# Patient Record
Sex: FEMALE | Race: WHITE | NOT HISPANIC OR LATINO | Employment: OTHER | URBAN - NONMETROPOLITAN AREA
[De-identification: names, ages, dates, MRNs, and addresses within clinical notes are randomized per-mention and may not be internally consistent; named-entity substitution may affect disease eponyms.]

---

## 2017-03-09 ENCOUNTER — OFFICE VISIT (OUTPATIENT)
Dept: PULMONOLOGY | Facility: CLINIC | Age: 57
End: 2017-03-09

## 2017-03-09 VITALS
RESPIRATION RATE: 18 BRPM | SYSTOLIC BLOOD PRESSURE: 118 MMHG | HEIGHT: 66 IN | HEART RATE: 72 BPM | WEIGHT: 265 LBS | BODY MASS INDEX: 42.59 KG/M2 | OXYGEN SATURATION: 99 % | DIASTOLIC BLOOD PRESSURE: 76 MMHG

## 2017-03-09 DIAGNOSIS — J45.40 MODERATE PERSISTENT ASTHMA WITHOUT COMPLICATION: ICD-10-CM

## 2017-03-09 DIAGNOSIS — R06.02 SHORTNESS OF BREATH: Primary | ICD-10-CM

## 2017-03-09 DIAGNOSIS — E66.01 MORBID OBESITY, UNSPECIFIED OBESITY TYPE (HCC): ICD-10-CM

## 2017-03-09 DIAGNOSIS — G47.33 OBSTRUCTIVE SLEEP APNEA: ICD-10-CM

## 2017-03-09 DIAGNOSIS — G47.33 OSA (OBSTRUCTIVE SLEEP APNEA): ICD-10-CM

## 2017-03-09 DIAGNOSIS — J30.89 OTHER ALLERGIC RHINITIS: ICD-10-CM

## 2017-03-09 PROCEDURE — 95012 NITRIC OXIDE EXP GAS DETER: CPT | Performed by: INTERNAL MEDICINE

## 2017-03-09 PROCEDURE — 99214 OFFICE O/P EST MOD 30 MIN: CPT | Performed by: INTERNAL MEDICINE

## 2017-03-09 NOTE — PROGRESS NOTES
"Chief Complaint   Patient presents with   • Follow-up         Subjective   Janet Obrien is a 56 y.o. female.     History of Present Illness   The patient is here for follow-up of obstructive sleep apnea, allergic rhinitis, and asthma.    She continues to have symptoms of rhinitis even with using Flonase twice a day and Astelin as needed.    She has a dogs and cats indoors.  She states mold may also be present in her house.    She used the sample of Spiriva and was doing well with this medication but her insurance would not cover the medication. She is now using Dulera, but she is using Ventolin 3-4 times a day also. She states she was not needing to use the Ventolin as often when she was using Spiriva.    In December, she had an episode of shortness of breath that required her to go to the emergency room.  A couple of weeks after that she still hadn't improved much since she went to the urgent treatment Center and was given antibiotics and steroids for an upper respiratory infection.    She is using the CPAP machine at night but complains of it causing extreme dryness.  She is not sure if there is a humidifier or how to adjust or turn on the humidifier if one is present.    The following portions of the patient's history were reviewed and updated as appropriate: allergies, current medications, past family history, past medical history, past social history and past surgical history.    Review of Systems   HENT: Negative for sinus pressure, sneezing and sore throat.    Respiratory: Positive for shortness of breath. Negative for cough and wheezing.        Objective   Visit Vitals   • /76   • Pulse 72   • Resp 18   • Ht 66\" (167.6 cm)   • Wt 265 lb (120 kg)   • SpO2 99%   • BMI 42.77 kg/m2     Physical Exam   Constitutional: She is oriented to person, place, and time. She appears well-developed.   HENT:   Head: Normocephalic and atraumatic.   Fillings noted.  Erythematous nares.  Mild " drainage.  Mallempati III/IV.   Eyes: EOM are normal.   Cardiovascular: Normal rate and regular rhythm.    Pulmonary/Chest: Effort normal and breath sounds normal. She has no wheezes.   Abdominal:   Obese.    Musculoskeletal:   Gait normal.   Neurological: She is alert and oriented to person, place, and time.   Skin: Skin is dry.   Psychiatric: She has a normal mood and affect.   Vitals reviewed.          Assessment/Plan   Janet was seen today for follow-up.    Diagnoses and all orders for this visit:    Shortness of breath    Obstructive sleep apnea  -     BIPAP / CPAP Adjustment  -     Ambulatory Referral to Bariatric Surgery    Moderate persistent asthma without complication  -     Ambulatory Referral to Bariatric Surgery  -     Nitric Oxide    LINDSAY (obstructive sleep apnea)    Morbid obesity, unspecified obesity type    Other allergic rhinitis    Other orders  -     Tiotropium Bromide Monohydrate (SPIRIVA RESPIMAT) 1.25 MCG/ACT aerosol solution; Inhale 2 puffs Daily.  -     mometasone-formoterol (DULERA 200) 200-5 MCG/ACT inhaler; Inhale 2 puffs 2 (Two) Times a Day. Rinse mouth with water after use.           Return in about 3 months (around 6/9/2017) for Recheck, For Elvia.    DISCUSSION (if any):  I will request that Tonia demonstrate the humidifier to the patient and show her how to increase the humidification. This should help with the dryness.     She has some issues using the CPAP initially but she is doing better. We have discussed the dryness issue so this should improve also.     I will start Spiriva again. She was having fewer issues and SOB when on Spiriva. Continue Dulera. Ventolin prn.     The patient is open to the idea of bariatric surgery. She has sleep apnea and asthma so she would benefit from the surgery. I reviewed her sleep study and she has mild sleep apnea with an AHI of 13. She needs to be compliant with AutoPAP. The setting is 8/20.    FeNO today 11.       Errors in dictation may  reflect use of voice recognition software and not all errors in transcription may have been detected prior to signing    This document was electronically signed by Jose Rao MD March 9, 2017  11:51 AM

## 2017-06-28 ENCOUNTER — HOSPITAL ENCOUNTER (EMERGENCY)
Facility: HOSPITAL | Age: 57
Discharge: HOME | End: 2017-06-28
Attending: EMERGENCY MEDICINE | Admitting: EMERGENCY MEDICINE

## 2017-06-28 ENCOUNTER — APPOINTMENT (OUTPATIENT)
Dept: CT IMAGING | Facility: HOSPITAL | Age: 57
End: 2017-06-28

## 2017-06-28 VITALS
HEART RATE: 70 BPM | OXYGEN SATURATION: 98 % | WEIGHT: 265 LBS | DIASTOLIC BLOOD PRESSURE: 72 MMHG | BODY MASS INDEX: 44.15 KG/M2 | RESPIRATION RATE: 18 BRPM | TEMPERATURE: 98.1 F | HEIGHT: 65 IN | SYSTOLIC BLOOD PRESSURE: 126 MMHG

## 2017-06-28 DIAGNOSIS — M25.519 NECK AND SHOULDER PAIN: ICD-10-CM

## 2017-06-28 DIAGNOSIS — J30.9 ALLERGIC RHINITIS, UNSPECIFIED ALLERGIC RHINITIS TRIGGER, UNSPECIFIED RHINITIS SEASONALITY: ICD-10-CM

## 2017-06-28 DIAGNOSIS — G44.89 OTHER HEADACHE SYNDROME: Primary | ICD-10-CM

## 2017-06-28 DIAGNOSIS — M54.2 NECK AND SHOULDER PAIN: ICD-10-CM

## 2017-06-28 DIAGNOSIS — N76.0 ACUTE VAGINITIS: ICD-10-CM

## 2017-06-28 PROCEDURE — 99282 EMERGENCY DEPT VISIT SF MDM: CPT

## 2017-06-28 PROCEDURE — 70450 CT HEAD/BRAIN W/O DYE: CPT

## 2017-06-28 RX ORDER — FLUCONAZOLE 150 MG/1
150 TABLET ORAL DAILY
Qty: 2 TABLET | Refills: 0 | Status: SHIPPED | OUTPATIENT
Start: 2017-06-28 | End: 2017-08-24 | Stop reason: SDUPTHER

## 2017-07-10 ENCOUNTER — OFFICE VISIT (OUTPATIENT)
Dept: PULMONOLOGY | Facility: CLINIC | Age: 57
End: 2017-07-10

## 2017-07-10 VITALS
OXYGEN SATURATION: 98 % | DIASTOLIC BLOOD PRESSURE: 72 MMHG | WEIGHT: 267.2 LBS | SYSTOLIC BLOOD PRESSURE: 108 MMHG | RESPIRATION RATE: 16 BRPM | BODY MASS INDEX: 44.52 KG/M2 | HEART RATE: 72 BPM | HEIGHT: 65 IN

## 2017-07-10 DIAGNOSIS — J45.901 ACUTE EXACERBATION OF ASTHMA WITH ALLERGIC RHINITIS: ICD-10-CM

## 2017-07-10 DIAGNOSIS — G47.33 OSA (OBSTRUCTIVE SLEEP APNEA): Primary | ICD-10-CM

## 2017-07-10 DIAGNOSIS — J30.89 OTHER ALLERGIC RHINITIS: ICD-10-CM

## 2017-07-10 PROCEDURE — 99214 OFFICE O/P EST MOD 30 MIN: CPT | Performed by: NURSE PRACTITIONER

## 2017-07-10 PROCEDURE — 96372 THER/PROPH/DIAG INJ SC/IM: CPT | Performed by: NURSE PRACTITIONER

## 2017-07-10 RX ORDER — METHYLPREDNISOLONE ACETATE 80 MG/ML
80 INJECTION, SUSPENSION INTRA-ARTICULAR; INTRALESIONAL; INTRAMUSCULAR; SOFT TISSUE ONCE
Status: COMPLETED | OUTPATIENT
Start: 2017-07-10 | End: 2017-07-13

## 2017-07-10 NOTE — PROGRESS NOTES
"Chief Complaint   Patient presents with   • Follow-up     ASTHMA COMPLICATION         Subjective   Janet Obrien is a 56 y.o. female.     History of Present Illness   The patient is here for shortness of breath and asthma.    She began having increased shortness of breath about 4 days ago and reports the shortness of breath has continued to worsen. She feels like she cannot get enough air in when she breathes. She has continued to use Dulera and Spiriva. She denies any fever or worsening cough. Her sclera are very erythematous and upon further questioning she mentions fresh cut grass being a trigger. She is using Ventolin about 2 times a day everyday for the last 4 days.     Upon further questioning, she admits to increased stress. Her Aunt passed away and she has several issues at home. We discussed how anxiety can cause or exacerbate the shortness of breath.     She is not using AutoPAP. She states the machine was not counting the time she used it and the company said she was not compliant and insurance would not pay for the machine. She returned it to the company.     The following portions of the patient's history were reviewed and updated as appropriate: allergies, current medications, past family history, past medical history, past social history and past surgical history.    Review of Systems   HENT: Negative for sinus pressure, sneezing and sore throat.    Respiratory: Positive for shortness of breath and wheezing. Negative for cough.        Objective   Visit Vitals   • /72   • Pulse 72   • Resp 16   • Ht 65\" (165.1 cm)   • Wt 267 lb 3.2 oz (121 kg)   • SpO2 98%   • BMI 44.46 kg/m2     Physical Exam   Constitutional: She is oriented to person, place, and time. She appears well-developed.   HENT:   Head: Normocephalic and atraumatic.   Crowded oropharynx.  Mild drainage.   Eyes: EOM are normal.   Sclera erythematous. Right >Left.   Cardiovascular: Normal rate and regular rhythm.  "   Pulmonary/Chest: Effort normal.   Very minimal end expiratory wheezing.   Abdominal:   Obese.   Neurological: She is alert and oriented to person, place, and time.   Skin: Skin is warm.   Psychiatric:   Tearful   Vitals reviewed.          Assessment/Plan   Janet was seen today for follow-up.    Diagnoses and all orders for this visit:    LINDSAY (obstructive sleep apnea)    Other allergic rhinitis    Acute exacerbation of asthma with allergic rhinitis  -     methylPREDNISolone acetate (DEPO-medrol) injection 80 mg; Inject 1 mL into the shoulder, thigh, or buttocks 1 (One) Time.    Other orders  -     mometasone-formoterol (DULERA 200) 200-5 MCG/ACT inhaler; Inhale 2 puffs 2 (Two) Times a Day. Rinse mouth with water after use.           Return in about 6 weeks (around 8/21/2017) for For Me.    DISCUSSION (if any):  For the symptoms of acute exacerbation of asthma, I have prescribed intramuscular steroids.    Patient will be prescribed antibiotics, if she develops any fever.    Side effects have been discussed in detail    Patient was asked to report to the emergency room if symptoms do not improve.    Continue Dulera and Spiriva. Spiriva samples given today.    Continue Nasalide and use it everyday.    Continue Singulair.    Errors in dictation may reflect use of voice recognition software and not all errors in transcription may have been detected prior to signing    This document was electronically signed by NARA Roque July 10, 2017  10:20 AM

## 2017-07-13 RX ADMIN — METHYLPREDNISOLONE ACETATE 80 MG: 80 INJECTION, SUSPENSION INTRA-ARTICULAR; INTRALESIONAL; INTRAMUSCULAR; SOFT TISSUE at 14:21

## 2017-08-24 ENCOUNTER — OFFICE VISIT (OUTPATIENT)
Dept: PULMONOLOGY | Facility: CLINIC | Age: 57
End: 2017-08-24

## 2017-08-24 VITALS
HEIGHT: 65 IN | OXYGEN SATURATION: 97 % | SYSTOLIC BLOOD PRESSURE: 116 MMHG | WEIGHT: 268.3 LBS | DIASTOLIC BLOOD PRESSURE: 72 MMHG | RESPIRATION RATE: 16 BRPM | HEART RATE: 65 BPM | BODY MASS INDEX: 44.7 KG/M2

## 2017-08-24 DIAGNOSIS — G47.33 OBSTRUCTIVE SLEEP APNEA: ICD-10-CM

## 2017-08-24 DIAGNOSIS — J45.40 MODERATE PERSISTENT ASTHMA WITHOUT COMPLICATION: ICD-10-CM

## 2017-08-24 DIAGNOSIS — R06.02 SHORTNESS OF BREATH: ICD-10-CM

## 2017-08-24 DIAGNOSIS — J30.89 OTHER ALLERGIC RHINITIS: Primary | ICD-10-CM

## 2017-08-24 DIAGNOSIS — E66.01 MORBID OBESITY, UNSPECIFIED OBESITY TYPE (HCC): ICD-10-CM

## 2017-08-24 PROCEDURE — 99214 OFFICE O/P EST MOD 30 MIN: CPT | Performed by: NURSE PRACTITIONER

## 2017-08-24 RX ORDER — FLUCONAZOLE 150 MG/1
150 TABLET ORAL DAILY
Qty: 2 TABLET | Refills: 0 | Status: SHIPPED | OUTPATIENT
Start: 2017-08-24 | End: 2017-08-29 | Stop reason: SDUPTHER

## 2017-08-24 RX ORDER — FLUNISOLIDE 0.25 MG/ML
2 SOLUTION NASAL EVERY 12 HOURS
Qty: 1 BOTTLE | Refills: 5 | Status: SHIPPED | OUTPATIENT
Start: 2017-08-24 | End: 2018-07-27

## 2017-08-24 RX ORDER — ALBUTEROL SULFATE 90 UG/1
2 AEROSOL, METERED RESPIRATORY (INHALATION) EVERY 6 HOURS PRN
Qty: 1 INHALER | Refills: 3 | Status: SHIPPED | OUTPATIENT
Start: 2017-08-24 | End: 2018-10-24 | Stop reason: SDUPTHER

## 2017-08-24 RX ORDER — AZELASTINE 1 MG/ML
2 SPRAY, METERED NASAL 2 TIMES DAILY PRN
Qty: 1 EACH | Refills: 5 | Status: SHIPPED | OUTPATIENT
Start: 2017-08-24 | End: 2018-07-27

## 2017-08-24 NOTE — PROGRESS NOTES
"Chief Complaint   Patient presents with   • Follow-up   • Sleep Apnea         Subjective   Janet Yoana Obrien is a 57 y.o. female.     History of Present Illness   The patient is here for follow-up of shortness of breath, asthma, and allergic rhinitis.    She is using Spiriva and Dulera as directed.  She has some shortness of breath at times but she feels this is due to anxiety.  She has had some family issues recently which has increased her stress and anxiety.  She describes her breathing as being heavy at times.    She has only used the Ventolin 3 times since her last appointment.    She continues to use Nasalide every day and Astelin on occasion. She states the Nasalide burns she has continued to use it.    She is taking Singulair at night    Upon questioning about using a CPAP machine she states that the machine was picked up by Jayda.  She reports she was using the machine but the machine was not capturing the full time she used it so she was sent to be noncompliant with therapy.  She would like to have another CPAP machine.  She states she felt better when she was using the machine.    The following portions of the patient's history were reviewed and updated as appropriate: allergies, current medications, past family history, past medical history, past social history and past surgical history.    Review of Systems   HENT: Negative for sinus pressure, sneezing and sore throat.    Respiratory: Positive for cough, shortness of breath and wheezing.        Objective   Visit Vitals   • /72   • Pulse 65   • Resp 16   • Ht 65\" (165.1 cm)   • Wt 268 lb 4.8 oz (122 kg)   • SpO2 97%   • BMI 44.65 kg/m2     Physical Exam   Constitutional: She is oriented to person, place, and time. She appears well-developed.   HENT:   Head: Normocephalic and atraumatic.   Eyes: EOM are normal.   Cardiovascular: Normal rate and regular rhythm.    Pulmonary/Chest: Effort normal and breath sounds normal. No respiratory distress. " She has no wheezes.   Abdominal:   Obese   Musculoskeletal:   Gait normal.   Neurological: She is alert and oriented to person, place, and time.   Skin: Skin is dry.   Psychiatric: She has a normal mood and affect.   Vitals reviewed.          Assessment/Plan   Janet was seen today for follow-up and sleep apnea.    Diagnoses and all orders for this visit:    Other allergic rhinitis    Moderate persistent asthma without complication    Morbid obesity, unspecified obesity type    Shortness of breath    Obstructive sleep apnea  -     BIPAP / CPAP Adjustment    Other orders  -     Tiotropium Bromide Monohydrate (SPIRIVA RESPIMAT) 1.25 MCG/ACT aerosol solution; Inhale 2 puffs Daily.  -     mometasone-formoterol (DULERA 200) 200-5 MCG/ACT inhaler; Inhale 2 puffs 2 (Two) Times a Day. Rinse mouth with water after use.  -     flunisolide (NASALIDE) 25 MCG/ACT (0.025%) solution nasal spray; Inhale 2 sprays Every 12 (Twelve) Hours.  -     azelastine (ASTELIN) 0.1 % nasal spray; 2 sprays into each nostril 2 (Two) Times a Day As Needed for Rhinitis or Allergies. Use in each nostril as directed  -     albuterol (VENTOLIN HFA) 108 (90 Base) MCG/ACT inhaler; Inhale 2 puffs Every 6 (Six) Hours As Needed for Wheezing.  -     fluconazole (DIFLUCAN) 150 MG tablet; Take 1 tablet by mouth Daily.           Return in about 3 months (around 11/24/2017) for Recheck, For Dr. Rao.    DISCUSSION (if any):  Her asthma seems to be under control at this time.  She is using her rescue inhaler minimally.  There could definitely be a component of anxiety that adds to her shortness of breath.  Continue Spiriva and Dulera as directed and Ventolin as needed.    She has benefited from the nasal spray so I encouraged her to continue to use Nasalide daily.  If her nose becomes too dry or she begins to have some bleeding she may decrease to using Nasalide every other day for a short period of time.    She definitely has sleep apnea and she is interested  in using CPAP again.  I reviewed her sleep study from November 2016 which was a home sleep study that showed mild sleep apnea with an AHI of 13 per hour.  She would definitely benefit from using CPAP.  I will order a new set up through a different company since she had a bad experience with [facility].  I have discussed compliance in using the machine and how she may benefit.  She is willing to use CPAP nightly.      Dictated utilizing Dragon dictation.    This document was electronically signed by NARA Roque August 24, 2017  2:06 PM

## 2017-08-29 RX ORDER — FLUCONAZOLE 150 MG/1
150 TABLET ORAL DAILY
Qty: 2 TABLET | Refills: 0 | Status: SHIPPED | OUTPATIENT
Start: 2017-08-29 | End: 2018-07-27

## 2017-10-30 ENCOUNTER — TRANSCRIBE ORDERS (OUTPATIENT)
Dept: MAMMOGRAPHY | Facility: HOSPITAL | Age: 57
End: 2017-10-30

## 2017-10-30 DIAGNOSIS — Z12.39 BREAST CANCER SCREENING: Primary | ICD-10-CM

## 2017-12-01 ENCOUNTER — TELEPHONE (OUTPATIENT)
Dept: PULMONOLOGY | Facility: CLINIC | Age: 57
End: 2017-12-01

## 2017-12-01 NOTE — TELEPHONE ENCOUNTER
[facility] HAS SCHEDULED SETUP TWICE AND PATIENT DOES NOT SHOW UP. NOW WILL NOT RETURN CALLS TO DME.

## 2017-12-13 ENCOUNTER — HOSPITAL ENCOUNTER (OUTPATIENT)
Dept: MAMMOGRAPHY | Facility: HOSPITAL | Age: 57
Discharge: HOME | End: 2017-12-13
Attending: INTERNAL MEDICINE | Admitting: INTERNAL MEDICINE

## 2017-12-13 DIAGNOSIS — Z12.39 BREAST CANCER SCREENING: ICD-10-CM

## 2017-12-13 PROCEDURE — 77063 BREAST TOMOSYNTHESIS BI: CPT

## 2017-12-13 PROCEDURE — G0202 SCR MAMMO BI INCL CAD: HCPCS

## 2018-04-24 ENCOUNTER — HOSPITAL ENCOUNTER (EMERGENCY)
Facility: HOSPITAL | Age: 58
Discharge: HOME | End: 2018-04-24
Attending: STUDENT IN AN ORGANIZED HEALTH CARE EDUCATION/TRAINING PROGRAM | Admitting: STUDENT IN AN ORGANIZED HEALTH CARE EDUCATION/TRAINING PROGRAM

## 2018-04-24 VITALS
RESPIRATION RATE: 18 BRPM | HEIGHT: 66 IN | DIASTOLIC BLOOD PRESSURE: 82 MMHG | SYSTOLIC BLOOD PRESSURE: 148 MMHG | BODY MASS INDEX: 42.59 KG/M2 | WEIGHT: 265 LBS | OXYGEN SATURATION: 96 % | HEART RATE: 76 BPM | TEMPERATURE: 98 F

## 2018-04-24 DIAGNOSIS — H53.2 DIPLOPIA: ICD-10-CM

## 2018-04-24 DIAGNOSIS — R42 VERTIGO: Primary | ICD-10-CM

## 2018-04-24 LAB
ALBUMIN SERPL-MCNC: 4.4 G/DL (ref 3.5–5)
ALBUMIN/GLOB SERPL: 1.5 G/DL (ref 1–2)
ALP SERPL-CCNC: 96 U/L (ref 38–126)
ALT SERPL W P-5'-P-CCNC: 34 U/L (ref 13–69)
ANION GAP SERPL CALCULATED.3IONS-SCNC: 18.1 MMOL/L (ref 10–20)
AST SERPL-CCNC: 29 U/L (ref 15–46)
BASOPHILS # BLD AUTO: 0.04 10*3/MM3 (ref 0–0.2)
BASOPHILS NFR BLD AUTO: 0.8 % (ref 0–2.5)
BILIRUB SERPL-MCNC: 0.6 MG/DL (ref 0.2–1.3)
BUN BLD-MCNC: 19 MG/DL (ref 7–20)
BUN/CREAT SERPL: 19 (ref 7.1–23.5)
CALCIUM SPEC-SCNC: 10.1 MG/DL (ref 8.4–10.2)
CHLORIDE SERPL-SCNC: 97 MMOL/L (ref 98–107)
CO2 SERPL-SCNC: 27 MMOL/L (ref 26–30)
CREAT BLD-MCNC: 1 MG/DL (ref 0.6–1.3)
DEPRECATED RDW RBC AUTO: 40.4 FL (ref 37–54)
EOSINOPHIL # BLD AUTO: 0.17 10*3/MM3 (ref 0–0.7)
EOSINOPHIL NFR BLD AUTO: 3.3 % (ref 0–7)
ERYTHROCYTE [DISTWIDTH] IN BLOOD BY AUTOMATED COUNT: 13.4 % (ref 11.5–14.5)
GFR SERPL CREATININE-BSD FRML MDRD: 57 ML/MIN/1.73
GLOBULIN UR ELPH-MCNC: 2.9 GM/DL
GLUCOSE BLD-MCNC: 142 MG/DL (ref 74–98)
HCT VFR BLD AUTO: 38.9 % (ref 37–47)
HGB BLD-MCNC: 13.1 G/DL (ref 12–16)
HOLD SPECIMEN: NORMAL
HOLD SPECIMEN: NORMAL
IMM GRANULOCYTES # BLD: 0.01 10*3/MM3 (ref 0–0.06)
IMM GRANULOCYTES NFR BLD: 0.2 % (ref 0–0.6)
LYMPHOCYTES # BLD AUTO: 1.24 10*3/MM3 (ref 0.6–3.4)
LYMPHOCYTES NFR BLD AUTO: 24.1 % (ref 10–50)
MCH RBC QN AUTO: 27.9 PG (ref 27–31)
MCHC RBC AUTO-ENTMCNC: 33.7 G/DL (ref 30–37)
MCV RBC AUTO: 82.8 FL (ref 81–99)
MONOCYTES # BLD AUTO: 0.5 10*3/MM3 (ref 0–0.9)
MONOCYTES NFR BLD AUTO: 9.7 % (ref 0–12)
NEUTROPHILS # BLD AUTO: 3.18 10*3/MM3 (ref 2–6.9)
NEUTROPHILS NFR BLD AUTO: 61.9 % (ref 37–80)
NRBC BLD MANUAL-RTO: 0 /100 WBC (ref 0–0)
PLATELET # BLD AUTO: 293 10*3/MM3 (ref 130–400)
PMV BLD AUTO: 10.9 FL (ref 6–12)
POTASSIUM BLD-SCNC: 3.1 MMOL/L (ref 3.5–5.1)
PROT SERPL-MCNC: 7.3 G/DL (ref 6.3–8.2)
RBC # BLD AUTO: 4.7 10*6/MM3 (ref 4.2–5.4)
SODIUM BLD-SCNC: 139 MMOL/L (ref 137–145)
TROPONIN I SERPL-MCNC: <0.012 NG/ML (ref 0–0.03)
WBC NRBC COR # BLD: 5.14 10*3/MM3 (ref 4.8–10.8)
WHOLE BLOOD HOLD SPECIMEN: NORMAL
WHOLE BLOOD HOLD SPECIMEN: NORMAL

## 2018-04-24 PROCEDURE — 85025 COMPLETE CBC W/AUTO DIFF WBC: CPT | Performed by: STUDENT IN AN ORGANIZED HEALTH CARE EDUCATION/TRAINING PROGRAM

## 2018-04-24 PROCEDURE — 99284 EMERGENCY DEPT VISIT MOD MDM: CPT

## 2018-04-24 PROCEDURE — 80053 COMPREHEN METABOLIC PANEL: CPT | Performed by: STUDENT IN AN ORGANIZED HEALTH CARE EDUCATION/TRAINING PROGRAM

## 2018-04-24 PROCEDURE — 96374 THER/PROPH/DIAG INJ IV PUSH: CPT

## 2018-04-24 PROCEDURE — 96375 TX/PRO/DX INJ NEW DRUG ADDON: CPT

## 2018-04-24 PROCEDURE — 25010000002 ONDANSETRON PER 1 MG: Performed by: STUDENT IN AN ORGANIZED HEALTH CARE EDUCATION/TRAINING PROGRAM

## 2018-04-24 PROCEDURE — 84484 ASSAY OF TROPONIN QUANT: CPT | Performed by: STUDENT IN AN ORGANIZED HEALTH CARE EDUCATION/TRAINING PROGRAM

## 2018-04-24 PROCEDURE — 25010000002 LORAZEPAM PER 2 MG: Performed by: STUDENT IN AN ORGANIZED HEALTH CARE EDUCATION/TRAINING PROGRAM

## 2018-04-24 PROCEDURE — 82962 GLUCOSE BLOOD TEST: CPT

## 2018-04-24 PROCEDURE — 93005 ELECTROCARDIOGRAM TRACING: CPT | Performed by: STUDENT IN AN ORGANIZED HEALTH CARE EDUCATION/TRAINING PROGRAM

## 2018-04-24 RX ORDER — MECLIZINE HYDROCHLORIDE 25 MG/1
50 TABLET ORAL ONCE
Status: COMPLETED | OUTPATIENT
Start: 2018-04-24 | End: 2018-04-24

## 2018-04-24 RX ORDER — ONDANSETRON 4 MG/1
4 TABLET, ORALLY DISINTEGRATING ORAL EVERY 6 HOURS PRN
Qty: 20 TABLET | Refills: 0 | Status: SHIPPED | OUTPATIENT
Start: 2018-04-24 | End: 2018-07-27

## 2018-04-24 RX ORDER — ONDANSETRON 2 MG/ML
4 INJECTION INTRAMUSCULAR; INTRAVENOUS ONCE
Status: COMPLETED | OUTPATIENT
Start: 2018-04-24 | End: 2018-04-24

## 2018-04-24 RX ORDER — MECLIZINE HYDROCHLORIDE 25 MG/1
25 TABLET ORAL 4 TIMES DAILY PRN
Qty: 20 TABLET | Refills: 0 | Status: SHIPPED | OUTPATIENT
Start: 2018-04-24 | End: 2018-07-27

## 2018-04-24 RX ORDER — LORAZEPAM 2 MG/ML
1 INJECTION INTRAMUSCULAR ONCE
Status: COMPLETED | OUTPATIENT
Start: 2018-04-24 | End: 2018-04-24

## 2018-04-24 RX ORDER — SODIUM CHLORIDE 0.9 % (FLUSH) 0.9 %
10 SYRINGE (ML) INJECTION AS NEEDED
Status: DISCONTINUED | OUTPATIENT
Start: 2018-04-24 | End: 2018-04-24 | Stop reason: HOSPADM

## 2018-04-24 RX ADMIN — MECLIZINE HYDROCHLORIDE 50 MG: 25 TABLET ORAL at 19:55

## 2018-04-24 RX ADMIN — ONDANSETRON 4 MG: 2 INJECTION INTRAMUSCULAR; INTRAVENOUS at 19:56

## 2018-04-24 RX ADMIN — LORAZEPAM 1 MG: 2 INJECTION INTRAMUSCULAR; INTRAVENOUS at 19:59

## 2018-04-25 LAB — GLUCOSE BLDC GLUCOMTR-MCNC: 141 MG/DL (ref 70–130)

## 2018-04-25 NOTE — ED PROVIDER NOTES
Subjective   Patient is a 57-year-old female that presents with vertigo symptoms that started today.  States she has nausea and vomiting as well as a headache.  Patient states when she tries to focus concern directions she has double vision.  She also reports that when she closes her eyes the vertigo persists.  She has a history of myositic orbital pseudotumor that she follows with Dr. Keenan aj.  She states when this flares up and he will schedule her for outpatient infusions.  She is not sure what the medicines are that she gets.  Currently closing one eye makes her symptoms better but they're still present.  She states it is worse when she tries to focus her eyes on anything.            Review of Systems   All other systems reviewed and are negative.      Past Medical History:   Diagnosis Date   • Asthma    • Chronic bronchitis    • Diabetes mellitus    • Hyperlipidemia    • Hypertension    • Hypothyroidism    • Pleurisy    • Pneumonia    • Skin cancer        Allergies   Allergen Reactions   • Penicillins    • Sulfa Antibiotics        Past Surgical History:   Procedure Laterality Date   • APPENDECTOMY     •  SECTION     • DILATATION AND CURETTAGE     • HYSTERECTOMY     • TONSILLECTOMY AND ADENOIDECTOMY         Family History   Problem Relation Age of Onset   • Hypertension Mother    • Heart attack Mother    • Fibromyalgia Mother    • Heart attack Father        Social History     Social History   • Marital status:      Social History Main Topics   • Smoking status: Former Smoker     Packs/day: 0.25     Years: 15.00     Types: Cigarettes     Quit date: 2004   • Smokeless tobacco: Never Used   • Alcohol use No   • Drug use: No     Other Topics Concern   • Not on file           Objective   Physical Exam   Nursing note and vitals reviewed.    GEN: No acute distress  Head: Normocephalic, atraumatic  Eyes: Pupils equal round reactive to light, mild nystagmus at baseline when looking straight ahead.   No true inducible nystagmus.  When she covers either eye it does seem to level out she seems to be able to focus.  ENT: Posterior pharynx normal in appearance, oral mucosa is moist  Chest: Nontender to palpation  Cardiovascular: Regular rate  Lungs: Clear to auscultation bilaterally  Abdomen: Soft, nontender, nondistended, no peritoneal signs  Extremities: No edema, normal appearance  Neuro: GCS 15, strength 5 out of 5 in all 4 extremities, patient does display normal coordination and has to reach for objects  Psych: Mood and affect are appropriate    Procedures         ED Course  ED Course                  MDM  Number of Diagnoses or Management Options  Diplopia:   Vertigo:   Diagnosis management comments: EKG shows no evidence of dysrhythmia or ST elevations.    Laboratories show no significant abnormalities    Patient was treated conservatively with IV Ativan, IV Zofran, and meclizine orally with good symptom control.  Patient does have a known disorder that she follows with Dr. Hussein for.  She states when these events happen he will have her show up for IV infusions.  She is comfortable with follow up with Dr. Hussein tomorrow.       Amount and/or Complexity of Data Reviewed  Clinical lab tests: reviewed  Decide to obtain previous medical records or to obtain history from someone other than the patient: yes  Obtain history from someone other than the patient: yes  Review and summarize past medical records: yes        Final diagnoses:   Vertigo   Diplopia            Davi Corona MD  04/24/18 [id number]

## 2018-07-27 ENCOUNTER — LAB (OUTPATIENT)
Dept: LAB | Facility: HOSPITAL | Age: 58
End: 2018-07-27
Attending: PSYCHIATRY & NEUROLOGY

## 2018-07-27 ENCOUNTER — OFFICE VISIT (OUTPATIENT)
Dept: NEUROLOGY | Facility: CLINIC | Age: 58
End: 2018-07-27

## 2018-07-27 VITALS
DIASTOLIC BLOOD PRESSURE: 68 MMHG | SYSTOLIC BLOOD PRESSURE: 118 MMHG | OXYGEN SATURATION: 99 % | HEART RATE: 82 BPM | HEIGHT: 65 IN

## 2018-07-27 DIAGNOSIS — H53.2 DIPLOPIA: Primary | ICD-10-CM

## 2018-07-27 DIAGNOSIS — H53.2 DIPLOPIA: ICD-10-CM

## 2018-07-27 LAB — TSH SERPL DL<=0.05 MIU/L-ACNC: 2.06 MIU/ML (ref 0.47–4.68)

## 2018-07-27 PROCEDURE — 36415 COLL VENOUS BLD VENIPUNCTURE: CPT

## 2018-07-27 PROCEDURE — 83519 RIA NONANTIBODY: CPT

## 2018-07-27 PROCEDURE — 99214 OFFICE O/P EST MOD 30 MIN: CPT | Performed by: PSYCHIATRY & NEUROLOGY

## 2018-07-27 PROCEDURE — 84443 ASSAY THYROID STIM HORMONE: CPT

## 2018-07-27 RX ORDER — CELECOXIB 100 MG/1
100 CAPSULE ORAL EVERY EVENING
COMMUNITY
End: 2019-01-24

## 2018-07-27 NOTE — PROGRESS NOTES
[facility] NEUROLOGY [place] PROGRESS NOTE  History of Present Illness     Date: 2018    Patient Identification  Janet Obrien is a 57 y.o. female.    Patient information was obtained from patient.  History/Exam limitations: none.    Original consultation requested by: Dr. Davenport      Chief Complaint   Diplopia (Pt in office today with c/o double vision and vertigo )      History of Present Illness   Patient is a pleasant 57-year-old referred to [facility] neurology [place] evaluation of diplopia.  Patient initially presented to the emergency room on 2018.  She was complaining of nausea and vomiting associated with headache.  She tried to focus she described a double vision.  Patients with clinically sent here for further evaluation and treatment.  We have also discussed the importance of checking her thyroid function and myasthenia gravis.  PMH:   Past Medical History:   Diagnosis Date   • Anxiety    • Asthma    • Chronic bronchitis (CMS/HCC)    • Depression    • Diabetes mellitus (CMS/HCC)    • Fibromyalgia    • Hyperlipidemia    • Hypertension    • Hypothyroidism    • Pleurisy    • Pneumonia    • Skin cancer        Past Surgical History:   Past Surgical History:   Procedure Laterality Date   • APPENDECTOMY     •  SECTION     • DILATATION AND CURETTAGE     • HYSTERECTOMY     • TONSILLECTOMY AND ADENOIDECTOMY         Family Hisotry:   Family History   Problem Relation Age of Onset   • Hypertension Mother    • Heart attack Mother    • Fibromyalgia Mother    • Heart attack Father        Social History:   Social History     Social History   • Marital status:      Spouse name: N/A   • Number of children: N/A   • Years of education: N/A     Occupational History   • Not on file.     Social History Main Topics   • Smoking status: Former Smoker     Packs/day: 0.25     Years: 15.00     Types: Cigarettes     Quit date: 2004   • Smokeless tobacco: Never Used   • Alcohol use  No   • Drug use: No   • Sexual activity: Not on file     Other Topics Concern   • Not on file     Social History Narrative   • No narrative on file       Medications:   Current Outpatient Prescriptions   Medication Sig Dispense Refill   • ACCU-CHEK MARYSOL PLUS test strip      • ACCU-CHEK SOFTCLIX LANCETS lancets      • albuterol (VENTOLIN HFA) 108 (90 Base) MCG/ACT inhaler Inhale 2 puffs Every 6 (Six) Hours As Needed for Wheezing. 1 inhaler 3   • Calcium Carbonate-Vit D-Min (CALCIUM 1200 PO) Take  by mouth.     • celecoxib (CeleBREX) 100 MG capsule Take 100 mg by mouth Every Evening.     • hydrochlorothiazide (HYDRODIURIL) 25 MG tablet Take 1 tablet by mouth daily.     • levothyroxine (SYNTHROID) 150 MCG tablet Take 1 tablet by mouth daily.     • oxyCODONE-acetaminophen (PERCOCET)  MG per tablet      • sitaGLIPtin-metFORMIN (JANUMET)  MG per tablet Take  by mouth.       No current facility-administered medications for this visit.        Allergy:   Allergies   Allergen Reactions   • Penicillins    • Sulfa Antibiotics        Review of Systems:  Review of Systems   Constitutional: Negative for chills and fever.   HENT: Negative for congestion, ear pain, hearing loss, rhinorrhea and sore throat.    Eyes: Positive for visual disturbance. Negative for pain, discharge and redness.   Respiratory: Negative for cough, shortness of breath, wheezing and stridor.    Cardiovascular: Negative for chest pain, palpitations and leg swelling.   Gastrointestinal: Negative for abdominal pain, constipation, nausea and vomiting.   Endocrine: Negative for cold intolerance, heat intolerance and polyphagia.   Genitourinary: Negative for dysuria, flank pain, frequency and urgency.   Musculoskeletal: Negative for joint swelling, myalgias, neck pain and neck stiffness.   Skin: Negative for pallor, rash and wound.   Allergic/Immunologic: Negative for environmental allergies.   Neurological: Positive for dizziness. Negative for  "tremors, seizures, syncope, facial asymmetry, speech difficulty, weakness, light-headedness, numbness and headaches.   Hematological: Negative for adenopathy.   Psychiatric/Behavioral: Negative for confusion and hallucinations. The patient is not nervous/anxious.        Physical Exam     Vitals:    07/27/18 1411   BP: 118/68   Pulse: 82   SpO2: 99%   Height: 165.1 cm (65\")     GENERAL: Patient is pleasant, cooperative, appears to be stated age.  Body habitus is endomorphic.  SKIN AND EXTREMITIES:  No skin rashes or lesions are noted.  No cyanosis, clubbing or edema of the extremities.    HEAD:  Head is normocephalic and atraumatic.    NECK: Neck are non-tender without thyromegaly or adenopathy.  Carotic upstrokes are 1+/4.  No cranial or cervical bruits.  The neck is supple with a full range of motion.   ENT: palate elevate symmetrically, no evidence of high arch palate, tongue midline erythema in posterior pharynx, Mallampati Classification Class III   CARDIOVASCULAR:  Regular rate and rhythm with normal S1 and S2 without rub or gallop.  RESPIRATORY:  Clear to auscultation without wheezes or crackle   ABDOMEN:  Soft and non-tender, positive bowel sound without hepatosplenomegaly  BACK:  Back is straight without midline defect.    PSYCH:  Higher cortical function/mental status:  The patient is alert.  She is oriented x3 to time, place and person.  Recent and the remote memory appear normal.  The patient has a good fund of knowledge.  There is no visual or auditory hallucination or suicidal or homicidal ideation.  SPEECH:There is no gross evidence of aphasia, dysarthria or agnosia.      CRANIAL NERVES:  Pupils are 4mm, equal round reactive to light, reacting briskly to 2mm without afferent pupillary defect.  Visual fields are intact to confrontation testing.  Fundoscopic examination reveals sharp disk margins with normal vasculature.  No papilledema, hemorrhages or exudates.  Extraocular movements are full and smooth " with normal pursuits and saccades.  No nystagmus noted.  The face is symmetric. palate elevate symmetrically, Tongue midline, positive gag reflex. The remainder of the cranial nerves are intact and symmetrical.    MOTOR: Strength is 5/5 throughout with normal tone and bulk with the following exceptions, 4/5 intrinsic muscles of the hands and feet.  No involuntary movements noted.    Deep Tendon Reflexes: are 2/4 and symmetrical in the upper extremities, 2/4 and symmetrical at the knees and 1/4 and symmetrical at the Achilles tendon.  Plantar responses were down-going bilaterally.    SENSATION:  Intact to pinprick, light touch, vibration and proprioception.  Coordination:  The patient normally performs finger-nose-finger, heel-to-knee-to-shin and rapid alternating movements in symmetrical fashion.    COORDINATION AND GAIT:  The patient walks with a narrow-based gait.  Patient is able to heel-toe and tandem walk forward and backwards without difficulty.  Romberg and monopedal  Romberg are negative.    MUSCULOSKELETAL: Range of motion normal, no clubbing, cyanosis, or edema.  No joint swelling.            Records Reviewed: I have personally reviewed her previous medical record.    Janet was seen today for diplopia.    Diagnoses and all orders for this visit:    Diplopia  -     Acetylcholine Receptor Antibody Panel; Future  -     TSH; Future        Treatments:  We have also discussed the importance of checking her thyroid function and myasthenia gravis.  Will obtain TSH and Myasthenia Gravis Panel    This Document is signed by Toni Hussein MD, FAAN, FAASM   July 28, 20189:08 PM

## 2018-08-09 LAB
ACHR AB SER-SCNC: <0.03 NMOL/L (ref 0–0.24)
ACHR BLOCK AB/ACHR TOTAL SFR SER: 15 % (ref 0–25)
ACHR MOD AB/ACHR TOTAL SFR SER: <12 % (ref 0–20)

## 2018-08-29 ENCOUNTER — OFFICE VISIT (OUTPATIENT)
Dept: NEUROLOGY | Facility: CLINIC | Age: 58
End: 2018-08-29

## 2018-08-29 VITALS
OXYGEN SATURATION: 99 % | SYSTOLIC BLOOD PRESSURE: 148 MMHG | HEIGHT: 65 IN | HEART RATE: 73 BPM | DIASTOLIC BLOOD PRESSURE: 80 MMHG

## 2018-08-29 DIAGNOSIS — E03.8 OTHER SPECIFIED HYPOTHYROIDISM: ICD-10-CM

## 2018-08-29 DIAGNOSIS — H53.2 BINOCULAR VISION DISORDER WITH DIPLOPIA: Primary | ICD-10-CM

## 2018-08-29 PROCEDURE — 99214 OFFICE O/P EST MOD 30 MIN: CPT | Performed by: PSYCHIATRY & NEUROLOGY

## 2018-08-29 NOTE — PROGRESS NOTES
[facility]UROLOG[facility] [place] PROGRESS NOTE  History of Present Illness     Date: 2018    Patient Identification  Janet Obrien is a 58 y.o. female.    Patient information was obtained from patient.  History/Exam limitations: none.    Original consultation requested by: Dr. Dailey      Chief Complaint   Results (Pt in office today to review results of lab work ) and Diplopia      History of Present Illness   Patient is a pleasant 58-year-old referred to [facility] neurology [place] for evaluation of diplopia.  Interval history since last visit patient underwent TSH, acetylcholine receptor AB,  CBC and they were all reportedly normal.  We have also discussed anti-MUSK antibody testing in diplopia patient when acetylcholine receptor antibody are negative.    PMH:   Past Medical History:   Diagnosis Date   • Anxiety    • Asthma    • Chronic bronchitis (CMS/HCC)    • Depression    • Diabetes mellitus (CMS/HCC)    • Fibromyalgia    • Hyperlipidemia    • Hypertension    • Hypothyroidism    • Pleurisy    • Pneumonia    • Skin cancer        Past Surgical History:   Past Surgical History:   Procedure Laterality Date   • APPENDECTOMY     •  SECTION     • DILATATION AND CURETTAGE     • HYSTERECTOMY     • TONSILLECTOMY AND ADENOIDECTOMY         Family Hisotry:   Family History   Problem Relation Age of Onset   • Hypertension Mother    • Heart attack Mother    • Fibromyalgia Mother    • Heart attack Father        Social History:   Social History     Social History   • Marital status:      Spouse name: N/A   • Number of children: N/A   • Years of education: N/A     Occupational History   • Not on file.     Social History Main Topics   • Smoking status: Former Smoker     Packs/day: 0.25     Years: 15.00     Types: Cigarettes     Quit date: 2004   • Smokeless tobacco: Never Used   • Alcohol use No   • Drug use: No   • Sexual activity: Not on file     Other Topics Concern   • Not on file      Social History Narrative   • No narrative on file       Medications:   Current Outpatient Prescriptions   Medication Sig Dispense Refill   • ACCU-CHEK MARYSOL PLUS test strip      • ACCU-CHEK SOFTCLIX LANCETS lancets      • albuterol (VENTOLIN HFA) 108 (90 Base) MCG/ACT inhaler Inhale 2 puffs Every 6 (Six) Hours As Needed for Wheezing. 1 inhaler 3   • Calcium Carbonate-Vit D-Min (CALCIUM 1200 PO) Take  by mouth.     • celecoxib (CeleBREX) 100 MG capsule Take 100 mg by mouth Every Evening.     • hydrochlorothiazide (HYDRODIURIL) 25 MG tablet Take 1 tablet by mouth daily.     • levothyroxine (SYNTHROID) 150 MCG tablet Take 1 tablet by mouth daily.     • oxyCODONE-acetaminophen (PERCOCET)  MG per tablet      • sitaGLIPtin-metFORMIN (JANUMET)  MG per tablet Take  by mouth.       No current facility-administered medications for this visit.        Allergy:   Allergies   Allergen Reactions   • Penicillins    • Sulfa Antibiotics        Review of Systems:  Review of Systems   Constitutional: Negative for chills and fever.   HENT: Negative for congestion, ear pain, hearing loss, rhinorrhea and sore throat.    Eyes: Positive for visual disturbance. Negative for pain, discharge and redness.   Respiratory: Negative for cough, shortness of breath, wheezing and stridor.    Cardiovascular: Negative for chest pain, palpitations and leg swelling.   Gastrointestinal: Negative for abdominal pain, constipation, nausea and vomiting.   Endocrine: Negative for cold intolerance, heat intolerance and polyphagia.   Genitourinary: Negative for dysuria, flank pain, frequency and urgency.   Musculoskeletal: Negative for joint swelling, myalgias, neck pain and neck stiffness.   Skin: Negative for pallor, rash and wound.   Allergic/Immunologic: Negative for environmental allergies.   Neurological: Positive for weakness. Negative for dizziness, tremors, seizures, syncope, facial asymmetry, speech difficulty, light-headedness,  "numbness and headaches.   Hematological: Negative for adenopathy.   Psychiatric/Behavioral: Negative for confusion and hallucinations. The patient is not nervous/anxious.        Physical Exam     Vitals:    08/29/18 1012   BP: 148/80   Pulse: 73   SpO2: 99%   Height: 165.1 cm (65\")     GENERAL: Patient is pleasant, cooperative, appears to be stated age.  Body habitus is endomorphic.  SKIN AND EXTREMITIES:  No skin rashes or lesions are noted.  No cyanosis, clubbing or edema of the extremities.    HEAD:  Head is normocephalic and atraumatic.    NECK: Neck are non-tender without thyromegaly or adenopathy.  Carotic upstrokes are 1+/4.  No cranial or cervical bruits.  The neck is supple with a full range of motion.   ENT: palate elevate symmetrically, no evidence of high arch palate, tongue midline erythema in posterior pharynx, Mallampati Classification Class III   CARDIOVASCULAR:  Regular rate and rhythm with normal S1 and S2 without rub or gallop.  RESPIRATORY:  Clear to auscultation without wheezes or crackle   ABDOMEN:  Soft and non-tender, positive bowel sound without hepatosplenomegaly  BACK:  Back is straight without midline defect.    PSYCH:  Higher cortical function/mental status:  The patient is alert.  She is oriented x3 to time, place and person.  Recent and the remote memory appear normal.  The patient has a good fund of knowledge.  There is no visual or auditory hallucination or suicidal or homicidal ideation.  SPEECH:There is no gross evidence of aphasia, dysarthria or agnosia.      CRANIAL NERVES:  Pupils are 4mm, equal round reactive to light, reacting briskly to 2mm without afferent pupillary defect.  Visual fields are intact to confrontation testing.  Fundoscopic examination reveals sharp disk margins with normal vasculature.  No papilledema, hemorrhages or exudates.  Extraocular movements left gaze nystagmus with normal pursuits and saccades.  No nystagmus noted.  The face is symmetric. palate " elevate symmetrically, Tongue midline, positive gag reflex. The remainder of the cranial nerves are intact and symmetrical.    MOTOR: Strength is 5/5 throughout with normal tone and bulk with the following exceptions, 4/5 intrinsic muscles of the hands and feet.  No involuntary movements noted.    Deep Tendon Reflexes: are 2/4 and symmetrical in the upper extremities, 2/4 and symmetrical at the knees and 1/4 and symmetrical at the Achilles tendon.  Plantar responses were down-going bilaterally.    SENSATION:  Intact to pinprick, light touch, vibration and proprioception.  Coordination:  The patient normally performs finger-nose-finger, heel-to-knee-to-shin and rapid alternating movements in symmetrical fashion.    COORDINATION AND GAIT:  The patient walks with a narrow-based gait.  Patient is able to heel-toe and tandem walk forward and backwards without difficulty.  Romberg and monopedal  Romberg are negative.    MUSCULOSKELETAL: Range of motion normal, no clubbing, cyanosis, or edema.  No joint swelling.            Studies: I have personally reviewed the following and discussed with the patient.  Results for orders placed or performed in visit on 07/27/18   Acetylcholine Receptor Antibody Panel   Result Value Ref Range    AChR Binding Ab <0.03 0.00 - 0.24 nmol/L    AChR Blocking Abs 15 0 - 25 %    Acetylcholine Modulating Ab <12 0 - 20 %   TSH   Result Value Ref Range    TSH 2.060 0.470 - 4.680 mIU/mL         Records Reviewed: I have personally reviewed her previous medical record.    Janet was seen today for results and diplopia.    Diagnoses and all orders for this visit:    Binocular vision disorder with diplopia  -     Musk Antibody; Future  -     Ambulatory Referral to Endocrinology    Other specified hypothyroidism  -     Ambulatory Referral to Endocrinology        Treatments:  We have also discussed anti-MUSK antibody testing in diplopia patient when acetylcholine receptor antibody are negative.      This  Document is signed by Toni Hussein MD, FAAN, FAASM   August 29, 201810:25 PM

## 2018-10-15 ENCOUNTER — LAB (OUTPATIENT)
Dept: LAB | Facility: HOSPITAL | Age: 58
End: 2018-10-15
Attending: PSYCHIATRY & NEUROLOGY

## 2018-10-15 DIAGNOSIS — H53.2 BINOCULAR VISION DISORDER WITH DIPLOPIA: ICD-10-CM

## 2018-10-15 PROCEDURE — 83519 RIA NONANTIBODY: CPT

## 2018-10-15 PROCEDURE — 36415 COLL VENOUS BLD VENIPUNCTURE: CPT

## 2018-10-15 RX ORDER — ALBUTEROL SULFATE 90 UG/1
AEROSOL, METERED RESPIRATORY (INHALATION)
Refills: 3 | OUTPATIENT
Start: 2018-10-15

## 2018-10-24 LAB — MUSK ANTIBODY: <1 U/ML

## 2018-10-24 RX ORDER — ALBUTEROL SULFATE 90 UG/1
2 AEROSOL, METERED RESPIRATORY (INHALATION) EVERY 6 HOURS PRN
Qty: 1 INHALER | Refills: 0 | Status: SHIPPED | OUTPATIENT
Start: 2018-10-24 | End: 2018-10-29 | Stop reason: SDUPTHER

## 2018-10-24 RX ORDER — ALBUTEROL SULFATE 90 UG/1
AEROSOL, METERED RESPIRATORY (INHALATION)
Refills: 3 | Status: CANCELLED | OUTPATIENT
Start: 2018-10-24

## 2018-10-29 ENCOUNTER — OFFICE VISIT (OUTPATIENT)
Dept: PULMONOLOGY | Facility: CLINIC | Age: 58
End: 2018-10-29

## 2018-10-29 VITALS
HEIGHT: 65 IN | OXYGEN SATURATION: 97 % | DIASTOLIC BLOOD PRESSURE: 78 MMHG | RESPIRATION RATE: 16 BRPM | HEART RATE: 68 BPM | SYSTOLIC BLOOD PRESSURE: 122 MMHG | WEIGHT: 267 LBS | BODY MASS INDEX: 44.48 KG/M2

## 2018-10-29 DIAGNOSIS — J45.40 MODERATE PERSISTENT ASTHMA WITHOUT COMPLICATION: Primary | ICD-10-CM

## 2018-10-29 DIAGNOSIS — E66.01 MORBID OBESITY, UNSPECIFIED OBESITY TYPE (HCC): ICD-10-CM

## 2018-10-29 DIAGNOSIS — R06.02 SHORTNESS OF BREATH: ICD-10-CM

## 2018-10-29 DIAGNOSIS — G47.33 OBSTRUCTIVE SLEEP APNEA: ICD-10-CM

## 2018-10-29 DIAGNOSIS — G47.33 OSA (OBSTRUCTIVE SLEEP APNEA): Primary | ICD-10-CM

## 2018-10-29 PROCEDURE — 99214 OFFICE O/P EST MOD 30 MIN: CPT | Performed by: NURSE PRACTITIONER

## 2018-10-29 RX ORDER — DULOXETIN HYDROCHLORIDE 60 MG/1
CAPSULE, DELAYED RELEASE ORAL
COMMUNITY
Start: 2018-10-24 | End: 2020-06-05

## 2018-10-29 RX ORDER — BUPROPION HYDROCHLORIDE 150 MG/1
150 TABLET, EXTENDED RELEASE ORAL
COMMUNITY
Start: 2018-10-24 | End: 2021-07-06

## 2018-10-29 RX ORDER — ALBUTEROL SULFATE 90 UG/1
2 AEROSOL, METERED RESPIRATORY (INHALATION) EVERY 6 HOURS PRN
Qty: 1 INHALER | Refills: 0 | Status: SHIPPED | OUTPATIENT
Start: 2018-10-29 | End: 2019-07-29 | Stop reason: SDUPTHER

## 2018-10-29 RX ORDER — MONTELUKAST SODIUM 10 MG/1
10 TABLET ORAL NIGHTLY
Qty: 30 TABLET | Refills: 5 | Status: SHIPPED | OUTPATIENT
Start: 2018-10-29 | End: 2019-07-29 | Stop reason: SDUPTHER

## 2018-11-01 ENCOUNTER — TRANSCRIBE ORDERS (OUTPATIENT)
Dept: MAMMOGRAPHY | Facility: HOSPITAL | Age: 58
End: 2018-11-01

## 2018-11-01 DIAGNOSIS — Z12.39 BREAST CANCER SCREENING: Primary | ICD-10-CM

## 2018-11-29 ENCOUNTER — OFFICE VISIT (OUTPATIENT)
Dept: NEUROLOGY | Facility: CLINIC | Age: 58
End: 2018-11-29

## 2018-11-29 VITALS — BODY MASS INDEX: 46.85 KG/M2 | HEART RATE: 66 BPM | OXYGEN SATURATION: 98 % | HEIGHT: 65 IN | WEIGHT: 281.2 LBS

## 2018-11-29 DIAGNOSIS — G70.00 OCULAR MYASTHENIA (HCC): ICD-10-CM

## 2018-11-29 DIAGNOSIS — G47.34 NOCTURNAL OXYGEN DESATURATION: ICD-10-CM

## 2018-11-29 DIAGNOSIS — G47.19 EXCESSIVE DAYTIME SLEEPINESS: ICD-10-CM

## 2018-11-29 DIAGNOSIS — G47.33 OBSTRUCTIVE SLEEP APNEA: Primary | ICD-10-CM

## 2018-11-29 PROCEDURE — 99214 OFFICE O/P EST MOD 30 MIN: CPT | Performed by: PSYCHIATRY & NEUROLOGY

## 2018-11-29 RX ORDER — PYRIDOSTIGMINE BROMIDE 60 MG/1
60 TABLET ORAL 3 TIMES DAILY
Qty: 90 TABLET | Refills: 2 | Status: SHIPPED | OUTPATIENT
Start: 2018-11-29 | End: 2019-01-24

## 2018-11-29 RX ORDER — LEVOTHYROXINE SODIUM 175 UG/1
TABLET ORAL
COMMUNITY
Start: 2018-11-06 | End: 2019-01-24

## 2018-11-29 NOTE — PROGRESS NOTES
[facility] NEUROLOGY [place] PROGRESS NOTE  History of Present Illness     Date: 2018    Patient Identification  Janet Obrien is a 58 y.o. female.    Patient information was obtained from patient.  History/Exam limitations: none.    Original consultation requested by: Dr. Davenport      Chief Complaint   Follow-up (Pt is here for a FU for vertigo and vision changes. Pt states that she is about the same not much better. Pt also requests her lab results. )      History of Present Illness   Patient is a pleasant 58-year-old referred to [facility] neurology [place] for evaluation of obstructive sleep apnea and intermittent diplopia.  Patient still have not started using her nasal CPAP yet.  I have counseled patient extensively the importance of being compliant with using nasal CPAP.    In today visit patient reported that she is still experiencing intermittent diplopia.  Despite acetylcholine receptor antibody negative and Musk antibody negative, we have discussed a trial of Mestinon 60 mg 1 pill 3 times a day.  We have discussed extensively the side effect profile the Mestinon including GI side effect.  Patient expressed understanding.    Patient reported that her dizziness has completely resolved in today visit.      PMH:   Past Medical History:   Diagnosis Date   • Anxiety    • Asthma    • Chronic bronchitis (CMS/HCC)    • Depression    • Diabetes mellitus (CMS/HCC)    • Fibromyalgia    • Hyperlipidemia    • Hypertension    • Hypothyroidism    • Pleurisy    • Pneumonia    • Skin cancer        Past Surgical History:   Past Surgical History:   Procedure Laterality Date   • APPENDECTOMY     •  SECTION     • DILATATION AND CURETTAGE     • HYSTERECTOMY     • TONSILLECTOMY AND ADENOIDECTOMY         Family Hisotry:   Family History   Problem Relation Age of Onset   • Hypertension Mother    • Heart attack Mother    • Fibromyalgia Mother    • Heart attack Father        Social History:   Social  History     Socioeconomic History   • Marital status:      Spouse name: Not on file   • Number of children: Not on file   • Years of education: Not on file   • Highest education level: Not on file   Social Needs   • Financial resource strain: Not on file   • Food insecurity - worry: Not on file   • Food insecurity - inability: Not on file   • Transportation needs - medical: Not on file   • Transportation needs - non-medical: Not on file   Occupational History   • Not on file   Tobacco Use   • Smoking status: Former Smoker     Packs/day: 0.25     Years: 15.00     Pack years: 3.75     Types: Cigarettes     Last attempt to quit: 2004     Years since quittin.3   • Smokeless tobacco: Never Used   Substance and Sexual Activity   • Alcohol use: No   • Drug use: No   • Sexual activity: Defer   Other Topics Concern   • Not on file   Social History Narrative   • Not on file       Medications:   Current Outpatient Medications   Medication Sig Dispense Refill   • ACCU-CHEK MARYSOL PLUS test strip      • ACCU-CHEK SOFTCLIX LANCETS lancets      • albuterol (VENTOLIN HFA) 108 (90 Base) MCG/ACT inhaler Inhale 2 puffs Every 6 (Six) Hours As Needed for Wheezing. 1 inhaler 0   • buPROPion SR (WELLBUTRIN SR) 150 MG 12 hr tablet      • Calcium Carbonate-Vit D-Min (CALCIUM 1200 PO) Take  by mouth.     • celecoxib (CeleBREX) 100 MG capsule Take 100 mg by mouth Every Evening.     • DULoxetine (CYMBALTA) 60 MG capsule      • hydrochlorothiazide (HYDRODIURIL) 25 MG tablet Take 1 tablet by mouth daily.     • levothyroxine (SYNTHROID, LEVOTHROID) 175 MCG tablet      • mometasone-formoterol (DULERA 200) 200-5 MCG/ACT inhaler Inhale 2 puffs 2 (Two) Times a Day. Rinse mouth with water after use. 1 g 5   • montelukast (SINGULAIR) 10 MG tablet Take 1 tablet by mouth Every Night. 30 tablet 5   • oxyCODONE-acetaminophen (PERCOCET)  MG per tablet      • sitaGLIPtin-metFORMIN (JANUMET)  MG per tablet Take  by mouth.     •  "pyridostigmine (MESTINON) 60 MG tablet Take 1 tablet by mouth 3 (Three) Times a Day. 90 tablet 2     No current facility-administered medications for this visit.        Allergy:   Allergies   Allergen Reactions   • Penicillins    • Sulfa Antibiotics        Review of Systems:  Review of Systems   Constitutional: Positive for fatigue. Negative for chills and fever.   HENT: Negative for congestion, ear pain, hearing loss, rhinorrhea and sore throat.    Eyes: Positive for visual disturbance. Negative for pain, discharge and redness.   Respiratory: Positive for apnea. Negative for cough, shortness of breath, wheezing and stridor.    Cardiovascular: Negative for chest pain, palpitations and leg swelling.   Gastrointestinal: Negative for abdominal pain, constipation, nausea and vomiting.   Endocrine: Negative for cold intolerance, heat intolerance and polyphagia.   Genitourinary: Negative for dysuria, flank pain, frequency and urgency.   Musculoskeletal: Negative for joint swelling, myalgias, neck pain and neck stiffness.   Skin: Negative for pallor, rash and wound.   Allergic/Immunologic: Negative for environmental allergies.   Neurological: Positive for dizziness. Negative for tremors, seizures, syncope, facial asymmetry, speech difficulty, weakness, light-headedness, numbness and headaches.   Hematological: Negative for adenopathy.   Psychiatric/Behavioral: Positive for sleep disturbance. Negative for confusion and hallucinations. The patient is not nervous/anxious.        Physical Exam     Vitals:    11/29/18 0859   Pulse: 66   SpO2: 98%   Weight: 128 kg (281 lb 3.2 oz)   Height: 165.1 cm (65\")     GENERAL: Patient is pleasant, cooperative, appears to be stated age.  Body habitus is endomorphic.  SKIN AND EXTREMITIES:  No skin rashes or lesions are noted.  No cyanosis, clubbing or edema of the extremities.    HEAD:  Head is normocephalic and atraumatic.    NECK: Neck are non-tender without thyromegaly or adenopathy.  " Carotic upstrokes are 1+/4.  No cranial or cervical bruits.  The neck is supple with a full range of motion.   ENT: palate elevate symmetrically, no evidence of high arch palate, tongue midline erythema in posterior pharynx, Mallampati Classification Class III   CARDIOVASCULAR:  Regular rate and rhythm with normal S1 and S2 without rub or gallop.  RESPIRATORY:  Clear to auscultation without wheezes or crackle   ABDOMEN:  Soft and non-tender, positive bowel sound without hepatosplenomegaly  BACK:  Back is straight without midline defect.    PSYCH:  Higher cortical function/mental status:  The patient is alert.  She is oriented x3 to time, place and person.  Recent and the remote memory appear normal.  The patient has a good fund of knowledge.  There is no visual or auditory hallucination or suicidal or homicidal ideation.  SPEECH:There is no gross evidence of aphasia, dysarthria or agnosia.      CRANIAL NERVES:  Pupils are 4mm, equal round reactive to light, reacting briskly to 2mm without afferent pupillary defect.  Visual fields are intact to confrontation testing.  Fundoscopic examination reveals sharp disk margins with normal vasculature.  No papilledema, hemorrhages or exudates.  Extraocular movements are full and smooth with normal pursuits and saccades.  No nystagmus noted.  The face is symmetric. palate elevate symmetrically, Tongue midline, positive gag reflex. The remainder of the cranial nerves are intact and symmetrical.    MOTOR: Strength is 5/5 throughout with normal tone and bulk with the following exceptions, 4/5 intrinsic muscles of the hands and feet.  No involuntary movements noted.    Deep Tendon Reflexes: are 2/4 and symmetrical in the upper extremities, 2/4 and symmetrical at the knees and 1/4 and symmetrical at the Achilles tendon.  Plantar responses were down-going bilaterally.    SENSATION:  Intact to pinprick, light touch, vibration and proprioception.  Coordination:  The patient normally  performs finger-nose-finger, heel-to-knee-to-shin and rapid alternating movements in symmetrical fashion.    COORDINATION AND GAIT:  The patient walks with a narrow-based gait.  Patient is able to heel-toe and tandem walk forward and backwards without difficulty.  Romberg and monopedal  Romberg are negative.    MUSCULOSKELETAL: Range of motion normal, no clubbing, cyanosis, or edema.  No joint swelling.            Records Reviewed: I have personally reviewed her previous medical record.    Janet was seen today for follow-up.    Diagnoses and all orders for this visit:    Obstructive sleep apnea    Excessive daytime sleepiness    Nocturnal oxygen desaturation    Ocular myasthenia (CMS/HCC)    Other orders  -     pyridostigmine (MESTINON) 60 MG tablet; Take 1 tablet by mouth 3 (Three) Times a Day.        Treatments:  Patient still have not started using her nasal CPAP yet.  I have counseled patient extensively the importance of being compliant with using nasal CPAP.    In today visit patient reported that she is still experiencing intermittent diplopia.  Despite acetylcholine receptor antibody negative and Musk antibody negative, we have discussed a trial of Mestinon 60 mg 1 pill 3 times a day.  We have discussed extensively the side effect profile the Mestinon including GI side effect.  Patient expressed understanding.    Discussion:  I have counseled patient extensively on Sleep Hygiene including regular sleep wake schedule and stimulus control therapy.  I have also discussed the importance of weight reduction because 10% reduction in body weight can reduce sleep apnea by 50 %. We have also discussed abstaining from smoking and drinking.  I have explained to patient that obstructive apnea episode is defined as the absence of airflow for at least 10 seconds.  Sleep apnea is usually accompanied by snoring, disturbed sleep, and daytime sleepiness. Patients with micrognathia, retrognathia, enlarged tonsils, tongue  enlargement, and acromegaly are especially predisposed to obstructive sleep apnea. Abnormalities or weakness in the muscles can also contribute to obstructive sleep apnea. Obesity can also contribute to sleep apnea.     Sleep apnea can lead to a number of complications, ranging from daytime sleepiness to possible increased risk of cardiovascular risks.   Daytime sleepiness is the most serious.  Daytime sleepiness can also increase the risk for accident-related injuries. Several studies have suggested that people with sleep apnea have two to three times as many car accidents, and five to seven times the risk for multiple accidents.   A number of cardiovascular diseases -- including high blood pressure, heart failure, stroke, and heart arrhythmias -- have an association with obstructive sleep apnea.   Up to a third of patients with heart failure also have sleep apnea. Both central and obstructive sleep apnea are linked with heart failure. Obstructive sleep apnea is also noted to be associated with type 2 diabetes according to Dr. Phillips at [facility].  The best treatment for symptomatic obstructive sleep apnea is continuous positive airflow pressure (CPAP). Bilevel positive airway pressure (BPAP) systems may be particularly helpful for patients with coexisting lung disease and those with excessive levels of carbon dioxide.  Other treatment options including UPPP surgery, LAUP surgery, radiofrequency somnoplasty and dental appliances such [place] or Clear[place].  This Document is signed by Toni Hussein MD, FAAN, FAASM   November 29, 20188:52 PM

## 2018-12-20 ENCOUNTER — APPOINTMENT (OUTPATIENT)
Dept: MAMMOGRAPHY | Facility: HOSPITAL | Age: 58
End: 2018-12-20
Attending: INTERNAL MEDICINE

## 2019-01-24 ENCOUNTER — OFFICE VISIT (OUTPATIENT)
Dept: ENDOCRINOLOGY | Facility: CLINIC | Age: 59
End: 2019-01-24

## 2019-01-24 VITALS
HEART RATE: 68 BPM | WEIGHT: 271.3 LBS | SYSTOLIC BLOOD PRESSURE: 112 MMHG | DIASTOLIC BLOOD PRESSURE: 68 MMHG | OXYGEN SATURATION: 99 % | BODY MASS INDEX: 45.15 KG/M2

## 2019-01-24 DIAGNOSIS — E11.9 TYPE 2 DIABETES MELLITUS WITHOUT COMPLICATION, WITHOUT LONG-TERM CURRENT USE OF INSULIN (HCC): ICD-10-CM

## 2019-01-24 DIAGNOSIS — H53.2 DIPLOPIA: Primary | ICD-10-CM

## 2019-01-24 DIAGNOSIS — E05.00 GRAVES' EYE DISEASE: ICD-10-CM

## 2019-01-24 DIAGNOSIS — E89.0 POSTOPERATIVE HYPOTHYROIDISM: ICD-10-CM

## 2019-01-24 PROBLEM — E03.9 HYPOTHYROIDISM: Status: ACTIVE | Noted: 2019-01-24

## 2019-01-24 LAB
ALBUMIN SERPL-MCNC: 4.42 G/DL (ref 3.2–4.8)
ALBUMIN/GLOB SERPL: 2.4 G/DL (ref 1.5–2.5)
ALP SERPL-CCNC: 114 U/L (ref 25–100)
ALT SERPL W P-5'-P-CCNC: 28 U/L (ref 7–40)
ANION GAP SERPL CALCULATED.3IONS-SCNC: 5 MMOL/L (ref 3–11)
AST SERPL-CCNC: 28 U/L (ref 0–33)
BILIRUB SERPL-MCNC: 0.4 MG/DL (ref 0.3–1.2)
BUN BLD-MCNC: 17 MG/DL (ref 9–23)
BUN/CREAT SERPL: 19.3 (ref 7–25)
CALCIUM SPEC-SCNC: 10 MG/DL (ref 8.7–10.4)
CHLORIDE SERPL-SCNC: 101 MMOL/L (ref 99–109)
CO2 SERPL-SCNC: 32 MMOL/L (ref 20–31)
CREAT BLD-MCNC: 0.88 MG/DL (ref 0.6–1.3)
GFR SERPL CREATININE-BSD FRML MDRD: 66 ML/MIN/1.73
GLOBULIN UR ELPH-MCNC: 1.9 GM/DL
GLUCOSE BLD-MCNC: 100 MG/DL (ref 70–100)
HBA1C MFR BLD: 6.2 % (ref 4.8–5.6)
POTASSIUM BLD-SCNC: 3.9 MMOL/L (ref 3.5–5.5)
PROT SERPL-MCNC: 6.3 G/DL (ref 5.7–8.2)
SODIUM BLD-SCNC: 138 MMOL/L (ref 132–146)
T4 FREE SERPL-MCNC: 1.41 NG/DL (ref 0.89–1.76)
TSH SERPL DL<=0.05 MIU/L-ACNC: 2.6 MIU/ML (ref 0.35–5.35)

## 2019-01-24 PROCEDURE — 84439 ASSAY OF FREE THYROXINE: CPT | Performed by: INTERNAL MEDICINE

## 2019-01-24 PROCEDURE — 83036 HEMOGLOBIN GLYCOSYLATED A1C: CPT | Performed by: INTERNAL MEDICINE

## 2019-01-24 PROCEDURE — 80053 COMPREHEN METABOLIC PANEL: CPT | Performed by: INTERNAL MEDICINE

## 2019-01-24 PROCEDURE — 99245 OFF/OP CONSLTJ NEW/EST HI 55: CPT | Performed by: INTERNAL MEDICINE

## 2019-01-24 PROCEDURE — 84443 ASSAY THYROID STIM HORMONE: CPT | Performed by: INTERNAL MEDICINE

## 2019-01-24 PROCEDURE — 84445 ASSAY OF TSI GLOBULIN: CPT | Performed by: INTERNAL MEDICINE

## 2019-01-24 RX ORDER — LEVOTHYROXINE SODIUM 175 MCG
175 TABLET ORAL DAILY
Qty: 30 TABLET | Refills: 6 | Status: SHIPPED | OUTPATIENT
Start: 2019-01-24 | End: 2019-09-15 | Stop reason: SDUPTHER

## 2019-01-24 NOTE — PROGRESS NOTES
"Subjective:   Establish Care (Hypothyroidism)        Janet Obrien is a 58 y.o. female who is being seen for consultation today at the request of Toni Hussein MD, FAAN   Postsurgical hypothyroidism dx in 1987. She had Graves disease with opthalmopathy and proptosis, had thyroidectomy in 1987. She also underwent IV steroid treatments at some point  She is currently taking levothyroxine 175 mcg, has a lots of the symptoms: fatigue, dizziness, depression, eye double vision and swelling, arthralgias, weight gain.  She has large fluctuations of the hormones.   She remembers feeling better on the brand name Synthroid      Diabetes Mellitus type 2:   Patient here for follow-up of diabetes mellitus type 2 dx in 1997.    It is well controlled  Diabetic complications: peripheral neuropathy, frequent yeast infections.   Eye exam current (within one year): uptodate.    Current diabetic medications include janumet     Past medications: metformin - not effective.     Nutrition:   discussed  carb consistent diet 45-60 gm carb per meal.   Current diet: in general, a \"healthy\" diet  . She is very frustrated with the weight gain despite her best efforts.   Foot care and dental care: discussed    Patient complains of fatigue, weight gain, swelling, anxiousness, palpitations, depression, ocular symptoms, arthralgias, pain in the arms and legs, double vision and pressure behind the eyes.        Review of Systems  Review of Systems   Constitutional: Positive for fatigue and unexpected weight gain.   HENT: Positive for ear pain and nosebleeds.    Eyes: Positive for double vision, pain, redness, itching and visual disturbance.   Respiratory: Positive for shortness of breath.    Cardiovascular: Positive for leg swelling.   Gastrointestinal: Positive for abdominal pain, diarrhea and nausea.   Genitourinary: Positive for pelvic pain.   Musculoskeletal: Positive for arthralgias, back pain, joint swelling and myalgias.   Skin: Positive " for dry skin.   Neurological: Positive for weakness, headache and memory problem.   Psychiatric/Behavioral: Positive for sleep disturbance and depressed mood. The patient is nervous/anxious.    All other systems reviewed and are negative.    Current medications:  Current Outpatient Medications   Medication Sig Dispense Refill   • ACCU-CHEK MARYSOL PLUS test strip      • ACCU-CHEK SOFTCLIX LANCETS lancets      • albuterol (VENTOLIN HFA) 108 (90 Base) MCG/ACT inhaler Inhale 2 puffs Every 6 (Six) Hours As Needed for Wheezing. 1 inhaler 0   • buPROPion SR (WELLBUTRIN SR) 150 MG 12 hr tablet      • Calcium Carbonate-Vit D-Min (CALCIUM 1200 PO) Take  by mouth.     • DULoxetine (CYMBALTA) 60 MG capsule      • hydrochlorothiazide (HYDRODIURIL) 25 MG tablet Take 1 tablet by mouth daily.     • mometasone-formoterol (DULERA 200) 200-5 MCG/ACT inhaler Inhale 2 puffs 2 (Two) Times a Day. Rinse mouth with water after use. 1 g 5   • montelukast (SINGULAIR) 10 MG tablet Take 1 tablet by mouth Every Night. 30 tablet 5   • oxyCODONE-acetaminophen (PERCOCET)  MG per tablet      • sitaGLIPtin-metFORMIN (JANUMET)  MG per tablet Take  by mouth.     • Semaglutide (OZEMPIC) 0.25 or 0.5 MG/DOSE solution pen-injector Inject 0.5 mg under the skin into the appropriate area as directed 1 (One) Time Per Week. 2 pen 6   • SYNTHROID 175 MCG tablet Take 1 tablet by mouth Daily. 30 tablet 6     No current facility-administered medications for this visit.        The following portions of the patient's history were reviewed and updated as appropriate: She  has a past medical history of Anxiety, Arthritis, Asthma, Asthma, Cancer (CMS/HCC), Chronic bronchitis (CMS/HCC), Depression, Depression, Diabetes mellitus (CMS/HCC), Fibromyalgia, Fibromyalgia, Hyperlipidemia, Hypertension, Hypothyroidism, Pleurisy, Pneumonia, Skin cancer, and Thyroid disease.  She  has a past surgical history that includes Appendectomy; Tonsillectomy and adenoidectomy;   section; Hysterectomy; and Dilation and curettage of uterus.  Her family history includes Cancer in her brother, maternal grandmother, and paternal grandmother; Diabetes in her maternal aunt; Fibromyalgia in her mother; Heart attack in her father and mother; Hyperlipidemia in her mother; Hypertension in her mother; Thyroid disease in her maternal grandmother and mother.  She  reports that she quit smoking about 14 years ago. Her smoking use included cigarettes. She has a 3.75 pack-year smoking history. she has never used smokeless tobacco. She reports that she does not drink alcohol or use drugs.  She is allergic to penicillins and sulfa antibiotics..      Objective:     Vitals:    19 0854   BP: 112/68   Pulse: 68   SpO2: 99%   Body mass index is 45.15 kg/m².  Physical Exam   Constitutional: She is oriented to person, place, and time. She appears well-developed and well-nourished.   Morbidly obese   HENT:   Head: Normocephalic and atraumatic.   Eyes: Lids are normal. Pupils are equal, round, and reactive to light. Right conjunctiva is injected. Left conjunctiva is injected. Right eye exhibits abnormal extraocular motion. Left eye exhibits abnormal extraocular motion.   Diplopia on lateral gaze is seen.    Neck: No thyromegaly present.   Cardiovascular: Normal rate, regular rhythm, normal heart sounds and intact distal pulses.   Pulmonary/Chest: Effort normal and breath sounds normal.   Musculoskeletal: She exhibits edema (non pitting edema).   Lymphadenopathy:     She has no cervical adenopathy.   Neurological: She is alert and oriented to person, place, and time.   Skin: Skin is dry. Rash noted.   Psychiatric: She has a normal mood and affect. Thought content normal.       LABS AND IMAGING  Lab Results   Component Value Date    TSH 2.060 2018    THYROIDAB 7 03/10/2015               Assessment:        Problem List Items Addressed This Visit        Endocrine    Hypothyroidism    Relevant  Medications    SYNTHROID 175 MCG tablet    Other Relevant Orders    T4, Free    TSH    Comprehensive Metabolic Panel    Diabetes mellitus (CMS/[facility])    Relevant Medications    Semaglutide (OZEMPIC) 0.25 or 0.5 MG/DOSE solution pen-injector    Other Relevant Orders    Hemoglobin A1c       Other    Diplopia - Primary    Relevant Orders    Thyroid Stimulating Immunoglobulin    Ambulatory Referral to Ophthalmology      Other Visit Diagnoses     Graves' eye disease        Relevant Medications    SYNTHROID 175 MCG tablet    Other Relevant Orders    Ambulatory Referral to Ophthalmology               Plan:        1. Diabetes mellitus type 2 with insulin resistance and weight gain   Most likely her weight gain is related to frequent steroids administration and multifactorial reasons. I do not see the need at this time to evaluate cortisol levels.  -GLP-1 agonist would be a great option in her case and I have provided her with the sample of Ozempic, instructions on administration and teaching.   -continue janumet, In the future if she tolerates injections weekly well, we may be able to d/c janumet.   -We have discussed the goals of HgbA1C, glucose testing frequency, foot care, diabetes complications prevention and screening. The healthy eating instructions and tips on weight loss were provided to the patient and all questions were answered. Patient was recommended to start exercise activity at least 15 min a day and slowly increase the intensity and duration of exercise as tolerated.    2. Postoperative hypothyroidism. - change to Synthroid brand name as she was feeling much better on this medication in the past.   -repeat labs now    3. Proptosis and diplopia - MRi of the orbits results reviewed and I have advised to see Graves disease eye specialist DR Stone. It appears she does not have an active disease at this time. Her sx are typical for Graves opthalmopathy.     35     min  of  75 min face-to-face visit time spent for  coordination of care and counselling regarding identified problems as outlined in the objective, assessment and discussion portions of the documentation.

## 2019-01-26 LAB — TSI SER-MCNC: 0.17 IU/L (ref 0–0.55)

## 2019-01-29 ENCOUNTER — PRIOR AUTHORIZATION (OUTPATIENT)
Dept: ENDOCRINOLOGY | Facility: CLINIC | Age: 59
End: 2019-01-29

## 2019-01-29 NOTE — TELEPHONE ENCOUNTER
Received PA request via fax from pharmacy for Ozempic, submitted PA via [email or web address], awaiting response.

## 2019-01-31 ENCOUNTER — TELEPHONE (OUTPATIENT)
Dept: INTERNAL MEDICINE | Facility: CLINIC | Age: 59
End: 2019-01-31

## 2019-01-31 NOTE — TELEPHONE ENCOUNTER
Returned patient call, copied instructions from office note and advised.  Will mail out summary also.     Instructions     Start Ozempic 0.25 mg once a week for 1 month, then increase to 0.5 mg once a week.

## 2019-02-02 ENCOUNTER — HOSPITAL ENCOUNTER (EMERGENCY)
Facility: HOSPITAL | Age: 59
Discharge: LEFT BEFORE DISCHARGE | End: 2019-02-02

## 2019-02-02 VITALS
WEIGHT: 284.8 LBS | TEMPERATURE: 98 F | DIASTOLIC BLOOD PRESSURE: 86 MMHG | HEART RATE: 75 BPM | BODY MASS INDEX: 45.77 KG/M2 | HEIGHT: 66 IN | RESPIRATION RATE: 18 BRPM | SYSTOLIC BLOOD PRESSURE: 138 MMHG | OXYGEN SATURATION: 97 %

## 2019-02-27 DIAGNOSIS — G47.33 OSA (OBSTRUCTIVE SLEEP APNEA): Primary | ICD-10-CM

## 2019-03-01 ENCOUNTER — OFFICE VISIT (OUTPATIENT)
Dept: NEUROLOGY | Facility: CLINIC | Age: 59
End: 2019-03-01

## 2019-03-01 VITALS
HEART RATE: 74 BPM | HEIGHT: 65 IN | BODY MASS INDEX: 47.39 KG/M2 | OXYGEN SATURATION: 98 % | DIASTOLIC BLOOD PRESSURE: 84 MMHG | SYSTOLIC BLOOD PRESSURE: 126 MMHG

## 2019-03-01 DIAGNOSIS — G47.34 NOCTURNAL OXYGEN DESATURATION: ICD-10-CM

## 2019-03-01 DIAGNOSIS — H53.2 DIPLOPIA: ICD-10-CM

## 2019-03-01 DIAGNOSIS — G47.19 EXCESSIVE DAYTIME SLEEPINESS: ICD-10-CM

## 2019-03-01 DIAGNOSIS — G70.00 OCULAR MYASTHENIA (HCC): ICD-10-CM

## 2019-03-01 DIAGNOSIS — G47.33 OBSTRUCTIVE SLEEP APNEA: Primary | ICD-10-CM

## 2019-03-01 PROCEDURE — 99214 OFFICE O/P EST MOD 30 MIN: CPT | Performed by: PSYCHIATRY & NEUROLOGY

## 2019-03-01 RX ORDER — PYRIDOSTIGMINE BROMIDE 60 MG/1
60 TABLET ORAL 3 TIMES DAILY
Qty: 90 TABLET | Refills: 2 | Status: SHIPPED | OUTPATIENT
Start: 2019-03-01 | End: 2020-02-29

## 2019-03-01 NOTE — PROGRESS NOTES
[facility] NEUROLOGY [place] CONSULTATION   History of Present Illness     Date: 3/2/2019    Patient Identification  Janet Obrien is a 58 y.o. female.    Patient information was obtained from patient.  History/Exam limitations: none.    CONSULTATION requested by: Dr. Davenport    Chief Complaint   Dizziness (Pt in office today for follow up appt, pt states sx have improved, are not as frequent) and Eye Problem (Pt states vision appt is scheduled for this month)      History of Present Illness   Patient is a pleasant 58-year-old referred to [facility] neurology [place] for evaluation of diplopia, and dizziness.  Patient reported dizziness particularly prominent when she is having lateral gaze bilaterally.  Patient was started on Mestinon 60 mg 3 times a day.  Patient reported that her symptoms has improved despite negative acetylcholine receptor antibody and negative Anti-Musk antibody.  Patient denied any GI side effect.   we have also counseled patient extensively the importance of controlling blood sugar.  Patient expressed understanding.      Patient is also been diagnosis of obstructive sleep apnea however patient still not interested in using nasal CPAP at this point.    PMH:   Past Medical History:   Diagnosis Date   • Anxiety    • Arthritis    • Asthma    • Asthma    • Cancer (CMS/HCC)    • Chronic bronchitis (CMS/HCC)    • Depression    • Depression    • Diabetes mellitus (CMS/HCC)    • Fibromyalgia    • Fibromyalgia    • Hyperlipidemia    • Hypertension    • Hypothyroidism    • Pleurisy    • Pneumonia    • Skin cancer    • Thyroid disease        Past Surgical History:   Past Surgical History:   Procedure Laterality Date   • APPENDECTOMY     •  SECTION     • DILATATION AND CURETTAGE     • HYSTERECTOMY     • TONSILLECTOMY AND ADENOIDECTOMY         Family Hisotry:   Family History   Problem Relation Age of Onset   • Hypertension Mother    • Heart attack Mother    • Fibromyalgia Mother     • Hyperlipidemia Mother    • Thyroid disease Mother    • Heart attack Father    • Cancer Brother    • Diabetes Maternal Aunt    • Cancer Maternal Grandmother    • Thyroid disease Maternal Grandmother    • Cancer Paternal Grandmother        Social History:   Social History     Socioeconomic History   • Marital status:      Spouse name: Not on file   • Number of children: Not on file   • Years of education: Not on file   • Highest education level: Not on file   Social Needs   • Financial resource strain: Not on file   • Food insecurity - worry: Not on file   • Food insecurity - inability: Not on file   • Transportation needs - medical: Not on file   • Transportation needs - non-medical: Not on file   Occupational History   • Not on file   Tobacco Use   • Smoking status: Former Smoker     Packs/day: 0.25     Years: 15.00     Pack years: 3.75     Types: Cigarettes     Last attempt to quit: 2004     Years since quittin.6   • Smokeless tobacco: Never Used   Substance and Sexual Activity   • Alcohol use: No   • Drug use: No   • Sexual activity: Defer   Other Topics Concern   • Not on file   Social History Narrative   • Not on file       Medications:   Current Outpatient Medications   Medication Sig Dispense Refill   • ACCU-CHEK MARYSOL PLUS test strip      • ACCU-CHEK SOFTCLIX LANCETS lancets      • albuterol (VENTOLIN HFA) 108 (90 Base) MCG/ACT inhaler Inhale 2 puffs Every 6 (Six) Hours As Needed for Wheezing. 1 inhaler 0   • buPROPion SR (WELLBUTRIN SR) 150 MG 12 hr tablet      • Calcium Carbonate-Vit D-Min (CALCIUM 1200 PO) Take  by mouth.     • DULoxetine (CYMBALTA) 60 MG capsule      • hydrochlorothiazide (HYDRODIURIL) 25 MG tablet Take 1 tablet by mouth daily.     • mometasone-formoterol (DULERA 200) 200-5 MCG/ACT inhaler Inhale 2 puffs 2 (Two) Times a Day. Rinse mouth with water after use. 1 g 5   • montelukast (SINGULAIR) 10 MG tablet Take 1 tablet by mouth Every Night. 30 tablet 5   •  "oxyCODONE-acetaminophen (PERCOCET)  MG per tablet      • Semaglutide (OZEMPIC) 0.25 or 0.5 MG/DOSE solution pen-injector Inject 0.5 mg under the skin into the appropriate area as directed 1 (One) Time Per Week. 2 pen 6   • sitaGLIPtin-metFORMIN (JANUMET)  MG per tablet Take  by mouth.     • SYNTHROID 175 MCG tablet Take 1 tablet by mouth Daily. 30 tablet 6   • pyridostigmine (MESTINON) 60 MG tablet Take 1 tablet by mouth 3 (Three) Times a Day. 90 tablet 2     No current facility-administered medications for this visit.        Allergy:   Allergies   Allergen Reactions   • Penicillins    • Sulfa Antibiotics        Review of Systems:  Review of Systems   Constitutional: Negative for chills and fever.   HENT: Negative for congestion, ear pain, hearing loss, rhinorrhea and sore throat.    Eyes: Positive for visual disturbance. Negative for pain, discharge and redness.   Respiratory: Negative for cough, shortness of breath, wheezing and stridor.    Cardiovascular: Negative for chest pain, palpitations and leg swelling.   Gastrointestinal: Negative for abdominal pain, constipation, nausea and vomiting.   Endocrine: Negative for cold intolerance, heat intolerance and polyphagia.   Genitourinary: Negative for dysuria, flank pain, frequency and urgency.   Musculoskeletal: Negative for joint swelling, myalgias, neck pain and neck stiffness.   Skin: Negative for pallor, rash and wound.   Allergic/Immunologic: Negative for environmental allergies.   Neurological: Positive for dizziness. Negative for tremors, seizures, syncope, facial asymmetry, speech difficulty, weakness, light-headedness, numbness and headaches.   Hematological: Negative for adenopathy.   Psychiatric/Behavioral: Negative for confusion and hallucinations. The patient is not nervous/anxious.        Physical Exam     Vitals:    03/01/19 0943   BP: 126/84   Pulse: 74   SpO2: 98%   Height: 165.1 cm (65\")     GENERAL: Patient is pleasant, cooperative, " appears to be stated age.  Body habitus is endomorphic.  SKIN AND EXTREMITIES:  No skin rashes or lesions are noted.  No cyanosis, clubbing or edema of the extremities.    HEAD:  Head is normocephalic and atraumatic.    NECK: Nontender without thyromegaly or adenopathy.  Carotid upstrokes are 1+/4.  No cranial or cervical bruits.  The neck is supple with a full range of motion.   ENT: Palate elevates symmetrically.  No evidence of high arch palate.  Tongue midline.  No erythema in posterior pharynx.  Mallampati Classification Class III   CARDIOVASCULAR:  Regular rate and rhythm with normal S1 and S2 without rub or gallop.  RESPIRATORY:  Clear to auscultation without wheezes or crackle   ABDOMEN:  Soft and nontender, positive bowel sound without hepatosplenomegaly  BACK:  Back is straight without midline defect.    PSYCH:  Higher cortical function/mental status:  The patient is alert.  The patient is oriented x3 to time, place and person.  Recent and the remote memory appear normal.  The patient has a good fund of knowledge.  There is no visual or auditory hallucination or suicidal or homicidal ideation.  SPEECH:There is no gross evidence of aphasia, dysarthria or agnosia.      CRANIAL NERVES:  Pupils are 4mm, equal round reactive to light, reacting briskly to 2mm without afferent pupillary defect.  Visual fields are intact to confrontation testing.  Funduscopic examination reveals sharp disc margins with normal vasculature.  No papilledema, hemorrhages or exudates.  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   the face is symmetric. Palate elevates symmetrically, Tongue midline, positive gag reflex. The remainder of the cranial nerves are intact and symmetrical.    MOTOR: Strength is 5/5 throughout with normal tone and bulk with the following exceptions, 4/5 intrinsic muscles of the hands and feet.  No involuntary movements noted.    Deep Tendon Reflexes: are 2/4 and symmetrical in the upper extremities,  2/4 and symmetrical at the knees and 1/4 and symmetrical at the Achilles tendon.  Plantar responses were down-going bilaterally.    SENSATION:  Intact to pinprick, light touch, vibration and proprioception.  Coordination:  The patient normally performs finger-nose-finger, heel-to-knee-to-shin and rapid alternating movements in symmetrical fashion.    COORDINATION AND GAIT:  The patient walks with a narrow-based gait.  Patient is able to heel-toe and tandem walk forward and backwards without difficulty.  Romberg and monopedal  Romberg are negative.    MUSCULOSKELETAL: Range of motion normal, no clubbing, cyanosis, or edema.  No joint swelling.            Records Reviewed: I have personally reviewed her previous medical record.    Janet was seen today for dizziness and eye problem.    Diagnoses and all orders for this visit:    Obstructive sleep apnea    Excessive daytime sleepiness    Nocturnal oxygen desaturation    Diplopia    Ocular myasthenia (CMS/HCC)    Other orders  -     pyridostigmine (MESTINON) 60 MG tablet; Take 1 tablet by mouth 3 (Three) Times a Day.        Treatments:    Discussion:  In summary, 58-year-old referred to [facility] neurology [place] for evaluation of diplopia, and dizziness.  Patient reported dizziness particularly prominent when she is having lateral gaze bilaterally.  Patient was started on Mestinon 60 mg 3 times a day.  Patient reported that her symptoms has improved despite negative acetylcholine receptor antibody and negative Anti-Musk antibody.  Patient denied any GI side effect.   we have also counseled patient extensively the importance of controlling blood sugar.  Patient expressed understanding.      Patient is also been diagnosis of obstructive sleep apnea however patient still not interested in using nasal CPAP at this point.    This Document is signed by Toni Hussein MD, FAAN, FAASM March 2, 20195:52 PM

## 2019-04-20 ENCOUNTER — HOSPITAL ENCOUNTER (EMERGENCY)
Facility: HOSPITAL | Age: 59
Discharge: HOME | End: 2019-04-21
Attending: STUDENT IN AN ORGANIZED HEALTH CARE EDUCATION/TRAINING PROGRAM | Admitting: STUDENT IN AN ORGANIZED HEALTH CARE EDUCATION/TRAINING PROGRAM
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
## 2019-04-20 DIAGNOSIS — S01.531A PUNCTURE WOUND OF LIP WITHOUT FOREIGN BODY, INITIAL ENCOUNTER: ICD-10-CM

## 2019-04-20 DIAGNOSIS — S00.81XA CAT SCRATCH OF FACE, INITIAL ENCOUNTER: Primary | ICD-10-CM

## 2019-04-20 PROCEDURE — 99283 EMERGENCY DEPT VISIT LOW MDM: CPT

## 2019-04-21 VITALS
HEART RATE: 70 BPM | DIASTOLIC BLOOD PRESSURE: 74 MMHG | OXYGEN SATURATION: 95 % | RESPIRATION RATE: 18 BRPM | WEIGHT: 278 LBS | HEIGHT: 66 IN | TEMPERATURE: 98.1 F | BODY MASS INDEX: 44.68 KG/M2 | SYSTOLIC BLOOD PRESSURE: 130 MMHG

## 2019-04-21 RX ORDER — AZITHROMYCIN 250 MG/1
250 TABLET, FILM COATED ORAL DAILY
Qty: 4 TABLET | Refills: 0 | Status: SHIPPED | OUTPATIENT
Start: 2019-04-22 | End: 2019-04-26

## 2019-04-21 RX ORDER — AZITHROMYCIN 250 MG/1
500 TABLET, FILM COATED ORAL ONCE
Status: COMPLETED | OUTPATIENT
Start: 2019-04-21 | End: 2019-04-21

## 2019-04-21 RX ADMIN — AZITHROMYCIN 500 MG: 250 TABLET, FILM COATED ORAL at 00:38

## 2019-04-26 ENCOUNTER — OFFICE VISIT (OUTPATIENT)
Dept: ENDOCRINOLOGY | Facility: CLINIC | Age: 59
End: 2019-04-26

## 2019-04-26 VITALS
WEIGHT: 279.6 LBS | SYSTOLIC BLOOD PRESSURE: 126 MMHG | DIASTOLIC BLOOD PRESSURE: 76 MMHG | OXYGEN SATURATION: 98 % | HEART RATE: 72 BPM | HEIGHT: 66 IN | BODY MASS INDEX: 44.93 KG/M2

## 2019-04-26 DIAGNOSIS — E11.9 TYPE 2 DIABETES MELLITUS WITHOUT COMPLICATION, WITH LONG-TERM CURRENT USE OF INSULIN (HCC): Primary | ICD-10-CM

## 2019-04-26 DIAGNOSIS — Z79.4 TYPE 2 DIABETES MELLITUS WITHOUT COMPLICATION, WITH LONG-TERM CURRENT USE OF INSULIN (HCC): Primary | ICD-10-CM

## 2019-04-26 DIAGNOSIS — H53.2 DIPLOPIA: ICD-10-CM

## 2019-04-26 DIAGNOSIS — E89.0 POSTOPERATIVE HYPOTHYROIDISM: ICD-10-CM

## 2019-04-26 LAB
GLUCOSE BLDC GLUCOMTR-MCNC: 123 MG/DL (ref 70–130)
HBA1C MFR BLD: 5.3 %
T4 FREE SERPL-MCNC: 1.07 NG/DL (ref 0.93–1.7)
TSH SERPL DL<=0.05 MIU/L-ACNC: 2.4 MIU/ML (ref 0.27–4.2)

## 2019-04-26 PROCEDURE — 99213 OFFICE O/P EST LOW 20 MIN: CPT | Performed by: INTERNAL MEDICINE

## 2019-04-26 PROCEDURE — 82962 GLUCOSE BLOOD TEST: CPT | Performed by: INTERNAL MEDICINE

## 2019-04-26 PROCEDURE — 84443 ASSAY THYROID STIM HORMONE: CPT | Performed by: INTERNAL MEDICINE

## 2019-04-26 PROCEDURE — 84439 ASSAY OF FREE THYROXINE: CPT | Performed by: INTERNAL MEDICINE

## 2019-04-26 PROCEDURE — 83036 HEMOGLOBIN GLYCOSYLATED A1C: CPT | Performed by: INTERNAL MEDICINE

## 2019-04-26 NOTE — PATIENT INSTRUCTIONS
Results for orders placed or performed in visit on 04/26/19   POC Glucose Fingerstick   Result Value Ref Range    Glucose 123 70 - 130 mg/dL   POC Glycosylated Hemoglobin (Hb A1C)   Result Value Ref Range    Hemoglobin A1C 5.3 %

## 2019-04-26 NOTE — PROGRESS NOTES
"Subjective:   Diplopia (Follow up:  )        Janet Obrien is a 58 y.o. female who is being seen for consultation today at the request of No ref. provider found   Postsurgical hypothyroidism dx in 1987. She had Graves disease with opthalmopathy and proptosis, had thyroidectomy in 1987. She also underwent IV steroid treatments at some point  She is currently taking levothyroxine 175 mcg, has a lots of the symptoms: fatigue, dizziness, depression, eye double vision and swelling, arthralgias, weight gain.  She has large fluctuations of the hormones.   After change to the brand name synthroid her sx improved.     Diabetes Mellitus type 2:   Diabetic complications: peripheral neuropathy, frequent yeast infections.   Eye exam current (within one year): uptodate.    Current diabetic medications include janumet and Ozempic started 1/2019 Tolerated it well, no side effects/     Past medications: metformin - not effective.     Nutrition:   discussed  carb consistent diet 45-60 gm carb per meal.   Current diet: in general, a \"healthy\" diet  . She is very frustrated with the weight gain despite her best efforts.   Foot care and dental care: discussed    Patient complains of fatigue, double vision on lateral gaze. Glucose is normal.   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         Review of Systems  Review of Systems   Constitutional: Positive for fatigue.   Eyes: Positive for double vision, pain, redness, itching and visual disturbance.   Respiratory: Positive for shortness of breath.    Cardiovascular: Positive for leg swelling.   Genitourinary: Positive for pelvic pain.   Musculoskeletal: Positive for arthralgias, back pain, joint swelling and myalgias.   Skin: Positive for dry skin.   Neurological: Positive for weakness, headache and memory problem.   Psychiatric/Behavioral: Positive for sleep disturbance and depressed mood. The patient is nervous/anxious.    All other systems reviewed and are " negative.    Current medications:  Current Outpatient Medications   Medication Sig Dispense Refill   • ACCU-CHEK MARYSOL PLUS test strip      • ACCU-CHEK SOFTCLIX LANCETS lancets      • albuterol (VENTOLIN HFA) 108 (90 Base) MCG/ACT inhaler Inhale 2 puffs Every 6 (Six) Hours As Needed for Wheezing. 1 inhaler 0   • buPROPion SR (WELLBUTRIN SR) 150 MG 12 hr tablet      • Calcium Carbonate-Vit D-Min (CALCIUM 1200 PO) Take  by mouth.     • DULoxetine (CYMBALTA) 60 MG capsule      • hydrochlorothiazide (HYDRODIURIL) 25 MG tablet Take 1 tablet by mouth daily.     • mometasone-formoterol (DULERA 200) 200-5 MCG/ACT inhaler Inhale 2 puffs 2 (Two) Times a Day. Rinse mouth with water after use. 1 g 5   • montelukast (SINGULAIR) 10 MG tablet Take 1 tablet by mouth Every Night. 30 tablet 5   • oxyCODONE-acetaminophen (PERCOCET)  MG per tablet      • pyridostigmine (MESTINON) 60 MG tablet Take 1 tablet by mouth 3 (Three) Times a Day. 90 tablet 2   • Semaglutide (OZEMPIC) 0.25 or 0.5 MG/DOSE solution pen-injector Inject 0.5 mg under the skin into the appropriate area as directed 1 (One) Time Per Week. 2 pen 6   • sitaGLIPtin-metFORMIN (JANUMET)  MG per tablet Take  by mouth.     • SYNTHROID 175 MCG tablet Take 1 tablet by mouth Daily. 30 tablet 6     No current facility-administered medications for this visit.        The following portions of the patient's history were reviewed and updated as appropriate: She  has a past medical history of Anxiety, Arthritis, Asthma, Asthma, Cancer (CMS/HCC), Chronic bronchitis (CMS/HCC), Depression, Depression, Diabetes mellitus (CMS/HCC), Fibromyalgia, Fibromyalgia, Hyperlipidemia, Hypertension, Hypothyroidism, Pleurisy, Pneumonia, Skin cancer, and Thyroid disease.  She  has a past surgical history that includes Appendectomy; Tonsillectomy and adenoidectomy;  section; Hysterectomy; and Dilation and curettage of uterus.  Her family history includes Cancer in her brother,  maternal grandmother, and paternal grandmother; Diabetes in her maternal aunt; Fibromyalgia in her mother; Heart attack in her father and mother; Hyperlipidemia in her mother; Hypertension in her mother; Thyroid disease in her maternal grandmother and mother.  She  reports that she quit smoking about 14 years ago. Her smoking use included cigarettes. She has a 3.75 pack-year smoking history. She has never used smokeless tobacco. She reports that she does not drink alcohol or use drugs.  She is allergic to penicillins and sulfa antibiotics..      Objective:     Vitals:    04/26/19 1335   BP: 126/76   Pulse: 72   SpO2: 98%   Body mass index is 45.13 kg/m².  Physical Exam   Constitutional: She is oriented to person, place, and time. She appears well-developed and well-nourished.   Morbidly obese   HENT:   Head: Normocephalic and atraumatic.   Eyes: Lids are normal.   Musculoskeletal: She exhibits edema (non pitting edema).   Neurological: She is alert and oriented to person, place, and time.   Psychiatric: She has a normal mood and affect. Thought content normal.       LABS AND IMAGING  Results for orders placed or performed in visit on 04/26/19   POC Glucose Fingerstick   Result Value Ref Range    Glucose 123 70 - 130 mg/dL   POC Glycosylated Hemoglobin (Hb A1C)   Result Value Ref Range    Hemoglobin A1C 5.3 %               Assessment:        Problem List Items Addressed This Visit        Endocrine    Hypothyroidism    Overview     Synthroid 175 mcg daily          Diabetes mellitus (CMS/[facility]) - Primary    Overview     ozempic 0.5 mg weekly, d/c janumet after current supply is over . A1C is at goal of therapy.          Relevant Orders    POC Glucose Fingerstick (Completed)    POC Glycosylated Hemoglobin (Hb A1C) (Completed)    TSH    T4, Free       Other    Diplopia               Plan:        1. Diabetes mellitus type 2 with insulin resistance and weight gain   -GLP-1 agonist Ozempic continued, A1C is at goal of  therapy.   -continue janumet until current supply is over, then d/c.     2. Postoperative hypothyroidism. - change to Synthroid brand name is effective.   -repeat labs    3. Referral to Dr Ashley VALENTINE were normal in 1/2019. She has sx of opthalmopathy. She missed her appt ion 2/2019 and I have given her a number to call and schedule appt.    Follow-up in 4-5 months

## 2019-07-25 ENCOUNTER — TRANSCRIBE ORDERS (OUTPATIENT)
Dept: MAMMOGRAPHY | Facility: HOSPITAL | Age: 59
End: 2019-07-25

## 2019-07-25 DIAGNOSIS — Z12.39 BREAST CANCER SCREENING: Primary | ICD-10-CM

## 2019-07-29 RX ORDER — ALBUTEROL SULFATE 90 UG/1
2 AEROSOL, METERED RESPIRATORY (INHALATION) EVERY 6 HOURS PRN
Qty: 1 INHALER | Refills: 0 | Status: SHIPPED | OUTPATIENT
Start: 2019-07-29 | End: 2019-10-15 | Stop reason: SDUPTHER

## 2019-07-29 RX ORDER — MONTELUKAST SODIUM 10 MG/1
10 TABLET ORAL NIGHTLY
Qty: 30 TABLET | Refills: 3 | Status: SHIPPED | OUTPATIENT
Start: 2019-07-29 | End: 2019-09-16 | Stop reason: HOSPADM

## 2019-08-01 ENCOUNTER — APPOINTMENT (OUTPATIENT)
Dept: CT IMAGING | Facility: HOSPITAL | Age: 59
End: 2019-08-01

## 2019-08-01 ENCOUNTER — HOSPITAL ENCOUNTER (EMERGENCY)
Facility: HOSPITAL | Age: 59
Discharge: HOME | End: 2019-08-01
Attending: EMERGENCY MEDICINE | Admitting: EMERGENCY MEDICINE

## 2019-08-01 VITALS
OXYGEN SATURATION: 99 % | SYSTOLIC BLOOD PRESSURE: 132 MMHG | DIASTOLIC BLOOD PRESSURE: 79 MMHG | BODY MASS INDEX: 45 KG/M2 | TEMPERATURE: 98.2 F | HEIGHT: 66 IN | HEART RATE: 72 BPM | WEIGHT: 280 LBS | RESPIRATION RATE: 20 BRPM

## 2019-08-01 DIAGNOSIS — R03.0 ELEVATED BLOOD PRESSURE READING: ICD-10-CM

## 2019-08-01 DIAGNOSIS — H10.13 ACUTE ATOPIC CONJUNCTIVITIS OF BOTH EYES: Primary | ICD-10-CM

## 2019-08-01 PROCEDURE — 70480 CT ORBIT/EAR/FOSSA W/O DYE: CPT

## 2019-08-01 PROCEDURE — 99283 EMERGENCY DEPT VISIT LOW MDM: CPT

## 2019-08-01 RX ORDER — TETRACAINE HYDROCHLORIDE 5 MG/ML
2 SOLUTION OPHTHALMIC ONCE
Status: COMPLETED | OUTPATIENT
Start: 2019-08-01 | End: 2019-08-01

## 2019-08-01 RX ORDER — ERYTHROMYCIN 5 MG/G
OINTMENT OPHTHALMIC EVERY 6 HOURS
Qty: 3.5 G | Refills: 0 | Status: SHIPPED | OUTPATIENT
Start: 2019-08-01 | End: 2019-09-16 | Stop reason: HOSPADM

## 2019-08-01 RX ORDER — ERYTHROMYCIN 5 MG/G
1 OINTMENT OPHTHALMIC ONCE
Status: COMPLETED | OUTPATIENT
Start: 2019-08-01 | End: 2019-08-01

## 2019-08-01 RX ADMIN — FLUORESCEIN SODIUM 2 STRIP: 1 STRIP OPHTHALMIC at 01:30

## 2019-08-01 RX ADMIN — TETRACAINE HYDROCHLORIDE 2 DROP: 5 SOLUTION OPHTHALMIC at 01:30

## 2019-08-01 RX ADMIN — ERYTHROMYCIN 1 APPLICATION: 5 OINTMENT OPHTHALMIC at 04:33

## 2019-08-01 NOTE — ED NOTES
"Pt refused vision acuity exam stating \"I can't do it, I can open my eyes in the light, it is too painful\" MD notified     Shanda Rudd, RN  08/01/19 9277    "

## 2019-08-03 NOTE — ED PROVIDER NOTES
Subjective   History of Present Illness    Chief Complaint: Eye pain and redness and drainage  History of Present Illness: Patient presents with bilateral eye redness tearing and reported pain and discomfort.  Was seen by optometrist today started on Patanol and ofloxacin.  States she has persistent symptoms.  Wears contacts but remove them today.  Onset: 1 to 2 days  Duration: Persistent exacerbating / Alleviating factors: Worse with opening her eyes and blinking  Associated symptoms: States her contact lens did tear when taking it out of her right eye but does not suspect that it broke and remains in her eye.    Nurses Notes reviewed and agree, including vitals, allergies, social history and prior medical history.     REVIEW OF SYSTEMS: All systems reviewed and not pertinent unless noted.    Positive for: Bilateral drainage, redness, tearing, pain and irritation    Negative for: Loss of vision, double vision  Review of Systems    Past Medical History:   Diagnosis Date   • Anxiety    • Arthritis    • Asthma    • Asthma    • Cancer (CMS/HCC)    • Chronic bronchitis (CMS/HCC)    • Depression    • Depression    • Diabetes mellitus (CMS/HCC)    • Fibromyalgia    • Fibromyalgia    • Hyperlipidemia    • Hypertension    • Hypothyroidism    • Pleurisy    • Pneumonia    • Skin cancer    • Thyroid disease        Allergies   Allergen Reactions   • Penicillins Angioedema   • Sulfa Antibiotics Rash       Past Surgical History:   Procedure Laterality Date   • APPENDECTOMY     •  SECTION     • DILATATION AND CURETTAGE     • HYSTERECTOMY     • TONSILLECTOMY AND ADENOIDECTOMY         Family History   Problem Relation Age of Onset   • Hypertension Mother    • Heart attack Mother    • Fibromyalgia Mother    • Hyperlipidemia Mother    • Thyroid disease Mother    • Heart attack Father    • Cancer Brother    • Diabetes Maternal Aunt    • Cancer Maternal Grandmother    • Thyroid disease Maternal Grandmother    • Cancer Paternal  Grandmother        Social History     Socioeconomic History   • Marital status:      Spouse name: Not on file   • Number of children: Not on file   • Years of education: Not on file   • Highest education level: Not on file   Tobacco Use   • Smoking status: Former Smoker     Packs/day: 0.25     Years: 15.00     Pack years: 3.75     Types: Cigarettes     Last attempt to quit: 7/21/2004     Years since quitting: 15.0   • Smokeless tobacco: Never Used   Substance and Sexual Activity   • Alcohol use: No   • Drug use: No   • Sexual activity: Defer           Objective   Physical Exam      GENERAL APPEARANCE: Well developed, well nourished, in no acute distress.  VITAL SIGNS: per nursing, reviewed and noted  SKIN: no rashes, ulcerations or petechiae.  Head: Normocephalic, atraumatic.   EYES: perrla. EOMI. bilateral chemosis and conjunctival hyperemia.  No visual field loss.  No identified foreign body with lid eversion.  Patient had significant immediate pain relief with tetracaine.  No evidence of foreign body or abrasion with forcing staining.  Intraocular pressures measured at 12 on the right and 17 on the left.  LUNGS:  normal breath sounds. No retractions.   CARDIOVASCULAR:  regular rate and rhythm, no murmurs.  Good Peripheral pulses.  ABDOMEN: Soft, nontender, normal bowel sounds. No hernia. No ascites.  MUSCULOSKELETAL:  No tenderness. Full ROM. Strength and tone normal.  NEUROLOGIC: Alert, oriented x 3. No gross deficits.   NECK: Supple, symmetric. No tenderness, no masses. Full ROM    Procedures     No attending provider procedures were performed      ED Course                  MDM  Patient had normal CT findings and of the orbits.  No evidence of foreign body.  Response with tetracaine suggestive of superficial irritation only.  Advised to continue her Patanol and ofloxacin drops.  Will add erythromycin ointment for lubrication at bedtime.  Advised outpatient follow-up and return if progressive worsening  symptoms.  Blood pressure is elevated however improved after monitoring and treatment of her symptoms which is likely secondary to her eye discomfort.  Will defer any changes in blood pressure treatment at this time.    Final diagnoses:   Acute atopic conjunctivitis of both eyes   Elevated blood pressure reading            Waqas Moore,   08/03/19 1944

## 2019-09-13 RX ORDER — SENNA AND DOCUSATE SODIUM 50; 8.6 MG/1; MG/1
1 TABLET, FILM COATED ORAL DAILY
Status: ON HOLD | COMMUNITY
End: 2023-03-15

## 2019-09-16 ENCOUNTER — ANESTHESIA EVENT (OUTPATIENT)
Dept: PERIOP | Facility: HOSPITAL | Age: 59
End: 2019-09-16

## 2019-09-16 ENCOUNTER — ANESTHESIA (OUTPATIENT)
Dept: PERIOP | Facility: HOSPITAL | Age: 59
End: 2019-09-16

## 2019-09-16 ENCOUNTER — HOSPITAL ENCOUNTER (OUTPATIENT)
Facility: HOSPITAL | Age: 59
Setting detail: HOSPITAL OUTPATIENT SURGERY
Discharge: HOME | End: 2019-09-16
Attending: OPHTHALMOLOGY | Admitting: OPHTHALMOLOGY

## 2019-09-16 VITALS
OXYGEN SATURATION: 97 % | HEIGHT: 66 IN | RESPIRATION RATE: 18 BRPM | TEMPERATURE: 98 F | BODY MASS INDEX: 44.03 KG/M2 | SYSTOLIC BLOOD PRESSURE: 128 MMHG | DIASTOLIC BLOOD PRESSURE: 76 MMHG | WEIGHT: 274 LBS | HEART RATE: 61 BPM

## 2019-09-16 PROBLEM — H25.11 AGE-RELATED NUCLEAR CATARACT OF RIGHT EYE: Status: ACTIVE | Noted: 2019-09-16

## 2019-09-16 LAB — GLUCOSE BLDC GLUCOMTR-MCNC: 103 MG/DL (ref 70–130)

## 2019-09-16 PROCEDURE — V2632 POST CHMBR INTRAOCULAR LENS: HCPCS | Performed by: OPHTHALMOLOGY

## 2019-09-16 PROCEDURE — 82962 GLUCOSE BLOOD TEST: CPT

## 2019-09-16 PROCEDURE — 25010000002 PROPOFOL 10 MG/ML EMULSION: Performed by: NURSE ANESTHETIST, CERTIFIED REGISTERED

## 2019-09-16 DEVICE — IMPLANTABLE DEVICE: Type: IMPLANTABLE DEVICE | Status: FUNCTIONAL

## 2019-09-16 RX ORDER — PREDNISOLONE ACETATE 10 MG/ML
SUSPENSION/ DROPS OPHTHALMIC AS NEEDED
Status: DISCONTINUED | OUTPATIENT
Start: 2019-09-16 | End: 2019-09-16 | Stop reason: HOSPADM

## 2019-09-16 RX ORDER — BALANCED SALT SOLUTION 6.4; .75; .48; .3; 3.9; 1.7 MG/ML; MG/ML; MG/ML; MG/ML; MG/ML; MG/ML
SOLUTION OPHTHALMIC AS NEEDED
Status: DISCONTINUED | OUTPATIENT
Start: 2019-09-16 | End: 2019-09-16 | Stop reason: HOSPADM

## 2019-09-16 RX ORDER — LIDOCAINE HYDROCHLORIDE 40 MG/ML
INJECTION, SOLUTION RETROBULBAR; TOPICAL AS NEEDED
Status: DISCONTINUED | OUTPATIENT
Start: 2019-09-16 | End: 2019-09-16 | Stop reason: HOSPADM

## 2019-09-16 RX ORDER — PREDNISOLONE ACETATE 10 MG/ML
SUSPENSION/ DROPS OPHTHALMIC
Qty: 2 ML | Refills: 0
Start: 2019-09-16 | End: 2020-06-05

## 2019-09-16 RX ORDER — SODIUM CHLORIDE 0.9 % (FLUSH) 0.9 %
1-10 SYRINGE (ML) INJECTION AS NEEDED
Status: DISCONTINUED | OUTPATIENT
Start: 2019-09-16 | End: 2019-09-16 | Stop reason: HOSPADM

## 2019-09-16 RX ORDER — SODIUM CHLORIDE 0.9 % (FLUSH) 0.9 %
3 SYRINGE (ML) INJECTION EVERY 12 HOURS SCHEDULED
Status: DISCONTINUED | OUTPATIENT
Start: 2019-09-16 | End: 2019-09-16 | Stop reason: HOSPADM

## 2019-09-16 RX ORDER — PREDNISOLONE ACETATE 10 MG/ML
1 SUSPENSION/ DROPS OPHTHALMIC SEE ADMIN INSTRUCTIONS
Status: DISCONTINUED | OUTPATIENT
Start: 2019-09-16 | End: 2019-09-16 | Stop reason: HOSPADM

## 2019-09-16 RX ORDER — CYCLOPENTOLATE HYDROCHLORIDE 20 MG/ML
1 SOLUTION/ DROPS OPHTHALMIC
Status: COMPLETED | OUTPATIENT
Start: 2019-09-16 | End: 2019-09-16

## 2019-09-16 RX ORDER — SODIUM CHLORIDE, SODIUM LACTATE, POTASSIUM CHLORIDE, CALCIUM CHLORIDE 600; 310; 30; 20 MG/100ML; MG/100ML; MG/100ML; MG/100ML
1000 INJECTION, SOLUTION INTRAVENOUS CONTINUOUS
Status: DISCONTINUED | OUTPATIENT
Start: 2019-09-16 | End: 2019-09-16 | Stop reason: HOSPADM

## 2019-09-16 RX ORDER — PROPOFOL 10 MG/ML
VIAL (ML) INTRAVENOUS AS NEEDED
Status: DISCONTINUED | OUTPATIENT
Start: 2019-09-16 | End: 2019-09-16 | Stop reason: SURG

## 2019-09-16 RX ORDER — PHENYLEPHRINE HYDROCHLORIDE 100 MG/ML
1 SOLUTION/ DROPS OPHTHALMIC
Status: COMPLETED | OUTPATIENT
Start: 2019-09-16 | End: 2019-09-16

## 2019-09-16 RX ORDER — TETRACAINE HYDROCHLORIDE 5 MG/ML
1 SOLUTION OPHTHALMIC SEE ADMIN INSTRUCTIONS
Status: COMPLETED | OUTPATIENT
Start: 2019-09-16 | End: 2019-09-16

## 2019-09-16 RX ORDER — TETRACAINE HYDROCHLORIDE 5 MG/ML
SOLUTION OPHTHALMIC AS NEEDED
Status: DISCONTINUED | OUTPATIENT
Start: 2019-09-16 | End: 2019-09-16 | Stop reason: HOSPADM

## 2019-09-16 RX ORDER — KETAMINE HYDROCHLORIDE 50 MG/ML
INJECTION, SOLUTION, CONCENTRATE INTRAMUSCULAR; INTRAVENOUS AS NEEDED
Status: DISCONTINUED | OUTPATIENT
Start: 2019-09-16 | End: 2019-09-16 | Stop reason: SURG

## 2019-09-16 RX ADMIN — PHENYLEPHRINE HYDROCHLORIDE 1 DROP: 100 SOLUTION/ DROPS OPHTHALMIC at 07:42

## 2019-09-16 RX ADMIN — CYCLOPENTOLATE HYDROCHLORIDE 1 DROP: 20 SOLUTION/ DROPS OPHTHALMIC at 07:32

## 2019-09-16 RX ADMIN — KETAMINE HYDROCHLORIDE 20 MG: 50 INJECTION, SOLUTION INTRAMUSCULAR; INTRAVENOUS at 08:35

## 2019-09-16 RX ADMIN — CYCLOPENTOLATE HYDROCHLORIDE 1 DROP: 20 SOLUTION/ DROPS OPHTHALMIC at 07:42

## 2019-09-16 RX ADMIN — SODIUM CHLORIDE, POTASSIUM CHLORIDE, SODIUM LACTATE AND CALCIUM CHLORIDE 1000 ML: 600; 310; 30; 20 INJECTION, SOLUTION INTRAVENOUS at 07:39

## 2019-09-16 RX ADMIN — CYCLOPENTOLATE HYDROCHLORIDE 1 DROP: 20 SOLUTION/ DROPS OPHTHALMIC at 07:37

## 2019-09-16 RX ADMIN — TETRACAINE HYDROCHLORIDE 1 DROP: 5 SOLUTION OPHTHALMIC at 07:30

## 2019-09-16 RX ADMIN — PHENYLEPHRINE HYDROCHLORIDE 1 DROP: 100 SOLUTION/ DROPS OPHTHALMIC at 07:32

## 2019-09-16 RX ADMIN — PROPOFOL 50 MG: 10 INJECTION, EMULSION INTRAVENOUS at 08:41

## 2019-09-16 RX ADMIN — PHENYLEPHRINE HYDROCHLORIDE 1 DROP: 100 SOLUTION/ DROPS OPHTHALMIC at 07:37

## 2019-09-16 RX ADMIN — TETRACAINE HYDROCHLORIDE 1 DROP: 5 SOLUTION OPHTHALMIC at 07:31

## 2019-09-16 RX ADMIN — PROPOFOL 50 MG: 10 INJECTION, EMULSION INTRAVENOUS at 08:35

## 2019-09-16 NOTE — DISCHARGE INSTRUCTIONS
Please follow all post op instructions and follow up appointment time from your physician's office included in your discharge packet.  .   To assist you in voiding:  Drink plenty of fluids  Listen to running water while attempting to void.    If you are unable to urinate and you have an uncomfortable urge to void or it has been   6 hours since you were discharged, return to the Emergency Room    Post Operative Cataract Instructions     Right Eye    1.  Wear eye shield at bedtime for 3 nights.  You may wear glasses or sunglasses during the day.  You must keep eye protected at all times.  2.  Try not to sleep on the side in which the eye was operated (1-2 weeks).  3.  No heavy lifting (at least 3 days).  No bending below the waist.  Keep your head above your heart.  4.  Try not to cough or sneeze excessively.  5.  Bring eye drops and instructions with you to post-op appointment.  6.  If eyes become stuck together after surgery you may soak with warm cloth.  7. Start Prednisilone (Pred-Forte) today as soon as you get home.   *Apply 1 drop to operative eye four times a day for 7 days and then twice daily until all medication is gone.    Complications from cataract surgery usually occur within the first couple of weeks.  Complications could include excessive redness, pain, decreased vision, or changes in vision.  If problems are treated early, there is a better chance of resolution.  Please contact Dr. Babb at one of the numbers listed below if you are experiencing any problems.  If a holiday, evenings, or weekends, do not hesitate to call if you are having a problem.      Dr. Romeo Babb    [phone] CELL  [phone] CELL    [phone] CELL             [phone] CELL        If you are unable to reach any of the above doctors, please call [facility] at [phone]    IMPORTANT INFORMATION  If you have any questions or need any refills on your  eye drops, please call the office at [phone].

## 2019-09-16 NOTE — H&P
[facility] [place]         History and Physical    Patient Name: Janet Obrien  MRN: [id number]  : 1960  Gender: female     HPI: Patient complaint of PPLOV Right eye diagnosed with cataract. Patient requests PHACO PCIOL for Increase of VA/ADL.    History:    Past Medical History:   Diagnosis Date   • Anxiety    • Arthritis    • Asthma    • Asthma    • Cancer (CMS/HCC)    • Chronic bronchitis (CMS/HCC)    • Depression    • Depression    • Diabetes mellitus (CMS/HCC)     type 2   • Fibromyalgia    • Fibromyalgia    • Hyperlipidemia    • Hypertension    • Hypothyroidism    • Pleurisy    • Pneumonia    • Skin cancer     upper lip   • Thyroid disease        Past Surgical History:   Procedure Laterality Date   • APPENDECTOMY     •  SECTION      x 2    • DILATATION AND CURETTAGE     • HYSTERECTOMY      ''still have one ovary''   • TONSILLECTOMY AND ADENOIDECTOMY         Social History     Socioeconomic History   • Marital status:      Spouse name: Not on file   • Number of children: Not on file   • Years of education: Not on file   • Highest education level: Not on file   Tobacco Use   • Smoking status: Former Smoker     Packs/day: 0.25     Years: 15.00     Pack years: 3.75     Types: Cigarettes     Last attempt to quit: 2004     Years since quitting: 15.1   • Smokeless tobacco: Never Used   Substance and Sexual Activity   • Alcohol use: No   • Drug use: No   • Sexual activity: Defer       Family History   Problem Relation Age of Onset   • Hypertension Mother    • Heart attack Mother    • Fibromyalgia Mother    • Hyperlipidemia Mother    • Thyroid disease Mother    • Heart attack Father    • Cancer Brother    • Diabetes Maternal Aunt    • Cancer Maternal Grandmother    • Thyroid disease Maternal Grandmother    • Cancer Paternal Grandmother        Prior to Admission Medications:  Medications Prior to Admission   Medication Sig Dispense Refill Last Dose   • ACCU-CHEK  MARYSOL PLUS test strip    Past Week at Unknown time   • ACCU-CHEK SOFTCLIX LANCETS lancets    Past Week at Unknown time   • albuterol sulfate HFA (VENTOLIN HFA) 108 (90 Base) MCG/ACT inhaler Inhale 2 puffs Every 6 (Six) Hours As Needed for Wheezing. 1 inhaler 0 Past Month at Unknown time   • buPROPion SR (WELLBUTRIN SR) 150 MG 12 hr tablet    Past Week at Unknown time   • Calcium Carbonate-Vit D-Min (CALCIUM 1200 PO) Take  by mouth.   Past Week at Unknown time   • DULoxetine (CYMBALTA) 60 MG capsule    Past Week at Unknown time   • estrogens, conjugated, (PREMARIN) 0.45 MG tablet Take 0.5 mg by mouth Daily. Take daily for 21 days then do not take for 7 days.   Past Week at Unknown time   • hydrochlorothiazide (HYDRODIURIL) 25 MG tablet Take 1 tablet by mouth daily.   9/15/2019 at 0800   • oxyCODONE-acetaminophen (PERCOCET)  MG per tablet    9/15/2019 at 1500   • pyridostigmine (MESTINON) 60 MG tablet Take 1 tablet by mouth 3 (Three) Times a Day. 90 tablet 2 9/15/2019 at 210   • Semaglutide (OZEMPIC) 0.25 or 0.5 MG/DOSE solution pen-injector Inject 0.5 mg under the skin into the appropriate area as directed 1 (One) Time Per Week. 2 pen 6 Past Week at Unknown time   • sennosides-docusate sodium (SENOKOT-S) 8.6-50 MG tablet Take 1 tablet by mouth Daily.   Past Week at Unknown time   • SYNTHROID 175 MCG tablet Take 1 tablet by mouth Daily. 30 tablet 6 9/15/2019 at 0800   • erythromycin (ROMYCIN) 5 MG/GM ophthalmic ointment Administer  to the right eye Every 6 (Six) Hours. 3.5 g 0    • fluticasone-salmeterol (ADVAIR) 250-50 MCG/DOSE DISKUS Inhale 1 puff 2 (Two) Times a Day. 60 each 5    • montelukast (SINGULAIR) 10 MG tablet Take 1 tablet by mouth Every Night. 30 tablet 3    • sitaGLIPtin-metFORMIN (JANUMET)  MG per tablet Take  by mouth.   Taking       Allergies:  Allergies   Allergen Reactions   • Penicillins Angioedema   • Darvon [Propoxyphene] Unknown (See Comments)     Doesn't remember   • Chlorhexidine  Rash   • Sulfa Antibiotics Rash        Vitals: Temp:  [98.2 °F (36.8 °C)] 98.2 °F (36.8 °C)  Heart Rate:  [72] 72  Resp:  [18] 18  BP: (114)/(70) 114/70    Review of Systems:   Within Normal Limits Abnormal   HEENT [x]    []     Cardiovascular [x]   []     Gastrointestinal [x]   []     Gentiourinary [x]   []     Neurologic [x]   []     Pulmonary [x]   []       Physical Exam:   Within Normal Limits Abnormal   HEENT [x]    []     Heart [x]   []     Lungs [x]   []     Abdomen [x]   []     Extremities [x]   []       Impression: Right nuclear sclerotic cataract.     Plan: CATARACT PHACO EXTRACTION WITH INTRAOCULAR LENS IMPLANT RIGHT (Right)     Carl Babb MD  9/16/2019

## 2019-09-16 NOTE — ANESTHESIA POSTPROCEDURE EVALUATION
Patient: Janet Obrien    Procedure Summary     Date:  09/16/19 Room / Location:  [facility] OR 2 / [facility] OR    Anesthesia Start:  0833 Anesthesia Stop:      Procedure:  CATARACT PHACO EXTRACTION WITH INTRAOCULAR LENS IMPLANT RIGHT (Right Eye) Diagnosis:       Age-related nuclear cataract of right eye      (Age-related nuclear cataract of right eye [H25.11])    Surgeon:  Carl Babb MD Provider:  Leroy Garcia CRNA    Anesthesia Type:  MAC ASA Status:  3          Anesthesia Type: MAC  Last vitals  BP   119/66   Temp   97.9   Pulse   64   Resp   16   SpO2   96     Post Anesthesia Care and Evaluation    Patient location during evaluation: bedside  Patient participation: complete - patient participated  Level of consciousness: awake and alert  Pain score: 0  Pain management: adequate  Airway patency: patent  Anesthetic complications: No anesthetic complications  PONV Status: none  Cardiovascular status: acceptable  Respiratory status: acceptable and nasal cannula  Hydration status: acceptable

## 2019-09-16 NOTE — OP NOTE
OPERATIVE NOTE    Date of Procedure: 9/16/2019  Patient Name: Janet Obrien  Patient MRN: [id number]  YOB: 1960     Preoperative Diagnosis: Right nuclear sclerotic cataract.     Postoperative Diagnosis: Right nuclear sclerotic cataract.     Procedure Performed: Phacoemulsification with implantation of a  foldable posterior chamber intraocular lens, Right eye.     Surgeon: Carl Babb MD     Anesthesia:  Monitored Anesthesia Care (MAC)      Brief History and Indication: The patient presents with a history of past progressive loss of vision.  Patient was diagnosed with cataract and requests removal for increased ability to read and see.     Operation Description: The patient was taken to the OR and prepped and draped in the usual sterile ophthalmic fashion. A lid speculum was placed in the eye.  A #75 blade was then used to make a stab incision two o’clock hours from the intended temporal clear cornea groove. The anterior chamber was then inflated with a Viscoelastic. A metal microkeratome blade was then used to enter the anterior chamber at the temporal clear cornea site. A three level tunnel incision was made. A curvilinear capsulorrhexis was then performed with a bent cystotome needle and capsulorrhexis forceps.  BSS on a 30 gauge bent cannula was used to hydro-dissect, and hydro-delineate the lens. Good fluid waves and hydro-delineation were noted. Phacoemulsification was then used to remove nuclear material without complications. The residual cortical and lenticular material was then removed with irrigation and aspiration. Viscoelastics were then used to inflate the bag in a soft shell technique. A PCIOL was injected into the bag. Post-implantation, there were no rents or tears in the bag and the lens was noted to be stable and centered. The residual Viscoelastic was then removed with irrigation and aspiration.  The wound was checked and found to be without leaks. Therefore a Polydex  ointment and one drop of a Prednisilone eye drop was placed in the eye.     Implant Information:   Implant Name Type Inv. Item Serial No.  Lot No. LRB No. Used   LENS ACRYSOF IQ [id number] W/ULTRASERT [id number] 25.5 - [id number] - [id number] Implant LENS ACRYSOF IQ [id number] W/ULTRASERT [id number] 25.5 [id number]  Right 1       Complications: None    Discharge and Condition  The patient was transported to same day surgery in excellent condition and scheduled for follow-up tomorrow morning. The patient was given instructions on use of eye drops for the operative eye and was specifically instructed to call Dr. Babb at his office or home for any nausea, vomiting, headache, decreased visual acuity, or pain, or if the patient had any general concerns.    Carl Babb MD  9/16/2019

## 2019-09-16 NOTE — ANESTHESIA PREPROCEDURE EVALUATION
Anesthesia Evaluation     Patient summary reviewed and Nursing notes reviewed   no history of anesthetic complications:  NPO Solid Status: > 8 hours  NPO Liquid Status: > 8 hours           Airway   Mallampati: II  TM distance: <3 FB  Neck ROM: full  Difficult intubation highly probable  Dental - normal exam     Pulmonary - normal exam   (+) a smoker Former, asthma,   Cardiovascular - normal exam    Rhythm: regular  Rate: normal    (+) hypertension, hyperlipidemia,       Neuro/Psych  (+) psychiatric history Anxiety and Depression,     GI/Hepatic/Renal/Endo    (+) morbid obesity,  diabetes mellitus type 2 well controlled, hypothyroidism,     Musculoskeletal     (+) arthralgias, chronic pain,       ROS comment: Fibromyalgia  Abdominal   (+) obese,     Abdomen: soft.  Bowel sounds: normal.   Substance History      OB/GYN          Other   (+) arthritis   history of cancer (Skin) remission                    Anesthesia Plan    ASA 3     MAC   (Risks and benefits discussed including risk of aspiration, recall and dental damage. All patient questions answered. Will continue with POC.)  intravenous induction   Anesthetic plan, all risks, benefits, and alternatives have been provided, discussed and informed consent has been obtained with: patient.

## 2019-09-17 RX ORDER — LEVOTHYROXINE SODIUM 175 MCG
TABLET ORAL
Qty: 90 TABLET | Refills: 0 | Status: SHIPPED | OUTPATIENT
Start: 2019-09-17 | End: 2020-01-16 | Stop reason: SDUPTHER

## 2019-09-19 ENCOUNTER — HOSPITAL ENCOUNTER (OUTPATIENT)
Dept: MAMMOGRAPHY | Facility: HOSPITAL | Age: 59
Discharge: HOME | End: 2019-09-19
Admitting: INTERNAL MEDICINE

## 2019-09-19 DIAGNOSIS — Z12.39 BREAST CANCER SCREENING: ICD-10-CM

## 2019-09-19 PROCEDURE — 77063 BREAST TOMOSYNTHESIS BI: CPT

## 2019-09-19 PROCEDURE — 77067 SCR MAMMO BI INCL CAD: CPT

## 2019-10-07 ENCOUNTER — ANESTHESIA EVENT (OUTPATIENT)
Dept: PERIOP | Facility: HOSPITAL | Age: 59
End: 2019-10-07

## 2019-10-07 ENCOUNTER — ANESTHESIA (OUTPATIENT)
Dept: PERIOP | Facility: HOSPITAL | Age: 59
End: 2019-10-07

## 2019-10-07 ENCOUNTER — HOSPITAL ENCOUNTER (OUTPATIENT)
Facility: HOSPITAL | Age: 59
Setting detail: HOSPITAL OUTPATIENT SURGERY
Discharge: HOME | End: 2019-10-07
Attending: OPHTHALMOLOGY | Admitting: OPHTHALMOLOGY

## 2019-10-07 VITALS
BODY MASS INDEX: 44.03 KG/M2 | WEIGHT: 274 LBS | HEIGHT: 66 IN | HEART RATE: 62 BPM | DIASTOLIC BLOOD PRESSURE: 95 MMHG | SYSTOLIC BLOOD PRESSURE: 154 MMHG | RESPIRATION RATE: 18 BRPM | TEMPERATURE: 98.7 F | OXYGEN SATURATION: 98 %

## 2019-10-07 LAB — GLUCOSE BLDC GLUCOMTR-MCNC: 111 MG/DL (ref 70–130)

## 2019-10-07 PROCEDURE — V2632 POST CHMBR INTRAOCULAR LENS: HCPCS | Performed by: OPHTHALMOLOGY

## 2019-10-07 PROCEDURE — 25010000002 PROPOFOL 10 MG/ML EMULSION: Performed by: NURSE ANESTHETIST, CERTIFIED REGISTERED

## 2019-10-07 PROCEDURE — 82962 GLUCOSE BLOOD TEST: CPT

## 2019-10-07 DEVICE — IMPLANTABLE DEVICE: Type: IMPLANTABLE DEVICE | Site: POSTERIOR CHAMBER | Status: FUNCTIONAL

## 2019-10-07 RX ORDER — LIDOCAINE HYDROCHLORIDE 40 MG/ML
INJECTION, SOLUTION RETROBULBAR; TOPICAL AS NEEDED
Status: DISCONTINUED | OUTPATIENT
Start: 2019-10-07 | End: 2019-10-07 | Stop reason: HOSPADM

## 2019-10-07 RX ORDER — TETRACAINE HYDROCHLORIDE 5 MG/ML
SOLUTION OPHTHALMIC AS NEEDED
Status: DISCONTINUED | OUTPATIENT
Start: 2019-10-07 | End: 2019-10-07 | Stop reason: HOSPADM

## 2019-10-07 RX ORDER — PREDNISOLONE ACETATE 10 MG/ML
1 SUSPENSION/ DROPS OPHTHALMIC SEE ADMIN INSTRUCTIONS
Status: DISCONTINUED | OUTPATIENT
Start: 2019-10-07 | End: 2019-10-07 | Stop reason: HOSPADM

## 2019-10-07 RX ORDER — SODIUM CHLORIDE, SODIUM LACTATE, POTASSIUM CHLORIDE, CALCIUM CHLORIDE 600; 310; 30; 20 MG/100ML; MG/100ML; MG/100ML; MG/100ML
1000 INJECTION, SOLUTION INTRAVENOUS CONTINUOUS
Status: DISCONTINUED | OUTPATIENT
Start: 2019-10-07 | End: 2019-10-07 | Stop reason: HOSPADM

## 2019-10-07 RX ORDER — BALANCED SALT SOLUTION 6.4; .75; .48; .3; 3.9; 1.7 MG/ML; MG/ML; MG/ML; MG/ML; MG/ML; MG/ML
SOLUTION OPHTHALMIC AS NEEDED
Status: DISCONTINUED | OUTPATIENT
Start: 2019-10-07 | End: 2019-10-07 | Stop reason: HOSPADM

## 2019-10-07 RX ORDER — TETRACAINE HYDROCHLORIDE 5 MG/ML
1 SOLUTION OPHTHALMIC SEE ADMIN INSTRUCTIONS
Status: COMPLETED | OUTPATIENT
Start: 2019-10-07 | End: 2019-10-07

## 2019-10-07 RX ORDER — LIDOCAINE HYDROCHLORIDE 20 MG/ML
INJECTION, SOLUTION INFILTRATION; PERINEURAL AS NEEDED
Status: DISCONTINUED | OUTPATIENT
Start: 2019-10-07 | End: 2019-10-07 | Stop reason: SURG

## 2019-10-07 RX ORDER — PROPOFOL 10 MG/ML
VIAL (ML) INTRAVENOUS AS NEEDED
Status: DISCONTINUED | OUTPATIENT
Start: 2019-10-07 | End: 2019-10-07 | Stop reason: SURG

## 2019-10-07 RX ORDER — SODIUM CHLORIDE 0.9 % (FLUSH) 0.9 %
3 SYRINGE (ML) INJECTION EVERY 12 HOURS SCHEDULED
Status: DISCONTINUED | OUTPATIENT
Start: 2019-10-07 | End: 2019-10-07 | Stop reason: HOSPADM

## 2019-10-07 RX ORDER — ONDANSETRON 2 MG/ML
4 INJECTION INTRAMUSCULAR; INTRAVENOUS ONCE AS NEEDED
Status: CANCELLED | OUTPATIENT
Start: 2019-10-07 | End: 2019-10-07

## 2019-10-07 RX ORDER — PREDNISOLONE ACETATE 10 MG/ML
SUSPENSION/ DROPS OPHTHALMIC AS NEEDED
Status: DISCONTINUED | OUTPATIENT
Start: 2019-10-07 | End: 2019-10-07 | Stop reason: HOSPADM

## 2019-10-07 RX ORDER — SODIUM CHLORIDE 0.9 % (FLUSH) 0.9 %
1-10 SYRINGE (ML) INJECTION AS NEEDED
Status: DISCONTINUED | OUTPATIENT
Start: 2019-10-07 | End: 2019-10-07 | Stop reason: HOSPADM

## 2019-10-07 RX ORDER — PHENYLEPHRINE HYDROCHLORIDE 100 MG/ML
1 SOLUTION/ DROPS OPHTHALMIC
Status: COMPLETED | OUTPATIENT
Start: 2019-10-07 | End: 2019-10-07

## 2019-10-07 RX ORDER — CYCLOPENTOLATE HYDROCHLORIDE 20 MG/ML
1 SOLUTION/ DROPS OPHTHALMIC
Status: COMPLETED | OUTPATIENT
Start: 2019-10-07 | End: 2019-10-07

## 2019-10-07 RX ADMIN — PHENYLEPHRINE HYDROCHLORIDE 1 DROP: 100 SOLUTION/ DROPS OPHTHALMIC at 08:06

## 2019-10-07 RX ADMIN — TETRACAINE HYDROCHLORIDE 1 DROP: 5 SOLUTION OPHTHALMIC at 07:59

## 2019-10-07 RX ADMIN — CYCLOPENTOLATE HYDROCHLORIDE 1 DROP: 20 SOLUTION/ DROPS OPHTHALMIC at 08:06

## 2019-10-07 RX ADMIN — CYCLOPENTOLATE HYDROCHLORIDE 1 DROP: 20 SOLUTION/ DROPS OPHTHALMIC at 08:01

## 2019-10-07 RX ADMIN — TETRACAINE HYDROCHLORIDE 1 DROP: 5 SOLUTION OPHTHALMIC at 08:00

## 2019-10-07 RX ADMIN — LIDOCAINE HYDROCHLORIDE 100 MG: 20 INJECTION, SOLUTION INFILTRATION; PERINEURAL at 09:15

## 2019-10-07 RX ADMIN — PROPOFOL 100 MG: 10 INJECTION, EMULSION INTRAVENOUS at 09:22

## 2019-10-07 RX ADMIN — CYCLOPENTOLATE HYDROCHLORIDE 1 DROP: 20 SOLUTION/ DROPS OPHTHALMIC at 08:11

## 2019-10-07 RX ADMIN — PHENYLEPHRINE HYDROCHLORIDE 1 DROP: 100 SOLUTION/ DROPS OPHTHALMIC at 08:11

## 2019-10-07 RX ADMIN — SODIUM CHLORIDE, POTASSIUM CHLORIDE, SODIUM LACTATE AND CALCIUM CHLORIDE 1000 ML: 600; 310; 30; 20 INJECTION, SOLUTION INTRAVENOUS at 08:03

## 2019-10-07 RX ADMIN — PHENYLEPHRINE HYDROCHLORIDE 1 DROP: 100 SOLUTION/ DROPS OPHTHALMIC at 08:01

## 2019-10-07 NOTE — DISCHARGE INSTRUCTIONS
Post Operative Cataract Instructions     Left Eye    1.  Wear eye shield at bedtime for 3 nights.  You may wear glasses or sunglasses during the day.  You must keep eye protected at all times.  2.  Try not to sleep on the side in which the eye was operated (1-2 weeks).  3.  No heavy lifting (at least 3 days).  No bending below the waist.  Keep your head above your heart.  4.  Try not to cough or sneeze excessively.  5.  Bring eye drops and instructions with you to post-op appointment.  6.  If eyes become stuck together after surgery you may soak with warm cloth.  7. Start Prednisilone (Pred-Forte) today as soon as you get home.   *Apply 1 drop to operative eye four times a day for 7 days and then twice daily until all medication is gone.    Complications from cataract surgery usually occur within the first couple of weeks.  Complications could include excessive redness, pain, decreased vision, or changes in vision.  If problems are treated early, there is a better chance of resolution.  Please contact Dr. Babb at one of the numbers listed below if you are experiencing any problems.  If a holiday, evenings, or weekends, do not hesitate to call if you are having a problem.      Dr. Romeo Babb    [phone] CELL  [phone] CELL    [phone] CELL             [phone] CELL        If you are unable to reach any of the above doctors, please call [facility] at [phone]    IMPORTANT INFORMATION  If you have any questions or need any refills on your eye drops, please call the office at [phone].    No pushing, pulling, tugging,  heavy lifting, or strenuous activity.  No major decision making, driving, or drinking alcoholic beverages for 24 hours. ( due to the medications you have  received)  Always use good hand hygiene/washing techniques.  NO driving while taking pain medications.    To assist you in voiding:  Drink plenty of fluids  Listen to  running water while attempting to void.    If you are unable to urinate and you have an uncomfortable urge to void or it has been   6 hours since you were discharged, return to the Emergency Room

## 2019-10-07 NOTE — H&P
[facility]         History and Physical    Patient Name: Janet Obrien  MRN: [id number]  : 1960  Gender: female     HPI: Patient complaint of PPLOV Left eye diagnosed with cataract. Patient requests PHACO PCIOL for Increase of VA/ADL.    History:    Past Medical History:   Diagnosis Date   • Anxiety    • Arthritis    • Asthma    • Asthma    • Cancer (CMS/HCC)    • Chronic bronchitis (CMS/HCC)    • Depression    • Depression    • Diabetes mellitus (CMS/HCC)     type 2   • Fibromyalgia    • Fibromyalgia    • Hyperlipidemia    • Hypertension    • Hypothyroidism    • Pleurisy    • Pneumonia    • Skin cancer     upper lip   • Thyroid disease        Past Surgical History:   Procedure Laterality Date   • APPENDECTOMY     • CATARACT EXTRACTION W/ INTRAOCULAR LENS IMPLANT Right 2019    Procedure: CATARACT PHACO EXTRACTION WITH INTRAOCULAR LENS IMPLANT RIGHT;  Surgeon: Carl Babb MD;  Location: [facility];  Service: Ophthalmology   •  SECTION      x 2    • DILATATION AND CURETTAGE     • HYSTERECTOMY      ''still have one ovary''   • TONSILLECTOMY AND ADENOIDECTOMY         Social History     Socioeconomic History   • Marital status:      Spouse name: Not on file   • Number of children: Not on file   • Years of education: Not on file   • Highest education level: Not on file   Tobacco Use   • Smoking status: Former Smoker     Packs/day: 0.25     Years: 15.00     Pack years: 3.75     Types: Cigarettes     Last attempt to quit: 2004     Years since quitting: 15.2   • Smokeless tobacco: Never Used   Substance and Sexual Activity   • Alcohol use: No   • Drug use: No   • Sexual activity: Defer       Family History   Problem Relation Age of Onset   • Hypertension Mother    • Heart attack Mother    • Fibromyalgia Mother    • Hyperlipidemia Mother    • Thyroid disease Mother    • Heart attack Father    • Cancer Brother    • Diabetes Maternal Aunt    •  Cancer Maternal Grandmother    • Thyroid disease Maternal Grandmother    • Cancer Paternal Grandmother        Prior to Admission Medications:  Medications Prior to Admission   Medication Sig Dispense Refill Last Dose   • ACCU-CHEK MARYSOL PLUS test strip    Past Month at Unknown time   • ACCU-CHEK SOFTCLIX LANCETS lancets    Past Month at Unknown time   • albuterol sulfate HFA (VENTOLIN HFA) 108 (90 Base) MCG/ACT inhaler Inhale 2 puffs Every 6 (Six) Hours As Needed for Wheezing. 1 inhaler 0 Past Month at Unknown time   • buPROPion SR (WELLBUTRIN SR) 150 MG 12 hr tablet    Past Week at Unknown time   • Calcium Carbonate-Vit D-Min (CALCIUM 1200 PO) Take  by mouth.   Past Week at Unknown time   • DULoxetine (CYMBALTA) 60 MG capsule    Past Week at Unknown time   • estrogens, conjugated, (PREMARIN) 0.45 MG tablet Take 0.5 mg by mouth Daily. Take daily for 21 days then do not take for 7 days.   Past Week at Unknown time   • hydrochlorothiazide (HYDRODIURIL) 25 MG tablet Take 1 tablet by mouth daily.   10/6/2019 at 0700   • oxyCODONE-acetaminophen (PERCOCET)  MG per tablet    10/6/2019 at 2000   • prednisoLONE acetate (PRED FORTE) 1 % ophthalmic suspension Follow instructions on the After Visit Summary. 2 mL 0 10/6/2019 at 1900   • pyridostigmine (MESTINON) 60 MG tablet Take 1 tablet by mouth 3 (Three) Times a Day. 90 tablet 2 Past Week at Unknown time   • Semaglutide (OZEMPIC) 0.25 or 0.5 MG/DOSE solution pen-injector Inject 0.5 mg under the skin into the appropriate area as directed 1 (One) Time Per Week. 2 pen 6 Past Week at Unknown time   • sennosides-docusate sodium (SENOKOT-S) 8.6-50 MG tablet Take 1 tablet by mouth Daily.   Past Week at Unknown time   • SYNTHROID 175 MCG tablet TAKE 1 TABLET BY MOUTH ONCE DAILY 90 tablet 0 10/6/2019 at 0800       Allergies:  Allergies   Allergen Reactions   • Penicillins Angioedema   • Darvon [Propoxyphene] Unknown (See Comments)     Doesn't remember   • Chlorhexidine Rash   •  Sulfa Antibiotics Rash        Vitals: Temp:  [98.7 °F (37.1 °C)] 98.7 °F (37.1 °C)  Heart Rate:  [75] 75  Resp:  [17] 17  BP: (143)/(76) 143/76    Review of Systems:   Within Normal Limits Abnormal   HEENT [x]    []     Cardiovascular [x]   []     Gastrointestinal [x]   []     Gentiourinary [x]   []     Neurologic [x]   []     Pulmonary [x]   []       Physical Exam:   Within Normal Limits Abnormal   HEENT [x]    []     Heart [x]   []     Lungs [x]   []     Abdomen [x]   []     Extremities [x]   []       Impression: Left nuclear sclerotic cataract.     Plan: CATARACT PHACO EXTRACTION WITH INTRAOCULAR LENS IMPLANT LEFT (Left)     Carl Babb MD  10/7/2019

## 2019-10-07 NOTE — ANESTHESIA POSTPROCEDURE EVALUATION
Patient: Janet Obrien    Procedure Summary     Date:  10/07/19 Room / Location:  [facility] OR 2 / [facility] OR    Anesthesia Start:  0910 Anesthesia Stop:  0925    Procedure:  CATARACT PHACO EXTRACTION WITH INTRAOCULAR LENS IMPLANT LEFT (Left Eye) Diagnosis:       Age-related nuclear cataract of left eye      (Age-related nuclear cataract of left eye [H25.12])    Surgeon:  Carl Babb MD Provider:  Carl Muñoz CRNA    Anesthesia Type:  MAC ASA Status:  3          Anesthesia Type: MAC  Last vitals  BP   154/95 (10/07/19 0959)   Temp   98.7 °F (37.1 °C) (10/07/19 0747)   Pulse   62 (10/07/19 0959)   Resp   18 (10/07/19 0959)     SpO2   98 % (10/07/19 0959)     Post Anesthesia Care and Evaluation    Patient location during evaluation: PHASE II  Patient participation: complete - patient participated  Level of consciousness: awake  Pain score: 1  Pain management: adequate  Airway patency: patent  Anesthetic complications: No anesthetic complications  PONV Status: controlled  Cardiovascular status: acceptable and stable  Respiratory status: acceptable  Hydration status: acceptable

## 2019-10-07 NOTE — OP NOTE
OPERATIVE NOTE    Date of Procedure: 10/7/2019  Patient Name: Janet Obrien  Patient MRN: [id number]  YOB: 1960     Preoperative Diagnosis: Left nuclear sclerotic cataract.     Postoperative Diagnosis: Left nuclear sclerotic cataract.     Procedure Performed: Phacoemulsification with implantation of a  foldable posterior chamber intraocular lens, Left eye.     Surgeon: Carl Babb MD     Anesthesia:  Monitored Anesthesia Care (MAC)      Brief History and Indication: The patient presents with a history of past progressive loss of vision.  Patient was diagnosed with cataract and requests removal for increased ability to read and see.     Operation Description: The patient was taken to the OR and prepped and draped in the usual sterile ophthalmic fashion. A lid speculum was placed in the eye.  A #75 blade was then used to make a stab incision two o’clock hours from the intended temporal clear cornea groove. The anterior chamber was then inflated with a Viscoelastic. A metal microkeratome blade was then used to enter the anterior chamber at the temporal clear cornea site. A three level tunnel incision was made. A curvilinear capsulorrhexis was then performed with a bent cystotome needle and capsulorrhexis forceps.  BSS on a 30 gauge bent cannula was used to hydro-dissect, and hydro-delineate the lens. Good fluid waves and hydro-delineation were noted. Phacoemulsification was then used to remove nuclear material without complications. The residual cortical and lenticular material was then removed with irrigation and aspiration. Viscoelastics were then used to inflate the bag in a soft shell technique. A PCIOL was injected into the bag. Post-implantation, there were no rents or tears in the bag and the lens was noted to be stable and centered. The residual Viscoelastic was then removed with irrigation and aspiration.  The wound was checked and found to be without leaks. Therefore a Polydex  ointment and one drop of a Prednisilone eye drop was placed in the eye.     Implant Information:   Implant Name Type Inv. Item Serial No.  Lot No. LRB No. Used   LENS ACRYSOF IQ SA60WF W/ULTRASERT [id number] 25.5 - [id number] - [id number] Implant LENS ACRYSOF IQ [id number] W/ULTRASERT 6X13MM [id number] 25.5 [id number] DENZEL NA Left 1       Complications: None    Discharge and Condition  The patient was transported to same day surgery in excellent condition and scheduled for follow-up tomorrow morning. The patient was given instructions on use of eye drops for the operative eye and was specifically instructed to call Dr. Babb at his office or home for any nausea, vomiting, headache, decreased visual acuity, or pain, or if the patient had any general concerns.    Carl Babb MD  10/7/2019

## 2019-10-15 RX ORDER — ALBUTEROL SULFATE 90 UG/1
AEROSOL, METERED RESPIRATORY (INHALATION)
Qty: 18 G | Refills: 5 | Status: SHIPPED | OUTPATIENT
Start: 2019-10-15 | End: 2020-01-16

## 2019-12-08 ENCOUNTER — HOSPITAL ENCOUNTER (EMERGENCY)
Facility: HOSPITAL | Age: 59
Discharge: HOME | End: 2019-12-09
Attending: EMERGENCY MEDICINE | Admitting: EMERGENCY MEDICINE

## 2019-12-08 DIAGNOSIS — B37.2 CANDIDAL INTERTRIGO: Primary | ICD-10-CM

## 2019-12-08 PROCEDURE — 99282 EMERGENCY DEPT VISIT SF MDM: CPT

## 2019-12-09 VITALS
RESPIRATION RATE: 18 BRPM | TEMPERATURE: 98.1 F | HEART RATE: 75 BPM | OXYGEN SATURATION: 97 % | DIASTOLIC BLOOD PRESSURE: 89 MMHG | SYSTOLIC BLOOD PRESSURE: 137 MMHG | WEIGHT: 272 LBS | HEIGHT: 66 IN | BODY MASS INDEX: 43.71 KG/M2

## 2019-12-09 RX ORDER — CLOTRIMAZOLE AND BETAMETHASONE DIPROPIONATE 10; .64 MG/G; MG/G
CREAM TOPICAL 2 TIMES DAILY
Qty: 15 G | Refills: 0 | Status: SHIPPED | OUTPATIENT
Start: 2019-12-09 | End: 2019-12-16

## 2019-12-09 NOTE — ED PROVIDER NOTES
Subjective   Patient is a 59-year-old male with history of anxiety, asthma, bronchitis, diabetes, fibromyalgia, hyperlipidemia, hypertension, hypothyroidism and obesity presenting to the ER for evaluation of a rash.  Patient states that starting yesterday she noticed an erythematous rash under her abdomen above her pelvis and around her inguinal skin folds.  She states that she tried use Lotrimin but this made it burn.  She states that is seeping clear fluid.  She denies any fever, chills, nausea, emesis, abdominal pain, dysuria, hematuria, abnormal vaginal discharge.           Review of Systems   Constitutional: Negative for chills and fever.   HENT: Negative.    Eyes: Negative.    Respiratory: Negative.    Cardiovascular: Negative.    Gastrointestinal: Negative.    Genitourinary: Negative.    Skin: Positive for rash.   Allergic/Immunologic: Negative for immunocompromised state.   Neurological: Negative.    Psychiatric/Behavioral: Negative.        Past Medical History:   Diagnosis Date   • Anxiety    • Arthritis    • Asthma    • Asthma    • Cancer (CMS/HCC)    • Chronic bronchitis (CMS/HCC)    • Depression    • Depression    • Diabetes mellitus (CMS/HCC)     type 2   • Fibromyalgia    • Fibromyalgia    • Hyperlipidemia    • Hypertension    • Hypothyroidism    • Pleurisy    • Pneumonia    • Skin cancer     upper lip   • Thyroid disease        Allergies   Allergen Reactions   • Penicillins Angioedema   • Darvon [Propoxyphene] Unknown (See Comments)     Doesn't remember   • Chlorhexidine Rash   • Sulfa Antibiotics Rash       Past Surgical History:   Procedure Laterality Date   • APPENDECTOMY     • CATARACT EXTRACTION W/ INTRAOCULAR LENS IMPLANT Right 9/16/2019    Procedure: CATARACT PHACO EXTRACTION WITH INTRAOCULAR LENS IMPLANT RIGHT;  Surgeon: Carl Babb MD;  Location: [facility];  Service: Ophthalmology   • CATARACT EXTRACTION W/ INTRAOCULAR LENS IMPLANT Left 10/7/2019    Procedure: CATARACT PHACO  "EXTRACTION WITH INTRAOCULAR LENS IMPLANT LEFT;  Surgeon: Carl Babb MD;  Location: [facility];  Service: Ophthalmology   •  SECTION      x 2    • DILATATION AND CURETTAGE     • HYSTERECTOMY      ''still have one ovary''   • TONSILLECTOMY AND ADENOIDECTOMY         Family History   Problem Relation Age of Onset   • Hypertension Mother    • Heart attack Mother    • Fibromyalgia Mother    • Hyperlipidemia Mother    • Thyroid disease Mother    • Heart attack Father    • Cancer Brother    • Diabetes Maternal Aunt    • Cancer Maternal Grandmother    • Thyroid disease Maternal Grandmother    • Cancer Paternal Grandmother        Social History     Socioeconomic History   • Marital status:      Spouse name: Not on file   • Number of children: Not on file   • Years of education: Not on file   • Highest education level: Not on file   Tobacco Use   • Smoking status: Former Smoker     Packs/day: 0.25     Years: 15.00     Pack years: 3.75     Types: Cigarettes     Last attempt to quit: 2004     Years since quitting: 15.3   • Smokeless tobacco: Never Used   Substance and Sexual Activity   • Alcohol use: No   • Drug use: No   • Sexual activity: Defer           Objective   Physical Exam   Nursing note and vitals reviewed.  /89 (BP Location: Left arm, Patient Position: Sitting)   Pulse 75   Temp 98.1 °F (36.7 °C) (Oral)   Resp 18   Ht 167.6 cm (66\")   Wt 123 kg (272 lb)   SpO2 97%   BMI 43.90 kg/m²     GEN: No acute distress, sitting upright in the stretcher  Skin: Patient has erythematous rash in the folds of her lower abdomen and inguinal folds.  Skin appears very moist, there is some yeast like substances in the inguinal folds.  Head: Normocephalic, atraumatic  Eyes: EOM intact  Cardiovascular: Regular rate  Lungs: Clear to auscultation bilaterally  Abdomen: Soft, nontender, nondistended, no peritoneal signs or guarding  Extremities: No edema, normal appearance  Neuro: GCS 15  Psych: Mood " and affect are appropriate    Procedures           ED Course                      No data recorded                        MDM  Number of Diagnoses or Management Options  Candidal intertrigo:   Diagnosis management comments: On arrival, patient is mildly hypertensive afebrile, no acute distress.  Differential includes contact dermatitis, candidal rash, cellulitis and other concerns.  Appears to be intertrigo.  I will give her topical antifungal cream and discussed keeping the area clean and dry.  Discussed follow-up with her primary care provider and strict return precautions.  She verbalized understanding and was in agreement with this plan of care       Amount and/or Complexity of Data Reviewed  Review and summarize past medical records: yes  Discuss the patient with other providers: yes    Risk of Complications, Morbidity, and/or Mortality  Presenting problems: low  Diagnostic procedures: low  Management options: low    Patient Progress  Patient progress: stable      Final diagnoses:   Candidal intertrigo              Cris Pena PA-C  12/09/19 0151

## 2019-12-09 NOTE — DISCHARGE INSTRUCTIONS
Likely have a rash that is caused by heat and friction.  Keep the area clean with soap and water.  Try to dry as well as you can.  You may apply antifungal cream as directed.  Powders may help keep the area dry.  You need to follow-up with your primary care provider in the next few days to reevaluate your symptoms and ensure they are improving.

## 2020-01-16 ENCOUNTER — OFFICE VISIT (OUTPATIENT)
Dept: ENDOCRINOLOGY | Facility: CLINIC | Age: 60
End: 2020-01-16

## 2020-01-16 VITALS
BODY MASS INDEX: 44.56 KG/M2 | SYSTOLIC BLOOD PRESSURE: 118 MMHG | HEART RATE: 68 BPM | HEIGHT: 66 IN | DIASTOLIC BLOOD PRESSURE: 66 MMHG | OXYGEN SATURATION: 98 % | WEIGHT: 277.3 LBS

## 2020-01-16 DIAGNOSIS — E11.9 TYPE 2 DIABETES MELLITUS WITHOUT COMPLICATION, WITHOUT LONG-TERM CURRENT USE OF INSULIN (HCC): Primary | ICD-10-CM

## 2020-01-16 DIAGNOSIS — E89.0 POSTOPERATIVE HYPOTHYROIDISM: ICD-10-CM

## 2020-01-16 LAB
ALBUMIN SERPL-MCNC: 4 G/DL (ref 3.5–5.2)
ALBUMIN/GLOB SERPL: 1.5 G/DL
ALP SERPL-CCNC: 87 U/L (ref 39–117)
ALT SERPL W P-5'-P-CCNC: 14 U/L (ref 1–33)
ANION GAP SERPL CALCULATED.3IONS-SCNC: 13.6 MMOL/L (ref 5–15)
AST SERPL-CCNC: 16 U/L (ref 1–32)
BILIRUB SERPL-MCNC: 0.5 MG/DL (ref 0.2–1.2)
BUN BLD-MCNC: 11 MG/DL (ref 6–20)
BUN/CREAT SERPL: 14.1 (ref 7–25)
CALCIUM SPEC-SCNC: 10 MG/DL (ref 8.6–10.5)
CHLORIDE SERPL-SCNC: 101 MMOL/L (ref 98–107)
CO2 SERPL-SCNC: 23.4 MMOL/L (ref 22–29)
CREAT BLD-MCNC: 0.78 MG/DL (ref 0.57–1)
EXPIRATION DATE: NORMAL
EXPIRATION DATE: NORMAL
GFR SERPL CREATININE-BSD FRML MDRD: 76 ML/MIN/1.73
GLOBULIN UR ELPH-MCNC: 2.6 GM/DL
GLUCOSE BLD-MCNC: 122 MG/DL (ref 65–99)
GLUCOSE BLDC GLUCOMTR-MCNC: 103 MG/DL (ref 70–130)
HBA1C MFR BLD: 5.6 %
Lab: NORMAL
Lab: NORMAL
POTASSIUM BLD-SCNC: 3.9 MMOL/L (ref 3.5–5.2)
PROT SERPL-MCNC: 6.6 G/DL (ref 6–8.5)
SODIUM BLD-SCNC: 138 MMOL/L (ref 136–145)
T4 FREE SERPL-MCNC: 1.25 NG/DL (ref 0.93–1.7)
TSH SERPL DL<=0.05 MIU/L-ACNC: 7.17 UIU/ML (ref 0.27–4.2)

## 2020-01-16 PROCEDURE — 82947 ASSAY GLUCOSE BLOOD QUANT: CPT | Performed by: INTERNAL MEDICINE

## 2020-01-16 PROCEDURE — 80053 COMPREHEN METABOLIC PANEL: CPT | Performed by: INTERNAL MEDICINE

## 2020-01-16 PROCEDURE — 84439 ASSAY OF FREE THYROXINE: CPT | Performed by: INTERNAL MEDICINE

## 2020-01-16 PROCEDURE — 84443 ASSAY THYROID STIM HORMONE: CPT | Performed by: INTERNAL MEDICINE

## 2020-01-16 PROCEDURE — 83036 HEMOGLOBIN GLYCOSYLATED A1C: CPT | Performed by: INTERNAL MEDICINE

## 2020-01-16 PROCEDURE — 99213 OFFICE O/P EST LOW 20 MIN: CPT | Performed by: INTERNAL MEDICINE

## 2020-01-16 RX ORDER — FLUOXETINE HYDROCHLORIDE 20 MG/1
20 CAPSULE ORAL DAILY
Refills: 3 | COMMUNITY
Start: 2019-11-07 | End: 2020-06-05

## 2020-01-16 RX ORDER — LEVOTHYROXINE SODIUM 175 MCG
175 TABLET ORAL DAILY
Qty: 90 TABLET | Refills: 3 | Status: SHIPPED | OUTPATIENT
Start: 2020-01-16 | End: 2020-01-21 | Stop reason: DRUGHIGH

## 2020-01-16 RX ORDER — OMEPRAZOLE 40 MG/1
40 CAPSULE, DELAYED RELEASE ORAL DAILY
Refills: 3 | COMMUNITY
Start: 2019-11-07 | End: 2020-06-05

## 2020-01-16 RX ORDER — ATORVASTATIN CALCIUM 80 MG/1
80 TABLET, FILM COATED ORAL DAILY
COMMUNITY
Start: 2020-01-04

## 2020-01-16 RX ORDER — TOPIRAMATE 100 MG/1
100 TABLET, FILM COATED ORAL DAILY
Refills: 3 | COMMUNITY
Start: 2019-11-07 | End: 2022-09-01

## 2020-01-16 RX ORDER — SUMATRIPTAN 100 MG/1
100 TABLET, FILM COATED ORAL DAILY
Refills: 3 | COMMUNITY
Start: 2019-11-07 | End: 2021-07-06

## 2020-01-16 NOTE — PROGRESS NOTES
Subjective:   Diabetes (Follow Up)        Janet Obrien is a 59 y.o. female who is being seen for follow-up   Postsurgical hypothyroidism dx in 1987. She had Graves disease with opthalmopathy and proptosis, had thyroidectomy in 1987. She also underwent IV steroid treatments at some point  She is currently taking levothyroxine 175 mcg, has a lots of the symptoms: fatigue, dizziness, depression, eye double vision and swelling, arthralgias, weight gain.  She has large fluctuations of the hormones.   After change to the brand name synthroid her sx improved.     Diabetes Mellitus type 2:   Diabetic complications: peripheral neuropathy, frequent yeast infections.   Eye exam current (within one year): uptodate.    Current diabetic medications include Ozempic started 1/2019 Tolerated it well, no side effects/     Past medications: metformin - not effective.     She has increased stress, takes care of the demented mother, who is combative. She has no help and no resources to help with care. She reported that because of the increased stress she eats more. She doesn't leave the house and cant exercise because of pain of fibromyalgia.          Review of Systems  Review of Systems   Constitutional: Positive for fatigue.   Eyes: Positive for pain, redness and visual disturbance.   Cardiovascular: Positive for leg swelling.   Genitourinary: Positive for pelvic pain.   Musculoskeletal: Positive for arthralgias, back pain, joint swelling and myalgias.   Skin: Positive for dry skin.   Neurological: Positive for weakness, headache and memory problem.   Psychiatric/Behavioral: Positive for sleep disturbance and depressed mood. The patient is nervous/anxious.    All other systems reviewed and are negative.    Current medications:  Current Outpatient Medications   Medication Sig Dispense Refill   • ACCU-CHEK MARYSOL PLUS test strip      • ACCU-CHEK SOFTCLIX LANCETS lancets      • atorvastatin (LIPITOR) 80 MG tablet Take 80 mg by  mouth Daily.     • buPROPion SR (WELLBUTRIN SR) 150 MG 12 hr tablet      • Calcium Carbonate-Vit D-Min (CALCIUM 1200 PO) Take  by mouth.     • DULoxetine (CYMBALTA) 60 MG capsule      • estrogens, conjugated, (PREMARIN) 0.45 MG tablet Take 0.5 mg by mouth Daily. Take daily for 21 days then do not take for 7 days.     • FLUoxetine (PROzac) 20 MG capsule Take 20 mg by mouth Daily.  3   • hydrochlorothiazide (HYDRODIURIL) 25 MG tablet Take 1 tablet by mouth daily.     • omeprazole (priLOSEC) 40 MG capsule Take 40 mg by mouth Daily.  3   • oxyCODONE-acetaminophen (PERCOCET)  MG per tablet      • prednisoLONE acetate (PRED FORTE) 1 % ophthalmic suspension Follow instructions on the After Visit Summary. 2 mL 0   • pyridostigmine (MESTINON) 60 MG tablet Take 1 tablet by mouth 3 (Three) Times a Day. 90 tablet 2   • Semaglutide,0.25 or 0.5MG/DOS, (OZEMPIC, 0.25 OR 0.5 MG/DOSE,) 2 MG/1.5ML solution pen-injector Inject 0.5 mg under the skin into the appropriate area as directed 1 (One) Time Per Week. 2 pen 6   • sennosides-docusate sodium (SENOKOT-S) 8.6-50 MG tablet Take 1 tablet by mouth Daily.     • SUMAtriptan (IMITREX) 100 MG tablet Take 100 mg by mouth Daily.  3   • SYNTHROID 175 MCG tablet Take 1 tablet by mouth Daily. 90 tablet 3   • topiramate (TOPAMAX) 100 MG tablet Take 100 mg by mouth Daily.  3     No current facility-administered medications for this visit.        The following portions of the patient's history were reviewed and updated as appropriate: She  has a past medical history of Anxiety, Arthritis, Asthma, Asthma, Cancer (CMS/HCC), Chronic bronchitis (CMS/HCC), Depression, Depression, Diabetes mellitus (CMS/HCC), Fibromyalgia, Fibromyalgia, Hyperlipidemia, Hypertension, Hypothyroidism, Pleurisy, Pneumonia, Skin cancer, and Thyroid disease.  She  has a past surgical history that includes Appendectomy; Tonsillectomy and adenoidectomy;  section; Dilation and curettage of uterus; Hysterectomy;  Cataract extraction w/ intraocular lens implant (Right, 9/16/2019); and Cataract extraction w/ intraocular lens implant (Left, 10/7/2019).  Her family history includes Cancer in her brother, maternal grandmother, and paternal grandmother; Diabetes in her maternal aunt; Fibromyalgia in her mother; Heart attack in her father and mother; Hyperlipidemia in her mother; Hypertension in her mother; Thyroid disease in her maternal grandmother and mother.  She  reports that she quit smoking about 15 years ago. Her smoking use included cigarettes. She has a 3.75 pack-year smoking history. She has never used smokeless tobacco. She reports that she does not drink alcohol or use drugs.  She is allergic to penicillins; darvon [propoxyphene]; chlorhexidine; and sulfa antibiotics..      Objective:     Vitals:    01/16/20 0759   BP: 118/66   Pulse: 68   SpO2: 98%   Body mass index is 44.76 kg/m².  Physical Exam   Constitutional: She is oriented to person, place, and time. She appears well-developed and well-nourished.   Morbidly obese   HENT:   Head: Normocephalic and atraumatic.   Eyes: Lids are normal.   Proptosis noted    Cardiovascular: Normal rate, regular rhythm and normal heart sounds.   Musculoskeletal: She exhibits edema (non pitting edema).   Neurological: She is alert and oriented to person, place, and time.   Psychiatric: She has a normal mood and affect. Thought content normal.       LABS AND IMAGING  Results for orders placed or performed in visit on 01/16/20   POC Glucose Fingerstick   Result Value Ref Range    Glucose 103 70 - 130 mg/dL    Lot Number [id number]     Expiration Date 08/01/2020    POC Glycosylated Hemoglobin (Hb A1C)   Result Value Ref Range    Hemoglobin A1C 5.6 %    Lot Number [id number]     Expiration Date 08/21/2021              Assessment:        Problem List Items Addressed This Visit        Endocrine    Hypothyroidism    Overview     Synthroid 175 mcg daily          Relevant Medications     SYNTHROID 175 MCG tablet    Other Relevant Orders    TSH    T4, Free    Diabetes mellitus (CMS/HCC) - Primary    Overview     ozempic 0.5 mg weekly, d/c janumet after current supply is over . A1C is at goal of therapy.          Relevant Medications    Semaglutide,0.25 or 0.5MG/DOS, (OZEMPIC, 0.25 OR 0.5 MG/DOSE,) 2 MG/1.5ML solution pen-injector    Other Relevant Orders    POC Glucose Fingerstick (Completed)    POC Glycosylated Hemoglobin (Hb A1C) (Completed)    Comprehensive Metabolic Panel               Plan:        1. Diabetes mellitus type 2 with insulin resistance and weight gain   -Cont Ozempic, A1C is at goal of therapy.   Discussed dietary modifications to help with weight.     2. Postoperative hypothyroidism. - change to Synthroid brand name is effective.   -repeat labs.    Follow-up in 6 months

## 2020-01-16 NOTE — PATIENT INSTRUCTIONS
Results for orders placed or performed in visit on 01/16/20   POC Glucose Fingerstick   Result Value Ref Range    Glucose 103 70 - 130 mg/dL    Lot Number [id number]     Expiration Date 08/01/2020    POC Glycosylated Hemoglobin (Hb A1C)   Result Value Ref Range    Hemoglobin A1C 5.6 %    Lot Number [id number]     Expiration Date 08/21/2021

## 2020-01-21 DIAGNOSIS — E89.0 POSTOPERATIVE HYPOTHYROIDISM: Primary | ICD-10-CM

## 2020-01-21 RX ORDER — LEVOTHYROXINE SODIUM 200 MCG
200 TABLET ORAL DAILY
Qty: 30 TABLET | Refills: 11 | Status: SHIPPED | OUTPATIENT
Start: 2020-01-21 | End: 2022-10-04 | Stop reason: SDUPTHER

## 2020-03-31 RX ORDER — ALBUTEROL SULFATE 90 UG/1
2 AEROSOL, METERED RESPIRATORY (INHALATION) EVERY 4 HOURS PRN
Qty: 1 INHALER | Refills: 0 | Status: SHIPPED | OUTPATIENT
Start: 2020-03-31 | End: 2020-07-07 | Stop reason: SDUPTHER

## 2020-05-25 ENCOUNTER — HOSPITAL ENCOUNTER (EMERGENCY)
Facility: HOSPITAL | Age: 60
Discharge: HOME | End: 2020-05-25
Attending: EMERGENCY MEDICINE | Admitting: EMERGENCY MEDICINE

## 2020-05-25 ENCOUNTER — APPOINTMENT (OUTPATIENT)
Dept: GENERAL RADIOLOGY | Facility: HOSPITAL | Age: 60
End: 2020-05-25

## 2020-05-25 VITALS
RESPIRATION RATE: 18 BRPM | HEART RATE: 65 BPM | WEIGHT: 274 LBS | HEIGHT: 66 IN | BODY MASS INDEX: 44.03 KG/M2 | DIASTOLIC BLOOD PRESSURE: 75 MMHG | TEMPERATURE: 98.5 F | SYSTOLIC BLOOD PRESSURE: 115 MMHG | OXYGEN SATURATION: 97 %

## 2020-05-25 DIAGNOSIS — R07.9 CHEST PAIN, UNSPECIFIED TYPE: Primary | ICD-10-CM

## 2020-05-25 LAB
ALBUMIN SERPL-MCNC: 4.4 G/DL (ref 3.5–5.2)
ALBUMIN/GLOB SERPL: 1.8 G/DL
ALP SERPL-CCNC: 116 U/L (ref 39–117)
ALT SERPL W P-5'-P-CCNC: 21 U/L (ref 1–33)
ANION GAP SERPL CALCULATED.3IONS-SCNC: 10.9 MMOL/L (ref 5–15)
AST SERPL-CCNC: 19 U/L (ref 1–32)
BASOPHILS # BLD AUTO: 0.05 10*3/MM3 (ref 0–0.2)
BASOPHILS NFR BLD AUTO: 0.8 % (ref 0–1.5)
BILIRUB SERPL-MCNC: 0.5 MG/DL (ref 0.2–1.2)
BUN BLD-MCNC: 13 MG/DL (ref 6–20)
BUN/CREAT SERPL: 14.9 (ref 7–25)
CALCIUM SPEC-SCNC: 10.2 MG/DL (ref 8.6–10.5)
CHLORIDE SERPL-SCNC: 101 MMOL/L (ref 98–107)
CO2 SERPL-SCNC: 27.1 MMOL/L (ref 22–29)
CREAT BLD-MCNC: 0.87 MG/DL (ref 0.57–1)
DEPRECATED RDW RBC AUTO: 39.6 FL (ref 37–54)
EOSINOPHIL # BLD AUTO: 0.2 10*3/MM3 (ref 0–0.4)
EOSINOPHIL NFR BLD AUTO: 3.3 % (ref 0.3–6.2)
ERYTHROCYTE [DISTWIDTH] IN BLOOD BY AUTOMATED COUNT: 13.2 % (ref 12.3–15.4)
GFR SERPL CREATININE-BSD FRML MDRD: 67 ML/MIN/1.73
GLOBULIN UR ELPH-MCNC: 2.4 GM/DL
GLUCOSE BLD-MCNC: 113 MG/DL (ref 65–99)
HCT VFR BLD AUTO: 41.6 % (ref 34–46.6)
HGB BLD-MCNC: 14.1 G/DL (ref 12–15.9)
HOLD SPECIMEN: NORMAL
HOLD SPECIMEN: NORMAL
IMM GRANULOCYTES # BLD AUTO: 0.02 10*3/MM3 (ref 0–0.05)
IMM GRANULOCYTES NFR BLD AUTO: 0.3 % (ref 0–0.5)
LIPASE SERPL-CCNC: 27 U/L (ref 13–60)
LYMPHOCYTES # BLD AUTO: 1.41 10*3/MM3 (ref 0.7–3.1)
LYMPHOCYTES NFR BLD AUTO: 23.3 % (ref 19.6–45.3)
MCH RBC QN AUTO: 28.1 PG (ref 26.6–33)
MCHC RBC AUTO-ENTMCNC: 33.9 G/DL (ref 31.5–35.7)
MCV RBC AUTO: 82.9 FL (ref 79–97)
MONOCYTES # BLD AUTO: 0.62 10*3/MM3 (ref 0.1–0.9)
MONOCYTES NFR BLD AUTO: 10.3 % (ref 5–12)
NEUTROPHILS # BLD AUTO: 3.74 10*3/MM3 (ref 1.7–7)
NEUTROPHILS NFR BLD AUTO: 62 % (ref 42.7–76)
NRBC BLD AUTO-RTO: 0 /100 WBC (ref 0–0.2)
PLATELET # BLD AUTO: 279 10*3/MM3 (ref 140–450)
PMV BLD AUTO: 10.6 FL (ref 6–12)
POTASSIUM BLD-SCNC: 3.2 MMOL/L (ref 3.5–5.2)
PROT SERPL-MCNC: 6.8 G/DL (ref 6–8.5)
RBC # BLD AUTO: 5.02 10*6/MM3 (ref 3.77–5.28)
SODIUM BLD-SCNC: 139 MMOL/L (ref 136–145)
TROPONIN I SERPL-MCNC: 0 NG/ML (ref 0–0.05)
TROPONIN T SERPL-MCNC: <0.01 NG/ML (ref 0–0.03)
TROPONIN T SERPL-MCNC: <0.01 NG/ML (ref 0–0.03)
WBC NRBC COR # BLD: 6.04 10*3/MM3 (ref 3.4–10.8)
WHOLE BLOOD HOLD SPECIMEN: NORMAL
WHOLE BLOOD HOLD SPECIMEN: NORMAL

## 2020-05-25 PROCEDURE — 93005 ELECTROCARDIOGRAM TRACING: CPT | Performed by: EMERGENCY MEDICINE

## 2020-05-25 PROCEDURE — 71045 X-RAY EXAM CHEST 1 VIEW: CPT

## 2020-05-25 PROCEDURE — 80053 COMPREHEN METABOLIC PANEL: CPT | Performed by: EMERGENCY MEDICINE

## 2020-05-25 PROCEDURE — 83690 ASSAY OF LIPASE: CPT | Performed by: EMERGENCY MEDICINE

## 2020-05-25 PROCEDURE — 84484 ASSAY OF TROPONIN QUANT: CPT | Performed by: EMERGENCY MEDICINE

## 2020-05-25 PROCEDURE — 85025 COMPLETE CBC W/AUTO DIFF WBC: CPT | Performed by: EMERGENCY MEDICINE

## 2020-05-25 PROCEDURE — 99284 EMERGENCY DEPT VISIT MOD MDM: CPT

## 2020-05-25 RX ORDER — MECLIZINE HYDROCHLORIDE 25 MG/1
25 TABLET ORAL ONCE
Status: COMPLETED | OUTPATIENT
Start: 2020-05-25 | End: 2020-05-25

## 2020-05-25 RX ORDER — SODIUM CHLORIDE 0.9 % (FLUSH) 0.9 %
10 SYRINGE (ML) INJECTION AS NEEDED
Status: DISCONTINUED | OUTPATIENT
Start: 2020-05-25 | End: 2020-05-25 | Stop reason: HOSPADM

## 2020-05-25 RX ADMIN — MECLIZINE HYDROCHLORIDE 25 MG: 25 TABLET ORAL at 15:12

## 2020-05-25 RX ADMIN — LIDOCAINE HYDROCHLORIDE: 20 SOLUTION ORAL; TOPICAL at 14:12

## 2020-05-25 NOTE — ED PROVIDER NOTES
Subjective   59-year-old female presents to the ED with a chief complaint of chest pain.  The patient states that she has had discomfort in her lower chest and epigastric region for the last few days.  States that is gotten worse since onset.  The pain radiates in a bandlike region around her lower ribs and upper stomach.  She states that it got slightly worse today.  She has had some associated nausea without vomiting.  No diarrhea.  No cough shortness of breath or wheeze.  No lightheadedness or dizziness.  No known history of coronary artery disease.  Does have a history significant for diabetes, hypertension asthma, fibromyalgia and others.          Review of Systems   Constitutional: Negative for fatigue and fever.   Respiratory: Negative for shortness of breath and wheezing.    Cardiovascular: Positive for chest pain. Negative for palpitations and leg swelling.   Gastrointestinal: Positive for abdominal pain and nausea.   All other systems reviewed and are negative.      Past Medical History:   Diagnosis Date   • Anxiety    • Arthritis    • Asthma    • Asthma    • Cancer (CMS/HCC)    • Chronic bronchitis (CMS/HCC)    • Depression    • Depression    • Diabetes mellitus (CMS/HCC)     type 2   • Fibromyalgia    • Fibromyalgia    • Hyperlipidemia    • Hypertension    • Hypothyroidism    • Pleurisy    • Pneumonia    • Skin cancer     upper lip   • Thyroid disease        Allergies   Allergen Reactions   • Penicillins Angioedema   • Chlorhexidine Rash   • Darvon [Propoxyphene] Unknown (See Comments)     Doesn't remember   • Sulfa Antibiotics Rash       Past Surgical History:   Procedure Laterality Date   • APPENDECTOMY     • CATARACT EXTRACTION W/ INTRAOCULAR LENS IMPLANT Right 9/16/2019    Procedure: CATARACT PHACO EXTRACTION WITH INTRAOCULAR LENS IMPLANT RIGHT;  Surgeon: Carl Babb MD;  Location: [facility];  Service: Ophthalmology   • CATARACT EXTRACTION W/ INTRAOCULAR LENS IMPLANT Left 10/7/2019     Procedure: CATARACT PHACO EXTRACTION WITH INTRAOCULAR LENS IMPLANT LEFT;  Surgeon: Carl Babb MD;  Location: [facility];  Service: Ophthalmology   •  SECTION      x 2    • DILATATION AND CURETTAGE     • HYSTERECTOMY      ''still have one ovary''   • TONSILLECTOMY AND ADENOIDECTOMY         Family History   Problem Relation Age of Onset   • Hypertension Mother    • Heart attack Mother    • Fibromyalgia Mother    • Hyperlipidemia Mother    • Thyroid disease Mother    • Heart attack Father    • Cancer Brother    • Diabetes Maternal Aunt    • Cancer Maternal Grandmother    • Thyroid disease Maternal Grandmother    • Cancer Paternal Grandmother        Social History     Socioeconomic History   • Marital status:      Spouse name: Not on file   • Number of children: Not on file   • Years of education: Not on file   • Highest education level: Not on file   Tobacco Use   • Smoking status: Former Smoker     Packs/day: 0.25     Years: 15.00     Pack years: 3.75     Types: Cigarettes     Last attempt to quit: 2004     Years since quitting: 15.8   • Smokeless tobacco: Never Used   Substance and Sexual Activity   • Alcohol use: No   • Drug use: No   • Sexual activity: Defer           Objective   Physical Exam   Constitutional: She is oriented to person, place, and time. No distress.   Morbidly obese   HENT:   Head: Normocephalic and atraumatic.   Nose: Nose normal.   Eyes: Conjunctivae and EOM are normal.   Cardiovascular: Normal rate, regular rhythm and intact distal pulses.   Pulmonary/Chest: Effort normal and breath sounds normal. No respiratory distress.   Abdominal: Soft. She exhibits no distension. There is tenderness. There is no guarding.   Epigastric tenderness to palpation   Musculoskeletal: She exhibits no edema or deformity.   Neurological: She is alert and oriented to person, place, and time. No cranial nerve deficit.   Skin: She is not diaphoretic.   Nursing note and vitals  reviewed.      Procedures           ED Course  ED Course as of May 25 1746   Mon May 25, 2020   1214 EKG interpreted by me.  Sinus rhythm.  Rate of 72.  Nonspecific ST segment depressions.  Abnormal EKG.    [CG]      ED Course User Index  [CG] Chemo Smith, DO                                           [facility]  59-year-old female presented to the ED with chief complaint of chest pain for several days duration.  Also complaining of some nausea.  Laboratories also reassuring.  EKG was nonischemic.  Chest x-ray was negative for acute process.  Repeat troponin was negative.  Given the negative work-up at this time.  Felt that it was unlikely to be ACS.  She was appropriate for discharge to follow-up with cardiology.  She was agreeable to this plan.        Final diagnoses:   Chest pain, unspecified type            Chemo Smith DO  05/25/20 1746

## 2020-06-05 ENCOUNTER — OFFICE VISIT (OUTPATIENT)
Dept: CARDIOLOGY | Facility: CLINIC | Age: 60
End: 2020-06-05

## 2020-06-05 VITALS
HEIGHT: 66 IN | HEART RATE: 74 BPM | DIASTOLIC BLOOD PRESSURE: 72 MMHG | OXYGEN SATURATION: 97 % | SYSTOLIC BLOOD PRESSURE: 128 MMHG | WEIGHT: 276.6 LBS | BODY MASS INDEX: 44.45 KG/M2

## 2020-06-05 DIAGNOSIS — R06.02 SOB (SHORTNESS OF BREATH): ICD-10-CM

## 2020-06-05 DIAGNOSIS — R07.2 PRECORDIAL PAIN: ICD-10-CM

## 2020-06-05 DIAGNOSIS — E89.0 POSTOPERATIVE HYPOTHYROIDISM: ICD-10-CM

## 2020-06-05 DIAGNOSIS — I10 ESSENTIAL HYPERTENSION: Primary | ICD-10-CM

## 2020-06-05 DIAGNOSIS — E66.01 MORBID OBESITY, UNSPECIFIED OBESITY TYPE (HCC): ICD-10-CM

## 2020-06-05 DIAGNOSIS — G70.00 OCULAR MYASTHENIA (HCC): ICD-10-CM

## 2020-06-05 PROCEDURE — 99204 OFFICE O/P NEW MOD 45 MIN: CPT | Performed by: INTERNAL MEDICINE

## 2020-06-05 RX ORDER — LORATADINE 10 MG/1
10 TABLET ORAL DAILY
COMMUNITY
Start: 2020-05-26 | End: 2022-09-01

## 2020-06-05 NOTE — PROGRESS NOTES
"    Subjective:     Encounter Date:06/05/2020      Patient ID: Janet Obrien is a 59 y.o. female.    Chief Complaint: Chest pain and shortness of breath  HPI  This is a 59-year-old female patient with no prior history of cardiac disease who presents to cardiology clinic to evaluate chest discomfort.  The patient reports having chest discomfort off and on for the last year.  She indicates that the discomfort has increased significantly since May 22.  She indicates that she has had near continuous chest discomfort since that time.  The patient reports having daily discomfort lasting several hours per episode.  It will typically occur as many as 4 times per day.  The discomfort has a 5-6/10 intensity.  The discomfort is localized to a fairly discrete area of the mid sternum and has a pressure or tightness quality.  The discomfort does not radiate.  The discomfort is associated with nausea diaphoresis shortness of breath and dizziness.  The patient reports that her chest discomfort is more pronounced if she is upset or \"scared\".  She cannot identify any other precipitating aggravating or alleviating features.  She has a history of hypertension but no personal history of diabetes or elevated cholesterol.  She is a reformed smoker having stopped smoking approximately 15 years ago.  She reports having shortness of breath both at rest and with activity.  There is no orthopnea PND or lower extremity edema.  She has no dizziness palpitations or syncope.  She is a never undergone an ischemic evaluation.  She is unable to do treadmill stress testing due to limited effort tolerance and obesity.  The following portions of the patient's history were reviewed and updated as appropriate: allergies, current medications, past family history, past medical history, past social history, past surgical history and problem  Review of Systems   Constitution: Negative for chills, diaphoresis, fever, malaise/fatigue, weight gain and " weight loss.   HENT: Negative for ear discharge, hearing loss, hoarse voice and nosebleeds.    Eyes: Negative for discharge, double vision, pain and photophobia.   Cardiovascular: Positive for chest pain and dyspnea on exertion. Negative for claudication, cyanosis, irregular heartbeat, leg swelling, near-syncope, orthopnea, palpitations, paroxysmal nocturnal dyspnea and syncope.   Respiratory: Positive for shortness of breath. Negative for cough, hemoptysis, sputum production and wheezing.    Endocrine: Negative for cold intolerance, heat intolerance, polydipsia, polyphagia and polyuria.   Hematologic/Lymphatic: Negative for adenopathy and bleeding problem. Does not bruise/bleed easily.   Skin: Negative for color change, flushing, itching and rash.   Musculoskeletal: Negative for muscle cramps, muscle weakness, myalgias and stiffness.   Gastrointestinal: Negative for abdominal pain, diarrhea, hematemesis, hematochezia, nausea and vomiting.   Genitourinary: Negative for dysuria, frequency and nocturia.   Neurological: Negative for focal weakness, loss of balance, numbness, paresthesias and seizures.   Psychiatric/Behavioral: Negative for altered mental status, hallucinations and suicidal ideas.   Allergic/Immunologic: Negative for HIV exposure, hives and persistent infections.           Current Outpatient Medications:   •  ACCU-CHEK MARYSOL PLUS test strip, , Disp: , Rfl:   •  ACCU-CHEK SOFTCLIX LANCETS lancets, , Disp: , Rfl:   •  atorvastatin (LIPITOR) 80 MG tablet, Take 80 mg by mouth Daily., Disp: , Rfl:   •  buPROPion SR (WELLBUTRIN SR) 150 MG 12 hr tablet, , Disp: , Rfl:   •  Calcium Carbonate-Vit D-Min (CALCIUM 1200 PO), Take  by mouth., Disp: , Rfl:   •  estrogens, conjugated, (PREMARIN) 0.45 MG tablet, Take 0.5 mg by mouth Daily. Take daily for 21 days then do not take for 7 days., Disp: , Rfl:   •  hydrochlorothiazide (HYDRODIURIL) 25 MG tablet, Take 1 tablet by mouth daily., Disp: , Rfl:   •  loratadine  (CLARITIN) 10 MG tablet, Take 10 mg by mouth Daily., Disp: , Rfl:   •  oxyCODONE-acetaminophen (PERCOCET)  MG per tablet, , Disp: , Rfl:   •  Semaglutide,0.25 or 0.5MG/DOS, (OZEMPIC, 0.25 OR 0.5 MG/DOSE,) 2 MG/1.5ML solution pen-injector, Inject 0.5 mg under the skin into the appropriate area as directed 1 (One) Time Per Week., Disp: 2 pen, Rfl: 6  •  sennosides-docusate sodium (SENOKOT-S) 8.6-50 MG tablet, Take 1 tablet by mouth Daily., Disp: , Rfl:   •  SUMAtriptan (IMITREX) 100 MG tablet, Take 100 mg by mouth Daily., Disp: , Rfl: 3  •  SYNTHROID 200 MCG tablet, Take 1 tablet by mouth Daily., Disp: 30 tablet, Rfl: 11  •  topiramate (TOPAMAX) 100 MG tablet, Take 100 mg by mouth Daily., Disp: , Rfl: 3  •  VENTOLIN  (90 Base) MCG/ACT inhaler, Inhale 2 puffs Every 4 (Four) Hours As Needed for Wheezing., Disp: 1 inhaler, Rfl: 0    Objective:   Physical Exam   Constitutional: She is oriented to person, place, and time. She appears well-developed and well-nourished. No distress.   HENT:   Head: Normocephalic and atraumatic.   Mouth/Throat: Oropharynx is clear and moist.   Eyes: Pupils are equal, round, and reactive to light. Conjunctivae and EOM are normal. No scleral icterus.   Neck: Normal range of motion. Neck supple. No JVD present. No tracheal deviation present. No thyromegaly present.   Cardiovascular: Normal rate, regular rhythm, S1 normal, S2 normal, normal heart sounds, intact distal pulses and normal pulses. PMI is not displaced. Exam reveals no gallop and no friction rub.   No murmur heard.  Pulmonary/Chest: Effort normal and breath sounds normal. No stridor. No respiratory distress. She has no wheezes. She has no rales.   Abdominal: Soft. Bowel sounds are normal. She exhibits no distension and no mass. There is no tenderness. There is no rebound and no guarding.   Musculoskeletal: Normal range of motion. She exhibits no edema or deformity.   Neurological: She is alert and oriented to person,  "place, and time. She displays normal reflexes. No cranial nerve deficit. Coordination normal.   Skin: Skin is warm and dry. No rash noted. She is not diaphoretic. No erythema.   Psychiatric: She has a normal mood and affect. Her behavior is normal. Thought content normal.     Blood pressure 128/72, pulse 74, height 167.6 cm (65.98\"), weight 125 kg (276 lb 9.6 oz), SpO2 97 %, not currently breastfeeding.   Lab Review:     Assessment:       1. Ocular myasthenia (CMS/HCC)  This is followed closely by her primary care provider.    2. Morbid obesity, unspecified obesity type (CMS/HCC)  Body mass index is just under 48.  This is due to excess calorie intake.  The obesity pattern is central.  There is evidence of comorbidities.  - Stress Test With Myocardial Perfusion Two Day  - Adult Transthoracic Echo Complete W/ Cont if Necessary Per Protocol    3. Essential hypertension  Acceptable blood pressure control.  - Adult Transthoracic Echo Complete W/ Cont if Necessary Per Protocol    4. Precordial pain  The patient's chest discomfort has features both typical and atypical for coronary insufficiency.  The patient has never had an ischemic evaluation.  The patient has multiple risk factors for coronary artery disease.  The patient is unable to do treadmill exercise stress testing due to obesity, shortness of breath with activity and poor effort tolerance.  - Stress Test With Myocardial Perfusion Two Day  - Adult Transthoracic Echo Complete W/ Cont if Necessary Per Protocol    5. SOB (shortness of breath)  This is multifactorial in etiology.  Some is certainly related to the patient's obesity and aerobic deconditioning.  There is an element of tobacco-related lung disease with former tobacco abuse.  There may be an element related to hypertensive cardiac disease.  There may be an element of unrecognized congestive heart failure.  This could also represent an angina equivalent.  - Stress Test With Myocardial Perfusion Two Day  - " Adult Transthoracic Echo Complete W/ Cont if Necessary Per Protocol    Procedures    Plan:     The patient has been congratulated on her smoking cessation and cautioned to never again resume cigarette smoking.    The patient has been counseled regarding the importance of dietary sodium restriction.    The patient is encouraged to maintain an active lifestyle.    The importance of diet exercise and weight management have been reinforced to the patient.    I have recommended a vasodilator nuclear stress test as well as an echocardiogram.    Further recommendations will be predicated on the results of her outpatient testing.

## 2020-06-22 ENCOUNTER — TELEPHONE (OUTPATIENT)
Dept: CARDIOLOGY | Facility: CLINIC | Age: 60
End: 2020-06-22

## 2020-06-22 DIAGNOSIS — R06.02 SOB (SHORTNESS OF BREATH): ICD-10-CM

## 2020-06-22 DIAGNOSIS — E66.01 MORBID OBESITY, UNSPECIFIED OBESITY TYPE (HCC): ICD-10-CM

## 2020-06-22 DIAGNOSIS — R07.2 PRECORDIAL PAIN: Primary | ICD-10-CM

## 2020-06-22 NOTE — TELEPHONE ENCOUNTER
Pt. Requested to change the stress test from 2 day to 1 day test. Dr. Madrid is agreeable but, only if the patient has supine and prone images during the stress test. If not Dr. Madrid states to cancel test. I notified Nuclear medicine of this and it is stated in the NEW order. lmom

## 2020-07-07 ENCOUNTER — OFFICE VISIT (OUTPATIENT)
Dept: ENDOCRINOLOGY | Facility: CLINIC | Age: 60
End: 2020-07-07

## 2020-07-07 VITALS
HEIGHT: 66 IN | HEART RATE: 69 BPM | OXYGEN SATURATION: 98 % | SYSTOLIC BLOOD PRESSURE: 110 MMHG | BODY MASS INDEX: 43.41 KG/M2 | WEIGHT: 270.1 LBS | DIASTOLIC BLOOD PRESSURE: 68 MMHG

## 2020-07-07 DIAGNOSIS — E11.9 TYPE 2 DIABETES MELLITUS WITHOUT COMPLICATION, WITHOUT LONG-TERM CURRENT USE OF INSULIN (HCC): Primary | ICD-10-CM

## 2020-07-07 DIAGNOSIS — E89.0 POSTOPERATIVE HYPOTHYROIDISM: ICD-10-CM

## 2020-07-07 LAB
EXPIRATION DATE: NORMAL
EXPIRATION DATE: NORMAL
GLUCOSE BLDC GLUCOMTR-MCNC: 72 MG/DL (ref 70–130)
HBA1C MFR BLD: 5.7 %
Lab: NORMAL
Lab: NORMAL

## 2020-07-07 PROCEDURE — 83036 HEMOGLOBIN GLYCOSYLATED A1C: CPT | Performed by: INTERNAL MEDICINE

## 2020-07-07 PROCEDURE — 99213 OFFICE O/P EST LOW 20 MIN: CPT | Performed by: INTERNAL MEDICINE

## 2020-07-07 PROCEDURE — 82947 ASSAY GLUCOSE BLOOD QUANT: CPT | Performed by: INTERNAL MEDICINE

## 2020-07-07 RX ORDER — ALBUTEROL SULFATE 90 UG/1
2 AEROSOL, METERED RESPIRATORY (INHALATION) EVERY 4 HOURS PRN
Qty: 1 INHALER | Refills: 0 | Status: SHIPPED | OUTPATIENT
Start: 2020-07-07 | End: 2020-07-07 | Stop reason: SDUPTHER

## 2020-07-07 RX ORDER — FLUOXETINE HYDROCHLORIDE 20 MG/1
20 CAPSULE ORAL DAILY
COMMUNITY
Start: 2020-06-11 | End: 2020-10-08

## 2020-07-07 RX ORDER — DULOXETIN HYDROCHLORIDE 60 MG/1
60 CAPSULE, DELAYED RELEASE ORAL DAILY
COMMUNITY
Start: 2020-07-05 | End: 2022-04-07

## 2020-07-07 RX ORDER — OMEPRAZOLE 40 MG/1
40 CAPSULE, DELAYED RELEASE ORAL DAILY
COMMUNITY
Start: 2020-06-11 | End: 2021-07-06

## 2020-07-07 RX ORDER — BLOOD SUGAR DIAGNOSTIC
STRIP MISCELLANEOUS
Qty: 100 EACH | Refills: 12 | Status: SHIPPED | OUTPATIENT
Start: 2020-07-07 | End: 2023-03-02

## 2020-07-07 RX ORDER — LANCETS 30 GAUGE
1 EACH MISCELLANEOUS 3 TIMES DAILY
Qty: 100 EACH | Refills: 11 | Status: SHIPPED | OUTPATIENT
Start: 2020-07-07 | End: 2023-03-02

## 2020-07-07 RX ORDER — ALBUTEROL SULFATE 90 UG/1
2 AEROSOL, METERED RESPIRATORY (INHALATION) EVERY 4 HOURS PRN
Qty: 1 INHALER | Refills: 4 | Status: SHIPPED | OUTPATIENT
Start: 2020-07-07 | End: 2021-02-16 | Stop reason: SDUPTHER

## 2020-07-07 NOTE — PROGRESS NOTES
Subjective:   Diabetes (Follow Up) and Hypothyroidism        Janet Obrien is a 59 y.o. female who is being seen for follow-up   Postsurgical hypothyroidism dx in 1987. She had Graves disease with opthalmopathy and proptosis, had thyroidectomy in 1987. She also underwent IV steroid treatments at some point  She is currently taking levothyroxine 175 mcg, has a lots of the symptoms: fatigue, dizziness, depression, eye double vision and swelling, arthralgias, weight gain.  She has large fluctuations of the hormones.   After change to the brand name synthroid her sx improved.     Diabetes Mellitus type 2:   Diabetic complications: peripheral neuropathy, frequent yeast infections.   Eye exam current (within one year): uptodate.    Current diabetic medications include Ozempic started 1/2019 Tolerated it well, no side effects/     Past medications: metformin - not effective.     She has increased stress, takes care of the demented mother, who is combative. She has no help and no resources to help with care. She reported that because of the increased stress she eats more. She doesn't leave the house and cant exercise because of pain of fibromyalgia.          Review of Systems  Review of Systems   Constitutional: Positive for fatigue.   Eyes: Positive for pain, redness and visual disturbance.   Cardiovascular: Positive for leg swelling.   Genitourinary: Positive for pelvic pain.   Musculoskeletal: Positive for arthralgias, back pain, joint swelling and myalgias.   Skin: Positive for dry skin.   Neurological: Positive for weakness, headache and memory problem.   Psychiatric/Behavioral: Positive for sleep disturbance and depressed mood. The patient is nervous/anxious.    All other systems reviewed and are negative.    Current medications:  Current Outpatient Medications   Medication Sig Dispense Refill   • atorvastatin (LIPITOR) 80 MG tablet Take 80 mg by mouth Daily.     • buPROPion SR (WELLBUTRIN SR) 150 MG 12 hr  tablet      • Calcium Carbonate-Vit D-Min (CALCIUM 1200 PO) Take  by mouth.     • DULoxetine (CYMBALTA) 60 MG capsule Take 60 mg by mouth Daily.     • estrogens, conjugated, (PREMARIN) 0.45 MG tablet Take 0.5 mg by mouth Daily. Take daily for 21 days then do not take for 7 days.     • FLUoxetine (PROzac) 20 MG capsule Take 20 mg by mouth Daily.     • hydrochlorothiazide (HYDRODIURIL) 25 MG tablet Take 1 tablet by mouth daily.     • Lancets (ONETOUCH DELICA PLUS [id number]) misc 1 each 3 (Three) Times a Day. 100 each 11   • loratadine (CLARITIN) 10 MG tablet Take 10 mg by mouth Daily.     • omeprazole (priLOSEC) 40 MG capsule Take 40 mg by mouth Daily.     • ONETOUCH VERIO test strip Test Three Times Daily 100 each 12   • oxyCODONE-acetaminophen (PERCOCET)  MG per tablet      • Semaglutide,0.25 or 0.5MG/DOS, (Ozempic, 0.25 or 0.5 MG/DOSE,) 2 MG/1.5ML solution pen-injector Inject 0.5 mg under the skin into the appropriate area as directed 1 (One) Time Per Week. 2 pen 11   • sennosides-docusate sodium (SENOKOT-S) 8.6-50 MG tablet Take 1 tablet by mouth Daily.     • SUMAtriptan (IMITREX) 100 MG tablet Take 100 mg by mouth Daily.  3   • SYNTHROID 200 MCG tablet Take 1 tablet by mouth Daily. 30 tablet 11   • topiramate (TOPAMAX) 100 MG tablet Take 100 mg by mouth Daily.  3   • VENTOLIN  (90 Base) MCG/ACT inhaler Inhale 2 puffs Every 4 (Four) Hours As Needed for Wheezing. 1 inhaler 4     No current facility-administered medications for this visit.        The following portions of the patient's history were reviewed and updated as appropriate: She  has a past medical history of Anxiety, Arthritis, Asthma, Asthma, Cancer (CMS/HCC), Chronic bronchitis (CMS/HCC), Depression, Depression, Diabetes mellitus (CMS/HCC), Fibromyalgia, Fibromyalgia, Hyperlipidemia, Hypertension, Hypothyroidism, Pleurisy, Pneumonia, Skin cancer, and Thyroid disease.  She  has a past surgical history that includes Appendectomy;  Tonsillectomy and adenoidectomy;  section; Dilation and curettage of uterus; Hysterectomy; Cataract extraction w/ intraocular lens implant (Right, 2019); and Cataract extraction w/ intraocular lens implant (Left, 10/7/2019).  Her family history includes Cancer in her brother, maternal grandmother, and paternal grandmother; Diabetes in her maternal aunt; Fibromyalgia in her mother; Heart attack in her father and mother; Hyperlipidemia in her mother; Hypertension in her mother; Thyroid disease in her maternal grandmother and mother.  She  reports that she quit smoking about 15 years ago. Her smoking use included cigarettes. She has a 3.75 pack-year smoking history. She has never used smokeless tobacco. She reports that she does not drink alcohol or use drugs.  She is allergic to penicillins; chlorhexidine; darvon [propoxyphene]; and sulfa antibiotics..      Objective:     Vitals:    20 1416   BP: 110/68   Pulse: 69   SpO2: 98%   Body mass index is 43.62 kg/m².  Physical Exam   Constitutional: She is oriented to person, place, and time. She appears well-developed and well-nourished.   Morbidly obese   HENT:   Head: Normocephalic and atraumatic.   Eyes: Lids are normal.   Proptosis noted    Cardiovascular: Normal rate, regular rhythm and normal heart sounds.   Musculoskeletal: She exhibits edema (non pitting edema).   Neurological: She is alert and oriented to person, place, and time.   Psychiatric: She has a normal mood and affect. Thought content normal.       LABS AND IMAGING  Results for orders placed or performed in visit on 20   POC Glucose Fingerstick   Result Value Ref Range    Glucose 72 70 - 130 mg/dL    Lot Number [id number]     Expiration Date 2020    POC Glycosylated Hemoglobin (Hb A1C)   Result Value Ref Range    Hemoglobin A1C 5.7 %    Lot Number [id number]     Expiration Date 2022              Assessment:        Problem List Items Addressed This Visit        Endocrine     Diabetes mellitus (CMS/[facility]) - Primary    Overview     ozempic 0.5 mg weekly, d/c janumet after current supply is over . A1C is at goal of therapy.          Relevant Medications    Semaglutide,0.25 or 0.5MG/DOS, (Ozempic, 0.25 or 0.5 MG/DOSE,) 2 MG/1.5ML solution pen-injector    Other Relevant Orders    POC Glucose Fingerstick (Completed)    POC Glycosylated Hemoglobin (Hb A1C) (Completed)               Plan:      1. Diabetes mellitus type 2 with insulin resistance and weight gain   -Cont Ozempic, A1C is at goal   Discussed dietary modifications to help with weight.     2. Postoperative hypothyroidism. - change to Synthroid brand name 200 mcg daily.     Follow-up in 6 months

## 2020-07-07 NOTE — PATIENT INSTRUCTIONS
Results for orders placed or performed in visit on 07/07/20   POC Glucose Fingerstick   Result Value Ref Range    Glucose 72 70 - 130 mg/dL    Lot Number [id number]     Expiration Date 12/24/2020    POC Glycosylated Hemoglobin (Hb A1C)   Result Value Ref Range    Hemoglobin A1C 5.7 %    Lot Number [id number]     Expiration Date 01/27/2022

## 2020-07-13 LAB
BH CV ECHO MEAS - % IVS THICK: 45 %
BH CV ECHO MEAS - % LVPW THICK: 89.5 %
BH CV ECHO MEAS - AO MAX PG (FULL): 1.1 MMHG
BH CV ECHO MEAS - AO MAX PG: 7 MMHG
BH CV ECHO MEAS - AO MEAN PG (FULL): 1 MMHG
BH CV ECHO MEAS - AO MEAN PG: 4 MMHG
BH CV ECHO MEAS - AO ROOT AREA: 5.3 CM^2
BH CV ECHO MEAS - AO ROOT DIAM: 2.6 CM
BH CV ECHO MEAS - AO V2 MAX: 132 CM/SEC
BH CV ECHO MEAS - AO V2 MEAN: 87.5 CM/SEC
BH CV ECHO MEAS - AO V2 VTI: 26.8 CM
BH CV ECHO MEAS - AVA(I,A): 3.3 CM^2
BH CV ECHO MEAS - AVA(I,D): 3.3 CM^2
BH CV ECHO MEAS - AVA(V,A): 3.2 CM^2
BH CV ECHO MEAS - AVA(V,D): 3.2 CM^2
BH CV ECHO MEAS - EDV(CUBED): 103.8 ML
BH CV ECHO MEAS - EDV(MOD-SP4): 95 ML
BH CV ECHO MEAS - EDV(TEICH): 102.4 ML
BH CV ECHO MEAS - EF(CUBED): 71.3 %
BH CV ECHO MEAS - EF(MOD-SP4): 67.4 %
BH CV ECHO MEAS - EF(TEICH): 63 %
BH CV ECHO MEAS - ESV(CUBED): 29.8 ML
BH CV ECHO MEAS - ESV(MOD-SP4): 31 ML
BH CV ECHO MEAS - ESV(TEICH): 37.9 ML
BH CV ECHO MEAS - FS: 34 %
BH CV ECHO MEAS - IVS/LVPW: 1.1
BH CV ECHO MEAS - IVSD: 1 CM
BH CV ECHO MEAS - IVSS: 1.5 CM
BH CV ECHO MEAS - LA DIMENSION: 3.9 CM
BH CV ECHO MEAS - LA/AO: 1.5
BH CV ECHO MEAS - LAD MAJOR: 5.2 CM
BH CV ECHO MEAS - LAT PEAK E' VEL: 10.6 CM/SEC
BH CV ECHO MEAS - LATERAL E/E' RATIO: 6.9
BH CV ECHO MEAS - LV IVRT: 0.11 SEC
BH CV ECHO MEAS - LV MASS(C)D: 158.9 GRAMS
BH CV ECHO MEAS - LV MASS(C)S: 188.8 GRAMS
BH CV ECHO MEAS - LV MAX PG: 5.9 MMHG
BH CV ECHO MEAS - LV MEAN PG: 3 MMHG
BH CV ECHO MEAS - LV V1 MAX: 121 CM/SEC
BH CV ECHO MEAS - LV V1 MEAN: 73.3 CM/SEC
BH CV ECHO MEAS - LV V1 VTI: 25.3 CM
BH CV ECHO MEAS - LVIDD: 4.7 CM
BH CV ECHO MEAS - LVIDS: 3.1 CM
BH CV ECHO MEAS - LVLD AP4: 8.9 CM
BH CV ECHO MEAS - LVLS AP4: 7.6 CM
BH CV ECHO MEAS - LVOT AREA (M): 3.5 CM^2
BH CV ECHO MEAS - LVOT AREA: 3.5 CM^2
BH CV ECHO MEAS - LVOT DIAM: 2.1 CM
BH CV ECHO MEAS - LVPWD: 0.95 CM
BH CV ECHO MEAS - LVPWS: 1.8 CM
BH CV ECHO MEAS - MED PEAK E' VEL: 7 CM/SEC
BH CV ECHO MEAS - MEDIAL E/E' RATIO: 10.4
BH CV ECHO MEAS - MV A MAX VEL: 65.6 CM/SEC
BH CV ECHO MEAS - MV DEC SLOPE: 337 CM/SEC^2
BH CV ECHO MEAS - MV DEC TIME: 0.2 SEC
BH CV ECHO MEAS - MV E MAX VEL: 73.1 CM/SEC
BH CV ECHO MEAS - MV E/A: 1.1
BH CV ECHO MEAS - MV MAX PG: 3.8 MMHG
BH CV ECHO MEAS - MV MEAN PG: 1 MMHG
BH CV ECHO MEAS - MV P1/2T MAX VEL: 72.1 CM/SEC
BH CV ECHO MEAS - MV P1/2T: 62.7 MSEC
BH CV ECHO MEAS - MV V2 MAX: 97.9 CM/SEC
BH CV ECHO MEAS - MV V2 MEAN: 52.2 CM/SEC
BH CV ECHO MEAS - MV V2 VTI: 23.8 CM
BH CV ECHO MEAS - MVA P1/2T LCG: 3.1 CM^2
BH CV ECHO MEAS - MVA(P1/2T): 3.5 CM^2
BH CV ECHO MEAS - MVA(VTI): 3.7 CM^2
BH CV ECHO MEAS - PA MAX PG: 3 MMHG
BH CV ECHO MEAS - PA V2 MAX: 85.9 CM/SEC
BH CV ECHO MEAS - RAP SYSTOLE: 5 MMHG
BH CV ECHO MEAS - RVSP: 23 MMHG
BH CV ECHO MEAS - SV(AO): 142.3 ML
BH CV ECHO MEAS - SV(CUBED): 74 ML
BH CV ECHO MEAS - SV(LVOT): 87.6 ML
BH CV ECHO MEAS - SV(MOD-SP4): 64 ML
BH CV ECHO MEAS - SV(TEICH): 64.4 ML
BH CV ECHO MEAS - TR MAX PG: 13 MMHG
BH CV ECHO MEAS - TR MAX VEL: 178 CM/SEC
BH CV ECHO MEAS - TV MAX PG: 1.2 MMHG
BH CV ECHO MEAS - TV V2 MAX: 53.8 CM/SEC
BH CV ECHO MEASUREMENTS AVERAGE E/E' RATIO: 8.31
LEFT ATRIUM VOLUME: 49 ML
LV EF 2D ECHO EST: 76 %

## 2020-09-11 ENCOUNTER — HOSPITAL ENCOUNTER (EMERGENCY)
Facility: HOSPITAL | Age: 60
Discharge: HOME | End: 2020-09-11
Attending: EMERGENCY MEDICINE | Admitting: EMERGENCY MEDICINE

## 2020-09-11 VITALS
BODY MASS INDEX: 42.81 KG/M2 | SYSTOLIC BLOOD PRESSURE: 137 MMHG | HEIGHT: 66 IN | WEIGHT: 266.4 LBS | TEMPERATURE: 98.6 F | DIASTOLIC BLOOD PRESSURE: 79 MMHG | HEART RATE: 78 BPM | RESPIRATION RATE: 16 BRPM | OXYGEN SATURATION: 99 %

## 2020-09-11 DIAGNOSIS — T50.901A MEDICATION OVERDOSE, ACCIDENTAL OR UNINTENTIONAL, INITIAL ENCOUNTER: Primary | ICD-10-CM

## 2020-09-11 LAB — GLUCOSE BLDC GLUCOMTR-MCNC: 91 MG/DL (ref 70–130)

## 2020-09-11 PROCEDURE — 99283 EMERGENCY DEPT VISIT LOW MDM: CPT

## 2020-09-11 PROCEDURE — 82962 GLUCOSE BLOOD TEST: CPT

## 2020-09-11 NOTE — ED PROVIDER NOTES
Subjective   60-year-old female presents after taking Ozempic earlier today, she thinks she may have taken more than the recommended dose she takes 0.5 mg which is her normal dosage, she injected in her abdomen, but the first injection did not take, she did not inject it again and is unsure that the medication was injected.  She had a second injector and use that in her abdomen, and received the metered dose.  She takes the injections once a week, shortly after she states she felt sick, had nausea and vomiting, and she is concerned that she may have taken more than the recommended dosage.  She now feels better, her nausea vomiting has resolved.      History provided by:  Patient   used: No        Review of Systems   Gastrointestinal: Positive for nausea and vomiting.   All other systems reviewed and are negative.      Past Medical History:   Diagnosis Date   • Anxiety    • Arthritis    • Asthma    • Cancer (CMS/HCC)    • Chronic bronchitis (CMS/HCC)    • Depression    • Diabetes mellitus (CMS/HCC)     type 2   • Fibromyalgia    • Hyperlipidemia    • Hypertension    • Hypothyroidism    • Pleurisy    • Pneumonia    • Skin cancer     upper lip   • Thyroid disease        Allergies   Allergen Reactions   • Penicillins Angioedema   • Chlorhexidine Rash   • Darvon [Propoxyphene] Unknown (See Comments)     Doesn't remember   • Sulfa Antibiotics Rash       Past Surgical History:   Procedure Laterality Date   • APPENDECTOMY     • CATARACT EXTRACTION W/ INTRAOCULAR LENS IMPLANT Right 2019    Procedure: CATARACT PHACO EXTRACTION WITH INTRAOCULAR LENS IMPLANT RIGHT;  Surgeon: Carl Babb MD;  Location: [facility] OR;  Service: Ophthalmology   • CATARACT EXTRACTION W/ INTRAOCULAR LENS IMPLANT Left 10/7/2019    Procedure: CATARACT PHACO EXTRACTION WITH INTRAOCULAR LENS IMPLANT LEFT;  Surgeon: Carl Babb MD;  Location: [facility] OR;  Service: Ophthalmology   •  SECTION      x 2    •  DILATATION AND CURETTAGE     • HYSTERECTOMY      ''still have one ovary''   • TONSILLECTOMY AND ADENOIDECTOMY         Family History   Problem Relation Age of Onset   • Hypertension Mother    • Heart attack Mother    • Fibromyalgia Mother    • Hyperlipidemia Mother    • Thyroid disease Mother    • Heart attack Father    • Cancer Brother    • Diabetes Maternal Aunt    • Cancer Maternal Grandmother    • Thyroid disease Maternal Grandmother    • Cancer Paternal Grandmother        Social History     Socioeconomic History   • Marital status:      Spouse name: Not on file   • Number of children: Not on file   • Years of education: Not on file   • Highest education level: Not on file   Tobacco Use   • Smoking status: Former Smoker     Packs/day: 0.25     Years: 15.00     Pack years: 3.75     Types: Cigarettes     Last attempt to quit: 2004     Years since quittin.1   • Smokeless tobacco: Never Used   Substance and Sexual Activity   • Alcohol use: No   • Drug use: No   • Sexual activity: Defer           Objective   Physical Exam   Constitutional: She is oriented to person, place, and time. She appears well-developed and well-nourished.   Eyes: EOM are normal.   Neck: Normal range of motion. Neck supple.   Cardiovascular: Normal rate and regular rhythm.   Pulmonary/Chest: Effort normal and breath sounds normal.   Abdominal: Soft. Bowel sounds are normal.   Musculoskeletal: Normal range of motion.   Neurological: She is alert and oriented to person, place, and time. She has normal reflexes.   Skin: Skin is warm and dry.   Psychiatric: She has a normal mood and affect.   Nursing note and vitals reviewed.      Procedures           ED Course                                           MDM  Number of Diagnoses or Management Options  Medication overdose, accidental or unintentional, initial encounter: new and requires workup     Amount and/or Complexity of Data Reviewed  Clinical lab tests: reviewed    Risk of  Complications, Morbidity, and/or Mortality  Presenting problems: minimal  Management options: minimal        Final diagnoses:   Medication overdose, accidental or unintentional, initial encounter            Kam Tolbert Jr., PA-C  09/11/20 [id number]

## 2020-09-14 ENCOUNTER — TELEPHONE (OUTPATIENT)
Dept: ENDOCRINOLOGY | Facility: CLINIC | Age: 60
End: 2020-09-14

## 2020-09-14 NOTE — TELEPHONE ENCOUNTER
PT HAD TO GO TO THE ER OVER THE WEEKEND BECAUSE OF AN INCIDENT W/ INSULIN.  SHE STATES THAT SHE WANTED TO BE SEEN, I GAVE HER A FEW OPTIONS BUT COULDN'T DO ANY OF THOSE.    PLEASE ADVISE.

## 2020-09-14 NOTE — TELEPHONE ENCOUNTER
PT'S OZEMPIC PIN MESSED UP ON HER OVER THE WEEKEND. SHE'S NOT SURE HOW MUCH INSULIN SHE GAVE HERSELF.   SHE WENT TO THE HOSPITAL AND THEY TOLD HER TO MONITOR HERSELF FOR A WEEK.  THIS MORNING, SHE HAS ALREADY GOTTEN SICK 3 TIMES AND SHE'S WONDERING IF THAT COULD BE DUE TO THE INSULIN.    PLEASE ADVISE.

## 2020-09-15 NOTE — TELEPHONE ENCOUNTER
Ozempic - is not insulin, it is weekly diabetes medication. It can cause nausea and Gi symptoms, so please hold it for now until sx resolve. Then later you may restart at 0.25 mg weekly. She has been taking ozempic since 1/2019 and unlikely that's the cause of sx, but it can make sx worse.

## 2020-10-05 NOTE — PROGRESS NOTES
[facility] Note        Subjective     Pain of the Right Knee      HPI    Janet Obrien is a 60 y.o. female who presents with new problem of: right knee pain.  Onset: mechanical fall. The issue has been ongoing for 1 month(s). Pain is a 7/10 on the pain scale. Pain is described as dull and aching. Associated symptoms include pain, swelling, popping, grinding and stiffness. The pain is worse with walking, standing, sitting, climbing stairs, sleeping and leisure; pain medication and/or NSAID improve the pain. Previous treatments have included: cane/walker, NSAIDS and physical therapy.    I have reviewed the following portions of the patient's history:History of Present Illnessand review of systems.      Patient is here today for a right knee injury.  She is here for second opinion.  She was mowing the lawn late August early September when she stepped in a rut.  She feels like she jammed the knee.  She was able to complete mowing the lawn.  The pain gradually worsened to the point where she had to go the emergency department at [facility].  She followed up with Dr. Juan Shah who gave her an injection.  She says this helped her for 45 minutes only.  If anything, she said this worsened her pain.  She is here for second opinion today.  Patient complains of anterior knee pain.  She is using a rolling walker.      Past Medical History:   Diagnosis Date   • Anxiety    • Arthritis    • Asthma    • Cancer (CMS/HCC)    • Chronic bronchitis (CMS/HCC)    • Chronic headaches    • Depression    • Diabetes mellitus (CMS/HCC)     type 2   • Fibromyalgia    • Hyperlipidemia    • Hypertension    • Hypothyroidism    • Peripheral autonomic neuropathy    • Pleurisy    • Pneumonia    • Rheumatism    • Skin cancer     upper lip   • Thyroid disease       Past Surgical History:   Procedure Laterality Date   • APPENDECTOMY     • CATARACT EXTRACTION W/ INTRAOCULAR LENS IMPLANT Right 9/16/2019     Procedure: CATARACT PHACO EXTRACTION WITH INTRAOCULAR LENS IMPLANT RIGHT;  Surgeon: Carl Babb MD;  Location: [facility] OR;  Service: Ophthalmology   • CATARACT EXTRACTION W/ INTRAOCULAR LENS IMPLANT Left 10/7/2019    Procedure: CATARACT PHACO EXTRACTION WITH INTRAOCULAR LENS IMPLANT LEFT;  Surgeon: Carl Babb MD;  Location: [facility] OR;  Service: Ophthalmology   •  SECTION      x 2    • DILATATION AND CURETTAGE     • HYSTERECTOMY      ''still have one ovary''   • TONSILLECTOMY AND ADENOIDECTOMY        Family History   Problem Relation Age of Onset   • Hypertension Mother    • Heart attack Mother    • Fibromyalgia Mother    • Hyperlipidemia Mother    • Thyroid disease Mother    • Heart attack Father    • Cancer Brother    • Diabetes Maternal Aunt    • Cancer Maternal Grandmother    • Thyroid disease Maternal Grandmother    • Cancer Paternal Grandmother      Social History     Socioeconomic History   • Marital status:      Spouse name: Not on file   • Number of children: Not on file   • Years of education: Not on file   • Highest education level: Not on file   Tobacco Use   • Smoking status: Former Smoker     Packs/day: 0.25     Years: 15.00     Pack years: 3.75     Types: Cigarettes     Quit date: 2004     Years since quittin.2   • Smokeless tobacco: Never Used   Substance and Sexual Activity   • Alcohol use: No   • Drug use: No   • Sexual activity: Defer      Current Outpatient Medications on File Prior to Visit   Medication Sig Dispense Refill   • atorvastatin (LIPITOR) 80 MG tablet Take 80 mg by mouth Daily.     • buPROPion SR (WELLBUTRIN SR) 150 MG 12 hr tablet      • Calcium Carbonate-Vit D-Min (CALCIUM 1200 PO) Take  by mouth.     • celecoxib (CeleBREX) 200 MG capsule Take 200 mg by mouth Daily.     • docusate sodium (COLACE) 250 MG capsule Take 250 mg by mouth 2 (two) times a day.     • DULoxetine (CYMBALTA) 60 MG capsule Take 60 mg by mouth Daily.     • estrogens,  conjugated, (PREMARIN) 0.45 MG tablet Take 0.5 mg by mouth Daily. Take daily for 21 days then do not take for 7 days.     • FLUoxetine (PROzac) 20 MG capsule Take 20 mg by mouth Daily.     • gabapentin (NEURONTIN) 300 MG capsule Take 300 mg by mouth 2 (two) times a day.     • hydrochlorothiazide (HYDRODIURIL) 25 MG tablet Take 1 tablet by mouth daily.     • Lancets (ONETOUCH DELICA PLUS [id number]) misc 1 each 3 (Three) Times a Day. 100 each 11   • loratadine (CLARITIN) 10 MG tablet Take 10 mg by mouth Daily.     • omeprazole (priLOSEC) 40 MG capsule Take 40 mg by mouth Daily.     • ONETOUCH VERIO test strip Test Three Times Daily 100 each 12   • oxyCODONE-acetaminophen (PERCOCET)  MG per tablet      • Semaglutide,0.25 or 0.5MG/DOS, (Ozempic, 0.25 or 0.5 MG/DOSE,) 2 MG/1.5ML solution pen-injector Inject 0.5 mg under the skin into the appropriate area as directed 1 (One) Time Per Week. 2 pen 11   • sennosides-docusate sodium (SENOKOT-S) 8.6-50 MG tablet Take 1 tablet by mouth Daily.     • SUMAtriptan (IMITREX) 100 MG tablet Take 100 mg by mouth Daily.  3   • SYNTHROID 200 MCG tablet Take 1 tablet by mouth Daily. 30 tablet 11   • topiramate (TOPAMAX) 100 MG tablet Take 100 mg by mouth Daily.  3   • VENTOLIN  (90 Base) MCG/ACT inhaler Inhale 2 puffs Every 4 (Four) Hours As Needed for Wheezing. 1 inhaler 4     No current facility-administered medications on file prior to visit.       Allergies   Allergen Reactions   • Penicillins Angioedema   • Chlorhexidine Rash   • Darvon [Propoxyphene] Unknown (See Comments)     Doesn't remember   • Sulfa Antibiotics Rash          Review of Systems   Constitutional: Negative.    HENT: Negative.    Eyes: Negative.    Respiratory: Negative.    Cardiovascular: Negative.    Gastrointestinal: Negative.    Endocrine: Negative.    Genitourinary: Negative.    Musculoskeletal: Positive for arthralgias.   Skin: Negative.    Allergic/Immunologic: Negative.    Neurological: Negative.  "   Hematological: Negative.    Psychiatric/Behavioral: Negative.         I reviewed the patient's chief complaint, history of present illness, review of systems, past medical history, surgical history, family history, social history, medications and allergy list.        Objective      Physical Exam  Pulse 80   Ht 167.6 cm (65.98\")   Wt 121 kg (266 lb 12.1 oz)   SpO2 97%   BMI 43.08 kg/m²     Body mass index is 43.08 kg/m².    General  Mental Status - alert  General Appearance - cooperative, well groomed, not in acute distress  Orientation - Oriented X3  Build & Nutrition - well developed and well nourished  Posture - normal posture  Gait -antalgic gait, rolling walker     Integumentary  Global Assessment  Examination of related systems reveals - no lymphadenopathy  Ears:  No abnormality  Nose:  No mucous drainage  General Characteristics  Overall examination of the patient's skin reveals - no rashes, no evidence of scars, no suspicious lesions and no bruises.  Color - normal coloration of skin.  Vascular: Brisk capillary refill in all extremities    Ortho Exam  Peripheral Vascular:    Upper Extremity:   Inspection:  Left--no cyanotic nail beds Right--no cyanotic nail beds   Bilateral:  Pink nail beds with brisk capillary refill   Palpation:  Bilateral radial pulse normal    Musculoskeletal:  Global Assessment:  Overall assessment of Lower Extremity Muscle Strength and Tone:    Right quadriceps--5/5  Right hamstrings--5/5  Right tibialis anterior--5/5  Right gastroc soleus--5/5  Right EHL--5/5    Lower Extremity:  Knee/Patella:  No digital clubbing or cyanosis.    Examination of right knee reveals:  Normal deep tendon reflexes, coordination, strength, tone, sensation.  No known fractures or deformities.    Inspection and Palpation:      Right knee:  Tenderness:  Over medial and lateral joint line, slightly exaggerated  Effusion:  1+  Crepitus:  none  Pulses:  2+  Ecchymosis:  None  Warmth:  None     ROM:  Right:  " Extension:0    Flexion:135  Left:  Extension:0     Flexion:135    Instability:      Right:  Lachman Test:  Negative, Varus stress test negative, Valgus stress test negative   Anterior Drawer Test:  Negative, Posterior Drawer Test:  Negative    Deformities/Malalignments/Discrepancies:    Left:  none  Right:  none    Functional Testing:  Right:  Yuliana's test:  Positive  Patella grind test:  Negative  Q-angle:  Normal  Apprehension Sign:  Negative      Imaging/Studies  Imaging Results (Last 24 Hours)     ** No results found for the last 24 hours. **      We reviewed outside records from [facility] emergency department dated 9/3/2020.  We have reviewed right knee images and reports of images that are read as negative for acute fracture.  There are moderate degenerative changes noted in the reports in the right knee.  Per our read, patient has end-stage patellofemoral arthritis.    We have also reviewed laboratory studies from that date as well which show a sed rate of 10, CRP of 1.7 (normal is less than 9).  Normal white count as well.      Assessment    Assessment:  1. Right knee pain, unspecified chronicity    2. Primary osteoarthritis of right knee    3. Injury of right knee, initial encounter        Plan:  1. Continue over-the-counter medication as needed for discomfort  2. Right knee injury in the face of end-stage patellofemoral arthritis--patient got no relief from a corticosteroid injection.  I have suggested we get an MRI to make sure there are no occult ligamentous or meniscal injuries.  If that is the case, then proceeding with a course of Visco supplementation may be wise.  Patient will be given a prescription today for a rolling walker.        Dayo Chavez MD  10/06/20  10:41 EDT    Dragon disclaimer:  Much of this encounter note is an electronic transcription/translation of spoken language to printed text. The electronic translation of spoken language may permit erroneous, or at  times, nonsensical words or phrases to be inadvertently transcribed; Although I have reviewed the note for such errors, some may still exist.

## 2020-10-06 ENCOUNTER — OFFICE VISIT (OUTPATIENT)
Dept: ORTHOPEDIC SURGERY | Facility: CLINIC | Age: 60
End: 2020-10-06

## 2020-10-06 VITALS — HEIGHT: 66 IN | BODY MASS INDEX: 42.87 KG/M2 | HEART RATE: 80 BPM | WEIGHT: 266.76 LBS | OXYGEN SATURATION: 97 %

## 2020-10-06 DIAGNOSIS — S89.91XA INJURY OF RIGHT KNEE, INITIAL ENCOUNTER: ICD-10-CM

## 2020-10-06 DIAGNOSIS — M25.561 RIGHT KNEE PAIN, UNSPECIFIED CHRONICITY: Primary | ICD-10-CM

## 2020-10-06 DIAGNOSIS — M17.11 PRIMARY OSTEOARTHRITIS OF RIGHT KNEE: ICD-10-CM

## 2020-10-06 PROCEDURE — 99203 OFFICE O/P NEW LOW 30 MIN: CPT | Performed by: ORTHOPAEDIC SURGERY

## 2020-10-08 ENCOUNTER — OFFICE VISIT (OUTPATIENT)
Dept: GASTROENTEROLOGY | Facility: CLINIC | Age: 60
End: 2020-10-08

## 2020-10-08 VITALS
TEMPERATURE: 97.7 F | HEIGHT: 66 IN | BODY MASS INDEX: 41.62 KG/M2 | RESPIRATION RATE: 16 BRPM | WEIGHT: 259 LBS | SYSTOLIC BLOOD PRESSURE: 139 MMHG | DIASTOLIC BLOOD PRESSURE: 81 MMHG | HEART RATE: 73 BPM

## 2020-10-08 DIAGNOSIS — K21.9 GASTROESOPHAGEAL REFLUX DISEASE, UNSPECIFIED WHETHER ESOPHAGITIS PRESENT: Chronic | ICD-10-CM

## 2020-10-08 DIAGNOSIS — K62.5 RECTAL BLEEDING: Chronic | ICD-10-CM

## 2020-10-08 DIAGNOSIS — R10.30 LOWER ABDOMINAL PAIN: Primary | Chronic | ICD-10-CM

## 2020-10-08 DIAGNOSIS — R19.7 DIARRHEA, UNSPECIFIED TYPE: Chronic | ICD-10-CM

## 2020-10-08 DIAGNOSIS — K62.89 ANAL OR RECTAL PAIN: Chronic | ICD-10-CM

## 2020-10-08 DIAGNOSIS — K59.00 CONSTIPATION, UNSPECIFIED CONSTIPATION TYPE: Chronic | ICD-10-CM

## 2020-10-08 DIAGNOSIS — E66.01 CLASS 3 SEVERE OBESITY DUE TO EXCESS CALORIES WITH SERIOUS COMORBIDITY AND BODY MASS INDEX (BMI) OF 40.0 TO 44.9 IN ADULT (HCC): Chronic | ICD-10-CM

## 2020-10-08 PROBLEM — E66.813 CLASS 3 SEVERE OBESITY DUE TO EXCESS CALORIES WITH SERIOUS COMORBIDITY AND BODY MASS INDEX (BMI) OF 40.0 TO 44.9 IN ADULT: Status: ACTIVE | Noted: 2020-10-08

## 2020-10-08 PROCEDURE — 99214 OFFICE O/P EST MOD 30 MIN: CPT | Performed by: NURSE PRACTITIONER

## 2020-10-08 RX ORDER — FLUOXETINE HYDROCHLORIDE 20 MG/1
20 CAPSULE ORAL DAILY PRN
COMMUNITY
End: 2021-07-06

## 2020-10-08 RX ORDER — POLYETHYLENE GLYCOL 1450
POWDER (GRAM) MISCELLANEOUS
Qty: 500 G | Refills: 3 | Status: ON HOLD | OUTPATIENT
Start: 2020-10-08 | End: 2023-03-15

## 2020-10-08 NOTE — PATIENT INSTRUCTIONS
1. Antireflux measures: Avoid fried, fatty foods, alcohol, chocolate, coffee, tea,  soft drinks, peppermint and spearmint, spicy foods, tomatoes and tomato based foods, onion based foods, and smoking. Other antireflux measures include weight reduction if overweight, avoiding tight clothing around the abdomen, elevating the head of the bed 6 inches with blocks under the head board, and don't drink or eat before going to bed and avoid lying down immediately after meals.  2. Omeprazole 40 mg 1 po daily in the am 30 minutes before breakfast.  3. Recommended to take Levothyroxine first in the am upon waking, wait 30 minutes, then take Omeprazole 40 mg, wait 30 minutes, then eat breakfast and take other medications.  4. Miralax 17 grams in 8 ounces of noncarbonated beverage daily.  5. May take stool softeners, 1-2 per day as needed for constipation.   6. High fiber, low fat diet with liberal water intake.  7. High fiber, low fat diet with liberal water intake.   8. Advised to exercise 30 minutes 3-4 days per week.  9. Advised to lose 20-25 pounds in the next 6-12 months.  10. Colonoscopy: Description of the procedure, risks, benefits, alternatives and options, including nonoperative options, were discussed with the patient in detail. The patient understands and wishes to proceed. Will obtain cardiac clearance from Dr. Madrid and call patient to schedule.  11. COVID-19 testing prior to procedure. The patient will need to self-quarantine after testing until the procedure. Instructions given to patient.

## 2020-10-08 NOTE — PROGRESS NOTES
New Patient Consult      Date: 10/08/2020   Patient Name: Janet Obrien  MRN: [id number]  : 1960     Primary Care Provider: Noe Davenport MD    Chief Complaint   Patient presents with   • Constipation   • Diarrhea     History of Present Illness: Janet Obrien is a 60 y.o. female who is here today to establish care with Gastroenterology for constipation and diarrhea.     The patient has a history of constipation alternating with diarrhea for over 5 years. The patient will not have a bowel movement for several days, then she will have a hard bowel movement, followed by 2-3 episodes of loose stool.. She has pain in the lower abdomen associated with bowel movements. She has rectal bleeding at times with bowel movements. She has rectal pain at times that is very sharp and severe.    The patient has a history of heartburn for several years. She takes Omeprazole as needed with reasonable control of reflux. The patient denies nausea associated with taking medications if she has not eaten prior to taking medications, but she does not have nausea at other times. There is no history of difficulty swallowing.    The patient has not had a colonoscopy or EGD in the past. There is no family history of GI malignancy.    Subjective      Past Medical History:   Diagnosis Date   • Anxiety    • Arthritis    • Asthma    • Back pain    • Cancer (CMS/HCC)    • Chronic bronchitis (CMS/HCC)    • Chronic headaches    • Depression    • Diabetes mellitus (CMS/HCC)     type 2   • Fibromyalgia    • High triglycerides    • History of sinus problem     as child   • Hyperlipidemia    • Hypertension    • Hyperthyroidism    • Hypothyroidism    • Migraine    • MVP (mitral valve prolapse)    • OA (osteoarthritis)    • Peripheral autonomic neuropathy    • Pleurisy    • Pneumonia    • Rheumatism    • RLS (restless legs syndrome)    • Skin cancer     upper lip   • Sleep apnea    • Thyroid disease      Past Surgical  History:   Procedure Laterality Date   • APPENDECTOMY     • CATARACT EXTRACTION W/ INTRAOCULAR LENS IMPLANT Right 2019    Procedure: CATARACT PHACO EXTRACTION WITH INTRAOCULAR LENS IMPLANT RIGHT;  Surgeon: Carl Babb MD;  [facility];  Service: Ophthalmology   • CATARACT EXTRACTION W/ INTRAOCULAR LENS IMPLANT Left 10/7/2019    Procedure: CATARACT PHACO EXTRACTION WITH INTRAOCULAR LENS IMPLANT LEFT;  Surgeon: Carl Babb MD;  [facility];  Service: Ophthalmology   •  SECTION  , 1997    x 2    • DILATATION AND CURETTAGE     • HYSTERECTOMY      ''still have one ovary''   • THYROIDECTOMY     • TONSILLECTOMY AND ADENOIDECTOMY       Family History   Problem Relation Age of Onset   • Hypertension Mother    • Heart attack Mother    • Fibromyalgia Mother    • Hyperlipidemia Mother    • Thyroid disease Mother    • Irritable bowel syndrome Mother    • Heart attack Father    • Cancer Brother    • Stomach cancer Brother    • Diabetes Maternal Aunt    • Cancer Maternal Grandmother    • Thyroid disease Maternal Grandmother    • Cancer Paternal Grandmother    • Colon cancer Neg Hx    • Cirrhosis Neg Hx    • Liver cancer Neg Hx    • Liver disease Neg Hx      Social History     Socioeconomic History   • Marital status:      Spouse name: Not on file   • Number of children: Not on file   • Years of education: Not on file   • Highest education level: Not on file   Tobacco Use   • Smoking status: Former Smoker     Packs/day: 0.25     Years: 15.00     Pack years: 3.75     Types: Cigarettes     Start date:      Quit date: 2004     Years since quittin.2   • Smokeless tobacco: Never Used   • Tobacco comment: off and on smoker   Substance and Sexual Activity   • Alcohol use: No   • Drug use: No   • Sexual activity: Defer       Current Outpatient Medications:   •  atorvastatin (LIPITOR) 80 MG tablet, Take 80 mg by mouth Daily., Disp: , Rfl:   •   buPROPion SR (WELLBUTRIN SR) 150 MG 12 hr tablet, Take 150 mg by mouth., Disp: , Rfl:   •  Calcium Carbonate-Vit D-Min (CALCIUM 1200 PO), Take  by mouth., Disp: , Rfl:   •  celecoxib (CeleBREX) 200 MG capsule, Take 200 mg by mouth Daily., Disp: , Rfl:   •  Cyanocobalamin (VITAMIN B 12 PO), Take  by mouth., Disp: , Rfl:   •  docusate sodium (COLACE) 250 MG capsule, Take 250 mg by mouth 2 (two) times a day., Disp: , Rfl:   •  DULoxetine (CYMBALTA) 60 MG capsule, Take 60 mg by mouth Daily., Disp: , Rfl:   •  estrogens, conjugated, (PREMARIN) 0.45 MG tablet, Take 0.5 mg by mouth Daily. Take daily for 21 days then do not take for 7 days., Disp: , Rfl:   •  FLUoxetine (PROzac) 20 MG capsule, Take 20 mg by mouth Daily As Needed., Disp: , Rfl:   •  hydrochlorothiazide (HYDRODIURIL) 25 MG tablet, Take 1 tablet by mouth daily., Disp: , Rfl:   •  Lancets (ONETOUCH DELICA PLUS UUFTKN56H) misc, 1 each 3 (Three) Times a Day., Disp: 100 each, Rfl: 11  •  loratadine (CLARITIN) 10 MG tablet, Take 10 mg by mouth Daily., Disp: , Rfl:   •  omeprazole (priLOSEC) 40 MG capsule, Take 40 mg by mouth Daily. As needed, Disp: , Rfl:   •  ONETOUCH VERIO test strip, Test Three Times Daily, Disp: 100 each, Rfl: 12  •  oxyCODONE-acetaminophen (PERCOCET)  MG per tablet, , Disp: , Rfl:   •  Semaglutide,0.25 or 0.5MG/DOS, (Ozempic, 0.25 or 0.5 MG/DOSE,) 2 MG/1.5ML solution pen-injector, Inject 0.5 mg under the skin into the appropriate area as directed 1 (One) Time Per Week., Disp: 2 pen, Rfl: 11  •  sennosides-docusate sodium (SENOKOT-S) 8.6-50 MG tablet, Take 1 tablet by mouth Daily., Disp: , Rfl:   •  SUMAtriptan (IMITREX) 100 MG tablet, Take 100 mg by mouth Daily., Disp: , Rfl: 3  •  SYNTHROID 200 MCG tablet, Take 1 tablet by mouth Daily., Disp: 30 tablet, Rfl: 11  •  topiramate (TOPAMAX) 100 MG tablet, Take 100 mg by mouth Daily., Disp: , Rfl: 3  •  VENTOLIN  (90 Base) MCG/ACT inhaler, Inhale 2 puffs Every 4 (Four) Hours As Needed  for Wheezing., Disp: 1 inhaler, Rfl: 4  •  Polyethylene Glycol powder, Take 1 cap full (17 grams) in 8 oz of noncarbonated beverage and drink once daily, Disp: 500 g, Rfl: 3    Allergies   Allergen Reactions   • Penicillins Angioedema   • Chlorhexidine Rash   • Darvon [Propoxyphene] Unknown (See Comments)     Doesn't remember   • Sulfa Antibiotics Rash     Review of Systems   Constitutional: Negative for appetite change and unexpected weight loss.   HENT: Negative for trouble swallowing.    Eyes: Negative for blurred vision.   Respiratory: Positive for chest tightness. Negative for choking.    Cardiovascular: Positive for chest pain and leg swelling.   Gastrointestinal: Positive for abdominal pain, anal bleeding, constipation, diarrhea, nausea, rectal pain and GERD. Negative for abdominal distention, blood in stool and vomiting.   Endocrine: Negative for polyphagia.   Genitourinary: Negative for hematuria.   Musculoskeletal: Positive for arthralgias. Negative for myalgias.   Skin: Negative for rash.   Allergic/Immunologic: Negative for food allergies.   Neurological: Positive for dizziness and syncope. Negative for confusion.   Hematological: Does not bruise/bleed easily.   Psychiatric/Behavioral: Negative for depressed mood.      The following portions of the patient's history were reviewed and updated as appropriate: allergies, current medications, past family history, past medical history, past social history, past surgical history and problem list.    Objective     Physical Exam  Vitals signs and nursing note reviewed.   Constitutional:       General: She is awake. She is not in acute distress.     Appearance: Normal appearance. She is well-developed. She is not diaphoretic.   HENT:      Head: Normocephalic and atraumatic.      Right Ear: Hearing and external ear normal.      Left Ear: Hearing and external ear normal.      Nose: Nose normal.      Mouth/Throat:      Lips: Pink.      Mouth: Mucous membranes are  "not pale, not dry and not cyanotic. No oral lesions.      Pharynx: No oropharyngeal exudate.   Eyes:      General: Lids are normal.         Right eye: No discharge.         Left eye: No discharge.      Conjunctiva/sclera: Conjunctivae normal.   Neck:      Musculoskeletal: Neck supple. No edema.      Thyroid: No thyroid mass or thyromegaly.      Vascular: No JVD.      Trachea: Trachea normal.   Cardiovascular:      Rate and Rhythm: Normal rate and regular rhythm.      Heart sounds: Normal heart sounds and S2 normal. No murmur. No friction rub. No gallop. No S3 sounds.    Pulmonary:      Effort: Pulmonary effort is normal. No respiratory distress.      Breath sounds: Normal breath sounds.   Chest:      Chest wall: No tenderness.   Abdominal:      General: Bowel sounds are normal. There is no distension.      Palpations: Abdomen is not rigid. There is no hepatomegaly, splenomegaly or mass.      Tenderness: There is abdominal tenderness in the suprapubic area. There is no guarding or rebound.      Hernia: No hernia is present.   Musculoskeletal: Normal range of motion.   Lymphadenopathy:      Cervical: No cervical adenopathy.      Upper Body:      Left upper body: No supraclavicular adenopathy.   Skin:     General: Skin is warm and dry.      Coloration: Skin is not pale.      Findings: No rash.      Nails: There is no clubbing.     Neurological:      Mental Status: She is alert and oriented to person, place, and time.      Cranial Nerves: No cranial nerve deficit.      Sensory: No sensory deficit.   Psychiatric:         Mood and Affect: Mood normal.         Speech: Speech normal.         Behavior: Behavior is cooperative.       Vital Signs:   Vitals:    10/08/20 0849   BP: 139/81   Pulse: 73   Resp: 16   Temp: 97.7 °F (36.5 °C)   Weight: 117 kg (259 lb)   Height: 167.6 cm (66\")     Results Review:   I have reviewed the patient's new clinical and imaging results.    Admission on 09/11/2020, Discharged on 09/11/2020 "   Component Date Value Ref Range Status   • Glucose 09/11/2020 91  70 - 130 mg/dL Final    Serial Number: [id number]:  [id number]   Office Visit on 07/07/2020   Component Date Value Ref Range Status   • Glucose 07/07/2020 72  70 - 130 mg/dL Final   • Lot Number 07/07/2020 [id number]   Final   • Expiration Date 07/07/2020 12/24/2020   Final   • Hemoglobin A1C 07/07/2020 5.7  % Final   • Lot Number 07/07/2020 [id number]   Final   • Expiration Date 07/07/2020 01/27/2022   Final   Office Visit on 06/05/2020   Component Date Value Ref Range Status   • IVSd 07/10/2020 1.0  cm Final   • IVSs 07/10/2020 1.5  cm Final   • LVIDd 07/10/2020 4.7  cm Final   • LVIDs 07/10/2020 3.1  cm Final   • LVPWd 07/10/2020 0.95  cm Final   • BH CV ECHO VITALIY - LVPWS 07/10/2020 1.8  cm Final   • IVS/LVPW 07/10/2020 1.1   Final   • FS 07/10/2020 34.0  % Final   • EDV(Teich) 07/10/2020 102.4  ml Final   • ESV(Teich) 07/10/2020 37.9  ml Final   • EF(Teich) 07/10/2020 63.0  % Final   • EDV(cubed) 07/10/2020 103.8  ml Final   • ESV(cubed) 07/10/2020 29.8  ml Final   • EF(cubed) 07/10/2020 71.3  % Final   • % IVS thick 07/10/2020 45.0  % Final   • % LVPW thick 07/10/2020 89.5  % Final   • LV mass(C)d 07/10/2020 158.9  grams Final   • LV mass(C)s 07/10/2020 188.8  grams Final   • SV(Teich) 07/10/2020 64.4  ml Final   • SV(cubed) 07/10/2020 74.0  ml Final   • Ao root diam 07/10/2020 2.6  cm Final   • Ao root area 07/10/2020 5.3  cm^2 Final   • LA dimension 07/10/2020 3.9  cm Final   • LA/Ao 07/10/2020 1.5   Final   • LVOT diam 07/10/2020 2.1  cm Final   • LVOT area 07/10/2020 3.5  cm^2 Final   • LVOT area(traced) 07/10/2020 3.5  cm^2 Final   • LAd major 07/10/2020 5.2  cm Final   • LVLd ap4 07/10/2020 8.9  cm Final   • EDV(MOD-sp4) 07/10/2020 95.0  ml Final   • LVLs ap4 07/10/2020 7.6  cm Final   • ESV(MOD-sp4) 07/10/2020 31.0  ml Final   • EF(MOD-sp4) 07/10/2020 67.4  % Final   • LA volume 07/10/2020 49.0  ml Final   • SV(MOD-sp4) 07/10/2020 64.0   ml Final   • MV E max thomas 07/10/2020 73.1  cm/sec Final   • MV A max thomas 07/10/2020 65.6  cm/sec Final   • MV E/A 07/10/2020 1.1   Final   • LV IVRT 07/10/2020 0.11  sec Final   • MV V2 max 07/10/2020 97.9  cm/sec Final   • MV max PG 07/10/2020 3.8  mmHg Final   • MV V2 mean 07/10/2020 52.2  cm/sec Final   • MV mean PG 07/10/2020 1.0  mmHg Final   • MV V2 VTI 07/10/2020 23.8  cm Final   • MVA(VTI) 07/10/2020 3.7  cm^2 Final   • MV P1/2t max thomas 07/10/2020 72.1  cm/sec Final   • MV P1/2t 07/10/2020 62.7  msec Final   • MVA(P1/2t) 07/10/2020 3.5  cm^2 Final   • MV dec slope 07/10/2020 337.0  cm/sec^2 Final   • MV dec time 07/10/2020 0.2  sec Final   • Ao pk thomas 07/10/2020 132.0  cm/sec Final   • Ao max PG 07/10/2020 7.0  mmHg Final   • Ao max PG (full) 07/10/2020 1.1  mmHg Final   • Ao V2 mean 07/10/2020 87.5  cm/sec Final   • Ao mean PG 07/10/2020 4.0  mmHg Final   • Ao mean PG (full) 07/10/2020 1.0  mmHg Final   • Ao V2 VTI 07/10/2020 26.8  cm Final   • SU(I,A) 07/10/2020 3.3  cm^2 Final   • SU(I,D) 07/10/2020 3.3  cm^2 Final   • SU(V,A) 07/10/2020 3.2  cm^2 Final   • SU(V,D) 07/10/2020 3.2  cm^2 Final   • LV V1 max PG 07/10/2020 5.9  mmHg Final   • LV V1 mean PG 07/10/2020 3.0  mmHg Final   • LV V1 max 07/10/2020 121.0  cm/sec Final   • LV V1 mean 07/10/2020 73.3  cm/sec Final   • LV V1 VTI 07/10/2020 25.3  cm Final   • SV(Ao) 07/10/2020 142.3  ml Final   • SV(LVOT) 07/10/2020 87.6  ml Final   • TV V2 max 07/10/2020 53.8  cm/sec Final   • TV max PG 07/10/2020 1.2  mmHg Final   • PA V2 max 07/10/2020 85.9  cm/sec Final   • PA max PG 07/10/2020 3.0  mmHg Final   • TR max thomas 07/10/2020 178.0  cm/sec Final   • TR max PG 07/10/2020 13.0  mmHg Final   • RVSP(TR) 07/10/2020 23.0  mmHg Final   • RAP systole 07/10/2020 5  mmHg Final   • MVA P1/2T LCG 07/10/2020 3.1  cm^2 Final   • Lat E/e'  07/10/2020 6.9   Final   • Med E/e' 07/10/2020 10.4   Final   • Lat Peak E' Thomas 07/10/2020 10.6  cm/sec Final   • Med Peak E' Thomas  07/10/2020 7.0  cm/sec Final   • Avg E/e' ratio 07/10/2020 8.31   Final   • Echo EF Estimated 07/10/2020 76  % Final   Admission on 05/25/2020, Discharged on 05/25/2020   Component Date Value Ref Range Status   • Glucose 05/25/2020 113* 65 - 99 mg/dL Final   • BUN 05/25/2020 13  6 - 20 mg/dL Final   • Creatinine 05/25/2020 0.87  0.57 - 1.00 mg/dL Final   • Sodium 05/25/2020 139  136 - 145 mmol/L Final   • Potassium 05/25/2020 3.2* 3.5 - 5.2 mmol/L Final   • Chloride 05/25/2020 101  98 - 107 mmol/L Final   • CO2 05/25/2020 27.1  22.0 - 29.0 mmol/L Final   • Calcium 05/25/2020 10.2  8.6 - 10.5 mg/dL Final   • Total Protein 05/25/2020 6.8  6.0 - 8.5 g/dL Final   • Albumin 05/25/2020 4.40  3.50 - 5.20 g/dL Final   • ALT (SGPT) 05/25/2020 21  1 - 33 U/L Final   • AST (SGOT) 05/25/2020 19  1 - 32 U/L Final   • Alkaline Phosphatase 05/25/2020 116  39 - 117 U/L Final   • Total Bilirubin 05/25/2020 0.5  0.2 - 1.2 mg/dL Final   • eGFR Non African Amer 05/25/2020 67  >60 mL/min/1.73 Final   • Globulin 05/25/2020 2.4  gm/dL Final   • A/G Ratio 05/25/2020 1.8  g/dL Final   • BUN/Creatinine Ratio 05/25/2020 14.9  7.0 - 25.0 Final   • Anion Gap 05/25/2020 10.9  5.0 - 15.0 mmol/L Final   • Troponin I 05/25/2020 0.000  0.000 - 0.050 ng/mL Final   • Troponin T 05/25/2020 <0.010  0.000 - 0.030 ng/mL Final   • Extra Tube 05/25/2020 hold for add-on   Final    Auto resulted   • Extra Tube 05/25/2020 Hold for add-ons.   Final    Auto resulted.   • Extra Tube 05/25/2020 hold for add-on   Final    Auto resulted   • Extra Tube 05/25/2020 Hold for add-ons.   Final    Auto resulted.   • WBC 05/25/2020 6.04  3.40 - 10.80 10*3/mm3 Final   • RBC 05/25/2020 5.02  3.77 - 5.28 10*6/mm3 Final   • Hemoglobin 05/25/2020 14.1  12.0 - 15.9 g/dL Final   • Hematocrit 05/25/2020 41.6  34.0 - 46.6 % Final   • MCV 05/25/2020 82.9  79.0 - 97.0 fL Final   • MCH 05/25/2020 28.1  26.6 - 33.0 pg Final   • MCHC 05/25/2020 33.9  31.5 - 35.7 g/dL Final   • RDW  "05/25/2020 13.2  12.3 - 15.4 % Final   • RDW-SD 05/25/2020 39.6  37.0 - 54.0 fl Final   • MPV 05/25/2020 10.6  6.0 - 12.0 fL Final   • Platelets 05/25/2020 279  140 - 450 10*3/mm3 Final   • Neutrophil % 05/25/2020 62.0  42.7 - 76.0 % Final   • Lymphocyte % 05/25/2020 23.3  19.6 - 45.3 % Final   • Monocyte % 05/25/2020 10.3  5.0 - 12.0 % Final   • Eosinophil % 05/25/2020 3.3  0.3 - 6.2 % Final   • Basophil % 05/25/2020 0.8  0.0 - 1.5 % Final   • Immature Grans % 05/25/2020 0.3  0.0 - 0.5 % Final   • Neutrophils, Absolute 05/25/2020 3.74  1.70 - 7.00 10*3/mm3 Final   • Lymphocytes, Absolute 05/25/2020 1.41  0.70 - 3.10 10*3/mm3 Final   • Monocytes, Absolute 05/25/2020 0.62  0.10 - 0.90 10*3/mm3 Final   • Eosinophils, Absolute 05/25/2020 0.20  0.00 - 0.40 10*3/mm3 Final   • Basophils, Absolute 05/25/2020 0.05  0.00 - 0.20 10*3/mm3 Final   • Immature Grans, Absolute 05/25/2020 0.02  0.00 - 0.05 10*3/mm3 Final   • nRBC 05/25/2020 0.0  0.0 - 0.2 /100 WBC Final   • Lipase 05/25/2020 27  13 - 60 U/L Final   • Troponin T 05/25/2020 <0.010  0.000 - 0.030 ng/mL Final      No radiology results for the last 90 days.     Assessment / Plan      1. Lower abdominal pain  The patient has a history of lower abdominal pain that is associated with bowel movements for the past few years.  The pain is mild to moderate and cramping. She has constipation and then episodes of diarrhea associated with the pain. Likely secondary to irritable bowel syndrome with constipation. The patient has not had a colonoscopy in the past.  Miralax 17 grams in 8 ounces of liquid daily.  High fiber, low fat diet with liberal water intake.   Colonoscopy for screening.    The patient had been to Dr. Madrid in June 2020 for chest discomfort, but she did not complete the testing that was ordered as she had difficulty getting her \"schedule together\". Will obtain cardiac clearance for procedure.    2. Constipation, unspecified constipation type  3. Diarrhea, " unspecified type  The patient has a history of constipation for several years.  She may have one firm bowel movement every 3 to 4 days that is followed by 2-3 episodes of loose stool.  The patient occasionally has rectal bleeding. Likely secondary to irritable bowel syndrome with constipation.  Miralax 17 grams in 8 ounces of liquid daily.  Colonoscopy to rule out other pathology.    - Polyethylene Glycol powder; Take 1 cap full (17 grams) in 8 oz of noncarbonated beverage and drink once daily  Dispense: 500 g; Refill: 3    4. Rectal bleeding  The patient has a history of rectal bleeding off and on for the past few years.  It is usually a small to moderate amount on the tissue. Differentials include hemorrhoids.  Colonoscopy to rule out other pathology.    5. Anal or rectal pain  She has intermittent episodes of sharp, and often severe, rectal pain that is associated with bowel movements. Differentials include hemorrhoids.  Colonoscopy to rule out other pathology.    6. Gastroesophageal reflux disease, unspecified whether esophagitis present  Reasonable control with PPI therapy as needed.  No history of difficulty swallowing.  Continue for now.    7. Class 3 severe obesity due to excess calories with serious comorbidity and body mass index (BMI) of 40.0 to 44.9 in adult (CMS/[facility])  BMI 41.89  High fiber, low fat diet with liberal water intake.   Advised to exercise 30 minutes 3-4 days per week.  Advised to lose 20-25 pounds in the next 6-12 months.    Patient Instructions   1. Antireflux measures: Avoid fried, fatty foods, alcohol, chocolate, coffee, tea,  soft drinks, peppermint and spearmint, spicy foods, tomatoes and tomato based foods, onion based foods, and smoking. Other antireflux measures include weight reduction if overweight, avoiding tight clothing around the abdomen, elevating the head of the bed 6 inches with blocks under the head board, and don't drink or eat before going to bed and avoid lying down  immediately after meals.  2. Omeprazole 40 mg 1 po daily in the am 30 minutes before breakfast.  3. Recommended to take Levothyroxine first in the am upon waking, wait 30 minutes, then take Omeprazole 40 mg, wait 30 minutes, then eat breakfast and take other medications.  4. Miralax 17 grams in 8 ounces of noncarbonated beverage daily.  5. May take stool softeners, 1-2 per day as needed for constipation.   6. High fiber, low fat diet with liberal water intake.  7. High fiber, low fat diet with liberal water intake.   8. Advised to exercise 30 minutes 3-4 days per week.  9. Advised to lose 20-25 pounds in the next 6-12 months.  10. Colonoscopy: Description of the procedure, risks, benefits, alternatives and options, including nonoperative options, were discussed with the patient in detail. The patient understands and wishes to proceed. Will obtain cardiac clearance from Dr. Madrid and call patient to schedule.  11. COVID-19 testing prior to procedure. The patient will need to self-quarantine after testing until the procedure. Instructions given to patient.     Gamaliel Sabillon, APRN  10/8/2020    Please note that portions of this note may have been completed with a voice recognition program. Efforts were made to edit the dictations, but occasionally words are mistranscribed.

## 2020-10-15 DIAGNOSIS — M17.11 PRIMARY OSTEOARTHRITIS OF RIGHT KNEE: ICD-10-CM

## 2020-10-15 DIAGNOSIS — M25.561 RIGHT KNEE PAIN, UNSPECIFIED CHRONICITY: ICD-10-CM

## 2020-10-15 DIAGNOSIS — S89.91XA INJURY OF RIGHT KNEE, INITIAL ENCOUNTER: ICD-10-CM

## 2020-10-22 ENCOUNTER — APPOINTMENT (OUTPATIENT)
Dept: MRI IMAGING | Facility: HOSPITAL | Age: 60
End: 2020-10-22

## 2020-11-03 ENCOUNTER — HOSPITAL ENCOUNTER (EMERGENCY)
Facility: HOSPITAL | Age: 60
Discharge: HOME | End: 2020-11-03
Attending: EMERGENCY MEDICINE | Admitting: EMERGENCY MEDICINE

## 2020-11-03 ENCOUNTER — APPOINTMENT (OUTPATIENT)
Dept: GENERAL RADIOLOGY | Facility: HOSPITAL | Age: 60
End: 2020-11-03

## 2020-11-03 VITALS
WEIGHT: 255 LBS | TEMPERATURE: 99.7 F | DIASTOLIC BLOOD PRESSURE: 87 MMHG | SYSTOLIC BLOOD PRESSURE: 154 MMHG | BODY MASS INDEX: 40.98 KG/M2 | HEIGHT: 66 IN | RESPIRATION RATE: 19 BRPM | HEART RATE: 96 BPM | OXYGEN SATURATION: 97 %

## 2020-11-03 DIAGNOSIS — U07.1 COVID-19: Primary | ICD-10-CM

## 2020-11-03 LAB
ALBUMIN SERPL-MCNC: 3.9 G/DL (ref 3.5–5.2)
ALBUMIN/GLOB SERPL: 2 G/DL
ALP SERPL-CCNC: 89 U/L (ref 39–117)
ALT SERPL W P-5'-P-CCNC: 22 U/L (ref 1–33)
ANION GAP SERPL CALCULATED.3IONS-SCNC: 9.8 MMOL/L (ref 5–15)
AST SERPL-CCNC: 19 U/L (ref 1–32)
BASOPHILS # BLD AUTO: 0.02 10*3/MM3 (ref 0–0.2)
BASOPHILS NFR BLD AUTO: 0.5 % (ref 0–1.5)
BILIRUB SERPL-MCNC: 0.4 MG/DL (ref 0–1.2)
BUN SERPL-MCNC: 7 MG/DL (ref 6–20)
BUN/CREAT SERPL: 9.3 (ref 7–25)
CALCIUM SPEC-SCNC: 9.5 MG/DL (ref 8.6–10.5)
CHLORIDE SERPL-SCNC: 103 MMOL/L (ref 98–107)
CO2 SERPL-SCNC: 25.2 MMOL/L (ref 22–29)
CREAT SERPL-MCNC: 0.75 MG/DL (ref 0.57–1)
DEPRECATED RDW RBC AUTO: 40.5 FL (ref 37–54)
EOSINOPHIL # BLD AUTO: 0.04 10*3/MM3 (ref 0–0.4)
EOSINOPHIL NFR BLD AUTO: 1 % (ref 0.3–6.2)
ERYTHROCYTE [DISTWIDTH] IN BLOOD BY AUTOMATED COUNT: 13.2 % (ref 12.3–15.4)
GFR SERPL CREATININE-BSD FRML MDRD: 79 ML/MIN/1.73
GLOBULIN UR ELPH-MCNC: 2 GM/DL
GLUCOSE SERPL-MCNC: 95 MG/DL (ref 65–99)
HCT VFR BLD AUTO: 37 % (ref 34–46.6)
HGB BLD-MCNC: 12.3 G/DL (ref 12–15.9)
IMM GRANULOCYTES # BLD AUTO: 0.01 10*3/MM3 (ref 0–0.05)
IMM GRANULOCYTES NFR BLD AUTO: 0.2 % (ref 0–0.5)
LIPASE SERPL-CCNC: 21 U/L (ref 13–60)
LYMPHOCYTES # BLD AUTO: 0.6 10*3/MM3 (ref 0.7–3.1)
LYMPHOCYTES NFR BLD AUTO: 14.9 % (ref 19.6–45.3)
MCH RBC QN AUTO: 27.6 PG (ref 26.6–33)
MCHC RBC AUTO-ENTMCNC: 33.2 G/DL (ref 31.5–35.7)
MCV RBC AUTO: 83.1 FL (ref 79–97)
MONOCYTES # BLD AUTO: 0.57 10*3/MM3 (ref 0.1–0.9)
MONOCYTES NFR BLD AUTO: 14.1 % (ref 5–12)
NEUTROPHILS NFR BLD AUTO: 2.79 10*3/MM3 (ref 1.7–7)
NEUTROPHILS NFR BLD AUTO: 69.3 % (ref 42.7–76)
NRBC BLD AUTO-RTO: 0 /100 WBC (ref 0–0.2)
NT-PROBNP SERPL-MCNC: 223.2 PG/ML (ref 0–900)
PLATELET # BLD AUTO: 217 10*3/MM3 (ref 140–450)
PMV BLD AUTO: 10.2 FL (ref 6–12)
POTASSIUM SERPL-SCNC: 3.3 MMOL/L (ref 3.5–5.2)
PROT SERPL-MCNC: 5.9 G/DL (ref 6–8.5)
RBC # BLD AUTO: 4.45 10*6/MM3 (ref 3.77–5.28)
SARS-COV-2 RNA PNL SPEC NAA+PROBE: DETECTED
SODIUM SERPL-SCNC: 138 MMOL/L (ref 136–145)
TROPONIN T SERPL-MCNC: <0.01 NG/ML (ref 0–0.03)
WBC # BLD AUTO: 4.03 10*3/MM3 (ref 3.4–10.8)

## 2020-11-03 PROCEDURE — 71045 X-RAY EXAM CHEST 1 VIEW: CPT

## 2020-11-03 PROCEDURE — 25010000002 KETOROLAC TROMETHAMINE PER 15 MG: Performed by: EMERGENCY MEDICINE

## 2020-11-03 PROCEDURE — 93005 ELECTROCARDIOGRAM TRACING: CPT | Performed by: EMERGENCY MEDICINE

## 2020-11-03 PROCEDURE — 83690 ASSAY OF LIPASE: CPT | Performed by: EMERGENCY MEDICINE

## 2020-11-03 PROCEDURE — 87635 SARS-COV-2 COVID-19 AMP PRB: CPT

## 2020-11-03 PROCEDURE — 99283 EMERGENCY DEPT VISIT LOW MDM: CPT

## 2020-11-03 PROCEDURE — 84484 ASSAY OF TROPONIN QUANT: CPT | Performed by: EMERGENCY MEDICINE

## 2020-11-03 PROCEDURE — 80053 COMPREHEN METABOLIC PANEL: CPT | Performed by: EMERGENCY MEDICINE

## 2020-11-03 PROCEDURE — 85025 COMPLETE CBC W/AUTO DIFF WBC: CPT | Performed by: EMERGENCY MEDICINE

## 2020-11-03 PROCEDURE — 96374 THER/PROPH/DIAG INJ IV PUSH: CPT

## 2020-11-03 PROCEDURE — 83880 ASSAY OF NATRIURETIC PEPTIDE: CPT | Performed by: EMERGENCY MEDICINE

## 2020-11-03 RX ORDER — AZITHROMYCIN 250 MG/1
250 TABLET, FILM COATED ORAL DAILY
Qty: 6 TABLET | Refills: 0 | Status: SHIPPED | OUTPATIENT
Start: 2020-11-03 | End: 2021-01-07

## 2020-11-03 RX ORDER — BENZONATATE 100 MG/1
100 CAPSULE ORAL 3 TIMES DAILY PRN
Qty: 20 CAPSULE | Refills: 0 | Status: SHIPPED | OUTPATIENT
Start: 2020-11-03 | End: 2021-01-07

## 2020-11-03 RX ORDER — KETOROLAC TROMETHAMINE 30 MG/ML
30 INJECTION, SOLUTION INTRAMUSCULAR; INTRAVENOUS ONCE
Status: COMPLETED | OUTPATIENT
Start: 2020-11-03 | End: 2020-11-03

## 2020-11-03 RX ORDER — ACETAMINOPHEN 500 MG
1000 TABLET ORAL ONCE
Status: COMPLETED | OUTPATIENT
Start: 2020-11-03 | End: 2020-11-03

## 2020-11-03 RX ADMIN — SODIUM CHLORIDE 1000 ML: 9 INJECTION, SOLUTION INTRAVENOUS at 17:08

## 2020-11-03 RX ADMIN — KETOROLAC TROMETHAMINE 30 MG: 30 INJECTION, SOLUTION INTRAMUSCULAR; INTRAVENOUS at 17:07

## 2020-11-03 RX ADMIN — ACETAMINOPHEN 1000 MG: 500 TABLET, FILM COATED ORAL at 17:07

## 2020-11-03 RX ADMIN — HYDROCODONE POLISTIREX AND CHLORPHENIRAMINE POLISTIREX 5 ML: 10; 8 SUSPENSION, EXTENDED RELEASE ORAL at 17:08

## 2020-11-03 NOTE — ED PROVIDER NOTES
Subjective   60-year-old female presenting with multiple complaints.  She states that all day today she has had nonproductive cough, fever, body aches, headache, shortness of breath, chest pain.  She notes she lives with her son who was recently diagnosed with COVID-19.  She denies any abdominal pain, vomiting or diarrhea.  She is also being seen here with her 85-year-old mother who has similar symptoms.          Review of Systems   Constitutional: Positive for fatigue and fever.   Eyes: Negative.    Respiratory: Positive for cough and shortness of breath.    Cardiovascular: Positive for chest pain.   Gastrointestinal: Negative.    Genitourinary: Negative.    Musculoskeletal: Positive for arthralgias.   Skin: Negative.    Neurological: Positive for headaches. Negative for weakness and numbness.   Psychiatric/Behavioral: Negative.        Past Medical History:   Diagnosis Date   • Anxiety    • Arthritis    • Asthma    • Back pain    • Cancer (CMS/HCC)    • Chronic bronchitis (CMS/HCC)    • Chronic headaches    • Depression    • Diabetes mellitus (CMS/HCC)     type 2   • Fibromyalgia    • High triglycerides    • History of sinus problem     as child   • Hyperlipidemia    • Hypertension    • Hyperthyroidism 1980   • Hypothyroidism 1990   • Migraine    • MVP (mitral valve prolapse)    • OA (osteoarthritis)    • Peripheral autonomic neuropathy    • Pleurisy    • Pneumonia    • Rheumatism    • RLS (restless legs syndrome)    • Skin cancer     upper lip   • Sleep apnea    • Thyroid disease        Allergies   Allergen Reactions   • Penicillins Angioedema   • Chlorhexidine Rash   • Darvon [Propoxyphene] Unknown (See Comments)     Doesn't remember   • Sulfa Antibiotics Rash       Past Surgical History:   Procedure Laterality Date   • APPENDECTOMY  1970's   • CATARACT EXTRACTION W/ INTRAOCULAR LENS IMPLANT Right 9/16/2019    Procedure: CATARACT PHACO EXTRACTION WITH INTRAOCULAR LENS IMPLANT RIGHT;  Surgeon: Carl Babb,  MD;  Location: [facility] OR;  Service: Ophthalmology   • CATARACT EXTRACTION W/ INTRAOCULAR LENS IMPLANT Left 10/7/2019    Procedure: CATARACT PHACO EXTRACTION WITH INTRAOCULAR LENS IMPLANT LEFT;  Surgeon: Carl Babb MD;  Location: [facility];  Service: Ophthalmology   •  SECTION  , 1997    x 2    • DILATATION AND CURETTAGE     • HYSTERECTOMY      ''still have one ovary''   • THYROIDECTOMY     • TONSILLECTOMY AND ADENOIDECTOMY  1965       Family History   Problem Relation Age of Onset   • Hypertension Mother    • Heart attack Mother    • Fibromyalgia Mother    • Hyperlipidemia Mother    • Thyroid disease Mother    • Irritable bowel syndrome Mother    • Heart attack Father    • Cancer Brother    • Stomach cancer Brother    • Diabetes Maternal Aunt    • Cancer Maternal Grandmother    • Thyroid disease Maternal Grandmother    • Cancer Paternal Grandmother    • Colon cancer Neg Hx    • Cirrhosis Neg Hx    • Liver cancer Neg Hx    • Liver disease Neg Hx        Social History     Socioeconomic History   • Marital status:      Spouse name: Not on file   • Number of children: Not on file   • Years of education: Not on file   • Highest education level: Not on file   Tobacco Use   • Smoking status: Former Smoker     Packs/day: 0.25     Years: 15.00     Pack years: 3.75     Types: Cigarettes     Start date:      Quit date: 2004     Years since quittin.2   • Smokeless tobacco: Never Used   • Tobacco comment: off and on smoker   Substance and Sexual Activity   • Alcohol use: No   • Drug use: No   • Sexual activity: Defer           Objective   Physical Exam  Constitutional:       General: She is not in acute distress.     Appearance: Normal appearance. She is not ill-appearing, toxic-appearing or diaphoretic.   HENT:      Head: Normocephalic and atraumatic.      Right Ear: External ear normal.      Left Ear: External ear normal.      Nose: Nose normal.      Mouth/Throat:      Mouth:  Mucous membranes are moist.      Pharynx: Oropharynx is clear.   Eyes:      Extraocular Movements: Extraocular movements intact.      Conjunctiva/sclera: Conjunctivae normal.      Pupils: Pupils are equal, round, and reactive to light.   Neck:      Musculoskeletal: Normal range of motion.   Cardiovascular:      Rate and Rhythm: Normal rate and regular rhythm.      Pulses: Normal pulses.      Heart sounds: Normal heart sounds.   Pulmonary:      Effort: Pulmonary effort is normal. No respiratory distress.      Breath sounds: Normal breath sounds.   Abdominal:      General: Bowel sounds are normal. There is no distension.      Tenderness: There is no abdominal tenderness.   Musculoskeletal: Normal range of motion.         General: No swelling, tenderness or deformity.   Skin:     General: Skin is warm and dry.      Capillary Refill: Capillary refill takes less than 2 seconds.      Findings: No rash.   Neurological:      General: No focal deficit present.      Mental Status: She is alert and oriented to person, place, and time.   Psychiatric:         Mood and Affect: Mood normal.         Behavior: Behavior normal.         Procedures           ED Course                                           MDM  Number of Diagnoses or Management Options  COVID-19:   Diagnosis management comments: 60-year-old female with multiple complaints.  Well-developed well-nourished lady in no distress with exam as above.  She has normal vital signs here save for a fever.  Her oxygen saturation is normal.  Her lungs are clear.  Will obtain labs, EKG, chest x-ray and COVID-19 swab.  We will give symptomatic treatment.  Disposition pending.    DDx: COVID-19, pneumonia, ACS, CHF    EKG interpreted by me: Sinus rhythm, normal rate, no acute ST/T changes, low-voltage QRS complexes, this is an atypical EKG    She is feeling better, resting comfortably.  COVID-19 is positive, otherwise her work-up is fairly unremarkable.  I do feel she is safe for  discharge home.  Encourage supportive measures and outpatient follow-up.  She is happy with this plan.       Amount and/or Complexity of Data Reviewed  Clinical lab tests: reviewed        Final diagnoses:   COVID-19            Morris Gardner MD  11/03/20 3137

## 2020-11-04 ENCOUNTER — PATIENT OUTREACH (OUTPATIENT)
Dept: CASE MANAGEMENT | Facility: OTHER | Age: 60
End: 2020-11-04

## 2020-11-04 ENCOUNTER — EPISODE CHANGES (OUTPATIENT)
Dept: CASE MANAGEMENT | Facility: OTHER | Age: 60
End: 2020-11-04

## 2020-11-04 NOTE — OUTREACH NOTE
ED Potential Covid Discharge Follow-up    Incoming call from patient.  Patient had ED visit at [facility] 11/03/20.  Patient presented with c/o flu-like symptoms.  Diagnosis description: COVID-19.  Patient was treated and discharged to home to follow with PCP. Patient lives with mother and son who are COVID+ as well.  Discharge Medications include Azithromycin and Tessalon.  AVS, discharge medicationss, education, recommended quarantine and f/u reviewed with patient.  Patient v/u and ability to drive self to appointments as needed.  Patient denied shortness of breath and fever.  Education on home delivery services and medical visits via telehealth provided.  RN-ACM answered patient's questions relative to the variety of medications and treatment protocols used with COVID-19 patients. Patient has declined MyChart and assistance in scheduling f/u.  [facility] 24/7 Nurse Call Line and RN-ACM contact information provided.  Patient encouraged to call as needed.     Nicky Dueñas RN  Ambulatory     11/4/2020, 14:10 EST

## 2020-11-16 ENCOUNTER — TRANSCRIBE ORDERS (OUTPATIENT)
Dept: LAB | Facility: HOSPITAL | Age: 60
End: 2020-11-16

## 2020-11-16 ENCOUNTER — LAB (OUTPATIENT)
Dept: LAB | Facility: HOSPITAL | Age: 60
End: 2020-11-16

## 2020-11-16 DIAGNOSIS — Z01.818 PRE-OP TESTING: Primary | ICD-10-CM

## 2020-11-16 DIAGNOSIS — Z01.818 PRE-OP TESTING: ICD-10-CM

## 2020-11-16 PROCEDURE — C9803 HOPD COVID-19 SPEC COLLECT: HCPCS

## 2020-11-16 PROCEDURE — U0004 COV-19 TEST NON-CDC HGH THRU: HCPCS

## 2020-11-17 LAB — SARS-COV-2 RNA RESP QL NAA+PROBE: DETECTED

## 2020-11-19 ENCOUNTER — OFFICE VISIT (OUTPATIENT)
Dept: ORTHOPEDIC SURGERY | Facility: CLINIC | Age: 60
End: 2020-11-19

## 2020-11-19 ENCOUNTER — TELEPHONE (OUTPATIENT)
Dept: ORTHOPEDIC SURGERY | Facility: CLINIC | Age: 60
End: 2020-11-19

## 2020-11-19 VITALS — OXYGEN SATURATION: 99 % | BODY MASS INDEX: 41.1 KG/M2 | HEART RATE: 83 BPM | HEIGHT: 66 IN | WEIGHT: 255.73 LBS

## 2020-11-19 DIAGNOSIS — M25.561 RIGHT KNEE PAIN, UNSPECIFIED CHRONICITY: Primary | ICD-10-CM

## 2020-11-19 DIAGNOSIS — M17.11 PRIMARY OSTEOARTHRITIS OF RIGHT KNEE: ICD-10-CM

## 2020-11-19 DIAGNOSIS — S83.241D TEAR OF MEDIAL MENISCUS OF RIGHT KNEE, CURRENT, UNSPECIFIED TEAR TYPE, SUBSEQUENT ENCOUNTER: ICD-10-CM

## 2020-11-19 PROCEDURE — 99442 PR PHYS/QHP TELEPHONE EVALUATION 11-20 MIN: CPT | Performed by: ORTHOPAEDIC SURGERY

## 2020-11-19 NOTE — PROGRESS NOTES
"    [facility] [place] [facility] [place]        Subjective     CC: Follow-up (MRI (10/14/20) follow up; Right knee pain)      HPI    Janet Obrien is a 60 y.o. female. ***     Overall, patient's symptoms are ***    ROS:    Constiutional:Pt denies fever, chills, nausea, or vomiting.  MSK:as above        Objective      Past Medical History  Past Medical History:   Diagnosis Date   • Anxiety    • Arthritis    • Asthma    • Back pain    • Cancer (CMS/HCC)    • Chronic bronchitis (CMS/HCC)    • Chronic headaches    • Depression    • Diabetes mellitus (CMS/HCC)     type 2   • Fibromyalgia    • High triglycerides    • History of sinus problem     as child   • Hyperlipidemia    • Hypertension    • Hyperthyroidism 1980   • Hypothyroidism 1990   • Migraine    • MVP (mitral valve prolapse)    • OA (osteoarthritis)    • Peripheral autonomic neuropathy    • Pleurisy    • Pneumonia    • Rheumatism    • RLS (restless legs syndrome)    • Skin cancer     upper lip   • Sleep apnea    • Thyroid disease          Physical Exam  Pulse 83   Ht 167.6 cm (65.98\")   Wt 116 kg (255 lb 11.7 oz)   SpO2 99%   BMI 41.30 kg/m²     Body mass index is 41.3 kg/m².    Patient is well nourished and well developed.        Ortho Exam  ***    Imaging/Labs/EMG Reviewed:  Imaging Results (Last 24 Hours)     ** No results found for the last 24 hours. **            Assessment    Assessment:  No diagnosis found.    Plan:  1. Recommend over the counter anti-inflammatories for pain and/or swelling  2. ***      Ledy Butt MA  11/19/20  09:19 SCOTT Gil disclaimer:  Much of this encounter note is an electronic transcription/translation of spoken language to printed text. The electronic translation of spoken language may permit erroneous, or at times, nonsensical words or phrases to be inadvertently transcribed; Although I have reviewed the note for such errors, some may still exist.  "

## 2020-11-19 NOTE — PROGRESS NOTES
[facility] [place] [place] [facility] Surgery Telephone/Video Visit Note        Subjective     CC: Follow-up (MRI (10/14/20) follow up; Right knee pain)      HPI    Janet Obrien is a 60 y.o. female.  Patient is contacted via telephone today for follow-up of her right knee pain.  This continues to bother her.  Patient tells me that there is no swelling in the anterior part of the knee.  No erythema or redness or increased warmth.  She continues to complain of pain.    You have chosen to receive care through a telehealth visit.  Do you consent to use a video/audio connection for your medical care today? Yes     ROS:    Constiutional:Pt denies fever, chills, nausea, or vomiting.  MSK:as above        Objective      Past Medical History  Past Medical History:   Diagnosis Date   • Anxiety    • Arthritis    • Asthma    • Back pain    • Cancer (CMS/HCC)    • Chronic bronchitis (CMS/HCC)    • Chronic headaches    • Depression    • Diabetes mellitus (CMS/HCC)     type 2   • Fibromyalgia    • High triglycerides    • History of sinus problem     as child   • Hyperlipidemia    • Hypertension    • Hyperthyroidism 1980   • Hypothyroidism 1990   • Migraine    • MVP (mitral valve prolapse)    • OA (osteoarthritis)    • Peripheral autonomic neuropathy    • Pleurisy    • Pneumonia    • Rheumatism    • RLS (restless legs syndrome)    • Skin cancer     upper lip   • Sleep apnea    • Thyroid disease          Telephone/video visit Notes:  Patient showed up in the office today and after checking her laboratory status, is found that she is Covid positive.  She was escorted out of the office safely and her room sanitized.    We reviewed images and report of the MRI of her right knee and this showed edema in the medial compartment especially in the tibial plateau.  Furthermore, there was a tear of the posterior horn of the medial meniscus.  The radiologist has said that there is fluid signal tracking along the IT band which is likely inflammatory and  there is a larger fluid collection along the prepatellar soft tissues which is suspected to be prepatellar bursitis or hematoma formation.  Correlation for a possible infectious process is recommended.        Assessment    Assessment:  1. Right knee pain, unspecified chronicity    2. Primary osteoarthritis of right knee    3. Tear of medial meniscus of right knee, current, unspecified tear type, subsequent encounter        Plan:  1. Recommend over the counter anti-inflammatories for pain and/or swelling  Right knee pain with a medial meniscal tear and early osteoarthritis in the medial compartment of the knee--patient will be brought back in when she is Covid negative.  We will discussed treatment options moving forward including arthroscopy.      A total of 12 minutes was spent before, during, and after the visit for patient care, counseling and care coordination.    Dayo Chavez MD  11/19/20  13:30 EST

## 2020-11-19 NOTE — TELEPHONE ENCOUNTER
Notified by VANESSA this patient was in a room already when discovered she had a positive COVID result in chart. I went to the room and SW patient. She stated she was told her results from 11/16 was negative. I called the lab at  to confirm and was told it was positive. I went back to inform the patient. I escorted her out our back door so she wasn't taken through the front lobby. She asked me to stay with her while she contacted her PCP and I did. They did indicated to her she could have a positive for up to 90 days. I let her know we would have Kathy call her cell phone to complete a telephone visit for today so she could get her MRI results. She understood and agreed. Kindra KIMBLE and Robyn TOMAS notified.

## 2020-12-09 ENCOUNTER — LAB (OUTPATIENT)
Dept: LAB | Facility: HOSPITAL | Age: 60
End: 2020-12-09

## 2020-12-09 ENCOUNTER — TRANSCRIBE ORDERS (OUTPATIENT)
Dept: LAB | Facility: HOSPITAL | Age: 60
End: 2020-12-09

## 2020-12-09 DIAGNOSIS — Z01.818 PRE-OP TESTING: ICD-10-CM

## 2020-12-09 DIAGNOSIS — Z01.818 PRE-OP TESTING: Primary | ICD-10-CM

## 2020-12-09 PROCEDURE — U0004 COV-19 TEST NON-CDC HGH THRU: HCPCS

## 2020-12-09 PROCEDURE — C9803 HOPD COVID-19 SPEC COLLECT: HCPCS

## 2020-12-10 LAB — SARS-COV-2 RNA RESP QL NAA+PROBE: NORMAL

## 2020-12-16 PROCEDURE — 99283 EMERGENCY DEPT VISIT LOW MDM: CPT

## 2020-12-17 ENCOUNTER — HOSPITAL ENCOUNTER (EMERGENCY)
Facility: HOSPITAL | Age: 60
Discharge: HOME | End: 2020-12-17
Attending: EMERGENCY MEDICINE | Admitting: EMERGENCY MEDICINE

## 2020-12-17 ENCOUNTER — APPOINTMENT (OUTPATIENT)
Dept: CT IMAGING | Facility: HOSPITAL | Age: 60
End: 2020-12-17

## 2020-12-17 VITALS
SYSTOLIC BLOOD PRESSURE: 142 MMHG | HEIGHT: 66 IN | RESPIRATION RATE: 18 BRPM | BODY MASS INDEX: 40.98 KG/M2 | HEART RATE: 67 BPM | WEIGHT: 255 LBS | OXYGEN SATURATION: 99 % | TEMPERATURE: 98.1 F | DIASTOLIC BLOOD PRESSURE: 87 MMHG

## 2020-12-17 DIAGNOSIS — N39.0 URINARY TRACT INFECTION WITHOUT HEMATURIA, SITE UNSPECIFIED: ICD-10-CM

## 2020-12-17 DIAGNOSIS — M54.9 MUSCULOSKELETAL BACK PAIN: Primary | ICD-10-CM

## 2020-12-17 LAB
BACTERIA UR QL AUTO: ABNORMAL /HPF
BILIRUB UR QL STRIP: NEGATIVE
CLARITY UR: CLEAR
COLOR UR: YELLOW
GLUCOSE UR STRIP-MCNC: NEGATIVE MG/DL
HGB UR QL STRIP.AUTO: ABNORMAL
HOLD SPECIMEN: NORMAL
HOLD SPECIMEN: NORMAL
HYALINE CASTS UR QL AUTO: ABNORMAL /LPF
KETONES UR QL STRIP: NEGATIVE
LEUKOCYTE ESTERASE UR QL STRIP.AUTO: ABNORMAL
NITRITE UR QL STRIP: NEGATIVE
PH UR STRIP.AUTO: 6.5 [PH] (ref 5–8)
PROT UR QL STRIP: NEGATIVE
RBC # UR: ABNORMAL /HPF
REF LAB TEST METHOD: ABNORMAL
SP GR UR STRIP: 1.02 (ref 1–1.03)
SQUAMOUS #/AREA URNS HPF: ABNORMAL /HPF
UROBILINOGEN UR QL STRIP: ABNORMAL
WBC UR QL AUTO: ABNORMAL /HPF
WHOLE BLOOD HOLD SPECIMEN: NORMAL
WHOLE BLOOD HOLD SPECIMEN: NORMAL

## 2020-12-17 PROCEDURE — 81001 URINALYSIS AUTO W/SCOPE: CPT | Performed by: EMERGENCY MEDICINE

## 2020-12-17 PROCEDURE — 87086 URINE CULTURE/COLONY COUNT: CPT | Performed by: EMERGENCY MEDICINE

## 2020-12-17 PROCEDURE — 96374 THER/PROPH/DIAG INJ IV PUSH: CPT

## 2020-12-17 PROCEDURE — 25010000002 KETOROLAC TROMETHAMINE PER 15 MG: Performed by: EMERGENCY MEDICINE

## 2020-12-17 PROCEDURE — 74176 CT ABD & PELVIS W/O CONTRAST: CPT

## 2020-12-17 PROCEDURE — 25010000002 MORPHINE PER 10 MG: Performed by: EMERGENCY MEDICINE

## 2020-12-17 PROCEDURE — 96376 TX/PRO/DX INJ SAME DRUG ADON: CPT

## 2020-12-17 PROCEDURE — 25010000002 ORPHENADRINE CITRATE PER 60 MG: Performed by: EMERGENCY MEDICINE

## 2020-12-17 PROCEDURE — 96375 TX/PRO/DX INJ NEW DRUG ADDON: CPT

## 2020-12-17 RX ORDER — NITROFURANTOIN 25; 75 MG/1; MG/1
100 CAPSULE ORAL ONCE
Status: COMPLETED | OUTPATIENT
Start: 2020-12-17 | End: 2020-12-17

## 2020-12-17 RX ORDER — KETOROLAC TROMETHAMINE 30 MG/ML
30 INJECTION, SOLUTION INTRAMUSCULAR; INTRAVENOUS ONCE
Status: COMPLETED | OUTPATIENT
Start: 2020-12-17 | End: 2020-12-17

## 2020-12-17 RX ORDER — MORPHINE SULFATE 4 MG/ML
4 INJECTION, SOLUTION INTRAMUSCULAR; INTRAVENOUS ONCE
Status: COMPLETED | OUTPATIENT
Start: 2020-12-17 | End: 2020-12-17

## 2020-12-17 RX ORDER — ORPHENADRINE CITRATE 30 MG/ML
60 INJECTION INTRAMUSCULAR; INTRAVENOUS ONCE
Status: COMPLETED | OUTPATIENT
Start: 2020-12-17 | End: 2020-12-17

## 2020-12-17 RX ORDER — CYCLOBENZAPRINE HCL 10 MG
10 TABLET ORAL 3 TIMES DAILY PRN
Qty: 15 TABLET | Refills: 0 | Status: SHIPPED | OUTPATIENT
Start: 2020-12-17 | End: 2021-02-06

## 2020-12-17 RX ORDER — SODIUM CHLORIDE 0.9 % (FLUSH) 0.9 %
10 SYRINGE (ML) INJECTION AS NEEDED
Status: DISCONTINUED | OUTPATIENT
Start: 2020-12-17 | End: 2020-12-17 | Stop reason: HOSPADM

## 2020-12-17 RX ORDER — NITROFURANTOIN 25; 75 MG/1; MG/1
100 CAPSULE ORAL 2 TIMES DAILY
Qty: 14 CAPSULE | Refills: 0 | Status: SHIPPED | OUTPATIENT
Start: 2020-12-17 | End: 2021-01-07

## 2020-12-17 RX ADMIN — LIDOCAINE HYDROCHLORIDE 150 MG: 10 INJECTION, SOLUTION INFILTRATION; PERINEURAL at 02:28

## 2020-12-17 RX ADMIN — MORPHINE SULFATE 4 MG: 4 INJECTION, SOLUTION INTRAMUSCULAR; INTRAVENOUS at 05:22

## 2020-12-17 RX ADMIN — ORPHENADRINE CITRATE 60 MG: 30 INJECTION INTRAMUSCULAR; INTRAVENOUS at 01:01

## 2020-12-17 RX ADMIN — NITROFURANTOIN MONOHYDRATE/MACROCRYSTALLINE 100 MG: 25; 75 CAPSULE ORAL at 05:22

## 2020-12-17 RX ADMIN — KETOROLAC TROMETHAMINE 30 MG: 30 INJECTION, SOLUTION INTRAMUSCULAR at 01:01

## 2020-12-17 RX ADMIN — MORPHINE SULFATE 4 MG: 4 INJECTION, SOLUTION INTRAMUSCULAR; INTRAVENOUS at 01:39

## 2020-12-17 NOTE — ED PROVIDER NOTES
Subjective   History of Present Illness    Chief Complaint: Back pain  History of Present Illness: 60-year-old female with back pain rating to her bilateral flank x4 days.  Covid 6 weeks ago.  No history of kidney stones no fever no chills no vomiting.  Took pain medication at home without relief.  Onset: 4 days  Duration: Persistent  Exacerbating / Alleviating factors: Worse with movement and deep breathing  Associated symptoms: None      Nurses Notes reviewed and agree, including vitals, allergies, social history and prior medical history.     REVIEW OF SYSTEMS: All systems reviewed and not pertinent unless noted.    Positive for: Flank pain    Negative for: Fever chills vomiting abdominal pain gross hematuria diarrhea GI bleeding  Review of Systems    Past Medical History:   Diagnosis Date   • Anxiety    • Arthritis    • Asthma    • Back pain    • Cancer (CMS/HCC)    • Chronic bronchitis (CMS/HCC)    • Chronic headaches    • Depression    • Diabetes mellitus (CMS/HCC)     type 2   • Fibromyalgia    • High triglycerides    • History of sinus problem     as child   • Hyperlipidemia    • Hypertension    • Hyperthyroidism 1980   • Hypothyroidism 1990   • Migraine    • MVP (mitral valve prolapse)    • OA (osteoarthritis)    • Peripheral autonomic neuropathy    • Pleurisy    • Pneumonia    • Rheumatism    • RLS (restless legs syndrome)    • Skin cancer     upper lip   • Sleep apnea    • Thyroid disease        Allergies   Allergen Reactions   • Penicillins Angioedema   • Chlorhexidine Rash   • Darvon [Propoxyphene] Unknown (See Comments)     Doesn't remember   • Sulfa Antibiotics Rash       Past Surgical History:   Procedure Laterality Date   • APPENDECTOMY  1970's   • CATARACT EXTRACTION W/ INTRAOCULAR LENS IMPLANT Right 9/16/2019    Procedure: CATARACT PHACO EXTRACTION WITH INTRAOCULAR LENS IMPLANT RIGHT;  Surgeon: Carl Babb MD;  Location: [facility];  Service: Ophthalmology   • CATARACT EXTRACTION W/  INTRAOCULAR LENS IMPLANT Left 10/7/2019    Procedure: CATARACT PHACO EXTRACTION WITH INTRAOCULAR LENS IMPLANT LEFT;  Surgeon: Carl Babb MD;  Location: [facility];  Service: Ophthalmology   •  SECTION  , 1997    x 2    • DILATATION AND CURETTAGE     • HYSTERECTOMY      ''still have one ovary''   • THYROIDECTOMY     • TONSILLECTOMY AND ADENOIDECTOMY         Family History   Problem Relation Age of Onset   • Hypertension Mother    • Heart attack Mother    • Fibromyalgia Mother    • Hyperlipidemia Mother    • Thyroid disease Mother    • Irritable bowel syndrome Mother    • Heart attack Father    • Cancer Brother    • Stomach cancer Brother    • Diabetes Maternal Aunt    • Cancer Maternal Grandmother    • Thyroid disease Maternal Grandmother    • Cancer Paternal Grandmother    • Colon cancer Neg Hx    • Cirrhosis Neg Hx    • Liver cancer Neg Hx    • Liver disease Neg Hx        Social History     Socioeconomic History   • Marital status:      Spouse name: Not on file   • Number of children: Not on file   • Years of education: Not on file   • Highest education level: Not on file   Tobacco Use   • Smoking status: Former Smoker     Packs/day: 0.25     Years: 15.00     Pack years: 3.75     Types: Cigarettes     Start date:      Quit date: 2004     Years since quittin.4   • Smokeless tobacco: Never Used   • Tobacco comment: off and on smoker   Substance and Sexual Activity   • Alcohol use: No   • Drug use: No   • Sexual activity: Defer           Objective   Physical Exam    GENERAL APPEARANCE: Well developed, obese 60-year-old white female,  in moderate discomfort.  VITAL SIGNS: per nursing, reviewed and noted  SKIN: exposed skin with no rashes, ulcerations or petechiae.  Head: Normocephalic, atraumatic.   EYES: perrla. EOMI.  ENT: Normal voice.  Patient maintained wearing a mask throughout patient encounter due to coronavirus pandemic  LUNGS:  No increased work of  breathing. No retractions.   CARDIOVASCULAR:  regular rate and rhythm, no murmurs.  Good Peripheral pulses. Good cap refill to extremities.   ABDOMEN: Soft, nontender, normal bowel sounds. No hernia. No ascites.  MUSCULOSKELETAL:  No tenderness. Full ROM. Strength and tone normal.  NEUROLOGIC: Alert, oriented x 3. No gross deficits. GCS 15.   NECK: Supple, symmetric. No tenderness, no masses. Full ROM  Back: full rom, no paraspinal spasm.  Right greater than left CVA tenderness.   PSYCH: appropriate affect.  : no bladder tenderness or distention, right greater than left CVA tenderness      Procedures     No attending physician procedures were performed on this patient.      ED Course  ED Course as of Dec 17 2300   Thu Dec 17, 2020   0200 Leukocytes, UA(!): Moderate (2+) [PF]   0200 RBC, UA(!): 3-5 [PF]   0200 WBC, UA(!): 6-12 [PF]   0200 Bacteria, UA(!): Trace [PF]   0200 Squamous Epithelial Cells, UA: 0-2 [PF]   0200 Hyaline Casts, UA: 0-2 [PF]   0228 Blood, UA(!): Small (1+) [PF]   0229 Leukocytes, UA(!): Moderate (2+) [PF]      ED Course User Index  [PF] Waqas Moore DO      Patient received ER interventions of Toradol Norflex without significant improvement, added morphine without any improvement.  Added lidocaine  Infusion.  Work-up suggestive of mild UTI given trace bacteria 612 white cells moderate leukocytes and small blood per urine dip.  We will add CT scanning stone protocol.                                MDM   negative CT scan for any acute intra-abdominal process.  Will add Macrobid and Flexeril, continue home medications.  Her examination is more suggestive of musculoskeletal pain is worse with movement.  No suspicion for pyelonephritis.  Discharge patient home in stable condition with outpatient follow-up and return precautions were discussed.  Final diagnoses:   Musculoskeletal back pain   Urinary tract infection without hematuria, site unspecified            Waqas Moore DO  12/17/20  [id number]

## 2020-12-18 LAB — BACTERIA SPEC AEROBE CULT: NORMAL

## 2021-01-07 ENCOUNTER — OFFICE VISIT (OUTPATIENT)
Dept: ENDOCRINOLOGY | Facility: CLINIC | Age: 61
End: 2021-01-07

## 2021-01-07 ENCOUNTER — TELEPHONE (OUTPATIENT)
Dept: ENDOCRINOLOGY | Facility: CLINIC | Age: 61
End: 2021-01-07

## 2021-01-07 VITALS
TEMPERATURE: 97.8 F | OXYGEN SATURATION: 96 % | DIASTOLIC BLOOD PRESSURE: 84 MMHG | SYSTOLIC BLOOD PRESSURE: 134 MMHG | HEIGHT: 66 IN | WEIGHT: 249 LBS | BODY MASS INDEX: 40.02 KG/M2 | HEART RATE: 72 BPM

## 2021-01-07 DIAGNOSIS — E11.9 TYPE 2 DIABETES MELLITUS WITHOUT COMPLICATION, WITHOUT LONG-TERM CURRENT USE OF INSULIN (HCC): Primary | ICD-10-CM

## 2021-01-07 DIAGNOSIS — E89.0 POSTOPERATIVE HYPOTHYROIDISM: ICD-10-CM

## 2021-01-07 LAB
EXPIRATION DATE: NORMAL
EXPIRATION DATE: NORMAL
GLUCOSE BLDC GLUCOMTR-MCNC: 77 MG/DL (ref 70–130)
HBA1C MFR BLD: 5.3 %
Lab: NORMAL
Lab: NORMAL

## 2021-01-07 PROCEDURE — 82947 ASSAY GLUCOSE BLOOD QUANT: CPT | Performed by: INTERNAL MEDICINE

## 2021-01-07 PROCEDURE — 99213 OFFICE O/P EST LOW 20 MIN: CPT | Performed by: INTERNAL MEDICINE

## 2021-01-07 PROCEDURE — 83036 HEMOGLOBIN GLYCOSYLATED A1C: CPT | Performed by: INTERNAL MEDICINE

## 2021-01-07 RX ORDER — PHENTERMINE HYDROCHLORIDE 37.5 MG/1
37.5 TABLET ORAL DAILY
COMMUNITY
Start: 2020-12-04

## 2021-01-07 NOTE — PROGRESS NOTES
Subjective:   Diabetes (Follow Up) and Hypothyroidism        Janet Obrien is a 60 y.o. female who is being seen for follow-up   Postsurgical hypothyroidism dx in 1987. She had Graves disease with opthalmopathy and proptosis, had thyroidectomy in 1987. She also underwent IV steroid treatments at some point  She is currently taking levothyroxine 175 mcg, has a lots of the symptoms: fatigue, dizziness, depression, eye double vision and swelling, arthralgias, weight gain.  She has large fluctuations of the hormones.   After change to the brand name synthroid her sx improved. She is taking 200 mcg daily now    Diabetes Mellitus type 2:   Diabetic complications: peripheral neuropathy, frequent yeast infections.   Eye exam current (within one year): uptodate.    Current diabetic medications include Ozempic started 1/2019 Tolerated it well, no side effects.    Past medications: metformin - not effective.     She had COVID in Nov 2020, doing well now. She has lost weight and tries to eat healthy .  Recent thyroid test was normal.           Review of Systems  Review of Systems   Constitutional: Positive for fatigue.   Eyes: Positive for pain, redness and visual disturbance.   Cardiovascular: Positive for leg swelling.   Genitourinary: Positive for pelvic pain.   Musculoskeletal: Positive for arthralgias, back pain, joint swelling and myalgias.   Skin: Positive for dry skin.   Neurological: Positive for weakness, headache and memory problem.   Psychiatric/Behavioral: Positive for sleep disturbance and depressed mood. The patient is nervous/anxious.    All other systems reviewed and are negative.    Current medications:  Current Outpatient Medications   Medication Sig Dispense Refill   • atorvastatin (LIPITOR) 80 MG tablet Take 80 mg by mouth Daily.     • buPROPion SR (WELLBUTRIN SR) 150 MG 12 hr tablet Take 150 mg by mouth.     • Calcium Carbonate-Vit D-Min (CALCIUM 1200 PO) Take  by mouth.     • celecoxib (CeleBREX)  200 MG capsule Take 200 mg by mouth Daily.     • Cyanocobalamin (VITAMIN B 12 PO) Take  by mouth.     • cyclobenzaprine (FLEXERIL) 10 MG tablet Take 1 tablet by mouth 3 (Three) Times a Day As Needed for Muscle Spasms. 15 tablet 0   • docusate sodium (COLACE) 250 MG capsule Take 250 mg by mouth 2 (two) times a day.     • DULoxetine (CYMBALTA) 60 MG capsule Take 60 mg by mouth Daily.     • estrogens, conjugated, (PREMARIN) 0.45 MG tablet Take 0.5 mg by mouth Daily. Take daily for 21 days then do not take for 7 days.     • FLUoxetine (PROzac) 20 MG capsule Take 20 mg by mouth Daily As Needed.     • hydrochlorothiazide (HYDRODIURIL) 25 MG tablet Take 1 tablet by mouth daily.     • Lancets (ONETOUCH DELICA PLUS [id number]) misc 1 each 3 (Three) Times a Day. 100 each 11   • loratadine (CLARITIN) 10 MG tablet Take 10 mg by mouth Daily.     • omeprazole (priLOSEC) 40 MG capsule Take 40 mg by mouth Daily. As needed     • ONETOUCH VERIO test strip Test Three Times Daily 100 each 12   • oxyCODONE-acetaminophen (PERCOCET)  MG per tablet      • Polyethylene Glycol powder Take 1 cap full (17 grams) in 8 oz of noncarbonated beverage and drink once daily 500 g 3   • Semaglutide,0.25 or 0.5MG/DOS, (Ozempic, 0.25 or 0.5 MG/DOSE,) 2 MG/1.5ML solution pen-injector Inject 0.5 mg under the skin into the appropriate area as directed 1 (One) Time Per Week. 2 pen 11   • sennosides-docusate sodium (SENOKOT-S) 8.6-50 MG tablet Take 1 tablet by mouth Daily.     • SUMAtriptan (IMITREX) 100 MG tablet Take 100 mg by mouth Daily.  3   • SYNTHROID 200 MCG tablet Take 1 tablet by mouth Daily. 30 tablet 11   • topiramate (TOPAMAX) 100 MG tablet Take 100 mg by mouth Daily.  3   • VENTOLIN  (90 Base) MCG/ACT inhaler Inhale 2 puffs Every 4 (Four) Hours As Needed for Wheezing. 1 inhaler 4   • phentermine (ADIPEX-P) 37.5 MG tablet Take 37.5 mg by mouth Daily.       No current facility-administered medications for this visit.        The  following portions of the patient's history were reviewed and updated as appropriate: She  has a past medical history of Anxiety, Arthritis, Asthma, Back pain, Cancer (CMS/HCC), Chronic bronchitis (CMS/HCC), Chronic headaches, Depression, Diabetes mellitus (CMS/HCC), Fibromyalgia, High triglycerides, History of sinus problem, Hyperlipidemia, Hypertension, Hyperthyroidism (), Hypothyroidism (), Migraine, MVP (mitral valve prolapse), OA (osteoarthritis), Peripheral autonomic neuropathy, Pleurisy, Pneumonia, Rheumatism, RLS (restless legs syndrome), Skin cancer, Sleep apnea, and Thyroid disease.  She  has a past surgical history that includes Tonsillectomy and adenoidectomy ();  section (, ); Dilation and curettage of uterus; Cataract extraction w/ intraocular lens implant (Right, 2019); Cataract extraction w/ intraocular lens implant (Left, 10/7/2019); Appendectomy (); Hysterectomy (); and Thyroidectomy ().  Her family history includes Cancer in her brother, maternal grandmother, and paternal grandmother; Diabetes in her maternal aunt; Fibromyalgia in her mother; Heart attack in her father and mother; Hyperlipidemia in her mother; Hypertension in her mother; Irritable bowel syndrome in her mother; Stomach cancer in her brother; Thyroid disease in her maternal grandmother and mother.  She  reports that she quit smoking about 16 years ago. Her smoking use included cigarettes. She started smoking about 43 years ago. She has a 3.75 pack-year smoking history. She has never used smokeless tobacco. She reports that she does not drink alcohol or use drugs.  She is allergic to penicillins; chlorhexidine; darvon [propoxyphene]; and sulfa antibiotics..      Objective:     Vitals:    21 0937   BP: 134/84   Pulse: 72   Temp: 97.8 °F (36.6 °C)   SpO2: 96%   Body mass index is 40.19 kg/m².  Physical Exam   Constitutional: She is oriented to person, place, and time. She appears  well-developed and well-nourished.   Morbidly obese   HENT:   Head: Normocephalic and atraumatic.   Eyes: Lids are normal.   Proptosis noted    Cardiovascular: Normal rate, regular rhythm and normal heart sounds.   Neurological: She is alert and oriented to person, place, and time.   Psychiatric: Thought content normal.       LABS AND IMAGING  Results for orders placed or performed in visit on 01/07/21   POC Glucose, Blood    Specimen: Blood   Result Value Ref Range    Glucose 77 70 - 130 mg/dL    Lot Number [id number]     Expiration Date 07/01/2021    POC Glycosylated Hemoglobin (Hb A1C)    Specimen: Blood   Result Value Ref Range    Hemoglobin A1C 5.3 %    Lot Number [id number]     Expiration Date 09/29/2022              Assessment:        Problem List Items Addressed This Visit        Other    Hypothyroidism    Overview     Synthroid 175 mcg daily          Diabetes mellitus (CMS/[facility]) - Primary    Overview     ozempic 0.5 mg weekly, d/c janumet after current supply is over . A1C is at goal of therapy.          Relevant Orders    POC Glucose, Blood (Completed)    POC Glycosylated Hemoglobin (Hb A1C) (Completed)               Plan:      1. Diabetes mellitus type 2 with insulin resistance and weight gain   -Cont Ozempic, A1C is at goal   Discussed dietary modifications to help with weight.     2. Postoperative hypothyroidism. - change to Synthroid brand name 200 mcg daily.   Request labs from Dr Davenport/   Follow-up in 6 months

## 2021-01-07 NOTE — TELEPHONE ENCOUNTER
----- Message from Dee PETERSON MD sent at 1/7/2021 10:04 AM EST -----  Please request labs form PCP(last month )

## 2021-01-14 RX ORDER — LEVOTHYROXINE SODIUM 175 MCG
175 TABLET ORAL DAILY
Qty: 30 TABLET | Refills: 11 | Status: SHIPPED | OUTPATIENT
Start: 2021-01-14 | End: 2022-10-04 | Stop reason: SDUPTHER

## 2021-01-21 ENCOUNTER — OFFICE VISIT (OUTPATIENT)
Dept: ORTHOPEDIC SURGERY | Facility: CLINIC | Age: 61
End: 2021-01-21

## 2021-01-21 ENCOUNTER — TELEPHONE (OUTPATIENT)
Dept: ORTHOPEDIC SURGERY | Facility: CLINIC | Age: 61
End: 2021-01-21

## 2021-01-21 VITALS — HEIGHT: 66 IN | BODY MASS INDEX: 40.02 KG/M2 | HEART RATE: 98 BPM | OXYGEN SATURATION: 99 % | WEIGHT: 249 LBS

## 2021-01-21 DIAGNOSIS — M17.11 PRIMARY OSTEOARTHRITIS OF RIGHT KNEE: ICD-10-CM

## 2021-01-21 DIAGNOSIS — S89.91XD INJURY OF RIGHT KNEE, SUBSEQUENT ENCOUNTER: ICD-10-CM

## 2021-01-21 DIAGNOSIS — R09.89 SUSPECTED DVT (DEEP VEIN THROMBOSIS): Primary | ICD-10-CM

## 2021-01-21 DIAGNOSIS — S83.241D TEAR OF MEDIAL MENISCUS OF RIGHT KNEE, CURRENT, UNSPECIFIED TEAR TYPE, SUBSEQUENT ENCOUNTER: ICD-10-CM

## 2021-01-21 PROCEDURE — 99214 OFFICE O/P EST MOD 30 MIN: CPT | Performed by: ORTHOPAEDIC SURGERY

## 2021-01-21 NOTE — PROGRESS NOTES
"    [facility] Note        Subjective     CC: Follow-up (2 month recheck right knee pain)      HPI    Janet Obrien is a 60 y.o. female.  Patient here today for 2-month follow-up of her right knee pain.  Patient tells me that she is having right lower extremity swelling and radiating pain up and down the leg.  She also has medial sided knee pain.  Last visit was via telehealth secondary to COVID-19.    Overall, patient's symptoms are worsening.    ROS:    Constiutional:Pt denies fever, chills, nausea, or vomiting.  MSK:as above        Objective      Past Medical History  Past Medical History:   Diagnosis Date   • Anxiety    • Arthritis    • Asthma    • Back pain    • Cancer (CMS/HCC)    • Chronic bronchitis (CMS/HCC)    • Chronic headaches    • Depression    • Diabetes mellitus (CMS/HCC)     type 2   • Fibromyalgia    • High triglycerides    • History of sinus problem     as child   • Hyperlipidemia    • Hypertension    • Hyperthyroidism 1980   • Hypothyroidism 1990   • Migraine    • MVP (mitral valve prolapse)    • OA (osteoarthritis)    • Peripheral autonomic neuropathy    • Pleurisy    • Pneumonia    • Rheumatism    • RLS (restless legs syndrome)    • Skin cancer     upper lip   • Sleep apnea    • Thyroid disease          Physical Exam  Pulse 98   Ht 167.6 cm (66\")   Wt 113 kg (249 lb)   SpO2 99%   BMI 40.19 kg/m²     Body mass index is 40.19 kg/m².    Patient is well nourished and well developed.        Ortho Exam  Pain is out of proportion to what one would expect  Calf is tender but not overly swollen  Knee is stable to varus and valgus at 0 and 30 degrees  Range of motion 0-115  Equivocal Yuliana's  Medial greater than lateral joint line tenderness    Imaging/Labs/EMG Reviewed:  Imaging Results (Last 24 Hours)     ** No results found for the last 24 hours. **      We have gone back and looked at the patient's MRI from [facility] and this shows medial compartment degenerative " changes as well as a degenerative medial meniscal tear.      Assessment    Assessment:  1. Suspected DVT (deep vein thrombosis)    2. Tear of medial meniscus of right knee, current, unspecified tear type, subsequent encounter    3. Primary osteoarthritis of right knee    4. Injury of right knee, subsequent encounter        Plan:  1. Recommend over the counter anti-inflammatories for pain and/or swelling  2. 60-year-old female with pain out of proportion to what one would expect for medial compartment arthritis and a medial meniscal tear.  Patient will be sent for a venous duplex for suspected DVT.  We have talked her at length and counseled her about what to expect reasonably after arthroscopy and what can be addressed with arthroscopy and what will not be addressed with arthroscopy.  The radiating pain up and down her leg is likely secondary to another issue and could be lumbar in etiology.  Patient also has fibromyalgia.  This should be kept in mind.  The risk, benefits, potential hazards of right knee arthroscopy with partial medial meniscectomy were discussed with her.  She had the opportunity to ask questions and wishes to proceed with scheduling.  We will get this done as an outpatient near future pending a negative venous duplex study.      Dayo Chavez MD  01/21/21  13:10 EST      Dragon disclaimer:  Much of this encounter note is an electronic transcription/translation of spoken language to printed text. The electronic translation of spoken language may permit erroneous, or at times, nonsensical words or phrases to be inadvertently transcribed; Although I have reviewed the note for such errors, some may still exist.

## 2021-01-22 DIAGNOSIS — R09.89 SUSPECTED DVT (DEEP VEIN THROMBOSIS): ICD-10-CM

## 2021-01-27 ENCOUNTER — TRANSCRIBE ORDERS (OUTPATIENT)
Dept: LAB | Facility: HOSPITAL | Age: 61
End: 2021-01-27

## 2021-01-27 DIAGNOSIS — Z01.818 PRE-OP EXAMINATION: Primary | ICD-10-CM

## 2021-02-06 ENCOUNTER — HOSPITAL ENCOUNTER (EMERGENCY)
Facility: HOSPITAL | Age: 61
Discharge: HOME | End: 2021-02-06
Attending: EMERGENCY MEDICINE | Admitting: EMERGENCY MEDICINE

## 2021-02-06 VITALS
BODY MASS INDEX: 41.5 KG/M2 | TEMPERATURE: 97.8 F | HEART RATE: 85 BPM | DIASTOLIC BLOOD PRESSURE: 103 MMHG | HEIGHT: 66 IN | WEIGHT: 258.2 LBS | RESPIRATION RATE: 20 BRPM | OXYGEN SATURATION: 100 % | SYSTOLIC BLOOD PRESSURE: 146 MMHG

## 2021-02-06 DIAGNOSIS — M54.6 ACUTE BILATERAL THORACIC BACK PAIN: Primary | ICD-10-CM

## 2021-02-06 LAB
ANION GAP SERPL CALCULATED.3IONS-SCNC: 5.6 MMOL/L (ref 5–15)
BUN SERPL-MCNC: 12 MG/DL (ref 6–20)
BUN/CREAT SERPL: 20 (ref 7–25)
CALCIUM SPEC-SCNC: 9.8 MG/DL (ref 8.6–10.5)
CHLORIDE SERPL-SCNC: 108 MMOL/L (ref 98–107)
CO2 SERPL-SCNC: 27.4 MMOL/L (ref 22–29)
CREAT SERPL-MCNC: 0.6 MG/DL (ref 0.57–1)
GFR SERPL CREATININE-BSD FRML MDRD: 102 ML/MIN/1.73
GLUCOSE SERPL-MCNC: 74 MG/DL (ref 65–99)
POTASSIUM SERPL-SCNC: 4 MMOL/L (ref 3.5–5.2)
SODIUM SERPL-SCNC: 141 MMOL/L (ref 136–145)

## 2021-02-06 PROCEDURE — 96374 THER/PROPH/DIAG INJ IV PUSH: CPT

## 2021-02-06 PROCEDURE — 25010000002 ORPHENADRINE CITRATE PER 60 MG: Performed by: PHYSICIAN ASSISTANT

## 2021-02-06 PROCEDURE — 25010000002 KETOROLAC TROMETHAMINE PER 15 MG: Performed by: PHYSICIAN ASSISTANT

## 2021-02-06 PROCEDURE — 96372 THER/PROPH/DIAG INJ SC/IM: CPT

## 2021-02-06 PROCEDURE — 80048 BASIC METABOLIC PNL TOTAL CA: CPT | Performed by: PHYSICIAN ASSISTANT

## 2021-02-06 PROCEDURE — 99283 EMERGENCY DEPT VISIT LOW MDM: CPT

## 2021-02-06 RX ORDER — HYDROCODONE BITARTRATE AND ACETAMINOPHEN 5; 325 MG/1; MG/1
1 TABLET ORAL ONCE
Status: COMPLETED | OUTPATIENT
Start: 2021-02-06 | End: 2021-02-06

## 2021-02-06 RX ORDER — SODIUM CHLORIDE 0.9 % (FLUSH) 0.9 %
10 SYRINGE (ML) INJECTION AS NEEDED
Status: DISCONTINUED | OUTPATIENT
Start: 2021-02-06 | End: 2021-02-06 | Stop reason: HOSPADM

## 2021-02-06 RX ORDER — ETODOLAC 200 MG/1
200 CAPSULE ORAL EVERY 8 HOURS
Qty: 15 CAPSULE | Refills: 0 | Status: SHIPPED | OUTPATIENT
Start: 2021-02-06 | End: 2021-07-06

## 2021-02-06 RX ORDER — ORPHENADRINE CITRATE 30 MG/ML
60 INJECTION INTRAMUSCULAR; INTRAVENOUS ONCE
Status: COMPLETED | OUTPATIENT
Start: 2021-02-06 | End: 2021-02-06

## 2021-02-06 RX ORDER — CYCLOBENZAPRINE HCL 10 MG
10 TABLET ORAL 3 TIMES DAILY PRN
Qty: 21 TABLET | Refills: 0 | Status: SHIPPED | OUTPATIENT
Start: 2021-02-06 | End: 2023-03-21

## 2021-02-06 RX ORDER — KETOROLAC TROMETHAMINE 30 MG/ML
30 INJECTION, SOLUTION INTRAMUSCULAR; INTRAVENOUS ONCE
Status: COMPLETED | OUTPATIENT
Start: 2021-02-06 | End: 2021-02-06

## 2021-02-06 RX ADMIN — HYDROCODONE BITARTRATE AND ACETAMINOPHEN 1 TABLET: 5; 325 TABLET ORAL at 15:51

## 2021-02-06 RX ADMIN — KETOROLAC TROMETHAMINE 30 MG: 30 INJECTION, SOLUTION INTRAMUSCULAR at 14:19

## 2021-02-06 RX ADMIN — ORPHENADRINE CITRATE 60 MG: 30 INJECTION INTRAMUSCULAR; INTRAVENOUS at 14:20

## 2021-02-06 NOTE — ED PROVIDER NOTES
Subjective   Patient comes in for evaluation of mid back pain since last night.  She states feels like a muscle spasm worse with movement and by palpation.  No known injury. No soa, no cough, no urinary sxs.  She was here for similar symptoms back in December and was treated with NSAIDs muscle relaxers, morphine and lidocaine infusion for pain and sent home with antibiotics for UTI.  She states she had been using her Flexeril as needed and it helped with the pain.  However last night with no known injury she was walking and felt acute onset of pain in her mid back bilaterally.  She took 1 Flexeril last night and it helped however she is currently out and she went to lunch today with her son and began having worsening pain today.  Is worse with any light touch and movement.  No radiation to her lower extremities or upper extremities.  No chest pain.  No abdominal pain.  No urinary symptoms.                                                    Review of Systems   Constitutional: Negative.    HENT: Negative.    Eyes: Negative.    Respiratory: Negative.    Cardiovascular: Negative.    Gastrointestinal: Negative.    Genitourinary: Negative.    Musculoskeletal: Positive for back pain.   Skin: Negative.    Neurological: Negative.    Psychiatric/Behavioral: Negative.        Past Medical History:   Diagnosis Date   • Anxiety    • Arthritis    • Asthma    • Back pain    • Cancer (CMS/HCC)    • Chronic bronchitis (CMS/HCC)    • Chronic headaches    • Depression    • Diabetes mellitus (CMS/HCC)     type 2   • Fibromyalgia    • High triglycerides    • History of sinus problem     as child   • Hyperlipidemia    • Hypertension    • Hyperthyroidism 1980   • Hypothyroidism 1990   • Migraine    • MVP (mitral valve prolapse)    • OA (osteoarthritis)    • Peripheral autonomic neuropathy    • Pleurisy    • Pneumonia    • Rheumatism    • RLS (restless legs syndrome)    • Skin cancer     upper lip   • Sleep apnea    • Thyroid disease         Allergies   Allergen Reactions   • Penicillins Angioedema   • Chlorhexidine Rash   • Darvon [Propoxyphene] Unknown (See Comments)     Doesn't remember   • Sulfa Antibiotics Rash       Past Surgical History:   Procedure Laterality Date   • APPENDECTOMY     • CATARACT EXTRACTION W/ INTRAOCULAR LENS IMPLANT Right 2019    Procedure: CATARACT PHACO EXTRACTION WITH INTRAOCULAR LENS IMPLANT RIGHT;  Surgeon: Carl Babb MD;  Location: [facility] OR;  Service: Ophthalmology   • CATARACT EXTRACTION W/ INTRAOCULAR LENS IMPLANT Left 10/7/2019    Procedure: CATARACT PHACO EXTRACTION WITH INTRAOCULAR LENS IMPLANT LEFT;  Surgeon: Carl Babb MD;  Location: [facility] OR;  Service: Ophthalmology   •  SECTION  , 1997    x 2    • DILATATION AND CURETTAGE     • HYSTERECTOMY      ''still have one ovary''   • THYROIDECTOMY     • TONSILLECTOMY AND ADENOIDECTOMY         Family History   Problem Relation Age of Onset   • Hypertension Mother    • Heart attack Mother    • Fibromyalgia Mother    • Hyperlipidemia Mother    • Thyroid disease Mother    • Irritable bowel syndrome Mother    • Heart attack Father    • Cancer Brother    • Stomach cancer Brother    • Diabetes Maternal Aunt    • Cancer Maternal Grandmother    • Thyroid disease Maternal Grandmother    • Cancer Paternal Grandmother    • Colon cancer Neg Hx    • Cirrhosis Neg Hx    • Liver cancer Neg Hx    • Liver disease Neg Hx        Social History     Socioeconomic History   • Marital status:      Spouse name: Not on file   • Number of children: Not on file   • Years of education: Not on file   • Highest education level: Not on file   Tobacco Use   • Smoking status: Former Smoker     Packs/day: 0.25     Years: 15.00     Pack years: 3.75     Types: Cigarettes     Start date:      Quit date: 2004     Years since quittin.5   • Smokeless tobacco: Never Used   • Tobacco comment: off and on smoker   Substance and  Sexual Activity   • Alcohol use: No   • Drug use: No   • Sexual activity: Defer           Objective   Physical Exam  Vitals signs and nursing note reviewed.   Constitutional:       General: She is not in acute distress.     Appearance: Normal appearance. She is obese. She is not ill-appearing or diaphoretic.   HENT:      Head: Normocephalic and atraumatic.   Eyes:      Extraocular Movements: Extraocular movements intact.   Neck:      Musculoskeletal: Normal range of motion.   Cardiovascular:      Rate and Rhythm: Normal rate and regular rhythm.   Pulmonary:      Effort: Pulmonary effort is normal.      Breath sounds: Normal breath sounds.   Abdominal:      Palpations: Abdomen is soft.      Tenderness: There is no abdominal tenderness.   Musculoskeletal: Normal range of motion.        Back:    Skin:     General: Skin is warm and dry.   Neurological:      General: No focal deficit present.      Mental Status: She is alert.   Psychiatric:         Mood and Affect: Mood normal.         Behavior: Behavior normal.         Procedures           ED Course      1524  Pain has improved. Was 10/10, now 6/10. Pain worse with movement and light palpation of back. Pt states she believes this is muscular and doesn't wish to have any imaging done as no injury and feels like musculoskeletal pain when she has had it in the past and when she was here in December for the same. Requests muscle relaxer medication and will treat symptomatically at home.                                      MDM    Final diagnoses:   Acute bilateral thoracic back pain            Dave Oliver PA-C  02/06/21 1526

## 2021-02-08 ENCOUNTER — HOSPITAL ENCOUNTER (EMERGENCY)
Facility: HOSPITAL | Age: 61
Discharge: HOME | End: 2021-02-08
Attending: EMERGENCY MEDICINE | Admitting: EMERGENCY MEDICINE

## 2021-02-08 VITALS
SYSTOLIC BLOOD PRESSURE: 152 MMHG | HEART RATE: 84 BPM | OXYGEN SATURATION: 99 % | BODY MASS INDEX: 40.24 KG/M2 | TEMPERATURE: 97.7 F | WEIGHT: 256.4 LBS | RESPIRATION RATE: 18 BRPM | DIASTOLIC BLOOD PRESSURE: 87 MMHG | HEIGHT: 67 IN

## 2021-02-08 DIAGNOSIS — M62.838 MUSCLE SPASM: Primary | ICD-10-CM

## 2021-02-08 PROCEDURE — 99283 EMERGENCY DEPT VISIT LOW MDM: CPT

## 2021-02-08 NOTE — ED PROVIDER NOTES
Subjective   60-year-old female presents to the ED with a chief complaint of back pain.  Patient is complaining of severe spasms in her bilateral thoracic spine.  She was seen and evaluated here 4 days ago given some Flexeril which did improve her symptoms but tonight when she woke up pain was severe again.  Rated 10 out of 10.  She took another Flexeril and then came to the ED.  She states that the Flexeril has improved her pain and now the spasms have lessened but are still present.  She denies chest pain or shortness of breath.  No cough or wheeze.  No nausea vomiting diarrhea or abdominal pain.  No other complaints at this time.          Review of Systems   Musculoskeletal: Positive for back pain.   All other systems reviewed and are negative.      Past Medical History:   Diagnosis Date   • Anxiety    • Arthritis    • Asthma    • Back pain    • Cancer (CMS/HCC)    • Chronic bronchitis (CMS/HCC)    • Chronic headaches    • Depression    • Diabetes mellitus (CMS/HCC)     type 2   • Fibromyalgia    • High triglycerides    • History of sinus problem     as child   • Hyperlipidemia    • Hypertension    • Hyperthyroidism 1980   • Hypothyroidism 1990   • Migraine    • MVP (mitral valve prolapse)    • OA (osteoarthritis)    • Peripheral autonomic neuropathy    • Pleurisy    • Pneumonia    • Rheumatism    • RLS (restless legs syndrome)    • Skin cancer     upper lip   • Sleep apnea    • Thyroid disease        Allergies   Allergen Reactions   • Penicillins Angioedema   • Chlorhexidine Rash   • Darvon [Propoxyphene] Unknown (See Comments)     Doesn't remember   • Sulfa Antibiotics Rash       Past Surgical History:   Procedure Laterality Date   • APPENDECTOMY  1970's   • CATARACT EXTRACTION W/ INTRAOCULAR LENS IMPLANT Right 9/16/2019    Procedure: CATARACT PHACO EXTRACTION WITH INTRAOCULAR LENS IMPLANT RIGHT;  Surgeon: Carl Babb MD;  Location: [facility];  Service: Ophthalmology   • CATARACT EXTRACTION W/  INTRAOCULAR LENS IMPLANT Left 10/7/2019    Procedure: CATARACT PHACO EXTRACTION WITH INTRAOCULAR LENS IMPLANT LEFT;  Surgeon: Carl Babb MD;  Location: [facility];  Service: Ophthalmology   •  SECTION  , 1997    x 2    • DILATATION AND CURETTAGE     • HYSTERECTOMY      ''still have one ovary''   • THYROIDECTOMY     • TONSILLECTOMY AND ADENOIDECTOMY  1965       Family History   Problem Relation Age of Onset   • Hypertension Mother    • Heart attack Mother    • Fibromyalgia Mother    • Hyperlipidemia Mother    • Thyroid disease Mother    • Irritable bowel syndrome Mother    • Heart attack Father    • Cancer Brother    • Stomach cancer Brother    • Diabetes Maternal Aunt    • Cancer Maternal Grandmother    • Thyroid disease Maternal Grandmother    • Cancer Paternal Grandmother    • Colon cancer Neg Hx    • Cirrhosis Neg Hx    • Liver cancer Neg Hx    • Liver disease Neg Hx        Social History     Socioeconomic History   • Marital status:      Spouse name: Not on file   • Number of children: Not on file   • Years of education: Not on file   • Highest education level: Not on file   Tobacco Use   • Smoking status: Former Smoker     Packs/day: 0.25     Years: 15.00     Pack years: 3.75     Types: Cigarettes     Start date:      Quit date: 2004     Years since quittin.5   • Smokeless tobacco: Never Used   • Tobacco comment: off and on smoker   Substance and Sexual Activity   • Alcohol use: No   • Drug use: No   • Sexual activity: Defer           Objective   Physical Exam  Vitals signs and nursing note reviewed.   Constitutional:       General: She is not in acute distress.     Appearance: She is well-developed. She is obese. She is not diaphoretic.   HENT:      Head: Normocephalic and atraumatic.      Nose: Nose normal.   Eyes:      Conjunctiva/sclera: Conjunctivae normal.   Cardiovascular:      Rate and Rhythm: Normal rate and regular rhythm.   Pulmonary:      Effort:  Pulmonary effort is normal. No respiratory distress.      Breath sounds: Normal breath sounds.   Abdominal:      General: There is no distension.      Palpations: Abdomen is soft.      Tenderness: There is no abdominal tenderness. There is no guarding.   Musculoskeletal:         General: No deformity.      Comments: Bilateral thoracic paraspinal muscles tenderness to palpation.  No midline TTP   Neurological:      Mental Status: She is alert and oriented to person, place, and time.      Cranial Nerves: No cranial nerve deficit.         Procedures           ED Course                                           MDM  Patient presented to the ED with complaint of spasms.  She took Toradol prior to arrival.  Monitored in the ED with resolution of her symptoms.  Will discharge with anti-inflammatories.  She is agreeable to this plan.      Final diagnoses:   Muscle spasm            Chemo Smith, DO  02/08/21 0355

## 2021-02-12 ENCOUNTER — TELEPHONE (OUTPATIENT)
Dept: ORTHOPEDIC SURGERY | Facility: CLINIC | Age: 61
End: 2021-02-12

## 2021-02-15 ENCOUNTER — APPOINTMENT (OUTPATIENT)
Dept: PREADMISSION TESTING | Facility: HOSPITAL | Age: 61
End: 2021-02-15

## 2021-02-16 ENCOUNTER — APPOINTMENT (OUTPATIENT)
Dept: LAB | Facility: HOSPITAL | Age: 61
End: 2021-02-16

## 2021-02-16 ENCOUNTER — LAB (OUTPATIENT)
Dept: LAB | Facility: HOSPITAL | Age: 61
End: 2021-02-16

## 2021-02-16 ENCOUNTER — OFFICE VISIT (OUTPATIENT)
Dept: PULMONOLOGY | Facility: CLINIC | Age: 61
End: 2021-02-16

## 2021-02-16 DIAGNOSIS — Z01.818 PRE-OP EXAMINATION: ICD-10-CM

## 2021-02-16 DIAGNOSIS — G47.33 OSA (OBSTRUCTIVE SLEEP APNEA): ICD-10-CM

## 2021-02-16 DIAGNOSIS — R06.02 SHORTNESS OF BREATH: Primary | ICD-10-CM

## 2021-02-16 DIAGNOSIS — J45.30 MILD PERSISTENT ASTHMA WITHOUT COMPLICATION: ICD-10-CM

## 2021-02-16 DIAGNOSIS — Z86.16 HISTORY OF COVID-19: ICD-10-CM

## 2021-02-16 PROCEDURE — U0004 COV-19 TEST NON-CDC HGH THRU: HCPCS

## 2021-02-16 PROCEDURE — 99443 PR PHYS/QHP TELEPHONE EVALUATION 21-30 MIN: CPT | Performed by: INTERNAL MEDICINE

## 2021-02-16 PROCEDURE — C9803 HOPD COVID-19 SPEC COLLECT: HCPCS

## 2021-02-16 RX ORDER — BUDESONIDE AND FORMOTEROL FUMARATE DIHYDRATE 80; 4.5 UG/1; UG/1
2 AEROSOL RESPIRATORY (INHALATION)
Qty: 1 EACH | Refills: 1 | Status: SHIPPED | OUTPATIENT
Start: 2021-02-16 | End: 2021-07-06

## 2021-02-16 RX ORDER — ALBUTEROL SULFATE 90 UG/1
2 AEROSOL, METERED RESPIRATORY (INHALATION) EVERY 4 HOURS PRN
Qty: 18 G | Refills: 3 | Status: SHIPPED | OUTPATIENT
Start: 2021-02-16 | End: 2021-11-17 | Stop reason: SDUPTHER

## 2021-02-16 NOTE — PROGRESS NOTES
You have chosen to receive care through a telephone visit. Do you consent to use a telephone visit for your medical care today? Yes    Chief Complaint   Patient presents with   • Follow-up   • Shortness of Breath       Subjective   Janet Obrien is a 60 y.o. female.     History of Present Illness   Patient was evaluated today for shortness of breath.    The patient says that since she developed COVID-19 infection in November 2020, her symptoms of shortness of breath have somewhat worsened.    Although she has a torn meniscus that limits her exercise capacity, she has noticed increasing use of Ventolin over the past 2 months or so.    She still only requires a Ventolin up to 4 times a week or less and denies any nighttime symptoms but feels that she would use it more if she was able to walk faster, which is hampered by the torn meniscus.    She does complain of daytime sleepiness and tiredness.  She also feels that she has occasional headaches in the morning.  She does have occasional tendency to fall asleep while watching TV or reading a book.    The following portions of the patient's history were reviewed and updated as appropriate: allergies, current medications, past family history, past medical history, past social history and past surgical history.    Review of Systems   Constitutional: Positive for fatigue.   Respiratory: Positive for shortness of breath.    Psychiatric/Behavioral: Positive for sleep disturbance.       Objective     Physical Exam  This was a remote visit. Physical exam not performed.       Assessment/Plan   Diagnoses and all orders for this visit:    1. Shortness of breath (Primary)  -     Pulmonary Function Test; Future  -     Nitric Oxide; Future    2. Mild persistent asthma without complication  -     Pulmonary Function Test; Future  -     Nitric Oxide; Future    3. LINDSAY (obstructive sleep apnea)    4. History of COVID-19    Other orders  -     budesonide-formoterol (Symbicort)  80-4.5 MCG/ACT inhaler; Inhale 2 puffs 2 (Two) Times a Day. Rinse mouth with water after use.  Dispense: 1 each; Refill: 1  -     Ventolin  (90 Base) MCG/ACT inhaler; Inhale 2 puffs Every 4 (Four) Hours As Needed for Wheezing.  Dispense: 18 g; Refill: 3           Return in about 10 weeks (around 4/27/2021) for Recheck, PFT F/U, FeNO F/U, For Elvia.    DISCUSSION (if any):  I reviewed the patient's symptoms with her which seem to be under decent control.    Last PFT in 2016 which was consistent with only minimal decreased diffusion capacity and otherwise normal.    The patient's COVID-19 infection could have caused her asthma to worsen.    For now I have asked her to continue Symbicort.    FeNO and PFTs upon follow up.    If no better, CXR?    Her last sleep study from November 2016 confirmed mild sleep apnea.  This was a home-based sleep study which does underestimate the true events.    Since she was unable to tolerate any CPAP device at that time and does seem to have symptoms of sleep apnea, I believe she will benefit from consideration of Mandibular Advancement Device especially given the fact that she has mild sleep apnea on the sleep study.    I told her that a subset of patients with mild LINDSAY can benefit from non-CPAP related therapies such as MAD.    I requested her to discuss this with her dentist.    I will also conveyed this recommendation to Dr. Booker & her PCP.    This visit has been rescheduled as a telehealth/video visit to comply with patient safety concerns in accordance with CDC recommendations. Total time of discussion was 24 minutes.    Dictated utilizing Dragon dictation.    This document was electronically signed by Jose Rao MD on 02/16/21 at 11:37 EST

## 2021-02-17 LAB — SARS-COV-2 RNA RESP QL NAA+PROBE: NOT DETECTED

## 2021-02-19 ENCOUNTER — TELEPHONE (OUTPATIENT)
Dept: ORTHOPEDIC SURGERY | Facility: CLINIC | Age: 61
End: 2021-02-19

## 2021-02-19 ENCOUNTER — OUTSIDE FACILITY SERVICE (OUTPATIENT)
Dept: ORTHOPEDIC SURGERY | Facility: CLINIC | Age: 61
End: 2021-02-19

## 2021-02-19 ENCOUNTER — TELEPHONE (OUTPATIENT)
Dept: ORTHOPEDICS | Facility: OTHER | Age: 61
End: 2021-02-19

## 2021-02-19 ENCOUNTER — HOSPITAL ENCOUNTER (EMERGENCY)
Facility: HOSPITAL | Age: 61
Discharge: HOME | End: 2021-02-19
Attending: EMERGENCY MEDICINE | Admitting: EMERGENCY MEDICINE

## 2021-02-19 VITALS
DIASTOLIC BLOOD PRESSURE: 99 MMHG | RESPIRATION RATE: 20 BRPM | TEMPERATURE: 98.7 F | WEIGHT: 248 LBS | HEART RATE: 94 BPM | BODY MASS INDEX: 39.86 KG/M2 | OXYGEN SATURATION: 97 % | HEIGHT: 66 IN | SYSTOLIC BLOOD PRESSURE: 147 MMHG

## 2021-02-19 DIAGNOSIS — G89.18 POST-OP PAIN: ICD-10-CM

## 2021-02-19 DIAGNOSIS — S83.241D TEAR OF MEDIAL MENISCUS OF RIGHT KNEE, CURRENT, UNSPECIFIED TEAR TYPE, SUBSEQUENT ENCOUNTER: Primary | ICD-10-CM

## 2021-02-19 DIAGNOSIS — M25.561 ACUTE PAIN OF RIGHT KNEE: Primary | ICD-10-CM

## 2021-02-19 PROCEDURE — 29881 ARTHRS KNE SRG MNISECTMY M/L: CPT | Performed by: ORTHOPAEDIC SURGERY

## 2021-02-19 PROCEDURE — 99283 EMERGENCY DEPT VISIT LOW MDM: CPT

## 2021-02-19 RX ORDER — OXYCODONE AND ACETAMINOPHEN 10; 325 MG/1; MG/1
1 TABLET ORAL EVERY 8 HOURS PRN
Qty: 15 TABLET | Refills: 0 | Status: SHIPPED | OUTPATIENT
Start: 2021-02-19

## 2021-02-19 RX ORDER — OXYCODONE HYDROCHLORIDE AND ACETAMINOPHEN 5; 325 MG/1; MG/1
1 TABLET ORAL ONCE
Status: COMPLETED | OUTPATIENT
Start: 2021-02-19 | End: 2021-02-19

## 2021-02-19 RX ADMIN — OXYCODONE HYDROCHLORIDE AND ACETAMINOPHEN 1 TABLET: 5; 325 TABLET ORAL at 13:22

## 2021-02-19 NOTE — TELEPHONE ENCOUNTER
I called and spoke with  pharmacy, they canceled her script because she just got a 30 day supply from Dr. Elliott. I informed Dr. hirsch and he said to let the patient know she would need to take those pills first. I called [facility] to verify they did receive a script from Dr. Elliott and they have but the patient has not picked it up yet. I called the patient and told her she needed to take what Dr. Elliott gave her and that it was ready for pickup at [facility]. She understood and will pick that up today.  Alea

## 2021-02-19 NOTE — TELEPHONE ENCOUNTER
I sent Dr. Chavez a message  to see if he can send a script in. Looks like it was canceled for some reason but not by us.  Alea

## 2021-02-19 NOTE — TELEPHONE ENCOUNTER
PATIENT CALLED STATING THAT SHE HAD SURGERY THIS MORNING WITH DR. GILLESPIE. PATIENT STATED THAT DR WAS GOING TO CALL IN HER SOME PAIN MEDICINE BUT PHARMACY NEVER RECEIVED IT. PATIENT'S PREFERRED PHARMACY IS [facility] IN [facility] PHONE NUMBER: [phone]. PATIENT WOULD LIKE A CALL IF RX CAN BE CALLED IN, SHE CAN BE REACHED [phone].

## 2021-02-19 NOTE — TELEPHONE ENCOUNTER
Caller: BETSY JAMESON    Relationship to patient: SELF    Best call back number: [phone]    Patient is needing: PATIENT HAD SURGERY WITH DR GILLESPIE THIS MORNING- PATIENT CALLED AND WAS IN EXCRUCIATING PAIN- IN TEARS ANDCRYING- COULDN'T GET OUT OF CAR. STATED SHE HAD ALREADY CALLED 911 AND THEY HAD COME AND GONE THEY COULDN'T GET HER OUT OF CAR. I DIRECTED PATIENT TO GO TO ER- CLOSEST TO HER WAS [facility].

## 2021-02-22 NOTE — ED PROVIDER NOTES
Subjective   60-year-old female presents to the ED with a chief complaint of knee pain.  The patient had an orthopedic procedure on her right knee performed today.  She states that she was on her way home from the procedure and was unable to get out of the car so she called EMS who were unable to help her get out of the car so she called the surgeon who told her to come to the ED to be evaluated.  She states that her pain is severe status post surgery and has been unable to get her pain meds filled in the hour since she left the surgery center.  No new trauma or injury.          Review of Systems   Musculoskeletal: Positive for arthralgias and joint swelling.        Right knee pain   All other systems reviewed and are negative.      Past Medical History:   Diagnosis Date   • Anxiety    • Arthritis    • Asthma    • Back pain    • Cancer (CMS/HCC)    • Chronic bronchitis (CMS/HCC)    • Chronic headaches    • Depression    • Diabetes mellitus (CMS/HCC)     type 2   • Fibromyalgia    • High triglycerides    • History of sinus problem     as child   • Hyperlipidemia    • Hypertension    • Hyperthyroidism 1980   • Hypothyroidism 1990   • Migraine    • MVP (mitral valve prolapse)    • OA (osteoarthritis)    • Peripheral autonomic neuropathy    • Pleurisy    • Pneumonia    • Rheumatism    • RLS (restless legs syndrome)    • Skin cancer     upper lip   • Sleep apnea    • Thyroid disease        Allergies   Allergen Reactions   • Penicillins Angioedema   • Chlorhexidine Rash   • Darvon [Propoxyphene] Unknown (See Comments)     Doesn't remember   • Sulfa Antibiotics Rash       Past Surgical History:   Procedure Laterality Date   • APPENDECTOMY  1970's   • CATARACT EXTRACTION W/ INTRAOCULAR LENS IMPLANT Right 9/16/2019    Procedure: CATARACT PHACO EXTRACTION WITH INTRAOCULAR LENS IMPLANT RIGHT;  Surgeon: Carl Babb MD;  Location: [facility];  Service: Ophthalmology   • CATARACT EXTRACTION W/ INTRAOCULAR LENS IMPLANT  Left 10/7/2019    Procedure: CATARACT PHACO EXTRACTION WITH INTRAOCULAR LENS IMPLANT LEFT;  Surgeon: Carl Babb MD;  Location: [facility];  Service: Ophthalmology   •  SECTION  , 1997    x 2    • DILATATION AND CURETTAGE     • HYSTERECTOMY      ''still have one ovary''   • THYROIDECTOMY     • TONSILLECTOMY AND ADENOIDECTOMY  1965       Family History   Problem Relation Age of Onset   • Hypertension Mother    • Heart attack Mother    • Fibromyalgia Mother    • Hyperlipidemia Mother    • Thyroid disease Mother    • Irritable bowel syndrome Mother    • Heart attack Father    • Cancer Brother    • Stomach cancer Brother    • Diabetes Maternal Aunt    • Cancer Maternal Grandmother    • Thyroid disease Maternal Grandmother    • Cancer Paternal Grandmother    • Colon cancer Neg Hx    • Cirrhosis Neg Hx    • Liver cancer Neg Hx    • Liver disease Neg Hx        Social History     Socioeconomic History   • Marital status:      Spouse name: Not on file   • Number of children: Not on file   • Years of education: Not on file   • Highest education level: Not on file   Tobacco Use   • Smoking status: Former Smoker     Packs/day: 0.25     Years: 15.00     Pack years: 3.75     Types: Cigarettes     Start date:      Quit date: 2004     Years since quittin.6   • Smokeless tobacco: Never Used   • Tobacco comment: off and on smoker   Substance and Sexual Activity   • Alcohol use: No   • Drug use: No   • Sexual activity: Defer           Objective   Physical Exam  Vitals signs and nursing note reviewed.   Constitutional:       General: She is not in acute distress.     Appearance: She is well-developed. She is not diaphoretic.   HENT:      Head: Normocephalic and atraumatic.      Nose: Nose normal.   Eyes:      Conjunctiva/sclera: Conjunctivae normal.   Cardiovascular:      Rate and Rhythm: Normal rate and regular rhythm.   Pulmonary:      Effort: Pulmonary effort is normal. No  respiratory distress.      Breath sounds: Normal breath sounds.   Abdominal:      General: There is no distension.      Palpations: Abdomen is soft.      Tenderness: There is no abdominal tenderness. There is no guarding.   Musculoskeletal:         General: No deformity.      Comments: Dressing on her right knee.   Neurological:      Mental Status: She is alert and oriented to person, place, and time.      Cranial Nerves: No cranial nerve deficit.   Psychiatric:      Comments: Anxious.  Crying.         Procedures           ED Course                                           MDM  Pt with pain less than 1 hour post surgery. Did not remove dressing. No obvious bleeding, Compartments of lower leg soft. Appropriate for discharge to follow up with surgeon.     Final diagnoses:   Acute pain of right knee   Post-op pain            Chemo Smith, DO  02/22/21 0712

## 2021-03-09 ENCOUNTER — OFFICE VISIT (OUTPATIENT)
Dept: ORTHOPEDIC SURGERY | Facility: CLINIC | Age: 61
End: 2021-03-09

## 2021-03-09 VITALS — TEMPERATURE: 98.7 F

## 2021-03-09 DIAGNOSIS — Z98.890 STATUS POST ARTHROSCOPY OF RIGHT KNEE: Primary | ICD-10-CM

## 2021-03-09 DIAGNOSIS — M17.11 PRIMARY OSTEOARTHRITIS OF RIGHT KNEE: ICD-10-CM

## 2021-03-09 DIAGNOSIS — S83.241D TEAR OF MEDIAL MENISCUS OF RIGHT KNEE, CURRENT, UNSPECIFIED TEAR TYPE, SUBSEQUENT ENCOUNTER: ICD-10-CM

## 2021-03-09 PROCEDURE — 99024 POSTOP FOLLOW-UP VISIT: CPT | Performed by: PHYSICIAN ASSISTANT

## 2021-03-09 NOTE — PROGRESS NOTES
[facility] Orthopaedic Surgery Clinic Note        Subjective     Post-op Follow-up (2 week S/P right knee arthroscopy and partial medial meniscectomy 02/19/21)       LEW Obrien is a 60 y.o. female.  Patient presents for their initial postop visit following right knee arthroscopy with partial medial meniscectomy performed on the above date by Dr. Chavez.  Patient is attending formal PT.      Currently she endorses a pain scale of 4-7/10.  Severity the pain remains moderate.  Associated symptoms swelling, popping, grinding, stiffness and giving way.  Symptoms are worse with walking, standing, sitting, stair climbing and it does affect her sleep.  No reported numbness or tingling to the extremities.  She continues to have bruising to the anterior knee.    Patient denies any fever, chills, night sweats or other constitutional symptoms.        Objective      Physical Exam  Temp 98.7 °F (37.1 °C)     There is no height or weight on file to calculate BMI.        Ortho Exam  Peripheral Vascular:    Upper Extremity:   Inspection:  Left--no cyanotic nail beds Right--no cyanotic nail beds   Bilateral:  Pink nail beds with brisk capillary refill   Palpation:  Bilateral radial pulse normal    Musculoskeletal:  Global Assessment:  Overall assessment of Lower Extremity Muscle Strength and Tone:    Right quadriceps--4/5    Lower Extremity:  Knee:      Inspection and Palpation:    Right knee:  Effusion:  1+  Crepitus: Positive  Pulses:  2+  Ecchymosis: Anterior knee  Warmth:  None  Incision: Portals are well-healed with sutures in place.  No redness, warmth, drainage or evidence of infection  Calf:  Non tender    ROM:  Right:  Extension:0    Flexion:115      Imaging Reviewed:  No new imaging.      Assessment:  1. Status post arthroscopy of right knee    2. Tear of medial meniscus of right knee, current, unspecified tear type, subsequent encounter    3. Primary osteoarthritis of right knee        Plan:  1. Status  post right knee arthroscopy with partial medial meniscectomy in the knee with known osteoarthritis, stable following surgery.  2. Continue working with formal PT.  3. Patient was asking for adjustments her pain medication especially at night.  She will have to contact her pain clinic if she requires a different type of medication or something stronger.  She is already taken to Percocet 10 for chronic pain.  4. We discussed ice and elevation to help assist with inflammation/pain control.  5. Follow-up in 4 weeks for repeat evaluation.  6. Questions and concerns answered.      Nena Gallagher PA-C  03/09/21  14:50 EST      Dragon disclaimer:  Much of this encounter note is an electronic transcription/translation of spoken language to printed text. The electronic translation of spoken language may permit erroneous, or at times, nonsensical words or phrases to be inadvertently transcribed; Although I have reviewed the note for such errors, some may still exist.

## 2021-03-15 ENCOUNTER — TELEPHONE (OUTPATIENT)
Dept: ORTHOPEDIC SURGERY | Facility: CLINIC | Age: 61
End: 2021-03-15

## 2021-03-15 NOTE — TELEPHONE ENCOUNTER
SANDI ESTRELLA FROM [facility] IS CALLING NEEDING AN ORDER FOR PHYSICAL THERAPY FAXED TO HER [phone]. SHE CAN BE REACHED [phone] IS HAVE ANY QUESTIONS

## 2021-03-16 DIAGNOSIS — Z98.890 STATUS POST ARTHROSCOPY OF RIGHT KNEE: Primary | ICD-10-CM

## 2021-04-08 ENCOUNTER — OFFICE VISIT (OUTPATIENT)
Dept: ORTHOPEDIC SURGERY | Facility: CLINIC | Age: 61
End: 2021-04-08

## 2021-04-08 DIAGNOSIS — M17.11 PRIMARY OSTEOARTHRITIS OF RIGHT KNEE: ICD-10-CM

## 2021-04-08 DIAGNOSIS — Z98.890 STATUS POST ARTHROSCOPY OF RIGHT KNEE: Primary | ICD-10-CM

## 2021-04-08 PROCEDURE — 99024 POSTOP FOLLOW-UP VISIT: CPT | Performed by: ORTHOPAEDIC SURGERY

## 2021-04-08 NOTE — PROGRESS NOTES
[facility] Note        Subjective     Post-op (4 week recheck - S/P right knee arthroscopy and partial medial meniscectomy 02/19/21)       LEW Obrien is a 60 y.o. female.  Patient is here today for follow-up now 6 weeks out from right knee arthroscopy with partial medial meniscectomy.  Patient had some degenerative changes within the patellofemoral and medial compartments at the time of surgery.  She tells me she is having episodes of her right knee giving out.  She is doing physical therapy she tells me twice a week at [facility] in [place].  She is trying to be active and enjoys walking and mowing her own lawn but cannot do so regularly because of her knee.          Objective      Physical Exam  There were no vitals taken for this visit.    There is no height or weight on file to calculate BMI.        Ortho Exam  Range of motion 0-1 10  Calf soft and nontender  Toes pink and warm  Lateral more than medial joint line tenderness  Patient has positive varus stress test.  Genu varum alignment with moderate varus laxity with ambulation.    Imaging Reviewed:  Imaging Results (Last 24 Hours)     ** No results found for the last 24 hours. **            Assessment    Assessment:  1. Status post arthroscopy of right knee    2. Primary osteoarthritis of right knee        Plan:  1. Status post right knee arthroscopy with partial medial meniscectomy in the face of medial and patellofemoral compartment arthritis--patient will be fitted for a custom medial compartment offloading brace.  There is a disproportionate size of her thigh and calf.  Furthermore, we will attempt to get a course of viscosupplementation approved.  We will see her back for injection #1.  Continue to be cautiously optimistic with her but I do not believe right now she is a reasonable candidate for arthroplasty and certainly does not appear to be interested in pursuing that course of treatment.      Dayo Orellana  MD Kathy  04/08/21  08:26 EDT      Dragon disclaimer:  Much of this encounter note is an electronic transcription/translation of spoken language to printed text. The electronic translation of spoken language may permit erroneous, or at times, nonsensical words or phrases to be inadvertently transcribed; Although I have reviewed the note for such errors, some may still exist.

## 2021-05-06 ENCOUNTER — CLINICAL SUPPORT (OUTPATIENT)
Dept: ORTHOPEDIC SURGERY | Facility: CLINIC | Age: 61
End: 2021-05-06

## 2021-05-06 DIAGNOSIS — M17.11 PRIMARY OSTEOARTHRITIS OF RIGHT KNEE: ICD-10-CM

## 2021-05-06 PROCEDURE — 20610 DRAIN/INJ JOINT/BURSA W/O US: CPT | Performed by: ORTHOPAEDIC SURGERY

## 2021-05-06 RX ORDER — LIDOCAINE HYDROCHLORIDE 10 MG/ML
3 INJECTION, SOLUTION EPIDURAL; INFILTRATION; INTRACAUDAL; PERINEURAL
Status: COMPLETED | OUTPATIENT
Start: 2021-05-06 | End: 2021-05-06

## 2021-05-06 RX ORDER — CELECOXIB 200 MG/1
CAPSULE ORAL
COMMUNITY
Start: 2021-05-02 | End: 2022-04-15 | Stop reason: ALTCHOICE

## 2021-05-06 RX ADMIN — LIDOCAINE HYDROCHLORIDE 3 ML: 10 INJECTION, SOLUTION EPIDURAL; INFILTRATION; INTRACAUDAL; PERINEURAL at 08:33

## 2021-05-06 NOTE — PROGRESS NOTES
Procedure   Large Joint Arthrocentesis: R knee  Date/Time: 5/6/2021 8:33 AM  Consent given by: patient  Site marked: site marked  Timeout: Immediately prior to procedure a time out was called to verify the correct patient, procedure, equipment, support staff and site/side marked as required   Supporting Documentation  Indications: pain   Procedure Details  Location: knee - R knee  Preparation: Patient was prepped and draped in the usual sterile fashion  Needle size: 23 G  Approach: anterolateral  Medications administered: 30 mg Hyaluronan 30 MG/2ML; 3 mL lidocaine PF 1% 1 %  Patient tolerance: patient tolerated the procedure well with no immediate complications

## 2021-05-09 ENCOUNTER — HOSPITAL ENCOUNTER (EMERGENCY)
Facility: HOSPITAL | Age: 61
Discharge: HOME | End: 2021-05-09
Attending: EMERGENCY MEDICINE | Admitting: EMERGENCY MEDICINE

## 2021-05-09 ENCOUNTER — APPOINTMENT (OUTPATIENT)
Dept: GENERAL RADIOLOGY | Facility: HOSPITAL | Age: 61
End: 2021-05-09

## 2021-05-09 VITALS
HEART RATE: 67 BPM | TEMPERATURE: 97.9 F | DIASTOLIC BLOOD PRESSURE: 85 MMHG | SYSTOLIC BLOOD PRESSURE: 147 MMHG | OXYGEN SATURATION: 98 % | RESPIRATION RATE: 18 BRPM | HEIGHT: 66 IN | WEIGHT: 258 LBS | BODY MASS INDEX: 41.46 KG/M2

## 2021-05-09 DIAGNOSIS — R51.9 NONINTRACTABLE HEADACHE, UNSPECIFIED CHRONICITY PATTERN, UNSPECIFIED HEADACHE TYPE: Primary | ICD-10-CM

## 2021-05-09 DIAGNOSIS — R42 DIZZINESS: ICD-10-CM

## 2021-05-09 LAB
ALBUMIN SERPL-MCNC: 4 G/DL (ref 3.5–5.2)
ALBUMIN/GLOB SERPL: 1.7 G/DL
ALP SERPL-CCNC: 107 U/L (ref 39–117)
ALT SERPL W P-5'-P-CCNC: 17 U/L (ref 1–33)
ANION GAP SERPL CALCULATED.3IONS-SCNC: 8.4 MMOL/L (ref 5–15)
AST SERPL-CCNC: 15 U/L (ref 1–32)
BASOPHILS # BLD AUTO: 0.05 10*3/MM3 (ref 0–0.2)
BASOPHILS NFR BLD AUTO: 1.1 % (ref 0–1.5)
BILIRUB SERPL-MCNC: 0.5 MG/DL (ref 0–1.2)
BUN SERPL-MCNC: 12 MG/DL (ref 6–20)
BUN/CREAT SERPL: 18.2 (ref 7–25)
CALCIUM SPEC-SCNC: 10 MG/DL (ref 8.6–10.5)
CHLORIDE SERPL-SCNC: 107 MMOL/L (ref 98–107)
CO2 SERPL-SCNC: 22.6 MMOL/L (ref 22–29)
CREAT SERPL-MCNC: 0.66 MG/DL (ref 0.57–1)
DEPRECATED RDW RBC AUTO: 40.5 FL (ref 37–54)
EOSINOPHIL # BLD AUTO: 0.28 10*3/MM3 (ref 0–0.4)
EOSINOPHIL NFR BLD AUTO: 6 % (ref 0.3–6.2)
ERYTHROCYTE [DISTWIDTH] IN BLOOD BY AUTOMATED COUNT: 13.4 % (ref 12.3–15.4)
GFR SERPL CREATININE-BSD FRML MDRD: 92 ML/MIN/1.73
GLOBULIN UR ELPH-MCNC: 2.4 GM/DL
GLUCOSE SERPL-MCNC: 100 MG/DL (ref 65–99)
HCT VFR BLD AUTO: 42.4 % (ref 34–46.6)
HGB BLD-MCNC: 13.9 G/DL (ref 12–15.9)
HOLD SPECIMEN: NORMAL
IMM GRANULOCYTES # BLD AUTO: 0.02 10*3/MM3 (ref 0–0.05)
IMM GRANULOCYTES NFR BLD AUTO: 0.4 % (ref 0–0.5)
LYMPHOCYTES # BLD AUTO: 1.26 10*3/MM3 (ref 0.7–3.1)
LYMPHOCYTES NFR BLD AUTO: 27 % (ref 19.6–45.3)
MAGNESIUM SERPL-MCNC: 2.4 MG/DL (ref 1.6–2.6)
MCH RBC QN AUTO: 27.4 PG (ref 26.6–33)
MCHC RBC AUTO-ENTMCNC: 32.8 G/DL (ref 31.5–35.7)
MCV RBC AUTO: 83.6 FL (ref 79–97)
MONOCYTES # BLD AUTO: 0.45 10*3/MM3 (ref 0.1–0.9)
MONOCYTES NFR BLD AUTO: 9.6 % (ref 5–12)
NEUTROPHILS NFR BLD AUTO: 2.61 10*3/MM3 (ref 1.7–7)
NEUTROPHILS NFR BLD AUTO: 55.9 % (ref 42.7–76)
NRBC BLD AUTO-RTO: 0 /100 WBC (ref 0–0.2)
PLATELET # BLD AUTO: 276 10*3/MM3 (ref 140–450)
PMV BLD AUTO: 10.2 FL (ref 6–12)
POTASSIUM SERPL-SCNC: 4.3 MMOL/L (ref 3.5–5.2)
PROT SERPL-MCNC: 6.4 G/DL (ref 6–8.5)
RBC # BLD AUTO: 5.07 10*6/MM3 (ref 3.77–5.28)
SODIUM SERPL-SCNC: 138 MMOL/L (ref 136–145)
TROPONIN T SERPL-MCNC: <0.01 NG/ML (ref 0–0.03)
WBC # BLD AUTO: 4.67 10*3/MM3 (ref 3.4–10.8)
WHOLE BLOOD HOLD SPECIMEN: NORMAL
WHOLE BLOOD HOLD SPECIMEN: NORMAL

## 2021-05-09 PROCEDURE — 83735 ASSAY OF MAGNESIUM: CPT | Performed by: EMERGENCY MEDICINE

## 2021-05-09 PROCEDURE — 96374 THER/PROPH/DIAG INJ IV PUSH: CPT

## 2021-05-09 PROCEDURE — 84484 ASSAY OF TROPONIN QUANT: CPT | Performed by: EMERGENCY MEDICINE

## 2021-05-09 PROCEDURE — 85025 COMPLETE CBC W/AUTO DIFF WBC: CPT | Performed by: EMERGENCY MEDICINE

## 2021-05-09 PROCEDURE — 71045 X-RAY EXAM CHEST 1 VIEW: CPT

## 2021-05-09 PROCEDURE — 93005 ELECTROCARDIOGRAM TRACING: CPT | Performed by: EMERGENCY MEDICINE

## 2021-05-09 PROCEDURE — 99285 EMERGENCY DEPT VISIT HI MDM: CPT

## 2021-05-09 PROCEDURE — 25010000002 KETOROLAC TROMETHAMINE PER 15 MG: Performed by: EMERGENCY MEDICINE

## 2021-05-09 PROCEDURE — 80053 COMPREHEN METABOLIC PANEL: CPT | Performed by: EMERGENCY MEDICINE

## 2021-05-09 RX ORDER — SODIUM CHLORIDE 0.9 % (FLUSH) 0.9 %
10 SYRINGE (ML) INJECTION AS NEEDED
Status: DISCONTINUED | OUTPATIENT
Start: 2021-05-09 | End: 2021-05-09 | Stop reason: HOSPADM

## 2021-05-09 RX ORDER — MECLIZINE HYDROCHLORIDE 25 MG/1
25 TABLET ORAL ONCE
Status: COMPLETED | OUTPATIENT
Start: 2021-05-09 | End: 2021-05-09

## 2021-05-09 RX ORDER — KETOROLAC TROMETHAMINE 30 MG/ML
30 INJECTION, SOLUTION INTRAMUSCULAR; INTRAVENOUS EVERY 6 HOURS PRN
Status: DISCONTINUED | OUTPATIENT
Start: 2021-05-09 | End: 2021-05-09 | Stop reason: HOSPADM

## 2021-05-09 RX ADMIN — KETOROLAC TROMETHAMINE 30 MG: 30 INJECTION, SOLUTION INTRAMUSCULAR; INTRAVENOUS at 10:50

## 2021-05-09 RX ADMIN — MECLIZINE HYDROCHLORIDE 25 MG: 25 TABLET ORAL at 10:50

## 2021-05-09 NOTE — ED PROVIDER NOTES
Subjective   60-year-old female presents to the ED with chief complaint of dizziness and headache.  Patient states that she woke up this morning with a left-sided headache felt similar to her normal headaches that she has associated with fibromyalgia, chronic headaches and migraines.  She states that when she has migraines it causes her to feel dizzy and she felt like the room was spinning.  She does not feel like she was going to pass out but the room spinning and she felt like she was dizzy.  She denies chest pain or shortness of breath.  No cough or wheeze.  No nausea vomiting diarrhea abdominal pain.  No fever chills.  No prior treatments or limiting factors.  No other complaints at this time.          Review of Systems   Neurological: Positive for dizziness and headaches.   All other systems reviewed and are negative.      Past Medical History:   Diagnosis Date   • Anxiety    • Arthritis    • Asthma    • Back pain    • Cancer (CMS/HCC)    • Chronic bronchitis (CMS/HCC)    • Chronic headaches    • Depression    • Diabetes mellitus (CMS/HCC)     type 2   • Fibromyalgia    • High triglycerides    • History of sinus problem     as child   • Hyperlipidemia    • Hypertension    • Hyperthyroidism 1980   • Hypothyroidism 1990   • Migraine    • MVP (mitral valve prolapse)    • OA (osteoarthritis)    • Peripheral autonomic neuropathy    • Pleurisy    • Pneumonia    • Rheumatism    • RLS (restless legs syndrome)    • Skin cancer     upper lip   • Sleep apnea    • Thyroid disease        Allergies   Allergen Reactions   • Penicillins Angioedema   • Chlorhexidine Rash   • Darvon [Propoxyphene] Unknown (See Comments)     Doesn't remember   • Sulfa Antibiotics Rash       Past Surgical History:   Procedure Laterality Date   • APPENDECTOMY  1970's   • CATARACT EXTRACTION W/ INTRAOCULAR LENS IMPLANT Right 9/16/2019    Procedure: CATARACT PHACO EXTRACTION WITH INTRAOCULAR LENS IMPLANT RIGHT;  Surgeon: Carl Babb MD;   Location: [facility] OR;  Service: Ophthalmology   • CATARACT EXTRACTION W/ INTRAOCULAR LENS IMPLANT Left 10/7/2019    Procedure: CATARACT PHACO EXTRACTION WITH INTRAOCULAR LENS IMPLANT LEFT;  Surgeon: Carl Babb MD;  Location: [facility];  Service: Ophthalmology   •  SECTION  , 1997    x 2    • DILATATION AND CURETTAGE     • HYSTERECTOMY      ''still have one ovary''   • KNEE SURGERY Right 2021    arthroscopy and partial medial meniscectomy Dr. Chavez   • THYROIDECTOMY     • TONSILLECTOMY AND ADENOIDECTOMY         Family History   Problem Relation Age of Onset   • Hypertension Mother    • Heart attack Mother    • Fibromyalgia Mother    • Hyperlipidemia Mother    • Thyroid disease Mother    • Irritable bowel syndrome Mother    • Heart attack Father    • Cancer Brother    • Stomach cancer Brother    • Diabetes Maternal Aunt    • Cancer Maternal Grandmother    • Thyroid disease Maternal Grandmother    • Cancer Paternal Grandmother    • Colon cancer Neg Hx    • Cirrhosis Neg Hx    • Liver cancer Neg Hx    • Liver disease Neg Hx        Social History     Socioeconomic History   • Marital status:      Spouse name: Not on file   • Number of children: Not on file   • Years of education: Not on file   • Highest education level: Not on file   Tobacco Use   • Smoking status: Former Smoker     Packs/day: 0.25     Years: 15.00     Pack years: 3.75     Types: Cigarettes     Start date:      Quit date: 2004     Years since quittin.8   • Smokeless tobacco: Never Used   Vaping Use   • Vaping Use: Never used   Substance and Sexual Activity   • Alcohol use: No   • Drug use: No   • Sexual activity: Defer           Objective   Physical Exam  Vitals and nursing note reviewed.   Constitutional:       General: She is not in acute distress.     Appearance: She is well-developed. She is not diaphoretic.   HENT:      Head: Normocephalic and atraumatic.      Nose: Nose normal.    Eyes:      Conjunctiva/sclera: Conjunctivae normal.   Cardiovascular:      Rate and Rhythm: Normal rate and regular rhythm.   Pulmonary:      Effort: Pulmonary effort is normal. No respiratory distress.      Breath sounds: Normal breath sounds.   Abdominal:      General: There is no distension.      Palpations: Abdomen is soft.      Tenderness: There is no abdominal tenderness. There is no guarding.   Musculoskeletal:         General: No deformity.   Neurological:      Mental Status: She is alert and oriented to person, place, and time.      Cranial Nerves: No cranial nerve deficit.      Comments: Cranial nerves II through XII grossly intact.  No focal neurological deficits.  Strength and sensation intact in all extremities         Procedures           ED Course  ED Course as of May 09 1644   Sun May 09, 2021   1133 EKG interpreted by me.  Sinus rhythm.  Rate of 68.  No ST segment or T wave changes.  Normal EKG    [CG]      ED Course User Index  [CG] Chemo Smith, DO                                           [facility]  60-year-old female presents to the ED with headache and dizziness similar to her prior migraines.  Treated symptomatically with improvement in her symptoms.  Work-up is reassuring.  Physical exam was unremarkable no signs of stroke or other abnormality.  She is appropriate for discharge follow-up as needed.  She is agreeable to this plan.  Final diagnoses:   Nonintractable headache, unspecified chronicity pattern, unspecified headache type   Dizziness       ED Disposition  ED Disposition     ED Disposition Condition Comment    Discharge Stable           Noe Davenport MD  [address] [place] [id number] [phone]               Medication List      No changes were made to your prescriptions during this visit.          Chemo Smith DO  05/09/21 3781

## 2021-05-13 ENCOUNTER — CLINICAL SUPPORT (OUTPATIENT)
Dept: ORTHOPEDIC SURGERY | Facility: CLINIC | Age: 61
End: 2021-05-13

## 2021-05-13 DIAGNOSIS — M17.11 PRIMARY OSTEOARTHRITIS OF RIGHT KNEE: Primary | ICD-10-CM

## 2021-05-13 PROCEDURE — 20610 DRAIN/INJ JOINT/BURSA W/O US: CPT | Performed by: ORTHOPAEDIC SURGERY

## 2021-05-13 NOTE — PROGRESS NOTES
Procedure Note:    I discussed with the patient the potential benefits of performing a therapeutic injections as well as potential risks including but not limited to infection, swelling, pain, bleeding, bruising, nerve/vessel damage, skin color changes, transient elevation in blood glucose levels, and fat atrophy. After informed consent and after the areas were prepped with chlorhexadine soap, ethyl chloride was used to numb the skin. Via the superiorlateral approach, 3mL of 1% lidocaine followed by Orthovisc No. 2 were each injected into the right knee joint. The patient tolerated the procedure well. There were no complications. A sterile dressing was placed over the injection sites.     PROVIDER:[TOKEN:[[id number]]] PROVIDER:[TOKEN:[[id number]]],PROVIDER:[TOKEN:[[id number]],FOLLOWUP:[Urgent]]

## 2021-05-13 NOTE — PROGRESS NOTES
Procedure   Large Joint Arthrocentesis: R knee  Date/Time: 5/13/2021 8:51 AM  Consent given by: patient  Site marked: site marked  Timeout: Immediately prior to procedure a time out was called to verify the correct patient, procedure, equipment, support staff and site/side marked as required   Supporting Documentation  Indications: pain   Procedure Details  Location: knee - R knee  Preparation: Patient was prepped and draped in the usual sterile fashion  Needle size: 23 G  Approach: anterolateral  Medications administered: 30 mg Hyaluronan 30 MG/2ML  Patient tolerance: patient tolerated the procedure well with no immediate complications

## 2021-05-20 ENCOUNTER — CLINICAL SUPPORT (OUTPATIENT)
Dept: ORTHOPEDIC SURGERY | Facility: CLINIC | Age: 61
End: 2021-05-20

## 2021-05-20 DIAGNOSIS — M17.11 PRIMARY OSTEOARTHRITIS OF RIGHT KNEE: Primary | ICD-10-CM

## 2021-05-20 PROCEDURE — 20610 DRAIN/INJ JOINT/BURSA W/O US: CPT | Performed by: ORTHOPAEDIC SURGERY

## 2021-05-20 RX ORDER — LIDOCAINE HYDROCHLORIDE 10 MG/ML
3 INJECTION, SOLUTION EPIDURAL; INFILTRATION; INTRACAUDAL; PERINEURAL
Status: COMPLETED | OUTPATIENT
Start: 2021-05-20 | End: 2021-05-20

## 2021-05-20 RX ADMIN — LIDOCAINE HYDROCHLORIDE 3 ML: 10 INJECTION, SOLUTION EPIDURAL; INFILTRATION; INTRACAUDAL; PERINEURAL at 08:18

## 2021-05-20 NOTE — PROGRESS NOTES
Procedure Note:    I discussed with the patient the potential benefits of performing a therapeutic injections as well as potential risks including but not limited to infection, swelling, pain, bleeding, bruising, nerve/vessel damage, skin color changes, transient elevation in blood glucose levels, and fat atrophy. After informed consent and after the areas were prepped with chlorhexadine soap, ethyl chloride was used to numb the skin. Via the superiorlateral approach, 3mL of 1% lidocaine followed by Orthovisc No. 3 were each injected into the right knee joint. The patient tolerated the procedure well. There were no complications. A sterile dressing was placed over the injection sites.      Patient has been fitted with her medial compartment off  brace (custom).  Patient will follow up in 3 months.

## 2021-05-20 NOTE — PROGRESS NOTES
Procedure   Large Joint Arthrocentesis: R knee  Date/Time: 5/20/2021 8:18 AM  Consent given by: patient  Site marked: site marked  Timeout: Immediately prior to procedure a time out was called to verify the correct patient, procedure, equipment, support staff and site/side marked as required   Supporting Documentation  Indications: pain   Procedure Details  Location: knee - R knee  Preparation: Patient was prepped and draped in the usual sterile fashion  Needle size: 23 G  Approach: anterolateral  Medications administered: 30 mg Hyaluronan 30 MG/2ML; 3 mL lidocaine PF 1% 1 %  Patient tolerance: patient tolerated the procedure well with no immediate complications

## 2021-07-06 ENCOUNTER — OFFICE VISIT (OUTPATIENT)
Dept: GASTROENTEROLOGY | Facility: CLINIC | Age: 61
End: 2021-07-06

## 2021-07-06 VITALS
RESPIRATION RATE: 20 BRPM | HEART RATE: 83 BPM | DIASTOLIC BLOOD PRESSURE: 80 MMHG | WEIGHT: 257 LBS | SYSTOLIC BLOOD PRESSURE: 132 MMHG | BODY MASS INDEX: 41.3 KG/M2 | OXYGEN SATURATION: 93 % | HEIGHT: 66 IN

## 2021-07-06 DIAGNOSIS — K62.89 ANAL OR RECTAL PAIN: Chronic | ICD-10-CM

## 2021-07-06 DIAGNOSIS — K59.00 CONSTIPATION, UNSPECIFIED CONSTIPATION TYPE: Chronic | ICD-10-CM

## 2021-07-06 DIAGNOSIS — R10.30 LOWER ABDOMINAL PAIN: Primary | Chronic | ICD-10-CM

## 2021-07-06 DIAGNOSIS — E66.01 CLASS 3 SEVERE OBESITY DUE TO EXCESS CALORIES WITH SERIOUS COMORBIDITY AND BODY MASS INDEX (BMI) OF 40.0 TO 44.9 IN ADULT (HCC): Chronic | ICD-10-CM

## 2021-07-06 DIAGNOSIS — R19.7 DIARRHEA, UNSPECIFIED TYPE: Chronic | ICD-10-CM

## 2021-07-06 DIAGNOSIS — K62.5 RECTAL BLEEDING: Chronic | ICD-10-CM

## 2021-07-06 DIAGNOSIS — K21.9 GASTROESOPHAGEAL REFLUX DISEASE, UNSPECIFIED WHETHER ESOPHAGITIS PRESENT: Chronic | ICD-10-CM

## 2021-07-06 DIAGNOSIS — Z12.11 ENCOUNTER FOR SCREENING FOR MALIGNANT NEOPLASM OF COLON: ICD-10-CM

## 2021-07-06 PROCEDURE — 99214 OFFICE O/P EST MOD 30 MIN: CPT | Performed by: NURSE PRACTITIONER

## 2021-07-06 RX ORDER — MAGNESIUM CARB/ALUMINUM HYDROX 105-160MG
TABLET,CHEWABLE ORAL
Qty: 296 ML | Refills: 0 | Status: SHIPPED | OUTPATIENT
Start: 2021-07-06 | End: 2021-11-08 | Stop reason: SDUPTHER

## 2021-07-06 RX ORDER — BISACODYL 5 MG/1
TABLET, DELAYED RELEASE ORAL
Qty: 4 TABLET | Refills: 0 | Status: SHIPPED | OUTPATIENT
Start: 2021-07-06 | End: 2021-11-08 | Stop reason: SDUPTHER

## 2021-07-06 RX ORDER — SODIUM CHLORIDE 9 MG/ML
70 INJECTION, SOLUTION INTRAVENOUS CONTINUOUS PRN
Status: CANCELLED | OUTPATIENT
Start: 2021-07-06

## 2021-07-06 RX ORDER — POLYETHYLENE GLYCOL 3350 17 G/17G
POWDER, FOR SOLUTION ORAL
Qty: 238 G | Refills: 0 | Status: SHIPPED | OUTPATIENT
Start: 2021-07-06 | End: 2021-11-08 | Stop reason: SDUPTHER

## 2021-07-06 NOTE — PROGRESS NOTES
Follow Up Note     Date: 2021   Patient Name: Janet Obrien  MRN: [id number]  : 1960     Primary Care Provider: Noe Davenport MD     Chief Complaint   Patient presents with   • Follow-up   • Rectal Bleeding     History of present illness:   2021  Janet Obrien is a 60 y.o. female who is here today for follow up for rectal bleeding.    She had to cancel her procedures after her last visit as she had to have knee surgery. Symptoms are unchanged. She continues to have lower abdominal pain with irregular bowel habits. She takes Senna, colace and Miralax when she is constipated without improvement. She may have diarrhea 1-2 times per week. Rectal bleeding is unchanged. Reflux is not well controlled with PPI. No difficulty swallowing.    Interval History:  10/8/2020  The patient has a history of constipation alternating with diarrhea for over 5 years. The patient will not have a bowel movement for several days, then she will have a hard bowel movement, followed by 2-3 episodes of loose stool. She has pain in the lower abdomen associated with bowel movements. She has rectal bleeding at times with bowel movements. She has rectal pain at times that is very sharp and severe.     The patient has a history of heartburn for several years. She takes Omeprazole as needed with reasonable control of reflux. The patient denies nausea associated with taking medications if she has not eaten prior to taking medications, but she does not have nausea at other times. There is no history of difficulty swallowing.     The patient has not had a colonoscopy or EGD in the past. There is no family history of GI malignancy.    Subjective      Past Medical History:   Diagnosis Date   • Anxiety    • Arthritis    • Asthma    • Back pain    • Cancer (CMS/HCC)    • Chronic bronchitis (CMS/HCC)    • Chronic headaches    • Depression    • Diabetes mellitus (CMS/HCC)     type 2   • Fibromyalgia    • High  triglycerides    • History of sinus problem     as child   • Hyperlipidemia    • Hypertension    • Hyperthyroidism    • Hypothyroidism    • Migraine    • MVP (mitral valve prolapse)    • OA (osteoarthritis)    • Peripheral autonomic neuropathy    • Pleurisy    • Pneumonia    • Rheumatism    • RLS (restless legs syndrome)    • Skin cancer     upper lip   • Sleep apnea    • Thyroid disease      Past Surgical History:   Procedure Laterality Date   • APPENDECTOMY     • CATARACT EXTRACTION W/ INTRAOCULAR LENS IMPLANT Right 2019    Procedure: CATARACT PHACO EXTRACTION WITH INTRAOCULAR LENS IMPLANT RIGHT;  Surgeon: Carl Babb MD;  Location: [facility] OR;  Service: Ophthalmology   • CATARACT EXTRACTION W/ INTRAOCULAR LENS IMPLANT Left 10/7/2019    Procedure: CATARACT PHACO EXTRACTION WITH INTRAOCULAR LENS IMPLANT LEFT;  Surgeon: Carl Babb MD;  Location: [facility] OR;  Service: Ophthalmology   •  SECTION  , 1997    x 2    • DILATATION AND CURETTAGE     • HYSTERECTOMY      ''still have one ovary''   • KNEE SURGERY Right 2021    arthroscopy and partial medial meniscectomy Dr. Chavez   • THYROIDECTOMY     • TONSILLECTOMY AND ADENOIDECTOMY       Family History   Problem Relation Age of Onset   • Hypertension Mother    • Heart attack Mother    • Fibromyalgia Mother    • Hyperlipidemia Mother    • Thyroid disease Mother    • Irritable bowel syndrome Mother    • Heart attack Father    • Cancer Brother    • Stomach cancer Brother    • Diabetes Maternal Aunt    • Cancer Maternal Grandmother    • Thyroid disease Maternal Grandmother    • Cancer Paternal Grandmother    • Colon cancer Neg Hx    • Cirrhosis Neg Hx    • Liver cancer Neg Hx    • Liver disease Neg Hx      Social History     Socioeconomic History   • Marital status:      Spouse name: Not on file   • Number of children: Not on file   • Years of education: Not on file   • Highest education level: Not on  file   Tobacco Use   • Smoking status: Former Smoker     Packs/day: 0.25     Years: 15.00     Pack years: 3.75     Types: Cigarettes     Start date:      Quit date: 2004     Years since quittin.9   • Smokeless tobacco: Never Used   Vaping Use   • Vaping Use: Never used   Substance and Sexual Activity   • Alcohol use: No   • Drug use: No   • Sexual activity: Defer       Current Outpatient Medications:   •  atorvastatin (LIPITOR) 80 MG tablet, Take 80 mg by mouth Daily., Disp: , Rfl:   •  Calcium Carbonate-Vit D-Min (CALCIUM 1200 PO), Take  by mouth., Disp: , Rfl:   •  celecoxib (CeleBREX) 200 MG capsule, , Disp: , Rfl:   •  Cyanocobalamin (VITAMIN B 12 PO), Take  by mouth., Disp: , Rfl:   •  cyclobenzaprine (FLEXERIL) 10 MG tablet, Take 1 tablet by mouth 3 (Three) Times a Day As Needed for Muscle Spasms., Disp: 21 tablet, Rfl: 0  •  docusate sodium (COLACE) 250 MG capsule, Take 250 mg by mouth 2 (two) times a day., Disp: , Rfl:   •  DULoxetine (CYMBALTA) 60 MG capsule, Take 60 mg by mouth Daily., Disp: , Rfl:   •  estrogens, conjugated, (PREMARIN) 0.45 MG tablet, Take 0.5 mg by mouth Daily. Take daily for 21 days then do not take for 7 days., Disp: , Rfl:   •  hydrochlorothiazide (HYDRODIURIL) 25 MG tablet, Take 1 tablet by mouth daily., Disp: , Rfl:   •  loratadine (CLARITIN) 10 MG tablet, Take 10 mg by mouth Daily., Disp: , Rfl:   •  oxyCODONE-acetaminophen (Percocet)  MG per tablet, Take 1 tablet by mouth Every 8 (Eight) Hours As Needed for Moderate Pain  or Severe Pain ., Disp: 15 tablet, Rfl: 0  •  Polyethylene Glycol powder, Take 1 cap full (17 grams) in 8 oz of noncarbonated beverage and drink once daily, Disp: 500 g, Rfl: 3  •  Semaglutide,0.25 or 0.5MG/DOS, (Ozempic, 0.25 or 0.5 MG/DOSE,) 2 MG/1.5ML solution pen-injector, Inject 0.5 mg under the skin into the appropriate area as directed 1 (One) Time Per Week., Disp: 2 pen, Rfl: 11  •  sennosides-docusate sodium (SENOKOT-S) 8.6-50 MG  tablet, Take 1 tablet by mouth Daily., Disp: , Rfl:   •  Synthroid 175 MCG tablet, Take 1 tablet by mouth Daily., Disp: 30 tablet, Rfl: 11  •  Ventolin  (90 Base) MCG/ACT inhaler, Inhale 2 puffs Every 4 (Four) Hours As Needed for Wheezing., Disp: 18 g, Rfl: 3  •  bisacodyl (DULCOLAX) 5 MG EC tablet, Take as directed for colon prep, Disp: 4 tablet, Rfl: 0  •  Lancets (ONETOUCH DELICA PLUS [id number]) misc, 1 each 3 (Three) Times a Day., Disp: 100 each, Rfl: 11  •  magnesium citrate 1.745 GM/30ML solution solution, Use as directed for colonoscopy prep., Disp: 296 mL, Rfl: 0  •  ONETOUCH VERIO test strip, Test Three Times Daily, Disp: 100 each, Rfl: 12  •  phentermine (ADIPEX-P) 37.5 MG tablet, Take 37.5 mg by mouth Daily., Disp: , Rfl:   •  polyethylene glycol (MiraLax) 17 GM/SCOOP powder, Take as directed for colonoscopy prep, Disp: 238 g, Rfl: 0  •  SYNTHROID 200 MCG tablet, Take 1 tablet by mouth Daily., Disp: 30 tablet, Rfl: 11  •  topiramate (TOPAMAX) 100 MG tablet, Take 100 mg by mouth Daily., Disp: , Rfl: 3     Allergies   Allergen Reactions   • Penicillins Angioedema   • Chlorhexidine Rash   • Darvon [Propoxyphene] Unknown (See Comments)     Doesn't remember   • Sulfa Antibiotics Rash     The following portions of the patient's history were reviewed and updated as appropriate: allergies, current medications, past family history, past medical history, past social history, past surgical history and problem list.  Objective     Physical Exam  Vitals and nursing note reviewed.   Constitutional:       General: She is not in acute distress.     Appearance: Normal appearance. She is well-developed. She is not diaphoretic.   HENT:      Head: Normocephalic and atraumatic.      Right Ear: Hearing and external ear normal.      Left Ear: Hearing and external ear normal.      Nose: Nose normal.      Mouth/Throat:      Mouth: Mucous membranes are not pale, not dry and not cyanotic.   Eyes:      General: Lids are  "normal.         Right eye: No discharge.         Left eye: No discharge.      Conjunctiva/sclera: Conjunctivae normal.   Neck:      Trachea: Trachea normal.   Cardiovascular:      Rate and Rhythm: Normal rate and regular rhythm.      Heart sounds: Normal heart sounds.   Pulmonary:      Effort: Pulmonary effort is normal. No respiratory distress.      Breath sounds: Normal breath sounds.   Chest:      Chest wall: No tenderness.   Abdominal:      General: Bowel sounds are normal. There is no distension.      Palpations: Abdomen is soft. There is no mass.      Tenderness: There is no abdominal tenderness. There is no guarding or rebound.      Hernia: No hernia is present.   Musculoskeletal:      Cervical back: No edema.   Skin:     General: Skin is warm and dry.      Coloration: Skin is not pale.      Findings: No rash.   Neurological:      Mental Status: She is alert and oriented to person, place, and time.      Cranial Nerves: No cranial nerve deficit.   Psychiatric:         Mood and Affect: Mood normal.         Speech: Speech normal.         Behavior: Behavior is cooperative.       Vitals:    07/06/21 1103   BP: 132/80   Pulse: 83   Resp: 20   SpO2: 93%   Weight: 117 kg (257 lb)   Height: 167.6 cm (65.98\")     Results Review:   I reviewed the patient's new clinical results.    Admission on 05/09/2021, Discharged on 05/09/2021   Component Date Value Ref Range Status   • Glucose 05/09/2021 100* 65 - 99 mg/dL Final   • BUN 05/09/2021 12  6 - 20 mg/dL Final   • Creatinine 05/09/2021 0.66  0.57 - 1.00 mg/dL Final   • Sodium 05/09/2021 138  136 - 145 mmol/L Final   • Potassium 05/09/2021 4.3  3.5 - 5.2 mmol/L Final   • Chloride 05/09/2021 107  98 - 107 mmol/L Final   • CO2 05/09/2021 22.6  22.0 - 29.0 mmol/L Final   • Calcium 05/09/2021 10.0  8.6 - 10.5 mg/dL Final   • Total Protein 05/09/2021 6.4  6.0 - 8.5 g/dL Final   • Albumin 05/09/2021 4.00  3.50 - 5.20 g/dL Final   • ALT (SGPT) 05/09/2021 17  1 - 33 U/L Final   • " AST (SGOT) 05/09/2021 15  1 - 32 U/L Final   • Alkaline Phosphatase 05/09/2021 107  39 - 117 U/L Final   • Total Bilirubin 05/09/2021 0.5  0.0 - 1.2 mg/dL Final   • eGFR Non African Amer 05/09/2021 92  >60 mL/min/1.73 Final   • Globulin 05/09/2021 2.4  gm/dL Final   • A/G Ratio 05/09/2021 1.7  g/dL Final   • BUN/Creatinine Ratio 05/09/2021 18.2  7.0 - 25.0 Final   • Anion Gap 05/09/2021 8.4  5.0 - 15.0 mmol/L Final   • Troponin T 05/09/2021 <0.010  0.000 - 0.030 ng/mL Final   • Magnesium 05/09/2021 2.4  1.6 - 2.6 mg/dL Final   • Extra Tube 05/09/2021 hold for add-on   Final    Auto resulted   • Extra Tube 05/09/2021 Hold for add-ons.   Final    Auto resulted.   • Extra Tube 05/09/2021 hold for add-on   Final    Auto resulted   • Extra Tube 05/09/2021 Hold for add-ons.   Final    Auto resulted.   • Extra Tube 05/09/2021 Hold for add-ons.   Final    Auto resulted.   • WBC 05/09/2021 4.67  3.40 - 10.80 10*3/mm3 Final   • RBC 05/09/2021 5.07  3.77 - 5.28 10*6/mm3 Final   • Hemoglobin 05/09/2021 13.9  12.0 - 15.9 g/dL Final   • Hematocrit 05/09/2021 42.4  34.0 - 46.6 % Final   • MCV 05/09/2021 83.6  79.0 - 97.0 fL Final   • MCH 05/09/2021 27.4  26.6 - 33.0 pg Final   • MCHC 05/09/2021 32.8  31.5 - 35.7 g/dL Final   • RDW 05/09/2021 13.4  12.3 - 15.4 % Final   • RDW-SD 05/09/2021 40.5  37.0 - 54.0 fl Final   • MPV 05/09/2021 10.2  6.0 - 12.0 fL Final   • Platelets 05/09/2021 276  140 - 450 10*3/mm3 Final   • Neutrophil % 05/09/2021 55.9  42.7 - 76.0 % Final   • Lymphocyte % 05/09/2021 27.0  19.6 - 45.3 % Final   • Monocyte % 05/09/2021 9.6  5.0 - 12.0 % Final   • Eosinophil % 05/09/2021 6.0  0.3 - 6.2 % Final   • Basophil % 05/09/2021 1.1  0.0 - 1.5 % Final   • Immature Grans % 05/09/2021 0.4  0.0 - 0.5 % Final   • Neutrophils, Absolute 05/09/2021 2.61  1.70 - 7.00 10*3/mm3 Final   • Lymphocytes, Absolute 05/09/2021 1.26  0.70 - 3.10 10*3/mm3 Final   • Monocytes, Absolute 05/09/2021 0.45  0.10 - 0.90 10*3/mm3 Final   •  Eosinophils, Absolute 05/09/2021 0.28  0.00 - 0.40 10*3/mm3 Final   • Basophils, Absolute 05/09/2021 0.05  0.00 - 0.20 10*3/mm3 Final   • Immature Grans, Absolute 05/09/2021 0.02  0.00 - 0.05 10*3/mm3 Final   • nRBC 05/09/2021 0.0  0.0 - 0.2 /100 WBC Final      XR Chest 1 View     Result Date: 5/9/2021  No acute process on this portable exam.  This report was finalized on 5/9/2021 10:50 AM by Nancy Carey MD.     Assessment / Plan      1. Lower abdominal pain  2. Diarrhea, unspecified type  3. Constipation, unspecified constipation type  4. Rectal bleeding  5. Anal or rectal pain  6. Encounter for screening for malignant neoplasm of colon  History of lower abdominal pain with irregular bowel movements for several years. Unchanged. The pain is mild to moderate and cramping. She has 1 very hard bowel movement every 2-3 days,, then she has diarrhea for 1-2 days with 2-3 episodes of loose stool. She has intermittent rectal bleeding with rectal pain that is associated with bowel movements. CTAP in December 2020 unremarkable. She has not had colonoscopy in the past. There is no family history of colon cancer.  High fiber, low fat diet with liberal water intake.   Low FODMAP diet - avoid dairy  Miralax 17 grams daily with stool softeners 2 per day.  Colonoscopy for colon cancer screening.    - Case Request; Standing  - Implement Anesthesia Orders Day of Procedure; Standing  - Obtain Informed Consent; Standing  - Verify Bowel Prep Was Successful; Standing  - Oxygen Therapy- Nasal Cannula; 2 LPM; Titrate for SPO2: equal to or greater than, 90%; Standing  - POC Glucose Once; Standing  - sodium chloride 0.9 % infusion  - Case Request  - polyethylene glycol (MiraLax) 17 GM/SCOOP powder; Take as directed for colonoscopy prep  Dispense: 238 g; Refill: 0  - bisacodyl (DULCOLAX) 5 MG EC tablet; Take as directed for colon prep  Dispense: 4 tablet; Refill: 0  - magnesium citrate 1.745 GM/30ML solution solution; Use as directed for  colonoscopy prep.  Dispense: 296 mL; Refill: 0    7. Gastroesophageal reflux disease, unspecified whether esophagitis present  Long standing history of reflux that is not controlled with PPI. Reflux is severe if she tries to decrease dose or stop PPI. No difficulty swallowing.   Anti-reflux measures.  Continue PPI for now.    8. Class 3 severe obesity due to excess calories with serious comorbidity and body mass index (BMI) of 40.0 to 44.9 in adult (CMS/[facility])  BMI 41.5  No history of elevated liver enzymes. CTAP in December 2020 unremarkable.    Patient Instructions   Antireflux measures: Avoid fried, fatty foods, alcohol, chocolate, coffee, tea,  soft drinks, peppermint and spearmint, spicy foods, tomatoes and tomato based foods, onion based foods, and smoking. Other antireflux measures include weight reduction if overweight, avoiding tight clothing around the abdomen, elevating the head of the bed 6 inches with blocks under the head board, and don't drink or eat before going to bed and avoid lying down immediately after meals.  Pantoprazole 40 mg 1 by mouth in the am 30 minutes before breakfast.  Recommend taking Synthroid upon waking, wait 30 minutes, then take Pantoprazole, wait 30 minutes, then take other morning medications/eat breakfast.  Zofran 4 mg 1 po every 8 hours as needed for nausea.   The patient should eat 4-5 very small meals throughout the day. Avoid large meals.   It is recommended to eat a softer diet. Meats are best consumed ground. Fruits and vegetables are best consumed cooked or steamed and then mashed.   Low FODMAP diet - avoid dairy   May continue Miralax 17 grams daily and stool softeners 2 per day.  Low fiber, low fat diet with liberal water intake.   Advised to exercise 30 minutes 4-5 days per week.   Advised to lose 20-25 pounds in the next 6-12 months.   Colonoscopy: Description of the procedure, risks, benefits, alternatives and options, including nonoperative options, were discussed  with the patient in detail. The patient understands and wishes to proceed.    Low-FODMAP Eating Plan    FODMAPs (fermentable oligosaccharides, disaccharides, monosaccharides, and polyols) are sugars that are hard for some people to digest. A low-FODMAP eating plan may help some people who have bowel (intestinal) diseases to manage their symptoms.  This meal plan can be complicated to follow. Work with a diet and nutrition specialist (dietitian) to make a low-FODMAP eating plan that is right for you. A dietitian can make sure that you get enough nutrition from this diet.  What are tips for following this plan?  Reading food labels  · Check labels for hidden FODMAPs such as:  ? High-fructose syrup.  ? Honey.  ? Agave.  ? Natural fruit flavors.  ? Onion or garlic powder.  · Choose low-FODMAP foods that contain 3-4 grams of fiber per serving.  · Check food labels for serving sizes. Eat only one serving at a time to make sure FODMAP levels stay low.  Meal planning  · Follow a low-FODMAP eating plan for up to 6 weeks, or as told by your health care provider or dietitian.  · To follow the eating plan:  1. Eliminate high-FODMAP foods from your diet completely.  2. Gradually reintroduce high-FODMAP foods into your diet one at a time. Most people should wait a few days after introducing one high-FODMAP food before they introduce the next high-FODMAP food. Your dietitian can recommend how quickly you may reintroduce foods.  3. Keep a daily record of what you eat and drink, and make note of any symptoms that you have after eating.  4. Review your daily record with a dietitian regularly. Your dietitian can help you identify which foods you can eat and which foods you should avoid.  General tips  · Drink enough fluid each day to keep your urine pale yellow.  · Avoid processed foods. These often have added sugar and may be high in FODMAPs.  · Avoid most dairy products, whole grains, and sweeteners.  · Work with a dietitian to make  "sure you get enough fiber in your diet.  Recommended foods  Grains  · Gluten-free grains, such as rice, oats, buckwheat, quinoa, corn, polenta, and millet. Gluten-free pasta, bread, or cereal. Rice noodles. Corn tortillas.  Vegetables  · Eggplant, zucchini, cucumber, peppers, green beans, [place] sprouts, bean sprouts, lettuce, arugula, kale, Swiss chard, spinach, matthew greens, bok maryanne, summer squash, potato, and tomato. Limited amounts of corn, carrot, and sweet potato. Green parts of scallions.  Fruits  · Bananas, oranges, tammie, limes, blueberries, raspberries, strawberries, grapes, cantaloupe, honeydew melon, kiwi, papaya, passion fruit, and pineapple. Limited amounts of dried cranberries, banana chips, and shredded coconut.  Dairy  · Lactose-free milk, yogurt, and kefir. Lactose-free cottage cheese and ice cream. Non-dairy milks, such as almond, coconut, hemp, and rice milk. Yogurts made of non-dairy milks. Limited amounts of goat cheese, brie, mozzarella, parmesan, swiss, and other hard cheeses.  Meats and other protein foods  · Unseasoned beef, pork, poultry, or fish. Eggs. White. Tofu (firm) and tempeh. Limited amounts of nuts and seeds, such as almonds, walnuts, brazil nuts, pecans, peanuts, pumpkin seeds, thu seeds, and sunflower seeds.  Fats and oils  · Butter-free spreads. Vegetable oils, such as olive, canola, and sunflower oil.  Seasoning and other foods  · Artificial sweeteners with names that do not end in \"ol\" such as aspartame, saccharine, and stevia. Maple syrup, white table sugar, raw sugar, brown sugar, and molasses. Fresh basil, coriander, parsley, rosemary, and thyme.  Beverages  · Water and mineral water. Sugar-sweetened soft drinks. Small amounts of orange juice or cranberry juice. Black and green tea. Most dry marielena. Coffee.  This may not be a complete list of low-FODMAP foods. Talk with your dietitian for more information.  Foods to avoid  Grains  · Wheat, including kamut, durum, and " semolina. Barley and bulgur. Couscous. Wheat-based cereals. Wheat noodles, bread, crackers, and pastries.  Vegetables  · Chicory root, artichoke, asparagus, cabbage, snow peas, sugar snap peas, mushrooms, and cauliflower. Onions, garlic, leeks, and the white part of scallions.  Fruits  · Fresh, dried, and juiced forms of apple, pear, watermelon, peach, plum, cherries, apricots, blackberries, boysenberries, figs, nectarines, and citlali. Avocado.  Dairy  · Milk, yogurt, ice cream, and soft cheese. Cream and sour cream. Milk-based sauces. Custard.  Meats and other protein foods  · Fried or fatty meat. Sausage. Cashews and pistachios. Soybeans, baked beans, black beans, chickpeas, kidney beans, tracy beans, navy beans, lentils, and split peas.  Seasoning and other foods  · Any sugar-free gum or candy. Foods that contain artificial sweeteners such as sorbitol, mannitol, isomalt, or xylitol. Foods that contain honey, high-fructose corn syrup, or agave. Bouillon, vegetable stock, beef stock, and chicken stock. Garlic and onion powder. Condiments made with onion, such as hummus, chutney, pickles, relish, salad dressing, and salsa. Tomato paste.  Beverages  · Chicory-based drinks. Coffee substitutes. Chamomile tea. Fennel tea. Sweet or fortified marielena such as port or canelo. Diet soft drinks made with isomalt, mannitol, maltitol, sorbitol, or xylitol. Apple, pear, and citlali juice. Juices with high-fructose corn syrup.  This may not be a complete list of high-FODMAP foods. Talk with your dietitian to discuss what dietary choices are best for you.   Summary  · A low-FODMAP eating plan is a short-term diet that eliminates FODMAPs from your diet to help ease symptoms of certain bowel diseases.  · The eating plan usually lasts up to 6 weeks. After that, high-FODMAP foods are restarted gradually, one at a time, so you can find out which may be causing symptoms.  · A low-FODMAP eating plan can be complicated. It is best to work with  a dietitian who has experience with this type of plan.  This information is not intended to replace advice given to you by your health care provider. Make sure you discuss any questions you have with your health care provider.  Document Revised: 11/30/2018 Document Reviewed: 08/14/2018  [employer] Patient Education © 2021 [employer] by [employer]    Gamaliel Sabillon, APRN  7/6/2021    Please note that portions of this note may have been completed with a voice recognition program. Efforts were made to edit the dictations, but occasionally words are mistranscribed.

## 2021-07-06 NOTE — PATIENT INSTRUCTIONS
Antireflux measures: Avoid fried, fatty foods, alcohol, chocolate, coffee, tea,  soft drinks, peppermint and spearmint, spicy foods, tomatoes and tomato based foods, onion based foods, and smoking. Other antireflux measures include weight reduction if overweight, avoiding tight clothing around the abdomen, elevating the head of the bed 6 inches with blocks under the head board, and don't drink or eat before going to bed and avoid lying down immediately after meals.  Pantoprazole 40 mg 1 by mouth in the am 30 minutes before breakfast.  Recommend taking Synthroid upon waking, wait 30 minutes, then take Pantoprazole, wait 30 minutes, then take other morning medications/eat breakfast.  Zofran 4 mg 1 po every 8 hours as needed for nausea.   The patient should eat 4-5 very small meals throughout the day. Avoid large meals.   It is recommended to eat a softer diet. Meats are best consumed ground. Fruits and vegetables are best consumed cooked or steamed and then mashed.   Low FODMAP diet - avoid dairy   May continue Miralax 17 grams daily and stool softeners 2 per day.  Low fiber, low fat diet with liberal water intake.   Advised to exercise 30 minutes 4-5 days per week.   Advised to lose 20-25 pounds in the next 6-12 months.   Colonoscopy: Description of the procedure, risks, benefits, alternatives and options, including nonoperative options, were discussed with the patient in detail. The patient understands and wishes to proceed.    Low-FODMAP Eating Plan    FODMAPs (fermentable oligosaccharides, disaccharides, monosaccharides, and polyols) are sugars that are hard for some people to digest. A low-FODMAP eating plan may help some people who have bowel (intestinal) diseases to manage their symptoms.  This meal plan can be complicated to follow. Work with a diet and nutrition specialist (dietitian) to make a low-FODMAP eating plan that is right for you. A dietitian can make sure that you get enough nutrition from this  diet.  What are tips for following this plan?  Reading food labels  · Check labels for hidden FODMAPs such as:  ? High-fructose syrup.  ? Honey.  ? Agave.  ? Natural fruit flavors.  ? Onion or garlic powder.  · Choose low-FODMAP foods that contain 3-4 grams of fiber per serving.  · Check food labels for serving sizes. Eat only one serving at a time to make sure FODMAP levels stay low.  Meal planning  · Follow a low-FODMAP eating plan for up to 6 weeks, or as told by your health care provider or dietitian.  · To follow the eating plan:  1. Eliminate high-FODMAP foods from your diet completely.  2. Gradually reintroduce high-FODMAP foods into your diet one at a time. Most people should wait a few days after introducing one high-FODMAP food before they introduce the next high-FODMAP food. Your dietitian can recommend how quickly you may reintroduce foods.  3. Keep a daily record of what you eat and drink, and make note of any symptoms that you have after eating.  4. Review your daily record with a dietitian regularly. Your dietitian can help you identify which foods you can eat and which foods you should avoid.  General tips  · Drink enough fluid each day to keep your urine pale yellow.  · Avoid processed foods. These often have added sugar and may be high in FODMAPs.  · Avoid most dairy products, whole grains, and sweeteners.  · Work with a dietitian to make sure you get enough fiber in your diet.  Recommended foods  Grains  · Gluten-free grains, such as rice, oats, buckwheat, quinoa, corn, polenta, and millet. Gluten-free pasta, bread, or cereal. Rice noodles. Corn tortillas.  Vegetables  · Eggplant, zucchini, cucumber, peppers, green beans, Massey sprouts, bean sprouts, lettuce, arugula, kale, Swiss chard, spinach, matthew greens, bok maryanne, summer squash, potato, and tomato. Limited amounts of corn, carrot, and sweet potato. Green parts of scallions.  Fruits  · Bananas, oranges, tammie, limes, blueberries,  "raspberries, strawberries, grapes, cantaloupe, honeydew melon, kiwi, papaya, passion fruit, and pineapple. Limited amounts of dried cranberries, banana chips, and shredded coconut.  Dairy  · Lactose-free milk, yogurt, and kefir. Lactose-free cottage cheese and ice cream. Non-dairy milks, such as almond, coconut, hemp, and rice milk. Yogurts made of non-dairy milks. Limited amounts of goat cheese, brie, mozzarella, parmesan, swiss, and other hard cheeses.  Meats and other protein foods  · Unseasoned beef, pork, poultry, or fish. Eggs. White. Tofu (firm) and tempeh. Limited amounts of nuts and seeds, such as almonds, walnuts, brazil nuts, pecans, peanuts, pumpkin seeds, thu seeds, and sunflower seeds.  Fats and oils  · Butter-free spreads. Vegetable oils, such as olive, canola, and sunflower oil.  Seasoning and other foods  · Artificial sweeteners with names that do not end in \"ol\" such as aspartame, saccharine, and stevia. Maple syrup, white table sugar, raw sugar, brown sugar, and molasses. Fresh basil, coriander, parsley, rosemary, and thyme.  Beverages  · Water and mineral water. Sugar-sweetened soft drinks. Small amounts of orange juice or cranberry juice. Black and green tea. Most dry marielena. Coffee.  This may not be a complete list of low-FODMAP foods. Talk with your dietitian for more information.  Foods to avoid  Grains  · Wheat, including kamut, durum, and semolina. Barley and bulgur. Couscous. Wheat-based cereals. Wheat noodles, bread, crackers, and pastries.  Vegetables  · Chicory root, artichoke, asparagus, cabbage, snow peas, sugar snap peas, mushrooms, and cauliflower. Onions, garlic, leeks, and the white part of scallions.  Fruits  · Fresh, dried, and juiced forms of apple, pear, watermelon, peach, plum, cherries, apricots, blackberries, boysenberries, figs, nectarines, and citlali. Avocado.  Dairy  · Milk, yogurt, ice cream, and soft cheese. Cream and sour cream. Milk-based sauces. Custard.  Meats and " other protein foods  · Fried or fatty meat. Sausage. Cashews and pistachios. Soybeans, baked beans, black beans, chickpeas, kidney beans, tracy beans, navy beans, lentils, and split peas.  Seasoning and other foods  · Any sugar-free gum or candy. Foods that contain artificial sweeteners such as sorbitol, mannitol, isomalt, or xylitol. Foods that contain honey, high-fructose corn syrup, or agave. Bouillon, vegetable stock, beef stock, and chicken stock. Garlic and onion powder. Condiments made with onion, such as hummus, chutney, pickles, relish, salad dressing, and salsa. Tomato paste.  Beverages  · Chicory-based drinks. Coffee substitutes. Chamomile tea. Fennel tea. Sweet or fortified marielena such as port or canelo. Diet soft drinks made with isomalt, mannitol, maltitol, sorbitol, or xylitol. Apple, pear, and citlali juice. Juices with high-fructose corn syrup.  This may not be a complete list of high-FODMAP foods. Talk with your dietitian to discuss what dietary choices are best for you.   Summary  · A low-FODMAP eating plan is a short-term diet that eliminates FODMAPs from your diet to help ease symptoms of certain bowel diseases.  · The eating plan usually lasts up to 6 weeks. After that, high-FODMAP foods are restarted gradually, one at a time, so you can find out which may be causing symptoms.  · A low-FODMAP eating plan can be complicated. It is best to work with a dietitian who has experience with this type of plan.  This information is not intended to replace advice given to you by your health care provider. Make sure you discuss any questions you have with your health care provider.  Document Revised: 11/30/2018 Document Reviewed: 08/14/2018  Elsevier Patient Education © 2021 [employer]

## 2021-07-15 ENCOUNTER — OFFICE VISIT (OUTPATIENT)
Dept: PULMONOLOGY | Facility: CLINIC | Age: 61
End: 2021-07-15

## 2021-07-15 VITALS
WEIGHT: 259 LBS | HEART RATE: 76 BPM | HEIGHT: 66 IN | DIASTOLIC BLOOD PRESSURE: 80 MMHG | BODY MASS INDEX: 41.62 KG/M2 | SYSTOLIC BLOOD PRESSURE: 134 MMHG | OXYGEN SATURATION: 98 % | RESPIRATION RATE: 18 BRPM

## 2021-07-15 DIAGNOSIS — J45.30 MILD PERSISTENT ASTHMA WITHOUT COMPLICATION: ICD-10-CM

## 2021-07-15 DIAGNOSIS — Z86.16 HISTORY OF COVID-19: ICD-10-CM

## 2021-07-15 DIAGNOSIS — R06.02 SHORTNESS OF BREATH: ICD-10-CM

## 2021-07-15 DIAGNOSIS — R06.02 SHORTNESS OF BREATH: Primary | ICD-10-CM

## 2021-07-15 DIAGNOSIS — G47.33 OSA (OBSTRUCTIVE SLEEP APNEA): ICD-10-CM

## 2021-07-15 PROCEDURE — 94010 BREATHING CAPACITY TEST: CPT | Performed by: INTERNAL MEDICINE

## 2021-07-15 PROCEDURE — 99214 OFFICE O/P EST MOD 30 MIN: CPT | Performed by: NURSE PRACTITIONER

## 2021-07-15 NOTE — PROGRESS NOTES
"Chief Complaint   Patient presents with   • Follow-up   • Shortness of Breath         Subjective   Janet Obrien is a 60 y.o. female.     History of Present Illness   Patient is here today for follow up of asthma and shortness of breath.     Patient says that since the last office visit she has had no recent exacerbations. she denies any emergency room visits or hospitalizations, due to her asthma.     She feels she is having more issues of shortness of breath.    She used Symbicort for a period of time and states that she cannot tell that it helped her breathing any.    She states she is working out now and has to take the rescue inhaler more often.  She takes 1 puff on her way to the gym and 1 puff when she gets there.  She feels the rescue inhaler helps somewhat.    She states her shortness of breath is worse in the spring to summer months in summer to fall season.  She has also noticed increased shortness of breath and increased allergy issues anytime she is around mowing.    She is not using a CPAP machine.  She is using an oral device.  She was told since her sleep apnea was mild and oral device would help.    The following portions of the patient's history were reviewed and updated as appropriate: allergies, current medications, past family history, past medical history, past social history and past surgical history.        Review of Systems   Constitutional: Negative for chills and fever.   HENT: Negative for rhinorrhea, sinus pressure, sneezing and sore throat.    Respiratory: Positive for cough, shortness of breath and wheezing.    Psychiatric/Behavioral: Positive for sleep disturbance.       Objective   Visit Vitals  /80   Pulse 76   Resp 18   Ht 167.6 cm (66\")   Wt 117 kg (259 lb)   SpO2 98%   BMI 41.80 kg/m²         Physical Exam  Vitals reviewed.   HENT:      Head: Atraumatic.      Mouth/Throat:      Mouth: Mucous membranes are moist.      Comments: Crowded oropharynx.  Eyes:      " Extraocular Movements: Extraocular movements intact.   Cardiovascular:      Rate and Rhythm: Normal rate and regular rhythm.   Pulmonary:      Effort: Pulmonary effort is normal. No respiratory distress.      Comments: Somewhat decreased air entry with scattered wheezing.  Musculoskeletal:      Cervical back: Neck supple.      Comments: Gait normal.   Skin:     General: Skin is warm.   Neurological:      Mental Status: She is alert and oriented to person, place, and time.             Assessment/Plan   Diagnoses and all orders for this visit:    1. Shortness of breath (Primary)    2. Mild persistent asthma without complication  -     IgE; Future  -     Allergens, Zone 8; Future  -     CBC Auto Differential; Future  -     Nitric Oxide    3. History of COVID-19    4. LINDSAY (obstructive sleep apnea)    Other orders  -     mometasone-formoterol (DULERA 200) 200-5 MCG/ACT inhaler; Inhale 2 puffs 2 (Two) Times a Day. Rinse mouth with water after use.  Dispense: 1 g; Refill: 5           Return in about 4 months (around 11/15/2021) for Recheck, For Dr. Rao.    DISCUSSION (if any):  I reviewed her PFT results and discussed the results with her today.  The PFT revealed no obstruction.    FeNO 36 ppb.    Her previous absolute eosinophils were 280 in May 2021.    I have ordered IgE RAST    Her symptoms of asthma are under poor control at this time.  I have prescribed Dulera and asked her to use it twice a day.  She will need to rinse her mouth after using the inhaler.  She should continue to use the rescue inhaler as needed for shortness of breath and wheezing.    Patient's medications for underlying asthma were reviewed with her in great detail.    Any needed adjustments to her pulmonary medications, either for clinical or insurance coverage reasons, have been made and are reflected in the orders.    Side effects of prescribed medications discussed with the patient.    Asthma action plan with discussed with her.    The patient  was asked to call this office if the symptoms worsen.    Dictated utilizing Dragon dictation.    This document was electronically signed by NARA Roque July 15, 2021  12:07 EDT

## 2021-07-16 ENCOUNTER — LAB (OUTPATIENT)
Dept: LAB | Facility: HOSPITAL | Age: 61
End: 2021-07-16

## 2021-07-16 DIAGNOSIS — J45.30 MILD PERSISTENT ASTHMA WITHOUT COMPLICATION: ICD-10-CM

## 2021-07-16 LAB
BASOPHILS # BLD AUTO: 0.03 10*3/MM3 (ref 0–0.2)
BASOPHILS NFR BLD AUTO: 0.7 % (ref 0–1.5)
DEPRECATED RDW RBC AUTO: 42 FL (ref 37–54)
EOSINOPHIL # BLD AUTO: 0.18 10*3/MM3 (ref 0–0.4)
EOSINOPHIL NFR BLD AUTO: 4.2 % (ref 0.3–6.2)
ERYTHROCYTE [DISTWIDTH] IN BLOOD BY AUTOMATED COUNT: 13.9 % (ref 12.3–15.4)
HCT VFR BLD AUTO: 38.9 % (ref 34–46.6)
HGB BLD-MCNC: 13.2 G/DL (ref 12–15.9)
IMM GRANULOCYTES # BLD AUTO: 0.02 10*3/MM3 (ref 0–0.05)
IMM GRANULOCYTES NFR BLD AUTO: 0.5 % (ref 0–0.5)
LYMPHOCYTES # BLD AUTO: 1.15 10*3/MM3 (ref 0.7–3.1)
LYMPHOCYTES NFR BLD AUTO: 26.9 % (ref 19.6–45.3)
MCH RBC QN AUTO: 28.4 PG (ref 26.6–33)
MCHC RBC AUTO-ENTMCNC: 33.9 G/DL (ref 31.5–35.7)
MCV RBC AUTO: 83.7 FL (ref 79–97)
MONOCYTES # BLD AUTO: 0.34 10*3/MM3 (ref 0.1–0.9)
MONOCYTES NFR BLD AUTO: 8 % (ref 5–12)
NEUTROPHILS NFR BLD AUTO: 2.55 10*3/MM3 (ref 1.7–7)
NEUTROPHILS NFR BLD AUTO: 59.7 % (ref 42.7–76)
NRBC BLD AUTO-RTO: 0 /100 WBC (ref 0–0.2)
PLATELET # BLD AUTO: 269 10*3/MM3 (ref 140–450)
PMV BLD AUTO: 11.5 FL (ref 6–12)
RBC # BLD AUTO: 4.65 10*6/MM3 (ref 3.77–5.28)
WBC # BLD AUTO: 4.27 10*3/MM3 (ref 3.4–10.8)

## 2021-07-16 PROCEDURE — 86003 ALLG SPEC IGE CRUDE XTRC EA: CPT

## 2021-07-16 PROCEDURE — 85025 COMPLETE CBC W/AUTO DIFF WBC: CPT

## 2021-07-16 PROCEDURE — 82785 ASSAY OF IGE: CPT

## 2021-07-16 PROCEDURE — 36415 COLL VENOUS BLD VENIPUNCTURE: CPT

## 2021-07-21 LAB
A ALTERNATA IGE QN: <0.1 KU/L
A FUMIGATUS IGE QN: <0.1 KU/L
AMER ROACH IGE QN: <0.1 KU/L
BAHIA GRASS IGE QN: <0.1 KU/L
BERMUDA GRASS IGE QN: <0.1 KU/L
BOXELDER IGE QN: <0.1 KU/L
C HERBARUM IGE QN: <0.1 KU/L
CAT DANDER IGE QN: 0.44 KU/L
CMN PIGWEED IGE QN: <0.1 KU/L
COMMON RAGWEED IGE QN: <0.1 KU/L
CONV CLASS DESCRIPTION: ABNORMAL
D FARINAE IGE QN: 0.28 KU/L
D PTERONYSS IGE QN: 0.24 KU/L
DOG DANDER IGE QN: <0.1 KU/L
ENGL PLANTAIN IGE QN: <0.1 KU/L
HAZELNUT POLN IGE QN: <0.1 KU/L
IGE SERPL-ACNC: 34 IU/ML (ref 6–495)
JOHNSON GRASS IGE QN: <0.1 KU/L
KENT BLUE GRASS IGE QN: <0.1 KU/L
LONDON PLANE IGE QN: <0.1 KU/L
M RACEMOSUS IGE QN: <0.1 KU/L
MT JUNIPER IGE QN: <0.1 KU/L
MUGWORT IGE QN: <0.1 KU/L
NETTLE IGE QN: <0.1 KU/L
P NOTATUM IGE QN: <0.1 KU/L
S BOTRYOSUM IGE QN: <0.1 KU/L
SHEEP SORREL IGE QN: <0.1 KU/L
SWEET GUM IGE QN: <0.1 KU/L
WHITE ELM IGE QN: <0.1 KU/L
WHITE HICKORY IGE QN: <0.1 KU/L
WHITE MULBERRY IGE QN: <0.1 KU/L
WHITE OAK IGE QN: <0.1 KU/L

## 2021-07-22 NOTE — PROGRESS NOTES
Please call the patient regarding her lab result.  She is allergic to several allergens including dust mite and cat dander.  Otherwise her labs were normal.

## 2021-08-12 NOTE — PROGRESS NOTES
Patient is having some issues with side effects from Dulera. Per Elvia switch patient to alternative.

## 2021-08-26 ENCOUNTER — OFFICE VISIT (OUTPATIENT)
Dept: ORTHOPEDIC SURGERY | Facility: CLINIC | Age: 61
End: 2021-08-26

## 2021-08-26 ENCOUNTER — PREP FOR SURGERY (OUTPATIENT)
Dept: OTHER | Facility: HOSPITAL | Age: 61
End: 2021-08-26

## 2021-08-26 VITALS
HEIGHT: 66 IN | SYSTOLIC BLOOD PRESSURE: 113 MMHG | WEIGHT: 255 LBS | BODY MASS INDEX: 40.98 KG/M2 | DIASTOLIC BLOOD PRESSURE: 88 MMHG | HEART RATE: 68 BPM

## 2021-08-26 DIAGNOSIS — E66.01 CLASS 3 SEVERE OBESITY WITH BODY MASS INDEX (BMI) OF 40.0 TO 44.9 IN ADULT, UNSPECIFIED OBESITY TYPE, UNSPECIFIED WHETHER SERIOUS COMORBIDITY PRESENT (HCC): ICD-10-CM

## 2021-08-26 DIAGNOSIS — M17.11 PRIMARY OSTEOARTHRITIS OF RIGHT KNEE: Primary | ICD-10-CM

## 2021-08-26 PROCEDURE — 20610 DRAIN/INJ JOINT/BURSA W/O US: CPT | Performed by: ORTHOPAEDIC SURGERY

## 2021-08-26 PROCEDURE — 99214 OFFICE O/P EST MOD 30 MIN: CPT | Performed by: ORTHOPAEDIC SURGERY

## 2021-08-26 RX ORDER — LIDOCAINE HYDROCHLORIDE 10 MG/ML
3 INJECTION, SOLUTION EPIDURAL; INFILTRATION; INTRACAUDAL; PERINEURAL
Status: COMPLETED | OUTPATIENT
Start: 2021-08-26 | End: 2021-08-26

## 2021-08-26 RX ORDER — TRIAMCINOLONE ACETONIDE 40 MG/ML
40 INJECTION, SUSPENSION INTRA-ARTICULAR; INTRAMUSCULAR
Status: COMPLETED | OUTPATIENT
Start: 2021-08-26 | End: 2021-08-26

## 2021-08-26 RX ADMIN — TRIAMCINOLONE ACETONIDE 40 MG: 40 INJECTION, SUSPENSION INTRA-ARTICULAR; INTRAMUSCULAR at 10:45

## 2021-08-26 RX ADMIN — LIDOCAINE HYDROCHLORIDE 3 ML: 10 INJECTION, SOLUTION EPIDURAL; INFILTRATION; INTRACAUDAL; PERINEURAL at 10:45

## 2021-08-26 NOTE — PROGRESS NOTES
Procedure   Large Joint Arthrocentesis: R knee  Date/Time: 8/26/2021 10:45 AM  Consent given by: patient  Site marked: site marked  Timeout: Immediately prior to procedure a time out was called to verify the correct patient, procedure, equipment, support staff and site/side marked as required   Supporting Documentation  Indications: pain   Procedure Details  Location: knee - R knee  Preparation: Patient was prepped and draped in the usual sterile fashion  Needle size: 23 G  Approach: anterolateral  Medications administered: 3 mL lidocaine PF 1% 1 %; 40 mg triamcinolone acetonide 40 MG/ML  Patient tolerance: patient tolerated the procedure well with no immediate complications

## 2021-08-26 NOTE — PROGRESS NOTES
"    [facility]        Subjective     CC: Follow-up (3 month f/u Primary osteoarthritis of right knee; completed Orthovisc series 05.20.2021)      HPI    Janet Obrien is a 61 y.o. female.  Patient is wheeled in today with crutches and hand for acute worsening of her right knee arthritis.  Completed an Orthovisc series about 3 months ago.  She tells me that she stood up and could not bear weight on her right knee over the weekend.    Overall, patient's symptoms are worsening    ROS:    Constiutional:Pt denies fever, chills, nausea, or vomiting.  MSK:as above        Objective      Past Medical History  Past Medical History:   Diagnosis Date   • Anxiety    • Arthritis    • Asthma    • Back pain    • Cancer (CMS/HCC)    • Chronic bronchitis (CMS/HCC)    • Chronic headaches    • Depression    • Diabetes mellitus (CMS/HCC)     type 2   • Fibromyalgia    • High triglycerides    • History of sinus problem     as child   • Hyperlipidemia    • Hypertension    • Hyperthyroidism 1980   • Hypothyroidism 1990   • Migraine    • MVP (mitral valve prolapse)    • OA (osteoarthritis)    • Peripheral autonomic neuropathy    • Pleurisy    • Pneumonia    • Rheumatism    • RLS (restless legs syndrome)    • Skin cancer     upper lip   • Sleep apnea    • Thyroid disease          Physical Exam  /88   Pulse 68   Ht 167.6 cm (65.98\")   Wt 116 kg (255 lb)   BMI 41.18 kg/m²     Body mass index is 41.18 kg/m².    Patient is well nourished and well developed.        Ortho Exam  Patient has pain with full extension and full flexion.  Range of motion 20-90 today  Pain is out of proportion to what one would expect  No induration or increased warmth of the knee  No significant effusion  No erythema    Imaging/Labs/EMG Reviewed:  Imaging Results (Last 24 Hours)     ** No results found for the last 24 hours. **        We reviewed x-rays in the office today.  They confirm severe medial and patellofemoral " compartment arthritis    Assessment    Assessment:  1. Primary osteoarthritis of right knee    2. Class 3 severe obesity with body mass index (BMI) of 40.0 to 44.9 in adult, unspecified obesity type, unspecified whether serious comorbidity present (CMS/[facility])        Plan:  1. Recommend over the counter anti-inflammatories for pain and/or swelling  2. Severe osteoarthritis in a patient with a BMI of 41--we had a lengthy discussion with the patient regarding treatment options and alternatives.  We counseled her about weight loss.  She needs to have her BMI under 40 to qualify for arthroplasty.  Patient lives by herself and is taking care of her elderly mother.  She has sons that do not live with her.  She has a son who lives in [place] and she is planning on moving there at some point in the near future.  Given the fact that she has very little help, I would like for her to arrange for help for at least 2 to 3 weeks after surgery.  She does not have reasonable transportation after surgery but home health is not a reliable option as well.  I told her she needs to get some help lined up before arthroplasty can be considered.  The weight loss is another consideration.  An injection will be given today.    Procedure Note:    I discussed with the patient the potential benefits of performing a therapeutic injections as well as potential risks including but not limited to infection, swelling, pain, bleeding, bruising, nerve/vessel damage, skin color changes, transient elevation in blood glucose levels, and fat atrophy. After informed consent and after the areas were prepped with chlorhexadine soap, ethyl chloride was used to numb the skin. Via the anterolateral approach, 3mL of 1% lidocaine followed by 40mg of Kenalog were each injected into the right knee joint. The patient tolerated the procedure well. There were no complications. A sterile dressing was placed over the injection sites.        Dayo Chavez,  MD  08/26/21  13:07 KATHYT      Dragon disclaimer:  Much of this encounter note is an electronic transcription/translation of spoken language to printed text. The electronic translation of spoken language may permit erroneous, or at times, nonsensical words or phrases to be inadvertently transcribed; Although I have reviewed the note for such errors, some may still exist.

## 2021-09-17 ENCOUNTER — TELEPHONE (OUTPATIENT)
Dept: ORTHOPEDIC SURGERY | Facility: CLINIC | Age: 61
End: 2021-09-17

## 2021-09-17 NOTE — TELEPHONE ENCOUNTER
Dr. Chavez-please see message below and advise. Thanks!    Spoke with pt who reports that her cortisone injection has already worn off and she is in a lot of pain and can barely walk; I explained to her that typically there is a 3 month minimum in between cortisone injections but she is stating that she can't wait until the end of November. I asked her what she was taking for pain and she reports taking Percocet 10mg and takes ibuprofen but doesn't feel like it does anything. I told her I would send JRT a message but I didn't think we could repeat the injection at the 6 week macario like she is requesting the week of 10-4 but that I would get back with her.    Also told her it was too early to repeat gel injections.    Ledy      10-13 thru 10-19

## 2021-09-17 NOTE — TELEPHONE ENCOUNTER
Provider: KEVIN  Caller: BETSY JAMESON  Relationship to Patient: PATIENT  Pharmacy: N/A  Phone Number: [phone]  Reason for Call: PATIENT IS CALLING TO SCHEDULE A CORTISONE INJECTION RT KNEE; PATIENT WOULD LIKE A DATE 10.4.21-10.6.21  When was the patient last seen: 8.26.21

## 2021-09-20 NOTE — TELEPHONE ENCOUNTER
Spoke with pt to let her know JRT stated it would be too early for repeat cortisone injections that first week of October like pt was requesting and that we would have to wait minimum 3 months for a repeat injection which would be the end of November. Pt was concerned and I told her she could talk to her PCP about different anti-inflammatory options or pain control options since ibuprofen doesn't seem to help and since she is already of Percocet. She understood.    Ledy

## 2021-10-07 ENCOUNTER — HOSPITAL ENCOUNTER (EMERGENCY)
Facility: HOSPITAL | Age: 61
Discharge: LEFT BEFORE DISCHARGE | End: 2021-10-07
Attending: EMERGENCY MEDICINE | Admitting: EMERGENCY MEDICINE

## 2021-10-07 VITALS
RESPIRATION RATE: 18 BRPM | DIASTOLIC BLOOD PRESSURE: 105 MMHG | HEIGHT: 66 IN | TEMPERATURE: 97.8 F | WEIGHT: 259 LBS | SYSTOLIC BLOOD PRESSURE: 171 MMHG | HEART RATE: 68 BPM | BODY MASS INDEX: 41.62 KG/M2 | OXYGEN SATURATION: 97 %

## 2021-10-07 DIAGNOSIS — R42 DIZZINESS: Primary | ICD-10-CM

## 2021-10-07 LAB
ALBUMIN SERPL-MCNC: 4.1 G/DL (ref 3.5–5.2)
ALBUMIN/GLOB SERPL: 1.8 G/DL
ALP SERPL-CCNC: 116 U/L (ref 39–117)
ALT SERPL W P-5'-P-CCNC: 24 U/L (ref 1–33)
ANION GAP SERPL CALCULATED.3IONS-SCNC: 6.7 MMOL/L (ref 5–15)
AST SERPL-CCNC: 21 U/L (ref 1–32)
BASOPHILS # BLD AUTO: 0.05 10*3/MM3 (ref 0–0.2)
BASOPHILS NFR BLD AUTO: 0.9 % (ref 0–1.5)
BILIRUB SERPL-MCNC: 0.4 MG/DL (ref 0–1.2)
BUN SERPL-MCNC: 11 MG/DL (ref 8–23)
BUN/CREAT SERPL: 13.3 (ref 7–25)
CALCIUM SPEC-SCNC: 9.7 MG/DL (ref 8.6–10.5)
CHLORIDE SERPL-SCNC: 104 MMOL/L (ref 98–107)
CO2 SERPL-SCNC: 28.3 MMOL/L (ref 22–29)
CREAT SERPL-MCNC: 0.83 MG/DL (ref 0.57–1)
DEPRECATED RDW RBC AUTO: 39.9 FL (ref 37–54)
EOSINOPHIL # BLD AUTO: 0.19 10*3/MM3 (ref 0–0.4)
EOSINOPHIL NFR BLD AUTO: 3.3 % (ref 0.3–6.2)
ERYTHROCYTE [DISTWIDTH] IN BLOOD BY AUTOMATED COUNT: 13.2 % (ref 12.3–15.4)
GFR SERPL CREATININE-BSD FRML MDRD: 70 ML/MIN/1.73
GLOBULIN UR ELPH-MCNC: 2.3 GM/DL
GLUCOSE SERPL-MCNC: 126 MG/DL (ref 65–99)
HCT VFR BLD AUTO: 39.3 % (ref 34–46.6)
HGB BLD-MCNC: 13.1 G/DL (ref 12–15.9)
HOLD SPECIMEN: NORMAL
HOLD SPECIMEN: NORMAL
IMM GRANULOCYTES # BLD AUTO: 0.02 10*3/MM3 (ref 0–0.05)
IMM GRANULOCYTES NFR BLD AUTO: 0.3 % (ref 0–0.5)
LYMPHOCYTES # BLD AUTO: 1.88 10*3/MM3 (ref 0.7–3.1)
LYMPHOCYTES NFR BLD AUTO: 32.9 % (ref 19.6–45.3)
MCH RBC QN AUTO: 27.5 PG (ref 26.6–33)
MCHC RBC AUTO-ENTMCNC: 33.3 G/DL (ref 31.5–35.7)
MCV RBC AUTO: 82.4 FL (ref 79–97)
MONOCYTES # BLD AUTO: 0.49 10*3/MM3 (ref 0.1–0.9)
MONOCYTES NFR BLD AUTO: 8.6 % (ref 5–12)
NEUTROPHILS NFR BLD AUTO: 3.09 10*3/MM3 (ref 1.7–7)
NEUTROPHILS NFR BLD AUTO: 54 % (ref 42.7–76)
NRBC BLD AUTO-RTO: 0 /100 WBC (ref 0–0.2)
PLATELET # BLD AUTO: 303 10*3/MM3 (ref 140–450)
PMV BLD AUTO: 9.7 FL (ref 6–12)
POTASSIUM SERPL-SCNC: 3.5 MMOL/L (ref 3.5–5.2)
PROT SERPL-MCNC: 6.4 G/DL (ref 6–8.5)
RBC # BLD AUTO: 4.77 10*6/MM3 (ref 3.77–5.28)
SODIUM SERPL-SCNC: 139 MMOL/L (ref 136–145)
WBC # BLD AUTO: 5.72 10*3/MM3 (ref 3.4–10.8)
WHOLE BLOOD HOLD SPECIMEN: NORMAL
WHOLE BLOOD HOLD SPECIMEN: NORMAL

## 2021-10-07 PROCEDURE — 96375 TX/PRO/DX INJ NEW DRUG ADDON: CPT

## 2021-10-07 PROCEDURE — 25010000002 ONDANSETRON PER 1 MG: Performed by: EMERGENCY MEDICINE

## 2021-10-07 PROCEDURE — 25010000002 DIAZEPAM PER 5 MG: Performed by: EMERGENCY MEDICINE

## 2021-10-07 PROCEDURE — 93005 ELECTROCARDIOGRAM TRACING: CPT | Performed by: EMERGENCY MEDICINE

## 2021-10-07 PROCEDURE — 25010000002 DIPHENHYDRAMINE PER 50 MG: Performed by: PHYSICIAN ASSISTANT

## 2021-10-07 PROCEDURE — 96374 THER/PROPH/DIAG INJ IV PUSH: CPT

## 2021-10-07 PROCEDURE — 85025 COMPLETE CBC W/AUTO DIFF WBC: CPT | Performed by: EMERGENCY MEDICINE

## 2021-10-07 PROCEDURE — 99283 EMERGENCY DEPT VISIT LOW MDM: CPT

## 2021-10-07 PROCEDURE — 25010000002 METOCLOPRAMIDE PER 10 MG: Performed by: PHYSICIAN ASSISTANT

## 2021-10-07 PROCEDURE — 80053 COMPREHEN METABOLIC PANEL: CPT | Performed by: EMERGENCY MEDICINE

## 2021-10-07 RX ORDER — SODIUM CHLORIDE 0.9 % (FLUSH) 0.9 %
10 SYRINGE (ML) INJECTION AS NEEDED
Status: DISCONTINUED | OUTPATIENT
Start: 2021-10-07 | End: 2021-10-08 | Stop reason: HOSPADM

## 2021-10-07 RX ORDER — DIPHENHYDRAMINE HYDROCHLORIDE 50 MG/ML
25 INJECTION INTRAMUSCULAR; INTRAVENOUS ONCE
Status: COMPLETED | OUTPATIENT
Start: 2021-10-07 | End: 2021-10-07

## 2021-10-07 RX ORDER — ONDANSETRON 2 MG/ML
4 INJECTION INTRAMUSCULAR; INTRAVENOUS ONCE
Status: COMPLETED | OUTPATIENT
Start: 2021-10-07 | End: 2021-10-07

## 2021-10-07 RX ORDER — METOCLOPRAMIDE HYDROCHLORIDE 5 MG/ML
10 INJECTION INTRAMUSCULAR; INTRAVENOUS ONCE
Status: COMPLETED | OUTPATIENT
Start: 2021-10-07 | End: 2021-10-07

## 2021-10-07 RX ORDER — MECLIZINE HYDROCHLORIDE 25 MG/1
25 TABLET ORAL EVERY 6 HOURS PRN
Qty: 20 TABLET | Refills: 0 | Status: SHIPPED | OUTPATIENT
Start: 2021-10-07

## 2021-10-07 RX ORDER — DIAZEPAM 5 MG/ML
5 INJECTION, SOLUTION INTRAMUSCULAR; INTRAVENOUS ONCE
Status: COMPLETED | OUTPATIENT
Start: 2021-10-07 | End: 2021-10-07

## 2021-10-07 RX ADMIN — METOCLOPRAMIDE 10 MG: 5 INJECTION, SOLUTION INTRAMUSCULAR; INTRAVENOUS at 22:36

## 2021-10-07 RX ADMIN — ONDANSETRON 4 MG: 2 INJECTION INTRAMUSCULAR; INTRAVENOUS at 21:41

## 2021-10-07 RX ADMIN — SODIUM CHLORIDE, PRESERVATIVE FREE 10 ML: 5 INJECTION INTRAVENOUS at 21:42

## 2021-10-07 RX ADMIN — DIPHENHYDRAMINE HYDROCHLORIDE 25 MG: 50 INJECTION INTRAMUSCULAR; INTRAVENOUS at 22:37

## 2021-10-07 RX ADMIN — DIAZEPAM 5 MG: 5 INJECTION, SOLUTION INTRAMUSCULAR; INTRAVENOUS at 21:42

## 2021-10-08 NOTE — ED PROVIDER NOTES
Subjective   Chief Complaint: Dizziness  History of Present Illness: 61-year-old female comes in for evaluation above complaint.  She states around 3 PM she had acute onset of the sensation of room spinning with vomiting.  She has a history of vertigo and states this feels similar.  No headache.  No chest pain or shortness of breath.  No recent illness.  No medicines taken prior to arrival.  Onset: 3 PM  Timing: Ongoing  Exacerbating / Alleviating factors: Turning head and changing position  Associated symptoms: vomiting      Nurses Notes reviewed and agree, including vitals, allergies, social history and prior medical history.          Review of Systems   Constitutional: Negative.    HENT: Negative.    Eyes: Negative.    Respiratory: Negative.  Negative for shortness of breath.    Cardiovascular: Negative.  Negative for chest pain.   Gastrointestinal: Positive for nausea and vomiting. Negative for abdominal pain.   Genitourinary: Negative.    Musculoskeletal: Negative.    Skin: Negative.    Neurological: Positive for dizziness. Negative for syncope, speech difficulty, weakness, numbness and headaches.   Psychiatric/Behavioral: Negative.        Past Medical History:   Diagnosis Date   • Anxiety    • Arthritis    • Asthma    • Back pain    • Cancer (HCC)    • Chronic bronchitis (HCC)    • Chronic headaches    • Depression    • Diabetes mellitus (HCC)     type 2   • Fibromyalgia    • High triglycerides    • History of sinus problem     as child   • Hyperlipidemia    • Hypertension    • Hyperthyroidism 1980   • Hypothyroidism 1990   • Migraine    • MVP (mitral valve prolapse)    • OA (osteoarthritis)    • Peripheral autonomic neuropathy    • Pleurisy    • Pneumonia    • Rheumatism    • RLS (restless legs syndrome)    • Skin cancer     upper lip   • Sleep apnea    • Thyroid disease        Allergies   Allergen Reactions   • Penicillins Angioedema   • Chlorhexidine Rash   • Darvon [Propoxyphene] Unknown (See Comments)      Doesn't remember   • Sulfa Antibiotics Rash       Past Surgical History:   Procedure Laterality Date   • APPENDECTOMY  's   • CATARACT EXTRACTION W/ INTRAOCULAR LENS IMPLANT Right 2019    Procedure: CATARACT PHACO EXTRACTION WITH INTRAOCULAR LENS IMPLANT RIGHT;  Surgeon: Carl Babb MD;  Location: [facility];  Service: Ophthalmology   • CATARACT EXTRACTION W/ INTRAOCULAR LENS IMPLANT Left 10/7/2019    Procedure: CATARACT PHACO EXTRACTION WITH INTRAOCULAR LENS IMPLANT LEFT;  Surgeon: Carl Babb MD;  Location: [facility];  Service: Ophthalmology   •  SECTION  , 1997    x 2    • DILATATION AND CURETTAGE     • HYSTERECTOMY      ''still have one ovary''   • KNEE SURGERY Right 2021    arthroscopy and partial medial meniscectomy Dr. Chavez   • THYROIDECTOMY     • TONSILLECTOMY AND ADENOIDECTOMY         Family History   Problem Relation Age of Onset   • Hypertension Mother    • Heart attack Mother    • Fibromyalgia Mother    • Hyperlipidemia Mother    • Thyroid disease Mother    • Irritable bowel syndrome Mother    • Heart attack Father    • Cancer Brother    • Stomach cancer Brother    • Diabetes Maternal Aunt    • Cancer Maternal Grandmother    • Thyroid disease Maternal Grandmother    • Cancer Paternal Grandmother    • Colon cancer Neg Hx    • Cirrhosis Neg Hx    • Liver cancer Neg Hx    • Liver disease Neg Hx        Social History     Socioeconomic History   • Marital status:      Spouse name: Not on file   • Number of children: Not on file   • Years of education: Not on file   • Highest education level: Not on file   Tobacco Use   • Smoking status: Former Smoker     Packs/day: 0.25     Years: 15.00     Pack years: 3.75     Types: Cigarettes     Start date:      Quit date: 2004     Years since quittin.2   • Smokeless tobacco: Never Used   Vaping Use   • Vaping Use: Never used   Substance and Sexual Activity   • Alcohol use: No   • Drug use:  No   • Sexual activity: Defer           Objective   Physical Exam  Vitals and nursing note reviewed.   Constitutional:       General: She is not in acute distress.     Appearance: Normal appearance. She is obese. She is not ill-appearing, toxic-appearing or diaphoretic.   HENT:      Head: Normocephalic and atraumatic.      Right Ear: Tympanic membrane normal.      Left Ear: Tympanic membrane normal.      Nose: Nose normal.   Eyes:      Extraocular Movements: Extraocular movements intact.      Pupils: Pupils are equal, round, and reactive to light.   Cardiovascular:      Rate and Rhythm: Normal rate and regular rhythm.   Pulmonary:      Effort: Pulmonary effort is normal.      Breath sounds: Normal breath sounds.   Abdominal:      General: Abdomen is flat.      Palpations: Abdomen is soft.   Musculoskeletal:         General: Normal range of motion.      Cervical back: Normal range of motion.   Skin:     General: Skin is warm and dry.   Neurological:      General: No focal deficit present.      Mental Status: She is alert and oriented to person, place, and time. Mental status is at baseline.      GCS: GCS eye subscore is 4. GCS verbal subscore is 5. GCS motor subscore is 6.      Cranial Nerves: Cranial nerves are intact.      Sensory: Sensation is intact.      Motor: Motor function is intact.      Coordination: Coordination is intact.   Psychiatric:         Mood and Affect: Mood normal.         Behavior: Behavior normal.         Procedures           ED Course  ED Course as of Oct 07 2302   Thu Oct 07, 2021   2125 EKG interpreted by me: Sinus rhythm, normal rate, no acute ST/T wave changes, some low-voltage QRS complexes, this is an atypical EKG    [MP]      ED Course User Index  [MP] Morris Gardner MD                                           [facility]  Patient wishing to leave AGAINST MEDICAL ADVICE.  She states the medicines are not helping and she has to get home and her son has to work tomorrow.  At this point I  will recommend a CT scan of her head given fact that her symptoms are not improving although they do soon quite consistent with vertigo.  Given the fact that she is not willing to let us obtain a CT scan she wishes to leave AGAINST MEDICAL ADVICE.  Final diagnoses:   Dizziness       ED Disposition  ED Disposition     ED Disposition Condition Comment    Noe Mcmullen MD  [address] [facility] [address]  [phone]    Schedule an appointment as soon as possible for a visit       [facility] Emergency Department  [address] [address] [phone]    If symptoms worsen         Medication List      New Prescriptions    meclizine 25 MG tablet  Commonly known as: ANTIVERT  Take 1 tablet by mouth Every 6 (Six) Hours As Needed for Dizziness.           Where to Get Your Medications      These medications were sent to [facility] [address] [place] [address] - [address] [facility] - [phone]  - [phone]  [address] [facility] [address]    Phone: [phone]   · meclizine 25 MG tablet          Dave Oliver PA-C  10/07/21 5062

## 2021-10-08 NOTE — ED NOTES
PT states she needs to sign out because her son had to work in the morning and the meds aren't working. ANAY Acosta informed and recommended imaging of head, pt. States she need to go home. Pt. Will sign out CHER.      Renetta Ventura RN  10/07/21 5599

## 2021-10-08 NOTE — DISCHARGE INSTRUCTIONS
Although your symptoms do seem consistent with vertigo we were concerned that they did not improve with usual treatments.  We would recommend a CT scan of your head but understand that you want to leave at this time.  We have sent some medicine into the pharmacy for you and if your symptoms worsen at all or do not improve please return to the ER so we can further evaluate you.

## 2021-11-02 ENCOUNTER — TELEPHONE (OUTPATIENT)
Dept: GASTROENTEROLOGY | Facility: CLINIC | Age: 61
End: 2021-11-02

## 2021-11-02 NOTE — TELEPHONE ENCOUNTER
CALLED PATIENT TO SCHEDULE COLONOSCOPY. PATIENT ASKS FOR A RETURN CALL ON Friday TO SCHEDULE. SHE IS OUT OF TOWN RIGHT NOW.

## 2021-11-08 DIAGNOSIS — K62.5 RECTAL BLEEDING: ICD-10-CM

## 2021-11-08 DIAGNOSIS — R10.30 LOWER ABDOMINAL PAIN: Primary | ICD-10-CM

## 2021-11-08 RX ORDER — SODIUM CHLORIDE 9 MG/ML
30 INJECTION, SOLUTION INTRAVENOUS CONTINUOUS PRN
Status: CANCELLED | OUTPATIENT
Start: 2021-11-08

## 2021-11-08 RX ORDER — MAGNESIUM CARB/ALUMINUM HYDROX 105-160MG
TABLET,CHEWABLE ORAL
Qty: 296 ML | Refills: 0 | Status: SHIPPED | OUTPATIENT
Start: 2021-11-08 | End: 2022-09-01

## 2021-11-08 RX ORDER — BISACODYL 5 MG/1
TABLET, DELAYED RELEASE ORAL
Qty: 4 TABLET | Refills: 0 | Status: SHIPPED | OUTPATIENT
Start: 2021-11-08 | End: 2022-04-07

## 2021-11-08 RX ORDER — POLYETHYLENE GLYCOL 3350 17 G/17G
POWDER, FOR SOLUTION ORAL
Qty: 238 G | Refills: 0 | Status: ON HOLD | OUTPATIENT
Start: 2021-11-08 | End: 2023-03-15

## 2021-11-12 ENCOUNTER — TELEPHONE (OUTPATIENT)
Dept: ORTHOPEDIC SURGERY | Facility: CLINIC | Age: 61
End: 2021-11-12

## 2021-11-16 ENCOUNTER — OFFICE VISIT (OUTPATIENT)
Dept: ORTHOPEDIC SURGERY | Facility: CLINIC | Age: 61
End: 2021-11-16

## 2021-11-16 VITALS
HEIGHT: 66 IN | DIASTOLIC BLOOD PRESSURE: 78 MMHG | WEIGHT: 257.94 LBS | HEART RATE: 76 BPM | BODY MASS INDEX: 41.45 KG/M2 | SYSTOLIC BLOOD PRESSURE: 138 MMHG

## 2021-11-16 DIAGNOSIS — M18.12 ARTHRITIS OF CARPOMETACARPAL (CMC) JOINT OF LEFT THUMB: ICD-10-CM

## 2021-11-16 DIAGNOSIS — M79.642 LEFT HAND PAIN: Primary | ICD-10-CM

## 2021-11-16 PROCEDURE — 99213 OFFICE O/P EST LOW 20 MIN: CPT | Performed by: PHYSICIAN ASSISTANT

## 2021-11-16 NOTE — PROGRESS NOTES
[facility] Note        Subjective     Pain of the Left Hand      HPI    Janet Obrien is a 61 y.o. female.  Patient returns today with a new problem.  She is here with complaints of left thumb pain.  She has had pain for years.  She has had prior surgery on her right thumb.  She reports pain is 8/10 and throbbing and stabbing.  She has pain with any lifting any motion of the joint.  She takes Percocet 10 for her back which does not help her thumb.  She has had prior injection and bracing.  Here for further evaluation.    Past Medical History:   Diagnosis Date   • Anxiety    • Arthritis    • Asthma    • Back pain    • Cancer (HCC)    • Chronic bronchitis (HCC)    • Chronic headaches    • Depression    • Diabetes mellitus (HCC)     type 2   • Fibromyalgia    • High triglycerides    • History of sinus problem     as child   • Hyperlipidemia    • Hypertension    • Hyperthyroidism    • Hypothyroidism    • Migraine    • MVP (mitral valve prolapse)    • OA (osteoarthritis)    • Peripheral autonomic neuropathy    • Pleurisy    • Pneumonia    • Rheumatism    • RLS (restless legs syndrome)    • Skin cancer     upper lip   • Sleep apnea    • Thyroid disease       Past Surgical History:   Procedure Laterality Date   • APPENDECTOMY     • CATARACT EXTRACTION W/ INTRAOCULAR LENS IMPLANT Right 2019    Procedure: CATARACT PHACO EXTRACTION WITH INTRAOCULAR LENS IMPLANT RIGHT;  Surgeon: Carl Babb MD;  Location: [facility];  Service: Ophthalmology   • CATARACT EXTRACTION W/ INTRAOCULAR LENS IMPLANT Left 10/7/2019    Procedure: CATARACT PHACO EXTRACTION WITH INTRAOCULAR LENS IMPLANT LEFT;  Surgeon: Carl Babb MD;  Location: [facility];  Service: Ophthalmology   •  SECTION  , 1997    x 2    • DILATATION AND CURETTAGE     • HYSTERECTOMY      ''still have one ovary''   • KNEE SURGERY Right 2021    arthroscopy and partial medial meniscectomy   Kathy   • THYROIDECTOMY     • TONSILLECTOMY AND ADENOIDECTOMY        Family History   Problem Relation Age of Onset   • Hypertension Mother    • Heart attack Mother    • Fibromyalgia Mother    • Hyperlipidemia Mother    • Thyroid disease Mother    • Irritable bowel syndrome Mother    • Heart attack Father    • Cancer Brother    • Stomach cancer Brother    • Diabetes Maternal Aunt    • Cancer Maternal Grandmother    • Thyroid disease Maternal Grandmother    • Cancer Paternal Grandmother    • Colon cancer Neg Hx    • Cirrhosis Neg Hx    • Liver cancer Neg Hx    • Liver disease Neg Hx      Social History     Socioeconomic History   • Marital status:    Tobacco Use   • Smoking status: Former Smoker     Packs/day: 0.25     Years: 15.00     Pack years: 3.75     Types: Cigarettes     Start date:      Quit date: 2004     Years since quittin.3   • Smokeless tobacco: Never Used   Vaping Use   • Vaping Use: Never used   Substance and Sexual Activity   • Alcohol use: No   • Drug use: No   • Sexual activity: Defer      Current Outpatient Medications on File Prior to Visit   Medication Sig Dispense Refill   • atorvastatin (LIPITOR) 80 MG tablet Take 80 mg by mouth Daily.     • bisacodyl (DULCOLAX) 5 MG EC tablet Take as directed for colon prep 4 tablet 0   • Calcium Carbonate-Vit D-Min (CALCIUM 1200 PO) Take  by mouth.     • celecoxib (CeleBREX) 200 MG capsule      • Cholecalciferol (vitamin D3) 125 MCG (5000 UT) tablet tablet      • Cyanocobalamin (VITAMIN B 12 PO) Take  by mouth.     • cyclobenzaprine (FLEXERIL) 10 MG tablet Take 1 tablet by mouth 3 (Three) Times a Day As Needed for Muscle Spasms. 21 tablet 0   • docusate sodium (COLACE) 250 MG capsule Take 250 mg by mouth 2 (two) times a day.     • DULoxetine (CYMBALTA) 60 MG capsule Take 60 mg by mouth Daily.     • estrogens, conjugated, (PREMARIN) 0.45 MG tablet Take 0.5 mg by mouth Daily. Take daily for 21 days then do not take for 7 days.      • fluticasone-salmeterol (Advair Diskus) 250-50 MCG/DOSE DISKUS Inhale 1 puff 2 (Two) Times a Day. 60 each 5   • hydrochlorothiazide (HYDRODIURIL) 25 MG tablet Take 1 tablet by mouth daily.     • Lancets (ONETOUCH DELICA PLUS [id number]) misc 1 each 3 (Three) Times a Day. 100 each 11   • loratadine (CLARITIN) 10 MG tablet Take 10 mg by mouth Daily.     • magnesium citrate 1.745 GM/30ML solution solution Use as directed for colonoscopy prep. 296 mL 0   • meclizine (ANTIVERT) 25 MG tablet Take 1 tablet by mouth Every 6 (Six) Hours As Needed for Dizziness. 20 tablet 0   • ONETOUCH VERIO test strip Test Three Times Daily 100 each 12   • oxyCODONE-acetaminophen (Percocet)  MG per tablet Take 1 tablet by mouth Every 8 (Eight) Hours As Needed for Moderate Pain  or Severe Pain . 15 tablet 0   • phentermine (ADIPEX-P) 37.5 MG tablet Take 37.5 mg by mouth Daily.     • polyethylene glycol (MiraLax) 17 GM/SCOOP powder Take as directed for colonoscopy prep 238 g 0   • Polyethylene Glycol powder Take 1 cap full (17 grams) in 8 oz of noncarbonated beverage and drink once daily 500 g 3   • Semaglutide,0.25 or 0.5MG/DOS, (Ozempic, 0.25 or 0.5 MG/DOSE,) 2 MG/1.5ML solution pen-injector Inject 0.5 mg under the skin into the appropriate area as directed 1 (One) Time Per Week. 2 pen 11   • sennosides-docusate sodium (SENOKOT-S) 8.6-50 MG tablet Take 1 tablet by mouth Daily.     • Synthroid 175 MCG tablet Take 1 tablet by mouth Daily. 30 tablet 11   • topiramate (TOPAMAX) 100 MG tablet Take 100 mg by mouth Daily.  3   • Ventolin  (90 Base) MCG/ACT inhaler Inhale 2 puffs Every 4 (Four) Hours As Needed for Wheezing. 18 g 3   • SYNTHROID 200 MCG tablet Take 1 tablet by mouth Daily. 30 tablet 11     No current facility-administered medications on file prior to visit.      Allergies   Allergen Reactions   • Penicillins Angioedema   • Chlorhexidine Rash   • Darvon [Propoxyphene] Unknown (See Comments)     Doesn't remember   •  "Sulfa Antibiotics Rash          Review of Systems   Constitutional: Negative.    HENT: Negative.    Eyes: Negative.    Respiratory: Negative.    Cardiovascular: Negative.    Gastrointestinal: Negative.    Endocrine: Negative.    Genitourinary: Negative.    Musculoskeletal: Positive for arthralgias.   Skin: Negative.    Allergic/Immunologic: Negative.    Neurological: Negative.    Hematological: Negative.    Psychiatric/Behavioral: Negative.         I reviewed the patient's chief complaint, history of present illness, review of systems, past medical history, surgical history, family history, social history, medications and allergy list.        Objective      Physical Exam  /78   Pulse 76   Ht 167.6 cm (65.98\")   Wt 117 kg (257 lb 15 oz)   BMI 41.65 kg/m²     Body mass index is 41.65 kg/m².    General  Mental Status - alert  General Appearance - cooperative, well groomed, not in acute distress  Orientation - Oriented X3  Build & Nutrition - well developed and well nourished  Posture - normal posture         Ortho Exam  Peripheral Vascular   Bilateral Upper Extremity    No cyanotic nail beds    Pink nail beds and rapid capillary refill   Palpation    Radial Pulse - Bilaterally normal    Neurologic   Sensory: Light touch intact- Left hand       Left Upper Extremity    Left wrist extensors: 5/5    Left wrist flexors: 5/5    Left intrinsics: 5/5    Musculoskeletal      Left Elbow    Forearm supination: AROM - 90 degrees    Forearm pronation: AROM - 90 degrees     Inspection and Palpation   Left Wrist      Tenderness -none    Swelling - none    Crepitus - none    Muscle tone - no atrophy        ROJM:      Left Wrist    Flexion: AROM - 90 degrees    Extension: AROM - 90 degrees     Deformities, Malalignments, Discrepancies    None     Functional Testing   Left Wrist    Tinel's Sign-- negative    Phalen's Sign-- negative    Carpal Compression Test-- negative    Finklestein's Test-- negative    Thumb CMC " joint--positive grind test     Strength and Tone    Left  strength: good         Hand Exam:        Tender at the thumb CMC joint with positive grind test.    Full range of motion of the thumb MCPJ and IPJ left    Full range of motion of the index finger MCPJ, PIPJ and DIPJ  left    Full range of motion of the middle finger MCPJ, PIPJ and DIPJ left    Full range of motion of the ring finger MCPJ, PIPJ and DIPJ left    Full range of motion of the small finger MCPJ, PIPJ and DIPJ left    FDS, FDP and extensor tendons are intact in the index, middle, ring and small fingers left    FPJ and extensor tendons are intact in the thumb.    No palpable triggering          Imaging/Studies  Imaging Results (Last 24 Hours)     Procedure Component Value Units Date/Time    XR Hand 3+ View Left [[id number]] Resulted: 11/16/21 1417     Updated: 11/16/21 1417    Narrative:      Left Hand X-Ray    Indication: Pain    Views:  AP, Lateral, and Oblique     Comparison: None    Findings:  No fracture  No bony lesion  Normal soft tissues  Severe thumb CMC arthritis.  Diffuse arthritis noted throughout the IP   joints and at the index and long digit MCPs.    Impression:   Degenerative changes throughout the left hand as noted above.  No acute   bony abnormality.              Assessment    Assessment:  1. Left hand pain    2. Arthritis of carpometacarpal (CMC) joint of left thumb          Plan:  1. Recommend over-the-counter medication as needed for discomfort  2. Left thumb CMC arthritis.  Patient has had prior conservative treatment with bracing, injection, topical and oral anti-inflammatories.  She is having persisting pain.  I explained that we do not have a hand specialist here at this clinic.  I will refer her to [facility].  Plan today is referral to [facility] hand surgery.  We will see her back as needed for her thumb.            Shanice Puga PA-C  11/17/21  14:17 EST

## 2021-11-17 ENCOUNTER — OFFICE VISIT (OUTPATIENT)
Dept: PULMONOLOGY | Facility: CLINIC | Age: 61
End: 2021-11-17

## 2021-11-17 VITALS
WEIGHT: 262 LBS | OXYGEN SATURATION: 97 % | HEART RATE: 73 BPM | HEIGHT: 66 IN | RESPIRATION RATE: 18 BRPM | DIASTOLIC BLOOD PRESSURE: 76 MMHG | SYSTOLIC BLOOD PRESSURE: 132 MMHG | BODY MASS INDEX: 42.11 KG/M2

## 2021-11-17 DIAGNOSIS — G47.33 OSA (OBSTRUCTIVE SLEEP APNEA): ICD-10-CM

## 2021-11-17 DIAGNOSIS — J45.30 MILD PERSISTENT ASTHMA WITHOUT COMPLICATION: ICD-10-CM

## 2021-11-17 DIAGNOSIS — E66.01 MORBID OBESITY, UNSPECIFIED OBESITY TYPE (HCC): ICD-10-CM

## 2021-11-17 DIAGNOSIS — Z86.16 HISTORY OF COVID-19: ICD-10-CM

## 2021-11-17 DIAGNOSIS — R06.02 SHORTNESS OF BREATH: Primary | ICD-10-CM

## 2021-11-17 PROCEDURE — 99214 OFFICE O/P EST MOD 30 MIN: CPT | Performed by: INTERNAL MEDICINE

## 2021-11-17 RX ORDER — ALBUTEROL SULFATE 90 UG/1
2 AEROSOL, METERED RESPIRATORY (INHALATION) EVERY 4 HOURS PRN
Qty: 18 G | Refills: 3 | Status: SHIPPED | OUTPATIENT
Start: 2021-11-17 | End: 2022-04-18 | Stop reason: SDUPTHER

## 2021-11-17 NOTE — PROGRESS NOTES
"  Chief Complaint   Patient presents with   • Follow-up   • Shortness of Breath       Subjective   Janet Obrien is a 61 y.o. female.     History of Present Illness   Patient was evaluated today for follow up of asthma, history of CoVid (1 year ago) and shortness of breath. Patient does not report any recent exacerbations requiring emergency room visits or hospitalizations.    Patient is compliant with pulmonary medicines, as prescribed. she is currently on Advair. she is using the rescue inhalers minimally.     She is using her \"mouth piece\" for sleep apnea and feels that it works well for her.       The following portions of the patient's history were reviewed and updated as appropriate: allergies, current medications, past family history, past medical history, past social history and past surgical history.    Review of Systems   Constitutional: Negative for chills and fever.   HENT: Negative for rhinorrhea, sinus pressure, sinus pain, sneezing and sore throat.    Respiratory: Positive for cough, shortness of breath and wheezing.    Psychiatric/Behavioral: Positive for sleep disturbance.       Objective   Visit Vitals  /76   Pulse 73   Resp 18   Ht 167.6 cm (65.98\")   Wt 119 kg (262 lb)   SpO2 97%   BMI 42.31 kg/m²       Physical Exam  Vitals reviewed.   Constitutional:       Appearance: She is well-developed.   HENT:      Head: Normocephalic and atraumatic.   Eyes:      Extraocular Movements: Extraocular movements intact.   Cardiovascular:      Rate and Rhythm: Normal rate.   Pulmonary:      Comments: Somewhat hyperresonant to percussion.  Somewhat decreased air entry.  No obvious wheezing noted.   Musculoskeletal:      Cervical back: Neck supple.   Neurological:      Mental Status: She is alert.         Assessment/Plan   Diagnoses and all orders for this visit:    1. Shortness of breath (Primary)    2. Mild persistent asthma without complication    3. History of COVID-19    4. LINDSAY (obstructive " sleep apnea)  -     Ambulatory Referral to Bariatric Surgery    5. Morbid obesity, unspecified obesity type (HCC)  -     Ambulatory Referral to Bariatric Surgery    Other orders  -     Ventolin  (90 Base) MCG/ACT inhaler; Inhale 2 puffs Every 4 (Four) Hours As Needed for Wheezing.  Dispense: 18 g; Refill: 3           Return in about 5 months (around 4/17/2022) for Recheck, For Elvia Townsend).    DISCUSSION (if any):  It appears that her symptoms of asthma are under good control with the current regimen.    Patient's medications for underlying asthma were reviewed in great detail.    She will be started on Singulair, to optimize asthma treatment.     Patient was informed about the black box warning for Singulair regarding suicidal thoughts and tendencies.  she denies any ongoing issues for now.     Any needed adjustments to her pulmonary medications, either for clinical or insurance coverage reasons, have been made and are reflected in the orders.    Compliance with medications stressed.     Side effects of prescribed medications discussed with the patient.    The need to continue to be aware of triggers that may cause asthma exacerbation versus progression of disease, was also discussed.    I have discussed asthma action plan with her.    If the patient continues to do fairly well as far as controlled asthma symptoms are concerned, we may recommend that she discontinue using Advair.    Will refer to Bariatric surgery program due to morbid obesity and LINDSAY.    Reviewed last sleep study, from 11/2016. Showed mild LINDSAY with AHI of 13/hour.     Continue oral device for LINDSAY.        Dictated utilizing Dragon dictation.    This document was electronically signed by Jose Rao MD on 11/17/21 at 15:20 EST

## 2021-11-19 ENCOUNTER — HOSPITAL ENCOUNTER (EMERGENCY)
Facility: HOSPITAL | Age: 61
Discharge: HOME | End: 2021-11-19
Attending: EMERGENCY MEDICINE | Admitting: EMERGENCY MEDICINE

## 2021-11-19 ENCOUNTER — APPOINTMENT (OUTPATIENT)
Dept: GENERAL RADIOLOGY | Facility: HOSPITAL | Age: 61
End: 2021-11-19

## 2021-11-19 VITALS
RESPIRATION RATE: 18 BRPM | BODY MASS INDEX: 41.46 KG/M2 | SYSTOLIC BLOOD PRESSURE: 138 MMHG | WEIGHT: 258 LBS | HEIGHT: 66 IN | HEART RATE: 66 BPM | TEMPERATURE: 97.8 F | OXYGEN SATURATION: 99 % | DIASTOLIC BLOOD PRESSURE: 88 MMHG

## 2021-11-19 DIAGNOSIS — J45.21 MILD INTERMITTENT ASTHMA WITH EXACERBATION: Primary | ICD-10-CM

## 2021-11-19 LAB
ALBUMIN SERPL-MCNC: 3.7 G/DL (ref 3.5–5.2)
ALBUMIN/GLOB SERPL: 1.7 G/DL
ALP SERPL-CCNC: 106 U/L (ref 39–117)
ALT SERPL W P-5'-P-CCNC: 19 U/L (ref 1–33)
ANION GAP SERPL CALCULATED.3IONS-SCNC: 8.4 MMOL/L (ref 5–15)
AST SERPL-CCNC: 20 U/L (ref 1–32)
BASOPHILS # BLD AUTO: 0.05 10*3/MM3 (ref 0–0.2)
BASOPHILS NFR BLD AUTO: 1 % (ref 0–1.5)
BILIRUB SERPL-MCNC: 0.3 MG/DL (ref 0–1.2)
BUN SERPL-MCNC: 11 MG/DL (ref 8–23)
BUN/CREAT SERPL: 16.7 (ref 7–25)
CALCIUM SPEC-SCNC: 9.5 MG/DL (ref 8.6–10.5)
CHLORIDE SERPL-SCNC: 106 MMOL/L (ref 98–107)
CO2 SERPL-SCNC: 22.6 MMOL/L (ref 22–29)
CREAT SERPL-MCNC: 0.66 MG/DL (ref 0.57–1)
DEPRECATED RDW RBC AUTO: 41.7 FL (ref 37–54)
EOSINOPHIL # BLD AUTO: 0.2 10*3/MM3 (ref 0–0.4)
EOSINOPHIL NFR BLD AUTO: 3.9 % (ref 0.3–6.2)
ERYTHROCYTE [DISTWIDTH] IN BLOOD BY AUTOMATED COUNT: 13.4 % (ref 12.3–15.4)
GFR SERPL CREATININE-BSD FRML MDRD: 91 ML/MIN/1.73
GLOBULIN UR ELPH-MCNC: 2.2 GM/DL
GLUCOSE SERPL-MCNC: 120 MG/DL (ref 65–99)
HCT VFR BLD AUTO: 38.1 % (ref 34–46.6)
HGB BLD-MCNC: 12.5 G/DL (ref 12–15.9)
IMM GRANULOCYTES # BLD AUTO: 0.02 10*3/MM3 (ref 0–0.05)
IMM GRANULOCYTES NFR BLD AUTO: 0.4 % (ref 0–0.5)
LYMPHOCYTES # BLD AUTO: 1.23 10*3/MM3 (ref 0.7–3.1)
LYMPHOCYTES NFR BLD AUTO: 24.2 % (ref 19.6–45.3)
MCH RBC QN AUTO: 27.8 PG (ref 26.6–33)
MCHC RBC AUTO-ENTMCNC: 32.8 G/DL (ref 31.5–35.7)
MCV RBC AUTO: 84.9 FL (ref 79–97)
MONOCYTES # BLD AUTO: 0.51 10*3/MM3 (ref 0.1–0.9)
MONOCYTES NFR BLD AUTO: 10 % (ref 5–12)
NEUTROPHILS NFR BLD AUTO: 3.07 10*3/MM3 (ref 1.7–7)
NEUTROPHILS NFR BLD AUTO: 60.5 % (ref 42.7–76)
NRBC BLD AUTO-RTO: 0 /100 WBC (ref 0–0.2)
PLATELET # BLD AUTO: 264 10*3/MM3 (ref 140–450)
PMV BLD AUTO: 10.5 FL (ref 6–12)
POTASSIUM SERPL-SCNC: 3.8 MMOL/L (ref 3.5–5.2)
PROT SERPL-MCNC: 5.9 G/DL (ref 6–8.5)
RBC # BLD AUTO: 4.49 10*6/MM3 (ref 3.77–5.28)
SODIUM SERPL-SCNC: 137 MMOL/L (ref 136–145)
WBC NRBC COR # BLD: 5.08 10*3/MM3 (ref 3.4–10.8)

## 2021-11-19 PROCEDURE — 96374 THER/PROPH/DIAG INJ IV PUSH: CPT

## 2021-11-19 PROCEDURE — 85025 COMPLETE CBC W/AUTO DIFF WBC: CPT | Performed by: EMERGENCY MEDICINE

## 2021-11-19 PROCEDURE — 99283 EMERGENCY DEPT VISIT LOW MDM: CPT

## 2021-11-19 PROCEDURE — 71045 X-RAY EXAM CHEST 1 VIEW: CPT

## 2021-11-19 PROCEDURE — 25010000002 METHYLPREDNISOLONE PER 125 MG: Performed by: EMERGENCY MEDICINE

## 2021-11-19 PROCEDURE — 80053 COMPREHEN METABOLIC PANEL: CPT | Performed by: EMERGENCY MEDICINE

## 2021-11-19 PROCEDURE — 93005 ELECTROCARDIOGRAM TRACING: CPT | Performed by: EMERGENCY MEDICINE

## 2021-11-19 RX ORDER — PREDNISONE 20 MG/1
20 TABLET ORAL 2 TIMES DAILY
Qty: 10 TABLET | Refills: 0 | Status: SHIPPED | OUTPATIENT
Start: 2021-11-19 | End: 2021-11-24

## 2021-11-19 RX ORDER — METHYLPREDNISOLONE SODIUM SUCCINATE 125 MG/2ML
125 INJECTION, POWDER, LYOPHILIZED, FOR SOLUTION INTRAMUSCULAR; INTRAVENOUS ONCE
Status: COMPLETED | OUTPATIENT
Start: 2021-11-19 | End: 2021-11-19

## 2021-11-19 RX ADMIN — METHYLPREDNISOLONE SODIUM SUCCINATE 125 MG: 125 INJECTION, POWDER, FOR SOLUTION INTRAMUSCULAR; INTRAVENOUS at 09:26

## 2021-11-19 NOTE — ED PROVIDER NOTES
Subjective   61-year-old female presents to the ED with chief complaint of asthma attack.  Patient states that she was working around the house when she went from indoors warm temperature to outside in the cold and it triggered an asthma attack.  She states that she started feeling short of breath chest tightness and wheezing and coughing almost immediately.  She took a few puffs on her inhaler without any improvement of her symptoms.  She did receive a breathing treatment via EMS and states that her symptoms are currently completely resolved.  No chest pain.  No lightheadedness or dizziness.  No current shortness of breath cough or wheezing.  No other complaints at this time.          Review of Systems   Constitutional: Negative for chills, fatigue and fever.   Respiratory: Positive for cough, shortness of breath and wheezing.    Cardiovascular: Negative for chest pain and palpitations.   All other systems reviewed and are negative.      Past Medical History:   Diagnosis Date   • Anxiety    • Arthritis    • Asthma    • Back pain    • Cancer (HCC)    • Chronic bronchitis (HCC)    • Chronic headaches    • Depression    • Diabetes mellitus (HCC)     type 2   • Fibromyalgia    • High triglycerides    • History of sinus problem     as child   • Hyperlipidemia    • Hypertension    • Hyperthyroidism 1980   • Hypothyroidism 1990   • Migraine    • MVP (mitral valve prolapse)    • OA (osteoarthritis)    • Peripheral autonomic neuropathy    • Pleurisy    • Pneumonia    • Rheumatism    • RLS (restless legs syndrome)    • Skin cancer     upper lip   • Sleep apnea    • Thyroid disease        Allergies   Allergen Reactions   • Penicillins Angioedema   • Chlorhexidine Rash   • Darvon [Propoxyphene] Unknown (See Comments)     Doesn't remember   • Sulfa Antibiotics Rash       Past Surgical History:   Procedure Laterality Date   • APPENDECTOMY  1970's   • CATARACT EXTRACTION W/ INTRAOCULAR LENS IMPLANT Right 9/16/2019    Procedure:  CATARACT PHACO EXTRACTION WITH INTRAOCULAR LENS IMPLANT RIGHT;  Surgeon: Carl Babb MD;  Location: [facility] OR;  Service: Ophthalmology   • CATARACT EXTRACTION W/ INTRAOCULAR LENS IMPLANT Left 10/7/2019    Procedure: CATARACT PHACO EXTRACTION WITH INTRAOCULAR LENS IMPLANT LEFT;  Surgeon: Carl Babb MD;  Location: [facility];  Service: Ophthalmology   •  SECTION  , 1997    x 2    • DILATATION AND CURETTAGE     • HYSTERECTOMY      ''still have one ovary''   • KNEE SURGERY Right 2021    arthroscopy and partial medial meniscectomy Dr. Chavez   • THYROIDECTOMY     • TONSILLECTOMY AND ADENOIDECTOMY         Family History   Problem Relation Age of Onset   • Hypertension Mother    • Heart attack Mother    • Fibromyalgia Mother    • Hyperlipidemia Mother    • Thyroid disease Mother    • Irritable bowel syndrome Mother    • Heart attack Father    • Cancer Brother    • Stomach cancer Brother    • Diabetes Maternal Aunt    • Cancer Maternal Grandmother    • Thyroid disease Maternal Grandmother    • Cancer Paternal Grandmother    • Colon cancer Neg Hx    • Cirrhosis Neg Hx    • Liver cancer Neg Hx    • Liver disease Neg Hx        Social History     Socioeconomic History   • Marital status:    Tobacco Use   • Smoking status: Former Smoker     Packs/day: 0.25     Years: 15.00     Pack years: 3.75     Types: Cigarettes     Start date:      Quit date: 2004     Years since quittin.3   • Smokeless tobacco: Never Used   Vaping Use   • Vaping Use: Never used   Substance and Sexual Activity   • Alcohol use: No   • Drug use: No   • Sexual activity: Defer           Objective   Physical Exam  Vitals and nursing note reviewed.   Constitutional:       General: She is not in acute distress.     Appearance: She is well-developed. She is not diaphoretic.   HENT:      Head: Normocephalic and atraumatic.      Nose: Nose normal.   Eyes:      Conjunctiva/sclera: Conjunctivae  normal.   Cardiovascular:      Rate and Rhythm: Normal rate and regular rhythm.   Pulmonary:      Effort: Pulmonary effort is normal. No respiratory distress.      Breath sounds: Normal breath sounds.   Abdominal:      General: There is no distension.      Palpations: Abdomen is soft.      Tenderness: There is no abdominal tenderness. There is no guarding.   Musculoskeletal:         General: No deformity.   Neurological:      Mental Status: She is alert and oriented to person, place, and time.      Cranial Nerves: No cranial nerve deficit.         Procedures           ED Course  ED Course as of 11/19/21 1745   Fri Nov 19, 2021   0948 EKG interpreted by me.  Sinus rhythm.  Rate of 65.  Right ventricular conduction delay.  No ST segment T wave changes.  Abnormal EKG [CG]      ED Course User Index  [CG] Chemo Smith, DO      PULSE OXIMETRY INTERPRETATION  Patient had a pulse ox of 99% on room air. This is a normal pulse oximetry reading.                                     MDM  61-year-old female presents to the ED complaining of asthma attack.  States that it completely resolved after EMS gave her a breathing treatment.  Chest x-ray was negative for acute process.  No wheezing while here in the ED.  Resting comfortably.  We did give her some steroids.  Pulse ox 99% on room air.  Monitored in the ED without return of her symptoms.  She is appropriate discharge follow-up outpatient as needed.  She is agreeable to this plan.    Final diagnoses:   Mild intermittent asthma with exacerbation       ED Disposition  ED Disposition     ED Disposition Condition Comment    Discharge Stable           Noe Davenport MD  [address] [place] [address] [phone]               Medication List      New Prescriptions    predniSONE 20 MG tablet  Commonly known as: DELTASONE  Take 1 tablet by mouth 2 (Two) Times a Day for 5 days.           Where to Get Your Medications      These medications were sent to [facility]  [address] - [place], [place] - [address] - [phone]  - [phone]  [address] [facility] [address]    Phone: [phone]   · predniSONE 20 MG tablet          Chemo Smith, DO  11/19/21 8148

## 2021-11-22 ENCOUNTER — TELEPHONE (OUTPATIENT)
Dept: ORTHOPEDIC SURGERY | Facility: CLINIC | Age: 61
End: 2021-11-22

## 2021-11-22 RX ORDER — MONTELUKAST SODIUM 10 MG/1
10 TABLET ORAL NIGHTLY
Qty: 30 TABLET | Refills: 5 | Status: SHIPPED | OUTPATIENT
Start: 2021-11-22 | End: 2022-04-18 | Stop reason: SDUPTHER

## 2021-11-22 NOTE — TELEPHONE ENCOUNTER
Provider: KESHAWN ROCHA PA-C  Caller: BETSY JAMESON/SELF  Phone Number: [phone]  Reason for Call: PATIENT IS SCHEDULED TO SEE [facility] 12 02 2021 AND [facility] IS REQUESTING FOR THE PRACTICE TO SEND A (DISC & REPORTS) SENT TO THEM - ASAP - PLEASE GIVE PATIENT A CALL & THANKS.

## 2021-11-22 NOTE — PROGRESS NOTES
Per Dr. Maira Mcclelland for this patient as he mentioned starting the patient on this medication in his note however he did not send it in to pharmacy.

## 2021-11-24 ENCOUNTER — TELEPHONE (OUTPATIENT)
Dept: ORTHOPEDIC SURGERY | Facility: CLINIC | Age: 61
End: 2021-11-24

## 2021-11-24 NOTE — TELEPHONE ENCOUNTER
Called and spoke with Pt to inform her that she would have to call [facility] ot get her Xray downloaded on a disc. Also asked her to call back with a good Fax # and who I should  attention the information to.

## 2021-11-24 NOTE — TELEPHONE ENCOUNTER
Caller: BETSY JAMESON    Relationship: SELF    Best call back number:     What form or medical record are you requesting: CHART NOTES FROM 11/16/21    Who is requesting this form or medical record from you: [facility]    How would you like to receive the form or medical records (pick-up, mail, fax): FAX  If fax, what is the fax number:   [phone]    Timeframe paperwork needed: ASAP

## 2021-12-08 ENCOUNTER — TELEPHONE (OUTPATIENT)
Dept: GASTROENTEROLOGY | Facility: CLINIC | Age: 61
End: 2021-12-08

## 2021-12-08 NOTE — TELEPHONE ENCOUNTER
Spoke to patient about colon on 12-9, wants to cancel at this time her mother has pneumonia,, will call back at later date to amanda

## 2022-01-16 ENCOUNTER — HOSPITAL ENCOUNTER (EMERGENCY)
Facility: HOSPITAL | Age: 62
Discharge: HOME | End: 2022-01-16
Attending: EMERGENCY MEDICINE | Admitting: EMERGENCY MEDICINE

## 2022-01-16 VITALS
OXYGEN SATURATION: 98 % | SYSTOLIC BLOOD PRESSURE: 138 MMHG | BODY MASS INDEX: 42.59 KG/M2 | WEIGHT: 265 LBS | RESPIRATION RATE: 20 BRPM | DIASTOLIC BLOOD PRESSURE: 86 MMHG | TEMPERATURE: 98.9 F | HEIGHT: 66 IN | HEART RATE: 98 BPM

## 2022-01-16 DIAGNOSIS — G89.18 ACUTE POST-OPERATIVE PAIN: Primary | ICD-10-CM

## 2022-01-16 PROCEDURE — 96372 THER/PROPH/DIAG INJ SC/IM: CPT

## 2022-01-16 PROCEDURE — 99283 EMERGENCY DEPT VISIT LOW MDM: CPT

## 2022-01-16 PROCEDURE — 25010000002 HYDROMORPHONE 1 MG/ML SOLUTION: Performed by: EMERGENCY MEDICINE

## 2022-01-16 RX ADMIN — HYDROMORPHONE HYDROCHLORIDE 1 MG: 1 INJECTION, SOLUTION INTRAMUSCULAR; INTRAVENOUS; SUBCUTANEOUS at 15:18

## 2022-01-16 NOTE — ED PROVIDER NOTES
Subjective   Chief Complaint: Left wrist pain  History of Present Illness: 61-year-old female present for evaluation of above complaint.  She had surgery on her left wrist on Friday at [facility].  She states that the nerve block wore off Friday evening she started having pain in her left wrist.  She states the Percocet 10 mg she is taking for this do not help with the pain.  She also noticed a little bit of redness to the volar left forearm just proximal to where the splint is and wanted to be sure she was not getting an infection.  She does complain of some intermittent right-sided chest wall discomfort worse with and 100% reproducible with movement   onset: Friday evening.  Timing: Persistently worsening  Exacerbating / Alleviating factors: Not helped by Percocet 10 mg tablets  Associated symptoms: Right-sided chest wall pain, no short of breath or cough      Nurses Notes reviewed and agree, including vitals, allergies, social history and prior medical history.          Review of Systems   Constitutional: Negative.    HENT: Negative.    Eyes: Negative.    Respiratory: Negative.  Negative for cough and shortness of breath.    Cardiovascular: Negative.    Gastrointestinal: Negative.    Genitourinary: Negative.    Musculoskeletal:        Left wrist pain, right chest wall pain   Skin: Negative.    Neurological: Negative.    Psychiatric/Behavioral: Negative.        Past Medical History:   Diagnosis Date   • Anxiety    • Arthritis    • Asthma    • Back pain    • Cancer (HCC)    • Chronic bronchitis (HCC)    • Chronic headaches    • Depression    • Diabetes mellitus (HCC)     type 2   • Fibromyalgia    • High triglycerides    • History of sinus problem     as child   • Hyperlipidemia    • Hypertension    • Hyperthyroidism 1980   • Hypothyroidism 1990   • Migraine    • MVP (mitral valve prolapse)    • OA (osteoarthritis)    • Peripheral autonomic neuropathy    • Pleurisy    • Pneumonia    • Rheumatism    •  RLS (restless legs syndrome)    • Skin cancer     upper lip   • Sleep apnea    • Thyroid disease        Allergies   Allergen Reactions   • Penicillins Angioedema   • Chlorhexidine Rash   • Darvon [Propoxyphene] Unknown (See Comments)     Doesn't remember   • Sulfa Antibiotics Rash       Past Surgical History:   Procedure Laterality Date   • APPENDECTOMY  s   • CATARACT EXTRACTION W/ INTRAOCULAR LENS IMPLANT Right 2019    Procedure: CATARACT PHACO EXTRACTION WITH INTRAOCULAR LENS IMPLANT RIGHT;  Surgeon: Carl Babb MD;  Location: [facility] OR;  Service: Ophthalmology   • CATARACT EXTRACTION W/ INTRAOCULAR LENS IMPLANT Left 10/7/2019    Procedure: CATARACT PHACO EXTRACTION WITH INTRAOCULAR LENS IMPLANT LEFT;  Surgeon: Carl Babb MD;  Location: [facility] OR;  Service: Ophthalmology   •  SECTION  , 1997    x 2    • DILATATION AND CURETTAGE     • HYSTERECTOMY      ''still have one ovary''   • KNEE SURGERY Right 2021    arthroscopy and partial medial meniscectomy Dr. Chavez   • THYROIDECTOMY     • TONSILLECTOMY AND ADENOIDECTOMY         Family History   Problem Relation Age of Onset   • Hypertension Mother    • Heart attack Mother    • Fibromyalgia Mother    • Hyperlipidemia Mother    • Thyroid disease Mother    • Irritable bowel syndrome Mother    • Heart attack Father    • Cancer Brother    • Stomach cancer Brother    • Diabetes Maternal Aunt    • Cancer Maternal Grandmother    • Thyroid disease Maternal Grandmother    • Cancer Paternal Grandmother    • Colon cancer Neg Hx    • Cirrhosis Neg Hx    • Liver cancer Neg Hx    • Liver disease Neg Hx        Social History     Socioeconomic History   • Marital status:    Tobacco Use   • Smoking status: Former Smoker     Packs/day: 0.25     Years: 15.00     Pack years: 3.75     Types: Cigarettes     Start date:      Quit date: 2004     Years since quittin.5   • Smokeless tobacco: Never Used   Vaping  Use   • Vaping Use: Never used   Substance and Sexual Activity   • Alcohol use: No   • Drug use: No   • Sexual activity: Defer           Objective   Physical Exam  Vitals and nursing note reviewed.   Constitutional:       Appearance: Normal appearance. She is obese.   HENT:      Head: Normocephalic and atraumatic.      Nose: Nose normal.   Eyes:      Extraocular Movements: Extraocular movements intact.   Cardiovascular:      Rate and Rhythm: Normal rate and regular rhythm.   Pulmonary:      Effort: Pulmonary effort is normal.      Breath sounds: Normal breath sounds.   Abdominal:      General: Abdomen is flat.   Musculoskeletal:      Cervical back: Normal range of motion.      Comments: Splint with Ace wrap on the left hand and wrist up to the mid forearm.   Skin:     Capillary Refill: Capillary refill takes less than 2 seconds.      Comments: Some very mild erythema to the left volar mid forearm just proximal to where the splint stops.  This does not extend under the splint and is not consistent with cellulitis or infection.  This is most consistent with an area where the splint is causing some local trauma.  Brisk capillary refill to the digits of the left hand with movement intact.  Sensation intact.   Neurological:      Mental Status: She is alert.   Psychiatric:         Mood and Affect: Mood normal.         Behavior: Behavior normal.         Procedures           ED Course                                                 MDM  1600  Patient requesting discharge home now.  I did discuss with her with her findings of right-sided chest wall pain and recent surgery would recommend a D-dimer to rule out PE.  She declines.  She states she does not want to stay any longer at this time and wishes to go home as the roads are getting bad.  I did explain to her that I cannot rule out PE and that results of one going untreated could result in death or worse and she expressed understanding.  She states if she gets any  worsening symptoms she will call ambulance but denies any chest pain right now.  No shortness of breath.  She states her left wrist pain is improved.  Final diagnoses:   Acute post-operative pain       ED Disposition  ED Disposition     ED Disposition Condition Comment    Discharge Stable           Noe Davenport MD  [address] [facility] [address] [phone]      As needed    [facility] Emergency Department  [address] [facility] [address] [phone]    If symptoms worsen or you have any return of chest pain or develop any shortness of breath.         Medication List      No changes were made to your prescriptions during this visit.          Dave Oliver PA-C  01/16/22 1606       Dave Oliver PA-C  01/16/22 1611

## 2022-01-28 RX ORDER — MOMETASONE FUROATE AND FORMOTEROL FUMARATE DIHYDRATE 200; 5 UG/1; UG/1
AEROSOL RESPIRATORY (INHALATION)
Qty: 39 G | Refills: 0 | OUTPATIENT
Start: 2022-01-28

## 2022-02-25 ENCOUNTER — TELEPHONE (OUTPATIENT)
Dept: ORTHOPEDIC SURGERY | Facility: CLINIC | Age: 62
End: 2022-02-25

## 2022-02-25 NOTE — TELEPHONE ENCOUNTER
Caller: BETSY JAMESON    Relationship to patient: SELF    Best call back number: [phone]    Chief complaint: BILATERAL KNEE PAIN    Type of visit: CORTISONE INJECTION    Requested date: ASAP    If rescheduling, when is the original appointment: N/A    Additional notes: N/A

## 2022-03-03 ENCOUNTER — TELEPHONE (OUTPATIENT)
Dept: ORTHOPEDIC SURGERY | Facility: CLINIC | Age: 62
End: 2022-03-03

## 2022-03-03 NOTE — TELEPHONE ENCOUNTER
Provider: DR GILLESPIE  Caller: BETSY JAMESON  Relationship to Patient: PATIENT     Phone Number: [phone]  Reason for Call: SCHEDULE KNEE INJECTIONS.  PATIENT WILL BE IN [place] ON 04/07/22 AND 04/08/2022 WOULD LIKE TO SCHEDULE IN THE AM .  PLEASE CALL TO SCHEDULE

## 2022-04-07 ENCOUNTER — OFFICE VISIT (OUTPATIENT)
Dept: ORTHOPEDIC SURGERY | Facility: CLINIC | Age: 62
End: 2022-04-07

## 2022-04-07 VITALS
SYSTOLIC BLOOD PRESSURE: 124 MMHG | DIASTOLIC BLOOD PRESSURE: 88 MMHG | RESPIRATION RATE: 18 BRPM | WEIGHT: 264 LBS | BODY MASS INDEX: 42.43 KG/M2 | HEIGHT: 66 IN

## 2022-04-07 DIAGNOSIS — M17.12 PRIMARY OSTEOARTHRITIS OF LEFT KNEE: Primary | ICD-10-CM

## 2022-04-07 DIAGNOSIS — M17.11 PRIMARY OSTEOARTHRITIS OF RIGHT KNEE: ICD-10-CM

## 2022-04-07 PROCEDURE — 20610 DRAIN/INJ JOINT/BURSA W/O US: CPT | Performed by: ORTHOPAEDIC SURGERY

## 2022-04-07 PROCEDURE — 99214 OFFICE O/P EST MOD 30 MIN: CPT | Performed by: ORTHOPAEDIC SURGERY

## 2022-04-07 RX ORDER — DULOXETIN HYDROCHLORIDE 60 MG/1
60 CAPSULE, DELAYED RELEASE ORAL DAILY
COMMUNITY
Start: 2021-11-19

## 2022-04-07 RX ORDER — HYDROCODONE BITARTRATE AND ACETAMINOPHEN 5; 325 MG/1; MG/1
TABLET ORAL
COMMUNITY
Start: 2022-03-17 | End: 2022-10-04

## 2022-04-07 RX ORDER — ESTRADIOL 0.5 MG/1
0.25 TABLET ORAL
COMMUNITY

## 2022-04-07 RX ORDER — TRIAMCINOLONE ACETONIDE 40 MG/ML
40 INJECTION, SUSPENSION INTRA-ARTICULAR; INTRAMUSCULAR
Status: COMPLETED | OUTPATIENT
Start: 2022-04-07 | End: 2022-04-07

## 2022-04-07 RX ORDER — LIDOCAINE HYDROCHLORIDE 10 MG/ML
3 INJECTION, SOLUTION EPIDURAL; INFILTRATION; INTRACAUDAL; PERINEURAL
Status: COMPLETED | OUTPATIENT
Start: 2022-04-07 | End: 2022-04-07

## 2022-04-07 RX ORDER — GABAPENTIN 300 MG/1
300 CAPSULE ORAL
COMMUNITY
Start: 2022-03-11 | End: 2022-10-04

## 2022-04-07 RX ADMIN — TRIAMCINOLONE ACETONIDE 40 MG: 40 INJECTION, SUSPENSION INTRA-ARTICULAR; INTRAMUSCULAR at 11:22

## 2022-04-07 RX ADMIN — LIDOCAINE HYDROCHLORIDE 3 ML: 10 INJECTION, SOLUTION EPIDURAL; INFILTRATION; INTRACAUDAL; PERINEURAL at 11:22

## 2022-04-07 NOTE — PROGRESS NOTES
Procedure   - Large Joint Arthrocentesis: bilateral knee on 4/7/2022 11:22 AM  Indications: pain  Details: 25 G needle, anterolateral approach  Medications (Right): 3 mL lidocaine PF 1% 1 %; 40 mg triamcinolone acetonide 40 MG/ML  Medications (Left): 40 mg triamcinolone acetonide 40 MG/ML; 3 mL lidocaine PF 1% 1 %  Outcome: tolerated well, no immediate complications  Procedure, treatment alternatives, risks and benefits explained, specific risks discussed. Consent was given by the patient. Immediately prior to procedure a time out was called to verify the correct patient, procedure, equipment, support staff and site/side marked as required. Patient was prepped and draped in the usual sterile fashion.

## 2022-04-07 NOTE — PROGRESS NOTES
[facility] Note        Subjective     Follow-up of the Right Knee and Pain of the Left Knee      HPI    Janet Obrien is a 61 y.o. female.  Patient here today for new problem regarding her left knee.  Has been seen in the past for her right knee where she has an end-stage medial and patellofemoral compartment knee.  Completed an Orthovisc series 2021.  Patient tells me that her left knee has bothered her quite a bit as of late.  She has central and lateral pain.  The knee will occasionally pop.  She feels like a lot of her symptoms are coming from compensation for her arthritic right knee.        Past Medical History:   Diagnosis Date   • Anxiety    • Arthritis    • Asthma    • Back pain    • Cancer (HCC)    • Chronic bronchitis (HCC)    • Chronic headaches    • Depression    • Diabetes mellitus (HCC)     type 2   • Fibromyalgia    • High triglycerides    • History of sinus problem     as child   • Hyperlipidemia    • Hypertension    • Hyperthyroidism    • Hypothyroidism    • Migraine    • MVP (mitral valve prolapse)    • OA (osteoarthritis)    • Peripheral autonomic neuropathy    • Pleurisy    • Pneumonia    • Rheumatism    • RLS (restless legs syndrome)    • Skin cancer     upper lip   • Sleep apnea    • Thyroid disease       Past Surgical History:   Procedure Laterality Date   • APPENDECTOMY     • CATARACT EXTRACTION W/ INTRAOCULAR LENS IMPLANT Right 2019    Procedure: CATARACT PHACO EXTRACTION WITH INTRAOCULAR LENS IMPLANT RIGHT;  Surgeon: Carl Babb MD;  Location: [facility] OR;  Service: Ophthalmology   • CATARACT EXTRACTION W/ INTRAOCULAR LENS IMPLANT Left 10/7/2019    Procedure: CATARACT PHACO EXTRACTION WITH INTRAOCULAR LENS IMPLANT LEFT;  Surgeon: Carl Babb MD;  Location: [facility] OR;  Service: Ophthalmology   •  SECTION  , 1997    x 2    • DILATATION AND CURETTAGE     • HYSTERECTOMY      ''still have one ovary''   •  KNEE SURGERY Right 2021    arthroscopy and partial medial meniscectomy Dr. Chavez   • THYROIDECTOMY     • TONSILLECTOMY AND ADENOIDECTOMY  1965      Family History   Problem Relation Age of Onset   • Hypertension Mother    • Heart attack Mother    • Fibromyalgia Mother    • Hyperlipidemia Mother    • Thyroid disease Mother    • Irritable bowel syndrome Mother    • Heart attack Father    • Cancer Brother    • Stomach cancer Brother    • Diabetes Maternal Aunt    • Cancer Maternal Grandmother    • Thyroid disease Maternal Grandmother    • Cancer Paternal Grandmother    • Colon cancer Neg Hx    • Cirrhosis Neg Hx    • Liver cancer Neg Hx    • Liver disease Neg Hx      Social History     Socioeconomic History   • Marital status:    Tobacco Use   • Smoking status: Former Smoker     Packs/day: 0.25     Years: 15.00     Pack years: 3.75     Types: Cigarettes     Start date:      Quit date: 2004     Years since quittin.7   • Smokeless tobacco: Never Used   Vaping Use   • Vaping Use: Never used   Substance and Sexual Activity   • Alcohol use: No   • Drug use: No   • Sexual activity: Defer      Current Outpatient Medications on File Prior to Visit   Medication Sig Dispense Refill   • atorvastatin (LIPITOR) 80 MG tablet Take 80 mg by mouth Daily.     • Calcium Carbonate-Vit D-Min (CALCIUM 1200 PO) Take  by mouth.     • celecoxib (CeleBREX) 200 MG capsule      • Cholecalciferol (vitamin D3) 125 MCG (5000 UT) tablet tablet      • cyclobenzaprine (FLEXERIL) 10 MG tablet Take 1 tablet by mouth 3 (Three) Times a Day As Needed for Muscle Spasms. 21 tablet 0   • DULoxetine (CYMBALTA) 60 MG capsule Take 60 mg by mouth.     • estradiol (ESTRACE) 0.5 MG tablet Take 0.25 mg by mouth.     • gabapentin (NEURONTIN) 300 MG capsule Take 300 mg by mouth every night at bedtime.     • hydrochlorothiazide (HYDRODIURIL) 25 MG tablet Take 1 tablet by mouth daily.     • HYDROcodone-acetaminophen (NORCO) 5-325 MG  per tablet      • meclizine (ANTIVERT) 25 MG tablet Take 1 tablet by mouth Every 6 (Six) Hours As Needed for Dizziness. 20 tablet 0   • montelukast (Singulair) 10 MG tablet Take 1 tablet by mouth Every Night. 30 tablet 5   • Semaglutide,0.25 or 0.5MG/DOS, (Ozempic, 0.25 or 0.5 MG/DOSE,) 2 MG/1.5ML solution pen-injector Inject 0.5 mg under the skin into the appropriate area as directed 1 (One) Time Per Week. 2 pen 11   • sennosides-docusate sodium (SENOKOT-S) 8.6-50 MG tablet Take 1 tablet by mouth Daily.     • Synthroid 175 MCG tablet Take 1 tablet by mouth Daily. 30 tablet 11   • Ventolin  (90 Base) MCG/ACT inhaler Inhale 2 puffs Every 4 (Four) Hours As Needed for Wheezing. 18 g 3   • Cyanocobalamin (VITAMIN B 12 PO) Take  by mouth.     • docusate sodium (COLACE) 250 MG capsule Take 250 mg by mouth 2 (two) times a day.     • estrogens, conjugated, (PREMARIN) 0.45 MG tablet Take 0.5 mg by mouth Daily. Take daily for 21 days then do not take for 7 days.     • Lancets (ONETOUCH DELICA PLUS [id number]) misc 1 each 3 (Three) Times a Day. 100 each 11   • loratadine (CLARITIN) 10 MG tablet Take 10 mg by mouth Daily.     • magnesium citrate 1.745 GM/30ML solution solution Use as directed for colonoscopy prep. 296 mL 0   • ONETOUCH VERIO test strip Test Three Times Daily 100 each 12   • oxyCODONE-acetaminophen (Percocet)  MG per tablet Take 1 tablet by mouth Every 8 (Eight) Hours As Needed for Moderate Pain  or Severe Pain . 15 tablet 0   • phentermine (ADIPEX-P) 37.5 MG tablet Take 37.5 mg by mouth Daily.     • polyethylene glycol (MiraLax) 17 GM/SCOOP powder Take as directed for colonoscopy prep 238 g 0   • Polyethylene Glycol powder Take 1 cap full (17 grams) in 8 oz of noncarbonated beverage and drink once daily 500 g 3   • SYNTHROID 200 MCG tablet Take 1 tablet by mouth Daily. 30 tablet 11   • topiramate (TOPAMAX) 100 MG tablet Take 100 mg by mouth Daily.  3   • [DISCONTINUED] bisacodyl (DULCOLAX) 5 MG EC  "tablet Take as directed for colon prep 4 tablet 0   • [DISCONTINUED] DULoxetine (CYMBALTA) 60 MG capsule Take 60 mg by mouth Daily.     • [DISCONTINUED] fluticasone-salmeterol (Advair Diskus) 250-50 MCG/DOSE DISKUS Inhale 1 puff 2 (Two) Times a Day. 60 each 5     No current facility-administered medications on file prior to visit.      Allergies   Allergen Reactions   • Penicillins Angioedema   • Cephalexin Itching   • Chlorhexidine Rash   • Darvon [Propoxyphene] Unknown (See Comments)     Doesn't remember   • Diazepam Other (See Comments)     Paradoxical effect/ hypes pt   • Sulfa Antibiotics Rash          Review of Systems     I reviewed the patient's chief complaint, history of present illness, review of systems, past medical history, surgical history, family history, social history, medications and allergy list.        Objective      Physical Exam  /88   Resp 18   Ht 167.6 cm (65.98\")   Wt 120 kg (264 lb)   BMI 42.63 kg/m²     Body mass index is 42.63 kg/m².    General  Mental Status - alert  General Appearance - cooperative, well groomed, not in acute distress  Orientation - Oriented X3  Build & Nutrition - well developed and well nourished  Posture - normal posture  Gait - normal gait       Ortho Exam      Musculoskeletal:  Global Assessment:  Overall assessment of Lower Extremity Muscle Strength and Tone:  Left quadriceps--5/5   Left hamstrings--5/5       Left tibialis anterior--5/5  Left gastroc-soleus--5/5  Left EHL --5/5    Lower Extremity:    Inspection and Palpation:  Left knee:  Tenderness:  Over the medial joint line and moderate severity  Effusion:  1+  Crepitus:  Positive  Pulses:  2+  Ecchymosis:  None  Warmth:  None     ROM:  Left:  Extension: 5     Flexion:120    Instability:    Left:    Lachman Test:  Negative   Varus stress test negative  Valgus stress test negative    Deformities/Malalignments/Discrepancies:    Left:  Genu Varum     Functional Testing:  Yuliana's test:  " Negative  Patella grind test:  Positive  Q-angle:  normal      Imaging/Studies  Imaging Results (Last 24 Hours)     Procedure Component Value Units Date/Time    XR Knee 4+ View Left [[id number]] Resulted: 04/07/22 1109     Updated: 04/07/22 1110    Narrative:      Knee X-Ray    Indication: Pain    Study:  Upright AP, Skiers, Lateral, and Sunrise views of Left knee(s)    Comparison: None    Findings:    Patient appears to have moderate to early severe degenerative changes in   the medial compartment.    There are mild degenerative changes in the lateral compartment.    There are severe changes in the patellofemoral compartment.    Patient has overall varus alignment.    Kellgren-Reece rdGrdrrdarddrderd:rd rd3rd Impression:   Moderate to early severe medial compartment and severe patellofemoral   compartment degnerative changes of the knee               Assessment    Assessment:  1. Primary osteoarthritis of left knee    2. Primary osteoarthritis of right knee        Plan:  1. Continue over-the-counter medication as needed for discomfort  2. Osteoarthritis left knee--moderate to early severe medial compartment and end-stage patellofemoral compartment disease.  Plan will be for corticosteroid injection today.  3. Osteoarthritis right knee--injection will be given today.  4. Patient has inquired today again about arthroplasty and her social support system is not good overall.  I do not believe [facility] a reasonable option for her either.  We have again recommended the patient to arrange for reliable help for 2 to 3 weeks after surgery to help with taking care of the patient and also with her pets and help with transportation to get her to and from therapy.    Procedure Note:    I discussed with the patient the potential benefits of performing a therapeutic injections as well as potential risks including but not limited to infection, swelling, pain, bleeding, bruising, nerve/vessel damage, skin color changes, transient  elevation in blood glucose levels, and fat atrophy. After informed consent and after the areas were prepped with chlorhexadine soap, ethyl chloride was used to numb the skin. Via the anterolateral approach, 3mL of 1% lidocaine followed by 40mg of Kenalog were each injected into the right and left knee joint. The patient tolerated the procedure well. There were no complications. A sterile dressing was placed over the injection sites.          Dayo Chavez MD  04/07/22  12:59 EDT      Dictated Utilizing Dragon Dictation.

## 2022-04-15 ENCOUNTER — TELEPHONE (OUTPATIENT)
Dept: ORTHOPEDIC SURGERY | Facility: CLINIC | Age: 62
End: 2022-04-15

## 2022-04-15 RX ORDER — IBUPROFEN 800 MG/1
800 TABLET ORAL 3 TIMES DAILY
Qty: 90 TABLET | Refills: 1 | Status: SHIPPED | OUTPATIENT
Start: 2022-04-15 | End: 2022-08-02

## 2022-04-15 NOTE — TELEPHONE ENCOUNTER
Patient would like a prescription strength anti-inflammatory prescribed for her.  Order placed for ibuprofen.    Please also let her know patient know that corticosteroid injections can take 1 to 2 weeks to begin working and sometimes up to 6 weeks for maximum effect.    Recommend ice, elevation.    If she does require stronger pain medication then she will need to go through her PCP.

## 2022-04-15 NOTE — TELEPHONE ENCOUNTER
I called patient, she states the left knee has been hurting since he gave her the cortisone injection last week. She said it is very stiff and she can hear the bones creaking in the knee when she bends her knee. Patient is not on any antiinflammatories currently. Would Mobic be an option? Please advise thank you!  Alea

## 2022-04-15 NOTE — TELEPHONE ENCOUNTER
I called patient and explained what Nena said to her and she expressed understanding, she will call with any further questions or concerns she may have.  Alea

## 2022-04-18 ENCOUNTER — OFFICE VISIT (OUTPATIENT)
Dept: PULMONOLOGY | Facility: CLINIC | Age: 62
End: 2022-04-18

## 2022-04-18 VITALS
HEART RATE: 67 BPM | WEIGHT: 266 LBS | RESPIRATION RATE: 14 BRPM | OXYGEN SATURATION: 97 % | SYSTOLIC BLOOD PRESSURE: 130 MMHG | BODY MASS INDEX: 42.95 KG/M2 | DIASTOLIC BLOOD PRESSURE: 84 MMHG | TEMPERATURE: 97.3 F

## 2022-04-18 DIAGNOSIS — G47.33 OSA (OBSTRUCTIVE SLEEP APNEA): ICD-10-CM

## 2022-04-18 DIAGNOSIS — J45.30 MILD PERSISTENT ASTHMA WITHOUT COMPLICATION: Primary | ICD-10-CM

## 2022-04-18 DIAGNOSIS — E66.01 CLASS 3 SEVERE OBESITY DUE TO EXCESS CALORIES WITH SERIOUS COMORBIDITY AND BODY MASS INDEX (BMI) OF 40.0 TO 44.9 IN ADULT: ICD-10-CM

## 2022-04-18 PROCEDURE — 99213 OFFICE O/P EST LOW 20 MIN: CPT | Performed by: NURSE PRACTITIONER

## 2022-04-18 RX ORDER — ALBUTEROL SULFATE 90 UG/1
2 AEROSOL, METERED RESPIRATORY (INHALATION) EVERY 4 HOURS PRN
Qty: 18 G | Refills: 3 | Status: SHIPPED | OUTPATIENT
Start: 2022-04-18 | End: 2022-08-04 | Stop reason: SDUPTHER

## 2022-04-18 RX ORDER — MONTELUKAST SODIUM 10 MG/1
10 TABLET ORAL NIGHTLY
Qty: 30 TABLET | Refills: 5 | Status: SHIPPED | OUTPATIENT
Start: 2022-04-18 | End: 2022-06-06

## 2022-04-18 NOTE — PROGRESS NOTES
Chief Complaint   Patient presents with   • Shortness of Breath         Subjective   Janet Obrien is a 61 y.o. female.     History of Present Illness   Patient is here today for follow up of asthma and shortness of breath.     Patient says that since the last office visit she has had no recent exacerbations. she denies any emergency room visits or hospitalizations, due to her asthma.     The patient says that she is compliant with pulmonary medicines, as prescribed. She has not needed to use the rescue inhaler since she was started on Singulair. She takes Singulair at night.     Patient states she is not using CPAP machine.      The following portions of the patient's history were reviewed and updated as appropriate: allergies, current medications, past family history, past medical history, past social history and past surgical history.    Review of Systems   Constitutional: Negative for chills.   HENT: Negative for sneezing.    Respiratory: Negative for shortness of breath.    Psychiatric/Behavioral: Negative for sleep disturbance.       Objective   Visit Vitals  /84 (BP Location: Right arm, Patient Position: Sitting, Cuff Size: Adult)   Pulse 67   Temp 97.3 °F (36.3 °C)   Resp 14   Wt 121 kg (266 lb)   SpO2 97%   BMI 42.95 kg/m²         Physical Exam  Vitals reviewed.   HENT:      Head: Atraumatic.      Mouth/Throat:      Mouth: Mucous membranes are moist.      Comments: Crowded oropharynx.   Eyes:      Extraocular Movements: Extraocular movements intact.   Neck:      Comments: Increased adipose tissue.  Cardiovascular:      Rate and Rhythm: Normal rate and regular rhythm.   Pulmonary:      Effort: Pulmonary effort is normal. No respiratory distress.   Abdominal:      Comments: Obese abdomen.   Musculoskeletal:      Comments: Gait normal.   Skin:     General: Skin is warm.   Neurological:      Mental Status: She is alert and oriented to person, place, and time.         Assessment/Plan   Diagnoses  and all orders for this visit:    1. Mild persistent asthma without complication (Primary)    2. LINDSAY (obstructive sleep apnea)    3. Class 3 severe obesity due to excess calories with serious comorbidity and body mass index (BMI) of 40.0 to 44.9 in adult ([facility])  Assessment & Plan:  Patient's (Body mass index is 42.95 kg/m².) indicates that they are morbidly obese (BMI > 40 or > 35 with obesity - related health condition) with health conditions that include obstructive sleep apnea and hypertension . Weight is unchanged. BMI is is above average; no BMI management plan is appropriate. We discussed portion control and increasing exercise. She was referred to bariatric surgery at her last visit.      Other orders  -     montelukast (Singulair) 10 MG tablet; Take 1 tablet by mouth Every Night.  Dispense: 30 tablet; Refill: 5  -     Ventolin  (90 Base) MCG/ACT inhaler; Inhale 2 puffs Every 4 (Four) Hours As Needed for Wheezing.  Dispense: 18 g; Refill: 3         Return if symptoms worsen or fail to improve.    DISCUSSION (if any):  Her last PFT did not show obstruction.     Her symptoms of asthma are under adequate control at this time with Singulair only.  She should continue Singulair and PRAVEENA as needed. She has not needed the PRAVEENA since she started Singulair and symptoms seems well controlled at this time.     I will release her back to her PCP and she can f/w us if symptoms worsen.    Patient's medications for underlying asthma were reviewed with her in great detail.    Any needed adjustments to her pulmonary medications, either for clinical or insurance coverage reasons, have been made and are reflected in the orders.    Side effects of prescribed medications discussed with the patient.    Asthma action plan with discussed with her.    The patient was asked to call this office if the symptoms worsen.    Her last sleep study showed mild LINDSAY with AHI of 13/hour. This was from 2016. She states she was ordered an  oral device for snoring but she has not picked this up yet.     She has not seen bariatrics.    Dictated utilizing Dragon dictation.    This document was electronically signed by NARA Roque April 18, 2022  15:36 EDT

## 2022-04-18 NOTE — ASSESSMENT & PLAN NOTE
Patient's (Body mass index is 42.95 kg/m².) indicates that they are morbidly obese (BMI > 40 or > 35 with obesity - related health condition) with health conditions that include obstructive sleep apnea and hypertension . Weight is unchanged. BMI is is above average; no BMI management plan is appropriate. We discussed portion control and increasing exercise. She was referred to bariatric surgery at her last visit.

## 2022-05-11 ENCOUNTER — TRANSCRIBE ORDERS (OUTPATIENT)
Dept: ADMINISTRATIVE | Facility: HOSPITAL | Age: 62
End: 2022-05-11

## 2022-05-11 DIAGNOSIS — Z12.31 VISIT FOR SCREENING MAMMOGRAM: Primary | ICD-10-CM

## 2022-05-20 ENCOUNTER — HOSPITAL ENCOUNTER (EMERGENCY)
Facility: HOSPITAL | Age: 62
Discharge: HOME | End: 2022-05-20
Attending: EMERGENCY MEDICINE | Admitting: EMERGENCY MEDICINE

## 2022-05-20 ENCOUNTER — APPOINTMENT (OUTPATIENT)
Dept: GENERAL RADIOLOGY | Facility: HOSPITAL | Age: 62
End: 2022-05-20

## 2022-05-20 VITALS
BODY MASS INDEX: 42.27 KG/M2 | WEIGHT: 263 LBS | SYSTOLIC BLOOD PRESSURE: 162 MMHG | DIASTOLIC BLOOD PRESSURE: 66 MMHG | HEIGHT: 66 IN | RESPIRATION RATE: 20 BRPM | HEART RATE: 85 BPM | OXYGEN SATURATION: 93 % | TEMPERATURE: 101.2 F

## 2022-05-20 DIAGNOSIS — J45.901 EXACERBATION OF ASTHMA, UNSPECIFIED ASTHMA SEVERITY, UNSPECIFIED WHETHER PERSISTENT: Primary | ICD-10-CM

## 2022-05-20 DIAGNOSIS — B34.8 PARAINFLUENZA INFECTION: ICD-10-CM

## 2022-05-20 LAB
A-A DO2: 47.3 MMHG
ALBUMIN SERPL-MCNC: 3.8 G/DL (ref 3.5–5.2)
ALBUMIN/GLOB SERPL: 1.5 G/DL
ALP SERPL-CCNC: 101 U/L (ref 39–117)
ALT SERPL W P-5'-P-CCNC: 20 U/L (ref 1–33)
ANION GAP SERPL CALCULATED.3IONS-SCNC: 10.4 MMOL/L (ref 5–15)
ARTERIAL PATENCY WRIST A: POSITIVE
AST SERPL-CCNC: 22 U/L (ref 1–32)
ATMOSPHERIC PRESS: 729 MMHG
B PARAPERT DNA SPEC QL NAA+PROBE: NOT DETECTED
B PERT DNA SPEC QL NAA+PROBE: NOT DETECTED
BASE EXCESS BLDA CALC-SCNC: 1.3 MMOL/L (ref 0–2)
BASOPHILS # BLD AUTO: 0.03 10*3/MM3 (ref 0–0.2)
BASOPHILS NFR BLD AUTO: 0.5 % (ref 0–1.5)
BDY SITE: ABNORMAL
BILIRUB SERPL-MCNC: 0.4 MG/DL (ref 0–1.2)
BUN SERPL-MCNC: 9 MG/DL (ref 8–23)
BUN/CREAT SERPL: 10.8 (ref 7–25)
C PNEUM DNA NPH QL NAA+NON-PROBE: NOT DETECTED
CALCIUM SPEC-SCNC: 9.5 MG/DL (ref 8.6–10.5)
CHLORIDE SERPL-SCNC: 102 MMOL/L (ref 98–107)
CO2 SERPL-SCNC: 24.6 MMOL/L (ref 22–29)
COHGB MFR BLD: 0.8 % (ref 0–2)
CREAT SERPL-MCNC: 0.83 MG/DL (ref 0.57–1)
D-LACTATE SERPL-SCNC: 1.6 MMOL/L (ref 0.5–2)
DEPRECATED RDW RBC AUTO: 43.1 FL (ref 37–54)
EGFRCR SERPLBLD CKD-EPI 2021: 80.8 ML/MIN/1.73
EOSINOPHIL # BLD AUTO: 0.03 10*3/MM3 (ref 0–0.4)
EOSINOPHIL NFR BLD AUTO: 0.5 % (ref 0.3–6.2)
ERYTHROCYTE [DISTWIDTH] IN BLOOD BY AUTOMATED COUNT: 14 % (ref 12.3–15.4)
FLUAV SUBTYP SPEC NAA+PROBE: NOT DETECTED
FLUBV RNA ISLT QL NAA+PROBE: NOT DETECTED
GLOBULIN UR ELPH-MCNC: 2.6 GM/DL
GLUCOSE SERPL-MCNC: 108 MG/DL (ref 65–99)
HADV DNA SPEC NAA+PROBE: NOT DETECTED
HCO3 BLDA-SCNC: 23.7 MMOL/L (ref 22–28)
HCOV 229E RNA SPEC QL NAA+PROBE: NOT DETECTED
HCOV HKU1 RNA SPEC QL NAA+PROBE: NOT DETECTED
HCOV NL63 RNA SPEC QL NAA+PROBE: NOT DETECTED
HCOV OC43 RNA SPEC QL NAA+PROBE: NOT DETECTED
HCT VFR BLD AUTO: 38.8 % (ref 34–46.6)
HCT VFR BLD CALC: 40.1 %
HGB BLD-MCNC: 12.9 G/DL (ref 12–15.9)
HMPV RNA NPH QL NAA+NON-PROBE: NOT DETECTED
HPIV1 RNA ISLT QL NAA+PROBE: NOT DETECTED
HPIV2 RNA SPEC QL NAA+PROBE: NOT DETECTED
HPIV3 RNA NPH QL NAA+PROBE: DETECTED
HPIV4 P GENE NPH QL NAA+PROBE: NOT DETECTED
IMM GRANULOCYTES # BLD AUTO: 0.02 10*3/MM3 (ref 0–0.05)
IMM GRANULOCYTES NFR BLD AUTO: 0.4 % (ref 0–0.5)
INHALED O2 CONCENTRATION: 21 %
LYMPHOCYTES # BLD AUTO: 0.73 10*3/MM3 (ref 0.7–3.1)
LYMPHOCYTES NFR BLD AUTO: 12.9 % (ref 19.6–45.3)
Lab: ABNORMAL
M PNEUMO IGG SER IA-ACNC: NOT DETECTED
MCH RBC QN AUTO: 28.2 PG (ref 26.6–33)
MCHC RBC AUTO-ENTMCNC: 33.2 G/DL (ref 31.5–35.7)
MCV RBC AUTO: 84.7 FL (ref 79–97)
METHGB BLD QL: 0.5 % (ref 0–1.5)
MODALITY: ABNORMAL
MONOCYTES # BLD AUTO: 0.64 10*3/MM3 (ref 0.1–0.9)
MONOCYTES NFR BLD AUTO: 11.3 % (ref 5–12)
NEUTROPHILS NFR BLD AUTO: 4.2 10*3/MM3 (ref 1.7–7)
NEUTROPHILS NFR BLD AUTO: 74.4 % (ref 42.7–76)
NOTE: ABNORMAL
NRBC BLD AUTO-RTO: 0 /100 WBC (ref 0–0.2)
NT-PROBNP SERPL-MCNC: 140.5 PG/ML (ref 0–900)
OXYHGB MFR BLDV: 93.1 % (ref 94–99)
PCO2 BLDA: 30.3 MM HG (ref 35–45)
PCO2 TEMP ADJ BLD: ABNORMAL MM[HG]
PH BLDA: 7.5 PH UNITS (ref 7.35–7.45)
PH, TEMP CORRECTED: ABNORMAL
PLATELET # BLD AUTO: 223 10*3/MM3 (ref 140–450)
PMV BLD AUTO: 9.8 FL (ref 6–12)
PO2 BLDA: 61.8 MM HG (ref 75–100)
PO2 TEMP ADJ BLD: ABNORMAL MM[HG]
POTASSIUM SERPL-SCNC: 3.2 MMOL/L (ref 3.5–5.2)
PROCALCITONIN SERPL-MCNC: 0.06 NG/ML (ref 0–0.25)
PROT SERPL-MCNC: 6.4 G/DL (ref 6–8.5)
RBC # BLD AUTO: 4.58 10*6/MM3 (ref 3.77–5.28)
RHINOVIRUS RNA SPEC NAA+PROBE: NOT DETECTED
RSV RNA NPH QL NAA+NON-PROBE: NOT DETECTED
SAO2 % BLDCOA: 94.3 % (ref 94–100)
SARS-COV-2 RNA NPH QL NAA+NON-PROBE: NOT DETECTED
SODIUM SERPL-SCNC: 137 MMOL/L (ref 136–145)
TROPONIN T SERPL-MCNC: <0.01 NG/ML (ref 0–0.03)
VENTILATOR MODE: ABNORMAL
WBC NRBC COR # BLD: 5.65 10*3/MM3 (ref 3.4–10.8)

## 2022-05-20 PROCEDURE — 36415 COLL VENOUS BLD VENIPUNCTURE: CPT

## 2022-05-20 PROCEDURE — 96365 THER/PROPH/DIAG IV INF INIT: CPT

## 2022-05-20 PROCEDURE — 85025 COMPLETE CBC W/AUTO DIFF WBC: CPT | Performed by: EMERGENCY MEDICINE

## 2022-05-20 PROCEDURE — 71045 X-RAY EXAM CHEST 1 VIEW: CPT

## 2022-05-20 PROCEDURE — 83880 ASSAY OF NATRIURETIC PEPTIDE: CPT | Performed by: EMERGENCY MEDICINE

## 2022-05-20 PROCEDURE — 80053 COMPREHEN METABOLIC PANEL: CPT | Performed by: EMERGENCY MEDICINE

## 2022-05-20 PROCEDURE — 93005 ELECTROCARDIOGRAM TRACING: CPT

## 2022-05-20 PROCEDURE — 0202U NFCT DS 22 TRGT SARS-COV-2: CPT | Performed by: EMERGENCY MEDICINE

## 2022-05-20 PROCEDURE — 36600 WITHDRAWAL OF ARTERIAL BLOOD: CPT

## 2022-05-20 PROCEDURE — 84484 ASSAY OF TROPONIN QUANT: CPT | Performed by: EMERGENCY MEDICINE

## 2022-05-20 PROCEDURE — 82805 BLOOD GASES W/O2 SATURATION: CPT

## 2022-05-20 PROCEDURE — 84145 PROCALCITONIN (PCT): CPT | Performed by: EMERGENCY MEDICINE

## 2022-05-20 PROCEDURE — 96375 TX/PRO/DX INJ NEW DRUG ADDON: CPT

## 2022-05-20 PROCEDURE — 25010000002 MAGNESIUM SULFATE 2 GM/50ML SOLUTION: Performed by: EMERGENCY MEDICINE

## 2022-05-20 PROCEDURE — 82375 ASSAY CARBOXYHB QUANT: CPT

## 2022-05-20 PROCEDURE — 99284 EMERGENCY DEPT VISIT MOD MDM: CPT

## 2022-05-20 PROCEDURE — 83050 HGB METHEMOGLOBIN QUAN: CPT

## 2022-05-20 PROCEDURE — 83605 ASSAY OF LACTIC ACID: CPT | Performed by: EMERGENCY MEDICINE

## 2022-05-20 PROCEDURE — 87040 BLOOD CULTURE FOR BACTERIA: CPT | Performed by: EMERGENCY MEDICINE

## 2022-05-20 PROCEDURE — 25010000002 METHYLPREDNISOLONE PER 125 MG: Performed by: EMERGENCY MEDICINE

## 2022-05-20 RX ORDER — AZITHROMYCIN 250 MG/1
250 TABLET, FILM COATED ORAL DAILY
Qty: 6 TABLET | Refills: 0 | Status: SHIPPED | OUTPATIENT
Start: 2022-05-20 | End: 2022-09-01

## 2022-05-20 RX ORDER — METHYLPREDNISOLONE SODIUM SUCCINATE 125 MG/2ML
125 INJECTION, POWDER, LYOPHILIZED, FOR SOLUTION INTRAMUSCULAR; INTRAVENOUS ONCE
Status: COMPLETED | OUTPATIENT
Start: 2022-05-20 | End: 2022-05-20

## 2022-05-20 RX ORDER — PREDNISONE 20 MG/1
60 TABLET ORAL DAILY
Qty: 15 TABLET | Refills: 0 | Status: SHIPPED | OUTPATIENT
Start: 2022-05-20 | End: 2022-05-25

## 2022-05-20 RX ORDER — POTASSIUM CHLORIDE 750 MG/1
40 CAPSULE, EXTENDED RELEASE ORAL ONCE
Status: COMPLETED | OUTPATIENT
Start: 2022-05-20 | End: 2022-05-20

## 2022-05-20 RX ORDER — MAGNESIUM SULFATE HEPTAHYDRATE 40 MG/ML
2 INJECTION, SOLUTION INTRAVENOUS ONCE
Status: COMPLETED | OUTPATIENT
Start: 2022-05-20 | End: 2022-05-20

## 2022-05-20 RX ADMIN — METHYLPREDNISOLONE SODIUM SUCCINATE 125 MG: 125 INJECTION, POWDER, FOR SOLUTION INTRAMUSCULAR; INTRAVENOUS at 10:18

## 2022-05-20 RX ADMIN — SODIUM CHLORIDE 1000 ML: 9 INJECTION, SOLUTION INTRAVENOUS at 10:18

## 2022-05-20 RX ADMIN — POTASSIUM CHLORIDE 40 MEQ: 10 CAPSULE, COATED, EXTENDED RELEASE ORAL at 12:20

## 2022-05-20 RX ADMIN — MAGNESIUM SULFATE HEPTAHYDRATE 2 G: 40 INJECTION, SOLUTION INTRAVENOUS at 10:19

## 2022-05-20 NOTE — ED PROVIDER NOTES
"Subjective   61-year-old female presenting with \"an asthma attack\".  She states this morning she she started feeling short of breath.  Over the last few days she has had subjective fevers, cough.  She took her inhalers at home which did not help, EMS administered a nebulizer which did help.  She denies any abdominal pain, chest pain, vomiting.          Review of Systems   Constitutional: Positive for fever.   HENT: Negative.    Eyes: Negative.    Respiratory: Positive for cough and shortness of breath.    Cardiovascular: Negative.    Gastrointestinal: Negative.    Genitourinary: Negative.    Musculoskeletal: Negative.    Skin: Negative.    Neurological: Negative.    Psychiatric/Behavioral: Negative.        Past Medical History:   Diagnosis Date   • Anxiety    • Arthritis    • Asthma    • Back pain    • Cancer (HCC)    • Chronic bronchitis (HCC)    • Chronic headaches    • Depression    • Diabetes mellitus (HCC)     type 2   • Fibromyalgia    • High triglycerides    • History of sinus problem     as child   • Hyperlipidemia    • Hypertension    • Hyperthyroidism 1980   • Hypothyroidism 1990   • Migraine    • MVP (mitral valve prolapse)    • OA (osteoarthritis)    • Peripheral autonomic neuropathy    • Pleurisy    • Pneumonia    • Rheumatism    • RLS (restless legs syndrome)    • Skin cancer     upper lip   • Sleep apnea    • Thyroid disease        Allergies   Allergen Reactions   • Penicillins Angioedema, Anaphylaxis, Hives, Itching, Shortness Of Breath and Swelling   • Cephalexin Itching   • Chlorhexidine Rash and Itching   • Sulfa Antibiotics Rash and Itching   • Darvon [Propoxyphene] Unknown (See Comments)     Doesn't remember   • Diazepam Other (See Comments)     Paradoxical effect/ hypes pt       Past Surgical History:   Procedure Laterality Date   • APPENDECTOMY  1970's   • CATARACT EXTRACTION W/ INTRAOCULAR LENS IMPLANT Right 9/16/2019    Procedure: CATARACT PHACO EXTRACTION WITH INTRAOCULAR LENS IMPLANT " RIGHT;  Surgeon: Carl Babb MD;  Location: [facility];  Service: Ophthalmology   • CATARACT EXTRACTION W/ INTRAOCULAR LENS IMPLANT Left 10/7/2019    Procedure: CATARACT PHACO EXTRACTION WITH INTRAOCULAR LENS IMPLANT LEFT;  Surgeon: Carl Babb MD;  Location: [facility];  Service: Ophthalmology   •  SECTION  , 1997    x 2    • DILATATION AND CURETTAGE     • HYSTERECTOMY      ''still have one ovary''   • KNEE SURGERY Right 2021    arthroscopy and partial medial meniscectomy Dr. Chavez   • THYROIDECTOMY     • TONSILLECTOMY AND ADENOIDECTOMY         Family History   Problem Relation Age of Onset   • Hypertension Mother    • Heart attack Mother    • Fibromyalgia Mother    • Hyperlipidemia Mother    • Thyroid disease Mother    • Irritable bowel syndrome Mother    • Heart attack Father    • Cancer Brother    • Stomach cancer Brother    • Diabetes Maternal Aunt    • Cancer Maternal Grandmother    • Thyroid disease Maternal Grandmother    • Cancer Paternal Grandmother    • Colon cancer Neg Hx    • Cirrhosis Neg Hx    • Liver cancer Neg Hx    • Liver disease Neg Hx        Social History     Socioeconomic History   • Marital status:    Tobacco Use   • Smoking status: Former Smoker     Packs/day: 0.25     Years: 15.00     Pack years: 3.75     Types: Cigarettes     Start date:      Quit date: 2004     Years since quittin.8   • Smokeless tobacco: Never Used   Vaping Use   • Vaping Use: Never used   Substance and Sexual Activity   • Alcohol use: No   • Drug use: No   • Sexual activity: Defer           Objective   Physical Exam  Constitutional:       General: She is not in acute distress.     Appearance: Normal appearance. She is not ill-appearing, toxic-appearing or diaphoretic.   HENT:      Head: Normocephalic and atraumatic.      Right Ear: External ear normal.      Left Ear: External ear normal.      Nose: Nose normal.      Mouth/Throat:      Mouth: Mucous  membranes are moist.      Pharynx: Oropharynx is clear.   Eyes:      Extraocular Movements: Extraocular movements intact.   Cardiovascular:      Rate and Rhythm: Normal rate and regular rhythm.      Pulses: Normal pulses.      Heart sounds: Normal heart sounds.   Pulmonary:      Effort: Pulmonary effort is normal. No respiratory distress.      Comments: Scattered coarse wheezes  Abdominal:      General: Bowel sounds are normal. There is no distension.      Tenderness: There is no abdominal tenderness.   Musculoskeletal:         General: No swelling, tenderness or deformity. Normal range of motion.      Cervical back: Normal range of motion.   Skin:     General: Skin is warm and dry.      Capillary Refill: Capillary refill takes less than 2 seconds.      Findings: No rash.   Neurological:      General: No focal deficit present.      Mental Status: She is alert and oriented to person, place, and time.   Psychiatric:         Mood and Affect: Mood normal.         Behavior: Behavior normal.         Procedures           ED Course                                                 MDM  Number of Diagnoses or Management Options  Exacerbation of asthma, unspecified asthma severity, unspecified whether persistent  Parainfluenza infection  Diagnosis management comments: 61-year-old female with cough and shortness of breath.  Well-developed and well-nourished lady in no distress with exam as above.  Her vital signs are notable for low-grade fever.  Her exam is otherwise notable for wheezes.  I do suspect viral illness, asthma exacerbation.  Will obtain labs, EKG, chest x-ray and viral panel.  Will give symptomatic treatment.  Disposition pending.    DDx: COPD, asthma, pneumonia, viral illness    EKG interpreted by me: Sinus rhythm, normal rate, no acute ST/T changes, this is a normal EKG    Work-up notable for mild hypokalemia, positive parainfluenza.  Chest x-ray is negative.  She is resting comfortably.  Will discharge home with  treatment for asthma exacerbation, viral illness.  She is comfortable with and understanding the plan.      Final diagnoses:   Exacerbation of asthma, unspecified asthma severity, unspecified whether persistent   Parainfluenza infection          Morris Gardner MD  05/20/22 [id number]

## 2022-05-25 LAB
BACTERIA SPEC AEROBE CULT: NORMAL
BACTERIA SPEC AEROBE CULT: NORMAL

## 2022-06-06 RX ORDER — MONTELUKAST SODIUM 10 MG/1
TABLET ORAL
Qty: 30 TABLET | Refills: 0 | Status: SHIPPED | OUTPATIENT
Start: 2022-06-06 | End: 2022-07-11

## 2022-06-20 ENCOUNTER — TELEPHONE (OUTPATIENT)
Dept: ORTHOPEDIC SURGERY | Facility: CLINIC | Age: 62
End: 2022-06-20

## 2022-06-20 DIAGNOSIS — M17.12 PRIMARY OSTEOARTHRITIS OF LEFT KNEE: Primary | ICD-10-CM

## 2022-06-20 DIAGNOSIS — M17.11 PRIMARY OSTEOARTHRITIS OF RIGHT KNEE: ICD-10-CM

## 2022-06-27 ENCOUNTER — HOSPITAL ENCOUNTER (EMERGENCY)
Facility: HOSPITAL | Age: 62
Discharge: HOME | End: 2022-06-27
Attending: EMERGENCY MEDICINE | Admitting: EMERGENCY MEDICINE

## 2022-06-27 VITALS
TEMPERATURE: 98.1 F | HEART RATE: 101 BPM | OXYGEN SATURATION: 96 % | SYSTOLIC BLOOD PRESSURE: 153 MMHG | BODY MASS INDEX: 42.2 KG/M2 | WEIGHT: 262.6 LBS | RESPIRATION RATE: 20 BRPM | HEIGHT: 66 IN | DIASTOLIC BLOOD PRESSURE: 92 MMHG

## 2022-06-27 DIAGNOSIS — G89.29 CHRONIC BILATERAL LOW BACK PAIN WITHOUT SCIATICA: Primary | ICD-10-CM

## 2022-06-27 DIAGNOSIS — M54.50 CHRONIC BILATERAL LOW BACK PAIN WITHOUT SCIATICA: Primary | ICD-10-CM

## 2022-06-27 PROCEDURE — 25010000002 ORPHENADRINE CITRATE PER 60 MG: Performed by: PHYSICIAN ASSISTANT

## 2022-06-27 PROCEDURE — 96372 THER/PROPH/DIAG INJ SC/IM: CPT

## 2022-06-27 PROCEDURE — 25010000002 KETOROLAC TROMETHAMINE PER 15 MG: Performed by: PHYSICIAN ASSISTANT

## 2022-06-27 PROCEDURE — 99283 EMERGENCY DEPT VISIT LOW MDM: CPT

## 2022-06-27 PROCEDURE — 25010000002 DEXAMETHASONE SODIUM PHOSPHATE 10 MG/ML SOLUTION: Performed by: PHYSICIAN ASSISTANT

## 2022-06-27 RX ORDER — ORPHENADRINE CITRATE 30 MG/ML
60 INJECTION INTRAMUSCULAR; INTRAVENOUS ONCE
Status: COMPLETED | OUTPATIENT
Start: 2022-06-27 | End: 2022-06-27

## 2022-06-27 RX ORDER — NAPROXEN 500 MG/1
500 TABLET ORAL 2 TIMES DAILY PRN
Qty: 20 TABLET | Refills: 0 | Status: ON HOLD | OUTPATIENT
Start: 2022-06-27 | End: 2023-03-15

## 2022-06-27 RX ORDER — DEXAMETHASONE SODIUM PHOSPHATE 10 MG/ML
10 INJECTION, SOLUTION INTRAMUSCULAR; INTRAVENOUS ONCE
Status: COMPLETED | OUTPATIENT
Start: 2022-06-27 | End: 2022-06-27

## 2022-06-27 RX ORDER — KETOROLAC TROMETHAMINE 30 MG/ML
60 INJECTION, SOLUTION INTRAMUSCULAR; INTRAVENOUS ONCE
Status: COMPLETED | OUTPATIENT
Start: 2022-06-27 | End: 2022-06-27

## 2022-06-27 RX ADMIN — DEXAMETHASONE SODIUM PHOSPHATE 10 MG: 10 INJECTION, SOLUTION INTRAMUSCULAR; INTRAVENOUS at 21:14

## 2022-06-27 RX ADMIN — KETOROLAC TROMETHAMINE 60 MG: 60 INJECTION, SOLUTION INTRAMUSCULAR at 21:15

## 2022-06-27 RX ADMIN — ORPHENADRINE CITRATE 60 MG: 60 INJECTION INTRAMUSCULAR; INTRAVENOUS at 21:14

## 2022-07-11 ENCOUNTER — TELEPHONE (OUTPATIENT)
Dept: ORTHOPEDIC SURGERY | Facility: CLINIC | Age: 62
End: 2022-07-11

## 2022-07-11 RX ORDER — MONTELUKAST SODIUM 10 MG/1
TABLET ORAL
Qty: 30 TABLET | Refills: 0 | Status: SHIPPED | OUTPATIENT
Start: 2022-07-11 | End: 2022-08-03

## 2022-08-01 ENCOUNTER — APPOINTMENT (OUTPATIENT)
Dept: GENERAL RADIOLOGY | Facility: HOSPITAL | Age: 62
End: 2022-08-01

## 2022-08-01 ENCOUNTER — HOSPITAL ENCOUNTER (EMERGENCY)
Facility: HOSPITAL | Age: 62
Discharge: HOME | End: 2022-08-01
Attending: FAMILY MEDICINE | Admitting: FAMILY MEDICINE

## 2022-08-01 VITALS
DIASTOLIC BLOOD PRESSURE: 107 MMHG | SYSTOLIC BLOOD PRESSURE: 139 MMHG | HEART RATE: 75 BPM | BODY MASS INDEX: 41.46 KG/M2 | HEIGHT: 66 IN | RESPIRATION RATE: 16 BRPM | OXYGEN SATURATION: 96 % | WEIGHT: 258 LBS | TEMPERATURE: 97.7 F

## 2022-08-01 DIAGNOSIS — M25.561 ACUTE PAIN OF RIGHT KNEE: Primary | ICD-10-CM

## 2022-08-01 PROCEDURE — 25010000002 KETOROLAC TROMETHAMINE PER 15 MG: Performed by: NURSE PRACTITIONER

## 2022-08-01 PROCEDURE — 73562 X-RAY EXAM OF KNEE 3: CPT

## 2022-08-01 PROCEDURE — 96372 THER/PROPH/DIAG INJ SC/IM: CPT

## 2022-08-01 PROCEDURE — 99283 EMERGENCY DEPT VISIT LOW MDM: CPT

## 2022-08-01 PROCEDURE — 25010000002 DEXAMETHASONE SODIUM PHOSPHATE 10 MG/ML SOLUTION: Performed by: NURSE PRACTITIONER

## 2022-08-01 RX ORDER — KETOROLAC TROMETHAMINE 30 MG/ML
30 INJECTION, SOLUTION INTRAMUSCULAR; INTRAVENOUS ONCE
Status: COMPLETED | OUTPATIENT
Start: 2022-08-01 | End: 2022-08-01

## 2022-08-01 RX ORDER — DEXAMETHASONE SODIUM PHOSPHATE 10 MG/ML
10 INJECTION, SOLUTION INTRAMUSCULAR; INTRAVENOUS ONCE
Status: COMPLETED | OUTPATIENT
Start: 2022-08-01 | End: 2022-08-01

## 2022-08-01 RX ADMIN — DEXAMETHASONE SODIUM PHOSPHATE 10 MG: 10 INJECTION INTRAMUSCULAR; INTRAVENOUS at 20:57

## 2022-08-01 RX ADMIN — KETOROLAC TROMETHAMINE 30 MG: 30 INJECTION, SOLUTION INTRAMUSCULAR; INTRAVENOUS at 20:57

## 2022-08-02 ENCOUNTER — TELEPHONE (OUTPATIENT)
Dept: ORTHOPEDIC SURGERY | Facility: CLINIC | Age: 62
End: 2022-08-02

## 2022-08-02 ENCOUNTER — TELEPHONE (OUTPATIENT)
Dept: EMERGENCY DEPT | Facility: HOSPITAL | Age: 62
End: 2022-08-02

## 2022-08-02 RX ORDER — MELOXICAM 15 MG/1
15 TABLET ORAL DAILY
Qty: 30 TABLET | Refills: 2 | Status: SHIPPED | OUTPATIENT
Start: 2022-08-02 | End: 2022-09-01

## 2022-08-02 RX ORDER — MELOXICAM 15 MG/1
15 TABLET ORAL DAILY
Qty: 30 TABLET | Refills: 2 | Status: SHIPPED | OUTPATIENT
Start: 2022-08-02 | End: 2022-08-02 | Stop reason: SDUPTHER

## 2022-08-02 NOTE — TELEPHONE ENCOUNTER
Patient called about knee x-ray results.  X-ray results were given to the patient, x-rays read as negative.

## 2022-08-02 NOTE — TELEPHONE ENCOUNTER
I sent a prescription for meloxicam.  Obviously, let her know not to take the ibuprofen with it.  Unfortunately I sent it to [facility] in [place] so I will send a new one to [facility].

## 2022-08-02 NOTE — ED PROVIDER NOTES
Subjective   61-year-old female presents to the ED today with complaint of right knee pain after she fell on Saturday.  She is supposed to have knee replacement but has not yet.  She has known meniscus and has pain in her knee any way.  She is unable to lift her leg or bear weight on her right leg.  She says she has taken Percocet for the pain and it has not helped.  She denies any other associated signs or symptoms or injuries today.          Review of Systems   Constitutional: Negative.    HENT: Negative.    Eyes: Negative.    Respiratory: Negative.    Cardiovascular: Negative.    Gastrointestinal: Negative.    Endocrine: Negative.    Genitourinary: Negative.    Musculoskeletal: Positive for joint swelling.   Skin: Negative.    Allergic/Immunologic: Negative.    Neurological: Negative.    Hematological: Negative.    Psychiatric/Behavioral: Negative.        Past Medical History:   Diagnosis Date   • Anxiety    • Arthritis    • Asthma    • Back pain    • Cancer (HCC)    • Chronic bronchitis (HCC)    • Chronic headaches    • Depression    • Diabetes mellitus (HCC)     type 2   • Fibromyalgia    • High triglycerides    • History of sinus problem     as child   • Hyperlipidemia    • Hypertension    • Hyperthyroidism 1980   • Hypothyroidism 1990   • Migraine    • MVP (mitral valve prolapse)    • OA (osteoarthritis)    • Peripheral autonomic neuropathy    • Pleurisy    • Pneumonia    • Rheumatism    • RLS (restless legs syndrome)    • Skin cancer     upper lip   • Sleep apnea    • Thyroid disease        Allergies   Allergen Reactions   • Penicillins Angioedema, Anaphylaxis, Hives, Itching, Shortness Of Breath and Swelling   • Cephalexin Itching   • Chlorhexidine Rash and Itching   • Sulfa Antibiotics Rash and Itching   • Darvon [Propoxyphene] Unknown (See Comments)     Doesn't remember   • Diazepam Other (See Comments)     Paradoxical effect/ hypes pt       Past Surgical History:   Procedure Laterality Date   •  APPENDECTOMY  's   • CATARACT EXTRACTION W/ INTRAOCULAR LENS IMPLANT Right 2019    Procedure: CATARACT PHACO EXTRACTION WITH INTRAOCULAR LENS IMPLANT RIGHT;  Surgeon: Carl Babb MD;  Location: [facility] OR;  Service: Ophthalmology   • CATARACT EXTRACTION W/ INTRAOCULAR LENS IMPLANT Left 10/7/2019    Procedure: CATARACT PHACO EXTRACTION WITH INTRAOCULAR LENS IMPLANT LEFT;  Surgeon: Carl Babb MD;  Location: [facility] OR;  Service: Ophthalmology   •  SECTION  , 1997    x 2    • DILATATION AND CURETTAGE     • HYSTERECTOMY      ''still have one ovary''   • KNEE SURGERY Right 2021    arthroscopy and partial medial meniscectomy Dr. Chavez   • THYROIDECTOMY     • TONSILLECTOMY AND ADENOIDECTOMY         Family History   Problem Relation Age of Onset   • Hypertension Mother    • Heart attack Mother    • Fibromyalgia Mother    • Hyperlipidemia Mother    • Thyroid disease Mother    • Irritable bowel syndrome Mother    • Heart attack Father    • Cancer Brother    • Stomach cancer Brother    • Diabetes Maternal Aunt    • Cancer Maternal Grandmother    • Thyroid disease Maternal Grandmother    • Cancer Paternal Grandmother    • Colon cancer Neg Hx    • Cirrhosis Neg Hx    • Liver cancer Neg Hx    • Liver disease Neg Hx        Social History     Socioeconomic History   • Marital status:    Tobacco Use   • Smoking status: Former Smoker     Packs/day: 0.25     Years: 15.00     Pack years: 3.75     Types: Cigarettes     Start date:      Quit date: 2004     Years since quittin.0   • Smokeless tobacco: Never Used   Vaping Use   • Vaping Use: Never used   Substance and Sexual Activity   • Alcohol use: No   • Drug use: No   • Sexual activity: Defer           Objective   Physical Exam  Vitals and nursing note reviewed.   Constitutional:       Appearance: She is obese.   HENT:      Head: Normocephalic and atraumatic.      Mouth/Throat:      Pharynx: Oropharynx is  clear.   Eyes:      Extraocular Movements: Extraocular movements intact.      Conjunctiva/sclera: Conjunctivae normal.      Pupils: Pupils are equal, round, and reactive to light.   Cardiovascular:      Rate and Rhythm: Normal rate and regular rhythm.      Pulses: Normal pulses.   Pulmonary:      Effort: Pulmonary effort is normal.   Abdominal:      General: Bowel sounds are normal.   Musculoskeletal:         General: Swelling present.      Right lower leg: Edema present.      Comments: Right knee pain with range of motion.   Skin:     General: Skin is warm and dry.      Capillary Refill: Capillary refill takes less than 2 seconds.   Neurological:      Mental Status: She is alert and oriented to person, place, and time.   Psychiatric:         Mood and Affect: Mood normal.         Behavior: Behavior normal.         Procedures           ED Course                                           MDM    Final diagnoses:   Acute pain of right knee       ED Disposition  ED Disposition     ED Disposition   Discharge    Condition   Stable    Comment   Patient requesting to leave             Noe Davenport MD  [address] [place] [id number] [phone]    Schedule an appointment as soon as possible for a visit   As needed, If symptoms worsen         Medication List      No changes were made to your prescriptions during this visit.          Telma Vail, APRN  08/01/22 213

## 2022-08-02 NOTE — TELEPHONE ENCOUNTER
Pt called in to state that she went to the [facility] ER yesterday and says the medication she received really helped.....      Wanted to know if she could receive a RX for Dexamethasone and Toradol sent to the [facility] in [place] [address]

## 2022-08-02 NOTE — TELEPHONE ENCOUNTER
Can you check on patients status for visco injections.    Pita Puga            I called and told the patient that Rx was sent to [facility] and for her to discontinue the Ibuprofen.    Patient understood and had no further questions.    Pita Puga

## 2022-08-02 NOTE — TELEPHONE ENCOUNTER
Taking Percocet 10 for her back.  She has been taking Ibuprofen 800 mg with no relief.  Can you call in something else?      Please send message back to me to forward up front to check on visco referral.      Pita Puga

## 2022-08-03 RX ORDER — MONTELUKAST SODIUM 10 MG/1
TABLET ORAL
Qty: 30 TABLET | Refills: 0 | Status: SHIPPED | OUTPATIENT
Start: 2022-08-03 | End: 2022-08-05 | Stop reason: SDUPTHER

## 2022-08-04 RX ORDER — ALBUTEROL SULFATE 90 UG/1
2 AEROSOL, METERED RESPIRATORY (INHALATION) EVERY 4 HOURS PRN
Qty: 18 G | Refills: 3 | Status: SHIPPED | OUTPATIENT
Start: 2022-08-04

## 2022-08-05 RX ORDER — MONTELUKAST SODIUM 10 MG/1
10 TABLET ORAL NIGHTLY
Qty: 30 TABLET | Refills: 3 | Status: SHIPPED | OUTPATIENT
Start: 2022-08-05

## 2022-08-29 ENCOUNTER — TELEPHONE (OUTPATIENT)
Dept: ORTHOPEDIC SURGERY | Facility: CLINIC | Age: 62
End: 2022-08-29

## 2022-08-29 NOTE — TELEPHONE ENCOUNTER
Called patient and left message to schedule Synvisc injections. Starting 9/6 at 2:50 for #1, 9/13 at 2:20 for #2, and 9/20 @2:30 for #3.

## 2022-08-30 ENCOUNTER — TELEPHONE (OUTPATIENT)
Dept: ORTHOPEDIC SURGERY | Facility: CLINIC | Age: 62
End: 2022-08-30

## 2022-08-30 NOTE — TELEPHONE ENCOUNTER
Patient fell this morning.  She says her knee and ankle went completely behind her.  The ankle has a bump on the outside and the knee is so painful she cannot bear weight.  She did not want to go to the ED.  I offered her an appointment Thursday with Nena when Dr. Chavez is in the office.  She accepted and will call if anything changes.    Gomez Urrutia RT(R)

## 2022-08-30 NOTE — TELEPHONE ENCOUNTER
Called patient and she stated that she fell backwards and landed on her knee. She is in a lot of pain.     Thank you.

## 2022-09-01 ENCOUNTER — OFFICE VISIT (OUTPATIENT)
Dept: ORTHOPEDIC SURGERY | Facility: CLINIC | Age: 62
End: 2022-09-01

## 2022-09-01 VITALS — BODY MASS INDEX: 41.64 KG/M2 | HEIGHT: 66 IN

## 2022-09-01 DIAGNOSIS — M25.571 RIGHT ANKLE PAIN, UNSPECIFIED CHRONICITY: ICD-10-CM

## 2022-09-01 DIAGNOSIS — M17.11 PRIMARY OSTEOARTHRITIS OF RIGHT KNEE: Primary | ICD-10-CM

## 2022-09-01 DIAGNOSIS — S93.491A SPRAIN OF ANTERIOR TALOFIBULAR LIGAMENT OF RIGHT ANKLE, INITIAL ENCOUNTER: ICD-10-CM

## 2022-09-01 PROCEDURE — 99213 OFFICE O/P EST LOW 20 MIN: CPT | Performed by: PHYSICIAN ASSISTANT

## 2022-09-01 PROCEDURE — 20610 DRAIN/INJ JOINT/BURSA W/O US: CPT | Performed by: PHYSICIAN ASSISTANT

## 2022-09-01 RX ADMIN — LIDOCAINE HYDROCHLORIDE 3 ML: 10 INJECTION, SOLUTION EPIDURAL; INFILTRATION; INTRACAUDAL; PERINEURAL at 10:08

## 2022-09-01 NOTE — PROGRESS NOTES
"    [facility] [place]        Subjective     CC: Pain of the Right Ankle and Follow-up (Primary osteoarthritis of right knee )      HPI    Janet Obrien is a 62 y.o. female. Patient presents today after contacting clinic 8/30/2022 and reporting she fell causing increased pain to her right knee and ankle. She reports having multiple falls secondary to her knee giving away.     Pain scale: 7/10.  Severity of the pain moderate to severe.  Quality of the pain aching, throbbing, stabbing, shooting.  Associated symptoms swelling, popping, grinding, stiffness, giving away/buckling.  Activity related to pain walking, standing, stair climbing, sleeping, lying on the affected side, movement of joint.  Pain eased by nothing.  No reported numbness or tingling.      Overall, patient's symptoms are unchanged.    ROS:    Constiutional:Pt denies fever, chills, nausea, or vomiting.  MSK:as above        Objective      Past Medical History  Past Medical History:   Diagnosis Date   • Anxiety    • Arthritis    • Asthma    • Back pain    • Cancer (HCC)    • Chronic bronchitis (HCC)    • Chronic headaches    • Depression    • Diabetes mellitus (HCC)     type 2   • Fibromyalgia    • High triglycerides    • History of sinus problem     as child   • Hyperlipidemia    • Hypertension    • Hyperthyroidism 1980   • Hypothyroidism 1990   • Migraine    • MVP (mitral valve prolapse)    • OA (osteoarthritis)    • Peripheral autonomic neuropathy    • Pleurisy    • Pneumonia    • Rheumatism    • RLS (restless legs syndrome)    • Skin cancer     upper lip   • Sleep apnea    • Thyroid disease          Physical Exam  Ht 167.6 cm (66\")   BMI 41.64 kg/m²     Body mass index is 41.64 kg/m².    Patient is well nourished and well developed.        Ortho Exam  Right knee  Skin: Intact without any erythema, warmth. Trace effusion.  Motion: 5-100° with crepitus and pain on motion.  Tenderness: Diffuse/global tenderness throughout " knee.  Instability: Lachman  negative. Varus and valgus stress test negative.    Patella: Compression/grind positive.  Straight leg raise: Intact.  Motor: Grossly intact Q/HS/TA/GS/EHL/P  Sensory: Grossly intact DP/SP/S/S/T nerve distributions.   Vascular: 2+ DP/PT  pulses with brisk capillary refill into each digit.    Right ankle  Skin: Grossly intact without erythema, warmth, swelling.  Tenderness: ATFL positive.  CFL negative.  Deltoid ligament negative.  Base 5MT negative.    Motion: Dorsiflexion 5°, plantarflexion 40°.  Instability: Anterior drawer negative.      Imaging/Labs/EMG Reviewed:  Ordered right knee and ankle plain films.  Imaging read/interpreted by Dr. Chavez.    Knee X-Ray     Indication: Pain     Study:  Upright AP, Skiers, Lateral, and Sunrise views of Right knee(s)     Comparison: Right knee 8/26/2021     Findings:     Patient appears to have severe degenerative changes in the medial compartment.    There are mild degenerative changes in the lateral compartment.    There are severe changes in the patellofemoral compartment.    Patient has overall varus alignment.    Kellgren-Reece rdGrdrrdarddrderd:rd rd3rd Impression:   Severe medial compartment and patellofemoral compartment degnerative changes of the knee     Right Ankle X-Ray     Indication: Pain     Views: AP, Lateral, Mortise     Comparison: None     Findings:   No fracture  No bony lesion  Soft tissues normal  Normal joint spaces with mortise well-aligned, no evidence of syndesmosis widening     Impression: Negative right ankle x-ray for acute bony abnormality      Assessment:  1. Primary osteoarthritis of right knee    2. Right ankle pain, unspecified chronicity    3. Sprain of anterior talofibular ligament of right ankle, initial encounter        Plan:  1. Right knee osteoarthritis--no fracture, started visco series today.  2. Right ankle pain due to ATFL sprain--offered ankle brace but patient declined.  3. Recommend OTC NSAIDS/pain  medication as needed.  4. Recommend use of walker or cane for support and stability.  5. Follow up in 1 week for visco injection #2.  6. Questions and concerns answered.    After discussing risks of injection the patient gave consent to proceed.  Her right knee was confirmed as the correct joint to be injected with a timeout.  The knee was then prepped with Hibiclens and injected 2 cc of 1% plain lidocaine followed by a prefilled syringe of Synvisc without any resistance using superior lateral approach, patient in supine position.  The patient tolerated procedure well.  Hemostasis was achieved and a Band-Aid was applied over the injection site.  I instructed the patient on signs and symptoms of infection.  They should report to the ED if any of these develop.  Recommended modifying activity to include rest, ice, elevation and/or heat along with oral pain medication as needed.        Nena Gallagher PA-C  09/05/22  13:07 EDT      Dictated Utilizing Dragon Dictation.

## 2022-09-01 NOTE — PROGRESS NOTES
Procedure   Large Joint Arthrocentesis: R knee  Date/Time: 9/1/2022 10:08 AM  Consent given by: patient  Site marked: site marked  Timeout: Immediately prior to procedure a time out was called to verify the correct patient, procedure, equipment, support staff and site/side marked as required   Supporting Documentation  Indications: pain   Procedure Details  Location: knee - R knee  Preparation: Patient was prepped and draped in the usual sterile fashion  Needle size: 22 G  Approach: superior  Medications administered: 8 mg/mL Hylan 16 MG/2ML; 3 mL lidocaine PF 1% 1 %  Patient tolerance: patient tolerated the procedure well with no immediate complications

## 2022-09-05 RX ORDER — LIDOCAINE HYDROCHLORIDE 10 MG/ML
3 INJECTION, SOLUTION EPIDURAL; INFILTRATION; INTRACAUDAL; PERINEURAL
Status: COMPLETED | OUTPATIENT
Start: 2022-09-01 | End: 2022-09-01

## 2022-09-08 ENCOUNTER — CLINICAL SUPPORT (OUTPATIENT)
Dept: ORTHOPEDIC SURGERY | Facility: CLINIC | Age: 62
End: 2022-09-08

## 2022-09-08 DIAGNOSIS — M17.11 PRIMARY OSTEOARTHRITIS OF RIGHT KNEE: Primary | ICD-10-CM

## 2022-09-08 PROCEDURE — 20610 DRAIN/INJ JOINT/BURSA W/O US: CPT | Performed by: ORTHOPAEDIC SURGERY

## 2022-09-08 RX ORDER — CELECOXIB 200 MG/1
200 CAPSULE ORAL DAILY
Status: ON HOLD | COMMUNITY
Start: 2022-06-16 | End: 2023-03-15

## 2022-09-08 RX ORDER — LIDOCAINE HYDROCHLORIDE 10 MG/ML
3 INJECTION, SOLUTION EPIDURAL; INFILTRATION; INTRACAUDAL; PERINEURAL
Status: COMPLETED | OUTPATIENT
Start: 2022-09-08 | End: 2022-09-08

## 2022-09-08 RX ORDER — LEVOTHYROXINE SODIUM 150 MCG
150 TABLET ORAL
COMMUNITY
Start: 2022-08-29

## 2022-09-08 RX ORDER — GABAPENTIN 100 MG/1
100 CAPSULE ORAL NIGHTLY
COMMUNITY
Start: 2022-08-29 | End: 2023-03-02

## 2022-09-08 RX ADMIN — LIDOCAINE HYDROCHLORIDE 3 ML: 10 INJECTION, SOLUTION EPIDURAL; INFILTRATION; INTRACAUDAL; PERINEURAL at 08:39

## 2022-09-08 NOTE — PROGRESS NOTES
Procedure   Large Joint Arthrocentesis: R knee  Date/Time: 9/8/2022 8:39 AM  Consent given by: patient  Site marked: site marked  Timeout: Immediately prior to procedure a time out was called to verify the correct patient, procedure, equipment, support staff and site/side marked as required   Supporting Documentation  Indications: pain   Procedure Details  Location: knee - R knee  Preparation: Patient was prepped and draped in the usual sterile fashion  Needle size: 22 G  Approach: anterolateral  Medications administered: 8 mg/mL Hylan 16 MG/2ML; 3 mL lidocaine PF 1% 1 %  Patient tolerance: patient tolerated the procedure well with no immediate complications

## 2022-09-08 NOTE — PROGRESS NOTES
Procedure Note:    I discussed with the patient the potential benefits of performing a therapeutic injections as well as potential risks including but not limited to infection, swelling, pain, bleeding, bruising, nerve/vessel damage, skin color changes, transient elevation in blood glucose levels, and fat atrophy. After informed consent and after the areas were prepped with chlorhexadine soap, ethyl chloride was used to numb the skin. Via the superiorlateral approach, 3mL of 1% lidocaine followed by Synvisc 3 #2 were each injected into the right knee joint. The patient tolerated the procedure well. There were no complications. A sterile dressing was placed over the injection sites.

## 2022-09-15 ENCOUNTER — CLINICAL SUPPORT (OUTPATIENT)
Dept: ORTHOPEDIC SURGERY | Facility: CLINIC | Age: 62
End: 2022-09-15

## 2022-09-15 DIAGNOSIS — M17.11 PRIMARY OSTEOARTHRITIS OF RIGHT KNEE: Primary | ICD-10-CM

## 2022-09-15 PROCEDURE — 20610 DRAIN/INJ JOINT/BURSA W/O US: CPT | Performed by: ORTHOPAEDIC SURGERY

## 2022-09-15 NOTE — PROGRESS NOTES
Procedure   Large Joint Arthrocentesis: R knee  Date/Time: 9/15/2022 8:31 AM  Consent given by: patient  Site marked: site marked  Timeout: Immediately prior to procedure a time out was called to verify the correct patient, procedure, equipment, support staff and site/side marked as required   Supporting Documentation  Indications: pain   Procedure Details  Location: knee - R knee  Preparation: Patient was prepped and draped in the usual sterile fashion  Needle size: 22 G  Approach: anterolateral  Medications administered: 8 mg/mL Hylan 16 MG/2ML  Patient tolerance: patient tolerated the procedure well with no immediate complications

## 2022-09-15 NOTE — PROGRESS NOTES
Procedure Note:    I discussed with the patient the potential benefits of performing a therapeutic injections as well as potential risks including but not limited to infection, swelling, pain, bleeding, bruising, nerve/vessel damage, skin color changes, transient elevation in blood glucose levels, and fat atrophy. After informed consent and after the areas were prepped with chlorhexadine soap, ethyl chloride was used to numb the skin. Via the superiorlateral approach, 3mL of 1% lidocaine followed by Synvisc 3 #3 were each injected into the right knee joint. The patient tolerated the procedure well. There were no complications. A sterile dressing was placed over the injection sites.      Follow-up in 3 months we can discuss surgery further.  Discussed the possibility that she may need to stay with her son in [place] for 4 weeks after surgery.

## 2022-09-28 ENCOUNTER — TELEPHONE (OUTPATIENT)
Dept: ORTHOPEDIC SURGERY | Facility: CLINIC | Age: 62
End: 2022-09-28

## 2022-09-28 NOTE — TELEPHONE ENCOUNTER
Caller: Janet Obrien    Relationship to patient: Self    Best call back number: [phone]    Chief complaint: LEFT KNEE    Type of visit: INJECTION    Requested date: [facility] - GOING OUT OF TOWN Monday     Additional notes: STATED SHE BLEW KNEE OUT LAST NIGHT NEEDS AN INJECTION ASAP

## 2022-10-04 ENCOUNTER — OFFICE VISIT (OUTPATIENT)
Dept: ORTHOPEDIC SURGERY | Facility: CLINIC | Age: 62
End: 2022-10-04

## 2022-10-04 VITALS
SYSTOLIC BLOOD PRESSURE: 134 MMHG | HEIGHT: 66 IN | WEIGHT: 254 LBS | DIASTOLIC BLOOD PRESSURE: 84 MMHG | BODY MASS INDEX: 40.82 KG/M2

## 2022-10-04 DIAGNOSIS — M17.12 PRIMARY OSTEOARTHRITIS OF LEFT KNEE: Primary | ICD-10-CM

## 2022-10-04 PROCEDURE — 20610 DRAIN/INJ JOINT/BURSA W/O US: CPT | Performed by: ORTHOPAEDIC SURGERY

## 2022-10-04 RX ORDER — LIDOCAINE HYDROCHLORIDE 10 MG/ML
2 INJECTION, SOLUTION EPIDURAL; INFILTRATION; INTRACAUDAL; PERINEURAL
Status: COMPLETED | OUTPATIENT
Start: 2022-10-04 | End: 2022-10-04

## 2022-10-04 RX ORDER — DEXAMETHASONE SODIUM PHOSPHATE 1 MG/ML
SOLUTION/ DROPS OPHTHALMIC
Status: ON HOLD | COMMUNITY
Start: 2022-09-28 | End: 2023-03-15

## 2022-10-04 RX ORDER — TRIAMCINOLONE ACETONIDE 40 MG/ML
40 INJECTION, SUSPENSION INTRA-ARTICULAR; INTRAMUSCULAR
Status: COMPLETED | OUTPATIENT
Start: 2022-10-04 | End: 2022-10-04

## 2022-10-04 RX ORDER — EPINASTINE HCL 0.05 %
DROPS OPHTHALMIC (EYE)
Status: ON HOLD | COMMUNITY
Start: 2022-09-30 | End: 2023-03-15

## 2022-10-04 RX ADMIN — LIDOCAINE HYDROCHLORIDE 2 ML: 10 INJECTION, SOLUTION EPIDURAL; INFILTRATION; INTRACAUDAL; PERINEURAL at 13:52

## 2022-10-04 RX ADMIN — TRIAMCINOLONE ACETONIDE 40 MG: 40 INJECTION, SUSPENSION INTRA-ARTICULAR; INTRAMUSCULAR at 13:52

## 2022-10-04 NOTE — PROGRESS NOTES
"    [facility] [place] [facility] Note        Subjective     CC: Pain of the Left Knee (Last seen on 4/7/22 for Primary osteoarthritis of left knee-cortisone injection given on 4/7/22)      HPI    Janet Obrien is a 62 y.o. female.  Patient returns the office today for follow-up of her left knee arthritis.  Last injection given 4/7/2022.  She says her son Toni is on board with coming into town prior to any surgery for her right knee.  Having some medial sided knee pain that is sharp in nature.    Overall, patient's symptoms are as above.    ROS:    Constiutional:Pt denies fever, chills, nausea, or vomiting.  MSK:as above        Objective      Past Medical History  Past Medical History:   Diagnosis Date   • Anxiety    • Arthritis    • Asthma    • Back pain    • Cancer (HCC)    • Chronic bronchitis (HCC)    • Chronic headaches    • Depression    • Diabetes mellitus (HCC)     type 2   • Fibromyalgia    • High triglycerides    • History of sinus problem     as child   • Hyperlipidemia    • Hypertension    • Hyperthyroidism 1980   • Hypothyroidism 1990   • Migraine    • MVP (mitral valve prolapse)    • OA (osteoarthritis)    • Peripheral autonomic neuropathy    • Pleurisy    • Pneumonia    • Rheumatism    • RLS (restless legs syndrome)    • Skin cancer     upper lip   • Sleep apnea    • Thyroid disease          Physical Exam  /84   Ht 167.6 cm (65.98\")   Wt 115 kg (254 lb)   BMI 41.02 kg/m²     Body mass index is 41.02 kg/m².    Patient is well nourished and well developed.        Ortho Exam      Musculoskeletal:  Global Assessment:  Overall assessment of Lower Extremity Muscle Strength and Tone:  Left quadriceps--5/5   Left hamstrings--5/5       Left tibialis anterior--5/5  Left gastroc-soleus--5/5  Left EHL --5/5    Lower Extremity:    Inspection and Palpation:  Left knee:  Tenderness:  Over the medial joint line and moderate severity  Effusion:  1+  Crepitus:  Positive  Pulses:  2+  Ecchymosis:  " None  Warmth:  None     ROM:  Left:  Extension: 5     Flexion:120    Instability:    Left:    Lachman Test:  Negative   Varus stress test negative  Valgus stress test negative    Deformities/Malalignments/Discrepancies:    Left:  Genu Varum     Functional Testing:  Yuliana's test:  Negative  Patella grind test:  Positive  Q-angle:  normal      Imaging/Labs/EMG Reviewed:  Imaging Results (Last 24 Hours)     ** No results found for the last 24 hours. **            Assessment    Assessment:  1. Primary osteoarthritis of left knee        Plan:  1. Recommend over the counter anti-inflammatories for pain and/or swelling  2. Left knee arthritis--steroid injection will be given today.  Follow-up in 3 months.    Procedure Note:    I discussed with the patient the potential benefits of performing a therapeutic injections as well as potential risks including but not limited to infection, swelling, pain, bleeding, bruising, nerve/vessel damage, skin color changes, transient elevation in blood glucose levels, and fat atrophy. After informed consent and after the areas were prepped with chlorhexadine soap, ethyl chloride was used to numb the skin. Via the anterolateral approach, 3mL of 1% lidocaine followed by 40mg of Kenalog were each injected into the left knee joint. The patient tolerated the procedure well. There were no complications. A sterile dressing was placed over the injection sites.        Dayo Chavez MD  10/04/22  14:00 EDT      Dictated Utilizing Dragon Dictation.

## 2022-10-04 NOTE — PROGRESS NOTES
Procedure   Large Joint Arthrocentesis: L knee  Date/Time: 10/4/2022 1:52 PM  Consent given by: patient  Site marked: site marked  Timeout: Immediately prior to procedure a time out was called to verify the correct patient, procedure, equipment, support staff and site/side marked as required   Supporting Documentation  Indications: pain   Procedure Details  Location: knee - L knee  Preparation: Patient was prepped and draped in the usual sterile fashion  Needle size: 25 G  Approach: anterolateral  Medications administered: 2 mL lidocaine PF 1% 1 %; 40 mg triamcinolone acetonide 40 MG/ML  Patient tolerance: patient tolerated the procedure well with no immediate complications

## 2022-12-20 ENCOUNTER — OFFICE VISIT (OUTPATIENT)
Dept: ORTHOPEDIC SURGERY | Facility: CLINIC | Age: 62
End: 2022-12-20

## 2022-12-20 VITALS
DIASTOLIC BLOOD PRESSURE: 78 MMHG | HEIGHT: 66 IN | SYSTOLIC BLOOD PRESSURE: 150 MMHG | WEIGHT: 251.8 LBS | BODY MASS INDEX: 40.47 KG/M2

## 2022-12-20 DIAGNOSIS — M17.12 PRIMARY OSTEOARTHRITIS OF LEFT KNEE: Primary | ICD-10-CM

## 2022-12-20 DIAGNOSIS — E66.01 CLASS 3 SEVERE OBESITY WITH BODY MASS INDEX (BMI) OF 40.0 TO 44.9 IN ADULT, UNSPECIFIED OBESITY TYPE, UNSPECIFIED WHETHER SERIOUS COMORBIDITY PRESENT: ICD-10-CM

## 2022-12-20 PROCEDURE — 99213 OFFICE O/P EST LOW 20 MIN: CPT | Performed by: ORTHOPAEDIC SURGERY

## 2022-12-20 RX ORDER — BUPROPION HYDROCHLORIDE 150 MG/1
150 TABLET, EXTENDED RELEASE ORAL 2 TIMES DAILY
Status: ON HOLD | COMMUNITY
Start: 2022-11-05 | End: 2023-03-15

## 2022-12-20 NOTE — PROGRESS NOTES
"    [facility] [place] Orthopaedic Surgery Clinic Note        Subjective     CC: Follow-up (2.5 months--  Primary osteoarthritis of left knee)      HPI    Janet Obrien is a 62 y.o. female.  Patient is here today for her left knee.  She was injected on 10/4/2022 in the left knee.  Continues to complain of bilateral knee pain that feels like a sharp stabbing pain anterior medially.  She says all she can do is sit around because her knees hurt her so severely.    Overall, patient's symptoms are as above.  She says her son is scheduled to come in for 3 weeks in January but is scheduled to go back to [place] in February.    ROS:    Constiutional:Pt denies fever, chills, nausea, or vomiting.  MSK:as above        Objective      Past Medical History  Past Medical History:   Diagnosis Date   • Anxiety    • Arthritis    • Asthma    • Back pain    • Cancer (HCC)    • Chronic bronchitis (HCC)    • Chronic headaches    • Depression    • Diabetes mellitus (HCC)     type 2   • Fibromyalgia    • High triglycerides    • History of sinus problem     as child   • Hyperlipidemia    • Hypertension    • Hyperthyroidism    • Hypothyroidism    • Migraine    • MVP (mitral valve prolapse)    • OA (osteoarthritis)    • Peripheral autonomic neuropathy    • Pleurisy    • Pneumonia    • Rheumatism    • RLS (restless legs syndrome)    • Skin cancer     upper lip   • Sleep apnea    • Thyroid disease      Social History     Socioeconomic History   • Marital status:    Tobacco Use   • Smoking status: Former     Packs/day: 0.25     Years: 15.00     Pack years: 3.75     Types: Cigarettes     Start date:      Quit date: 2004     Years since quittin.4   • Smokeless tobacco: Never   Vaping Use   • Vaping Use: Never used   Substance and Sexual Activity   • Alcohol use: No   • Drug use: No   • Sexual activity: Defer          Physical Exam  /78   Ht 167.6 cm (65.98\")   Wt 114 kg (251 lb 12.8 oz)   BMI 40.66 kg/m² "     Body mass index is 40.66 kg/m².    Patient is well nourished and well developed.        Ortho Exam      Musculoskeletal:  Global Assessment:  Overall assessment of Lower Extremity Muscle Strength and Tone:  Left quadriceps--5/5   Left hamstrings--5/5       Left tibialis anterior--5/5  Left gastroc-soleus--5/5  Left EHL --5/5    Lower Extremity:    Inspection and Palpation:  Left knee:  Tenderness:  Over the medial joint line and moderate severity  Effusion:  1+  Crepitus:  Positive  Pulses:  2+  Ecchymosis:  None  Warmth:  None     ROM:  Left:  Extension: 5     Flexion:120    Instability:    Left:    Lachman Test:  Negative   Varus stress test negative  Valgus stress test negative    Deformities/Malalignments/Discrepancies:    Left:  Genu Varum     Functional Testing:  Yuliana's test:  Negative  Patella grind test:  Positive  Q-angle:  normal      Imaging/Labs/EMG Reviewed:  Imaging Results (Last 24 Hours)     ** No results found for the last 24 hours. **            Assessment    Assessment:  1. Primary osteoarthritis of left knee    2. Class 3 severe obesity with body mass index (BMI) of 40.0 to 44.9 in adult, unspecified obesity type, unspecified whether serious comorbidity present (HCC)        Plan:  1. Recommend over the counter anti-inflammatories for pain and/or swelling  2. Left knee arthritis in a patient with a BMI of 41--we have told the patient that due to her insurance, her BMI needs to be lower.  Ideally she could be around 35 but it certainly needs to be under 40 before surgery can be considered.  She will need to prove to us that she is trying to lose weight and would like to see her back when she meets criteria and we can further discussion.  She unfortunately was under the impression today that we were going to schedule her surgery for early January and again the surgery schedule is already full at that point and the earliest date that I can see is January 18.  I think a lot has to happen  before that date considers to be considered for surgery and this may not leave her enough time to have her son take care of her.      Dayo Chavez MD  12/20/22  09:30 EST      Dictated Utilizing Dragon Dictation.

## 2023-02-01 ENCOUNTER — HOSPITAL ENCOUNTER (EMERGENCY)
Facility: HOSPITAL | Age: 63
Discharge: HOME | End: 2023-02-01
Attending: EMERGENCY MEDICINE | Admitting: EMERGENCY MEDICINE
Payer: COMMERCIAL

## 2023-02-01 ENCOUNTER — APPOINTMENT (OUTPATIENT)
Dept: GENERAL RADIOLOGY | Facility: HOSPITAL | Age: 63
End: 2023-02-01
Payer: COMMERCIAL

## 2023-02-01 VITALS
DIASTOLIC BLOOD PRESSURE: 90 MMHG | WEIGHT: 240 LBS | RESPIRATION RATE: 18 BRPM | TEMPERATURE: 98.4 F | HEIGHT: 66 IN | BODY MASS INDEX: 38.57 KG/M2 | HEART RATE: 75 BPM | SYSTOLIC BLOOD PRESSURE: 125 MMHG | OXYGEN SATURATION: 98 %

## 2023-02-01 DIAGNOSIS — S83.92XA SPRAIN OF LEFT KNEE, UNSPECIFIED LIGAMENT, INITIAL ENCOUNTER: ICD-10-CM

## 2023-02-01 DIAGNOSIS — M25.562 ACUTE PAIN OF LEFT KNEE: ICD-10-CM

## 2023-02-01 DIAGNOSIS — M17.12 TRICOMPARTMENT OSTEOARTHRITIS OF LEFT KNEE: Primary | ICD-10-CM

## 2023-02-01 PROCEDURE — 73560 X-RAY EXAM OF KNEE 1 OR 2: CPT

## 2023-02-01 PROCEDURE — 99283 EMERGENCY DEPT VISIT LOW MDM: CPT

## 2023-02-01 RX ORDER — HYDROCODONE BITARTRATE AND ACETAMINOPHEN 5; 325 MG/1; MG/1
1 TABLET ORAL ONCE
Status: COMPLETED | OUTPATIENT
Start: 2023-02-01 | End: 2023-02-01

## 2023-02-01 RX ADMIN — HYDROCODONE BITARTRATE AND ACETAMINOPHEN 1 TABLET: 5; 325 TABLET ORAL at 11:04

## 2023-02-01 NOTE — ED PROVIDER NOTES
Subjective   History of Present Illness  Janet Obrien is a 62-year-old female that presents emergency department with complaints of left knee pain.  Patient advises that she is aware she needs bilateral knee replacements.  She is followed by Dr. Harrison Blue.  This morning she was walking the dog she went to turn and injured her left knee.  She advises that it felt like it bone-on-bone pain a sharp stabbing discomfort.  She reports that she was unable to move the left knee.  She describes the pain as burning and shooting in discomfort.  Starts in the center of her left anterior knee and radiates into the medial portion of her knee.    History provided by:  Patient   used: No    Knee Pain  Location:  Knee  Injury: yes    Mechanism of injury comment:  Turning around  Knee location:  L knee  Pain details:     Quality:  Throbbing, shooting and burning    Severity:  Moderate    Timing:  Constant    Progression:  Worsening  Relieved by:  Nothing  Worsened by:  Bearing weight, activity, extension and flexion  Associated symptoms: decreased ROM and swelling    Associated symptoms: no numbness and no tingling        Review of Systems   Musculoskeletal:        Lt knee pain     Skin: Negative for color change.       Past Medical History:   Diagnosis Date   • Anxiety    • Arthritis    • Asthma    • Back pain    • Cancer (HCC)    • Chronic bronchitis (HCC)    • Chronic headaches    • Depression    • Diabetes mellitus (HCC)     type 2   • Fibromyalgia    • High triglycerides    • History of sinus problem     as child   • Hyperlipidemia    • Hypertension    • Hyperthyroidism 1980   • Hypothyroidism 1990   • Migraine    • MVP (mitral valve prolapse)    • OA (osteoarthritis)    • Peripheral autonomic neuropathy    • Pleurisy    • Pneumonia    • Rheumatism    • RLS (restless legs syndrome)    • Skin cancer     upper lip   • Sleep apnea    • Thyroid disease        Allergies   Allergen Reactions   •  Penicillins Angioedema, Anaphylaxis, Hives, Itching, Shortness Of Breath and Swelling   • Cephalexin Itching   • Chlorhexidine Rash and Itching   • Sulfa Antibiotics Rash and Itching   • Darvon [Propoxyphene] Unknown (See Comments)     Doesn't remember   • Diazepam Other (See Comments)     Paradoxical effect/ hypes pt       Past Surgical History:   Procedure Laterality Date   • APPENDECTOMY  s   • CATARACT EXTRACTION W/ INTRAOCULAR LENS IMPLANT Right 2019    Procedure: CATARACT PHACO EXTRACTION WITH INTRAOCULAR LENS IMPLANT RIGHT;  Surgeon: Carl Babb MD;  Location: [facility] [place];  Service: Ophthalmology   • CATARACT EXTRACTION W/ INTRAOCULAR LENS IMPLANT Left 10/7/2019    Procedure: CATARACT PHACO EXTRACTION WITH INTRAOCULAR LENS IMPLANT LEFT;  Surgeon: Carl Babb MD;  Location: [facility] [place];  Service: Ophthalmology   •  SECTION  , 1997    x 2    • DILATATION AND CURETTAGE     • HYSTERECTOMY      ''still have one ovary''   • KNEE SURGERY Right 2021    arthroscopy and partial medial meniscectomy Dr. Chavez   • THYROIDECTOMY     • TONSILLECTOMY AND ADENOIDECTOMY         Family History   Problem Relation Age of Onset   • Hypertension Mother    • Heart attack Mother    • Fibromyalgia Mother    • Hyperlipidemia Mother    • Thyroid disease Mother    • Irritable bowel syndrome Mother    • Heart attack Father    • Cancer Brother    • Stomach cancer Brother    • Diabetes Maternal Aunt    • Cancer Maternal Grandmother    • Thyroid disease Maternal Grandmother    • Cancer Paternal Grandmother    • Colon cancer Neg Hx    • Cirrhosis Neg Hx    • Liver cancer Neg Hx    • Liver disease Neg Hx        Social History     Socioeconomic History   • Marital status:    Tobacco Use   • Smoking status: Former     Packs/day: 0.25     Years: 15.00     Pack years: 3.75     Types: Cigarettes     Start date:      Quit date: 2004     Years since quittin.5   •  Smokeless tobacco: Never   Vaping Use   • Vaping Use: Never used   Substance and Sexual Activity   • Alcohol use: No   • Drug use: No   • Sexual activity: Defer           Objective   Physical Exam  Vitals and nursing note reviewed.   Constitutional:       General: She is not in acute distress.     Appearance: She is not ill-appearing.   HENT:      Head: Normocephalic and atraumatic.      Nose: Nose normal.      Mouth/Throat:      Mouth: Mucous membranes are moist.   Eyes:      Extraocular Movements: Extraocular movements intact.   Cardiovascular:      Rate and Rhythm: Normal rate.      Pulses: Normal pulses.      Heart sounds: Normal heart sounds.   Pulmonary:      Effort: No respiratory distress.      Breath sounds: Normal breath sounds.   Musculoskeletal:      Cervical back: Normal range of motion.      Left knee: Swelling present. Decreased range of motion. Tenderness (anterior knee pain, medial knee pain) present.   Skin:     General: Skin is warm and dry.   Neurological:      Mental Status: She is alert.   Psychiatric:         Behavior: Behavior normal.         Procedures           ED Course  ED Course as of 02/02/23 1852 Wed Feb 01, 2023   1120 Patient is a 62-year-old female that presents emergency department with complaints of left knee pain.  Patient needs bilateral knee replacements.  She is followed by Dr. Harrison Blue.  Patient reports that she was outside today walking her dog.  She turned wrong and felt a bone-on-bone discomfort.  Her leg locked and she was unable to move.    Differential diagnosis: Joint effusion,  osteoarthritis, fracture, ligamentous injury, sprain.  [KG]   1148 Left knee x-ray was reviewed at this time.  No signs of a fracture or dislocation.  Imaging does reveal tricompartmental arthritis. [KG]   1150 Patient currently takes Percocet for her chronic pain.  Patient to continue to take her current pain medication.  Patient to wear knee immobilizer.  We will give patient crutches.   "Patient to follow-up with Dr. Harrison Blue.  Patient to elevate extremity.  Patient to apply ice to the site.  She agrees with the treatment plan and verbalized understanding. [KG]      ED Course User Index  [KG] Milli Proctor, NARA           No results found for this or any previous visit (from the past 24 hour(s)).  Note: In addition to lab results from this visit, the labs listed above may include labs taken at another facility or during a different encounter within the last 24 hours. Please correlate lab times with ED admission and discharge times for further clarification of the services performed during this visit.    XR Knee 1 or 2 View Left   Final Result   Impression:   Cortical margins are intact without evidence of fracture or dislocation. There is no significant joint effusion. Moderate to severe tricompartmental arthrosis changes are noted, with greatest medial and patellofemoral compartment involvement.      Electronically Signed: Jagdish Cook     2/1/2023 11:48 AM EST     Workstation ID: [id number]        Vitals:    02/01/23 1043 02/01/23 1230   BP: 120/86 125/90   BP Location: Left arm Right arm   Patient Position: Sitting Lying   Pulse: 73 75   Resp: 22 18   Temp: 98.4 °F (36.9 °C)    TempSrc: Oral    SpO2: 96% 98%   Weight: 109 kg (240 lb)    Height: 167.6 cm (66\")      Medications   HYDROcodone-acetaminophen (NORCO) 5-325 MG per tablet 1 tablet (1 tablet Oral Given 2/1/23 1104)     ECG/EMG Results (last 24 hours)     ** No results found for the last 24 hours. **        No orders to display                                       MDM    Final diagnoses:   Tricompartment osteoarthritis of left knee   Acute pain of left knee   Sprain of left knee, unspecified ligament, initial encounter       ED Disposition  ED Disposition     ED Disposition   Discharge    Condition   Stable    Comment   --             Dayo Chavez MD  [address] " [id number]  [phone]               Medication List      No changes were made to your prescriptions during this visit.          Milli Proctor, APRN  02/02/23 9056

## 2023-02-01 NOTE — DISCHARGE INSTRUCTIONS
Continue your current pain medication.  Wear knee immobilizer.  Follow-up with Dr. Harrison Blue as planned.

## 2023-02-09 ENCOUNTER — OFFICE VISIT (OUTPATIENT)
Dept: ORTHOPEDIC SURGERY | Facility: CLINIC | Age: 63
End: 2023-02-09
Payer: COMMERCIAL

## 2023-02-09 ENCOUNTER — PREP FOR SURGERY (OUTPATIENT)
Dept: OTHER | Facility: HOSPITAL | Age: 63
End: 2023-02-09
Payer: COMMERCIAL

## 2023-02-09 VITALS
WEIGHT: 238.2 LBS | DIASTOLIC BLOOD PRESSURE: 86 MMHG | BODY MASS INDEX: 38.28 KG/M2 | HEIGHT: 66 IN | SYSTOLIC BLOOD PRESSURE: 138 MMHG

## 2023-02-09 DIAGNOSIS — M17.12 PRIMARY OSTEOARTHRITIS OF LEFT KNEE: Primary | ICD-10-CM

## 2023-02-09 DIAGNOSIS — E66.9 CLASS 2 OBESITY WITH BODY MASS INDEX (BMI) OF 38.0 TO 38.9 IN ADULT, UNSPECIFIED OBESITY TYPE, UNSPECIFIED WHETHER SERIOUS COMORBIDITY PRESENT: ICD-10-CM

## 2023-02-09 PROCEDURE — 99214 OFFICE O/P EST MOD 30 MIN: CPT | Performed by: ORTHOPAEDIC SURGERY

## 2023-02-09 RX ORDER — SEMAGLUTIDE 1.34 MG/ML
INJECTION, SOLUTION SUBCUTANEOUS WEEKLY
COMMUNITY
Start: 2023-02-08

## 2023-02-09 RX ORDER — TRANEXAMIC ACID 10 MG/ML
1000 INJECTION, SOLUTION INTRAVENOUS ONCE
Status: CANCELLED | OUTPATIENT
Start: 2023-02-09 | End: 2023-02-09

## 2023-02-09 RX ORDER — ACETAMINOPHEN 500 MG
1000 TABLET ORAL ONCE
Status: CANCELLED | OUTPATIENT
Start: 2023-02-09 | End: 2023-02-09

## 2023-02-09 RX ORDER — OXYCODONE HCL 10 MG/1
10 TABLET, FILM COATED, EXTENDED RELEASE ORAL ONCE
Status: CANCELLED | OUTPATIENT
Start: 2023-02-09 | End: 2023-02-09

## 2023-02-09 NOTE — PROGRESS NOTES
"    [facility] [place] Orthopaedic Surgery Clinic Note        Subjective     CC: Follow-up (8 week f/u -- Primary osteoarthritis of left knee)      HPI     Janet Obrien is a 62 y.o. female.  Patient is worked in today after visiting the ER 8 days ago.  She was twisting and felt a locking searing sensation on the medial side of her knee.  Patient has been started on Ozempic recently and has lost quite a bit of weight.    Overall, patient's symptoms are as above.  Insulin-dependent diabetes.    ROS:    Constiutional:Pt denies fever, chills, nausea, or vomiting.  MSK:as above        Objective      Past Medical History  Past Medical History:   Diagnosis Date   • Anxiety    • Arthritis    • Asthma    • Back pain    • Cancer (HCC)    • Chronic bronchitis (HCC)    • Chronic headaches    • Depression    • Diabetes mellitus (HCC)     type 2   • Fibromyalgia    • High triglycerides    • History of sinus problem     as child   • Hyperlipidemia    • Hypertension    • Hyperthyroidism    • Hypothyroidism    • Migraine    • MVP (mitral valve prolapse)    • OA (osteoarthritis)    • Peripheral autonomic neuropathy    • Pleurisy    • Pneumonia    • Rheumatism    • RLS (restless legs syndrome)    • Skin cancer     upper lip   • Sleep apnea    • Thyroid disease      Social History     Socioeconomic History   • Marital status:    Tobacco Use   • Smoking status: Former     Packs/day: 0.25     Years: 15.00     Pack years: 3.75     Types: Cigarettes     Start date:      Quit date: 2004     Years since quittin.5   • Smokeless tobacco: Never   Vaping Use   • Vaping Use: Never used   Substance and Sexual Activity   • Alcohol use: No   • Drug use: No   • Sexual activity: Defer          Physical Exam  /86   Ht 167.6 cm (65.98\")   Wt 108 kg (238 lb 3.2 oz)   BMI 38.47 kg/m²     Body mass index is 38.47 kg/m².    Patient is well nourished and well developed.        Ortho Exam      Musculoskeletal:  Global " Assessment:  Overall assessment of Lower Extremity Muscle Strength and Tone:  Left quadriceps--5/5   Left hamstrings--5/5       Left tibialis anterior--5/5  Left gastroc-soleus--5/5  Left EHL --5/5    Lower Extremity:    Inspection and Palpation:  Left knee:  Tenderness:  Over the medial joint line and moderate severity  Effusion:  1+  Crepitus:  Positive  Pulses:  2+  Ecchymosis:  None  Warmth:  None     ROM:  Left:  Extension: 5     Flexion:120    Instability:    Left:    Lachman Test:  Negative   Varus stress test negative  Valgus stress test negative    Deformities/Malalignments/Discrepancies:    Left:  Genu Varum     Functional Testing:  Yuliana's test:  Negative  Patella grind test:  Positive  Q-angle:  normal      Imaging/Labs/EMG Reviewed:  Imaging Results (Last 24 Hours)     ** No results found for the last 24 hours. **        XR Knee 1 or 2 View Left  Narrative: XR KNEE 1 OR 2 VW LEFT    Date of Exam: 2/1/2023 10:58 AM EST    Indication: knee injury.    Comparison: 9/1/2022    Findings:  Cortical margins are intact without evidence of fracture or dislocation. There is no significant joint effusion. Moderate to severe tricompartmental arthrosis changes are noted, with greatest medial and patellofemoral compartment involvement.  Impression: Impression:  Cortical margins are intact without evidence of fracture or dislocation. There is no significant joint effusion. Moderate to severe tricompartmental arthrosis changes are noted, with greatest medial and patellofemoral compartment involvement.    Electronically Signed: Jagdish Cook    2/1/2023 11:48 AM EST    Workstation ID: [id number]    We have reviewed images and report of the x-ray above.  No acute bony abnormality is noted.    Assessment    Assessment:  1. Primary osteoarthritis of left knee    2. Class 2 obesity with body mass index (BMI) of 38.0 to 38.9 in adult, unspecified obesity type, unspecified whether serious comorbidity present         Plan:  1. Recommend over the counter anti-inflammatories for pain and/or swelling  2. Osteoarthritis left knee--end-stage patellofemoral and moderate early severe medial compartment disease.  We discussed again the treatment options and alternatives for the patient going forward.  She has gotten her BMI under 40.  We need to make sure her hemoglobin A1c is less than 7.  We we will make sure she is cleared medically by her PCP Dr. Davenport.  Patient will have the surgery as an outpatient.  We will use Eliquis 2.5 twice daily for 6 weeks for DVT prophylaxis because her mother and brother have a history of DVTs.  We will start immediate outpatient physical therapy at [facility] in [place] the day after surgery.  Patient has been told that she needs to make arrangements for transportation after 2 weeks when her son leaves to go back home to [place].  I will see the patient back preoperatively and hopefully her laboratory studies are not prohibitive for surgery.      Dayo Chavez MD  02/09/23  16:29 EST      Dictated Utilizing Dragon Dictation.

## 2023-03-01 NOTE — PRE-PROCEDURE INSTRUCTIONS
Database initiated. Email sent to patient , or the patient has verbalized they already received the information from the  physician's  office to access the [facility] general patent education video, and has agreed to watch the video.  Database to be completed upon the Preadmission Testing appointment.   Pt was instructed to bring physical copies of any recent lab work or testing such as EKG to the [facility] appt.

## 2023-03-01 NOTE — SIGNIFICANT NOTE
Database initiated. Email sent to patient , or the patient has verbalized they already received the information from the  physician's  office to access the [facility] general patent education video, and has agreed to watch the video.  Database to be completed upon the Preadmission Testing appointment.   Pt was instructed to bring physical copies of any recent lab work or testing such as EKG to the [facility] appt.

## 2023-03-02 ENCOUNTER — PRE-ADMISSION TESTING (OUTPATIENT)
Dept: PREADMISSION TESTING | Facility: HOSPITAL | Age: 63
End: 2023-03-02
Payer: COMMERCIAL

## 2023-03-02 ENCOUNTER — OFFICE VISIT (OUTPATIENT)
Dept: ORTHOPEDIC SURGERY | Facility: CLINIC | Age: 63
End: 2023-03-02
Payer: COMMERCIAL

## 2023-03-02 VITALS — BODY MASS INDEX: 39.45 KG/M2 | WEIGHT: 236.77 LBS | HEIGHT: 65 IN

## 2023-03-02 VITALS
WEIGHT: 235.89 LBS | DIASTOLIC BLOOD PRESSURE: 85 MMHG | SYSTOLIC BLOOD PRESSURE: 135 MMHG | HEIGHT: 65 IN | BODY MASS INDEX: 39.3 KG/M2

## 2023-03-02 DIAGNOSIS — G89.29 OTHER CHRONIC PAIN: ICD-10-CM

## 2023-03-02 DIAGNOSIS — M17.12 PRIMARY OSTEOARTHRITIS OF LEFT KNEE: Primary | ICD-10-CM

## 2023-03-02 DIAGNOSIS — M17.12 PRIMARY OSTEOARTHRITIS OF LEFT KNEE: ICD-10-CM

## 2023-03-02 DIAGNOSIS — E66.9 CLASS 2 OBESITY WITH BODY MASS INDEX (BMI) OF 38.0 TO 38.9 IN ADULT, UNSPECIFIED OBESITY TYPE, UNSPECIFIED WHETHER SERIOUS COMORBIDITY PRESENT: ICD-10-CM

## 2023-03-02 LAB
ANION GAP SERPL CALCULATED.3IONS-SCNC: 8 MMOL/L (ref 5–15)
BASOPHILS # BLD AUTO: 0.03 10*3/MM3 (ref 0–0.2)
BASOPHILS NFR BLD AUTO: 0.8 % (ref 0–1.5)
BUN SERPL-MCNC: 12 MG/DL (ref 8–23)
BUN/CREAT SERPL: 20.7 (ref 7–25)
CALCIUM SPEC-SCNC: 10.4 MG/DL (ref 8.6–10.5)
CHLORIDE SERPL-SCNC: 99 MMOL/L (ref 98–107)
CO2 SERPL-SCNC: 29 MMOL/L (ref 22–29)
CREAT SERPL-MCNC: 0.58 MG/DL (ref 0.57–1)
CRP SERPL-MCNC: <0.3 MG/DL (ref 0–0.5)
DEPRECATED RDW RBC AUTO: 38.4 FL (ref 37–54)
EGFRCR SERPLBLD CKD-EPI 2021: 103.1 ML/MIN/1.73
EOSINOPHIL # BLD AUTO: 0.17 10*3/MM3 (ref 0–0.4)
EOSINOPHIL NFR BLD AUTO: 4.3 % (ref 0.3–6.2)
ERYTHROCYTE [DISTWIDTH] IN BLOOD BY AUTOMATED COUNT: 12.8 % (ref 12.3–15.4)
ERYTHROCYTE [SEDIMENTATION RATE] IN BLOOD: 4 MM/HR (ref 0–30)
GLUCOSE SERPL-MCNC: 102 MG/DL (ref 65–99)
HBA1C MFR BLD: 5.1 % (ref 4.8–5.6)
HCT VFR BLD AUTO: 41.2 % (ref 34–46.6)
HGB BLD-MCNC: 13.8 G/DL (ref 12–15.9)
IMM GRANULOCYTES # BLD AUTO: 0.01 10*3/MM3 (ref 0–0.05)
IMM GRANULOCYTES NFR BLD AUTO: 0.3 % (ref 0–0.5)
LYMPHOCYTES # BLD AUTO: 0.98 10*3/MM3 (ref 0.7–3.1)
LYMPHOCYTES NFR BLD AUTO: 24.9 % (ref 19.6–45.3)
MCH RBC QN AUTO: 28 PG (ref 26.6–33)
MCHC RBC AUTO-ENTMCNC: 33.5 G/DL (ref 31.5–35.7)
MCV RBC AUTO: 83.7 FL (ref 79–97)
MONOCYTES # BLD AUTO: 0.36 10*3/MM3 (ref 0.1–0.9)
MONOCYTES NFR BLD AUTO: 9.2 % (ref 5–12)
NEUTROPHILS NFR BLD AUTO: 2.38 10*3/MM3 (ref 1.7–7)
NEUTROPHILS NFR BLD AUTO: 60.5 % (ref 42.7–76)
NRBC BLD AUTO-RTO: 0 /100 WBC (ref 0–0.2)
PLAT MORPH BLD: NORMAL
PLATELET # BLD AUTO: 260 10*3/MM3 (ref 140–450)
PMV BLD AUTO: 10.8 FL (ref 6–12)
POTASSIUM SERPL-SCNC: 3.4 MMOL/L (ref 3.5–5.2)
RBC # BLD AUTO: 4.92 10*6/MM3 (ref 3.77–5.28)
RBC MORPH BLD: NORMAL
SODIUM SERPL-SCNC: 136 MMOL/L (ref 136–145)
WBC MORPH BLD: NORMAL
WBC NRBC COR # BLD: 3.93 10*3/MM3 (ref 3.4–10.8)

## 2023-03-02 PROCEDURE — 85007 BL SMEAR W/DIFF WBC COUNT: CPT

## 2023-03-02 PROCEDURE — 36415 COLL VENOUS BLD VENIPUNCTURE: CPT

## 2023-03-02 PROCEDURE — S0260 H&P FOR SURGERY: HCPCS | Performed by: ORTHOPAEDIC SURGERY

## 2023-03-02 PROCEDURE — 83036 HEMOGLOBIN GLYCOSYLATED A1C: CPT

## 2023-03-02 PROCEDURE — 93010 ELECTROCARDIOGRAM REPORT: CPT | Performed by: INTERNAL MEDICINE

## 2023-03-02 PROCEDURE — 93005 ELECTROCARDIOGRAM TRACING: CPT

## 2023-03-02 PROCEDURE — 86140 C-REACTIVE PROTEIN: CPT

## 2023-03-02 PROCEDURE — 85652 RBC SED RATE AUTOMATED: CPT

## 2023-03-02 PROCEDURE — 85025 COMPLETE CBC W/AUTO DIFF WBC: CPT

## 2023-03-02 PROCEDURE — 80048 BASIC METABOLIC PNL TOTAL CA: CPT

## 2023-03-02 NOTE — PROGRESS NOTES
"                          [facility] [place] Orthopaedic Surgery Office Follow Up     Office Follow Up Visit     Date: 03/02/2023   Patient Name: Janet Obrien  MRN: [id number]  YOB: 1960  Chief Complaint:   Chief Complaint   Patient presents with   • Follow-up     3 week f/u-- Primary osteoarthritis of left knee      History of Present Illness:   Janet Obrien is a 62 y.o. female who is here today for follow up for ***    Subjective   I reviewed the patient's chief complaint, history of present illness, review of systems, past medical history, surgical history, family history, social history, medications and allergy list   Objective    Vital Signs:   Vitals:    03/02/23 0954   BP: 135/85   Weight: 107 kg (235 lb 14.3 oz)   Height: 165.1 cm (65\")     Body mass index is 39.25 kg/m².    Ortho Exam:  ***    Results Review:  XR Knee 1 or 2 View Left  Narrative: XR KNEE 1 OR 2 VW LEFT    Date of Exam: 2/1/2023 10:58 AM EST    Indication: knee injury.    Comparison: 9/1/2022    Findings:  Cortical margins are intact without evidence of fracture or dislocation. There is no significant joint effusion. Moderate to severe tricompartmental arthrosis changes are noted, with greatest medial and patellofemoral compartment involvement.  Impression: Impression:  Cortical margins are intact without evidence of fracture or dislocation. There is no significant joint effusion. Moderate to severe tricompartmental arthrosis changes are noted, with greatest medial and patellofemoral compartment involvement.    Electronically Signed: Jagdish Cook    2/1/2023 11:48 AM EST    Workstation ID: [id number]       Assessment / Plan    Assessment/Plan:   Diagnoses and all orders for this visit:    1. Primary osteoarthritis of left knee (Primary)    2. Class 2 obesity with body mass index (BMI) of 38.0 to 38.9 in adult, unspecified obesity type, unspecified whether serious comorbidity present    3. Other chronic pain      ***    Follow Up: "   No follow-ups on file.      Jeremias Hernández MD  [facility] Orthopedic Surgeon

## 2023-03-02 NOTE — DISCHARGE INSTRUCTIONS
The following information and instructions were given:    Do not eat, drink, smoke or chew gum after midnight the night before surgery. This includes no mints.  Take all routine, prescribed medications including heart and blood pressure medicines with a sip of water unless otherwise instructed by your physician.   Do NOT take diabetic medication unless instructed by your physician.    DO NOT shave for two days before your procedure.  Do not wear makeup.      DO NOT wear fingernail polish (gel/regular) and/or acrylic/artificial nails on the day of surgery. If you had a recent manicure and would rather not remove polish or artificial nails, the minimum requirement is that the polish/artificial nails must be removed from the middle finger on each hand.      If you are having surgery/procedure on an upper extremity, fingernail polish/artificial fingernails must be removed for surgery.  NO EXCEPTIONS.      If you are having surgery/procedure on a lower extremity, toenail polish on both extremities must be removed for surgery.  NO EXCEPTIONS.    Remove all jewelry (advise to go to jeweler if unable to remove).  Jewelry, especially rings, can no longer be taped for surgery.    Leave anything you consider valuable at home.    Leave your suitcase in the car until after your surgery if you are staying overnight.    Bring the following with you the day of your procedure (when applicable):       -Picture ID and insurance cards       -Co-pay/deductible required by insurance       -Medications in the original bottles (not a list) including all over-the-counter medications if not brought to PAT       -Copy of advance directive, living will or power of  documents if not brought to PAT       -CPAP or BIPAP mask and tubing (do not bring machine)       -Skin prep instruction(s) sheet       -PAT Pass    Educational handout or binder (joint replacements) related to procedure given to patient.  Educational handout also includes  general information related to the recovery that mentions signs and symptoms of infection and when to call the doctor.    When applicable, an ERAS handout was given to patient.    Respirex use and pneumonia prevention education provided in Pre Admission Testing general education video.    Information related to infection and hand hygiene mentioned in Pre Admission Testing general education video. Patient instructed to call their doctor if any of the following symptoms are noted during recovery:  Fever of 100.4 F or higher, incision that is warm or has increasing bleeding, redness or drainage.    DVT Prevention instructions given in general education video presentation during Pre Admission Testing appointment that stress the importance of ambulation to improve blood circulation.  Also encouraged patient to perform foot exercises when in bed and application of a sequential device may be applied to lower extremities to improve circulation.      Please apply Chlorhexidine wipes to surgical area (if instructed) the night before procedure and the AM of procedure and document date/time of applications on skin prep instruction sheet.

## 2023-03-02 NOTE — PROGRESS NOTES
"    [facility] [facility] Note        Subjective     CC: Follow-up (3 week f/u-- Primary osteoarthritis of left knee )      HPI    Janet Obrien is a 62 y.o. female.  Patient returns the office today for follow-up of her left knee arthritis.  We have yet to hear back from her chronic pain management provider regarding a written plan for controlling her postop pain.    Overall, patient's symptoms are unchanged from her last visit on 2023    ROS:    Constiutional:Pt denies fever, chills, nausea, or vomiting.  MSK:as above        Objective      Past Medical History  Past Medical History:   Diagnosis Date   • Anxiety    • Arthritis    • Asthma    • Back pain    • Cancer (HCC)     SQUAMOUS CELL/ BASAL CELL ON LIP   • Chronic bronchitis (HCC)    • Chronic headaches    • Depression    • Diabetes mellitus (HCC)     type 2   • Fibromyalgia    • High triglycerides    • History of sinus problem     as child   • Hyperlipidemia    • Hypertension    • Hyperthyroidism    • Hypothyroidism    • Migraine    • MVP (mitral valve prolapse)    • OA (osteoarthritis)    • Peripheral autonomic neuropathy    • Pleurisy    • Pneumonia    • Rheumatism    • RLS (restless legs syndrome)    • Skin cancer     upper lip   • Sleep apnea    • Thyroid disease      Social History     Socioeconomic History   • Marital status:    Tobacco Use   • Smoking status: Former     Packs/day: 0.25     Years: 15.00     Pack years: 3.75     Types: Cigarettes     Start date:      Quit date: 2004     Years since quittin.6   • Smokeless tobacco: Never   Vaping Use   • Vaping Use: Never used   Substance and Sexual Activity   • Alcohol use: No   • Drug use: No   • Sexual activity: Defer          Physical Exam  /85   Ht 165.1 cm (65\")   Wt 107 kg (235 lb 14.3 oz)   BMI 39.25 kg/m²     Body mass index is 39.25 kg/m².    Patient is well nourished and well developed.        Ortho Exam      Musculoskeletal:  Global " Assessment:  Overall assessment of Lower Extremity Muscle Strength and Tone:  Left quadriceps--5/5   Left hamstrings--5/5       Left tibialis anterior--5/5  Left gastroc-soleus--5/5  Left EHL --5/5    Lower Extremity:    Inspection and Palpation:  Left knee:  Tenderness:  Over the medial joint line and moderate severity  Effusion:  1+  Crepitus:  Positive  Pulses:  2+  Ecchymosis:  None  Warmth:  None     ROM:  Left:  Extension: 5     Flexion:120    Instability:    Left:    Lachman Test:  Negative   Varus stress test negative  Valgus stress test negative    Deformities/Malalignments/Discrepancies:    Left:  Genu Varum     Functional Testing:  Yuliana's test:  Negative  Patella grind test:  Positive  Q-angle:  normal      Imaging/Labs/EMG Reviewed:  Imaging Results (Last 24 Hours)     ** No results found for the last 24 hours. **            Assessment    Assessment:  1. Primary osteoarthritis of left knee    2. Class 2 obesity with body mass index (BMI) of 38.0 to 38.9 in adult, unspecified obesity type, unspecified whether serious comorbidity present    3. Other chronic pain        Plan:  1. Recommend over the counter anti-inflammatories for pain and/or swelling  2. Left knee arthritis in a patient who has chronic pain and elevated BMI--patient at this point needs to have a written pain management control in place.  If that cannot get done, then we will need to delay her surgery or possibly get her over to someone who is willing to do her surgery without a written plan in place.  In my experience, this needs to be spelled out ahead of time to avoid confusion and make sure that the patient is appropriately managed and has plenty of pain medication to maximize outcome and how well she can tolerate therapy.      Dayo Chavez MD  03/02/23  12:45 EST      Dictated Utilizing Dragon Dictation.

## 2023-03-02 NOTE — PAT
An arrival time for procedure was not provided during PAT visit. If patient had any questions or concerns about their arrival time, they were instructed to contact their surgeon/physician.  Additionally, if the patient referred to an arrival time that was acquired from their my chart account, patient was encouraged to verify that time with their surgeon/physician. Arrival times are NOT provided in Pre Admission Testing Department.    Patient viewed general PAT education video as instructed in their preoperative information received from their surgeon.  Patient stated the general PAT education video was viewed in its entirety and survey completed.  Copies of PAT general education handouts (Incentive Spirometry, Meds to Beds Program, Patient Belongings, Pre-op skin preparation instructions, Blood Glucose testing, Visitor policy, Surgery FAQ, Code H) distributed to patient if not printed. Education related to the PAT pass and skin preparation for surgery (if applicable) completed in PAT as a reinforcement to PAT education video. Patient instructed to return PAT pass provided today as well as completed skin preparation sheet (if applicable) on the day of procedure.     Additionally if patient had not viewed video yet but intended to view it at home or in our waiting area, then referred them to the handout with QR code/link provided during PAT visit.  Instructed patient to complete survey after viewing the video in its entirety.  Encouraged patient/family to read PAT general education handouts thoroughly and notify PAT staff with any questions or concerns. Patient verbalized understanding of all information and priority content.    Patient denies any current skin issues.     [employer] (by [employer]) pain pump patient informational handout given to patient.  Instructed patient to watch [employer] Patient Education Video regarding Peripheral Nerve Catheter that will be in place for upcoming surgery unless contraindicated.  The video can be accessed using QR code noted on handout.  Patient agreed to watch video.  Stressed to patient to call [facility] Hotline 24/7 if patient has any questions or concerns after discharge.     Patient to apply Ready Bath Luxe wipes  to surgical area (as instructed) the night before procedure and the AM of procedure. Wipes provided.  Also instructed to use antibacterial soap due to CHG allergy.        Discussed with patient options for receiving total joint replacement education and assessed patient's ability and preference. Joint Replacement Guide given to patient during PAT visit since not received a copy within the last year. Encouraged patient/family to read guide thoroughly and notify PAT staff with any questions or concerns. Handout provided directing patient to links to watch online videos related to joint replacement surgery on the [facility] website. The handout gives detailed instructions for joining an online joint replacement class through Zoom or phone conference offered on Thursdays. Patient agreed to participate by watching videos online. Patient verbalized understanding of instructions and to complete the online learning tool survey. Encouraged to share information with family and/or . An overview of the joint replacement education was provided during the visit including general perioperative instructions that are routine for all surgical patients (PAT PASS, wipes, directions to pre-op, etc.).    EKG obtained.

## 2023-03-03 LAB
QT INTERVAL: 410 MS
QTC INTERVAL: 439 MS

## 2023-03-14 ENCOUNTER — ANESTHESIA EVENT (OUTPATIENT)
Dept: PERIOP | Facility: HOSPITAL | Age: 63
End: 2023-03-14
Payer: COMMERCIAL

## 2023-03-15 ENCOUNTER — HOSPITAL ENCOUNTER (OUTPATIENT)
Facility: HOSPITAL | Age: 63
Discharge: HOME | End: 2023-03-17
Attending: ORTHOPAEDIC SURGERY | Admitting: ORTHOPAEDIC SURGERY
Payer: COMMERCIAL

## 2023-03-15 ENCOUNTER — ANESTHESIA (OUTPATIENT)
Dept: PERIOP | Facility: HOSPITAL | Age: 63
End: 2023-03-15
Payer: COMMERCIAL

## 2023-03-15 ENCOUNTER — ANESTHESIA EVENT CONVERTED (OUTPATIENT)
Dept: ANESTHESIOLOGY | Facility: HOSPITAL | Age: 63
End: 2023-03-15
Payer: COMMERCIAL

## 2023-03-15 ENCOUNTER — APPOINTMENT (OUTPATIENT)
Dept: GENERAL RADIOLOGY | Facility: HOSPITAL | Age: 63
End: 2023-03-15
Payer: COMMERCIAL

## 2023-03-15 DIAGNOSIS — Z96.652 STATUS POST TOTAL LEFT KNEE REPLACEMENT: Primary | ICD-10-CM

## 2023-03-15 DIAGNOSIS — M17.12 PRIMARY OSTEOARTHRITIS OF LEFT KNEE: ICD-10-CM

## 2023-03-15 PROBLEM — E78.5 HYPERLIPIDEMIA: Status: ACTIVE | Noted: 2023-03-15

## 2023-03-15 LAB
GLUCOSE BLDC GLUCOMTR-MCNC: 84 MG/DL (ref 70–130)
GLUCOSE BLDC GLUCOMTR-MCNC: 90 MG/DL (ref 70–130)
POTASSIUM SERPL-SCNC: 3.3 MMOL/L (ref 3.5–5.2)

## 2023-03-15 PROCEDURE — C1776 JOINT DEVICE (IMPLANTABLE): HCPCS | Performed by: ORTHOPAEDIC SURGERY

## 2023-03-15 PROCEDURE — 27447 TOTAL KNEE ARTHROPLASTY: CPT | Performed by: ORTHOPAEDIC SURGERY

## 2023-03-15 PROCEDURE — 25010000002 DEXAMETHASONE PER 1 MG: Performed by: NURSE ANESTHETIST, CERTIFIED REGISTERED

## 2023-03-15 PROCEDURE — 25010000002 KETOROLAC TROMETHAMINE PER 15 MG: Performed by: ORTHOPAEDIC SURGERY

## 2023-03-15 PROCEDURE — 25010000002 SUCCINYLCHOLINE PER 20 MG

## 2023-03-15 PROCEDURE — 84132 ASSAY OF SERUM POTASSIUM: CPT | Performed by: ANESTHESIOLOGY

## 2023-03-15 PROCEDURE — 25010000002 MIDAZOLAM PER 1 MG

## 2023-03-15 PROCEDURE — 97162 PT EVAL MOD COMPLEX 30 MIN: CPT

## 2023-03-15 PROCEDURE — 25010000002 FENTANYL CITRATE (PF) 100 MCG/2ML SOLUTION: Performed by: NURSE ANESTHETIST, CERTIFIED REGISTERED

## 2023-03-15 PROCEDURE — C1713 ANCHOR/SCREW BN/BN,TIS/BN: HCPCS | Performed by: ORTHOPAEDIC SURGERY

## 2023-03-15 PROCEDURE — 25010000002 PROPOFOL 10 MG/ML EMULSION

## 2023-03-15 PROCEDURE — 25010000002 ONDANSETRON PER 1 MG: Performed by: NURSE ANESTHETIST, CERTIFIED REGISTERED

## 2023-03-15 PROCEDURE — 25010000002 HYDROMORPHONE 1 MG/ML SOLUTION

## 2023-03-15 PROCEDURE — 82962 GLUCOSE BLOOD TEST: CPT

## 2023-03-15 PROCEDURE — 25010000002 VANCOMYCIN 10 G RECONSTITUTED SOLUTION: Performed by: ORTHOPAEDIC SURGERY

## 2023-03-15 PROCEDURE — 27447 TOTAL KNEE ARTHROPLASTY: CPT | Performed by: PHYSICIAN ASSISTANT

## 2023-03-15 PROCEDURE — 25010000002 ROPIVACAINE PER 1 MG: Performed by: NURSE ANESTHETIST, CERTIFIED REGISTERED

## 2023-03-15 PROCEDURE — 25010000002 MORPHINE PER 10 MG: Performed by: ORTHOPAEDIC SURGERY

## 2023-03-15 PROCEDURE — 25010000002 DIPHENHYDRAMINE PER 50 MG: Performed by: ANESTHESIOLOGY

## 2023-03-15 PROCEDURE — 97116 GAIT TRAINING THERAPY: CPT

## 2023-03-15 PROCEDURE — 73560 X-RAY EXAM OF KNEE 1 OR 2: CPT

## 2023-03-15 PROCEDURE — 25010000002 ROPIVACAINE PER 1 MG: Performed by: ORTHOPAEDIC SURGERY

## 2023-03-15 DEVICE — ART/SRF KN PERSONA/VE PS EF 6TO7 10MM LT: Type: IMPLANTABLE DEVICE | Site: KNEE | Status: FUNCTIONAL

## 2023-03-15 DEVICE — CAP TOTL KN CMT PRIMARY: Type: IMPLANTABLE DEVICE | Site: KNEE | Status: FUNCTIONAL

## 2023-03-15 DEVICE — IMPLANTABLE DEVICE: Type: IMPLANTABLE DEVICE | Site: KNEE | Status: FUNCTIONAL

## 2023-03-15 DEVICE — CMT BONE R 1X40: Type: IMPLANTABLE DEVICE | Site: KNEE | Status: FUNCTIONAL

## 2023-03-15 DEVICE — COMP FEM/KN PERSONA CR CMT NRW SZ7 LT: Type: IMPLANTABLE DEVICE | Site: KNEE | Status: FUNCTIONAL

## 2023-03-15 DEVICE — STEM TIB/KN PERSONA CMT 5D SZE LT: Type: IMPLANTABLE DEVICE | Site: KNEE | Status: FUNCTIONAL

## 2023-03-15 RX ORDER — SODIUM CHLORIDE 0.9 % (FLUSH) 0.9 %
10 SYRINGE (ML) INJECTION EVERY 12 HOURS SCHEDULED
Status: CANCELLED | OUTPATIENT
Start: 2023-03-15

## 2023-03-15 RX ORDER — MAGNESIUM HYDROXIDE 1200 MG/15ML
LIQUID ORAL AS NEEDED
Status: DISCONTINUED | OUTPATIENT
Start: 2023-03-15 | End: 2023-03-15 | Stop reason: HOSPADM

## 2023-03-15 RX ORDER — IBUPROFEN 600 MG/1
1 TABLET ORAL
Status: DISCONTINUED | OUTPATIENT
Start: 2023-03-15 | End: 2023-03-17 | Stop reason: HOSPADM

## 2023-03-15 RX ORDER — NICOTINE POLACRILEX 4 MG
15 LOZENGE BUCCAL
Status: DISCONTINUED | OUTPATIENT
Start: 2023-03-15 | End: 2023-03-17 | Stop reason: HOSPADM

## 2023-03-15 RX ORDER — ACETAMINOPHEN 500 MG
1000 TABLET ORAL ONCE
Status: COMPLETED | OUTPATIENT
Start: 2023-03-15 | End: 2023-03-15

## 2023-03-15 RX ORDER — ONDANSETRON 2 MG/ML
INJECTION INTRAMUSCULAR; INTRAVENOUS AS NEEDED
Status: DISCONTINUED | OUTPATIENT
Start: 2023-03-15 | End: 2023-03-15 | Stop reason: SURG

## 2023-03-15 RX ORDER — ROPIVACAINE HYDROCHLORIDE 2 MG/ML
INJECTION, SOLUTION EPIDURAL; INFILTRATION; PERINEURAL CONTINUOUS
Status: DISCONTINUED | OUTPATIENT
Start: 2023-03-15 | End: 2023-03-17 | Stop reason: HOSPADM

## 2023-03-15 RX ORDER — ROCURONIUM BROMIDE 10 MG/ML
INJECTION, SOLUTION INTRAVENOUS AS NEEDED
Status: DISCONTINUED | OUTPATIENT
Start: 2023-03-15 | End: 2023-03-15 | Stop reason: SURG

## 2023-03-15 RX ORDER — HYDROCODONE BITARTRATE AND ACETAMINOPHEN 10; 325 MG/1; MG/1
1 TABLET ORAL EVERY 6 HOURS PRN
Qty: 30 TABLET | Refills: 0 | Status: SHIPPED | OUTPATIENT
Start: 2023-03-15 | End: 2023-03-15

## 2023-03-15 RX ORDER — PROPOFOL 10 MG/ML
VIAL (ML) INTRAVENOUS AS NEEDED
Status: DISCONTINUED | OUTPATIENT
Start: 2023-03-15 | End: 2023-03-15 | Stop reason: SURG

## 2023-03-15 RX ORDER — BUPIVACAINE HYDROCHLORIDE 2.5 MG/ML
INJECTION, SOLUTION EPIDURAL; INFILTRATION; INTRACAUDAL
Status: COMPLETED | OUTPATIENT
Start: 2023-03-15 | End: 2023-03-15

## 2023-03-15 RX ORDER — DOXYCYCLINE 100 MG/1
100 TABLET ORAL 2 TIMES DAILY
Qty: 14 TABLET | Refills: 0 | Status: SHIPPED | OUTPATIENT
Start: 2023-03-15 | End: 2023-03-22

## 2023-03-15 RX ORDER — ONDANSETRON 2 MG/ML
4 INJECTION INTRAMUSCULAR; INTRAVENOUS ONCE AS NEEDED
Status: DISCONTINUED | OUTPATIENT
Start: 2023-03-15 | End: 2023-03-15 | Stop reason: HOSPADM

## 2023-03-15 RX ORDER — OXYCODONE HYDROCHLORIDE AND ACETAMINOPHEN 5; 325 MG/1; MG/1
2 TABLET ORAL EVERY 6 HOURS PRN
Status: DISCONTINUED | OUTPATIENT
Start: 2023-03-15 | End: 2023-03-17 | Stop reason: HOSPADM

## 2023-03-15 RX ORDER — LEVOTHYROXINE SODIUM 0.15 MG/1
150 TABLET ORAL
Status: DISCONTINUED | OUTPATIENT
Start: 2023-03-16 | End: 2023-03-17 | Stop reason: HOSPADM

## 2023-03-15 RX ORDER — LABETALOL HYDROCHLORIDE 5 MG/ML
10 INJECTION, SOLUTION INTRAVENOUS EVERY 4 HOURS PRN
Status: DISCONTINUED | OUTPATIENT
Start: 2023-03-15 | End: 2023-03-17 | Stop reason: HOSPADM

## 2023-03-15 RX ORDER — FENTANYL CITRATE 50 UG/ML
INJECTION, SOLUTION INTRAMUSCULAR; INTRAVENOUS AS NEEDED
Status: DISCONTINUED | OUTPATIENT
Start: 2023-03-15 | End: 2023-03-15 | Stop reason: SURG

## 2023-03-15 RX ORDER — DULOXETIN HYDROCHLORIDE 60 MG/1
60 CAPSULE, DELAYED RELEASE ORAL DAILY
Status: DISCONTINUED | OUTPATIENT
Start: 2023-03-15 | End: 2023-03-17 | Stop reason: HOSPADM

## 2023-03-15 RX ORDER — ACETAMINOPHEN 500 MG
1000 TABLET ORAL EVERY 8 HOURS
Qty: 90 TABLET | Refills: 1 | Status: SHIPPED | OUTPATIENT
Start: 2023-03-15

## 2023-03-15 RX ORDER — DEXTROSE MONOHYDRATE 25 G/50ML
25 INJECTION, SOLUTION INTRAVENOUS
Status: DISCONTINUED | OUTPATIENT
Start: 2023-03-15 | End: 2023-03-17 | Stop reason: HOSPADM

## 2023-03-15 RX ORDER — INSULIN LISPRO 100 [IU]/ML
0-7 INJECTION, SOLUTION INTRAVENOUS; SUBCUTANEOUS
Status: DISCONTINUED | OUTPATIENT
Start: 2023-03-15 | End: 2023-03-17 | Stop reason: HOSPADM

## 2023-03-15 RX ORDER — FAMOTIDINE 20 MG/1
20 TABLET, FILM COATED ORAL ONCE
Status: COMPLETED | OUTPATIENT
Start: 2023-03-15 | End: 2023-03-15

## 2023-03-15 RX ORDER — DOCUSATE SODIUM 100 MG/1
100 CAPSULE, LIQUID FILLED ORAL 2 TIMES DAILY PRN
Qty: 60 CAPSULE | Refills: 0 | Status: SHIPPED | OUTPATIENT
Start: 2023-03-15

## 2023-03-15 RX ORDER — SODIUM CHLORIDE 9 MG/ML
40 INJECTION, SOLUTION INTRAVENOUS AS NEEDED
Status: CANCELLED | OUTPATIENT
Start: 2023-03-15

## 2023-03-15 RX ORDER — FAMOTIDINE 10 MG/ML
20 INJECTION, SOLUTION INTRAVENOUS ONCE
Status: CANCELLED | OUTPATIENT
Start: 2023-03-15 | End: 2023-03-15

## 2023-03-15 RX ORDER — DIPHENHYDRAMINE HYDROCHLORIDE 50 MG/ML
25 INJECTION INTRAMUSCULAR; INTRAVENOUS ONCE
Status: COMPLETED | OUTPATIENT
Start: 2023-03-15 | End: 2023-03-15

## 2023-03-15 RX ORDER — IBUPROFEN 600 MG/1
600 TABLET ORAL EVERY 6 HOURS PRN
Status: DISCONTINUED | OUTPATIENT
Start: 2023-03-15 | End: 2023-03-17 | Stop reason: HOSPADM

## 2023-03-15 RX ORDER — SUCCINYLCHOLINE CHLORIDE 20 MG/ML
INJECTION INTRAMUSCULAR; INTRAVENOUS AS NEEDED
Status: DISCONTINUED | OUTPATIENT
Start: 2023-03-15 | End: 2023-03-15 | Stop reason: SURG

## 2023-03-15 RX ORDER — ATORVASTATIN CALCIUM 40 MG/1
80 TABLET, FILM COATED ORAL DAILY
Status: DISCONTINUED | OUTPATIENT
Start: 2023-03-15 | End: 2023-03-17 | Stop reason: HOSPADM

## 2023-03-15 RX ORDER — FENTANYL CITRATE 50 UG/ML
50 INJECTION, SOLUTION INTRAMUSCULAR; INTRAVENOUS
Status: DISCONTINUED | OUTPATIENT
Start: 2023-03-15 | End: 2023-03-15 | Stop reason: HOSPADM

## 2023-03-15 RX ORDER — DEXAMETHASONE SODIUM PHOSPHATE 10 MG/ML
INJECTION INTRAMUSCULAR; INTRAVENOUS AS NEEDED
Status: DISCONTINUED | OUTPATIENT
Start: 2023-03-15 | End: 2023-03-15 | Stop reason: SURG

## 2023-03-15 RX ORDER — LIDOCAINE HYDROCHLORIDE 10 MG/ML
INJECTION, SOLUTION EPIDURAL; INFILTRATION; INTRACAUDAL; PERINEURAL AS NEEDED
Status: DISCONTINUED | OUTPATIENT
Start: 2023-03-15 | End: 2023-03-15 | Stop reason: SURG

## 2023-03-15 RX ORDER — IPRATROPIUM BROMIDE AND ALBUTEROL SULFATE 2.5; .5 MG/3ML; MG/3ML
3 SOLUTION RESPIRATORY (INHALATION) ONCE AS NEEDED
Status: DISCONTINUED | OUTPATIENT
Start: 2023-03-15 | End: 2023-03-15 | Stop reason: HOSPADM

## 2023-03-15 RX ORDER — SODIUM CHLORIDE, SODIUM LACTATE, POTASSIUM CHLORIDE, CALCIUM CHLORIDE 600; 310; 30; 20 MG/100ML; MG/100ML; MG/100ML; MG/100ML
9 INJECTION, SOLUTION INTRAVENOUS CONTINUOUS
Status: DISCONTINUED | OUTPATIENT
Start: 2023-03-15 | End: 2023-03-17 | Stop reason: HOSPADM

## 2023-03-15 RX ORDER — MIDAZOLAM HYDROCHLORIDE 1 MG/ML
INJECTION INTRAMUSCULAR; INTRAVENOUS AS NEEDED
Status: DISCONTINUED | OUTPATIENT
Start: 2023-03-15 | End: 2023-03-15 | Stop reason: SURG

## 2023-03-15 RX ORDER — SODIUM CHLORIDE 0.9 % (FLUSH) 0.9 %
10 SYRINGE (ML) INJECTION AS NEEDED
Status: CANCELLED | OUTPATIENT
Start: 2023-03-15

## 2023-03-15 RX ORDER — LIDOCAINE HYDROCHLORIDE 10 MG/ML
0.5 INJECTION, SOLUTION EPIDURAL; INFILTRATION; INTRACAUDAL; PERINEURAL ONCE AS NEEDED
Status: COMPLETED | OUTPATIENT
Start: 2023-03-15 | End: 2023-03-15

## 2023-03-15 RX ORDER — MONTELUKAST SODIUM 10 MG/1
10 TABLET ORAL NIGHTLY
Status: DISCONTINUED | OUTPATIENT
Start: 2023-03-15 | End: 2023-03-17 | Stop reason: HOSPADM

## 2023-03-15 RX ORDER — OXYCODONE HCL 10 MG/1
10 TABLET, FILM COATED, EXTENDED RELEASE ORAL ONCE
Status: COMPLETED | OUTPATIENT
Start: 2023-03-15 | End: 2023-03-15

## 2023-03-15 RX ORDER — ONDANSETRON 4 MG/1
4 TABLET, FILM COATED ORAL EVERY 8 HOURS PRN
Qty: 15 TABLET | Refills: 1 | Status: SHIPPED | OUTPATIENT
Start: 2023-03-15

## 2023-03-15 RX ORDER — OXYCODONE AND ACETAMINOPHEN 10; 325 MG/1; MG/1
1 TABLET ORAL EVERY 6 HOURS PRN
Qty: 30 TABLET | Refills: 0 | Status: SHIPPED | OUTPATIENT
Start: 2023-03-15

## 2023-03-15 RX ORDER — ONDANSETRON 4 MG/1
4 TABLET, FILM COATED ORAL ONCE AS NEEDED
Status: DISCONTINUED | OUTPATIENT
Start: 2023-03-15 | End: 2023-03-17 | Stop reason: HOSPADM

## 2023-03-15 RX ADMIN — SUCCINYLCHOLINE CHLORIDE 100 MG: 20 INJECTION, SOLUTION INTRAMUSCULAR; INTRAVENOUS at 10:50

## 2023-03-15 RX ADMIN — BUPIVACAINE HYDROCHLORIDE 15 ML: 2.5 INJECTION, SOLUTION EPIDURAL; INFILTRATION; INTRACAUDAL at 13:46

## 2023-03-15 RX ADMIN — ATORVASTATIN CALCIUM 80 MG: 40 TABLET, FILM COATED ORAL at 18:23

## 2023-03-15 RX ADMIN — Medication 1000 MG: at 13:48

## 2023-03-15 RX ADMIN — SUGAMMADEX 200 MG: 100 INJECTION, SOLUTION INTRAVENOUS at 12:49

## 2023-03-15 RX ADMIN — DULOXETINE 60 MG: 60 CAPSULE, DELAYED RELEASE ORAL at 18:23

## 2023-03-15 RX ADMIN — PROPOFOL 150 MG: 10 INJECTION, EMULSION INTRAVENOUS at 10:50

## 2023-03-15 RX ADMIN — FENTANYL CITRATE 25 MCG: 50 INJECTION, SOLUTION INTRAMUSCULAR; INTRAVENOUS at 12:38

## 2023-03-15 RX ADMIN — ONDANSETRON 4 MG: 2 INJECTION INTRAMUSCULAR; INTRAVENOUS at 12:49

## 2023-03-15 RX ADMIN — HYDROMORPHONE HYDROCHLORIDE 0.5 MG: 1 INJECTION, SOLUTION INTRAMUSCULAR; INTRAVENOUS; SUBCUTANEOUS at 13:55

## 2023-03-15 RX ADMIN — FAMOTIDINE 20 MG: 20 TABLET ORAL at 08:30

## 2023-03-15 RX ADMIN — VANCOMYCIN HYDROCHLORIDE 1500 MG: 10 INJECTION, POWDER, LYOPHILIZED, FOR SOLUTION INTRAVENOUS at 09:03

## 2023-03-15 RX ADMIN — PROPOFOL 50 MG: 10 INJECTION, EMULSION INTRAVENOUS at 10:36

## 2023-03-15 RX ADMIN — ROCURONIUM BROMIDE 30 MG: 10 INJECTION, SOLUTION INTRAVENOUS at 11:00

## 2023-03-15 RX ADMIN — LIDOCAINE HYDROCHLORIDE 50 MG: 10 INJECTION, SOLUTION EPIDURAL; INFILTRATION; INTRACAUDAL; PERINEURAL at 10:50

## 2023-03-15 RX ADMIN — DEXAMETHASONE SODIUM PHOSPHATE 4 MG: 10 INJECTION INTRAMUSCULAR; INTRAVENOUS at 12:49

## 2023-03-15 RX ADMIN — OXYCODONE HYDROCHLORIDE AND ACETAMINOPHEN 2 TABLET: 5; 325 TABLET ORAL at 23:25

## 2023-03-15 RX ADMIN — HYDROMORPHONE HYDROCHLORIDE 0.5 MG: 1 INJECTION, SOLUTION INTRAMUSCULAR; INTRAVENOUS; SUBCUTANEOUS at 14:29

## 2023-03-15 RX ADMIN — MONTELUKAST 10 MG: 10 TABLET, FILM COATED ORAL at 20:49

## 2023-03-15 RX ADMIN — DIPHENHYDRAMINE HYDROCHLORIDE 25 MG: 50 INJECTION, SOLUTION INTRAMUSCULAR; INTRAVENOUS at 10:00

## 2023-03-15 RX ADMIN — FENTANYL CITRATE 75 MCG: 50 INJECTION, SOLUTION INTRAMUSCULAR; INTRAVENOUS at 10:50

## 2023-03-15 RX ADMIN — MIDAZOLAM HYDROCHLORIDE 2 MG: 1 INJECTION, SOLUTION INTRAMUSCULAR; INTRAVENOUS at 10:36

## 2023-03-15 RX ADMIN — ACETAMINOPHEN 1000 MG: 500 TABLET ORAL at 08:29

## 2023-03-15 RX ADMIN — BUPIVACAINE HYDROCHLORIDE 30 ML: 2.5 INJECTION, SOLUTION EPIDURAL; INFILTRATION; INTRACAUDAL at 10:53

## 2023-03-15 RX ADMIN — Medication 0.5 MG: at 13:55

## 2023-03-15 RX ADMIN — LIDOCAINE HYDROCHLORIDE 0.5 ML: 10 INJECTION, SOLUTION EPIDURAL; INFILTRATION; INTRACAUDAL; PERINEURAL at 08:14

## 2023-03-15 RX ADMIN — ROCURONIUM BROMIDE 10 MG: 10 INJECTION, SOLUTION INTRAVENOUS at 10:50

## 2023-03-15 RX ADMIN — OXYCODONE HYDROCHLORIDE 10 MG: 10 TABLET, FILM COATED, EXTENDED RELEASE ORAL at 08:33

## 2023-03-15 RX ADMIN — SODIUM CHLORIDE, POTASSIUM CHLORIDE, SODIUM LACTATE AND CALCIUM CHLORIDE 9 ML/HR: 600; 310; 30; 20 INJECTION, SOLUTION INTRAVENOUS at 08:14

## 2023-03-15 NOTE — ANESTHESIA PREPROCEDURE EVALUATION
" Anesthesia Evaluation     Patient summary reviewed and Nursing notes reviewed   NPO Solid Status: > 8 hours  NPO Liquid Status: > 2 hours           Airway   Mallampati: II  TM distance: >3 FB  Neck ROM: full  No difficulty expected  Dental      Pulmonary    (+) a smoker (15 yrs ago) Former, COPD (PRN Ventoilin no flares 6 months) moderate, asthma,shortness of breath, sleep apnea (no CPAP ),   (-) pneumonia, recent URI, no home oxygen  Cardiovascular     ECG reviewed    (+) hypertension, valvular problems/murmurs MVP, hyperlipidemia,   (-) CAD, dysrhythmias, angina, cardiac stents    ROS comment: ECG NSR   ECHO 2020 Ef >70% ow  Normal     Neuro/Psych  (+) headaches,    (-) seizures, CVA    ROS Comment: Myasthenia   GI/Hepatic/Renal/Endo    (+) obesity, morbid obesity, GERD, GI bleeding , diabetes mellitus (A1c <5) type 2 well controlled, thyroid problem hypothyroidism  (-) no renal disease    Musculoskeletal     (+) back pain,   Abdominal    Substance History      OB/GYN          Other   arthritis,      (-) history of cancer (skin)  ROS/Med Hx Other: Problem w GA ((Gyn surgery 30 years ago)  \"Woke up\" couldn't breathe/choking  Wants to be awake w classic 70's rock                Anesthesia Plan    ASA 3     spinal     (Pt accepts min sedation with spinal (on Ozempic)   Will scan stomach  Post op ACB )  intravenous induction     Anesthetic plan, risks, benefits, and alternatives have been provided, discussed and informed consent has been obtained with: patient.    Plan discussed with CRNA.        CODE STATUS:       "

## 2023-03-15 NOTE — THERAPY EVALUATION
Patient Name: Janet Obrien  : 1960    MRN: [id number]                              Today's Date: 3/15/2023       Admit Date: 3/15/2023    Visit Dx:     ICD-10-CM ICD-9-CM   1. Primary osteoarthritis of left knee  M17.12 715.16     Patient Active Problem List   Diagnosis   • Diplopia   • Orbital pseudotumor   • Myositic orbital pseudotumor   • Hypothyroidism   • Diabetes mellitus (HCC)   • Age-related nuclear cataract of right eye   • Ocular myasthenia (HCC)   • Morbid obesity, unspecified obesity type (HCC)   • Essential hypertension   • Precordial pain   • SOB (shortness of breath)   • Lower abdominal pain   • Constipation   • Diarrhea   • Rectal bleeding   • Gastroesophageal reflux disease   • Class 3 severe obesity due to excess calories with serious comorbidity and body mass index (BMI) of 40.0 to 44.9 in adult (HCC)   • Anal or rectal pain   • Primary osteoarthritis of left knee   • Status post total left knee replacement   • Hyperlipidemia     Past Medical History:   Diagnosis Date   • Anxiety    • Arthritis    • Asthma    • Back pain    • Cancer (HCC)     SQUAMOUS CELL/ BASAL CELL ON LIP   • Chronic bronchitis (HCC)    • Depression    • Diabetes mellitus (HCC)     type 2   • Fibromyalgia    • High triglycerides    • Hyperlipidemia    • Hypertension    • Migraine    • MVP (mitral valve prolapse)    • OA (osteoarthritis)    • Peripheral autonomic neuropathy    • Pleurisy    • Pneumonia    • Rheumatism    • RLS (restless legs syndrome)    • Sleep apnea     Mild form per patient. recommended mouthpiece   • Thyroid disease      Past Surgical History:   Procedure Laterality Date   • APPENDECTOMY  's   • CATARACT EXTRACTION W/ INTRAOCULAR LENS IMPLANT Right 2019    Procedure: CATARACT PHACO EXTRACTION WITH INTRAOCULAR LENS IMPLANT RIGHT;  Surgeon: Carl Babb MD;  Location: [facility];  Service: Ophthalmology   • CATARACT EXTRACTION W/ INTRAOCULAR LENS IMPLANT Left 10/07/2019     "Procedure: CATARACT PHACO EXTRACTION WITH INTRAOCULAR LENS IMPLANT LEFT;  Surgeon: Carl Babb MD;  Location: [facility];  Service: Ophthalmology   •  SECTION  , 1997    x 2    • DILATATION AND CURETTAGE     • HAND SURGERY Right     \"Pinched nerve surgery\"   • HAND SURGERY Left     Joint removed secondary to arthritis   • HYSTERECTOMY      ''still have one ovary''   • KNEE SURGERY Right 2021    arthroscopy and partial medial meniscectomy Dr. Chavez   • THYROIDECTOMY     • TONSILLECTOMY AND ADENOIDECTOMY        General Information     Row Name 03/15/23 1810          Physical Therapy Time and Intention    Document Type evaluation  -     Mode of Treatment physical therapy  -     Row Name 03/15/23 1810          General Information    Patient Profile Reviewed yes  -     Prior Level of Function min assist:;all household mobility;community mobility;gait;transfer;bed mobility;ADL's  -     Existing Precautions/Restrictions fall;other (see comments)  adductor canal nerve cath  -     Barriers to Rehab none identified  -     Row Name 03/15/23 1810          Living Environment    People in Home alone;other (see comments)  brother/son planning to stay with pt at d/c  -     Row Name 03/15/23 1810          Home Main Entrance    Number of Stairs, Main Entrance one;other (see comments)  one \"big step  -     Row Name 03/15/23 1810          Stairs Within Home, Primary    Stairs, Within Home, Primary split foyer  -     Number of Stairs, Within Home, Primary seven  -HP     Row Name 03/15/23 1810          Cognition    Orientation Status (Cognition) oriented x 3;other (see comments)  drowsy and lethargic from anesthesia  -HP     Row Name 03/15/23 1810          Safety Issues, Functional Mobility    Safety Issues Affecting Function (Mobility) insight into deficits/self-awareness;awareness of need for assistance;safety precaution awareness  -     Impairments Affecting Function " (Mobility) balance;cognition;endurance/activity tolerance;pain;strength;range of motion (ROM)  -     Cognitive Impairments, Mobility Safety/Performance awareness, need for assistance;attention;insight into deficits/self-awareness;problem-solving/reasoning  -           User Key  (r) = Recorded By, (t) = Taken By, (c) = Cosigned By    Initials Name Provider Type     Winifred Youssef, PT Physical Therapist               Mobility     Row Name 03/15/23 1811          Bed Mobility    Bed Mobility supine-sit  -     Supine-Sit [place] (Bed Mobility) minimum assist (75% patient effort);verbal cues;nonverbal cues (demo/gesture)  -     Comment, (Bed Mobility) VC for sequencing  -     Row Name 03/15/23 1811          Transfers    Comment, (Transfers) VC for hand placement. Pt impulsive due to drowsiness. Mod Ax2 for weight shifting and extension  -     Row Name 03/15/23 1811          Sit-Stand Transfer    Sit-Stand [place] (Transfers) moderate assist (50% patient effort);2 person assist;nonverbal cues (demo/gesture);verbal cues  -     Assistive Device (Sit-Stand Transfers) walker, front-wheeled  -     Row Name 03/15/23 1811          Gait/Stairs (Locomotion)    [place] Level (Gait) minimum assist (75% patient effort);2 person assist;verbal cues;nonverbal cues (demo/gesture)  -     Assistive Device (Gait) walker, front-wheeled  -HP     Ambulated day of surgery or within 4 hours of PACU discharge yes  -HP     Distance in Feet (Gait) 50  -HP     Deviations/Abnormal Patterns (Gait) bilateral deviations;base of support, wide;weight shifting decreased;stride length decreased;gait speed decreased  -     Bilateral Gait Deviations forward flexed posture;heel strike decreased  -     Comment, (Gait/Stairs) Pt amb 50' with FWW and Min A x2 for stability. Pt drowsy with decreased safety awareness limiting functional mobility. Pt with wide [place]LINDA, step-through gait pattern, decreased stride length B. VC for  upright posture and L knee extension. Pt fatigued quickly and reported pain.  -     Row Name 03/15/23 1811          Mobility    Extremity Weight-bearing Status left lower extremity  -     Left Lower Extremity (Weight-bearing Status) weight-bearing as tolerated (WBAT)  -           User Key  (r) = Recorded By, (t) = Taken By, (c) = Cosigned By    Initials Name Provider Type     Winifred Youssef, LEANA Physical Therapist               Obj/Interventions     [facility] Name 03/15/23 1814          Range of Motion Comprehensive    General Range of Motion lower extremity range of motion deficits identified  -     Comment, General Range of Motion L knee ROM 15-60; limited by decreased command following  -[facility] Name 03/15/23 1814          Strength Comprehensive (MMT)    General Manual Muscle Testing (MMT) Assessment lower extremity strength deficits identified  -     Comment, General Manual Muscle Testing (MMT) Assessment Pt able to perform B active ankle DF and SLR  -[facility] Name 03/15/23 1814          Balance    Balance Assessment sitting static balance;sitting dynamic balance;sit to stand dynamic balance;standing static balance;standing dynamic balance  -     Static Sitting Balance contact guard  -     Dynamic Sitting Balance contact guard  -     Position, Sitting Balance sitting edge of bed  -     Static Standing Balance minimal assist;non-verbal cues (demo/gesture);verbal cues  -     Dynamic Standing Balance minimal assist;2-person assist;non-verbal cues (demo/gesture);verbal cues  -     Position/Device Used, Standing Balance supported;walker, rolling  -     Balance Interventions sitting;standing;sit to stand;occupation based/functional task  -[facility] Name 03/15/23 1814          Sensory Assessment (Somatosensory)    Sensory Assessment (Somatosensory) LE sensation intact  -           User Key  (r) = Recorded By, (t) = Taken By, (c) = Cosigned By    Initials Name Provider Type     Mikael  Winifred, PT Physical Therapist               Goals/Plan     Row Name 03/15/23 1818          Bed Mobility Goal 1 (PT)    Activity/Assistive Device (Bed Mobility Goal 1, PT) sit to supine/supine to sit  -HP     [place] Level/Cues Needed (Bed Mobility Goal 1, PT) modified independence  -HP     Time Frame (Bed Mobility Goal 1, PT) long term goal (LTG);3 days  -HP     Progress/Outcomes (Bed Mobility Goal 1, PT) goal ongoing  -     Row Name 03/15/23 1818          Transfer Goal 1 (PT)    Activity/Assistive Device (Transfer Goal 1, PT) sit-to-stand/stand-to-sit  -HP     [place] Level/Cues Needed (Transfer Goal 1, PT) modified independence  -HP     Time Frame (Transfer Goal 1, PT) long term goal (LTG);3 days  -HP     Progress/Outcome (Transfer Goal 1, PT) goal ongoing  -[facility] Name 03/15/23 1818          Gait Training Goal 1 (PT)    Activity/Assistive Device (Gait Training Goal 1, PT) gait (walking locomotion)  -HP     [place] Level (Gait Training Goal 1, PT) modified independence  -HP     Distance (Gait Training Goal 1, PT) 500  -HP     Time Frame (Gait Training Goal 1, PT) long term goal (LTG);3 days  -HP     Progress/Outcome (Gait Training Goal 1, PT) goal ongoing  -[facility] Name 03/15/23 1818          ROM Goal 1 (PT)    ROM Goal 1 (PT) L knee ROM 0-90  -HP     Time Frame (ROM Goal 1, PT) long-term goal (LTG);3 days  -HP     Progress/Outcome (ROM Goal 1, PT) goal ongoing  -     Row Name 03/15/23 1818          Stairs Goal 1 (PT)    Activity/Assistive Device (Stairs Goal 1, PT) ascending stairs;descending stairs  -HP     [place] Level/Cues Needed (Stairs Goal 1, PT) contact guard required  -HP     Number of Stairs (Stairs Goal 1, PT) 7  -HP     Time Frame (Stairs Goal 1, PT) long term goal (LTG);3 days  -HP     Progress/Outcome (Stairs Goal 1, PT) goal ongoing  -     Row Name 03/15/23 1818          Therapy Assessment/Plan (PT)    Planned Therapy Interventions (PT) balance training;bed mobility  training;gait training;home exercise program;patient/family education;transfer training;stretching;strengthening;stair training;ROM (range of motion)  -           User Key  (r) = Recorded By, (t) = Taken By, (c) = Cosigned By    Initials Name Provider Type    HP Winifred Youssef, PT Physical Therapist               Clinical Impression     Row Name 03/15/23 1816          Pain    Pretreatment Pain Rating 5/10  -HP     Posttreatment Pain Rating 5/10  -     Pain Location - Side/Orientation Left  -     Pain Location generalized  -     Pain Location - knee  -HP     Pain Intervention(s) Repositioned;Cold applied;Ambulation/increased activity;Elevated  -     Row Name 03/15/23 1816          Plan of Care Review    Plan of Care Reviewed With patient  -HP     Progress no change  -     Outcome Evaluation PT eval complete. Pt continued to be drowsy from anesthesia and demonstrated decreased safety awareness at this time limiting mobility. Pt amb 50' with FWW and MIon A x2. No knee buckling or LOB noted. HEP and precautions reviewed with pt. Recommend d/c home with assist and HHPT tomorrow if medically appropriate. Plan to continue gait training and assess stairs next session.  -     Row Name 03/15/23 1816          Therapy Assessment/Plan (PT)    Rehab Potential (PT) good, to achieve stated therapy goals  -     Criteria for Skilled Interventions Met (PT) yes;meets criteria;skilled treatment is necessary  -     Therapy Frequency (PT) 2 times/day  -     Row Name 03/15/23 1816          Vital Signs    Pre Systolic BP Rehab 115  VSS  -HP     Pre Treatment Diastolic BP 75  -HP     Pre Patient Position Supine  -HP     Intra Patient Position Standing  -HP     Post Patient Position Sitting  -     Row Name 03/15/23 1816          Positioning and Restraints    Pre-Treatment Position in bed  -HP     Post Treatment Position chair  -HP     In Chair notified nsg;reclined;sitting;call light within reach;encouraged to call for  assist;exit alarm on;with family/caregiver;legs elevated;compression device  -HP           User Key  (r) = Recorded By, (t) = Taken By, (c) = Cosigned By    Initials Name Provider Type     Winifred Youssef PT Physical Therapist               Outcome Measures     Row Name 03/15/23 1818          How much help from another person do you currently need...    Turning from your back to your side while in flat bed without using bedrails? 3  -HP     Moving from lying on back to sitting on the side of a flat bed without bedrails? 3  -HP     Moving to and from a bed to a chair (including a wheelchair)? 2  -HP     Standing up from a chair using your arms (e.g., wheelchair, bedside chair)? 2  -HP     Climbing 3-5 steps with a railing? 2  -HP     To walk in hospital room? 2  -HP     AM-PAC 6 Clicks Score (PT) 14  -HP     Highest level of mobility 4 --> Transferred to chair/commode  -     Row Name 03/15/23 1818          PADD    Diagnosis 1  -     Gender 1  -     Age Group 3  -HP     Gait Distance 0  -HP     Assist Level 0  -HP     Home Support 3  -HP     PADD Score 7  -HP     Patient Preference home with home health  -     Prediction by PADD Score extended rehabilitation  -     Row Name 03/15/23 1818          Functional Assessment    Outcome Measure Options AM-PAC 6 Clicks Basic Mobility (PT);PADD  -HP           User Key  (r) = Recorded By, (t) = Taken By, (c) = Cosigned By    Initials Name Provider Type     Wiinfred Youssef PT Physical Therapist                             Physical Therapy Education     Title: PT OT SLP Therapies (Done)     Topic: Physical Therapy (Done)     Point: Mobility training (Done)     Learning Progress Summary           Patient Acceptance, E,D, VU,DU by  at 3/15/2023 1819                   Point: Home exercise program (Done)     Learning Progress Summary           Patient Acceptance, E,D, VU,DU by  at 3/15/2023 1819                   Point: Body mechanics (Done)     Learning Progress  Summary           Patient Acceptance, E,D, VU,DU by  at 3/15/2023 1819                   Point: Precautions (Done)     Learning Progress Summary           Patient Acceptance, E,D, VU,DU by  at 3/15/2023 1819                               User Key     Initials Effective Dates Name Provider Type [facility] 06/01/21 -  Winifred Youssef PT Physical Therapist PT              PT Recommendation and Plan  Planned Therapy Interventions (PT): balance training, bed mobility training, gait training, home exercise program, patient/family education, transfer training, stretching, strengthening, stair training, ROM (range of motion)  Plan of Care Reviewed With: patient  Progress: no change  Outcome Evaluation: PT eval complete. Pt continued to be drowsy from anesthesia and demonstrated decreased safety awareness at this time limiting mobility. Pt amb 50' with FWW and MIon A x2. No knee buckling or LOB noted. HEP and precautions reviewed with pt. Recommend d/c home with assist and HHPT tomorrow if medically appropriate. Plan to continue gait training and assess stairs next session.     Time Calculation:    PT Charges     Row Name 03/15/23 1745             Time Calculation    Start Time 1745  -HP      PT Received On 03/15/23  -      PT Goal Re-Cert Due Date 03/25/23  -         Timed Charges    16837 - Gait Training Minutes  8  -HP      84577 - PT Therapeutic Activity Minutes 3  -HP         Untimed Charges    PT Eval/Re-eval Minutes 47  -HP         Total Minutes    Timed Charges Total Minutes 11  -HP      Untimed Charges Total Minutes 47  -HP       Total Minutes 58  -HP            User Key  (r) = Recorded By, (t) = Taken By, (c) = Cosigned By    Initials Name Provider Type     Winifred Youssef PT Physical Therapist              Therapy Charges for Today     Code Description Service Date Service Provider Modifiers Qty    51953911401 HC GAIT TRAINING EA 15 MIN 3/15/2023 Winifred Youssef, PT GP 1    23814639048 HC PT EVAL  MOD COMPLEXITY 4 3/15/2023 Winifred Youssef, PT GP 1    [id number] HC PT THER SUPP EA 15 MIN 3/15/2023 Winifred Youssef, PT GP 3          PT G-Codes  Outcome Measure Options: AM-PAC 6 Clicks Basic Mobility (PT), PADD  AM-PAC 6 Clicks Score (PT): 14  PT Discharge Summary  Anticipated Discharge Disposition (PT): home with assist, home with home health    Winifred Youssef, LEANA  3/15/2023

## 2023-03-15 NOTE — H&P
Patient Name: Janet Obrien  MRN: [id number]  : 1960  DOS: 3/15/2023    Attending: Dayo Chavez,*    Primary Care Provider: Noe Davenport MD      Chief complaint: Left knee pain    Subjective   Patient is a pleasant 62 y.o. female presented for scheduled surgery by Dr. Chavez.  She underwent left total knee arthroplasty under general anesthesia.  She tolerated surgery well and was admitted for further medical management.  Her knee has been painful for many years.  She denies use of assistive device for ambulation.  She does report recent falls.    When seen in PACU she is drowsy.  She reports 5/10 knee pain.  She denies nausea, shortness of breath or chest pain.  No history of DVT or PE.  She does report that she has a strong family history of DVTs.    Allergies:  Allergies   Allergen Reactions   • Penicillins Angioedema, Anaphylaxis, Hives, Itching, Shortness Of Breath and Swelling   • Cephalexin Itching   • Chlorhexidine Rash and Itching   • Sulfa Antibiotics Rash and Itching   • Darvon [Propoxyphene] Unknown (See Comments)     Doesn't remember   • Diazepam Other (See Comments)     Paradoxical effect/ hypes pt       Meds:  Medications Prior to Admission   Medication Sig Dispense Refill Last Dose   • atorvastatin (LIPITOR) 80 MG tablet Take 1 tablet by mouth Daily.   3/14/2023 at 1000   • Cholecalciferol (VITAMIN D3 PO) Take 1 tablet by mouth Daily.   3/14/2023 at 1000   • DULoxetine (CYMBALTA) 60 MG capsule Take 1 capsule by mouth Daily.   3/14/2023 at 1000   • hydrochlorothiazide (HYDRODIURIL) 25 MG tablet Take 1 tablet by mouth Daily.   3/14/2023 at 1000   • montelukast (SINGULAIR) 10 MG tablet Take 1 tablet by mouth Every Night. 30 tablet 3 3/14/2023 at 2300   • oxyCODONE-acetaminophen (Percocet)  MG per tablet Take 1 tablet by mouth Every 8 (Eight) Hours As Needed for Moderate Pain  or Severe Pain . 15 tablet 0 3/14/2023 at 2100   • Synthroid 150 MCG tablet Take 1 tablet by  mouth Every Morning.   3/14/2023 at 0600   • cyclobenzaprine (FLEXERIL) 10 MG tablet Take 1 tablet by mouth 3 (Three) Times a Day As Needed for Muscle Spasms. 21 tablet 0 More than a month   • estradiol (ESTRACE) 0.5 MG tablet Take 0.25 mg by mouth. NOT CURRENTLY   More than a month   • meclizine (ANTIVERT) 25 MG tablet Take 1 tablet by mouth Every 6 (Six) Hours As Needed for Dizziness. (Patient taking differently: Take 1 tablet by mouth Every 6 (Six) Hours As Needed for Dizziness or Nausea. NOT CURRENTLY TAKING) 20 tablet 0 More than a month   • Ozempic, 1 MG/DOSE, 4 MG/3ML solution pen-injector 1 (One) Time Per Week. THURSDAY   3/9/2023   • phentermine (ADIPEX-P) 37.5 MG tablet Take 1 tablet by mouth Daily. NOT CURRENTLY TAKING   3/1/2023   • Ventolin  (90 Base) MCG/ACT inhaler Inhale 2 puffs Every 4 (Four) Hours As Needed for Wheezing. 18 g 3 3/1/2023         History:   Past Medical History:   Diagnosis Date   • Anxiety    • Arthritis    • Asthma    • Back pain    • Cancer (HCC)     SQUAMOUS CELL/ BASAL CELL ON LIP   • Chronic bronchitis (HCC)    • Depression    • Diabetes mellitus (HCC)     type 2   • Fibromyalgia    • High triglycerides    • Hyperlipidemia    • Hypertension    • Migraine    • MVP (mitral valve prolapse)    • OA (osteoarthritis)    • Peripheral autonomic neuropathy    • Pleurisy    • Pneumonia    • Rheumatism    • RLS (restless legs syndrome)    • Sleep apnea     Mild form per patient. recommended mouthpiece   • Thyroid disease      Past Surgical History:   Procedure Laterality Date   • APPENDECTOMY  1970's   • CATARACT EXTRACTION W/ INTRAOCULAR LENS IMPLANT Right 09/16/2019    Procedure: CATARACT PHACO EXTRACTION WITH INTRAOCULAR LENS IMPLANT RIGHT;  Surgeon: Carl Babb MD;  Location: [facility];  Service: Ophthalmology   • CATARACT EXTRACTION W/ INTRAOCULAR LENS IMPLANT Left 10/07/2019    Procedure: CATARACT PHACO EXTRACTION WITH INTRAOCULAR LENS IMPLANT LEFT;  Surgeon:  "Carl Babb MD;  Location: [facility];  Service: Ophthalmology   •  SECTION  , 1997    x 2    • DILATATION AND CURETTAGE     • HAND SURGERY Right     \"Pinched nerve surgery\"   • HAND SURGERY Left     Joint removed secondary to arthritis   • HYSTERECTOMY      ''still have one ovary''   • KNEE SURGERY Right 2021    arthroscopy and partial medial meniscectomy Dr. Chavez   • THYROIDECTOMY     • TONSILLECTOMY AND ADENOIDECTOMY  1965     Family History   Problem Relation Age of Onset   • Hypertension Mother    • Heart attack Mother    • Fibromyalgia Mother    • Hyperlipidemia Mother    • Thyroid disease Mother    • Irritable bowel syndrome Mother    • Heart attack Father    • Cancer Brother    • Stomach cancer Brother    • Diabetes Maternal Aunt    • Cancer Maternal Grandmother    • Thyroid disease Maternal Grandmother    • Cancer Paternal Grandmother    • Colon cancer Neg Hx    • Cirrhosis Neg Hx    • Liver cancer Neg Hx    • Liver disease Neg Hx      Social History     Tobacco Use   • Smoking status: Former     Packs/day: 0.25     Years: 15.00     Pack years: 3.75     Types: Cigarettes     Start date:      Quit date: 2004     Years since quittin.6   • Smokeless tobacco: Never   Vaping Use   • Vaping Use: Never used   Substance Use Topics   • Alcohol use: No   • Drug use: No   She lives alone and has 2 children.  She is disabled.    Review of Systems  All systems were reviewed and negative except for:  Respiratory: positive for  shortness of air  Gastrointestinal: positive for  constipation    Vital Signs  /66 (BP Location: Right arm, Patient Position: Lying)   Pulse 69   Temp 98.1 °F (36.7 °C) (Temporal)   Resp 14   Ht 165.1 cm (65\")   Wt 107 kg (235 lb)   SpO2 95%   BMI 39.11 kg/m²     Physical Exam:    General Appearance:    Alert, cooperative, in no acute distress   Head:    Normocephalic, without obvious abnormality, atraumatic   Eyes:            Lids " and lashes normal, conjunctivae and sclerae normal, no   icterus, no pallor, corneas clear,    Ears:    Ears appear intact with no abnormalities noted   Throat:   No oral lesions, no thrush, oral mucosa moist   Neck:   No adenopathy, supple, trachea midline, no thyromegaly    Lungs:     Clear to auscultation,respirations regular, even and unlabored    Heart:    Regular rhythm and normal rate, normal S1 and S2, no murmur, no gallop   Abdomen:     Normal bowel sounds, no masses, no organomegaly, soft non-tender, non-distended, no guarding, no rebound  tenderness   Genitalia:    Deferred   Extremities:  Left knee Ace wrap CDI.  Nerve block present.   Pulses:   Pulses palpable and equal bilaterally   Skin:   No bleeding, bruising or rash   Neurologic:   Cranial nerves 2 - 12 grossly intact. Flexion and dorsiflexion intact bilateral feet.        I reviewed the patient's new clinical results.     Results from last 7 days   Lab Units 03/15/23  0823   POTASSIUM mmol/L 3.3*     Lab Results   Component Value Date    HGBA1C 5.10 03/02/2023      Latest Reference Range & Units 03/02/23 08:28   Glucose 65 - 99 mg/dL 102 (H)   Sodium 136 - 145 mmol/L 136   Potassium 3.5 - 5.2 mmol/L 3.4 (L)   CO2 22.0 - 29.0 mmol/L 29.0   Chloride 98 - 107 mmol/L 99   Anion Gap 5.0 - 15.0 mmol/L 8.0   Creatinine 0.57 - 1.00 mg/dL 0.58   BUN 8 - 23 mg/dL 12   BUN/Creatinine Ratio 7.0 - 25.0  20.7   Calcium 8.6 - 10.5 mg/dL 10.4   eGFR >60.0 mL/min/1.73 103.1   Hemoglobin A1C 4.80 - 5.60 % 5.10   C-Reactive Protein 0.00 - 0.50 mg/dL <0.30   WBC 3.40 - 10.80 10*3/mm3 3.93   RBC 3.77 - 5.28 10*6/mm3 4.92   Hemoglobin 12.0 - 15.9 g/dL 13.8   Hematocrit 34.0 - 46.6 % 41.2   RDW 12.3 - 15.4 % 12.8   MCV 79.0 - 97.0 fL 83.7   MCH 26.6 - 33.0 pg 28.0   MCHC 31.5 - 35.7 g/dL 33.5   MPV 6.0 - 12.0 fL 10.8   Platelets 140 - 450 10*3/mm3 260   (H): Data is abnormally high  (L): Data is abnormally low    Assessment and Plan:     Status post total left knee  replacement    Primary osteoarthritis of left knee    Hypothyroidism    Diabetes mellitus (HCC)    Essential hypertension    Hyperlipidemia      Plan  1. PT/OT- WBAT LLE  2. Pain control-prns, AC nerve block   3. IS-encourage  4. DVT proph- Mechs/Eliquis  5. Bowel regimen  6. Resume home medications as appropriate  7. Monitor post-op labs  8. DC planning for home    HTN, Hyperlipidemia  - Continue home statin  - Hold HCTZ for now  - Monitor BP   - Holding parameters for BP meds  - Labetalol PRN for SBP>170    DM  - hgb A1c on 3/2/23 5.1  - Accu-Chek AC and HS with low dose SSI    Hypothyroid  -Continue home Synthroid      Shanda Camarena, NARA  03/15/23  14:52 EDT

## 2023-03-15 NOTE — ANESTHESIA PROCEDURE NOTES
LEFT Adducotor canal cath      Patient reassessed immediately prior to procedure    Reason for block: at surgeon's request and post-op pain management  Performed by  CRNA/CAA: Cydney Ventura CRNA  Assisted by: Arianne Ruiz RNSRNA: Erika Lyn SRNA  Preanesthetic Checklist  Completed: patient identified, IV checked, site marked, risks and benefits discussed, surgical consent, monitors and equipment checked, pre-op evaluation and timeout performed  Prep:  Pt Position: supine  Sterile barriers:cap, gloves, mask, sterile barriers and washed/disinfected hands  Prep: ChloraPrep  Patient monitoring: blood pressure monitoring, continuous pulse oximetry and EKG  Procedure    Sedation: no  Performed under: local infiltration  Guidance:ultrasound guided    ULTRASOUND INTERPRETATION.  Using ultrasound guidance a 20 G gauge needle was placed in close proximity to the nerve, at which point, under ultrasound guidance anesthetic was injected in the area of the nerve and spread of the anesthesia was seen on ultrasound in close proximity thereto.  There were no abnormalities seen on ultrasound; a digital image was taken; and the patient tolerated the procedure with no complications. Images:still images obtained, printed/placed on chart    Laterality:left  Block Type:adductor canal block  Injection Technique:catheter  Needle Type:Tuohy and echogenic  Needle Gauge:18 G  Resistance on Injection: none  Catheter Size:20 G (20g)  Cath Depth at skin: 10 cm    Medications Used: bupivacaine PF (MARCAINE) 0.25 % injection - Injection   15 mL - 3/15/2023 1:46:00 PM      Post Assessment  Injection Assessment: negative aspiration for heme, incremental injection and no paresthesia on injection  Patient Tolerance:comfortable throughout block  Complications:no  Additional Notes  CATHETER   A high-frequency linear transducer, with sterile cover, was placed on the anterior mid-thigh (between the anterior superior iliac spine and  "patella). The transducer was then moved medially to identify the Sartorius muscle (Estefany), Vastus Medialis muscle (VMM), Superficial Femoral Artery (SFA) and Vein. The transducer was then moved cephalad or caudad to position the SFA in the middle of the Estefany. The insertion site was prepped and draped in sterile fashion. Skin and cutaneous tissue was infiltrated with 2-5 ml of 1% Lidocaine. Using ultrasound-guidance, an 18-gauge Contiplex Ultra 360 Touhy needle was advanced in plane from lateral to medial. Preservative-free normal saline was utilized for hydro-dissection of tissue, advancement of Touhy, and to confirm needle placement below the fascial plane of the Estefany where the Nerve to the VMM is located. Local anesthetic (LA) 5 ml deposited here. The Touhy needle continues its path lateral to the SFA at the level of the Saphenous Nerve. The remainder of the LA was deposited at the 10-11 o'clock position of the SFA. This injection created a space between the Estefany and the SFA. Aspiration every 5 ml to prevent intravascular injection. Injection was completed with negative aspiration of blood and negative intravascular injection. Injection pressures were normal with minimal resistance. A 20-gauge Contiplex Echo catheter was placed through the needle and advance out the tip of the Touhy 3-5 cm anterior to the SFA. The Touhy needle was then removed, and final catheter position verified at the 12 o'clock position to the SFA. The catheter was secured in the usual fashion with skin glue, benzoin, steri-strips, CHG tegaderm and label noting \"Nerve Block Catheter\". Jerk tape applied at yellow connector and catheter connection.           "

## 2023-03-15 NOTE — ANESTHESIA PROCEDURE NOTES
LEFT Adductor canal SS      Patient reassessed immediately prior to procedure    Reason for block: at surgeon's request and post-op pain management  Performed by  CRNA/CAA: Cydney Ventura CRNA  Preanesthetic Checklist  Completed: patient identified, IV checked, site marked, risks and benefits discussed, surgical consent, monitors and equipment checked, pre-op evaluation and timeout performed  Prep:  Pt Position: supine  Sterile barriers:cap, gloves, mask, sterile barriers and washed/disinfected hands  Prep: ChloraPrep  Patient monitoring: blood pressure monitoring, continuous pulse oximetry and EKG  Procedure    Sedation: yes  Performed under: general  Guidance:ultrasound guided    ULTRASOUND INTERPRETATION.  Using ultrasound guidance a 20 G gauge needle was placed in close proximity to the nerve, at which point, under ultrasound guidance anesthetic was injected in the area of the nerve and spread of the anesthesia was seen on ultrasound in close proximity thereto.  There were no abnormalities seen on ultrasound; a digital image was taken; and the patient tolerated the procedure with no complications. Images:still images obtained, printed/placed on chart    Laterality:left  Block Type:adductor canal block  Injection Technique:single-shot  Needle Type:Tuohy and echogenic  Needle Gauge:18 G  Resistance on Injection: none  Catheter Size:20 G (20g)    Medications Used: bupivacaine PF (MARCAINE) 0.25 % injection - Injection   30 mL - 3/15/2023 10:53:00 AM      Post Assessment  Injection Assessment: negative aspiration for heme, incremental injection and no paresthesia on injection  Patient Tolerance:comfortable throughout block  Complications:no  Additional Notes  SINGLE shot   A high-frequency linear transducer, with sterile cover, was placed on the anterior mid-thigh (between the anterior superior iliac spine and patella). The transducer was then moved medially to identify the Sartorius muscle (Estefany), Vastus  "Medialis muscle (VMM), Superficial Femoral Artery (SFA) and Vein. The transducer was then moved cephalad or caudad to position the SFA in the middle of the Estefany. The insertion site was prepped and draped in sterile fashion. Skin and cutaneous tissue was infiltrated with 2-5 ml of 1% Lidocaine. Using ultrasound-guidance, a 20-gauge B-Otto 4\" Ultraplex 360 non-stimulating echogenic needle was advanced in plane from lateral to medial. Preservative-free normal saline was utilized for hydro-dissection of tissue, advancement of needle, and to confirm needle placement below the fascial plane of the Estefany where the Nerve to the VMM is located. Local anesthetic (LA) 5 ml deposited here. The needle continues its path lateral to the SFA at the level of the Saphenous Nerve. The remainder of the LA was deposited at the 10-11 o'clock position of the SFA. This injection created a space between the Estefany and the SFA. Aspiration every 5 ml to prevent intravascular injection. Injection was completed with negative aspiration of blood and negative intravascular injection. Injection pressures were normal with minimal resistance.           "

## 2023-03-15 NOTE — OP NOTE
Orthopaedics Operative Report    PREOPERATIVE DIAGNOSIS: Primary osteoarthritis left knee    POSTOPERATIVE DIAGNOSIS: Same    PROCEDURE PERFORMED: Left total knee arthroplasty, CPT 39115    SURGEON: Dayo Chavez MD    ANESTHESIA:  General with Block    STAFF:  Circulator: Arturo Bates RN; Eunice Monroy RN; Waqas Otero, JAVIER  Scrub Person: Tres Hernandez  Vendor Representative: Kirk Walker  Nursing Assistant: Rob Barrios PCT  Assistant: Nena Gallagher PA-C    TOURNIQUET TIME: 83 min    ESTIMATED BLOOD LOSS: 50 mL    COMPLICATIONS: None apparent.    RELEASES:None required after bone cuts made and osteophytes removed    APPROACH:  Medial parapatellar    TXA:IV    IMPLANTS:     Implant Name Type Inv. Item Serial No.  Lot No. LRB No. Used Action   CMT BONE R 1X40 - [id number] Implant CMT BONE R 1X40  [employer] [id number] Left 1 Implanted   CMT BONE R 1X40 - [id number] Implant CMT BONE R 1X40  [employer] [id number] Left 1 Implanted   STEM TIB/KN PERSONA CMT 5D ADEOLA LT - [id number] Implant STEM TIB/KN PERSONA CMT 5D ADEOLA LT  [employer] [id number] Left 1 Implanted   PAT KN PERSONA ALLPOLY CMT 8.5X32MM - [id number] Implant PAT KN PERSONA ALLPOLY CMT 8.5X32MM  [employer] [id number] Left 1 Implanted   COMP FEM/KN PERSONA CR CMT NRW SZ7 LT - [id number] Implant COMP FEM/KN PERSONA CR CMT NRW SZ7 LT  [employer] [id number] Left 1 Implanted   ART/SRF KN PERSONA/VE PS EF 6TO7 10MM LT - [id number] Implant ART/SRF KN PERSONA/VE PS EF 6TO7 10MM LT  [employer] [id number] Left 1 Implanted       Wendie Persona CR femur size 7 narrow   size E tibia  32 patella button   Size 10 Vitamin E Medial Congruent highly crosslinked polyethylene articular surface    PREOPERATIVE ANTIBIOTICS: Vancomycin    REFERRING PHYSICIAN: Otoniel Davenport MD    INDICATIONS: Failure of nonoperative treatment including injections, bracing, and activity modification.    DESCRIPTION OF PROCEDURE: After informed  consent was obtained, the patient was taken to the operating room. The patient was given a dose of IV antibiotics prior to incision.After the smooth induction of general anesthesia and single shot femoral block, the patient’s left lower extremity was prepped and draped in the usual fashion for this type of procedure. We performed a timeout to verify site and the procedure to be performed.  We began with exsanguination of the left lower extremity using an Esmarch bandage and inflation of tourniquet to 300 mmHg. We made our standard midline incision and medial parapatellar approach. We took 20% of the quadriceps tendon medially and a sleeve of tissue around the patella for repair as well a sliver of patellar tendon. Dissected extra-ostially but subperiosteally on the medial side taking off the superficial and deep MCL from the proximal tibia. These were protected throughout the procedure. Visible medial meniscus was excised. The retropatellar fat pad was excised. The ACL and visible lateral meniscus was excised as well as the synovium from the anterior surface of the distal femur.  Osteophytes were removed from the distal femur and proximal tibia at this point.       We then everted the patella and this was resurfaced, restoring patellar height. The patellar height was 18 mm preoperatively and we cut the patella to 13 mm and sized the patella to a 32.  The lugholes were drilled and the component slightly medialized.      We then turned our attention to preparation of the femur. We placed our intramedullary distal femoral guide into place set on 5 degrees of valgus and due to preoperative flexion contracture, we took an extra 2 mm of bone off of the distal femur. The distal femur was cut protecting medial and lateral structures. We then marked Schoolcraft's line and sized our femur to be a size 7. We marked 5° of external rotation which correlated well with Schoolcraft's line. We then secured this block into place and made  our anterior, posterior, and chamfer cuts. The pins were removed and the bone cuts were completed and freshened up. We then excised our posterior cruciate ligament and exposed the proximal tibia. We took 10 mm of bone off the lateral side. We protected medial and lateral structures during our cut as well as the patellar tendon. We completed the cut. We sized the tibia to be a size E. We then removed meniscus from the back of the knee. We used our flexion extension blocks to make sure our flexion and extension gaps were acceptable. We then completed the preparation of the tibia, aiming the center of the baseplate at the medial third of the tibial tubercle.     We then completed the preparation of our femoral component.  We then trialed and were stable on the medial side at 0°, 30°, 60°, 90° and 120°. The lug holes were then drilled.  After thorough irrigation of the knee, we then injected our periarticular injection into the posterior medial aspect of the knee which consisted of 20 ml of NS, 20 ml of 0.5% lidocaine with epi, 20 ml of 0.5% bupivicaine, 30 mg of toradol, and 8 mg of morphine. We then cemented the final components into place. Once the cement had cured, we removed excess cement. We then trialed and a size 10 polyethylene spacer had good stability and full range of motion.  We then deflated the tourniquet prior to placement of our final polyethylene spacer. Any bleeding seen in the back of the knee was controlled and we obtained hemostasis. The final spacer was then secured into place. We then used a final irrigation consisting of Irricept and diluted Betadine throughout the knee. We then closed our deep parapatellar approach using 0 Ethibond in an interrupted fashion.  We then closed our subcutaneous layer using running 2-0 Vicryl and interrupted 2-0 Vicryl. We closed our skin using a running 3-0 Monocryl. We placed an Exofin Fusion dressing system over the incision and took down the drapes. The patient  was transferred back to their hospital bed and then taken to the recovery room in stable condition. All counts were correct postoperatively. I performed the case.    First assistant: Nena Gallagher PA-C    The skilled assistance of the above noted first assistant was necessary during this complex surgical procedure.  The surgical assistant assisted with every aspect of the operation including, but not limited to, proper and safe positioning of the patient, obtaining adequate surgical exposure, manipulation of surgical instruments to make the proper bone cuts, cementing of the final implants, the continual process of hemostasis during the procedure itself in addition to surgical wound closure and removal of the patient from the operating table and returning the patient back to the hospitals.  The assistance of the surgical assistant allowed me to perform the most sensitive and technical potions of this operation using 2 hands, thus enhancing efficiency and patient safety.  This would not be possible without the help of a skilled assistant familiar with the procedure and capable of safely performing the aforementioned tasks.       POSTOPERATIVE PLAN:  1. Weight bearing as tolerated left lower extremity.  2. The patient will receive an indwelling low femoral nerve block in the recovery room.  3.  Patient will receive a dose of IV antibiotics prior to discharge home  4.  Eliquis 2.5 twice daily beginning tomorrow morning for DVT prophylaxis.  5.  The patient will begin physical therapy with either [facility] or [facility] in [place] in the next 24 to 48 hours  6.  Discharge home once the patient has met criteria  7.  Patient will be discharged home with a prescription for hydrocodone, Tylenol, Colace, doxycycline and Zofran in addition to their DVT prophylaxis  8.  Follow up with me in the office in 3 weeks    Dayo Chavez M.D.*

## 2023-03-15 NOTE — PROGRESS NOTES
Discussed home health with son Toni and he is agreeable to [facility]. PCP is Dr Noe Davenport and ortho orders per Dr. Chavez--Cole CORNEJO([facility])Hospital Liaison

## 2023-03-15 NOTE — ANESTHESIA POSTPROCEDURE EVALUATION
Patient: Janet Obrien    Procedure Summary     Date: 03/15/23 Room / Location:  [facility]  /  [facility]    Anesthesia Start: 1032 Anesthesia Stop: 1346    Procedure: TOTAL KNEE ARTHROPLASTY -  LEFT (Left: Knee) Diagnosis:       Primary osteoarthritis of left knee      (Primary osteoarthritis of left knee [M17.12])    Surgeons: Dayo Chavez MD Provider: Waqas Costa MD    Anesthesia Type: spinal ASA Status: 3          Anesthesia Type: spinal    Vitals  Vitals Value Taken Time   /112 03/15/23 1341   Temp     Pulse 80 03/15/23 1345   Resp     SpO2 94 % 03/15/23 1345   Vitals shown include unvalidated device data.        Post Anesthesia Care and Evaluation    Patient location during evaluation: PACU  Patient participation: complete - patient participated  Level of consciousness: awake and alert  Pain management: adequate    Airway patency: patent  Anesthetic complications: No anesthetic complications  PONV Status: none  Cardiovascular status: hemodynamically stable and acceptable  Respiratory status: nonlabored ventilation, acceptable and nasal cannula  Hydration status: acceptable

## 2023-03-15 NOTE — H&P
"  Pre-Op H&P  Janet Obrien  [id number]  1960      Chief complaint: Left Knee Pain      Subjective:  Patient is a 62 y.o.female presents for scheduled surgery by Dr. Chavez. She anticipates a TOTAL KNEE ARTHROPLASTY - LEFT - Left today.  Patient has had left knee pain that started \"a few years ago\".  The patient states that when she moves it a certain way, the pain is unbearable.  The patient denies any numbness or tingling in her left leg.  The patient endorses using a walker at home.  The patient has taken Percocet 10's for the pain, which does help.      Review of Systems:  Constitutional-- No fever, chills or sweats. No fatigue.  CV-- No chest pain, palpitation or syncope. +HTN, HLD  Resp-- No SOB, cough, hemoptysis. +LINDSAY   Skin--No rashes. Scabs on left leg      Allergies:   Allergies   Allergen Reactions   • Penicillins Angioedema, Anaphylaxis, Hives, Itching, Shortness Of Breath and Swelling   • Cephalexin Itching   • Chlorhexidine Rash and Itching   • Sulfa Antibiotics Rash and Itching   • Darvon [Propoxyphene] Unknown (See Comments)     Doesn't remember   • Diazepam Other (See Comments)     Paradoxical effect/ hypes pt         Home Meds:  Medications Prior to Admission   Medication Sig Dispense Refill Last Dose   • atorvastatin (LIPITOR) 80 MG tablet Take 1 tablet by mouth Daily.   3/14/2023 at 1000   • Calcium Carbonate-Vit D-Min (CALCIUM 1200 PO) Take  by mouth.   3/14/2023 at 1000   • celecoxib (CeleBREX) 200 MG capsule Take 1 capsule by mouth Daily.   3/14/2023 at 0900   • Cholecalciferol (VITAMIN D3 PO) Take 1 tablet by mouth Daily.   3/14/2023 at 1000   • DULoxetine (CYMBALTA) 60 MG capsule Take 1 capsule by mouth Daily.   3/14/2023 at 1000   • hydrochlorothiazide (HYDRODIURIL) 25 MG tablet Take 1 tablet by mouth Daily.   3/14/2023 at 1000   • montelukast (SINGULAIR) 10 MG tablet Take 1 tablet by mouth Every Night. 30 tablet 3 3/14/2023 at 2300   • oxyCODONE-acetaminophen (Percocet)  " MG per tablet Take 1 tablet by mouth Every 8 (Eight) Hours As Needed for Moderate Pain  or Severe Pain . 15 tablet 0 3/14/2023 at 2100   • Synthroid 150 MCG tablet Take 1 tablet by mouth Every Morning.   3/14/2023 at 0600   • cyclobenzaprine (FLEXERIL) 10 MG tablet Take 1 tablet by mouth 3 (Three) Times a Day As Needed for Muscle Spasms. 21 tablet 0 More than a month   • estradiol (ESTRACE) 0.5 MG tablet Take 0.25 mg by mouth. NOT CURRENTLY   More than a month   • meclizine (ANTIVERT) 25 MG tablet Take 1 tablet by mouth Every 6 (Six) Hours As Needed for Dizziness. (Patient taking differently: Take 1 tablet by mouth Every 6 (Six) Hours As Needed for Dizziness or Nausea. NOT CURRENTLY TAKING) 20 tablet 0 More than a month   • Ozempic, 1 MG/DOSE, 4 MG/3ML solution pen-injector 1 (One) Time Per Week. THURSDAY   3/9/2023   • phentermine (ADIPEX-P) 37.5 MG tablet Take 1 tablet by mouth Daily. NOT CURRENTLY TAKING   3/1/2023   • Ventolin  (90 Base) MCG/ACT inhaler Inhale 2 puffs Every 4 (Four) Hours As Needed for Wheezing. 18 g 3 3/1/2023         PMH:   Past Medical History:   Diagnosis Date   • Anxiety    • Arthritis    • Asthma    • Back pain    • Cancer (HCC)     SQUAMOUS CELL/ BASAL CELL ON LIP   • Chronic bronchitis (HCC)    • Depression    • Diabetes mellitus (HCC)     type 2   • Fibromyalgia    • High triglycerides    • Hyperlipidemia    • Hypertension    • Migraine    • MVP (mitral valve prolapse)    • OA (osteoarthritis)    • Peripheral autonomic neuropathy    • Pleurisy    • Pneumonia    • Rheumatism    • RLS (restless legs syndrome)    • Sleep apnea     Mild form per patient. recommended mouthpiece   • Thyroid disease      PSH:    Past Surgical History:   Procedure Laterality Date   • APPENDECTOMY  1970's   • CATARACT EXTRACTION W/ INTRAOCULAR LENS IMPLANT Right 09/16/2019    Procedure: CATARACT PHACO EXTRACTION WITH INTRAOCULAR LENS IMPLANT RIGHT;  Surgeon: Carl Babb MD;  Location: [facility] OR;   "Service: Ophthalmology   • CATARACT EXTRACTION W/ INTRAOCULAR LENS IMPLANT Left 10/07/2019    Procedure: CATARACT PHACO EXTRACTION WITH INTRAOCULAR LENS IMPLANT LEFT;  Surgeon: Carl Babb MD;  Location: [facility];  Service: Ophthalmology   •  SECTION  , 1997    x 2    • DILATATION AND CURETTAGE     • HAND SURGERY Right     \"Pinched nerve surgery\"   • HAND SURGERY Left     Joint removed secondary to arthritis   • HYSTERECTOMY      ''still have one ovary''   • KNEE SURGERY Right 2021    arthroscopy and partial medial meniscectomy Dr. Chavez   • THYROIDECTOMY     • TONSILLECTOMY AND ADENOIDECTOMY         Immunization History:  Influenza: No  Pneumococcal: No  Tetanus: Yes  Covid : x2    Social History:   Tobacco:   Social History     Tobacco Use   Smoking Status Former   • Packs/day: 0.25   • Years: 15.00   • Pack years: 3.75   • Types: Cigarettes   • Start date:    • Quit date: 2004   • Years since quittin.6   Smokeless Tobacco Never      Alcohol:     Social History     Substance and Sexual Activity   Alcohol Use No         Physical Exam:/78 (BP Location: Right arm, Patient Position: Lying)   Pulse 71   Temp 97.7 °F (36.5 °C) (Temporal)   Resp 16   Ht 165.1 cm (65\")   Wt 107 kg (235 lb)   SpO2 95%   BMI 39.11 kg/m²       General Appearance:    Alert, cooperative, no distress, appears stated age   Head:    Normocephalic, without obvious abnormality, atraumatic   Lungs:     Clear to auscultation bilaterally, respirations unlabored    Heart:   Regular rate and rhythm, S1 and S2 normal    Abdomen:    Soft without tenderness   Extremities:   Extremities normal, atraumatic, no cyanosis or edema   Skin:   Skin color, texture, turgor normal, no rashes. Scabs on left leg   Neurologic:   Grossly intact     Results Review:     LABS:  Lab Results   Component Value Date    WBC 3.93 2023    HGB 13.8 2023    HCT 41.2 2023    MCV 83.7 2023 "     03/02/2023    NEUTROABS 2.38 03/02/2023    GLUCOSE 102 (H) 03/02/2023    BUN 12 03/02/2023    CREATININE 0.58 03/02/2023    EGFRIFNONA 91 11/19/2021     03/02/2023    K 3.4 (L) 03/02/2023    CL 99 03/02/2023    CO2 29.0 03/02/2023    MG 2.4 05/09/2021    CALCIUM 10.4 03/02/2023    ALBUMIN 3.80 05/20/2022    AST 22 05/20/2022    ALT 20 05/20/2022    BILITOT 0.4 05/20/2022       RADIOLOGY:    XR KNEE 1 OR 2 VW LEFT     Date of Exam: 2/1/2023 10:58 AM EST     Indication: knee injury.     Comparison: 9/1/2022     Findings:  Cortical margins are intact without evidence of fracture or dislocation. There is no significant joint effusion. Moderate to severe tricompartmental arthrosis changes are noted, with greatest medial and patellofemoral compartment involvement.     IMPRESSION:  Impression:  Cortical margins are intact without evidence of fracture or dislocation. There is no significant joint effusion. Moderate to severe tricompartmental arthrosis changes are noted, with greatest medial and patellofemoral compartment involvement.  I reviewed the patient's new clinical results.      Impression: Primary osteoarthritis of left knee      Plan: TOTAL KNEE ARTHROPLASTY - LEFT - Left      Herminio Canales, APRN   3/15/2023   08:31 EDT

## 2023-03-15 NOTE — ANESTHESIA PROCEDURE NOTES
Airway  Urgency: elective    Date/Time: 3/15/2023 10:51 AM  Airway not difficult    General Information and Staff    Patient location during procedure: OR  SRNA: Erika Lyn SRNA  Indications and Patient Condition  Indications for airway management: airway protection    Preoxygenated: yes  MILS not maintained throughout  Mask difficulty assessment: 0 - not attempted    Final Airway Details  Final airway type: endotracheal airway      Successful airway: ETT  Cuffed: yes   Successful intubation technique: direct laryngoscopy and RSI  Facilitating devices/methods: intubating stylet and cricoid pressure  Endotracheal tube insertion site: oral  Blade: Pacheco  Blade size: 3  ETT size (mm): 7.0  Cormack-Lehane Classification: grade IIb - view of arytenoids or posterior of glottis only  Placement verified by: chest auscultation and capnometry   Cuff volume (mL): 6  Measured from: lips  ETT/EBT  to lips (cm): 21  Number of attempts at approach: 1  Assessment: lips, teeth, and gum same as pre-op and atraumatic intubation    Additional Comments  Negative epigastric sounds, Breath sound equal bilaterally with symmetric chest rise and fall. RSI with cricoid pressure until tube confirmation. VC clear upon DL

## 2023-03-16 ENCOUNTER — ANESTHESIA EVENT CONVERTED (OUTPATIENT)
Dept: ANESTHESIOLOGY | Facility: HOSPITAL | Age: 63
End: 2023-03-16
Payer: COMMERCIAL

## 2023-03-16 ENCOUNTER — HOME HEALTH ADMISSION (OUTPATIENT)
Dept: HOME HEALTH SERVICES | Facility: HOME HEALTHCARE | Age: 63
End: 2023-03-16
Payer: COMMERCIAL

## 2023-03-16 LAB
ANION GAP SERPL CALCULATED.3IONS-SCNC: 5 MMOL/L (ref 5–15)
BUN SERPL-MCNC: 13 MG/DL (ref 8–23)
BUN/CREAT SERPL: 18.6 (ref 7–25)
CALCIUM SPEC-SCNC: 9.4 MG/DL (ref 8.6–10.5)
CHLORIDE SERPL-SCNC: 101 MMOL/L (ref 98–107)
CO2 SERPL-SCNC: 28 MMOL/L (ref 22–29)
CREAT SERPL-MCNC: 0.7 MG/DL (ref 0.57–1)
DEPRECATED RDW RBC AUTO: 40.9 FL (ref 37–54)
EGFRCR SERPLBLD CKD-EPI 2021: 98.5 ML/MIN/1.73
ERYTHROCYTE [DISTWIDTH] IN BLOOD BY AUTOMATED COUNT: 13 % (ref 12.3–15.4)
GLUCOSE BLDC GLUCOMTR-MCNC: 113 MG/DL (ref 70–130)
GLUCOSE BLDC GLUCOMTR-MCNC: 139 MG/DL (ref 70–130)
GLUCOSE BLDC GLUCOMTR-MCNC: 146 MG/DL (ref 70–130)
GLUCOSE SERPL-MCNC: 108 MG/DL (ref 65–99)
HCT VFR BLD AUTO: 31.4 % (ref 34–46.6)
HGB BLD-MCNC: 10.3 G/DL (ref 12–15.9)
MCH RBC QN AUTO: 28.3 PG (ref 26.6–33)
MCHC RBC AUTO-ENTMCNC: 32.8 G/DL (ref 31.5–35.7)
MCV RBC AUTO: 86.3 FL (ref 79–97)
PLATELET # BLD AUTO: 212 10*3/MM3 (ref 140–450)
PMV BLD AUTO: 11.3 FL (ref 6–12)
POTASSIUM SERPL-SCNC: 3.6 MMOL/L (ref 3.5–5.2)
RBC # BLD AUTO: 3.64 10*6/MM3 (ref 3.77–5.28)
SODIUM SERPL-SCNC: 134 MMOL/L (ref 136–145)
WBC NRBC COR # BLD: 7.87 10*3/MM3 (ref 3.4–10.8)

## 2023-03-16 PROCEDURE — 97116 GAIT TRAINING THERAPY: CPT

## 2023-03-16 PROCEDURE — 85027 COMPLETE CBC AUTOMATED: CPT | Performed by: NURSE PRACTITIONER

## 2023-03-16 PROCEDURE — 99024 POSTOP FOLLOW-UP VISIT: CPT | Performed by: ORTHOPAEDIC SURGERY

## 2023-03-16 PROCEDURE — 80048 BASIC METABOLIC PNL TOTAL CA: CPT | Performed by: NURSE PRACTITIONER

## 2023-03-16 PROCEDURE — 97535 SELF CARE MNGMENT TRAINING: CPT

## 2023-03-16 PROCEDURE — 97165 OT EVAL LOW COMPLEX 30 MIN: CPT

## 2023-03-16 PROCEDURE — 25010000002 KETOROLAC TROMETHAMINE PER 15 MG: Performed by: INTERNAL MEDICINE

## 2023-03-16 PROCEDURE — 97110 THERAPEUTIC EXERCISES: CPT

## 2023-03-16 PROCEDURE — 97530 THERAPEUTIC ACTIVITIES: CPT

## 2023-03-16 PROCEDURE — 82962 GLUCOSE BLOOD TEST: CPT

## 2023-03-16 RX ORDER — CYCLOBENZAPRINE HCL 10 MG
5 TABLET ORAL 3 TIMES DAILY PRN
Status: DISCONTINUED | OUTPATIENT
Start: 2023-03-16 | End: 2023-03-17 | Stop reason: HOSPADM

## 2023-03-16 RX ORDER — BUPIVACAINE HYDROCHLORIDE 2.5 MG/ML
INJECTION, SOLUTION EPIDURAL; INFILTRATION; INTRACAUDAL
Status: DISCONTINUED | OUTPATIENT
Start: 2023-03-16 | End: 2023-03-16 | Stop reason: SURG

## 2023-03-16 RX ORDER — KETOROLAC TROMETHAMINE 30 MG/ML
15 INJECTION, SOLUTION INTRAMUSCULAR; INTRAVENOUS EVERY 6 HOURS PRN
Status: DISCONTINUED | OUTPATIENT
Start: 2023-03-16 | End: 2023-03-16

## 2023-03-16 RX ADMIN — MONTELUKAST 10 MG: 10 TABLET, FILM COATED ORAL at 20:08

## 2023-03-16 RX ADMIN — BUPIVACAINE HYDROCHLORIDE 20 ML: 2.5 INJECTION, SOLUTION EPIDURAL; INFILTRATION; INTRACAUDAL at 13:31

## 2023-03-16 RX ADMIN — APIXABAN 2.5 MG: 2.5 TABLET, FILM COATED ORAL at 08:47

## 2023-03-16 RX ADMIN — OXYCODONE HYDROCHLORIDE AND ACETAMINOPHEN 2 TABLET: 5; 325 TABLET ORAL at 05:39

## 2023-03-16 RX ADMIN — IBUPROFEN 600 MG: 600 TABLET, FILM COATED ORAL at 23:45

## 2023-03-16 RX ADMIN — IBUPROFEN 600 MG: 600 TABLET, FILM COATED ORAL at 08:48

## 2023-03-16 RX ADMIN — CYCLOBENZAPRINE 5 MG: 10 TABLET, FILM COATED ORAL at 15:13

## 2023-03-16 RX ADMIN — OXYCODONE HYDROCHLORIDE AND ACETAMINOPHEN 2 TABLET: 5; 325 TABLET ORAL at 20:08

## 2023-03-16 RX ADMIN — DULOXETINE 60 MG: 60 CAPSULE, DELAYED RELEASE ORAL at 08:47

## 2023-03-16 RX ADMIN — LEVOTHYROXINE SODIUM 150 MCG: 150 TABLET ORAL at 05:39

## 2023-03-16 RX ADMIN — ATORVASTATIN CALCIUM 80 MG: 40 TABLET, FILM COATED ORAL at 08:47

## 2023-03-16 RX ADMIN — KETOROLAC TROMETHAMINE 15 MG: 30 INJECTION, SOLUTION INTRAMUSCULAR; INTRAVENOUS at 15:12

## 2023-03-16 RX ADMIN — APIXABAN 2.5 MG: 2.5 TABLET, FILM COATED ORAL at 20:08

## 2023-03-16 RX ADMIN — OXYCODONE HYDROCHLORIDE AND ACETAMINOPHEN 2 TABLET: 5; 325 TABLET ORAL at 11:54

## 2023-03-16 NOTE — PLAN OF CARE
Goal Outcome Evaluation:  Plan of Care Reviewed With: patient        Progress: improving  Outcome Evaluation: Patient demonstrated improving mobility. She is limtied by pain and fatigue. Recommend one more session prior to discharge to home

## 2023-03-16 NOTE — PROGRESS NOTES
[facility]    Acute pain service Inpatient Progress Note    Patient Name: Janet Obrien  :  1960  MRN:  [id number]        Acute Pain  Service Inpatient Progress Note:    Analgesia:Poor  Pain Score:9/10  LOC: alert and awake  Resp Status: room air  Cardiac: VS stable  Side Effects:None  Catheter Site:clean, dressing intact and dry  Cath type: peripheral nerve cath with ON Q  Volume: 1mL,8ml, 8ml InfuSystem Pump.  Catheter Plan:Catheter to remain Insitu and Continue catheter infusion rate unchanged  Comments:   Patient continues with uncontrolled anterior and medial knee pain. I evaluated her nerve catheter under ultrasound guidance and determined it to be out of position. With patient consent, I replaced her adductor canal catheter.

## 2023-03-16 NOTE — THERAPY TREATMENT NOTE
Patient Name: Janet Obrien  : 1960    MRN: [id number]                              Today's Date: 3/16/2023       Admit Date: 3/15/2023    Visit Dx:     ICD-10-CM ICD-9-CM   1. Primary osteoarthritis of left knee  M17.12 715.16     Patient Active Problem List   Diagnosis   • Diplopia   • Orbital pseudotumor   • Myositic orbital pseudotumor   • Hypothyroidism   • Diabetes mellitus (HCC)   • Age-related nuclear cataract of right eye   • Ocular myasthenia (HCC)   • Morbid obesity, unspecified obesity type (HCC)   • Essential hypertension   • Precordial pain   • SOB (shortness of breath)   • Lower abdominal pain   • Constipation   • Diarrhea   • Rectal bleeding   • Gastroesophageal reflux disease   • Class 3 severe obesity due to excess calories with serious comorbidity and body mass index (BMI) of 40.0 to 44.9 in adult (HCC)   • Anal or rectal pain   • Primary osteoarthritis of left knee   • Status post total left knee replacement   • Hyperlipidemia     Past Medical History:   Diagnosis Date   • Anxiety    • Arthritis    • Asthma    • Back pain    • Cancer (HCC)     SQUAMOUS CELL/ BASAL CELL ON LIP   • Chronic bronchitis (HCC)    • Depression    • Diabetes mellitus (HCC)     type 2   • Fibromyalgia    • High triglycerides    • Hyperlipidemia    • Hypertension    • Migraine    • MVP (mitral valve prolapse)    • OA (osteoarthritis)    • Peripheral autonomic neuropathy    • Pleurisy    • Pneumonia    • Rheumatism    • RLS (restless legs syndrome)    • Sleep apnea     Mild form per patient. recommended mouthpiece   • Thyroid disease      Past Surgical History:   Procedure Laterality Date   • APPENDECTOMY  's   • CATARACT EXTRACTION W/ INTRAOCULAR LENS IMPLANT Right 2019    Procedure: CATARACT PHACO EXTRACTION WITH INTRAOCULAR LENS IMPLANT RIGHT;  Surgeon: Carl Babb MD;  Location: [facility];  Service: Ophthalmology   • CATARACT EXTRACTION W/ INTRAOCULAR LENS IMPLANT Left 10/07/2019     "Procedure: CATARACT PHACO EXTRACTION WITH INTRAOCULAR LENS IMPLANT LEFT;  Surgeon: Carl Babb MD;  Location: [facility];  Service: Ophthalmology   •  SECTION  , 1997    x 2    • DILATATION AND CURETTAGE     • HAND SURGERY Right     \"Pinched nerve surgery\"   • HAND SURGERY Left     Joint removed secondary to arthritis   • HYSTERECTOMY      ''still have one ovary''   • KNEE SURGERY Right 2021    arthroscopy and partial medial meniscectomy Dr. Chavez   • THYROIDECTOMY     • TONSILLECTOMY AND ADENOIDECTOMY        General Information     Row Name 23 1547 23 1305       Physical Therapy Time and Intention    Document Type therapy note (daily note)  -SC therapy note (daily note)  -SC    Mode of Treatment physical therapy  -SC physical therapy  -SC    Row Name 23 1547 23 1305       General Information    Patient Profile Reviewed yes  -SC yes  -SC    Existing Precautions/Restrictions fall;other (see comments)  nerve cath  -SC fall;other (see comments)  nerve cath  -SC    Row Name 23 1547 23 1305       Cognition    Orientation Status (Cognition) oriented x 4  -SC oriented x 4  -SC    Row Name 23 1547 23 1305       Safety Issues, Functional Mobility    Impairments Affecting Function (Mobility) balance;cognition;endurance/activity tolerance;pain;strength;range of motion (ROM)  -SC balance;cognition;endurance/activity tolerance;pain;strength;range of motion (ROM)  -SC    Comment, Safety Issues/Impairments (Mobility) alert, following commands  -SC alert, following commands  -SC          User Key  (r) = Recorded By, (t) = Taken By, (c) = Cosigned By    Initials Name Provider Type    SC Chip Salgado, PT Physical Therapist               Mobility     Row Name 23 1548 23 1307       Bed Mobility    Bed Mobility sit-supine  -SC --    Scooting/Bridging [place] (Bed Mobility) verbal cues;1 person assist;standby assist  -SC --    " Assistive Device (Bed Mobility) head of bed elevated;leg   -SC --    Comment, (Bed Mobility) cues for sequencing and use of leg   -SC uic  -SC    Row Name 03/16/23 1548          Transfers    Comment, (Transfers) cues for safety to stay with walker when sitting  -SC     Row Name 03/16/23 1307          Bed-Chair Transfer    Comment, (Bed-Chair Transfer) cues for hand placements  -SC     Row Name 03/16/23 1548 03/16/23 1307       Sit-Stand Transfer    Sit-Stand [place] (Transfers) contact guard;verbal cues;1 person assist  -SC contact guard;verbal cues;1 person assist  -SC    Assistive Device (Sit-Stand Transfers) walker, front-wheeled  -SC walker, front-wheeled  -SC    Row Name 03/16/23 1548 03/16/23 1307       Gait/Stairs (Locomotion)    [place] Level (Gait) contact guard;1 person assist;verbal cues  -SC contact guard;1 person assist;verbal cues  -SC    Assistive Device (Gait) walker, front-wheeled  -SC walker, front-wheeled  -SC    Distance in Feet (Gait) 160  -  -SC    Deviations/Abnormal Patterns (Gait) left sided deviations;antalgic;weight shifting decreased;stride length decreased;junie decreased  -SC left sided deviations;antalgic;weight shifting decreased;stride length decreased;junie decreased  -SC    Bilateral Gait Deviations forward flexed posture  -SC forward flexed posture  -SC    Assistive Device (Stairs) -- cane, straight  -SC    Handrail Location (Stairs) -- right side (ascending)  -SC    Number of Steps (Stairs) -- 5+5  -SC    Comment, (Gait/Stairs) Gt training focused on controling walker . Patient reminded frequently to stay closer to walker and stand taller. =No LOB or buckling noted. Limited by pain  -SC Gt training focused on achieving step through gait pattern. Cues for upright posture, staying close to walker and equal wt shifting. Demonstrated improving control of walker at end of walk. No buckling or loss of balance. Walking limited by quick onset of fatigue.   Required cues for sequencing cane on stairs. Demonstrated good technique. Cane issued to patient  -SC    Row Name 03/16/23 1548 03/16/23 1307       Mobility    Extremity Weight-bearing Status left lower extremity  -SC left lower extremity  -SC    Left Lower Extremity (Weight-bearing Status) weight-bearing as tolerated (WBAT)  -SC weight-bearing as tolerated (WBAT)  -SC          User Key  (r) = Recorded By, (t) = Taken By, (c) = Cosigned By    Initials Name Provider Type    SC Chip Salgado, PT Physical Therapist               Obj/Interventions     Row Name 03/16/23 1551 03/16/23 1315       Motor Skills    Therapeutic Exercise knee  -SC knee;ankle  -SC    Row Name 03/16/23 1551 03/16/23 1315       Knee (Therapeutic Exercise)    Knee (Therapeutic Exercise) -- isometric exercises;strengthening exercise  -SC    Knee Isometrics (Therapeutic Exercise) left;quad sets;10 repetitions  -SC left;sitting;quad sets;10 repetitions  -SC    Knee Strengthening (Therapeutic Exercise) left;SLR (straight leg raise);SAQ (short arc quad);LAQ (long arc quad);10 repetitions  -SC left;SLR (straight leg raise);heel slides;SAQ (short arc quad);LAQ (long arc quad);sitting;10 repetitions  -SC    Row Name 03/16/23 1551 03/16/23 1315       Ankle (Therapeutic Exercise)    Ankle (Therapeutic Exercise) AROM (active range of motion)  -SC AROM (active range of motion)  -SC    Ankle AROM (Therapeutic Exercise) left;dorsiflexion;plantarflexion;10 repetitions  -SC bilateral;dorsiflexion;plantarflexion;10 repetitions  -SC    Row Name 03/16/23 1551 03/16/23 1315       Balance    Dynamic Standing Balance 1-person assist;verbal cues;contact guard  -SC contact guard  -SC    Position/Device Used, Standing Balance supported;walker, rolling  -SC supported;walker, rolling  -SC    Comment, Balance -- no bucklilng  -SC          User Key  (r) = Recorded By, (t) = Taken By, (c) = Cosigned By    Initials Name Provider Type    SC Chip Salgado, PT Physical  Therapist               Goals/Plan    No documentation.                Clinical Impression     Row Name 03/16/23 1319          Pain    Pain Intervention(s) Repositioned;Cold applied  -SC     Additional Documentation Pain Scale: FACES Pre/Post-Treatment (Group)  -SC     Row Name 03/16/23 1552 03/16/23 1319       Pain Scale: FACES Pre/Post-Treatment    Pain: FACES Scale, Pretreatment 6-->hurts even more  -SC 6-->hurts even more  -SC    Posttreatment Pain Rating 6-->hurts even more  -SC 6-->hurts even more  -SC    Pain Location - Side/Orientation Left  -SC Left  -SC    Pain Location anterior  -SC --    Pain Location - knee  -SC knee  -SC    Row Name 03/16/23 1552 03/16/23 1319       Plan of Care Review    Plan of Care Reviewed With patient  -SC patient  -SC    Progress -- improving  -SC    Outcome Evaluation Patient has been reblocked this pm. She was able to increase her ambulation and continued to need safety cues. She continues to be limited by pain  -SC Patient demonstrated improving mobility. She is limtied by pain and fatigue. Recommend one more session prior to discharge to home  -SC    Row Name 03/16/23 1552 03/16/23 1319       Therapy Assessment/Plan (PT)    Patient/Family Therapy Goals Statement (PT) go home  -SC go home  -SC    Rehab Potential (PT) good, to achieve stated therapy goals  -SC good, to achieve stated therapy goals  -SC    Criteria for Skilled Interventions Met (PT) yes;meets criteria;skilled treatment is necessary  -SC yes;meets criteria;skilled treatment is necessary  -SC    Therapy Frequency (PT) 2 times/day  -SC 2 times/day  -SC    Row Name 03/16/23 1552 03/16/23 1319       Positioning and Restraints    Pre-Treatment Position sitting in chair/recliner  -SC sitting in chair/recliner  -SC    Post Treatment Position bed  -SC chair  -SC    In Bed notified nsg;supine;side lying right;call light within reach;encouraged to call for assist;exit alarm on  -SC --    In Chair -- notified  nsg;reclined;sitting;call light within reach;encouraged to call for assist;exit alarm on  -SC          User Key  (r) = Recorded By, (t) = Taken By, (c) = Cosigned By    Initials Name Provider Type    Chip Castaneda PT Physical Therapist               Outcome Measures     Row Name 03/16/23 1553 03/16/23 1320       How much help from another person do you currently need...    Turning from your back to your side while in flat bed without using bedrails? 4  -SC 3  -SC    Moving from lying on back to sitting on the side of a flat bed without bedrails? 3  -SC 3  -SC    Moving to and from a bed to a chair (including a wheelchair)? 3  -SC 3  -SC    Standing up from a chair using your arms (e.g., wheelchair, bedside chair)? 3  -SC 3  -SC    Climbing 3-5 steps with a railing? 3  -SC 3  -SC    To walk in hospital room? 3  -SC 3  -SC    AM-PAC 6 Clicks Score (PT) 19  -SC 18  -SC    Highest level of mobility 6 --> Walked 10 steps or more  -SC 6 --> Walked 10 steps or more  -SC    Row Name 03/16/23 1553 03/16/23 1320       Functional Assessment    Outcome Measure Options AM-PAC 6 Clicks Basic Mobility (PT)  -SC AM-PAC 6 Clicks Basic Mobility (PT)  -[place] Name 03/16/23 1215          Functional Assessment    Outcome Measure Options AM-PAC 6 Clicks Daily Activity (OT)  -J           User Key  (r) = Recorded By, (t) = Taken By, (c) = Cosigned By    Initials Name Provider Type    SC Chip Salgado PT Physical Therapist    Sophie Williamson OT Occupational Therapist                             Physical Therapy Education     Title: PT OT SLP Therapies (In Progress)     Topic: Physical Therapy (Done)     Point: Mobility training (Done)     Learning Progress Summary           Patient ALFREDO Zuniga VU by SC at 3/16/2023 1554    Comment: reviewed safety with mobility    ALFREDO Zuniga,SUSAN,BOONE, VU,DU by SC at 3/16/2023 1321    Comment: reviewed safety on stairs and HEp    ALFREDO Rivera,D, VU,DU by  at 3/15/2023 1819                   Point:  Home exercise program (Done)     Learning Progress Summary           Patient Eager, E, VU by SC at 3/16/2023 1554    Comment: reviewed safety with mobility    Eager, E,TB,D, VU,DU by SC at 3/16/2023 1321    Comment: reviewed safety on stairs and HEp    Acceptance, E,D, VU,DU by  at 3/15/2023 1819                   Point: Body mechanics (Done)     Learning Progress Summary           Patient Eager, E, VU by SC at 3/16/2023 1554    Comment: reviewed safety with mobility    Eager, E,TB,D, VU,DU by SC at 3/16/2023 1321    Comment: reviewed safety on stairs and HEp    Acceptance, E,D, VU,DU by  at 3/15/2023 1819                   Point: Precautions (Done)     Learning Progress Summary           Patient Eager, E, VU by SC at 3/16/2023 1554    Comment: reviewed safety with mobility    Eager, E,TB,D, VU,DU by SC at 3/16/2023 1321    Comment: reviewed safety on stairs and HEp    Acceptance, E,D, VU,DU by  at 3/15/2023 1819                               User Key     Initials Effective Dates Name Provider Type Discipline    SC 02/03/23 -  Chip Salgado, PT Physical Therapist PT     06/01/21 -  Winifred Youssef, LEANA Physical Therapist PT              PT Recommendation and Plan     Plan of Care Reviewed With: patient  Progress: improving  Outcome Evaluation: Patient has been reblocked this pm. She was able to increase her ambulation and continued to need safety cues. She continues to be limited by pain     Time Calculation:    PT Charges     Row Name 03/16/23 1520 03/16/23 1057          Time Calculation    Start Time 1520  -SC 1057  -SC     PT Received On 03/16/23  -SC 03/16/23  -SC     PT Goal Re-Cert Due Date 03/25/23  -SC 03/25/23  -SC        Time Calculation- PT    Total Timed Code Minutes- PT 20 minute(s)  -SC 28 minute(s)  -SC        Timed Charges    80726 - PT Therapeutic Exercise Minutes 10  -SC 8  -SC     53663 - Gait Training Minutes  10  -SC 20  -SC        Total Minutes    Timed Charges Total Minutes 20  -SC 28   -SC      Total Minutes 20  -SC 28  -SC           User Key  (r) = Recorded By, (t) = Taken By, (c) = Cosigned By    Initials Name Provider Type    SC Chip Salgado PT Physical Therapist              Therapy Charges for Today     Code Description Service Date Service Provider Modifiers Qty    85484410026  PT THER PROC EA 15 MIN 3/16/2023 Chip Salgado, PT GP 1    34351360748 HC GAIT TRAINING EA 15 MIN 3/16/2023 Damian, Chip PEDROZA, PT GP 1    30138973883 HC PT THER PROC EA 15 MIN 3/16/2023 Chip Salgado, PT GP 1          PT G-Codes  Outcome Measure Options: AM-PAC 6 Clicks Basic Mobility (PT)  AM-PAC 6 Clicks Score (PT): 19  AM-PAC 6 Clicks Score (OT): 19  PT Discharge Summary  Anticipated Discharge Disposition (PT): home with assist, home with home health    Chip Salgado, PT  3/16/2023

## 2023-03-16 NOTE — ADDENDUM NOTE
Addendum  created 03/16/23 1332 by Harsha Grant, CRNA    Child order released for a procedure order, Clinical Note Signed, Intraprocedure Blocks edited, LDA created via procedure documentation, LDA removed via procedure documentation, SmartForm saved

## 2023-03-16 NOTE — PLAN OF CARE
Problem: Adult Inpatient Plan of Care  Goal: Plan of Care Review  Recent Flowsheet Documentation  Taken 3/16/2023 1125 by Sophie Coburn OT  Progress: (OT IE) no change  Plan of Care Reviewed With: patient  Outcome Evaluation: OT evaluation completed. During post operative assessment pt presents w/ decreased I in ADLs, related t/fs, mobility related to PLOF limited by decreased activity tolerance, impaired balance, decreased reach toward LEs d/t pain and ROM limitations impacting LB ADLs, dizziness upon sitting and more in standing with decreased BP noted and safety awareness deficits w/ mild impuslivity noted increasing pt risk for falls. OT issued LH AE and pt demonstrated improved perofrmance in d/d socks and undergarments with increased emphasis on close monitoring of nerve cath and no showering while nerve cath in place. Anticipate greater I with ADLs with further AE use and training. Recommend IPOT POC d/t below baseline occupational performance and further home w/ 24/7 at least initially and HHOT once medically ready. Pt will need FWW, deferred BSC recommendation.

## 2023-03-16 NOTE — PLAN OF CARE
Goal Outcome Evaluation:  Plan of Care Reviewed With: patient        Progress: improving  Outcome Evaluation: Patient has been reblocked this pm. She was able to increase her ambulation and continued to need safety cues. She continues to be limited by pain

## 2023-03-16 NOTE — ANESTHESIA PROCEDURE NOTES
Left Adductor Canal Catheter reblock      Patient reassessed immediately prior to procedure    Patient location during procedure: floor  Start time: 3/16/2023 1:31 PM  Reason for block: at surgeon's request and post-op pain management  Performed by  CRNA/CAA: Harsha Grant, CRNA  Assisted by: Kelly Mckeon RN  Preanesthetic Checklist  Completed: patient identified, IV checked, site marked, risks and benefits discussed, surgical consent, monitors and equipment checked, pre-op evaluation and timeout performed  Prep:  Pt Position: supine  Sterile barriers:cap, gloves, mask, sterile barriers and washed/disinfected hands  Prep: ChloraPrep  Patient monitoring: blood pressure monitoring, continuous pulse oximetry and EKG  Procedure    Sedation: no  Performed under: local infiltration  Guidance:ultrasound guided    ULTRASOUND INTERPRETATION.  Using ultrasound guidance a 20 G gauge needle was placed in close proximity to the nerve, at which point, under ultrasound guidance anesthetic was injected in the area of the nerve and spread of the anesthesia was seen on ultrasound in close proximity thereto.  There were no abnormalities seen on ultrasound; a digital image was taken; and the patient tolerated the procedure with no complications. Images:still images obtained, printed/placed on chart    Laterality:left  Block Type:adductor canal block  Injection Technique:catheter  Needle Type:Tuohy and echogenic  Needle Gauge:18 G  Resistance on Injection: none  Catheter Size:20 G (20g)  Cath Depth at skin: 14 cm    Medications Used: bupivacaine PF (MARCAINE) 0.25 % injection - Injection   20 mL - 3/16/2023 1:31:00 PM      Medications  Preservative Free Saline:5ml    Post Assessment  Injection Assessment: negative aspiration for heme, incremental injection and no paresthesia on injection  Patient Tolerance:comfortable throughout block  Complications:no  Additional Notes  CATHETER   A high-frequency linear transducer, with sterile  "cover, was placed on the anterior mid-thigh (between the anterior superior iliac spine and patella). The transducer was then moved medially to identify the Sartorius muscle (Estefany), Vastus Medialis muscle (VMM), Superficial Femoral Artery (SFA) and Vein. The transducer was then moved cephalad or caudad to position the SFA in the middle of the Estefany. The insertion site was prepped and draped in sterile fashion. Skin and cutaneous tissue was infiltrated with 2-5 ml of 1% Lidocaine. Using ultrasound-guidance, an 18-gauge Contiplex Ultra 360 Touhy needle was advanced in plane from lateral to medial. Preservative-free normal saline was utilized for hydro-dissection of tissue, advancement of Touhy, and to confirm needle placement below the fascial plane of the Estefany where the Nerve to the VMM is located. Local anesthetic (LA) 5 ml deposited here. The Touhy needle continues its path lateral to the SFA at the level of the Saphenous Nerve. The remainder of the LA was deposited at the 10-11 o'clock position of the SFA. This injection created a space between the Estefany and the SFA. Aspiration every 5 ml to prevent intravascular injection. Injection was completed with negative aspiration of blood and negative intravascular injection. Injection pressures were normal with minimal resistance. A 20-gauge ZeeWhereiplex Echo catheter was placed through the needle and advance out the tip of the Touhy 3-5 cm anterior to the SFA. The Touhy needle was then removed, and final catheter position verified at the 12 o'clock position to the SFA. The catheter was secured in the usual fashion with skin glue, benzoin, steri-strips, CHG tegaderm and label noting \"Nerve Block Catheter\". Jerk tape applied at yellow connector and catheter connection.           "

## 2023-03-16 NOTE — THERAPY TREATMENT NOTE
Patient Name: Janet Obrien  : 1960    MRN: [id number]                              Today's Date: 3/16/2023       Admit Date: 3/15/2023    Visit Dx:     ICD-10-CM ICD-9-CM   1. Primary osteoarthritis of left knee  M17.12 715.16     Patient Active Problem List   Diagnosis   • Diplopia   • Orbital pseudotumor   • Myositic orbital pseudotumor   • Hypothyroidism   • Diabetes mellitus (HCC)   • Age-related nuclear cataract of right eye   • Ocular myasthenia (HCC)   • Morbid obesity, unspecified obesity type (HCC)   • Essential hypertension   • Precordial pain   • SOB (shortness of breath)   • Lower abdominal pain   • Constipation   • Diarrhea   • Rectal bleeding   • Gastroesophageal reflux disease   • Class 3 severe obesity due to excess calories with serious comorbidity and body mass index (BMI) of 40.0 to 44.9 in adult (HCC)   • Anal or rectal pain   • Primary osteoarthritis of left knee   • Status post total left knee replacement   • Hyperlipidemia     Past Medical History:   Diagnosis Date   • Anxiety    • Arthritis    • Asthma    • Back pain    • Cancer (HCC)     SQUAMOUS CELL/ BASAL CELL ON LIP   • Chronic bronchitis (HCC)    • Depression    • Diabetes mellitus (HCC)     type 2   • Fibromyalgia    • High triglycerides    • Hyperlipidemia    • Hypertension    • Migraine    • MVP (mitral valve prolapse)    • OA (osteoarthritis)    • Peripheral autonomic neuropathy    • Pleurisy    • Pneumonia    • Rheumatism    • RLS (restless legs syndrome)    • Sleep apnea     Mild form per patient. recommended mouthpiece   • Thyroid disease      Past Surgical History:   Procedure Laterality Date   • APPENDECTOMY  's   • CATARACT EXTRACTION W/ INTRAOCULAR LENS IMPLANT Right 2019    Procedure: CATARACT PHACO EXTRACTION WITH INTRAOCULAR LENS IMPLANT RIGHT;  Surgeon: Carl Babb MD;  Location: [facility];  Service: Ophthalmology   • CATARACT EXTRACTION W/ INTRAOCULAR LENS IMPLANT Left 10/07/2019     "Procedure: CATARACT PHACO EXTRACTION WITH INTRAOCULAR LENS IMPLANT LEFT;  Surgeon: Carl Babb MD;  Location: [facility];  Service: Ophthalmology   •  SECTION  , 1997    x 2    • DILATATION AND CURETTAGE     • HAND SURGERY Right     \"Pinched nerve surgery\"   • HAND SURGERY Left     Joint removed secondary to arthritis   • HYSTERECTOMY      ''still have one ovary''   • KNEE SURGERY Right 2021    arthroscopy and partial medial meniscectomy Dr. Chavez   • THYROIDECTOMY     • TONSILLECTOMY AND ADENOIDECTOMY        General Information     Row Name 23 1305          Physical Therapy Time and Intention    Document Type therapy note (daily note)  -SC     Mode of Treatment physical therapy  -SC     Row Name 23 130          General Information    Patient Profile Reviewed yes  -SC     Existing Precautions/Restrictions fall;other (see comments)  nerve cath  -SC     Row Name 23 130          Cognition    Orientation Status (Cognition) oriented x 4  -SC     Row Name 23 130          Safety Issues, Functional Mobility    Impairments Affecting Function (Mobility) balance;cognition;endurance/activity tolerance;pain;strength;range of motion (ROM)  -SC     Comment, Safety Issues/Impairments (Mobility) alert, following commands  -SC           User Key  (r) = Recorded By, (t) = Taken By, (c) = Cosigned By    Initials Name Provider Type    SC Chip Salgado PT Physical Therapist               Mobility     Row Name 23 130          Bed Mobility    Comment, (Bed Mobility) uic  -SC     Row Name 23 130          Bed-Chair Transfer    Comment, (Bed-Chair Transfer) cues for hand placements  -SC     Row Name 23 130          Sit-Stand Transfer    Sit-Stand [place] (Transfers) contact guard;verbal cues;1 person assist  -SC     Assistive Device (Sit-Stand Transfers) walker, front-wheeled  -SC     Row Name 23 130          Gait/Stairs (Locomotion)    " [place] Level (Gait) contact guard;1 person assist;verbal cues  -SC     Assistive Device (Gait) walker, front-wheeled  -SC     Distance in Feet (Gait) 120  -SC     Deviations/Abnormal Patterns (Gait) left sided deviations;antalgic;weight shifting decreased;stride length decreased;junie decreased  -SC     Bilateral Gait Deviations forward flexed posture  -SC     Assistive Device (Stairs) cane, straight  -SC     Handrail Location (Stairs) right side (ascending)  -SC     Number of Steps (Stairs) 5+5  -SC     Comment, (Gait/Stairs) Gt training focused on achieving step through gait pattern. Cues for upright posture, staying close to walker and equal wt shifting. Demonstrated improving control of walker at end of walk. No buckling or loss of balance. Walking limited by quick onset of fatigue.  Required cues for sequencing cane on stairs. Demonstrated good technique. Cane issued to patient  -SC     Row Name 03/16/23 1307          Mobility    Extremity Weight-bearing Status left lower extremity  -SC     Left Lower Extremity (Weight-bearing Status) weight-bearing as tolerated (WBAT)  -SC           User Key  (r) = Recorded By, (t) = Taken By, (c) = Cosigned By    Initials Name Provider Type    SC Chip Salgado, PT Physical Therapist               Obj/Interventions     Row Name 03/16/23 1315          Motor Skills    Therapeutic Exercise knee;ankle  -SC     Row Name 03/16/23 1315          Knee (Therapeutic Exercise)    Knee (Therapeutic Exercise) isometric exercises;strengthening exercise  -SC     Knee Isometrics (Therapeutic Exercise) left;sitting;quad sets;10 repetitions  -SC     Knee Strengthening (Therapeutic Exercise) left;SLR (straight leg raise);heel slides;SAQ (short arc quad);LAQ (long arc quad);sitting;10 repetitions  -SC     Row Name 03/16/23 1315          Ankle (Therapeutic Exercise)    Ankle (Therapeutic Exercise) AROM (active range of motion)  -SC     Ankle AROM (Therapeutic Exercise)  bilateral;dorsiflexion;plantarflexion;10 repetitions  -[facility] Name 03/16/23 1315          Balance    Dynamic Standing Balance contact guard  -SC     Position/Device Used, Standing Balance supported;walker, rolling  -SC     Comment, Balance no bucklilng  -SC           User Key  (r) = Recorded By, (t) = Taken By, (c) = Cosigned By    Initials Name Provider Type    SC Chip Salgado, PT Physical Therapist               Goals/Plan    No documentation.                Clinical Impression     [facility] Name 03/16/23 1319          Pain    Pain Intervention(s) Repositioned;Cold applied  -SC     Additional Documentation Pain Scale: FACES Pre/Post-Treatment (Group)  -[facility] Name 03/16/23 1319          Pain Scale: FACES Pre/Post-Treatment    Pain: FACES Scale, Pretreatment 6-->hurts even more  -SC     Posttreatment Pain Rating 6-->hurts even more  -SC     Pain Location - Side/Orientation Left  -SC     Pain Location - knee  -[facility] Name 03/16/23 1319          Plan of Care Review    Plan of Care Reviewed With patient  -SC     Progress improving  -SC     Outcome Evaluation Patient demonstrated improving mobility. She is limtied by pain and fatigue. Recommend one more session prior to discharge to home  -[facility] Name 03/16/23 1319          Therapy Assessment/Plan (PT)    Patient/Family Therapy Goals Statement (PT) go home  -SC     Rehab Potential (PT) good, to achieve stated therapy goals  -SC     Criteria for Skilled Interventions Met (PT) yes;meets criteria;skilled treatment is necessary  -SC     Therapy Frequency (PT) 2 times/day  -[facility] Name 03/16/23 1319          Positioning and Restraints    Pre-Treatment Position sitting in chair/recliner  -SC     Post Treatment Position chair  -SC     In Chair notified nsg;reclined;sitting;call light within reach;encouraged to call for assist;exit alarm on  -SC           User Key  (r) = Recorded By, (t) = Taken By, (c) = Cosigned By    Initials Name Provider Type    SC Damian  Chip PEDROZA, PT Physical Therapist               Outcome Measures     Row Name 03/16/23 1320          How much help from another person do you currently need...    Turning from your back to your side while in flat bed without using bedrails? 3  -SC     Moving from lying on back to sitting on the side of a flat bed without bedrails? 3  -SC     Moving to and from a bed to a chair (including a wheelchair)? 3  -SC     Standing up from a chair using your arms (e.g., wheelchair, bedside chair)? 3  -SC     Climbing 3-5 steps with a railing? 3  -SC     To walk in hospital room? 3  -SC     AM-PAC 6 Clicks Score (PT) 18  -SC     Highest level of mobility 6 --> Walked 10 steps or more  -SC     Row Name 03/16/23 1320 03/16/23 1215       Functional Assessment    Outcome Measure Options AM-PAC 6 Clicks Basic Mobility (PT)  -SC AM-PAC 6 Clicks Daily Activity (OT)  -JENA          User Key  (r) = Recorded By, (t) = Taken By, (c) = Cosigned By    Initials Name Provider Type    SC Chip Salgado, PT Physical Therapist    Sophie Williamson, OT Occupational Therapist                             Physical Therapy Education     Title: PT OT SLP Therapies (In Progress)     Topic: Physical Therapy (Done)     Point: Mobility training (Done)     Learning Progress Summary           Patient EagerALFREDO TB, D, LALIT,NADYA by SC at 3/16/2023 1321    Comment: reviewed safety on stairs and HEp    Acceptance, E,BOONE, VU,DU by  at 3/15/2023 1819                   Point: Home exercise program (Done)     Learning Progress Summary           Patient EagALFREDO davis,SUSAN,BOONE, VU,NADYA by SC at 3/16/2023 1321    Comment: reviewed safety on stairs and HEp    Acceptance, E,D, VU,DU by  at 3/15/2023 1819                   Point: Body mechanics (Done)     Learning Progress Summary           Patient Eager E,SUSAN,BOONE, VU,DU by SC at 3/16/2023 1321    Comment: reviewed safety on stairs and HEp    Acceptance, E,D, VU,DU by  at 3/15/2023 1819                   Point: Precautions (Done)      Learning Progress Summary           Patient Eager, ALFREDO,SUSAN,LALIT SHOOK DU by SC at 3/16/2023 1321    Comment: reviewed safety on stairs and HEp    Miguel, ALFREDO,BOONE, NADYA COHN by  at 3/15/2023 1819                               User Key     Initials Effective Dates Name Provider Type Discipline    SC 02/03/23 -  Chip Salgado PT Physical Therapist PT    HP 06/01/21 -  Winifred Youssef PT Physical Therapist PT              PT Recommendation and Plan     Plan of Care Reviewed With: patient  Progress: improving  Outcome Evaluation: Patient demonstrated improving mobility. She is limtied by pain and fatigue. Recommend one more session prior to discharge to home     Time Calculation:    PT Charges     Row Name 03/16/23 1057             Time Calculation    Start Time 1057  -SC      PT Received On 03/16/23  -SC      PT Goal Re-Cert Due Date 03/25/23  -SC         Time Calculation- PT    Total Timed Code Minutes- PT 28 minute(s)  -SC         Timed Charges    90099 - PT Therapeutic Exercise Minutes 8  -SC      31833 - Gait Training Minutes  20  -SC         Total Minutes    Timed Charges Total Minutes 28  -SC       Total Minutes 28  -SC            User Key  (r) = Recorded By, (t) = Taken By, (c) = Cosigned By    Initials Name Provider Type    SC Chip Salgado, PT Physical Therapist              Therapy Charges for Today     Code Description Service Date Service Provider Modifiers Qty    97075817761 HC PT THER PROC EA 15 MIN 3/16/2023 Chip Salgado, PT GP 1    08236636323 HC GAIT TRAINING EA 15 MIN 3/16/2023 Chip Salgado, PT GP 1          PT G-Codes  Outcome Measure Options: AM-PAC 6 Clicks Basic Mobility (PT)  AM-PAC 6 Clicks Score (PT): 18  AM-PAC 6 Clicks Score (OT): 19  PT Discharge Summary  Anticipated Discharge Disposition (PT): home with assist, home with home health    Chip Salgado PT  3/16/2023

## 2023-03-16 NOTE — CASE MANAGEMENT/SOCIAL WORK
Continued Stay Note  [facility]     Patient Name: Janet Obrien  MRN: [id number]  Today's Date: 3/16/2023    Admit Date: 3/15/2023    Plan: Home with [facility]   Discharge Plan     Row Name 03/16/23 1052       Plan    Plan Home with [facility]    Patient/Family in Agreement with Plan yes    Plan Comments Ms. Obrien anticipates going home at the time of discharge. She has a rolling walker at home. She will be followed by [facility] for physical therapy. Her brother will transport her home via private vehicle. No additional discharge needs identified.    Final Discharge Disposition Code 06 - home with home health care               Discharge Codes    No documentation.               Expected Discharge Date and Time     Expected Discharge Date Expected Discharge Time    Mar 15, 2023             Vaibhav Sibley RN

## 2023-03-16 NOTE — THERAPY EVALUATION
Patient Name: Janet Obrien  : 1960    MRN: [id number]                              Today's Date: 3/16/2023       Admit Date: 3/15/2023    Visit Dx:     ICD-10-CM ICD-9-CM   1. Primary osteoarthritis of left knee  M17.12 715.16     Patient Active Problem List   Diagnosis   • Diplopia   • Orbital pseudotumor   • Myositic orbital pseudotumor   • Hypothyroidism   • Diabetes mellitus (HCC)   • Age-related nuclear cataract of right eye   • Ocular myasthenia (HCC)   • Morbid obesity, unspecified obesity type (HCC)   • Essential hypertension   • Precordial pain   • SOB (shortness of breath)   • Lower abdominal pain   • Constipation   • Diarrhea   • Rectal bleeding   • Gastroesophageal reflux disease   • Class 3 severe obesity due to excess calories with serious comorbidity and body mass index (BMI) of 40.0 to 44.9 in adult (HCC)   • Anal or rectal pain   • Primary osteoarthritis of left knee   • Status post total left knee replacement   • Hyperlipidemia     Past Medical History:   Diagnosis Date   • Anxiety    • Arthritis    • Asthma    • Back pain    • Cancer (HCC)     SQUAMOUS CELL/ BASAL CELL ON LIP   • Chronic bronchitis (HCC)    • Depression    • Diabetes mellitus (HCC)     type 2   • Fibromyalgia    • High triglycerides    • Hyperlipidemia    • Hypertension    • Migraine    • MVP (mitral valve prolapse)    • OA (osteoarthritis)    • Peripheral autonomic neuropathy    • Pleurisy    • Pneumonia    • Rheumatism    • RLS (restless legs syndrome)    • Sleep apnea     Mild form per patient. recommended mouthpiece   • Thyroid disease      Past Surgical History:   Procedure Laterality Date   • APPENDECTOMY  's   • CATARACT EXTRACTION W/ INTRAOCULAR LENS IMPLANT Right 2019    Procedure: CATARACT PHACO EXTRACTION WITH INTRAOCULAR LENS IMPLANT RIGHT;  Surgeon: Carl Babb MD;  Location: [facility];  Service: Ophthalmology   • CATARACT EXTRACTION W/ INTRAOCULAR LENS IMPLANT Left 10/07/2019     "Procedure: CATARACT PHACO EXTRACTION WITH INTRAOCULAR LENS IMPLANT LEFT;  Surgeon: Carl Babb MD;  Location: [facility] OR;  Service: Ophthalmology   •  SECTION  , 1997    x 2    • DILATATION AND CURETTAGE     • HAND SURGERY Right     \"Pinched nerve surgery\"   • HAND SURGERY Left     Joint removed secondary to arthritis   • HYSTERECTOMY      ''still have one ovary''   • KNEE SURGERY Right 2021    arthroscopy and partial medial meniscectomy Dr. Chavez   • THYROIDECTOMY     • TONSILLECTOMY AND ADENOIDECTOMY        General Information     Row Name 23 1058          OT Time and Intention    Document Type evaluation  -JY     Mode of Treatment occupational therapy;individual therapy  -J     Row Name 23 1058          General Information    Prior Level of Function min assist:;all household mobility;community mobility;gait;transfer;bed mobility;dressing;bathing;home management;cooking;cleaning;independent:;driving;feeding;grooming  pt performance in LBD and LBB limited by L knee pain and decreased ROM, fxl mobility hindered by pain decreasing desired movement, QoL; reports 6-7 falls in last 6 months d/t knee instability  -JY     Existing Precautions/Restrictions fall;other (see comments)  adductor nerve canal, impulsive at times, decreased reception of safety cues  -JY     Barriers to Rehab none identified  -[employer]     Row Name 23 1058          Occupational Profile    Environmental Supports and Barriers (Occupational Profile) step over tub w/ seat, elevated toilet seat, DME: no DME used or available in storage  -[employer]     Row Name 23 1058          Living Environment    People in Home alone;other (see comments)  combination of family to stay with pt at d/c and following 2 weeks post d/c  -     Row Name 23 1058          Home Main Entrance    Number of Stairs, Main Entrance one;other (see comments)  one \"big\" step to enter  -[employer]     Row Name 23 1058          " Stairs Within Home, Primary    Stairs, Within Home, Primary split foyer with bed, bath, kitchen and living area on same level  -JY     Number of Stairs, Within Home, Primary seven  -JY     Row Name 03/16/23 1058          Cognition    Orientation Status (Cognition) oriented x 4  -JY     Row Name 03/16/23 1058          Safety Issues, Functional Mobility    Safety Issues Affecting Function (Mobility) awareness of need for assistance;insight into deficits/self-awareness;safety precaution awareness;safety precautions follow-through/compliance;sequencing abilities  -JY     Impairments Affecting Function (Mobility) balance;cognition;endurance/activity tolerance;pain;strength;range of motion (ROM)  -JY     Cognitive Impairments, Mobility Safety/Performance awareness, need for assistance;impulsivity;insight into deficits/self-awareness;judgment;problem-solving/reasoning;safety precaution awareness;safety precaution follow-through;sequencing abilities  -JY     Comment, Safety Issues/Impairments (Mobility) pt more alert this date however closing eyes, more lethargic toward end of session with request to rest; reported dizziness at EOB, increased in standing with decrease in BP noted as in flowsheet, RN notified; impulsive at times w/ apparent decreased reception to safety cues provided resulting in repetition and emphasis  -JY           User Key  (r) = Recorded By, (t) = Taken By, (c) = Cosigned By    Initials Name Provider Type    Sophie Williamson OT Occupational Therapist                 Mobility/ADL's     Row Name 03/16/23 1106          Bed Mobility    Bed Mobility supine-sit;scooting/bridging  -JY     Scooting/Bridging [place] (Bed Mobility) standby assist;verbal cues  -JROSEMARY     Supine-Sit [place] (Bed Mobility) contact guard;verbal cues  -JROSEMARY     Bed Mobility, Safety Issues decreased use of legs for bridging/pushing  -JY     Assistive Device (Bed Mobility) head of bed elevated;bed rails  -JY     Comment, (Bed  Mobility) pt reports going to sleep on couch at return home, OT educated on safety concerns with low height and only unilateral UE support, reviewed seq for advancing LEs to EOB and uprighting trunk into sitting; increased time and effort to advance LLE to EOB, issued pt leg  to assist with LLE mgmt; report of dizziness upon sitting, decreased BP noted  -NICKI     Row Name 03/16/23 1107          Transfers    Transfers sit-stand transfer;stand-sit transfer;bed-chair transfer;toilet transfer;other (see comments)  toilet t/f to BSC vs toilet d/t decreased BP and dizziness  -JY     Comment, (Transfers) skilled cues for optimal hand placement for controlled ascend, descend specifically to push up from seated surface with at least 1 UE vs pulling at FWW with BUEs and reaching back prior to sitting with step forward at LLE; reported increased dizziness in standing with decrease in BP. T/f'd to BSC vs fxl mobility to bathroom d/t safety concerns with dizziness and decreased BP; pt can be impulsive at times prior to safety readiness  -NICKI     Row Name 03/16/23 1107          Bed-Chair Transfer    Bed-Chair [place] (Transfers) minimum assist (75% patient effort);verbal cues  -JY     Assistive Device (Bed-Chair Transfers) walker, front-wheeled  -JY     Row Name 03/16/23 1107          Sit-Stand Transfer    Sit-Stand [place] (Transfers) minimum assist (75% patient effort);verbal cues  -JY     Assistive Device (Sit-Stand Transfers) walker, front-wheeled  -JY     Row Name 03/16/23 1107          Stand-Sit Transfer    Stand-Sit [place] (Transfers) minimum assist (75% patient effort);verbal cues  -JY     Assistive Device (Stand-Sit Transfers) walker, front-wheeled  -JY     Row Name 03/16/23 1107          Toilet Transfer    Type (Toilet Transfer) stand pivot/stand step  -JY     [place] Level (Toilet Transfer) minimum assist (75% patient effort);verbal cues  -JY     Assistive Device (Toilet Transfer) walker,  front-wheeled  -NICKI     Row Name 03/16/23 1107          Functional Mobility    Functional Mobility- Ind. Level not tested  -JY     Functional Mobility- Comment refer to PT for specifics, pt u/a to complete during OT session d/t increased dizziness and decreased BP, fxl transfers and bed mobility extent of mobility this session  -NICIK     Row Name 03/16/23 1107          Activities of Daily Living    BADL Assessment/Intervention upper body dressing;lower body dressing;bathing;toileting;grooming  -NICKI     Row Name 03/16/23 1107          Mobility    Extremity Weight-bearing Status left lower extremity  -JY     Left Lower Extremity (Weight-bearing Status) weight-bearing as tolerated (WBAT)  -NICKI     Row Name 03/16/23 1107          Upper Body Dressing Assessment/Training    [place] Level (Upper Body Dressing) doff;don;pajama/robe;minimum assist (75% patient effort);verbal cues  -JY     Position (Upper Body Dressing) supported sitting  -JY     Comment, (Upper Body Dressing) min A for posterior and proximal mgmt of gown  -NICKI     Row Name 03/16/23 1107          Lower Body Dressing Assessment/Training    [place] Level (Lower Body Dressing) doff;don;socks;contact guard assist;other (see comments);minimum assist (75% patient effort);verbal cues;nonverbal cues (demo/gesture)  undergarments  -JY     Assistive Devices (Lower Body Dressing) leg ;long-handled shoe horn;reacher;sock-aid;other (see comments)  issued LH AE to assist with more I LBD via improved reach and decreased pain, reviewed with pt each device and sought return demo  -JY     Position (Lower Body Dressing) unsupported sitting;supported standing  -JY     Comment, (Lower Body Dressing) educated pt on optimal seq for threading and unthreading given more impacted LLE, close monitoring of nerve cath to reduce risk for dislodging and use of AE to assist w/ more I and decreased pain; pt presented with improved I with AE as indicated, increased time to  understand mgmt of garments around shaina cath; emphasized sitting for gross LBD vs standing as pt indicated was baseline approach  -JY     Row Name 03/16/23 1107          Bathing Assessment/Intervention    [place] Level (Bathing) lower body;distal lower extremities/feet  -JY     Assistive Devices (Bathing) long-handled sponge  -JY     Comment, (Bathing) educated pt on use of LH sponge to assist with more I LBB with increased reach and decreased pain, emphasized no showering while nerve cath still in place with pt understanding; pt able to simulate distal reach, no authentic bathing assessed  -JY     Row Name 03/16/23 1107          Toileting Assessment/Training    [place] Level (Toileting) adjust/manage clothing;contact guard assist;perform perineal hygiene;independent  -JY     Assistive Devices (Toileting) commode, bedside without drop arms  -JY     Position (Toileting) supported standing;unsupported sitting  -JY     Comment, (Toileting) toileting limited to BSC vs in bathroom d/t increased dizziness in sitting and further in standing with decrease in BP noted; pt able to complete I hygiene while seated, req'd CGA for balance during gown mgmt pre and post toileting, may anticipate greater A for more involved LB garments  -JY     Row Name 03/16/23 1107          Grooming Assessment/Training    [place] Level (Grooming) wash face, hands;set up  -JY     Position (Grooming) supported sitting  -JY           User Key  (r) = Recorded By, (t) = Taken By, (c) = Cosigned By    Initials Name Provider Type    Sophie Williamson OT Occupational Therapist               Obj/Interventions     Row Name 03/16/23 1120          Sensory Assessment (Somatosensory)    Sensory Assessment (Somatosensory) bilateral UE;sensation intact  -JY     Bilateral UE Sensory Assessment general sensation;light touch awareness;intact  -JY     Sensory Assessment denies any numbness or tingling and able to recognize all LT Stimuli as intact and  symmetrical at BUEs  -JY     Row Name 03/16/23 1120          Vision Assessment/Intervention    Visual Impairment/Limitations corrective lenses for reading  -JY     Vision Assessment Comment denies any acute changes to vision  -[facility] Name 03/16/23 1120          Range of Motion Comprehensive    General Range of Motion bilateral upper extremity ROM WFL  -[facility] Name 03/16/23 1120          Strength Comprehensive (MMT)    General Manual Muscle Testing (MMT) Assessment upper extremity strength deficits identified  -JY     Comment, General Manual Muscle Testing (MMT) Assessment ALFREDO MMS grossly 4+/5 to 5/5 per MMT  -[facility] Name 03/16/23 1120          Motor Skills    Motor Skills functional endurance;coordination  -JY     Coordination finger to nose;other (see comments)  finger thumb opposition WFL  -JY     Functional Endurance decreased activity tolerance largely impacted this date by dizziness with progressive change in position and decreased BP, further reports pain from groin to knee on sx side with report of increased pain in standing  -[facility] Name 03/16/23 1120          Balance    Balance Assessment sitting static balance;sitting dynamic balance;standing static balance;standing dynamic balance  -JY     Static Sitting Balance standby assist  -JY     Dynamic Sitting Balance contact guard;verbal cues;other (see comments)  LBD, pre transfer skills  -JY     Position, Sitting Balance unsupported;sitting edge of bed  -JY     Static Standing Balance contact guard;verbal cues  -JY     Dynamic Standing Balance minimal assist;verbal cues  -JY     Position/Device Used, Standing Balance supported;walker, front-wheeled  -JY     Balance Interventions sitting;standing;static;dynamic;occupation based/functional task  -JY     Comment, Balance no overt LOB during seated or standing tasks, utilized FWW in standing; pt limited by dizziness and postural sway noted when standing w/ more dynamic challenges  -JY           User  Key  (r) = Recorded By, (t) = Taken By, (c) = Cosigned By    Initials Name Provider Type    Sophie Williamson, OT Occupational Therapist               Goals/Plan     Row Name 03/16/23 1134          Transfer Goal 1 (OT)    Activity/Assistive Device (Transfer Goal 1, OT) sit-to-stand/stand-to-sit;bed-to-chair/chair-to-bed;toilet;commode, bedside without drop arms;walker, rolling  -JY     [place] Level/Cues Needed (Transfer Goal 1, OT) contact guard required;verbal cues required  -JY     Time Frame (Transfer Goal 1, OT) long term goal (LTG);by discharge  -JY     Progress/Outcome (Transfer Goal 1, OT) new goal  -JENAY     Row Name 03/16/23 1134          Dressing Goal 1 (OT)    Activity/Device (Dressing Goal 1, OT) upper body dressing;lower body dressing;other (see comments)  d/d TB garments with AE PRN  -JY     [place]/Cues Needed (Dressing Goal 1, OT) contact guard required;verbal cues required  -JY     Time Frame (Dressing Goal 1, OT) long term goal (LTG);by discharge  -JY     Progress/Outcome (Dressing Goal 1, OT) new goal  -JY     Row Name 03/16/23 1134          Toileting Goal 1 (OT)    Activity/Device (Toileting Goal 1, OT) adjust/manage clothing;perform perineal hygiene;commode;commode, bedside without drop arms;grab bar/safety frame;raised toilet seat  -JY     [place] Level/Cues Needed (Toileting Goal 1, OT) standby assist;independent;verbal cues required  -JY     Time Frame (Toileting Goal 1, OT) long term goal (LTG);by discharge  -JY     Progress/Outcome (Toileting Goal 1, OT) new goal  -JY     Row Name 03/16/23 1134          Strength Goal 1 (OT)    Strength Goal 1 (OT) Pt to complete seated HEP encompassing BUEs focused on strength and endurance w/ progressive sets/reps/resistance in order to improve integration in ADLs, related t/fs, mobility  -JY     Time Frame (Strength Goal 1, OT) long term goal (LTG);by discharge  -JY     Progress/Outcome (Strength Goal 1, OT) new goal  -JY     Row Name  03/16/23 1134          Therapy Assessment/Plan (OT)    Planned Therapy Interventions (OT) activity tolerance training;adaptive equipment training;BADL retraining;functional balance retraining;occupation/activity based interventions;patient/caregiver education/training;ROM/therapeutic exercise;strengthening exercise;transfer/mobility retraining  -JY           User Key  (r) = Recorded By, (t) = Taken By, (c) = Cosigned By    Initials Name Provider Type    Sophie Williamson, JAZZ Occupational Therapist               Clinical Impression     Row Name 03/16/23 1125          Pain Assessment    Pretreatment Pain Rating 6/10  -JY     Posttreatment Pain Rating 6/10  -JY     Pain Location - Side/Orientation Left  -JY     Pain Location generalized  -JY     Pain Location - knee;groin  -JY     Pre/Posttreatment Pain Comment reports persistent pain from L groin to knee, RN and pain mgmt aware and managing; pt did not grossly limit pt in observed interventions yet consistently reported; pt denied knowing about PCA thus OT initiated education and pain mgmt to f/u with education  -JY     Pain Intervention(s) Repositioned;Ambulation/increased activity;Medication (See MAR);Cold applied;Rest  -JY     Row Name 03/16/23 1125          Plan of Care Review    Plan of Care Reviewed With patient  -JY     Progress no change  OT IE  -JY     Outcome Evaluation OT evaluation completed. During post operative assessment pt presents w/ decreased I in ADLs, related t/fs, mobility related to PLOF limited by decreased activity tolerance, impaired balance, decreased reach toward LEs d/t pain and ROM limitations impacting LB ADLs, dizziness upon sitting and more in standing with decreased BP noted and safety awareness deficits w/ mild impuslivity noted increasing pt risk for falls. OT issued LH AE and pt demonstrated improved perofrmance in d/d socks and undergarments with increased emphasis on close monitoring of nerve cath and no showering while nerve  cath in place. Anticipate greater I with ADLs with further AE use and training. Recommend IPOT POC d/t below baseline occupational performance and further home w/ 24/7 at least initially and HHOT. Pt will need FWW, [place] [facility] [place]  -     Row Name 03/16/23 1125          Therapy Assessment/Plan (OT)    Patient/Family Therapy Goal Statement (OT) to maximize I in ADLs, related t/fs, mobility, return to PLOF  -JY     Rehab Potential (OT) good, to achieve stated therapy goals  -JY     Criteria for Skilled Therapeutic Interventions Met (OT) yes;skilled treatment is necessary  -JY     Therapy Frequency (OT) daily  -JY     Row Name 03/16/23 1125          Therapy Plan Review/Discharge Plan (OT)    Equipment Needs Upon Discharge (OT) walker, rolling;dressing equipment;bathing equipment  -JY     Anticipated Discharge Disposition (OT) home with 24/7 care;home with home health  -     Row Name 03/16/23 1125          Vital Signs    Pre Systolic BP Rehab 101  -JY     Pre Treatment Diastolic BP 65  -JY     Intra Systolic BP Rehab 91  -JY     Intra Treatment Diastolic BP 67  -JY     Post Systolic BP Rehab 101  -JY     Post Treatment Diastolic BP 73  -JY     Pretreatment Heart Rate (beats/min) 86  -JY     Posttreatment Heart Rate (beats/min) 88  -JY     Pre SpO2 (%) 96  -JY     O2 Delivery Pre Treatment room air  -JY     O2 Delivery Intra Treatment room air  -JY     Post SpO2 (%) 96  -JY     O2 Delivery Post Treatment room air  -JY     Pre Patient Position Supine  -JY     Intra Patient Position Standing  -JY     Post Patient Position Sitting  -JY     Row Name 03/16/23 1125          Positioning and Restraints    Pre-Treatment Position in bed  -JY     Post Treatment Position chair  -JY     In Chair notified nsg;reclined;call light within reach;encouraged to call for assist;exit alarm on;compression device;waffle cushion;legs elevated  -JY           User Key  (r) = Recorded By, (t) = Taken By, (c) = Cosigned By     Initials Name Provider Type    Sophie Williamson OT Occupational Therapist               Outcome Measures     Row Name 03/16/23 1215          How much help from another is currently needed...    Putting on and taking off regular lower body clothing? 3  -JY     Bathing (including washing, rinsing, and drying) 3  -JY     Toileting (which includes using toilet bed pan or urinal) 3  -JY     Putting on and taking off regular upper body clothing 3  -JY     Taking care of personal grooming (such as brushing teeth) 3  -JY     Eating meals 4  -JY     AM-PAC 6 Clicks Score (OT) 19  -JY     Row Name 03/16/23 1215          Functional Assessment    Outcome Measure Options AM-PAC 6 Clicks Daily Activity (OT)  -JY           User Key  (r) = Recorded By, (t) = Taken By, (c) = Cosigned By    Initials Name Provider Type    Sophie Williamson OT Occupational Therapist                Occupational Therapy Education     Title: PT OT SLP Therapies (In Progress)     Topic: Occupational Therapy (In Progress)     Point: ADL training (In Progress)     Description:   Instruct learner(s) on proper safety adaptation and remediation techniques during self care or transfers.   Instruct in proper use of assistive devices.              Learning Progress Summary           Patient Acceptance, E,D, NR by NICKI at 3/16/2023 0753                   Point: Home exercise program (Not Started)     Description:   Instruct learner(s) on appropriate technique for monitoring, assisting and/or progressing therapeutic exercises/activities.              Learner Progress:  Not documented in this visit.          Point: Precautions (In Progress)     Description:   Instruct learner(s) on prescribed precautions during self-care and functional transfers.              Learning Progress Summary           Patient Acceptance, E,D, NR by NICKI at 3/16/2023 0753                   Point: Body mechanics (In Progress)     Description:   Instruct learner(s) on proper positioning and  spine alignment during self-care, functional mobility activities and/or exercises.              Learning Progress Summary           Patient Acceptance, E,D, NR by  at 3/16/2023 0753                               User Key     Initials Effective Dates Name Provider Type Discipline     06/16/21 -  Sophie Coburn OT Occupational Therapist OT              OT Recommendation and Plan  Planned Therapy Interventions (OT): activity tolerance training, adaptive equipment training, BADL retraining, functional balance retraining, occupation/activity based interventions, patient/caregiver education/training, ROM/therapeutic exercise, strengthening exercise, transfer/mobility retraining  Therapy Frequency (OT): daily  Plan of Care Review  Plan of Care Reviewed With: patient  Progress: no change (OT IE)  Outcome Evaluation: OT evaluation completed. During post operative assessment pt presents w/ decreased I in ADLs, related t/fs, mobility related to PLOF limited by decreased activity tolerance, impaired balance, decreased reach toward LEs d/t pain and ROM limitations impacting LB ADLs, dizziness upon sitting and more in standing with decreased BP noted and safety awareness deficits w/ mild impuslivity noted increasing pt risk for falls. OT issued LH AE and pt demonstrated improved perofrmance in d/d socks and undergarments with increased emphasis on close monitoring of nerve cath and no showering while nerve cath in place. Anticipate greater I with ADLs with further AE use and training. Recommend IPOT POC d/t below baseline occupational performance and further home w/ 24/7 at least initially and HHOT. Pt will need FWW, deferred [facility] recommendation.     Time Calculation:    Time Calculation- OT     Row Name 03/16/23 1217             Time Calculation- OT    OT Start Time 0753  -JY      OT Received On 03/16/23  -JY      OT Goal Re-Cert Due Date 03/26/23  -JY         Timed Charges    01781 - OT Therapeutic Activity Minutes 28  -JY       94120 - OT Self Care/Mgmt Minutes 28  -JY         Untimed Charges    OT Eval/Re-eval Minutes 52  -JY         Total Minutes    Timed Charges Total Minutes 56  -JY      Untimed Charges Total Minutes 52  -JY       Total Minutes 108  -JY            User Key  (r) = Recorded By, (t) = Taken By, (c) = Cosigned By    Initials Name Provider Type    Sophie Williamson OT Occupational Therapist              Therapy Charges for Today     Code Description Service Date Service Provider Modifiers Qty    [id number] HC OT THERAPEUTIC ACT EA 15 MIN 3/16/2023 Sophie Coburn OT GO 2    [id number] HC OT SELF CARE/MGMT/TRAIN EA 15 MIN 3/16/2023 Sophie Coburn OT GO 2    [id number] HC OT EVAL LOW COMPLEXITY 4 3/16/2023 Sophie Coburn OT GO 1               Sophie Coburn OT  3/16/2023

## 2023-03-16 NOTE — PROGRESS NOTES
" progress note      Janet Obrien  [id number]  1960     LOS: 0 days     Attending: Dayo Chavez,*    Primary Care Provider: Noe Davenport MD      Chief Complaint/Reason for visit:  No chief complaint on file.      Subjective   Patient had complained of severe 10 out of 10 pain in her knee.  Her peripheral nerve block catheter was replaced, pain has improved but still dealing with posterior knee pain pain radiating all through her leg according to the patient.    Objective        Visit Vitals  /57 (BP Location: Left arm, Patient Position: Lying)   Pulse 74   Temp 98.5 °F (36.9 °C) (Oral)   Resp 16   Ht 165.1 cm (65\")   Wt 107 kg (235 lb)   SpO2 96%   BMI 39.11 kg/m²     Temp (24hrs), Av.4 °F (36.9 °C), Min:97.8 °F (36.6 °C), Max:99.3 °F (37.4 °C)      Intake/Output:    Intake/Output Summary (Last 24 hours) at 3/16/2023 1634  Last data filed at 3/16/2023 1403  Gross per 24 hour   Intake 550 ml   Output 1100 ml   Net -550 ml        Physical Therapy:  Progress: improving  Outcome Evaluation: Patient has been reblocked this pm. She was able to increase her ambulation and continued to need safety cues. She continues to be limited by pain  Physical Exam:     General Appearance:    Alert, cooperative, in no acute distress   Head:    Normocephalic, without obvious abnormality, atraumatic    Lungs:     Normal effort, symmetric chest rise,  clear to      auscultation bilaterally              Heart:    Regular rhythm and normal rate, normal S1 and S2    Abdomen:     Normal bowel sounds, no masses, no organomegaly, soft        non-tender, non-distended, no guarding, no rebound                tenderness   Extremities:  Clean dry and intact dressing.  Peripheral nerve block cath present with infuse pump.  Intact flexion and dorsiflexion bilateral feet.  No clubbing, cyanosis or edema.  No deformities.    Pulses:   Pulses palpable and equal bilaterally   Skin:   No bleeding, bruising or rash      "     Results Review:     I reviewed the patient's new clinical results.   Results from last 7 days   Lab Units 03/16/23  0536   WBC 10*3/mm3 7.87   HEMOGLOBIN g/dL 10.3*   HEMATOCRIT % 31.4*   PLATELETS 10*3/mm3 212     Results from last 7 days   Lab Units 03/16/23  0536 03/15/23  0823   SODIUM mmol/L 134*  --    POTASSIUM mmol/L 3.6 3.3*   CHLORIDE mmol/L 101  --    CO2 mmol/L 28.0  --    BUN mg/dL 13  --    CREATININE mg/dL 0.70  --    CALCIUM mg/dL 9.4  --    GLUCOSE mg/dL 108*  --       Latest Reference Range & Units 03/15/23 14:46 03/16/23 05:36 03/16/23 07:03 03/16/23 11:35   Glucose 70 - 130 mg/dL 90 108 (H) 113 146 (H)   (H): Data is abnormally high  I reviewed the patient's new imaging including images and reports.    All medications reviewed.   apixaban, 2.5 mg, Oral, Q12H  atorvastatin, 80 mg, Oral, Daily  DULoxetine, 60 mg, Oral, Daily  insulin lispro, 0-7 Units, Subcutaneous, TID AC  levothyroxine, 150 mcg, Oral, Q AM  montelukast, 10 mg, Oral, Nightly      cyclobenzaprine, 5 mg, TID PRN  dextrose, 25 g, Q15 Min PRN  dextrose, 15 g, Q15 Min PRN  glucagon (human recombinant), 1 mg, Q15 Min PRN  ibuprofen, 600 mg, Q6H PRN  ketorolac, 15 mg, Q6H PRN  labetalol, 10 mg, Q4H PRN  ondansetron, 4 mg, Once PRN  oxyCODONE-acetaminophen, 2 tablet, Q6H PRN  sodium chloride, 500 mL, TID PRN        Assessment & Plan       Status post total left knee replacement    Hypothyroidism    Diabetes mellitus (HCC)    Essential hypertension    Primary osteoarthritis of left knee    Hyperlipidemia  Fibromyalgia       Plan   1. PT/OT- WBAT LLE  2. Pain control-prns, AC nerve block  .  Multimodal approach.  I added Flexeril and gave a dose of Toradol today.  3. IS-encourage  4. DVT proph- Mechs/Eliquis  5. Bowel regimen  6. Resume home medications as appropriate  7. Monitor post-op labs  8. DC planning for home, pending improved pain control.     HTN, Hyperlipidemia  - Continue home statin  - Hold HCTZ for now  - Monitor BP   -  Holding parameters for BP meds  - Labetalol PRN for SBP>170     DM  - hgb A1c on 3/2/23 5.1  - Accu-Chek AC and HS with low dose SSI     Hypothyroid  -Continued home Synthroid    Fito Tang MD  03/16/23  16:34 EDT

## 2023-03-16 NOTE — PLAN OF CARE
Goal Outcome Evaluation:                 Problem: Adult Inpatient Plan of Care  Goal: Absence of Hospital-Acquired Illness or Injury  Intervention: Identify and Manage Fall Risk  Recent Flowsheet Documentation  Taken 3/16/2023 0200 by Cornelio Lara RN  Safety Promotion/Fall Prevention:   activity supervised   safety round/check completed   toileting scheduled  Taken 3/16/2023 0000 by Cornelio Lara RN  Safety Promotion/Fall Prevention:   activity supervised   toileting scheduled   safety round/check completed  Taken 3/15/2023 2200 by Cornelio Lara RN  Safety Promotion/Fall Prevention:   activity supervised   safety round/check completed   toileting scheduled  Taken 3/15/2023 2000 by Cornelio Lara RN  Safety Promotion/Fall Prevention:   activity supervised   safety round/check completed   toileting scheduled  Intervention: Prevent Skin Injury  Recent Flowsheet Documentation  Taken 3/16/2023 0200 by Cornelio Lara RN  Body Position: supine  Taken 3/16/2023 0000 by Cornelio Lara RN  Body Position: supine  Taken 3/15/2023 2200 by Cornelio Lara RN  Body Position: supine  Taken 3/15/2023 2000 by Cornelio Lara RN  Body Position: supine  Intervention: Prevent and Manage VTE (Venous Thromboembolism) Risk  Recent Flowsheet Documentation  Taken 3/16/2023 0200 by Cornelio Lara RN  VTE Prevention/Management:   bilateral   sequential compression devices on  Taken 3/16/2023 0000 by oCrnelio Lara RN  VTE Prevention/Management:   bilateral   sequential compression devices on  Taken 3/15/2023 2200 by Cornelio Lara RN  VTE Prevention/Management:   bilateral   sequential compression devices on  Taken 3/15/2023 2000 by Cornelio Lara RN  VTE Prevention/Management:   bilateral   sequential compression devices on  Intervention: Prevent Infection  Recent Flowsheet Documentation  Taken 3/16/2023 0200 by Cornelio Lara RN  Infection Prevention: environmental surveillance performed  Taken 3/16/2023 0000 by  Cornelio Lara RN  Infection Prevention: environmental surveillance performed  Taken 3/15/2023 2200 by Cornelio Lara RN  Infection Prevention: environmental surveillance performed  Taken 3/15/2023 2000 by Cornelio Lara RN  Infection Prevention: environmental surveillance performed  Goal: Optimal Comfort and Wellbeing  Intervention: Monitor Pain and Promote Comfort  Recent Flowsheet Documentation  Taken 3/16/2023 0200 by Cornelio Lara RN  Pain Management Interventions: quiet environment facilitated  Taken 3/16/2023 0000 by Cornelio Lara RN  Pain Management Interventions: quiet environment facilitated  Taken 3/15/2023 2200 by Cornelio Lara RN  Pain Management Interventions: quiet environment facilitated  Taken 3/15/2023 2000 by Cornelio Lara RN  Pain Management Interventions: quiet environment facilitated  Intervention: Provide Person-Centered Care  Recent Flowsheet Documentation  Taken 3/16/2023 0200 by Cornelio Lara RN  Trust Relationship/Rapport: care explained  Taken 3/16/2023 0000 by Cornelio Lara RN  Trust Relationship/Rapport: care explained  Taken 3/15/2023 2200 by Cornelio Lara RN  Trust Relationship/Rapport: care explained  Taken 3/15/2023 2000 by Cornelio Lara RN  Trust Relationship/Rapport: care explained     VSS, voids well, rested throughout the night, pain managed with PRN medications, will continue to monitor for changes.

## 2023-03-16 NOTE — PROGRESS NOTES
"          Orthopaedic Surgery Progress Note      LOS: 0 days   Patient Care Team:  Noe Davenport MD as PCP - General (Internal Medicine)  Bhavna Akins MD as Surgeon (General Surgery)  Dee Kelly MD as Consulting Physician (Endocrinology)    POD 1    Subjective     Interval History:   Patient underwent elective left total knee arthroplasty yesterday.  She was scheduled to go home but got lightheaded and was unstable and it was felt by physical therapy that she would benefit from staying overnight.  She has better this morning.  Complaining of pain.  Denies chest pain or shortness of breath.    Objective     Vital Signs:  Temp (24hrs), Av.2 °F (36.8 °C), Min:97.8 °F (36.6 °C), Max:99.3 °F (37.4 °C)    /72 (BP Location: Left arm, Patient Position: Lying)   Pulse 86   Temp 99.3 °F (37.4 °C) (Oral)   Resp 16   Ht 165.1 cm (65\")   Wt 107 kg (235 lb)   SpO2 96%   BMI 39.11 kg/m²     Labs:  Lab Results (last 24 hours)     Procedure Component Value Units Date/Time    Basic Metabolic Panel [[id number]]  (Abnormal) Collected: 23    Specimen: Blood Updated: 23     Glucose 108 mg/dL      BUN 13 mg/dL      Creatinine 0.70 mg/dL      Sodium 134 mmol/L      Potassium 3.6 mmol/L      Chloride 101 mmol/L      CO2 28.0 mmol/L      Calcium 9.4 mg/dL      BUN/Creatinine Ratio 18.6     Anion Gap 5.0 mmol/L      eGFR 98.5 mL/min/1.73     Narrative:      GFR Normal >60  Chronic Kidney Disease <60  Kidney Failure <15      POC Glucose Once [[id number]]  (Normal) Collected: 23    Specimen: Blood Updated: 23     Glucose 113 mg/dL      Comment: Meter: [id number] : [id number] Radha Vitale       CBC (No Diff) [[id number]]  (Abnormal) Collected: 23    Specimen: Blood Updated: 23     WBC 7.87 10*3/mm3      RBC 3.64 10*6/mm3      Hemoglobin 10.3 g/dL      Hematocrit 31.4 %      MCV 86.3 fL      MCH 28.3 pg      MCHC 32.8 g/dL      RDW 13.0 %      RDW-SD " 40.9 fl      MPV 11.3 fL      Platelets 212 10*3/mm3     POC Glucose Once [[id number]]  (Normal) Collected: 03/15/23 1446    Specimen: Blood Updated: 03/15/23 1448     Glucose 90 mg/dL      Comment: Meter: [id number] : [id number]       Potassium [[id number]]  (Abnormal) Collected: 03/15/23 0823    Specimen: Blood Updated: 03/15/23 0840     Potassium 3.3 mmol/L           Physical Exam:  EHL, FHL, gastroc soleus, and tibialis anterior are intact  Toes are pink and warm  Surgical dressing is in place  Palpable dorsalis pedis pulse  Calf is soft and nontender    Postoperative x-ray has been reviewed and looks acceptable    Assessment & Plan     Postoperative day number  1 status post left total knee arthroplasty.    Labs: Hematocrit 31, platelets 212,000, creatinine 0.70    Pain Control: Will be challenging secondary to her chronic narcotic use    PT and OT     DVT prophylaxis: Eliquis 2.5 twice daily x6 weeks.  Begin this morning.    Discharge planning: Anticipate discharge home today.    Upon discharge, patient will need follow-up with me in 3 weeks' time.  They will need [facility] for physical therapy.  Standard Exofin Fusion dressing care instructions.  Do not remove.  DME per case management.    Admission Status:  I believe this patient meets INPATIENT status due to the need for care which can only be reasonably provided in a hospital setting such as aggressive/expedited ancillary services and/or consultation services, the necessity for IV medications, close physician monitoring and/or the possible need for procedures.  In such, I feel patient’s risk for adverse outcomes and need for care warrant INPATIENT evaluation and predict the patient’s care encounter to likely last beyond 2 midnights.       Dayo Chavez MD  03/16/23  08:30 EDT

## 2023-03-16 NOTE — PROGRESS NOTES
[facility]    Acute pain service Inpatient Progress Note    Patient Name: Janet Obrien  :  1960  MRN:  [id number]        Acute Pain  Service Inpatient Progress Note:    Pain Score:5/10  LOC: alert and awake  Resp Status: room air  Cardiac: VS stable  Side Effects:None  Catheter Site:clean, dressing intact and dry  Cath type: peripheral nerve cath with ON Q  Volume: 1mL,8ml, 8ml InfuSystem Pump.  Catheter Plan:Catheter to remain Insitu and Continue catheter infusion rate unchanged  Comments:   Patient is doing well this morning.  She does complain of some anterior and medial pain.  I educated her about the use of her PCA button on her infuse system pump.  She was currently unaware that she could use that.  I will follow back up with her later this morning to make sure that the PCA bolus has been effective.

## 2023-03-17 ENCOUNTER — ANESTHESIA EVENT (OUTPATIENT)
Dept: TELEMETRY | Facility: HOSPITAL | Age: 63
End: 2023-03-17
Payer: COMMERCIAL

## 2023-03-17 ENCOUNTER — ANESTHESIA (OUTPATIENT)
Dept: TELEMETRY | Facility: HOSPITAL | Age: 63
End: 2023-03-17
Payer: COMMERCIAL

## 2023-03-17 ENCOUNTER — ANESTHESIA EVENT CONVERTED (OUTPATIENT)
Dept: ANESTHESIOLOGY | Facility: HOSPITAL | Age: 63
End: 2023-03-17
Payer: COMMERCIAL

## 2023-03-17 VITALS
BODY MASS INDEX: 39.15 KG/M2 | HEIGHT: 65 IN | DIASTOLIC BLOOD PRESSURE: 65 MMHG | WEIGHT: 235 LBS | RESPIRATION RATE: 16 BRPM | SYSTOLIC BLOOD PRESSURE: 122 MMHG | OXYGEN SATURATION: 96 % | HEART RATE: 79 BPM | TEMPERATURE: 98.2 F

## 2023-03-17 LAB
GLUCOSE BLDC GLUCOMTR-MCNC: 95 MG/DL (ref 70–130)
GLUCOSE BLDC GLUCOMTR-MCNC: 97 MG/DL (ref 70–130)

## 2023-03-17 PROCEDURE — 97116 GAIT TRAINING THERAPY: CPT

## 2023-03-17 PROCEDURE — 25010000002 DEXAMETHASONE SODIUM PHOSPHATE 10 MG/ML SOLUTION: Performed by: NURSE ANESTHETIST, CERTIFIED REGISTERED

## 2023-03-17 PROCEDURE — 97110 THERAPEUTIC EXERCISES: CPT

## 2023-03-17 PROCEDURE — 82962 GLUCOSE BLOOD TEST: CPT

## 2023-03-17 PROCEDURE — 99024 POSTOP FOLLOW-UP VISIT: CPT | Performed by: ORTHOPAEDIC SURGERY

## 2023-03-17 RX ORDER — BUPIVACAINE HYDROCHLORIDE 2.5 MG/ML
INJECTION, SOLUTION EPIDURAL; INFILTRATION; INTRACAUDAL
Status: COMPLETED | OUTPATIENT
Start: 2023-03-17 | End: 2023-03-17

## 2023-03-17 RX ORDER — DEXAMETHASONE SODIUM PHOSPHATE 10 MG/ML
INJECTION, SOLUTION INTRAMUSCULAR; INTRAVENOUS
Status: COMPLETED | OUTPATIENT
Start: 2023-03-17 | End: 2023-03-17

## 2023-03-17 RX ADMIN — LEVOTHYROXINE SODIUM 150 MCG: 150 TABLET ORAL at 05:21

## 2023-03-17 RX ADMIN — DEXAMETHASONE SODIUM PHOSPHATE 2 MG: 10 INJECTION, SOLUTION INTRAMUSCULAR; INTRAVENOUS at 12:06

## 2023-03-17 RX ADMIN — BUPIVACAINE HYDROCHLORIDE 30 ML: 2.5 INJECTION, SOLUTION EPIDURAL; INFILTRATION; INTRACAUDAL; PERINEURAL at 12:20

## 2023-03-17 RX ADMIN — OXYCODONE HYDROCHLORIDE AND ACETAMINOPHEN 2 TABLET: 5; 325 TABLET ORAL at 14:56

## 2023-03-17 RX ADMIN — OXYCODONE HYDROCHLORIDE AND ACETAMINOPHEN 2 TABLET: 5; 325 TABLET ORAL at 09:12

## 2023-03-17 RX ADMIN — APIXABAN 2.5 MG: 2.5 TABLET, FILM COATED ORAL at 08:44

## 2023-03-17 RX ADMIN — DULOXETINE 60 MG: 60 CAPSULE, DELAYED RELEASE ORAL at 08:43

## 2023-03-17 RX ADMIN — OXYCODONE HYDROCHLORIDE AND ACETAMINOPHEN 2 TABLET: 5; 325 TABLET ORAL at 03:26

## 2023-03-17 RX ADMIN — ATORVASTATIN CALCIUM 80 MG: 40 TABLET, FILM COATED ORAL at 08:44

## 2023-03-17 NOTE — CASE MANAGEMENT/SOCIAL WORK
Case Management Discharge Note      Final Note: Pt is being discharged home today with [facility]. I updated [facility] of discharge. I spoke with Ms. Obrien, at the bedside. She is agreeable with D/C plan and states that her brother will be driving her home. No needs voiced or identified.         Selected Continued Care - Admitted Since 3/15/2023     Destination    No services have been selected for the patient.              Durable Medical Equipment    No services have been selected for the patient.              Dialysis/Infusion    No services have been selected for the patient.              Home Medical Care Coordination complete.    Service Provider Selected Services Address Phone Fax Patient Preferred     [facility] [address] [address] [phone] --          Therapy    No services have been selected for the patient.              Community Resources    No services have been selected for the patient.              Community & DME    No services have been selected for the patient.                       Final Discharge Disposition Code: 06 - home with home health care

## 2023-03-17 NOTE — DISCHARGE INSTRUCTIONS
InfuBLOCK - Patient Information    What is a pain pump?  InfuBLOCK is a postoperative, non-narcotic pain relief system that delivers local anesthetic to or near the surgical site. This is a pain minimizing therapy that delivers an anesthetic (numbing) medicine to the nerve.    The InfuBLOCK pain pump will continuously deliver a local anesthetic medication to block the pain in the area of your procedure.    Where can I find information about my pain pump?           For more information about your pain pump, scan the QR code.  For additional patient resources, visit [email or web address].                                                                                             The [employer] Nursing Hotline is Here for You 24/7.     Call [phone] for Assistance.  While your physician is your primary source for information about your treatment., there may be times during your treatment that you need assistance with your infusion pump. Our team of compassionate and knowledgeable Registered Nursed (RN) is here to assist every step of the way.    Answers to questions about your infusion pump                 Tubing disconnect  Assistance with pump alarms                                                      Dislodged catheter  Excessive leakage noted from pump                                         Inadequate pain control   Nerve Catheter Removal Instructions  When your device is empty:    Remove your catheter by pulling the dressing off slowly (like you would remove a regular bandage). The catheter should pull right out of the skin.  Check that the BLUE tip is intact.                                                                                     If the catheter is stuck, reposition your   extremity and pull slowly until removed.  *If catheter is HURTING and WON'T come out, stop and call [phone] for further assistance.    Remove medication bag from the black carrying case.  Cut the  tubing on right and left side of pump, and discard the medication bag and tubing into garbage.  Place the pump and black carrying case into the plastic bag and then place this into the return box.  Seal box with blue stickers and return to [employer] postal service.    THIS IS PRE-PAID POSTAGE. EXOFIN CARING FOR YOUR WOUND    AFTER exofin Fusion SKIN CLOSURE SYSTEM HAS BEEN APPLIED    YOUR HEALTHCARE PROFESSIONAL HAS CHOSEN TO USE exofin Fusion SKIN CLOSURE SYSTEM TO CLOSE YOUR WOUND.    exocin Fusion is the combination of a mesh and liquid adhesive that allows the incision or wound to be held together during the healing process.    exocin Fusion should remain in place until your healthcare professional; has determined that adequate healing has occurred, which is usually anywhere between 7 to 14 days. In most cases, exofin Fusion is easily removed with little or no discomfort.    In the event that you notice that exofin Fusion is beginning to loosen or may be coming off, contact your healthcare professional.    The following information is provided to help you understand how to care for your incision and is based on the FDA-cleared product labeling.    You should always follow the instructions of your healthcare provider.    THINGS TO KNOW    BATHING OR SHOWERING    If directed by your healthcare professional, you may occasionally and briefly wet your incision or wound that was treated with exofin Fusion in the shower or bath. Do not soak or scrub your incision or wound. Do not swim or soak your incision or wound in water. After showering or bathing, gently blot your incision or wound dry with a soft towel. If a dry protective dressing is being used over exofin Fusion, it should be replaced with a fresh, dry protective dressing after showering or bathing as directed by your healthcare practitioner. Care should also be taken so that any tape that may be part of the dry protective dressing does not come into contact with  exofin Fusion because when the tape is removed, it may also remove exofin Fusion.    WOUND HEALING  If you experience any redness, swelling, discomfort, warmth or pus, contact your healthcare professional and he or she will determine how your incision or wound is healing and take the necessary steps to address any issues.    EXERCISE  Do not engage in strenuous exercise that may cause additional stress on your incision or wound. Follow your healthcare professional's guidance about when you can return to your normal activities.    OINTMENTS OR LIQUIDS  Topical ointments, liquids or any other products (other than dry bandages) should not be applied to the incision while exofin Fusion is in place. These may loosen exofin Fusion from the skin before it has completely healed.    REMOVING exofin Fusion  Your healthcare professional will determine when the healing process of your incision or wound has been completed and exofin Fusion is ready to be removed, which is usually between 7 to 14 days. When healing is complete, your healthcare professional will carefully peel off the exofin Fusion.    Prior to removal, do not scratch, rub or pick at the mesh. This may loosen the adhesive and mesh before the skin is healed.  In the event that you notice the exofin Fusion is beginning to loosen and may be coming off or comes off with the skin/wound, contract your healthcare professional.    For complete directions on the use of exofin Fusion, please refer to instructions for Use (IFU) included in the product packaging.  IF YOU HAVE ANY QUESTIONS OR CONCERNS ABOUT exofin Fusion PLEASE CONTACT YOUR HEALTHCARE PROFESSIONAL. [facility] COLD THERAPY - PATIENT INSTRUCTION SHEET    Cold Compression Therapy for your comfort and rehabilitation  Your caregivers want you to be productive in your rehab and comfortable during your stay. In keeping with those goals, you will be receiving an [facility] Cold Therapy Wrap to help ease post-operative pain and  swelling that might keep you from getting back on track! Your SMI Cold Therapy Wrap is effective and simple-to-use, and you will be encouraged to apply it throughout your hospital stay and at home through the duration of your recovery.    When you are ready to go home  Be sure to take your SMI Cold Therapy Wrap and both sets of Gel Bags with you for continued comfort and use throughout your rehabilitation. If you don't already have them, ask your nurse or aide to retrieve your SMI Gel Bags from the patient freezer.    Home use precautions  Always follow your medical professional's application instructions upon discharge. Your SMI Cold Therapy Wrap and Gel Bags are designed to last for months following your surgery. Never heat the Gel Bags unless specified by your healthcare provider. Supervision is advised when using this product on children or geriatric patients. To avoid danger of suffocation, please keep the outer plastic packaging away from children & pets.    Cold Therapy Instructions  Place Gel Bags in a freezer set ¾ of the way to max temperature for at least (4) hours. For best results, lay the Gel Bags flat and [id number] in the freezer. Once frozen, slide Gel Bags into the gel pouch and secure your wrap to the affected area with the straps.  Gel wraps that have been stored in a freezer for an extended period of time may require a (10) minute period of softening up in a room temperature environment before application.  The gel pouch acts as a protective barrier. NEVER place frozen bags directly onto skin, as this may cause frostbite injury.  The SMI Cold Therapy Wrap is designed to be able to be worm while ambulating. The compression straps can be secured well enough so that the Wrap won't fall off while moving.  Wrap Application Videos can be viewed at [email or web address].  An additional protective barrier such as clothing, a washcloth, hand-towel or pillowcase may be used during prolonged treatment  applications.  The Gel-Pouch and Wrap are both Latex-Free and the Gel Bag ingredients are non toxic.    [facility] Wrap care instructions  The [facility] Cold Therapy Wrap may be hand washed and hung to dry when needed.    [facility] re-order information  Additional [facility] body specific wraps and/or Gel Bags can be re-ordered from [email or web address] or call [email or web address] ([phone])

## 2023-03-17 NOTE — PROGRESS NOTES
[facility]    Acute pain service Inpatient Progress Note    Patient Name: Janet Obrien  :  1960  MRN:  [id number]        Acute Pain  Service Inpatient Progress Note:    Analgesia:Fair  Pain Score:5/10 (Anteriro Knee pain 5/10. Posterior knee pain 7/10)  LOC: alert and awake  Resp Status: room air  Cardiac: VS stable  Side Effects:None  Catheter Site:clean, dressing intact and dry  Catheter type: Peripheral nerve cath with InfuSystem pump.  Volume: 1mL,8ml, 8ml InfuSystem Pump.  Catheter Plan:Catheter to remain Insitu and Continue catheter infusion rate unchanged  Comments: Pt with increased pain to anterior and posterior knee. Pt states the PCA function helps some. Discussed with pt for a popliteal plexus SS block. Dr. Chavez ok'd for the block. Will come back with an US and assess the ACB catheter and perform the SS block for the posterior knee pain.

## 2023-03-17 NOTE — PLAN OF CARE
Goal Outcome Evaluation:  Plan of Care Reviewed With: patient        Progress: improving  Outcome Evaluation: Pt continues to present with decreased functional mobility and decreased independence with mobility. Pt ambulated 400ft this session with CGA and RW. Pt demo decreased safety awareness, requiring increased cues to improve. Continue to progress per pt tolerance.

## 2023-03-17 NOTE — THERAPY TREATMENT NOTE
Patient Name: Janet Obrien  : 1960    MRN: [id number]                              Today's Date: 3/17/2023       Admit Date: 3/15/2023    Visit Dx:     ICD-10-CM ICD-9-CM   1. Primary osteoarthritis of left knee  M17.12 715.16     Patient Active Problem List   Diagnosis   • Diplopia   • Orbital pseudotumor   • Myositic orbital pseudotumor   • Hypothyroidism   • Diabetes mellitus (HCC)   • Age-related nuclear cataract of right eye   • Ocular myasthenia (HCC)   • Morbid obesity, unspecified obesity type (HCC)   • Essential hypertension   • Precordial pain   • SOB (shortness of breath)   • Lower abdominal pain   • Constipation   • Diarrhea   • Rectal bleeding   • Gastroesophageal reflux disease   • Class 3 severe obesity due to excess calories with serious comorbidity and body mass index (BMI) of 40.0 to 44.9 in adult (HCC)   • Anal or rectal pain   • Primary osteoarthritis of left knee   • Status post total left knee replacement   • Hyperlipidemia     Past Medical History:   Diagnosis Date   • Anxiety    • Arthritis    • Asthma    • Back pain    • Cancer (HCC)     SQUAMOUS CELL/ BASAL CELL ON LIP   • Chronic bronchitis (HCC)    • Depression    • Diabetes mellitus (HCC)     type 2   • Fibromyalgia    • High triglycerides    • Hyperlipidemia    • Hypertension    • Migraine    • MVP (mitral valve prolapse)    • OA (osteoarthritis)    • Peripheral autonomic neuropathy    • Pleurisy    • Pneumonia    • Rheumatism    • RLS (restless legs syndrome)    • Sleep apnea     Mild form per patient. recommended mouthpiece   • Thyroid disease      Past Surgical History:   Procedure Laterality Date   • APPENDECTOMY  's   • CATARACT EXTRACTION W/ INTRAOCULAR LENS IMPLANT Right 2019    Procedure: CATARACT PHACO EXTRACTION WITH INTRAOCULAR LENS IMPLANT RIGHT;  Surgeon: Carl Babb MD;  Location: [facility];  Service: Ophthalmology   • CATARACT EXTRACTION W/ INTRAOCULAR LENS IMPLANT Left 10/07/2019     "Procedure: CATARACT PHACO EXTRACTION WITH INTRAOCULAR LENS IMPLANT LEFT;  Surgeon: Carl Babb MD;  Location: [facility];  Service: Ophthalmology   •  SECTION  , 1997    x 2    • DILATATION AND CURETTAGE     • HAND SURGERY Right     \"Pinched nerve surgery\"   • HAND SURGERY Left     Joint removed secondary to arthritis   • HYSTERECTOMY      ''still have one ovary''   • KNEE SURGERY Right 2021    arthroscopy and partial medial meniscectomy Dr. Chavez   • THYROIDECTOMY     • TONSILLECTOMY AND ADENOIDECTOMY     • TOTAL KNEE ARTHROPLASTY Left 3/15/2023    Procedure: TOTAL KNEE ARTHROPLASTY -  LEFT;  Surgeon: Dayo Chavez MD;  Location: [facility];  Service: Orthopedics;  Laterality: Left;      General Information     Row Name 23 0949          Physical Therapy Time and Intention    Document Type therapy note (daily note)  -AE     Mode of Treatment physical therapy  -AE     Row Name 23 0949          General Information    Patient Profile Reviewed yes  -AE     Existing Precautions/Restrictions fall;other (see comments)  adductor nerve cath  -AE     Barriers to Rehab none identified  -AE     Row Name 23 0949          Cognition    Orientation Status (Cognition) oriented x 4  -AE     Row Name 23 0949          Safety Issues, Functional Mobility    Safety Issues Affecting Function (Mobility) awareness of need for assistance;insight into deficits/self-awareness;safety precaution awareness;safety precautions follow-through/compliance;sequencing abilities  -AE     Impairments Affecting Function (Mobility) balance;cognition;endurance/activity tolerance;pain;strength;range of motion (ROM)  -AE     Cognitive Impairments, Mobility Safety/Performance awareness, need for assistance;safety precaution awareness;safety precaution follow-through;sequencing abilities;impulsivity  -AE           User Key  (r) = Recorded By, (t) = Taken By, (c) = Cosigned By    Initials " Name Provider Type    AE Claudio Delvalle PT Physical Therapist               Mobility     Row Name 03/17/23 0950          Bed Mobility    Comment, (Bed Mobility) Pt received standing in room and left [facility].  -AE     Row Name 03/17/23 0950          Transfers    Comment, (Transfers) VCs for hand placement and sequencing. Pt is impulsive and requires cues for pacing.  -AE     Row Name 03/17/23 0950          Sit-Stand Transfer    Sit-Stand [place] (Transfers) contact guard;1 person assist  -AE     Assistive Device (Sit-Stand Transfers) walker, front-wheeled  -AE     Row Name 03/17/23 0950          Gait/Stairs (Locomotion)    [place] Level (Gait) contact guard;1 person assist;verbal cues;nonverbal cues (demo/gesture)  -AE     Assistive Device (Gait) walker, front-wheeled  -AE     Distance in Feet (Gait) 400  -AE     Deviations/Abnormal Patterns (Gait) bilateral deviations;junie decreased;gait speed decreased;stride length decreased  -AE     Bilateral Gait Deviations heel strike decreased;forward flexed posture  -AE     Left Sided Gait Deviations weight shift ability decreased  -AE     Comment, (Gait/Stairs) Pt demo step through gait pattern with forward flexed posture and quik pace. Pt required cues to improve upright standing posture and slow pace to improve safety awareness with mobility. Further distance limited by fatigue. No LOB or knee buckling noted.  -AE     Row Name 03/17/23 0950          Mobility    Extremity Weight-bearing Status left lower extremity  -AE     Left Lower Extremity (Weight-bearing Status) weight-bearing as tolerated (WBAT)  -AE           User Key  (r) = Recorded By, (t) = Taken By, (c) = Cosigned By    Initials Name Provider Type    AE Claudio Delvalle PT Physical Therapist               Obj/Interventions     Row Name 03/17/23 0953          Motor Skills    Therapeutic Exercise hip;knee;ankle  -AE     Row Name 03/17/23 0953          Hip (Therapeutic Exercise)    Hip (Therapeutic  Exercise) strengthening exercise  -AE     Hip Strengthening (Therapeutic Exercise) left;heel slides;sitting;10 repetitions  -AE     Row Name 03/17/23 0953          Knee (Therapeutic Exercise)    Knee (Therapeutic Exercise) isometric exercises;strengthening exercise  -AE     Knee Isometrics (Therapeutic Exercise) left;quad sets;sitting;10 repetitions;3 second hold  -AE     Knee Strengthening (Therapeutic Exercise) left;SLR (straight leg raise);SAQ (short arc quad);LAQ (long arc quad);10 repetitions;sitting  -AE     Row Name 03/17/23 0953          Ankle (Therapeutic Exercise)    Ankle (Therapeutic Exercise) AROM (active range of motion)  -AE     Ankle AROM (Therapeutic Exercise) dorsiflexion;bilateral;plantarflexion;20 repititions  -AE     Row Name 03/17/23 0953          Balance    Balance Assessment sitting static balance;sitting dynamic balance;sit to stand dynamic balance;standing static balance;standing dynamic balance  -AE     Static Sitting Balance standby assist  -AE     Dynamic Sitting Balance contact guard  -AE     Position, Sitting Balance unsupported;sitting in chair  -AE     Sit to Stand Dynamic Balance contact guard;1-person assist;verbal cues  -AE     Static Standing Balance contact guard  -AE     Dynamic Standing Balance contact guard  -AE     Position/Device Used, Standing Balance supported  -AE     Comment, Balance No overt LOB  -AE           User Key  (r) = Recorded By, (t) = Taken By, (c) = Cosigned By    Initials Name Provider Type    AE Claudio Delvalle, PT Physical Therapist               Goals/Plan    No documentation.                Clinical Impression     Row Name 03/17/23 0957          Pain    Pretreatment Pain Rating 4/10  -AE     Posttreatment Pain Rating 6/10  -AE     Pain Location - Side/Orientation Left  -AE     Pain Location - knee  -AE     Pre/Posttreatment Pain Comment tolerated; RN aware and managing  -AE     Pain Intervention(s) Repositioned;Ambulation/increased activity;Cold  applied  -AE     Row Name 03/17/23 0957          Plan of Care Review    Plan of Care Reviewed With patient  -AE     Progress improving  -AE     Outcome Evaluation Pt continues to present with decreased functional mobility and decreased independence with mobility. Pt ambulated 400ft this session with CGA and RW. Pt demo decreased safety awareness, requiring increased cues to improve. Continue to progress per pt tolerance.  -AE     Row Name 03/17/23 0957          Vital Signs    Pre Systolic BP Rehab 117  -AE     Pre Treatment Diastolic BP 64  -AE     Posttreatment Heart Rate (beats/min) 86  -AE     O2 Delivery Pre Treatment room air  -AE     O2 Delivery Intra Treatment room air  -AE     Post SpO2 (%) 96  -AE     O2 Delivery Post Treatment room air  -AE     Pre Patient Position Standing  -AE     Intra Patient Position Standing  -AE     Post Patient Position Sitting  -AE     Row Name 03/17/23 0957          Positioning and Restraints    Pre-Treatment Position bathroom  -AE     Post Treatment Position chair  -AE     In Chair notified nsg;reclined;call light within reach;encouraged to call for assist;exit alarm on;waffle cushion;legs elevated  -AE           User Key  (r) = Recorded By, (t) = Taken By, (c) = Cosigned By    Initials Name Provider Type    AE Claudio Delvalle, PT Physical Therapist               Outcome Measures     Row Name 03/17/23 1001          How much help from another person do you currently need...    Turning from your back to your side while in flat bed without using bedrails? 4  -AE     Moving from lying on back to sitting on the side of a flat bed without bedrails? 3  -AE     Moving to and from a bed to a chair (including a wheelchair)? 3  -AE     Standing up from a chair using your arms (e.g., wheelchair, bedside chair)? 3  -AE     Climbing 3-5 steps with a railing? 3  -AE     To walk in hospital room? 3  -AE     AM-PAC 6 Clicks Score (PT) 19  -AE     Highest level of mobility 6 --> Walked 10 steps  or more  -AE     Row Name 03/17/23 1001          Functional Assessment    Outcome Measure Options AM-PAC 6 Clicks Basic Mobility (PT)  -AE           User Key  (r) = Recorded By, (t) = Taken By, (c) = Cosigned By    Initials Name Provider Type    AE Claudio Delvalle PT Physical Therapist                             Physical Therapy Education     Title: PT OT SLP Therapies (In Progress)     Topic: Physical Therapy (Done)     Point: Mobility training (Done)     Learning Progress Summary           Patient Acceptance, E, VU by AE at 3/17/2023 0815    Eager, E, VU by SC at 3/16/2023 1554    Comment: reviewed safety with mobility    Eager, E,TB,D, VU,DU by SC at 3/16/2023 1321    Comment: reviewed safety on stairs and HEp    Acceptance, E,D, VU,DU by  at 3/15/2023 1819                   Point: Home exercise program (Done)     Learning Progress Summary           Patient Acceptance, E, VU by AE at 3/17/2023 0815    Eager, E, VU by SC at 3/16/2023 1554    Comment: reviewed safety with mobility    Eager, E,TB,D, VU,DU by SC at 3/16/2023 1321    Comment: reviewed safety on stairs and HEp    Acceptance, E,D, VU,DU by  at 3/15/2023 1819                   Point: Body mechanics (Done)     Learning Progress Summary           Patient Acceptance, E, VU by AE at 3/17/2023 0815    Eager, E, VU by SC at 3/16/2023 1554    Comment: reviewed safety with mobility    Eager, E,TB,D, VU,DU by SC at 3/16/2023 1321    Comment: reviewed safety on stairs and HEp    Acceptance, E,D, VU,DU by  at 3/15/2023 1819                   Point: Precautions (Done)     Learning Progress Summary           Patient Acceptance, E, VU by AE at 3/17/2023 0815    Eager, E, VU by SC at 3/16/2023 1554    Comment: reviewed safety with mobility    Eager, E,TB,D, VU,DU by SC at 3/16/2023 1321    Comment: reviewed safety on stairs and HEp    Acceptance, E,D, VU,DU by  at 3/15/2023 1819                               User Key     Initials Effective Dates Name  Provider Type Discipline    SC 02/03/23 -  Chip Salgado PT Physical Therapist PT    HP 06/01/21 -  Winifred Youssef PT Physical Therapist PT    AE 09/21/21 -  Claudio Delvalle PT Physical Therapist PT              PT Recommendation and Plan     Plan of Care Reviewed With: patient  Progress: improving  Outcome Evaluation: Pt continues to present with decreased functional mobility and decreased independence with mobility. Pt ambulated 400ft this session with CGA and RW. Pt demo decreased safety awareness, requiring increased cues to improve. Continue to progress per pt tolerance.     Time Calculation:    PT Charges     Row Name 03/17/23 1003             Time Calculation    Start Time 0815  -AE      PT Received On 03/17/23  -AE      PT Goal Re-Cert Due Date 03/25/23  -AE         Time Calculation- PT    Total Timed Code Minutes- PT 25 minute(s)  -AE         Timed Charges    46111 - PT Therapeutic Exercise Minutes 10  -AE      16326 - Gait Training Minutes  15  -AE         Total Minutes    Timed Charges Total Minutes 25  -AE       Total Minutes 25  -AE            User Key  (r) = Recorded By, (t) = Taken By, (c) = Cosigned By    Initials Name Provider Type    AE Claudio Delvalle PT Physical Therapist              Therapy Charges for Today     Code Description Service Date Service Provider Modifiers Qty    83128444750 HC PT THER PROC EA 15 MIN 3/17/2023 Claudio Delvalle, PT GP 1    01843756137 HC GAIT TRAINING EA 15 MIN 3/17/2023 Claudio Delvalle, PT GP 1          PT G-Codes  Outcome Measure Options: AM-PAC 6 Clicks Basic Mobility (PT)  AM-PAC 6 Clicks Score (PT): 19  AM-PAC 6 Clicks Score (OT): 19  PT Discharge Summary  Anticipated Discharge Disposition (PT): home with assist, home with home health    Claudio Delvalle PT  3/17/2023

## 2023-03-17 NOTE — DISCHARGE SUMMARY
Patient Name: Janet Obrien  MRN: [id number]  : 1960  DOS: 3/17/2023    Attending: Dayo Chavez,*    Primary Care Provider: Noe Davenport MD    Date of Admission:.3/15/2023  7:36 AM    Date of Discharge:  3/17/2023    Discharge Diagnosis:   Status post total left knee replacement    Hypothyroidism    Diabetes mellitus (HCC)    Essential hypertension    Primary osteoarthritis of left knee    Hyperlipidemia      Hospital Course    At admit:     Patient is a pleasant 62 y.o. female presented for scheduled surgery by Dr. Chavez.  She underwent left total knee arthroplasty under general anesthesia.  She tolerated surgery well and was admitted for further medical management.  Her knee has been painful for many years.  She denies use of assistive device for ambulation.  She does report recent falls.     When seen in PACU she is drowsy.  She reports 5/10 knee pain.  She denies nausea, shortness of breath or chest pain.  No history of DVT or PE.  She does report that she has a strong family history of DVTs.     After admit:    Patient was provided pain medications as needed for pain control, along with adductor canal nerve block infusion of Ropivacaine.      Adjustments were made to pain medications to optimize postop pain management. Risks and benefits of opiate medications discussed with patient. ROD report was reviewed.    She was seen by PT and OT and has progressed well over her stay.    She used an IS for atelectasis prophylaxis and Apixaban along with mechanicals for DVT prophylaxis.    Home medications were resumed as appropriate, and labs were monitored and remained fairly stable.     With the progress she has made,  is ready for DC home today.      She will have an Infupump ( instructed on it during this admit).    Discussed with patient regarding plan and she shows understanding and agreement.      Patient will have HHPT following discharge.        Procedures  "Performed  Procedure(s):  TOTAL KNEE ARTHROPLASTY -  LEFT       Pertinent Test Results:    I reviewed the patient's new clinical results.   Results from last 7 days   Lab Units 23  0536   WBC 10*3/mm3 7.87   HEMOGLOBIN g/dL 10.3*   HEMATOCRIT % 31.4*   PLATELETS 10*3/mm3 212     Results from last 7 days   Lab Units 23  0536 03/15/23  0823   SODIUM mmol/L 134*  --    POTASSIUM mmol/L 3.6 3.3*   CHLORIDE mmol/L 101  --    CO2 mmol/L 28.0  --    BUN mg/dL 13  --    CREATININE mg/dL 0.70  --    CALCIUM mg/dL 9.4  --    GLUCOSE mg/dL 108*  --      I reviewed the patient's new imaging including images and reports.      Physical therapy  Progress: improving  Outcome Evaluation: Pt continues to present with decreased functional mobility and decreased independence with mobility. Pt ambulated 400ft this session with CGA and RW. Pt demo decreased safety awareness, requiring increased cues to improve. Continue to progress per pt tolerance.  Discharge Assessment:       Visit Vitals  /65 (BP Location: Left arm, Patient Position: Lying)   Pulse 79   Temp 98.2 °F (36.8 °C) (Oral)   Resp 16   Ht 165.1 cm (65\")   Wt 107 kg (235 lb)   SpO2 96%   BMI 39.11 kg/m²     Temp (24hrs), Av.5 °F (36.9 °C), Min:98.2 °F (36.8 °C), Max:98.8 °F (37.1 °C)      General Appearance:    Alert, cooperative, in no acute distress   Lungs:     Clear to auscultation,respirations regular, even and                   unlabored    Heart:    Regular rhythm and normal rate, normal S1 and S2   Abdomen:     Normal bowel sounds, no masses, no organomegaly, soft        non-tender, non-distended, no guarding, no rebound                 tenderness   Extremities:   CDI dressing surgical knee . ACB cath present, infupump.   Pulses:   Pulses palpable and equal bilaterally   Skin:   No bleeding, bruising or rash   Neurologic:   Cranial nerves 2 - 12 grossly intact, sensation intact, Flexion and dorsiflexion intact bilateral feet.         Discharge " Disposition: Home.           Discharge Medications      New Medications      Instructions Start Date   acetaminophen 500 MG tablet  Commonly known as: TYLENOL   1,000 mg, Oral, Every 8 Hours      apixaban 2.5 MG tablet tablet  Commonly known as: ELIQUIS   2.5 mg, Oral, 2 Times Daily      docusate sodium 100 MG capsule  Commonly known as: Colace   100 mg, Oral, 2 Times Daily PRN      doxycycline 100 MG tablet  Commonly known as: ADOXA   100 mg, Oral, 2 Times Daily      ondansetron 4 MG tablet  Commonly known as: Zofran   4 mg, Oral, Every 8 Hours PRN         Changes to Medications      Instructions Start Date   oxyCODONE-acetaminophen  MG per tablet  Commonly known as: Percocet  What changed: Another medication with the same name was added. Make sure you understand how and when to take each.   1 tablet, Oral, Every 8 Hours PRN      oxyCODONE-acetaminophen  MG per tablet  Commonly known as: Percocet  What changed: You were already taking a medication with the same name, and this prescription was added. Make sure you understand how and when to take each.   1 tablet, Oral, Every 6 Hours PRN         Continue These Medications      Instructions Start Date   atorvastatin 80 MG tablet  Commonly known as: LIPITOR   80 mg, Oral, Daily      cyclobenzaprine 10 MG tablet  Commonly known as: FLEXERIL   10 mg, Oral, 3 Times Daily PRN      DULoxetine 60 MG capsule  Commonly known as: CYMBALTA   60 mg, Oral, Daily      estradiol 0.5 MG tablet  Commonly known as: ESTRACE   0.25 mg, Oral, NOT CURRENTLY      hydroCHLOROthiazide 25 MG tablet  Commonly known as: HYDRODIURIL   1 tablet, Oral, Daily      montelukast 10 MG tablet  Commonly known as: SINGULAIR   10 mg, Oral, Nightly      Ozempic (1 MG/DOSE) 4 MG/3ML solution pen-injector  Generic drug: Semaglutide (1 MG/DOSE)   Weekly, THURSDAY      phentermine 37.5 MG tablet  Commonly known as: ADIPEX-P   37.5 mg, Oral, Daily, NOT CURRENTLY TAKING      Synthroid 150 MCG  tablet  Generic drug: levothyroxine   150 mcg, Oral, Every Early Morning      Ventolin  (90 Base) MCG/ACT inhaler  Generic drug: albuterol sulfate HFA   2 puffs, Inhalation, Every 4 Hours PRN      VITAMIN D3 PO   Take 1 tablet by mouth Daily.         ASK your doctor about these medications      Instructions Start Date   meclizine 25 MG tablet  Commonly known as: ANTIVERT   25 mg, Oral, Every 6 Hours PRN             Discharge Diet: resume prior.        Activity Instructions     Discharge Activity      Keep incision clean and dry  Cover with ACE wrap when wearing long pants  PT exercises BID when at home (set given by [facility] PT)  Use Walker or Crutches until cleared by outpatient therapist or HHPT    Range of Motion      0-90    Weight Bearing As Tolerated      Weight Bearing Status      Weight Bearing Status: As Tolerated             Future Appointments   Date Time Provider Department Center   4/11/2023 11:10 AM Dayo Chavez MD [facility]   5/11/2023  8:15 AM [facility]  [facility] Gil disclaimer:  Part of this encounter note is an electronic transcription/translation of spoken language to printed text. The electronic translation of spoken language may permit erroneous, or at times, nonsensical words or phrases to be inadvertently transcribed; Although I have reviewed the note for such errors, some may still exist.       Fito Tang MD  03/17/23  13:43 EDT

## 2023-03-17 NOTE — ANESTHESIA PROCEDURE NOTES
Left Popliteal Plexus SS block      Patient reassessed immediately prior to procedure    Patient location during procedure: floor  Reason for block: at surgeon's request and post-op pain management  Performed by  CRNA/CAA: Renetta Macdonald CRNA  Assisted by: Arianne Ruiz RN  Preanesthetic Checklist  Completed: patient identified, IV checked, site marked, risks and benefits discussed, surgical consent, monitors and equipment checked, pre-op evaluation and timeout performed  Prep:  Pt Position: supine  Sterile barriers:mask, cap, washed/disinfected hands and gloves  Prep: ChloraPrep  Patient monitoring: blood pressure monitoring, continuous pulse oximetry and EKG  Procedure    Guidance:ultrasound guided    ULTRASOUND INTERPRETATION.  Using ultrasound guidance a 20 G gauge needle was placed in close proximity to the nerve, at which point, under ultrasound guidance anesthetic was injected in the area of the nerve and spread of the anesthesia was seen on ultrasound in close proximity thereto.  There were no abnormalities seen on ultrasound; a digital image was taken; and the patient tolerated the procedure with no complications. Images:still images obtained, printed/placed on chart    Laterality:left  Anesthesia block type: Popliteal Plexus.  Injection Technique:single-shot  Needle Type:echogenic and short-bevel  Needle Gauge:20 G  Resistance on Injection: none    Medications Used: bupivacaine PF (MARCAINE) 0.25 % injection - Injection   30 mL - 3/17/2023 12:20:00 PM  dexamethasone sodium phosphate injection - Injection   2 mg - 3/17/2023 12:06:00 PM      Post Assessment  Injection Assessment: negative aspiration for heme, no paresthesia on injection and incremental injection  Patient Tolerance:comfortable throughout block  Complications:no  Additional Notes  A high-frequency linear transducer, with sterile cover, was placed on the anterior mid-thigh (between the anterior superior iliac spine and patella). The  "transducer was then moved medially to identify the Sartorius muscle (Estefany), Vastus Medialis muscle (VMM), Superficial Femoral Artery (SFA) and Vein. The transducer was then moved caudad to position where the SFA is distal and posterior to the Estefany (Adductor Hiatus). The insertion site was prepped and draped in sterile fashion. Skin and cutaneous tissue was infiltrated with 2-5 ml of 1% Lidocaine. Using ultrasound-guidance, a 20-gauge B-Otto 4\" Ultraplex 360 non-stimulating echogenic needle was advanced in plane from lateral to medial. Preservative-free normal saline was utilized for hydro-dissection of tissue, and to confirm needle placement at the 12 o'clock position to the artery. Local anesthetic in incremental 3-5 ml injections. Aspiration every 5 ml to prevent intravascular injection. Injection was completed with negative aspiration of blood and negative intravascular injection. Injection pressures were normal with minimal resistance.           "

## 2023-03-17 NOTE — PROGRESS NOTES
"          Orthopaedic Surgery Progress Note      LOS: 0 days   Patient Care Team:  Noe Davenport MD as PCP - General (Internal Medicine)  Bhavna Akins MD as Surgeon (General Surgery)  Dee Kelly MD as Consulting Physician (Endocrinology)    POD 2    Subjective     Interval History:   Patient unable to go home yesterday secondary to pain and subjective left lower extremity instability.  She says her pain is little bit better this morning.  Denies chest pain or shortness of breath.    Objective     Vital Signs:  Temp (24hrs), Av.6 °F (37 °C), Min:98.3 °F (36.8 °C), Max:99.3 °F (37.4 °C)    /59 (BP Location: Left arm, Patient Position: Lying)   Pulse 86   Temp 98.3 °F (36.8 °C) (Oral)   Resp 18   Ht 165.1 cm (65\")   Wt 107 kg (235 lb)   SpO2 93%   BMI 39.11 kg/m²     Labs:  Lab Results (last 24 hours)     Procedure Component Value Units Date/Time    POC Glucose Once [[id number]]  (Abnormal) Collected: 23 1638    Specimen: Blood Updated: 23 1639     Glucose 139 mg/dL      Comment: Meter: [id number] : [id number] Radha Vitale       POC Glucose Once [[id number]]  (Abnormal) Collected: 23 1135    Specimen: Blood Updated: 23 1138     Glucose 146 mg/dL      Comment: Meter: [id number] : [id number] McHans Winifred       Basic Metabolic Panel [[id number]]  (Abnormal) Collected: 23 0536    Specimen: Blood Updated: 23 0718     Glucose 108 mg/dL      BUN 13 mg/dL      Creatinine 0.70 mg/dL      Sodium 134 mmol/L      Potassium 3.6 mmol/L      Chloride 101 mmol/L      CO2 28.0 mmol/L      Calcium 9.4 mg/dL      BUN/Creatinine Ratio 18.6     Anion Gap 5.0 mmol/L      eGFR 98.5 mL/min/1.73     Narrative:      GFR Normal >60  Chronic Kidney Disease <60  Kidney Failure <15      POC Glucose Once [[id number]]  (Normal) Collected: 23 0703    Specimen: Blood Updated: 23 0706     Glucose 113 mg/dL      Comment: Meter: [id number] : [id number] Radha Vitale"          Physical Exam:  EHL, FHL, gastroc soleus, and tibialis anterior are intact  Toes are pink and warm  Surgical dressing is in place  Palpable dorsalis pedis pulse  Calf is soft and nontender      Assessment & Plan     Postoperative day number 2 status post left total knee arthroplasty.    Pain Control: Satisfactory currently    PT and OT     DVT prophylaxis: Eliquis 2.5 twice daily x6 weeks    Discharge planning: We will attempt to discharge the patient home today with home health therapy.  Guarded optimism.    Upon discharge, patient will need follow-up with me in 3 weeks' time.  They will need [facility] for physical therapy.  Standard Exofin Fusion dressing care instructions.  Do not remove.  DME per case management.    Admission Status:  I believe this patient meets INPATIENT status due to the need for care which can only be reasonably provided in a hospital setting such as aggressive/expedited ancillary services and/or consultation services, the necessity for IV medications, close physician monitoring and/or the possible need for procedures.  In such, I feel patient’s risk for adverse outcomes and need for care warrant INPATIENT evaluation and predict the patient’s care encounter to likely last beyond 2 midnights.        Dayo Chavez MD  03/17/23  06:51 EDT

## 2023-03-18 ENCOUNTER — HOME CARE VISIT (OUTPATIENT)
Dept: HOME HEALTH SERVICES | Facility: HOME HEALTHCARE | Age: 63
End: 2023-03-18
Payer: COMMERCIAL

## 2023-03-18 VITALS
TEMPERATURE: 96.6 F | OXYGEN SATURATION: 95 % | HEART RATE: 88 BPM | RESPIRATION RATE: 16 BRPM | DIASTOLIC BLOOD PRESSURE: 62 MMHG | SYSTOLIC BLOOD PRESSURE: 116 MMHG

## 2023-03-18 PROCEDURE — G0151 HHCP-SERV OF PT,EA 15 MIN: HCPCS

## 2023-03-18 NOTE — HOME HEALTH
SOC Note:    Home Health ordered for disciplines: PT    Reason for Hosp/Primary Dx/Co-morbidities: L TKA; PMH includes HTN, DM, OA, RLS    Focus of Care: Improve LE pain, ROM, strength and gait related to TKA    Current Functional status/mobility/DME: currently using a walker for household mobility; has shower bench and cane    HB status/Living Arrangements: lives at home alone; must navigate multiple steps to enter/exit home; gait limited by weakness, limited ROM and pain; needs assistance to enter/exit home    Skin Integrity/wound status: wound covered with non-removable dressing    Code Status: full    Fall Risk: high    POC confirmed with Dayo Chavez MD via case communication note on 3/18

## 2023-03-19 ENCOUNTER — HOSPITAL ENCOUNTER (EMERGENCY)
Facility: HOSPITAL | Age: 63
Discharge: HOME | End: 2023-03-19
Attending: EMERGENCY MEDICINE | Admitting: EMERGENCY MEDICINE
Payer: COMMERCIAL

## 2023-03-19 ENCOUNTER — APPOINTMENT (OUTPATIENT)
Dept: ULTRASOUND IMAGING | Facility: HOSPITAL | Age: 63
End: 2023-03-19
Payer: COMMERCIAL

## 2023-03-19 VITALS
SYSTOLIC BLOOD PRESSURE: 129 MMHG | DIASTOLIC BLOOD PRESSURE: 68 MMHG | OXYGEN SATURATION: 95 % | WEIGHT: 264 LBS | HEART RATE: 77 BPM | TEMPERATURE: 98.2 F | RESPIRATION RATE: 18 BRPM | BODY MASS INDEX: 42.43 KG/M2 | HEIGHT: 66 IN

## 2023-03-19 DIAGNOSIS — M71.22 SYNOVIAL CYST OF LEFT POPLITEAL SPACE: ICD-10-CM

## 2023-03-19 DIAGNOSIS — M79.89 LEG SWELLING: Primary | ICD-10-CM

## 2023-03-19 PROCEDURE — 93971 EXTREMITY STUDY: CPT

## 2023-03-19 PROCEDURE — 99283 EMERGENCY DEPT VISIT LOW MDM: CPT

## 2023-03-19 RX ORDER — CYCLOBENZAPRINE HCL 10 MG
10 TABLET ORAL ONCE
Status: COMPLETED | OUTPATIENT
Start: 2023-03-19 | End: 2023-03-19

## 2023-03-19 RX ADMIN — CYCLOBENZAPRINE 10 MG: 10 TABLET, FILM COATED ORAL at 20:08

## 2023-03-20 NOTE — ED PROVIDER NOTES
Subjective  History of Present Illness:    Chief Complaint: Left leg swelling  History of Present Illness: 62-year-old female presents with swelling of her left leg.  She had total knee replacement 4 days ago, started having swelling yesterday and today.  She called the on-call orthopedic office and they recommended coming to the emergency department to rule out DVT.  She was discharged on Eliquis.  Onset: 1 to 2 days  Duration: 1 to 2 days  Exacerbating / Alleviating factors: Pain, swelling  Associated symptoms: Swelling      Nurses Notes reviewed and agree, including vitals, allergies, social history and prior medical history.     REVIEW OF SYSTEMS: All systems reviewed and not pertinent unless noted.    Review of Systems   Cardiovascular: Positive for leg swelling.   All other systems reviewed and are negative.      Past Medical History:   Diagnosis Date   • Anxiety    • Arthritis    • Asthma    • Back pain    • Cancer (HCC)     SQUAMOUS CELL/ BASAL CELL ON LIP   • Chronic bronchitis (HCC)    • Depression    • Diabetes mellitus (HCC)     type 2   • Fibromyalgia    • High triglycerides    • Hyperlipidemia    • Hypertension    • Migraine    • MVP (mitral valve prolapse)    • OA (osteoarthritis)    • Peripheral autonomic neuropathy    • Pleurisy    • Pneumonia    • Rheumatism    • RLS (restless legs syndrome)    • Sleep apnea     Mild form per patient. recommended mouthpiece   • Thyroid disease        Allergies:    Penicillins, Cephalexin, Chlorhexidine, Sulfa antibiotics, Adhesive tape, Darvon [propoxyphene], and Diazepam      Past Surgical History:   Procedure Laterality Date   • APPENDECTOMY  1970's   • CATARACT EXTRACTION W/ INTRAOCULAR LENS IMPLANT Right 09/16/2019    Procedure: CATARACT PHACO EXTRACTION WITH INTRAOCULAR LENS IMPLANT RIGHT;  Surgeon: Carl Babb MD;  Location: [facility];  Service: Ophthalmology   • CATARACT EXTRACTION W/ INTRAOCULAR LENS IMPLANT Left 10/07/2019    Procedure: CATARACT  "PHACO EXTRACTION WITH INTRAOCULAR LENS IMPLANT LEFT;  Surgeon: Carl Babb MD;  Location: [facility];  Service: Ophthalmology   •  SECTION  , 1997    x 2    • DILATATION AND CURETTAGE     • HAND SURGERY Right     \"Pinched nerve surgery\"   • HAND SURGERY Left     Joint removed secondary to arthritis   • HYSTERECTOMY      ''still have one ovary''   • KNEE SURGERY Right 2021    arthroscopy and partial medial meniscectomy Dr. Chavez   • THYROIDECTOMY     • TONSILLECTOMY AND ADENOIDECTOMY     • TOTAL KNEE ARTHROPLASTY Left 3/15/2023    Procedure: TOTAL KNEE ARTHROPLASTY -  LEFT;  Surgeon: Dayo Chavez MD;  Location: [facility];  Service: Orthopedics;  Laterality: Left;         Social History     Socioeconomic History   • Marital status:    Tobacco Use   • Smoking status: Former     Packs/day: 0.25     Years: 15.00     Pack years: 3.75     Types: Cigarettes     Start date:      Quit date: 2004     Years since quittin.6   • Smokeless tobacco: Never   Vaping Use   • Vaping Use: Never used   Substance and Sexual Activity   • Alcohol use: No   • Drug use: No   • Sexual activity: Defer         Family History   Problem Relation Age of Onset   • Hypertension Mother    • Heart attack Mother    • Fibromyalgia Mother    • Hyperlipidemia Mother    • Thyroid disease Mother    • Irritable bowel syndrome Mother    • Heart attack Father    • Cancer Brother    • Stomach cancer Brother    • Diabetes Maternal Aunt    • Cancer Maternal Grandmother    • Thyroid disease Maternal Grandmother    • Cancer Paternal Grandmother    • Colon cancer Neg Hx    • Cirrhosis Neg Hx    • Liver cancer Neg Hx    • Liver disease Neg Hx        Objective  Physical Exam:  /68   Pulse 77   Temp 98.2 °F (36.8 °C) (Oral)   Resp 18   Ht 167.6 cm (66\")   Wt 120 kg (264 lb)   SpO2 98%   BMI 42.61 kg/m²      Physical Exam  Vitals and nursing note reviewed.   Constitutional:       " Appearance: She is well-developed.   HENT:      Head: Normocephalic and atraumatic.   Cardiovascular:      Rate and Rhythm: Normal rate and regular rhythm.   Pulmonary:      Effort: Pulmonary effort is normal.      Breath sounds: Normal breath sounds.   Abdominal:      Palpations: Abdomen is soft.   Musculoskeletal:         General: Swelling present.      Cervical back: Normal range of motion and neck supple.   Skin:     General: Skin is warm and dry.   Neurological:      Mental Status: She is alert and oriented to person, place, and time.      Deep Tendon Reflexes: Reflexes are normal and symmetric.           Procedures    ED Course:         Lab Results (last 24 hours)     ** No results found for the last 24 hours. **           No radiology results from the last 24 hrs       Medical Decision Making  62-year-old female presents with swelling in her left leg postoperatively, after a total knee replacement.  Differential includes Baker's cyst, postoperative swelling, DVT, dependent edema.  An ultrasound was performed, no DVT was found she did have popliteal cyst.  She has pain medication and muscle relaxers, and will follow-up with Ortho next week, she said for discharge.    Leg swelling: acute illness or injury  Synovial cyst of left popliteal space: acute illness or injury  Amount and/or Complexity of Data Reviewed  ECG/medicine tests: ordered.      Risk  Prescription drug management.            Final diagnoses:   Leg swelling   Synovial cyst of left popliteal space        Kam Tolbert Jr., PA-C  03/19/23 [id number]

## 2023-03-21 ENCOUNTER — TELEPHONE (OUTPATIENT)
Dept: ORTHOPEDIC SURGERY | Facility: CLINIC | Age: 63
End: 2023-03-21
Payer: COMMERCIAL

## 2023-03-21 ENCOUNTER — HOME CARE VISIT (OUTPATIENT)
Dept: HOME HEALTH SERVICES | Facility: HOME HEALTHCARE | Age: 63
End: 2023-03-21
Payer: COMMERCIAL

## 2023-03-21 VITALS — TEMPERATURE: 98.7 F | RESPIRATION RATE: 16 BRPM | OXYGEN SATURATION: 98 % | HEART RATE: 82 BPM

## 2023-03-21 DIAGNOSIS — Z96.652 STATUS POST TOTAL LEFT KNEE REPLACEMENT: Primary | ICD-10-CM

## 2023-03-21 PROCEDURE — G0157 HHC PT ASSISTANT EA 15: HCPCS

## 2023-03-21 RX ORDER — CYCLOBENZAPRINE HCL 10 MG
10 TABLET ORAL 3 TIMES DAILY PRN
Qty: 40 TABLET | Refills: 1 | Status: SHIPPED | OUTPATIENT
Start: 2023-03-21

## 2023-03-21 NOTE — TELEPHONE ENCOUNTER
Pt HAD SX 2/15/23    Pt IS HAVING MUSCLE SPASMS IN HER LEFT LEG. SHE IS ASKING FOR RX TO HELP WITH THE SPASMS

## 2023-03-21 NOTE — HOME HEALTH
"Routine Visit Note: Greeted at door by patient's son. Patient first seen sitting in recliner with no visable signs of distress. Patient reports that she is doing well, but has been struggling with \"muscle spasms\" over the last few days.     Skill/education provided: Instructed gait training, therpaeutic exercises, and educated patient on HEP exercises and pain management strategies.     Patient/caregiver response: Patient tolerated all treatment well today, with minor tolerable pains during ROM exercise that subsided with rest. Patient appears on track to meet goals.     Plan for next visit: Patient would benefit from continued skilled PT to address deficit in strength, ROM, and to imrove gait/overall functional mobility. Progress with exercise and gait training as tolerated. Continue to educate patient on pain management strategies and post-surgical care."

## 2023-03-21 NOTE — TELEPHONE ENCOUNTER
Notified pt of fill sent per Dr Chavez-she understood and will call back for any remaining questions.

## 2023-03-21 NOTE — TELEPHONE ENCOUNTER
Dr Chavez,  Spoke with patient, she denied infection symptoms. She was in the ED to r/o blood clot recently and is still c/o muscle spasms in calf on operative side. I advised her to elevate and ice to reduce swelling s/p TKA. She is asking for a fill of Flexeril. She has been given 5mg past and 10mg recently.     Will you provide this Rx? Please send to her verified pharmacy on file if so.     Shanda SHOOK

## 2023-03-22 ENCOUNTER — HOSPITAL ENCOUNTER (EMERGENCY)
Facility: HOSPITAL | Age: 63
Discharge: HOME | End: 2023-03-22
Attending: EMERGENCY MEDICINE | Admitting: EMERGENCY MEDICINE
Payer: COMMERCIAL

## 2023-03-22 ENCOUNTER — APPOINTMENT (OUTPATIENT)
Dept: GENERAL RADIOLOGY | Facility: HOSPITAL | Age: 63
End: 2023-03-22
Payer: COMMERCIAL

## 2023-03-22 VITALS
BODY MASS INDEX: 36.96 KG/M2 | HEIGHT: 66 IN | WEIGHT: 230 LBS | HEART RATE: 76 BPM | RESPIRATION RATE: 18 BRPM | SYSTOLIC BLOOD PRESSURE: 110 MMHG | DIASTOLIC BLOOD PRESSURE: 65 MMHG | TEMPERATURE: 97.3 F | OXYGEN SATURATION: 97 %

## 2023-03-22 DIAGNOSIS — M25.462 EFFUSION OF LEFT KNEE JOINT: ICD-10-CM

## 2023-03-22 DIAGNOSIS — M25.562 ACUTE PAIN OF LEFT KNEE: Primary | ICD-10-CM

## 2023-03-22 LAB
ALBUMIN SERPL-MCNC: 3 G/DL (ref 3.5–5.2)
ALBUMIN/GLOB SERPL: 1.2 G/DL
ALP SERPL-CCNC: 94 U/L (ref 39–117)
ALT SERPL W P-5'-P-CCNC: 26 U/L (ref 1–33)
ANION GAP SERPL CALCULATED.3IONS-SCNC: 13.2 MMOL/L (ref 5–15)
AST SERPL-CCNC: 20 U/L (ref 1–32)
BASOPHILS # BLD AUTO: 0.02 10*3/MM3 (ref 0–0.2)
BASOPHILS NFR BLD AUTO: 0.3 % (ref 0–1.5)
BILIRUB SERPL-MCNC: 0.6 MG/DL (ref 0–1.2)
BUN SERPL-MCNC: 9 MG/DL (ref 8–23)
BUN/CREAT SERPL: 13.8 (ref 7–25)
CALCIUM SPEC-SCNC: 9.6 MG/DL (ref 8.6–10.5)
CHLORIDE SERPL-SCNC: 99 MMOL/L (ref 98–107)
CO2 SERPL-SCNC: 24.8 MMOL/L (ref 22–29)
CREAT SERPL-MCNC: 0.65 MG/DL (ref 0.57–1)
CRP SERPL-MCNC: 3.25 MG/DL (ref 0–0.5)
DEPRECATED RDW RBC AUTO: 40.9 FL (ref 37–54)
EGFRCR SERPLBLD CKD-EPI 2021: 100.3 ML/MIN/1.73
EOSINOPHIL # BLD AUTO: 0.3 10*3/MM3 (ref 0–0.4)
EOSINOPHIL NFR BLD AUTO: 5 % (ref 0.3–6.2)
ERYTHROCYTE [DISTWIDTH] IN BLOOD BY AUTOMATED COUNT: 13.4 % (ref 12.3–15.4)
ERYTHROCYTE [SEDIMENTATION RATE] IN BLOOD: 14 MM/HR (ref 0–30)
GLOBULIN UR ELPH-MCNC: 2.6 GM/DL
GLUCOSE SERPL-MCNC: 87 MG/DL (ref 65–99)
HCT VFR BLD AUTO: 33 % (ref 34–46.6)
HGB BLD-MCNC: 11 G/DL (ref 12–15.9)
IMM GRANULOCYTES # BLD AUTO: 0.02 10*3/MM3 (ref 0–0.05)
IMM GRANULOCYTES NFR BLD AUTO: 0.3 % (ref 0–0.5)
LYMPHOCYTES # BLD AUTO: 1.1 10*3/MM3 (ref 0.7–3.1)
LYMPHOCYTES NFR BLD AUTO: 18.2 % (ref 19.6–45.3)
MCH RBC QN AUTO: 27.6 PG (ref 26.6–33)
MCHC RBC AUTO-ENTMCNC: 33.3 G/DL (ref 31.5–35.7)
MCV RBC AUTO: 82.9 FL (ref 79–97)
MONOCYTES # BLD AUTO: 0.67 10*3/MM3 (ref 0.1–0.9)
MONOCYTES NFR BLD AUTO: 11.1 % (ref 5–12)
NEUTROPHILS NFR BLD AUTO: 3.94 10*3/MM3 (ref 1.7–7)
NEUTROPHILS NFR BLD AUTO: 65.1 % (ref 42.7–76)
NRBC BLD AUTO-RTO: 0 /100 WBC (ref 0–0.2)
PLATELET # BLD AUTO: 367 10*3/MM3 (ref 140–450)
PMV BLD AUTO: 9.1 FL (ref 6–12)
POTASSIUM SERPL-SCNC: 3.7 MMOL/L (ref 3.5–5.2)
PROT SERPL-MCNC: 5.6 G/DL (ref 6–8.5)
RBC # BLD AUTO: 3.98 10*6/MM3 (ref 3.77–5.28)
SODIUM SERPL-SCNC: 137 MMOL/L (ref 136–145)
WBC NRBC COR # BLD: 6.05 10*3/MM3 (ref 3.4–10.8)

## 2023-03-22 PROCEDURE — 99283 EMERGENCY DEPT VISIT LOW MDM: CPT

## 2023-03-22 PROCEDURE — 73562 X-RAY EXAM OF KNEE 3: CPT

## 2023-03-22 PROCEDURE — 86140 C-REACTIVE PROTEIN: CPT | Performed by: PHYSICIAN ASSISTANT

## 2023-03-22 PROCEDURE — 25010000002 MORPHINE PER 10 MG: Performed by: EMERGENCY MEDICINE

## 2023-03-22 PROCEDURE — 85652 RBC SED RATE AUTOMATED: CPT | Performed by: PHYSICIAN ASSISTANT

## 2023-03-22 PROCEDURE — 36415 COLL VENOUS BLD VENIPUNCTURE: CPT

## 2023-03-22 PROCEDURE — 96372 THER/PROPH/DIAG INJ SC/IM: CPT

## 2023-03-22 PROCEDURE — 80053 COMPREHEN METABOLIC PANEL: CPT | Performed by: PHYSICIAN ASSISTANT

## 2023-03-22 PROCEDURE — 85025 COMPLETE CBC W/AUTO DIFF WBC: CPT | Performed by: PHYSICIAN ASSISTANT

## 2023-03-22 RX ADMIN — MORPHINE SULFATE 4 MG: 4 INJECTION, SOLUTION INTRAMUSCULAR; INTRAVENOUS at 15:14

## 2023-03-22 NOTE — ED PROVIDER NOTES
Subjective   History of Present Illness  62-year-old female who presents to the emergency department with complaint of left knee pain.  Patient states she felt a sudden pop in her left knee.  Patient states she recently had a total knee replacement performed at [facility] performed by Dr. Chavez.  Patient denies any fever, chills, body ache.  No wound drainage.    History provided by:  Patient   used: No    Knee Pain  Location:  Knee  Time since incident:  2 days  Injury: no    Knee location:  L knee  Pain details:     Quality:  Aching    Radiates to:  Does not radiate    Severity:  Moderate    Onset quality:  Gradual    Duration:  2 days    Timing:  Intermittent    Progression:  Worsening  Chronicity:  New  Dislocation: no    Foreign body present:  No foreign bodies  Tetanus status:  Unknown  Prior injury to area:  No  Relieved by:  Nothing  Worsened by:  Nothing  Ineffective treatments:  None tried  Associated symptoms: decreased ROM    Associated symptoms: no fever, no itching, no muscle weakness, no neck pain, no numbness and no swelling    Risk factors: no concern for non-accidental trauma, no frequent fractures, no known bone disorder, no obesity and no recent illness        Review of Systems   Constitutional: Negative.  Negative for appetite change, chills and fever.   HENT: Negative.  Negative for congestion, ear discharge, ear pain, facial swelling, hearing loss, mouth sores and nosebleeds.    Eyes: Negative.  Negative for pain, discharge and itching.   Respiratory: Negative.  Negative for cough, choking and chest tightness.    Cardiovascular: Positive for leg swelling.   Endocrine: Negative.  Negative for cold intolerance, heat intolerance and polydipsia.   Genitourinary: Negative.  Negative for dyspareunia, dysuria and frequency.   Musculoskeletal: Positive for arthralgias, joint swelling and myalgias. Negative for neck pain.   Skin: Negative.  Negative for color  change, itching and wound.   Neurological: Negative.  Negative for seizures, facial asymmetry, speech difficulty, light-headedness, numbness and headaches.   Hematological: Negative.  Does not bruise/bleed easily.   Psychiatric/Behavioral: Negative.  Negative for decreased concentration, dysphoric mood and hallucinations.   All other systems reviewed and are negative.      Past Medical History:   Diagnosis Date   • Anxiety    • Arthritis    • Asthma    • Back pain    • Cancer (HCC)     SQUAMOUS CELL/ BASAL CELL ON LIP   • Chronic bronchitis (HCC)    • Depression    • Diabetes mellitus (HCC)     type 2   • Fibromyalgia    • High triglycerides    • Hyperlipidemia    • Hypertension    • Migraine    • MVP (mitral valve prolapse)    • OA (osteoarthritis)    • Peripheral autonomic neuropathy    • Pleurisy    • Pneumonia    • Rheumatism    • RLS (restless legs syndrome)    • Sleep apnea     Mild form per patient. recommended mouthpiece   • Thyroid disease        Allergies   Allergen Reactions   • Penicillins Angioedema, Anaphylaxis, Hives, Itching, Shortness Of Breath and Swelling   • Cephalexin Itching   • Chlorhexidine Rash and Itching   • Sulfa Antibiotics Rash and Itching   • Adhesive Tape Rash     Tegaderm specific   • Darvon [Propoxyphene] Unknown (See Comments)     Doesn't remember   • Diazepam Other (See Comments)     Paradoxical effect/ hypes pt       Past Surgical History:   Procedure Laterality Date   • APPENDECTOMY     • CATARACT EXTRACTION W/ INTRAOCULAR LENS IMPLANT Right 2019    Procedure: CATARACT PHACO EXTRACTION WITH INTRAOCULAR LENS IMPLANT RIGHT;  Surgeon: Carl Babb MD;  Location: [facility];  Service: Ophthalmology   • CATARACT EXTRACTION W/ INTRAOCULAR LENS IMPLANT Left 10/07/2019    Procedure: CATARACT PHACO EXTRACTION WITH INTRAOCULAR LENS IMPLANT LEFT;  Surgeon: Carl Babb MD;  Location: [facility] OR;  Service: Ophthalmology   •  SECTION  , 1997    x 2    •  "DILATATION AND CURETTAGE     • HAND SURGERY Right     \"Pinched nerve surgery\"   • HAND SURGERY Left     Joint removed secondary to arthritis   • HYSTERECTOMY      ''still have one ovary''   • KNEE SURGERY Right 2021    arthroscopy and partial medial meniscectomy Dr. Chavez   • THYROIDECTOMY     • TONSILLECTOMY AND ADENOIDECTOMY     • TOTAL KNEE ARTHROPLASTY Left 3/15/2023    Procedure: TOTAL KNEE ARTHROPLASTY -  LEFT;  Surgeon: Dayo Chavez MD;  Location: [facility];  Service: Orthopedics;  Laterality: Left;       Family History   Problem Relation Age of Onset   • Hypertension Mother    • Heart attack Mother    • Fibromyalgia Mother    • Hyperlipidemia Mother    • Thyroid disease Mother    • Irritable bowel syndrome Mother    • Heart attack Father    • Cancer Brother    • Stomach cancer Brother    • Diabetes Maternal Aunt    • Cancer Maternal Grandmother    • Thyroid disease Maternal Grandmother    • Cancer Paternal Grandmother    • Colon cancer Neg Hx    • Cirrhosis Neg Hx    • Liver cancer Neg Hx    • Liver disease Neg Hx        Social History     Socioeconomic History   • Marital status:    Tobacco Use   • Smoking status: Former     Packs/day: 0.25     Years: 15.00     Pack years: 3.75     Types: Cigarettes     Start date:      Quit date: 2004     Years since quittin.6   • Smokeless tobacco: Never   Vaping Use   • Vaping Use: Never used   Substance and Sexual Activity   • Alcohol use: No   • Drug use: No   • Sexual activity: Defer           Objective   Physical Exam  Vitals and nursing note reviewed.   Constitutional:       General: She is not in acute distress.     Appearance: Normal appearance. She is normal weight. She is not ill-appearing, toxic-appearing or diaphoretic.   HENT:      Head: Normocephalic and atraumatic.      Right Ear: Tympanic membrane, ear canal and external ear normal. There is no impacted cerumen.      Left Ear: Tympanic membrane, ear " canal and external ear normal. There is no impacted cerumen.      Nose: Nose normal. No congestion or rhinorrhea.      Mouth/Throat:      Mouth: Mucous membranes are moist.      Pharynx: Oropharynx is clear. No oropharyngeal exudate or posterior oropharyngeal erythema.   Eyes:      General: No scleral icterus.        Right eye: No discharge.         Left eye: No discharge.      Extraocular Movements: Extraocular movements intact.      Conjunctiva/sclera: Conjunctivae normal.      Pupils: Pupils are equal, round, and reactive to light.   Cardiovascular:      Rate and Rhythm: Normal rate and regular rhythm.      Pulses: Normal pulses.      Heart sounds: Normal heart sounds. No murmur heard.    No friction rub. No gallop.   Pulmonary:      Effort: Pulmonary effort is normal. No respiratory distress.      Breath sounds: Normal breath sounds. No stridor. No wheezing, rhonchi or rales.   Chest:      Chest wall: No tenderness.   Abdominal:      General: Abdomen is flat. Bowel sounds are normal. There is no distension.      Palpations: Abdomen is soft. There is no mass.      Tenderness: There is no abdominal tenderness. There is no right CVA tenderness, left CVA tenderness, guarding or rebound.      Hernia: No hernia is present.   Musculoskeletal:         General: Swelling, tenderness and signs of injury present.      Cervical back: Normal range of motion and neck supple. No rigidity or tenderness.      Left lower leg: Swelling and tenderness present. Edema present.   Lymphadenopathy:      Cervical: No cervical adenopathy.   Skin:     General: Skin is warm.      Capillary Refill: Capillary refill takes less than 2 seconds.      Coloration: Skin is not jaundiced or pale.      Findings: No bruising, erythema, lesion or rash.   Neurological:      General: No focal deficit present.      Mental Status: She is alert and oriented to person, place, and time. Mental status is at baseline.      Cranial Nerves: No cranial nerve  deficit.      Sensory: No sensory deficit.      Motor: No weakness.      Coordination: Coordination normal.      Gait: Gait normal.      Deep Tendon Reflexes: Reflexes normal.   Psychiatric:         Mood and Affect: Mood normal.         Behavior: Behavior normal.         Thought Content: Thought content normal.         Judgment: Judgment normal.         Procedures           ED Course  ED Course as of 03/22/23 1546   Wed Mar 22, 2023   1514 IMPRESSION:  No acute fracture. Small joint effusion persists. [BH]      ED Course User Index  [] Leroy Martinez PA-C                                           [facility]    Final diagnoses:   Acute pain of left knee   Effusion of left knee joint       ED Disposition  ED Disposition     ED Disposition   Discharge    Condition   Stable    Comment   --             Noe Davenport MD  [address] [id number] [phone]    Call in 1 day      Dayo Chavez MD  [address] [id number] [phone]    Call in 1 day           Medication List      ASK your doctor about these medications    meclizine 25 MG tablet  Commonly known as: ANTIVERT  Take 1 tablet by mouth Every 6 (Six) Hours As Needed for Dizziness.             Leroy Martinez PA-C  03/22/23 1546

## 2023-03-24 ENCOUNTER — HOME CARE VISIT (OUTPATIENT)
Dept: HOME HEALTH SERVICES | Facility: HOME HEALTHCARE | Age: 63
End: 2023-03-24
Payer: COMMERCIAL

## 2023-03-24 PROCEDURE — G0157 HHC PT ASSISTANT EA 15: HCPCS

## 2023-03-25 VITALS
TEMPERATURE: 97.4 F | OXYGEN SATURATION: 97 % | DIASTOLIC BLOOD PRESSURE: 75 MMHG | HEART RATE: 67 BPM | SYSTOLIC BLOOD PRESSURE: 123 MMHG | RESPIRATION RATE: 16 BRPM

## 2023-03-25 NOTE — HOME HEALTH
"Routine Visit Note: Let into home by patient, who was first seen sitting in recliner with no apparent signs of distress. Patient reports recently going to hospital after \"hearing a pop\" and states that everything \"looked good\". Reports no concern of structural damage, states that she has since been ambulating with no issues (while utilizing walker).     Skill/education provided: Instructed therapeutic exercise, gait training, and ROM exercises. Educated patient on HEP and pain management strategies.     Patient/caregiver response: Patient tolerated all interventions well today, but complained of pain during exercise/ROM exercises. Patient states that pain improved following seated rest break.     Plan for next visit: Patient would benefit from continued skilled PT to address deficits in LE strength, ROM, and to continue educating on post-surgical care. Progress with gait training and ROM exercise as tolerated next visit.     Other pertinent info: Follow up regarding Knee pain."

## 2023-03-28 ENCOUNTER — HOME CARE VISIT (OUTPATIENT)
Dept: HOME HEALTH SERVICES | Facility: HOME HEALTHCARE | Age: 63
End: 2023-03-28
Payer: COMMERCIAL

## 2023-03-28 VITALS
RESPIRATION RATE: 16 BRPM | OXYGEN SATURATION: 98 % | TEMPERATURE: 97.6 F | SYSTOLIC BLOOD PRESSURE: 138 MMHG | DIASTOLIC BLOOD PRESSURE: 85 MMHG | HEART RATE: 73 BPM

## 2023-03-28 PROCEDURE — G0157 HHC PT ASSISTANT EA 15: HCPCS

## 2023-03-29 NOTE — HOME HEALTH
"Routine Visit Note:Let into home by patient, who was first seen sitting in recliner with no visable signs of distress. Patient reports that she \"isn't doing great\" and states that she is sore from \"doing alot yesterday\". Patient reports compliance with HEP and states that she has been ambulating with walker as directed by MD.     Skill/education provided: Instructed ROM/therapeutic exercise, and educated patient on pain management techniques/HEP.     Patient/caregiver response: Patient reports \"feeling sore\" and declined to progress with standing exercises today. Patient tolerated all seated interventions well today with no visable signs of distress.     Plan for next visit: Patient would benefit from continued skilled PT to address deficits in strength, ROM, balance, gait, and to continue education on proper post-surgical care. Progress with gait training and standing activities next visit as tolerated."

## 2023-03-31 ENCOUNTER — HOME CARE VISIT (OUTPATIENT)
Dept: HOME HEALTH SERVICES | Facility: HOME HEALTHCARE | Age: 63
End: 2023-03-31
Payer: COMMERCIAL

## 2023-03-31 VITALS
TEMPERATURE: 97 F | DIASTOLIC BLOOD PRESSURE: 65 MMHG | RESPIRATION RATE: 16 BRPM | SYSTOLIC BLOOD PRESSURE: 110 MMHG | OXYGEN SATURATION: 98 % | HEART RATE: 80 BPM

## 2023-03-31 PROCEDURE — G0157 HHC PT ASSISTANT EA 15: HCPCS

## 2023-03-31 NOTE — HOME HEALTH
Routine Visit Note: Let into home by patient, who was first seen sitting in recliner with no visible signs of distress. Patient reports feeling “sore“ today and states that her knee pain was bothering her last night . Patient reports regular compliance with HEP.   Skill/education provided: Instructed therapeutic exercises, gait training, and ROM exercises today. Educated patient on pain management strategies and HEP.     Patient/caregiver response: Patient tolerated all treatment well today, with no visible signs of distress. Patient complained or pain with standing exercise, but states that pain subsided following seated rest break.    Plan for next visit: Patient would benefit from continued skilled physical therapy to address deficits in strength, ROM, and to educate on proper post-surgical care . Progress with exercises and gait training  as tolerated next visit.

## 2023-04-04 ENCOUNTER — HOME CARE VISIT (OUTPATIENT)
Dept: HOME HEALTH SERVICES | Facility: HOME HEALTHCARE | Age: 63
End: 2023-04-04
Payer: COMMERCIAL

## 2023-04-04 VITALS
RESPIRATION RATE: 17 BRPM | TEMPERATURE: 97.7 F | OXYGEN SATURATION: 97 % | HEART RATE: 76 BPM | DIASTOLIC BLOOD PRESSURE: 83 MMHG | SYSTOLIC BLOOD PRESSURE: 129 MMHG

## 2023-04-04 PROCEDURE — G0157 HHC PT ASSISTANT EA 15: HCPCS

## 2023-04-04 NOTE — HOME HEALTH
"Routine Visit Note: Let into home by patient, who was first seen sitting in recliner with no visible signs of distress. Patient reports feeling \"a lot of pain at night\" and states that she has been utilizing ice/meds to improve pain . Patient reports regular compliance with HEP as prescribed.     Skill/education provided: Gait training, therapeutic exercises, ROM exercise. Educated patient on pain relief strategies and HEP.     Patient/caregiver response: Patient exhibits improved ROM today and tolerated all interventions well / with no exacerbation of pain or visable signs of distress.     Plan for next visit: Patient would benefit from continued skilled PT to address residual deficits in ROM/strength and to improve overall functional mobility. Continue to educate patient on proper post surgical care techniques. Progress as tolerated next visit."

## 2023-04-07 ENCOUNTER — HOME CARE VISIT (OUTPATIENT)
Dept: HOME HEALTH SERVICES | Facility: HOME HEALTHCARE | Age: 63
End: 2023-04-07
Payer: COMMERCIAL

## 2023-04-07 VITALS
SYSTOLIC BLOOD PRESSURE: 115 MMHG | OXYGEN SATURATION: 100 % | TEMPERATURE: 97.8 F | HEART RATE: 85 BPM | DIASTOLIC BLOOD PRESSURE: 75 MMHG | RESPIRATION RATE: 16 BRPM

## 2023-04-07 PROCEDURE — G0157 HHC PT ASSISTANT EA 15: HCPCS

## 2023-04-07 NOTE — HOME HEALTH
"Routine Visit Note: Greeted at door by patient, who was first seen with no visible signs of distress. Patient reports feeling “really sore“ today and states that she has difficulty lying in supine position due to discomfot/pain. Patient reports regular compliance with HEP and states that she has been \"walking a lot\".     Skill/education provided: Instructed therapeutic exercises and gait training today. Educated patient on pain management strategies and HEP.     Patient/caregiver response: Patient tolerated all seated treatment well today, but declined standing activities due to pain.  Patient exhibits ability to flex knee to 90 and reach full extension. Patient reports over-exertion throughout day / likely causing soreness. Patient reports that she \"left walker in car\", but states that she uses it to ambulate at all times per MD order. Walker not visable in home today.     Plan for next visit: Patient is due for discharge visit next visit with supervising PT, patient appears on track overall to reach goals. Patient prepped for DC visit.     other: patient seen walking in yard following conclusion pt visits without walker."

## 2023-04-11 ENCOUNTER — OFFICE VISIT (OUTPATIENT)
Dept: ORTHOPEDIC SURGERY | Facility: CLINIC | Age: 63
End: 2023-04-11
Payer: COMMERCIAL

## 2023-04-11 VITALS — TEMPERATURE: 97.5 F

## 2023-04-11 DIAGNOSIS — Z96.652 STATUS POST TOTAL LEFT KNEE REPLACEMENT: Primary | ICD-10-CM

## 2023-04-11 PROCEDURE — 99024 POSTOP FOLLOW-UP VISIT: CPT | Performed by: ORTHOPAEDIC SURGERY

## 2023-04-11 RX ORDER — CELECOXIB 200 MG/1
CAPSULE ORAL
COMMUNITY
Start: 2023-04-06

## 2023-04-11 NOTE — PROGRESS NOTES
[facility] [place] [facility] [place]        Subjective     Post-op (Post Op ( 4 week status post Left total knee arthroplasty 3-15-23))       HPI    Janet Obrien is a 62 y.o. female.  Patient is now 4 weeks out from left total knee arthroplasty on 3/15/2023.  She says she is taking 5 pain pills a day, slightly up from her typical 4 pain pills a day at her baseline.  Home health physical therapy has been discontinued.  She is not doing outpatient physical therapy.  She is using a walker.  She says that she is ready to think about having the right knee done.          Objective      Physical Exam  Temp 97.5 °F (36.4 °C)     There is no height or weight on file to calculate BMI.        Ortho Exam      Lower Extremity:     Inspection and Palpation:      Left knee:  Calf:  Soft and non tender  Pulses:  2+  Ecchymosis:  None  Warmth:  Appropriate  Incision:  Clean, dry, and intact.  Healing appropriately  Assistive device: Rolling walker    ROM:  Left:  Extension: 0    flexion: 120      Functional Testing:  Left:  Straight Leg Raise: 5/5        Imaging Reviewed:  Imaging Results (Last 24 Hours)     Procedure Component Value Units Date/Time    XR Knee 3+ View With [place] Left [[id number]] Resulted: 04/11/23 1132     Updated: 04/11/23 1132    Narrative:      Knee X-ray    Indication: status-post TKA    Study:  AP, Lateral, and Sunrise views of Left knee    Comparison: Left knee 3/22/2023    Findings:  No signs of acute fracture are visualized  No signs of loosening are appreciated  Components are well aligned    Impression:  Status post Left total knee arthroplasty. No signs of   loosening or fracture.                Assessment    Assessment:  1. Status post total left knee replacement        Plan:  1. Status post left total knee arthroplasty--patient is doing much better than anticipated and we can send her back to her pain management specialists to manage pain going forward as she is needing only 1 additional pain  pill daily.  I will see her back in a month to see how she is doing clinically.  I think she can wean from her walker.  I have asked her if she wants to do outpatient physical therapy and she says no.      Dayo Chavez MD  04/11/23  12:46 EDT      Dictated Utilizing Dragon Dictation.

## 2023-04-12 ENCOUNTER — HOME CARE VISIT (OUTPATIENT)
Dept: HOME HEALTH SERVICES | Facility: HOME HEALTHCARE | Age: 63
End: 2023-04-12
Payer: COMMERCIAL

## 2023-04-12 VITALS
OXYGEN SATURATION: 98 % | HEART RATE: 72 BPM | DIASTOLIC BLOOD PRESSURE: 82 MMHG | SYSTOLIC BLOOD PRESSURE: 133 MMHG | RESPIRATION RATE: 12 BRPM

## 2023-04-12 PROCEDURE — G0151 HHCP-SERV OF PT,EA 15 MIN: HCPCS

## 2023-04-12 NOTE — Clinical Note
Ms. Obrien discharged from  PT on 4/12. Pt has progressed well with therapy interventions. 0-120 of L knee AROM in sitting and is independent with gait and stair navigation without device. Pt declines OP PT but agrees to continue HEP at home.

## 2023-04-24 RX ORDER — APIXABAN 2.5 MG/1
TABLET, FILM COATED ORAL
Qty: 84 TABLET | Refills: 0 | OUTPATIENT
Start: 2023-04-24

## 2023-04-28 ENCOUNTER — OFFICE VISIT (OUTPATIENT)
Dept: ORTHOPEDIC SURGERY | Facility: CLINIC | Age: 63
End: 2023-04-28
Payer: COMMERCIAL

## 2023-04-28 ENCOUNTER — TELEPHONE (OUTPATIENT)
Dept: ORTHOPEDIC SURGERY | Facility: CLINIC | Age: 63
End: 2023-04-28
Payer: COMMERCIAL

## 2023-04-28 DIAGNOSIS — Z96.652 STATUS POST TOTAL LEFT KNEE REPLACEMENT: Primary | ICD-10-CM

## 2023-04-28 PROCEDURE — 1160F RVW MEDS BY RX/DR IN RCRD: CPT | Performed by: PHYSICIAN ASSISTANT

## 2023-04-28 PROCEDURE — 99024 POSTOP FOLLOW-UP VISIT: CPT | Performed by: PHYSICIAN ASSISTANT

## 2023-04-28 PROCEDURE — 1159F MED LIST DOCD IN RCRD: CPT | Performed by: PHYSICIAN ASSISTANT

## 2023-04-28 RX ORDER — NALOXONE HYDROCHLORIDE 4 MG/.1ML
SPRAY NASAL
COMMUNITY
Start: 2023-04-20

## 2023-04-28 NOTE — PROGRESS NOTES
[facility] [place] [facility] [place]        Subjective     Post-op (2 week recheck- 6 weeks status post Left total knee arthroplasty 3-15-23)       HPI    Janet Obrien is a 62 y.o. female.  Patient brought into the clinic after contacting our office this morning reporting injury to her left knee.  She reports about 2 weeks ago her dog ran into the medial aspect of the knee and since then she has had increasing pain with difficulty weightbearing.  Because of the knee pain she was concerned about a possible blood clot.  She denies any pain in the lower leg or calf area.  She does have 1 single spot in the upper thigh that is painful which she has marked with an X.  She is still on her Eliquis.    Pain scale: 7/10.  Associated symptoms swelling, stiffness.  Pain is worse with walking, standing and sleeping.  Ice and elevating do help.  No outpatient PT--told she did not needed at last appointment.    Patient denies any fever, chills, night sweats or other constitutional symptoms.          Objective      Physical Exam  There were no vitals taken for this visit.    There is no height or weight on file to calculate BMI.        Ortho Exam  Left knee  Skin: Surgical incision sites well-healed no evidence of redness, warmth, drainage or infection.  Tenderness: Mild discomfort noted medial aspect of the knee but not related specifically to the joint line, diffuse and pattern.  No calf pain.  She does have some tenderness noted anterior aspect of thigh.  All compartments throughout left lower extremity are soft and compressible.  Motion: 0-120 degrees.  Straight leg raise: Intact  Testing: Varus and valgus stress negative.  Motor/sensory: Grossly intact L2-S1.      Imaging Reviewed:  Ordered left knee plain films.  Imaging read/interpreted by Dr. Chavez.    Knee X-ray     Indication: status-post TKA     Study:  AP, Lateral, and Sunrise views of Left knee     Comparison: L knee 4/11/23     Findings:  No signs of  acute fracture are visualized  No signs of loosening are appreciated  Components are well aligned     Impression:  Status post Left total knee arthroplasty. No signs of loosening or fracture.        Assessment:  1. Status post total left knee replacement        Plan:  1. Status post left TKA, stable.  2. Reviewed imaging with the patient.  3. Patient will continue taking Eliquis as directed.  She reports she has about 1 week left of the medication.  4. To continue with pain management through her pain management provider.  5. Recommend patient proceed with outpatient PT--referral placed to RICKIE Nunez.  6. She will keep her follow-up appointment Dr. Chavez on 5/9/2023.  States she wants to discuss proceeding with right knee at that appointment.  7. Questions and concerns answered.      Nena Gallagher PA-C  04/30/23  11:43 EDT      Dictated Utilizing Dragon Dictation.

## 2023-04-28 NOTE — TELEPHONE ENCOUNTER
Last appt 3/6/23 and next appt 4/19/23. Last refill sumatriptan 25mg 3/6/23 #10 RF x 1. Refill protocol passed. Med refilled.   Pt hurt her left replaced knee. She complains of pain and is concerned she could get a blood clot. She is having trouble bearing weight on it.      Call pt to give advice. I got her in for next available appt but it is not until may 9th.        Call pt:  [phone]

## 2023-05-03 ENCOUNTER — TREATMENT (OUTPATIENT)
Dept: PHYSICAL THERAPY | Facility: CLINIC | Age: 63
End: 2023-05-03
Payer: COMMERCIAL

## 2023-05-03 DIAGNOSIS — M25.562 CHRONIC PAIN OF LEFT KNEE: Primary | ICD-10-CM

## 2023-05-03 DIAGNOSIS — G89.29 CHRONIC PAIN OF LEFT KNEE: Primary | ICD-10-CM

## 2023-05-03 NOTE — PROGRESS NOTES
Physical Therapy Initial Evaluation and Plan of Care  [address] [place] [address]      Patient: Janet Obrien   : 1960  Diagnosis/ICD-10 Code:  Chronic pain of left knee [M25.562, G89.29]  Referring practitioner: Nena Gallagher, *  Date of Initial Visit: 5/3/2023  Today's Date: 5/3/2023  Patient seen for 1 session         Visit Diagnoses:    ICD-10-CM ICD-9-CM   1. Chronic pain of left knee  M25.562 719.46    G89.29 338.29         Subjective Questionnaire: LEFS:       Subjective Evaluation    History of Present Illness    Subjective comment: Pt had a L knee replacment on 3-15-23. Reports her R knee is painful also and needs to be replaced.  only did home health for about 1 month and then she was told she was good and discharged. Still sleeping on the couch because she can not lay down flat in the bed. Pt is only taking sponge baths because she can not step over into the bath tub yet. Pt performs her housework. Uses a motorized cart at the grocery. Pain  At best pain ratin  At worst pain ratin  Quality: dull ache  Relieving factors: ice  Aggravating factors: stairs, ambulation, squatting, standing and sleeping  Progression: improved    Social Support  Lives in: multiple-level home  Lives with: alone    Patient Goals  Patient goals for therapy: decreased pain, increased motion, increased strength, independence with ADLs/IADLs and return to sport/leisure activities             Objective          Palpation     Additional Palpation Details  Reports she is tender throughout the knee, peripatellar    Active Range of Motion     Additional Active Range of Motion Details  R knee ROM 5-120 deg  L knee ROM  deg    Strength/Myotome Testing     Left Knee   Flexion: 4  Extension: 4+  Quadriceps contraction: fair    Right Knee   Flexion: 4  Extension: 4+    Additional Strength Details  L hip flexion 4+/5    Ambulation     Observational Gait   Decreased walking speed and  stride length.   Left foot contact pattern: foot flat  Left arm swing: decreased  Base of support: increased          Assessment & Plan     Assessment  Impairments: abnormal gait, abnormal muscle tone, abnormal or restricted ROM, activity intolerance, impaired physical strength, pain with function and weight-bearing intolerance  Functional Limitations: sleeping, walking, uncomfortable because of pain, standing and stooping  Assessment details: Pt is a 61 YO F presenting to the clinic with L knee pain. Pt with signs and symptoms consistent with L TKA. Pt would benefit from skilled PT for improving L knee ROM, strength and gait pattern to aid with daily function.     Barriers to therapy: chronic knee pain, severity, chronicity  Prognosis: fair    Goals  Plan Goals: SHORT TERM GOALS:  4 weeks       1. Pt independent with HEP  2. Pt to demonstrate improved B LE strength by half grade or greater to improve stability with ambulation  3. Pt to report being able to walk for 10 minutes without increasing pain in the left knee    LONG TERM GOALS:   8 weeks  1. Pt to demonstrate ability to perform full functional squat with good form and control of the knees and without increasing pain  2. Pt to demonstrate L LE increase in strength by 1 full grade  to improve safety with ambulation on uneven surfaces  3. Pt to return to report pain at worst in the L LE of 4/10 at worst.   4. Pt to demonstrate ability to perform step up/down 8 inch step x10 safely and without pain in the left knee     Plan  Therapy options: will be seen for skilled therapy services  Planned modality interventions: cryotherapy  Planned therapy interventions: abdominal trunk stabilization, manual therapy, neuromuscular re-education, balance/weight-bearing training, flexibility, functional ROM exercises, gait training, home exercise program, joint mobilization, soft tissue mobilization, strengthening, stretching and therapeutic activities  Frequency: 2x  week  Duration in weeks: 8  Treatment plan discussed with: patient        History # of Personal Factors and/or Comorbidities: LOW (0)  Examination of Body System(s): # of elements: LOW (1-2)  Clinical Presentation: STABLE   Clinical Decision Making: LOW       Timed:         Manual Therapy:         mins  07705;     Therapeutic Exercise:  23       mins  36539;     Neuromuscular Bel:       mins  30093;    Therapeutic Activity:          mins  63060;     Gait Training:          mins  15261;     Ultrasound:          mins  00403;    Ionto                                   mins   57210  Self Care                            mins   19846  Canalith Repos         mins 86718      Un-Timed:  Electrical Stimulation:         mins  51061 ( );  Dry Needling          mins self-pay  Traction          mins 94859  Low Eval    20     Mins  78243  Mod Eval          Mins  11575  High Eval                            Mins  43572        Timed Treatment:   43   mins   Total Treatment:     43   mins      PT: Amber Hansen PT     License Number: [id number]    Electronically signed by Amber Hansen PT, 05/03/23, 9:44 AM EDT    Certification Period: 5/3/2023 thru 7/31/2023  I certify that the therapy services are furnished while this patient is under my care.  The services outlined above are required by this patient, and will be reviewed every 90 days.         Physician Signature:__________________________________________________    PHYSICIAN: Nena Gallagher PA-C  NPI: [id number]      DATE:     Please sign and return via fax to [email or web address] . Thank you, [facility].

## 2023-05-09 ENCOUNTER — OFFICE VISIT (OUTPATIENT)
Dept: ORTHOPEDIC SURGERY | Facility: CLINIC | Age: 63
End: 2023-05-09
Payer: COMMERCIAL

## 2023-05-09 DIAGNOSIS — Z96.652 STATUS POST TOTAL LEFT KNEE REPLACEMENT: Primary | ICD-10-CM

## 2023-05-09 PROCEDURE — 99024 POSTOP FOLLOW-UP VISIT: CPT | Performed by: ORTHOPAEDIC SURGERY

## 2023-05-09 RX ORDER — PREDNISOLONE ACETATE 10 MG/ML
SUSPENSION/ DROPS OPHTHALMIC
COMMUNITY
Start: 2023-05-02

## 2023-05-09 NOTE — PROGRESS NOTES
[facility] [place] [facility] [place]        Subjective     Post-op Follow-up (11 day f/u;  8 weeks status post Left total knee arthroplasty 3-15-23)       LEW Obrien is a 62 y.o. female.  Patient is 8 weeks out from left total knee arthroplasty on 3/15/2023.  She says that she started on outpatient physical therapy and was forced to do some things that have exacerbated her underlying left knee pain.  At one point she says she felt a pop in her left knee.  Denies chest pain or shortness of breath.          Objective      Physical Exam  There were no vitals taken for this visit.    There is no height or weight on file to calculate BMI.        Ortho Exam    Lower Extremity:     Inspection and Palpation:      Left knee:  Calf:  Soft and non tender  Effusion:  None  Pulses:  2+  Warmth:  None  Incision:  Healed     ROM:  Left:  Extension:0    Flexion:120      Deformities/Malalignments/Discrepancies:    Left:  none    Functional Testing:  Left:  Straight Leg Raise: 5/5  Patella Tracking:  Normal      Imaging Reviewed:  Imaging Results (Last 24 Hours)     ** No results found for the last 24 hours. **            Assessment    Assessment:  1. Status post total left knee replacement        Plan:  1. Status post left total knee arthroplasty--at this point, patient is about 2 months out.  She is asking for Flexeril and Percocet.  I told her that she needs to go through her pain management providers.  I will see her back in 6 weeks and we will reassess how she is doing with the left knee and if she is pain-free and doing very well then we can consider addressing her right knee.  At this point, if she feels that the therapy is making her worse, this can be put on hold for now.      Dayo Chavez MD  05/09/23  16:31 EDT      Dictated Utilizing Dragon Dictation.

## 2023-05-11 ENCOUNTER — HOSPITAL ENCOUNTER (OUTPATIENT)
Dept: MAMMOGRAPHY | Facility: HOSPITAL | Age: 63
Discharge: HOME | End: 2023-05-11
Payer: COMMERCIAL

## 2023-05-11 ENCOUNTER — OFFICE VISIT (OUTPATIENT)
Dept: OBSTETRICS AND GYNECOLOGY | Facility: CLINIC | Age: 63
End: 2023-05-11
Payer: COMMERCIAL

## 2023-05-11 VITALS
DIASTOLIC BLOOD PRESSURE: 80 MMHG | HEIGHT: 66 IN | SYSTOLIC BLOOD PRESSURE: 124 MMHG | BODY MASS INDEX: 35.84 KG/M2 | WEIGHT: 223 LBS

## 2023-05-11 DIAGNOSIS — Z12.31 VISIT FOR SCREENING MAMMOGRAM: ICD-10-CM

## 2023-05-11 DIAGNOSIS — R22.32 MASS OF LEFT AXILLA: Primary | ICD-10-CM

## 2023-05-17 NOTE — PROGRESS NOTES
"GYN Office Visit    Subjective   Chief Complaint   Patient presents with   • Breast Problem     Lump on left side      Janet Obrien is a 62 y.o. year old  presenting to be seen for a left axillary mass.    She reports noticing a lump in the left armpit for several months now.  No significant pain.  Most recent mammogram was 2019.      Previous hysterectomy in .  She is on low-dose HRT.    OB Hx:  x 2, miscarriage x 1  Pap smear: 2016?  No history of cervical dysplasia  Mammogram: 2019  Colonoscopy: Never  DEXA Scan: Never    Past Medical History:   Diagnosis Date   • Anxiety    • Arthritis    • Asthma    • Back pain    • Cancer     SQUAMOUS CELL/ BASAL CELL ON LIP   • Chronic bronchitis    • Depression    • Diabetes mellitus     type 2   • Fibroid    • Fibromyalgia    • High triglycerides    • Hyperlipidemia    • Hypertension    • Migraine    • MVP (mitral valve prolapse)    • OA (osteoarthritis)    • Ovarian cyst    • Peripheral autonomic neuropathy    • Pleurisy    • Pneumonia    • Rheumatism    • RLS (restless legs syndrome)    • Sleep apnea     Mild form per patient. recommended mouthpiece   • Thyroid disease    • Varicella        Past Surgical History:   Procedure Laterality Date   • APPENDECTOMY     • CATARACT EXTRACTION W/ INTRAOCULAR LENS IMPLANT Right 2019    Procedure: CATARACT PHACO EXTRACTION WITH INTRAOCULAR LENS IMPLANT RIGHT;  Surgeon: Carl Babb MD;  Location: [facility] OR;  Service: Ophthalmology   • CATARACT EXTRACTION W/ INTRAOCULAR LENS IMPLANT Left 10/07/2019    Procedure: CATARACT PHACO EXTRACTION WITH INTRAOCULAR LENS IMPLANT LEFT;  Surgeon: Carl Babb MD;  Location: [facility] OR;  Service: Ophthalmology   •  SECTION  , 1997    x 2    • DILATATION AND CURETTAGE     • HAND SURGERY Right     \"Pinched nerve surgery\"   • HAND SURGERY Left     Joint removed secondary to arthritis   • HYSTERECTOMY      ''still have one ovary''   • " KNEE SURGERY Right 2021    arthroscopy and partial medial meniscectomy Dr. Chavez   • THYROIDECTOMY     • TONSILLECTOMY AND ADENOIDECTOMY     • TOTAL KNEE ARTHROPLASTY Left 3/15/2023    Procedure: TOTAL KNEE ARTHROPLASTY -  LEFT;  Surgeon: Dayo Chavez MD;  Location: [facility];  Service: Orthopedics;  Laterality: Left;       Family History   Problem Relation Age of Onset   • Hypertension Mother    • Heart attack Mother    • Fibromyalgia Mother    • Hyperlipidemia Mother    • Thyroid disease Mother    • Irritable bowel syndrome Mother    • Heart attack Father    • Cancer Brother    • Stomach cancer Brother    • Diabetes Maternal Aunt    • Cancer Maternal Grandmother    • Thyroid disease Maternal Grandmother    • Cancer Paternal Grandmother    • Colon cancer Neg Hx    • Cirrhosis Neg Hx    • Liver cancer Neg Hx    • Liver disease Neg Hx         Social History     Tobacco Use   • Smoking status: Former     Packs/day: 0.25     Years: 15.00     Pack years: 3.75     Types: Cigarettes     Start date:      Quit date: 2004     Years since quittin.8   • Smokeless tobacco: Never   Vaping Use   • Vaping Use: Never used   Substance Use Topics   • Alcohol use: No   • Drug use: No       (Not in a hospital admission)      Penicillins, Cephalexin, Chlorhexidine, Sulfa antibiotics, Adhesive tape, Darvon [propoxyphene], and Diazepam    Current Outpatient Medications on File Prior to Visit   Medication Sig Dispense Refill   • atorvastatin (LIPITOR) 80 MG tablet Take 1 tablet by mouth Daily.     • celecoxib (CeleBREX) 200 MG capsule      • Cholecalciferol (vitamin D3) 125 MCG (5000 UT) tablet tablet      • cyclobenzaprine (FLEXERIL) 10 MG tablet Take 1 tablet by mouth 3 (Three) Times a Day As Needed for Muscle Spasms. 40 tablet 1   • DULoxetine (CYMBALTA) 60 MG capsule Take 1 capsule by mouth Daily.     • estradiol (ESTRACE) 0.5 MG tablet Take 0.25 mg by mouth. NOT CURRENTLY     •  "hydrochlorothiazide (HYDRODIURIL) 25 MG tablet Take 1 tablet by mouth Daily.     • meclizine (ANTIVERT) 25 MG tablet Take 1 tablet by mouth Every 6 (Six) Hours As Needed for Dizziness. (Patient taking differently: Take 1 tablet by mouth Every 6 (Six) Hours As Needed for Dizziness or Nausea. NOT CURRENTLY TAKING) 20 tablet 0   • montelukast (SINGULAIR) 10 MG tablet Take 1 tablet by mouth Every Night. 30 tablet 3   • naloxone (NARCAN) 4 MG/0.1ML nasal spray      • Ozempic, 1 MG/DOSE, 4 MG/3ML solution pen-injector 1 (One) Time Per Week. THURSDAY  Indications: Type 2 Diabetes     • phentermine (ADIPEX-P) 37.5 MG tablet Take 1 tablet by mouth Daily. NOT CURRENTLY TAKING     • prednisoLONE acetate (PRED FORTE) 1 % ophthalmic suspension INSTILL 1 DROP INTO LEFT EYE THREE TIMES DAILY SHAKE WELL BEFORE USE     • Synthroid 150 MCG tablet Take 1 tablet by mouth Every Morning.     • Ventolin  (90 Base) MCG/ACT inhaler Inhale 2 puffs Every 4 (Four) Hours As Needed for Wheezing. 18 g 3     No current facility-administered medications on file prior to visit.       Social History    Tobacco Use      Smoking status: Former        Packs/day: 0.25        Years: 15.00        Pack years: 3.75        Types: Cigarettes        Start date:         Quit date: 2004        Years since quittin.8      Smokeless tobacco: Never         Objective   /80   Ht 167.6 cm (66\")   Wt 101 kg (223 lb)   BMI 35.99 kg/m²     Physical Exam:  General Appearance: alert, pleasant, appears stated age, interactive and cooperative  Breasts: Examined in supine position  Symmetric without masses or skin dimpling  Nipples normal without inversion, lesions or discharge   Left axillary lump noted         Medical Decision Making:    I reviewed her mammogram from 2019.    Assessment   Left axillary lump, uncertain prognosis  Previous hysterectomy  Hormone replacement therapy     Plan    Orders Placed This Encounter   Procedures   • Mammo " "Diagnostic Digital Tomosynthesis Bilateral With CAD     Standing Status:   Future     Standing Expiration Date:   5/11/2024     Order Specific Question:   Is an Ultrasound Breast required?  If 'Yes\", Please enter an order for the US Breast as well.     Answer:   Yes     Order Specific Question:   Reason for Exam:     Answer:   Left axillary mass     Order Specific Question:   Release to patient     Answer:   Routine Release   • US Axilla Left     Standing Status:   Future     Standing Expiration Date:   5/11/2024     Order Specific Question:   Reason for Exam:     Answer:   Left axillary mass     Order Specific Question:   Release to patient     Answer:   Routine Release       Medication Management: None    Procedures Performed: None    We reviewed her situation in detail today.  We discussed her exam findings.  Diagnostic mammogram + left axillary ultrasound ordered today.  We will follow-up results by phone.  All questions answered.    Cristian Bourgeois MD  Obstetrics and Gynecology  [facility]  "

## 2023-05-25 ENCOUNTER — HOSPITAL ENCOUNTER (EMERGENCY)
Facility: HOSPITAL | Age: 63
Discharge: HOME | End: 2023-05-25
Attending: EMERGENCY MEDICINE
Payer: COMMERCIAL

## 2023-05-25 VITALS
HEIGHT: 66 IN | HEART RATE: 81 BPM | OXYGEN SATURATION: 98 % | BODY MASS INDEX: 35.84 KG/M2 | RESPIRATION RATE: 18 BRPM | DIASTOLIC BLOOD PRESSURE: 76 MMHG | WEIGHT: 223 LBS | TEMPERATURE: 97.8 F | SYSTOLIC BLOOD PRESSURE: 132 MMHG

## 2023-05-25 DIAGNOSIS — S33.5XXA LUMBAR BACK SPRAIN, INITIAL ENCOUNTER: Primary | ICD-10-CM

## 2023-05-25 PROCEDURE — 96372 THER/PROPH/DIAG INJ SC/IM: CPT

## 2023-05-25 PROCEDURE — 99282 EMERGENCY DEPT VISIT SF MDM: CPT

## 2023-05-25 PROCEDURE — 25010000002 KETOROLAC TROMETHAMINE PER 15 MG: Performed by: PHYSICIAN ASSISTANT

## 2023-05-25 PROCEDURE — 25010000002 METHYLPREDNISOLONE PER 125 MG: Performed by: PHYSICIAN ASSISTANT

## 2023-05-25 RX ORDER — METHYLPREDNISOLONE SODIUM SUCCINATE 125 MG/2ML
80 INJECTION, POWDER, LYOPHILIZED, FOR SOLUTION INTRAMUSCULAR; INTRAVENOUS ONCE
Status: COMPLETED | OUTPATIENT
Start: 2023-05-25 | End: 2023-05-25

## 2023-05-25 RX ORDER — KETOROLAC TROMETHAMINE 10 MG/1
10 TABLET, FILM COATED ORAL EVERY 6 HOURS PRN
Qty: 15 TABLET | Refills: 0 | Status: SHIPPED | OUTPATIENT
Start: 2023-05-25

## 2023-05-25 RX ORDER — PREDNISONE 20 MG/1
20 TABLET ORAL DAILY
Qty: 5 TABLET | Refills: 0 | Status: SHIPPED | OUTPATIENT
Start: 2023-05-25

## 2023-05-25 RX ORDER — KETOROLAC TROMETHAMINE 30 MG/ML
15 INJECTION, SOLUTION INTRAMUSCULAR; INTRAVENOUS ONCE
Status: COMPLETED | OUTPATIENT
Start: 2023-05-25 | End: 2023-05-25

## 2023-05-25 RX ORDER — ORPHENADRINE CITRATE 100 MG/1
100 TABLET, EXTENDED RELEASE ORAL 2 TIMES DAILY
Qty: 20 TABLET | Refills: 0 | Status: SHIPPED | OUTPATIENT
Start: 2023-05-25

## 2023-05-25 RX ADMIN — METHYLPREDNISOLONE SODIUM SUCCINATE 80 MG: 125 INJECTION, POWDER, FOR SOLUTION INTRAMUSCULAR; INTRAVENOUS at 16:30

## 2023-05-25 RX ADMIN — KETOROLAC TROMETHAMINE 15 MG: 30 INJECTION, SOLUTION INTRAMUSCULAR; INTRAVENOUS at 16:30

## 2023-05-25 NOTE — ED PROVIDER NOTES
Subjective   History of Present Illness  62-year-old female who presents to the emergency department chief complaint lumbar back pain that radiates down lower extremities.  Patient denies any fecal or urinary incontinence.  Does states she has chronic back pain that she takes Percocet 10 mg 3 times daily for.  Patient does have history of asthma, anxiety, fibromyalgia.    History provided by:  Patient   used: No    Back Pain  Location:  Lumbar spine  Quality:  Aching  Pain severity:  Moderate  Onset quality:  Gradual  Duration:  2 days  Timing:  Intermittent  Progression:  Worsening  Chronicity:  New  Context: not emotional stress, not falling, not jumping from heights, not lifting heavy objects, not MVA, not occupational injury, not pedestrian accident, not physical stress, not recent illness, not recent injury and not twisting    Relieved by:  Nothing  Worsened by:  Nothing  Ineffective treatments:  None tried  Associated symptoms: leg pain and weakness    Associated symptoms: no abdominal pain, no bladder incontinence, no bowel incontinence, no chest pain, no dysuria, no fever, no headaches, no numbness, no paresthesias, no perianal numbness, no tingling and no weight loss    Risk factors: no hx of cancer, no hx of osteoporosis, no menopause, not obese, no recent surgery, no steroid use and no vascular disease        Review of Systems   Constitutional: Negative for appetite change, chills, diaphoresis, fatigue, fever, unexpected weight change and weight loss.   HENT: Negative.  Negative for dental problem, drooling, ear discharge, ear pain, facial swelling, hearing loss, mouth sores, nosebleeds and postnasal drip.    Eyes: Negative.  Negative for photophobia, pain, redness and visual disturbance.   Respiratory: Negative for apnea, cough, chest tightness and stridor.    Cardiovascular: Negative.  Negative for chest pain and leg swelling.   Gastrointestinal: Negative for abdominal distention,  abdominal pain, anal bleeding, blood in stool, bowel incontinence and constipation.   Endocrine: Negative.  Negative for heat intolerance, polydipsia and polyphagia.   Genitourinary: Negative.  Negative for bladder incontinence, dysuria, enuresis, frequency, genital sores and hematuria.   Musculoskeletal: Positive for arthralgias, back pain and myalgias.   Skin: Negative.  Negative for color change, pallor, rash and wound.   Neurological: Positive for weakness. Negative for dizziness, tingling, numbness, headaches and paresthesias.   Hematological: Negative.  Negative for adenopathy. Does not bruise/bleed easily.   Psychiatric/Behavioral: Negative for agitation, behavioral problems, decreased concentration, dysphoric mood and hallucinations. The patient is not nervous/anxious and is not hyperactive.    All other systems reviewed and are negative.      Past Medical History:   Diagnosis Date   • Anxiety    • Arthritis    • Asthma    • Back pain    • Cancer     SQUAMOUS CELL/ BASAL CELL ON LIP   • Chronic bronchitis    • Depression    • Diabetes mellitus     type 2   • Fibroid    • Fibromyalgia    • High triglycerides    • Hyperlipidemia    • Hypertension    • Migraine    • MVP (mitral valve prolapse)    • OA (osteoarthritis)    • Ovarian cyst    • Peripheral autonomic neuropathy    • Pleurisy    • Pneumonia    • Rheumatism    • RLS (restless legs syndrome)    • Sleep apnea     Mild form per patient. recommended mouthpiece   • Thyroid disease    • Varicella        Allergies   Allergen Reactions   • Penicillins Angioedema, Anaphylaxis, Hives, Itching, Shortness Of Breath and Swelling   • Cephalexin Itching   • Chlorhexidine Rash and Itching   • Sulfa Antibiotics Rash and Itching   • Adhesive Tape Rash     Tegaderm specific   • Darvon [Propoxyphene] Unknown (See Comments)     Doesn't remember   • Diazepam Other (See Comments)     Paradoxical effect/ hypes pt       Past Surgical History:   Procedure Laterality Date   •  "APPENDECTOMY  's   • CATARACT EXTRACTION W/ INTRAOCULAR LENS IMPLANT Right 2019    Procedure: CATARACT PHACO EXTRACTION WITH INTRAOCULAR LENS IMPLANT RIGHT;  Surgeon: Carl Babb MD;  Location: [facility] OR;  Service: Ophthalmology   • CATARACT EXTRACTION W/ INTRAOCULAR LENS IMPLANT Left 10/07/2019    Procedure: CATARACT PHACO EXTRACTION WITH INTRAOCULAR LENS IMPLANT LEFT;  Surgeon: Carl Babb MD;  Location: [facility] OR;  Service: Ophthalmology   •  SECTION  , 1997    x 2    • DILATATION AND CURETTAGE     • HAND SURGERY Right     \"Pinched nerve surgery\"   • HAND SURGERY Left     Joint removed secondary to arthritis   • HYSTERECTOMY      ''still have one ovary''   • KNEE SURGERY Right 2021    arthroscopy and partial medial meniscectomy Dr. Chavez   • THYROIDECTOMY     • TONSILLECTOMY AND ADENOIDECTOMY     • TOTAL KNEE ARTHROPLASTY Left 3/15/2023    Procedure: TOTAL KNEE ARTHROPLASTY -  LEFT;  Surgeon: Dayo Chavez MD;  Location: [facility];  Service: Orthopedics;  Laterality: Left;       Family History   Problem Relation Age of Onset   • Hypertension Mother    • Heart attack Mother    • Fibromyalgia Mother    • Hyperlipidemia Mother    • Thyroid disease Mother    • Irritable bowel syndrome Mother    • Heart attack Father    • Cancer Brother    • Stomach cancer Brother    • Diabetes Maternal Aunt    • Cancer Maternal Grandmother    • Thyroid disease Maternal Grandmother    • Cancer Paternal Grandmother    • Colon cancer Neg Hx    • Cirrhosis Neg Hx    • Liver cancer Neg Hx    • Liver disease Neg Hx        Social History     Socioeconomic History   • Marital status:    Tobacco Use   • Smoking status: Former     Packs/day: 0.25     Years: 15.00     Pack years: 3.75     Types: Cigarettes     Start date:      Quit date: 2004     Years since quittin.8   • Smokeless tobacco: Never   Vaping Use   • Vaping Use: Never used   Substance and " Sexual Activity   • Alcohol use: No   • Drug use: No   • Sexual activity: Not Currently     Partners: Male     Birth control/protection: Abstinence           Objective   Physical Exam  Vitals and nursing note reviewed.   Constitutional:       General: She is not in acute distress.     Appearance: Normal appearance. She is normal weight. She is not ill-appearing, toxic-appearing or diaphoretic.   HENT:      Head: Normocephalic and atraumatic.      Right Ear: Tympanic membrane, ear canal and external ear normal. There is no impacted cerumen.      Left Ear: Tympanic membrane, ear canal and external ear normal. There is no impacted cerumen.      Nose: Nose normal. No congestion or rhinorrhea.      Mouth/Throat:      Mouth: Mucous membranes are moist.      Pharynx: Oropharynx is clear. No oropharyngeal exudate or posterior oropharyngeal erythema.   Eyes:      General: No scleral icterus.        Right eye: No discharge.         Left eye: No discharge.      Extraocular Movements: Extraocular movements intact.      Conjunctiva/sclera: Conjunctivae normal.      Pupils: Pupils are equal, round, and reactive to light.   Cardiovascular:      Rate and Rhythm: Normal rate and regular rhythm.      Pulses: Normal pulses.      Heart sounds: Normal heart sounds. No murmur heard.    No friction rub. No gallop.   Pulmonary:      Effort: Pulmonary effort is normal. No respiratory distress.      Breath sounds: Normal breath sounds. No stridor. No wheezing, rhonchi or rales.   Abdominal:      General: Abdomen is flat. Bowel sounds are normal. There is no distension.      Palpations: Abdomen is soft. There is no mass.      Tenderness: There is no abdominal tenderness. There is no left CVA tenderness, guarding or rebound.      Hernia: No hernia is present.   Musculoskeletal:         General: Tenderness and signs of injury present. No deformity.      Cervical back: Normal range of motion and neck supple. No rigidity or tenderness.       Thoracic back: Spasms present. Decreased range of motion.      Lumbar back: Signs of trauma, spasms and tenderness present. Decreased range of motion.   Lymphadenopathy:      Cervical: No cervical adenopathy.   Skin:     General: Skin is warm and dry.      Capillary Refill: Capillary refill takes less than 2 seconds.      Coloration: Skin is not jaundiced or pale.      Findings: No bruising, erythema, lesion or rash.   Neurological:      General: No focal deficit present.      Mental Status: She is alert and oriented to person, place, and time. Mental status is at baseline.      Cranial Nerves: No cranial nerve deficit.      Sensory: No sensory deficit.      Motor: No weakness.      Coordination: Coordination normal.      Gait: Gait normal.      Deep Tendon Reflexes: Reflexes normal.   Psychiatric:         Mood and Affect: Mood normal.         Behavior: Behavior normal.         Thought Content: Thought content normal.         Judgment: Judgment normal.         Procedures           ED Course                                           MDM    Final diagnoses:   Lumbar back sprain, initial encounter       ED Disposition  ED Disposition     ED Disposition   Discharge    Condition   Stable    Comment   --             Noe Davenport MD  [address] [place], [place] [address] [phone]    Call in 1 day           Medication List      New Prescriptions    ketorolac 10 MG tablet  Commonly known as: TORADOL  Take 1 tablet by mouth Every 6 (Six) Hours As Needed for Moderate Pain.     orphenadrine 100 MG 12 hr tablet  Commonly known as: NORFLEX  Take 1 tablet by mouth 2 (Two) Times a Day.     predniSONE 20 MG tablet  Commonly known as: DELTASONE  Take 1 tablet by mouth Daily.        Changed    meclizine 25 MG tablet  Commonly known as: ANTIVERT  Take 1 tablet by mouth Every 6 (Six) Hours As Needed for Dizziness.  What changed:   · reasons to take this  · additional instructions           Where to Get Your Medications       These medications were sent to [facility] [address] [place] [address] - [address] [facility] - [phone]  - [phone]   [address] [facility] [address]    Phone: [phone]   · ketorolac 10 MG tablet  · orphenadrine 100 MG 12 hr tablet  · predniSONE 20 MG tablet          Leroy Martinez PA-C  05/25/23 [id number]

## 2023-06-09 ENCOUNTER — HOSPITAL ENCOUNTER (EMERGENCY)
Facility: HOSPITAL | Age: 63
Discharge: HOME | End: 2023-06-09
Attending: EMERGENCY MEDICINE
Payer: COMMERCIAL

## 2023-06-09 ENCOUNTER — APPOINTMENT (OUTPATIENT)
Dept: CT IMAGING | Facility: HOSPITAL | Age: 63
End: 2023-06-09
Payer: COMMERCIAL

## 2023-06-09 VITALS
RESPIRATION RATE: 16 BRPM | OXYGEN SATURATION: 98 % | SYSTOLIC BLOOD PRESSURE: 110 MMHG | DIASTOLIC BLOOD PRESSURE: 90 MMHG | HEIGHT: 66 IN | HEART RATE: 64 BPM | WEIGHT: 220 LBS | BODY MASS INDEX: 35.36 KG/M2

## 2023-06-09 DIAGNOSIS — M54.50 ACUTE BILATERAL LOW BACK PAIN WITHOUT SCIATICA: Primary | ICD-10-CM

## 2023-06-09 DIAGNOSIS — R52 INTRACTABLE PAIN: ICD-10-CM

## 2023-06-09 LAB
ALBUMIN SERPL-MCNC: 4 G/DL (ref 3.5–5.2)
ALBUMIN/GLOB SERPL: 1.7 G/DL
ALP SERPL-CCNC: 94 U/L (ref 39–117)
ALT SERPL W P-5'-P-CCNC: 18 U/L (ref 1–33)
ANION GAP SERPL CALCULATED.3IONS-SCNC: 8 MMOL/L (ref 5–15)
AST SERPL-CCNC: 21 U/L (ref 1–32)
BASOPHILS # BLD AUTO: 0.05 10*3/MM3 (ref 0–0.2)
BASOPHILS NFR BLD AUTO: 0.8 % (ref 0–1.5)
BILIRUB SERPL-MCNC: 0.7 MG/DL (ref 0–1.2)
BUN SERPL-MCNC: 11 MG/DL (ref 8–23)
BUN/CREAT SERPL: 16.9 (ref 7–25)
CALCIUM SPEC-SCNC: 10.2 MG/DL (ref 8.6–10.5)
CHLORIDE SERPL-SCNC: 104 MMOL/L (ref 98–107)
CO2 SERPL-SCNC: 26 MMOL/L (ref 22–29)
CREAT SERPL-MCNC: 0.65 MG/DL (ref 0.57–1)
DEPRECATED RDW RBC AUTO: 41.7 FL (ref 37–54)
EGFRCR SERPLBLD CKD-EPI 2021: 100.3 ML/MIN/1.73
EOSINOPHIL # BLD AUTO: 0.21 10*3/MM3 (ref 0–0.4)
EOSINOPHIL NFR BLD AUTO: 3.4 % (ref 0.3–6.2)
ERYTHROCYTE [DISTWIDTH] IN BLOOD BY AUTOMATED COUNT: 13.4 % (ref 12.3–15.4)
GLOBULIN UR ELPH-MCNC: 2.4 GM/DL
GLUCOSE SERPL-MCNC: 88 MG/DL (ref 65–99)
HCT VFR BLD AUTO: 40.5 % (ref 34–46.6)
HGB BLD-MCNC: 13.2 G/DL (ref 12–15.9)
IMM GRANULOCYTES # BLD AUTO: 0.03 10*3/MM3 (ref 0–0.05)
IMM GRANULOCYTES NFR BLD AUTO: 0.5 % (ref 0–0.5)
LYMPHOCYTES # BLD AUTO: 1.08 10*3/MM3 (ref 0.7–3.1)
LYMPHOCYTES NFR BLD AUTO: 17.5 % (ref 19.6–45.3)
MCH RBC QN AUTO: 27.5 PG (ref 26.6–33)
MCHC RBC AUTO-ENTMCNC: 32.6 G/DL (ref 31.5–35.7)
MCV RBC AUTO: 84.4 FL (ref 79–97)
MONOCYTES # BLD AUTO: 0.5 10*3/MM3 (ref 0.1–0.9)
MONOCYTES NFR BLD AUTO: 8.1 % (ref 5–12)
NEUTROPHILS NFR BLD AUTO: 4.31 10*3/MM3 (ref 1.7–7)
NEUTROPHILS NFR BLD AUTO: 69.7 % (ref 42.7–76)
NRBC BLD AUTO-RTO: 0 /100 WBC (ref 0–0.2)
PLATELET # BLD AUTO: 303 10*3/MM3 (ref 140–450)
PMV BLD AUTO: 10.3 FL (ref 6–12)
POTASSIUM SERPL-SCNC: 3.7 MMOL/L (ref 3.5–5.2)
PROT SERPL-MCNC: 6.4 G/DL (ref 6–8.5)
RBC # BLD AUTO: 4.8 10*6/MM3 (ref 3.77–5.28)
SODIUM SERPL-SCNC: 138 MMOL/L (ref 136–145)
WBC NRBC COR # BLD: 6.18 10*3/MM3 (ref 3.4–10.8)

## 2023-06-09 PROCEDURE — 80053 COMPREHEN METABOLIC PANEL: CPT | Performed by: EMERGENCY MEDICINE

## 2023-06-09 PROCEDURE — 25010000002 KETOROLAC TROMETHAMINE PER 15 MG: Performed by: EMERGENCY MEDICINE

## 2023-06-09 PROCEDURE — 72131 CT LUMBAR SPINE W/O DYE: CPT

## 2023-06-09 PROCEDURE — 96374 THER/PROPH/DIAG INJ IV PUSH: CPT

## 2023-06-09 PROCEDURE — 99284 EMERGENCY DEPT VISIT MOD MDM: CPT

## 2023-06-09 PROCEDURE — 25010000002 DEXAMETHASONE PER 1 MG: Performed by: EMERGENCY MEDICINE

## 2023-06-09 PROCEDURE — 96375 TX/PRO/DX INJ NEW DRUG ADDON: CPT

## 2023-06-09 PROCEDURE — 85025 COMPLETE CBC W/AUTO DIFF WBC: CPT | Performed by: EMERGENCY MEDICINE

## 2023-06-09 RX ORDER — PREDNISONE 20 MG/1
TABLET ORAL
Qty: 10 TABLET | Refills: 0 | Status: SHIPPED | OUTPATIENT
Start: 2023-06-09

## 2023-06-09 RX ORDER — OXYCODONE AND ACETAMINOPHEN 10; 325 MG/1; MG/1
1 TABLET ORAL ONCE
Status: COMPLETED | OUTPATIENT
Start: 2023-06-09 | End: 2023-06-09

## 2023-06-09 RX ORDER — TIZANIDINE HYDROCHLORIDE 2 MG/1
2 CAPSULE, GELATIN COATED ORAL 3 TIMES DAILY PRN
Qty: 30 CAPSULE | Refills: 0 | Status: SHIPPED | OUTPATIENT
Start: 2023-06-09

## 2023-06-09 RX ORDER — CYCLOBENZAPRINE HCL 10 MG
10 TABLET ORAL ONCE
Status: COMPLETED | OUTPATIENT
Start: 2023-06-09 | End: 2023-06-09

## 2023-06-09 RX ORDER — KETOROLAC TROMETHAMINE 15 MG/ML
15 INJECTION, SOLUTION INTRAMUSCULAR; INTRAVENOUS ONCE
Status: COMPLETED | OUTPATIENT
Start: 2023-06-09 | End: 2023-06-09

## 2023-06-09 RX ORDER — DEXAMETHASONE SODIUM PHOSPHATE 4 MG/ML
4 INJECTION, SOLUTION INTRA-ARTICULAR; INTRALESIONAL; INTRAMUSCULAR; INTRAVENOUS; SOFT TISSUE ONCE
Status: COMPLETED | OUTPATIENT
Start: 2023-06-09 | End: 2023-06-09

## 2023-06-09 RX ORDER — CELECOXIB 200 MG/1
200 CAPSULE ORAL 2 TIMES DAILY
Qty: 60 CAPSULE | Refills: 0 | Status: SHIPPED | OUTPATIENT
Start: 2023-06-09

## 2023-06-09 RX ADMIN — CYCLOBENZAPRINE 10 MG: 10 TABLET, FILM COATED ORAL at 10:39

## 2023-06-09 RX ADMIN — KETOROLAC TROMETHAMINE 15 MG: 15 INJECTION, SOLUTION INTRAMUSCULAR; INTRAVENOUS at 10:39

## 2023-06-09 RX ADMIN — OXYCODONE HYDROCHLORIDE AND ACETAMINOPHEN 1 TABLET: 10; 325 TABLET ORAL at 10:39

## 2023-06-09 RX ADMIN — DEXAMETHASONE SODIUM PHOSPHATE 4 MG: 4 INJECTION INTRA-ARTICULAR; INTRALESIONAL; INTRAMUSCULAR; INTRAVENOUS; SOFT TISSUE at 10:39

## 2023-06-09 NOTE — DISCHARGE INSTRUCTIONS
Try to remain as active as possible.  Do not drive while taking muscle relaxers or narcotics.  Call your primary care provider.  Take the Celebrex only after eating.  Call your pain management provider as well for ongoing care.

## 2023-06-09 NOTE — ED PROVIDER NOTES
Subjective   History of Present Illness  Mrs. Obrien presents by ambulance from home with back pain.  When asked when it started she tells me that her pills quit working 4 weeks ago.  She denies any recent injury.  She denies fevers or chills.  She tells me that whenever she tries to walk or moves she has severe pain which prevent her from doing so.  She has history of chronic back pain and is on chronic narcotics for that.  She takes 10 mg Percocet 3 times daily.  Her last dose was 5 hours ago.  She was seen in the emergency department at [facility] on the 25th of last month for similar symptoms.  At that point was prescribed prednisone and Norflex.  She tells me she has called her pain management doctor and they tell her that she is not eligible for injections until next month.  She denies difficulty controlling her bowels or bladder.    Review of Systems    Past Medical History:   Diagnosis Date    Anxiety     Arthritis     Asthma     Back pain     Cancer     SQUAMOUS CELL/ BASAL CELL ON LIP    Chronic bronchitis     Depression     Diabetes mellitus     type 2    Fibroid     Fibromyalgia     High triglycerides     Hyperlipidemia     Hypertension     Migraine     MVP (mitral valve prolapse)     OA (osteoarthritis)     Ovarian cyst     Peripheral autonomic neuropathy     Pleurisy     Pneumonia     Rheumatism     RLS (restless legs syndrome)     Sleep apnea     Mild form per patient. recommended mouthpiece    Thyroid disease     Varicella        Allergies   Allergen Reactions    Penicillins Angioedema, Anaphylaxis, Hives, Itching, Shortness Of Breath and Swelling    Cephalexin Itching    Chlorhexidine Rash and Itching    Sulfa Antibiotics Rash and Itching    Adhesive Tape Rash     Tegaderm specific    Darvon [Propoxyphene] Unknown (See Comments)     Doesn't remember    Diazepam Other (See Comments)     Paradoxical effect/ hypes pt       Past Surgical History:   Procedure Laterality Date    APPENDECTOMY   "'s    CATARACT EXTRACTION W/ INTRAOCULAR LENS IMPLANT Right 2019    Procedure: CATARACT PHACO EXTRACTION WITH INTRAOCULAR LENS IMPLANT RIGHT;  Surgeon: Carl Babb MD;  Location: [facility] OR;  Service: Ophthalmology    CATARACT EXTRACTION W/ INTRAOCULAR LENS IMPLANT Left 10/07/2019    Procedure: CATARACT PHACO EXTRACTION WITH INTRAOCULAR LENS IMPLANT LEFT;  Surgeon: Carl Babb MD;  Location: [facility] OR;  Service: Ophthalmology     SECTION  , 1997    x 2     DILATATION AND CURETTAGE      HAND SURGERY Right     \"Pinched nerve surgery\"    HAND SURGERY Left     Joint removed secondary to arthritis    HYSTERECTOMY      ''still have one ovary''    KNEE SURGERY Right 2021    arthroscopy and partial medial meniscectomy Dr. Chavez    THYROIDECTOMY  1985    TONSILLECTOMY AND ADENOIDECTOMY      TOTAL KNEE ARTHROPLASTY Left 3/15/2023    Procedure: TOTAL KNEE ARTHROPLASTY -  LEFT;  Surgeon: Dayo Chavez MD;  [facility];  Service: Orthopedics;  Laterality: Left;       Family History   Problem Relation Age of Onset    Hypertension Mother     Heart attack Mother     Fibromyalgia Mother     Hyperlipidemia Mother     Thyroid disease Mother     Irritable bowel syndrome Mother     Heart attack Father     Cancer Brother     Stomach cancer Brother     Diabetes Maternal Aunt     Cancer Maternal Grandmother     Thyroid disease Maternal Grandmother     Cancer Paternal Grandmother     Colon cancer Neg Hx     Cirrhosis Neg Hx     Liver cancer Neg Hx     Liver disease Neg Hx        Social History     Socioeconomic History    Marital status:    Tobacco Use    Smoking status: Former     Packs/day: 0.25     Years: 15.00     Pack years: 3.75     Types: Cigarettes     Start date:      Quit date: 2004     Years since quittin.8    Smokeless tobacco: Never   Vaping Use    Vaping Use: Never used   Substance and Sexual Activity    Alcohol use: No    Drug " use: No    Sexual activity: Not Currently     Partners: Male     Birth control/protection: Abstinence           Objective   Physical Exam  Vitals and nursing note reviewed.   Constitutional:       General: She is not in acute distress.     Appearance: Normal appearance.   HENT:      Head: Normocephalic and atraumatic.      Nose: Nose normal. No congestion or rhinorrhea.   Eyes:      General: No scleral icterus.     Conjunctiva/sclera: Conjunctivae normal.   Neck:      Comments: No JVD   Cardiovascular:      Rate and Rhythm: Normal rate and regular rhythm.      Heart sounds: No murmur heard.    No friction rub.   Pulmonary:      Effort: Pulmonary effort is normal.      Breath sounds: Normal breath sounds. No wheezing or rales.   Abdominal:      General: Bowel sounds are normal.      Palpations: Abdomen is soft.      Tenderness: There is no abdominal tenderness. There is no guarding or rebound.   Musculoskeletal:         General: Tenderness present.      Cervical back: Normal range of motion and neck supple.      Right lower leg: No edema.      Left lower leg: No edema.      Comments: She is lying supine in bed.  She has pain with any attempt at movement.  She is tender to palpate across both lumbar paraspinous areas.  She appears to have muscle spasm with any attempt at movement   Skin:     General: Skin is warm and dry.      Coloration: Skin is not pale.      Findings: No erythema.   Neurological:      General: No focal deficit present.      Mental Status: She is alert and oriented to person, place, and time.      Motor: No weakness.      Coordination: Coordination normal.   Psychiatric:         Mood and Affect: Mood normal.         Behavior: Behavior normal.         Thought Content: Thought content normal.       Procedures           ED Course  ED Course as of 06/09/23 1605   Fri Jun 09, 2023   1121 I have ordered and reviewed CT scan results.  This shows DJD but does not show any acute fracture.  Labs unremarkable.   Awaiting urinalysis. [DT]   1122 I spoke with her about findings and treatment.  We will start her back on prednisone, she tells me she went off of her anti-inflammatory recently because her prescription ran out.  Will prescribe Naprosyn.  She will continue working with her pain management provider [DT]      ED Course User Index  [DT] Garett Padgett MD                                           Medical Decision Making  Please see course notes.  I reviewed records.  I gave IV medications.  I obtained and interpreted labs as being normal and indicating that her symptoms were unlikely to be due to infection.  I ordered and interpreted CT scan of her lumbar spine which showed no fractures but did show osteoarthritis.  I spoke with her at length about her treatment plan.    Problems Addressed:  Acute bilateral low back pain without sciatica: complicated acute illness or injury  Intractable pain: complicated acute illness or injury    Amount and/or Complexity of Data Reviewed  External Data Reviewed: notes.  Labs: ordered. Decision-making details documented in ED Course.  Radiology: ordered. Decision-making details documented in ED Course.    Risk  Prescription drug management.        Final diagnoses:   Acute bilateral low back pain without sciatica   Intractable pain       ED Disposition  ED Disposition       ED Disposition   Discharge    Condition   Stable    Comment   --               No follow-up provider specified.       Medication List        New Prescriptions      predniSONE 20 MG tablet  Commonly known as: DELTASONE  2 pills daily for 5 days     TiZANidine 2 MG capsule  Commonly known as: ZANAFLEX  Take 1 capsule by mouth 3 (Three) Times a Day As Needed for Muscle Spasms (backpain).            Changed      celecoxib 200 MG capsule  Commonly known as: CeleBREX  Take 1 capsule by mouth 2 (Two) Times a Day.  What changed:   how much to take  how to take this  when to take this     meclizine 25 MG tablet  Commonly  known as: ANTIVERT  Take 1 tablet by mouth Every 6 (Six) Hours As Needed for Dizziness.  What changed:   reasons to take this  additional instructions            Stop      doxycycline 100 MG capsule  Commonly known as: MONODOX     methocarbamol 750 MG tablet  Commonly known as: ROBAXIN               Where to Get Your Medications        These medications were sent to [facility] [id number] - [place], [place] - [address] AT [facility] [phone] [facility] [phone]   [address] [facility] [address]      Phone: [phone]   celecoxib 200 MG capsule  predniSONE 20 MG tablet  TiZANidine 2 MG capsule            Garett Padgett MD  06/09/23 0192

## 2023-06-22 PROBLEM — M17.11 PRIMARY OSTEOARTHRITIS OF RIGHT KNEE: Status: ACTIVE | Noted: 2023-06-22

## 2023-08-31 ENCOUNTER — LAB (OUTPATIENT)
Dept: LAB | Facility: HOSPITAL | Age: 63
End: 2023-08-31
Payer: COMMERCIAL

## 2023-08-31 ENCOUNTER — OFFICE VISIT (OUTPATIENT)
Dept: ORTHOPEDIC SURGERY | Facility: CLINIC | Age: 63
End: 2023-08-31
Payer: COMMERCIAL

## 2023-08-31 VITALS
WEIGHT: 220 LBS | DIASTOLIC BLOOD PRESSURE: 84 MMHG | SYSTOLIC BLOOD PRESSURE: 124 MMHG | HEIGHT: 66 IN | BODY MASS INDEX: 35.36 KG/M2

## 2023-08-31 DIAGNOSIS — M17.11 PRIMARY OSTEOARTHRITIS OF RIGHT KNEE: ICD-10-CM

## 2023-08-31 DIAGNOSIS — M17.11 PRIMARY OSTEOARTHRITIS OF RIGHT KNEE: Primary | ICD-10-CM

## 2023-08-31 LAB
ANION GAP SERPL CALCULATED.3IONS-SCNC: 9 MMOL/L (ref 5–15)
BASOPHILS # BLD AUTO: 0.05 10*3/MM3 (ref 0–0.2)
BASOPHILS NFR BLD AUTO: 0.9 % (ref 0–1.5)
BUN SERPL-MCNC: 14 MG/DL (ref 8–23)
BUN/CREAT SERPL: 20.6 (ref 7–25)
CALCIUM SPEC-SCNC: 10.5 MG/DL (ref 8.6–10.5)
CHLORIDE SERPL-SCNC: 98 MMOL/L (ref 98–107)
CO2 SERPL-SCNC: 27 MMOL/L (ref 22–29)
CREAT SERPL-MCNC: 0.68 MG/DL (ref 0.57–1)
DEPRECATED RDW RBC AUTO: 42.7 FL (ref 37–54)
EGFRCR SERPLBLD CKD-EPI 2021: 98.6 ML/MIN/1.73
EOSINOPHIL # BLD AUTO: 0.17 10*3/MM3 (ref 0–0.4)
EOSINOPHIL NFR BLD AUTO: 3.2 % (ref 0.3–6.2)
ERYTHROCYTE [DISTWIDTH] IN BLOOD BY AUTOMATED COUNT: 13.6 % (ref 12.3–15.4)
GLUCOSE SERPL-MCNC: 95 MG/DL (ref 65–99)
HCT VFR BLD AUTO: 39.9 % (ref 34–46.6)
HGB BLD-MCNC: 13.4 G/DL (ref 12–15.9)
IMM GRANULOCYTES # BLD AUTO: 0.01 10*3/MM3 (ref 0–0.05)
IMM GRANULOCYTES NFR BLD AUTO: 0.2 % (ref 0–0.5)
LYMPHOCYTES # BLD AUTO: 0.95 10*3/MM3 (ref 0.7–3.1)
LYMPHOCYTES NFR BLD AUTO: 17.9 % (ref 19.6–45.3)
MCH RBC QN AUTO: 28.6 PG (ref 26.6–33)
MCHC RBC AUTO-ENTMCNC: 33.6 G/DL (ref 31.5–35.7)
MCV RBC AUTO: 85.1 FL (ref 79–97)
MONOCYTES # BLD AUTO: 0.54 10*3/MM3 (ref 0.1–0.9)
MONOCYTES NFR BLD AUTO: 10.2 % (ref 5–12)
NEUTROPHILS NFR BLD AUTO: 3.6 10*3/MM3 (ref 1.7–7)
NEUTROPHILS NFR BLD AUTO: 67.6 % (ref 42.7–76)
NRBC BLD AUTO-RTO: 0 /100 WBC (ref 0–0.2)
PLATELET # BLD AUTO: 305 10*3/MM3 (ref 140–450)
PMV BLD AUTO: 10.8 FL (ref 6–12)
POTASSIUM SERPL-SCNC: 3.2 MMOL/L (ref 3.5–5.2)
RBC # BLD AUTO: 4.69 10*6/MM3 (ref 3.77–5.28)
SODIUM SERPL-SCNC: 134 MMOL/L (ref 136–145)
WBC NRBC COR # BLD: 5.32 10*3/MM3 (ref 3.4–10.8)

## 2023-08-31 PROCEDURE — 36415 COLL VENOUS BLD VENIPUNCTURE: CPT

## 2023-08-31 PROCEDURE — 80048 BASIC METABOLIC PNL TOTAL CA: CPT

## 2023-08-31 PROCEDURE — 85025 COMPLETE CBC W/AUTO DIFF WBC: CPT

## 2023-08-31 NOTE — PROGRESS NOTES
[facility] Note        Subjective     CC: Follow-up (1 month f/u - Primary osteoarthritis of right knee)      HPI    Janet Obrien is a 63 y.o. female.  Patient returns today for follow-up evaluation of her right lower leg.  Her TKA was canceled on 7/26/2023 due to a brown recluse spider bite to posterior medial calf on her right lower leg.  Immediately following the cancellation she was seen by a dermatologist who did a biopsy and then treated her with 28 days of oral and topical antibiotic.  Patient reports that she has been cleared by her dermatologist.  At this time she only has residual scarring to the right lower leg/calf.      Pain scale: 6-8/10 to right knee.  Associated symptoms swelling, popping, grinding, stiffness, giving away.  Pain is worse with walking, standing, stairs, sleeping, working, leisure, lying on the affected side, rising from a seated position, movement of joint.  At this time she reports nothing helps with improvement of pain.    Overall, patient's symptoms are worsening.    ROS:    Constiutional:Pt denies fever, chills, nausea, or vomiting.  MSK:as above        Objective      Past Medical History  Past Medical History:   Diagnosis Date    Anxiety     Arthritis     Asthma     Back pain     Cancer     SQUAMOUS CELL/ BASAL CELL ON LIP    Chronic bronchitis     Depression     Diabetes mellitus     type 2    Fibroid     Fibromyalgia     High triglycerides     Hyperlipidemia     Hypertension     Migraine     MVP (mitral valve prolapse)     OA (osteoarthritis)     Ovarian cyst     Peripheral autonomic neuropathy     Pleurisy     Pneumonia     Rheumatism     RLS (restless legs syndrome)     Sleep apnea     Mild form per patient. recommended mouthpiece    Thyroid disease     Varicella      Social History     Socioeconomic History    Marital status:    Tobacco Use    Smoking status: Former     Packs/day: 0.25     Years: 15.00     Pack years: 3.75     Types:  "Cigarettes     Start date:      Quit date: 2004     Years since quittin.1    Smokeless tobacco: Never   Vaping Use    Vaping Use: Never used   Substance and Sexual Activity    Alcohol use: No    Drug use: No    Sexual activity: Defer     Partners: Male     Birth control/protection: Abstinence          Physical Exam  /84   Ht 167.6 cm (65.98\")   Wt 99.8 kg (220 lb)   BMI 35.53 kg/mý     Body mass index is 35.53 kg/mý.    Patient is well nourished and well developed.        Ortho Exam  Right knee  Skin: Grossly intact without any redness or warmth.  Positive effusion.  Tenderness: Global tenderness throughout the knee but more pronounced along the medial joint line.  Motion: 5-120 degrees with crepitus and pain.  Testing: Varus and valgus stress test negative.  Lachman negative.  Straight leg raise: Intact.  Motor/sensory: Grossly intact L2-S1.    Right calf  Nickel size lesion noted posterior medial calf with hyperpigmented scarring to the area.  No redness no warmth no drainage.  Area is nontender.      Imaging/Labs/EMG Reviewed:  Ordered templating imaging of right knee.      Assessment:  1. Primary osteoarthritis of right knee        Plan:  Primary osteoarthritis right knee--plan will be for right TKA.  Patient was brought in today to make sure her spider bite was healed--it is.  Dr. Chavez has agreed to proceed with right knee TKA using Nora robot on 2023.  Patient plans on staying overnight after surgery.   DVT prophylaxis: Patient has family history of of DVTs--will be placed on Eliquis for 6 weeks following surgery.  Questions and concerns answered.    Patient was also examined by Dr. Chavez and he agrees with the above assessment and plan.      Nena Gallagher PA-C  23  13:24 EDT      Dictated Utilizing Dragon Dictation.  "

## 2023-08-31 NOTE — PROGRESS NOTES
I have reviewed the notes, assessments, and/or procedures performed by Nena Gallagher PA-C, I concur with her documentation of Janet Obrien.        Patient seen and examined with Nena this afternoon.  We have examined the spot on the right lower leg.  This does appear to be much improved when compared to her condition a few weeks ago which showed a resolving infection.  I think we can safely proceed with right TKA.  This will be done with a 23-hour stay.  Home health will be arranged.  Eliquis for DVT prophylaxis.  Patient understands the importance of PT.  We will track down her dermatology notes as well.  Patient has been informed of the robotic assisted TKA that is planned.

## 2023-09-01 ENCOUNTER — TELEPHONE (OUTPATIENT)
Dept: ORTHOPEDIC SURGERY | Facility: CLINIC | Age: 63
End: 2023-09-01

## 2023-09-01 DIAGNOSIS — M17.11 PRIMARY OSTEOARTHRITIS OF RIGHT KNEE: Primary | ICD-10-CM

## 2023-09-05 ENCOUNTER — ANESTHESIA EVENT (OUTPATIENT)
Dept: PERIOP | Facility: HOSPITAL | Age: 63
End: 2023-09-05
Payer: COMMERCIAL

## 2023-09-05 RX ORDER — SODIUM CHLORIDE 0.9 % (FLUSH) 0.9 %
10 SYRINGE (ML) INJECTION AS NEEDED
Status: CANCELLED | OUTPATIENT
Start: 2023-09-05

## 2023-09-05 RX ORDER — SODIUM CHLORIDE 0.9 % (FLUSH) 0.9 %
10 SYRINGE (ML) INJECTION EVERY 12 HOURS SCHEDULED
Status: CANCELLED | OUTPATIENT
Start: 2023-09-05

## 2023-09-05 RX ORDER — FAMOTIDINE 10 MG/ML
20 INJECTION, SOLUTION INTRAVENOUS ONCE
Status: CANCELLED | OUTPATIENT
Start: 2023-09-05 | End: 2023-09-05

## 2023-09-05 RX ORDER — SODIUM CHLORIDE 9 MG/ML
40 INJECTION, SOLUTION INTRAVENOUS AS NEEDED
Status: CANCELLED | OUTPATIENT
Start: 2023-09-05

## 2023-09-06 ENCOUNTER — ANESTHESIA (OUTPATIENT)
Dept: PERIOP | Facility: HOSPITAL | Age: 63
End: 2023-09-06
Payer: COMMERCIAL

## 2023-09-06 ENCOUNTER — APPOINTMENT (OUTPATIENT)
Dept: GENERAL RADIOLOGY | Facility: HOSPITAL | Age: 63
End: 2023-09-06
Payer: COMMERCIAL

## 2023-09-06 ENCOUNTER — ANESTHESIA EVENT CONVERTED (OUTPATIENT)
Dept: ANESTHESIOLOGY | Facility: HOSPITAL | Age: 63
End: 2023-09-06
Payer: COMMERCIAL

## 2023-09-06 ENCOUNTER — HOSPITAL ENCOUNTER (OUTPATIENT)
Facility: HOSPITAL | Age: 63
Discharge: HOME | End: 2023-09-10
Attending: ORTHOPAEDIC SURGERY | Admitting: ORTHOPAEDIC SURGERY
Payer: COMMERCIAL

## 2023-09-06 DIAGNOSIS — Z96.651 S/P TOTAL KNEE ARTHROPLASTY, RIGHT: Primary | ICD-10-CM

## 2023-09-06 DIAGNOSIS — M17.11 PRIMARY OSTEOARTHRITIS OF RIGHT KNEE: ICD-10-CM

## 2023-09-06 PROBLEM — E66.9 OBESITY: Status: ACTIVE | Noted: 2023-09-06

## 2023-09-06 LAB
ANION GAP SERPL CALCULATED.3IONS-SCNC: 7 MMOL/L (ref 5–15)
BUN SERPL-MCNC: 13 MG/DL (ref 8–23)
BUN/CREAT SERPL: 19.1 (ref 7–25)
CALCIUM SPEC-SCNC: 9.7 MG/DL (ref 8.6–10.5)
CHLORIDE SERPL-SCNC: 103 MMOL/L (ref 98–107)
CO2 SERPL-SCNC: 27 MMOL/L (ref 22–29)
CREAT SERPL-MCNC: 0.68 MG/DL (ref 0.57–1)
DEPRECATED RDW RBC AUTO: 42.8 FL (ref 37–54)
EGFRCR SERPLBLD CKD-EPI 2021: 98.6 ML/MIN/1.73
ERYTHROCYTE [DISTWIDTH] IN BLOOD BY AUTOMATED COUNT: 13.2 % (ref 12.3–15.4)
GLUCOSE BLDC GLUCOMTR-MCNC: 84 MG/DL (ref 70–130)
GLUCOSE SERPL-MCNC: 84 MG/DL (ref 65–99)
HCT VFR BLD AUTO: 37.7 % (ref 34–46.6)
HGB BLD-MCNC: 12.2 G/DL (ref 12–15.9)
MCH RBC QN AUTO: 28.3 PG (ref 26.6–33)
MCHC RBC AUTO-ENTMCNC: 32.4 G/DL (ref 31.5–35.7)
MCV RBC AUTO: 87.5 FL (ref 79–97)
PLATELET # BLD AUTO: 266 10*3/MM3 (ref 140–450)
PMV BLD AUTO: 10.7 FL (ref 6–12)
POTASSIUM SERPL-SCNC: 3.7 MMOL/L (ref 3.5–5.2)
RBC # BLD AUTO: 4.31 10*6/MM3 (ref 3.77–5.28)
SODIUM SERPL-SCNC: 137 MMOL/L (ref 136–145)
WBC NRBC COR # BLD: 7.63 10*3/MM3 (ref 3.4–10.8)

## 2023-09-06 PROCEDURE — C1755 CATHETER, INTRASPINAL: HCPCS | Performed by: ORTHOPAEDIC SURGERY

## 2023-09-06 PROCEDURE — C1776 JOINT DEVICE (IMPLANTABLE): HCPCS | Performed by: ORTHOPAEDIC SURGERY

## 2023-09-06 PROCEDURE — 25010000002 FENTANYL CITRATE (PF) 50 MCG/ML SOLUTION

## 2023-09-06 PROCEDURE — 25010000002 VANCOMYCIN 10 G RECONSTITUTED SOLUTION: Performed by: ORTHOPAEDIC SURGERY

## 2023-09-06 PROCEDURE — 25010000002 PROPOFOL 10 MG/ML EMULSION: Performed by: NURSE ANESTHETIST, CERTIFIED REGISTERED

## 2023-09-06 PROCEDURE — 27447 TOTAL KNEE ARTHROPLASTY: CPT | Performed by: ORTHOPAEDIC SURGERY

## 2023-09-06 PROCEDURE — 25010000002 SODIUM CHLORIDE 0.9 % WITH KCL 20 MEQ 20-0.9 MEQ/L-% SOLUTION: Performed by: ORTHOPAEDIC SURGERY

## 2023-09-06 PROCEDURE — 25010000002 KETOROLAC TROMETHAMINE PER 15 MG: Performed by: ORTHOPAEDIC SURGERY

## 2023-09-06 PROCEDURE — 25010000002 MIDAZOLAM PER 1 MG: Performed by: NURSE ANESTHETIST, CERTIFIED REGISTERED

## 2023-09-06 PROCEDURE — 80048 BASIC METABOLIC PNL TOTAL CA: CPT | Performed by: ANESTHESIOLOGY

## 2023-09-06 PROCEDURE — C1713 ANCHOR/SCREW BN/BN,TIS/BN: HCPCS | Performed by: ORTHOPAEDIC SURGERY

## 2023-09-06 PROCEDURE — 25010000002 ONDANSETRON PER 1 MG: Performed by: NURSE ANESTHETIST, CERTIFIED REGISTERED

## 2023-09-06 PROCEDURE — 27447 TOTAL KNEE ARTHROPLASTY: CPT | Performed by: PHYSICIAN ASSISTANT

## 2023-09-06 PROCEDURE — 25010000002 MORPHINE PER 10 MG: Performed by: ORTHOPAEDIC SURGERY

## 2023-09-06 PROCEDURE — 25010000002 ROPIVACAINE PER 1 MG: Performed by: NURSE ANESTHETIST, CERTIFIED REGISTERED

## 2023-09-06 PROCEDURE — 73560 X-RAY EXAM OF KNEE 1 OR 2: CPT

## 2023-09-06 PROCEDURE — 25010000002 BUPIVACAINE 0.5 % SOLUTION: Performed by: ANESTHESIOLOGY

## 2023-09-06 PROCEDURE — 85027 COMPLETE CBC AUTOMATED: CPT | Performed by: ANESTHESIOLOGY

## 2023-09-06 PROCEDURE — 25010000002 ROPIVACAINE PER 1 MG: Performed by: ORTHOPAEDIC SURGERY

## 2023-09-06 PROCEDURE — 82948 REAGENT STRIP/BLOOD GLUCOSE: CPT

## 2023-09-06 PROCEDURE — 20985 CPTR-ASST DIR MS PX: CPT | Performed by: ORTHOPAEDIC SURGERY

## 2023-09-06 RX ORDER — DULOXETIN HYDROCHLORIDE 60 MG/1
60 CAPSULE, DELAYED RELEASE ORAL DAILY
Status: DISCONTINUED | OUTPATIENT
Start: 2023-09-06 | End: 2023-09-10 | Stop reason: HOSPADM

## 2023-09-06 RX ORDER — VANCOMYCIN HYDROCHLORIDE 1 G/200ML
1000 INJECTION, SOLUTION INTRAVENOUS EVERY 12 HOURS
Status: COMPLETED | OUTPATIENT
Start: 2023-09-07 | End: 2023-09-08

## 2023-09-06 RX ORDER — DOCUSATE SODIUM 100 MG/1
100 CAPSULE, LIQUID FILLED ORAL 2 TIMES DAILY PRN
Status: DISCONTINUED | OUTPATIENT
Start: 2023-09-06 | End: 2023-09-10 | Stop reason: HOSPADM

## 2023-09-06 RX ORDER — MAGNESIUM HYDROXIDE 1200 MG/15ML
LIQUID ORAL AS NEEDED
Status: DISCONTINUED | OUTPATIENT
Start: 2023-09-06 | End: 2023-09-06 | Stop reason: HOSPADM

## 2023-09-06 RX ORDER — OXYCODONE HCL 10 MG/1
10 TABLET, FILM COATED, EXTENDED RELEASE ORAL ONCE
Status: COMPLETED | OUTPATIENT
Start: 2023-09-06 | End: 2023-09-06

## 2023-09-06 RX ORDER — SODIUM CHLORIDE 0.9 % (FLUSH) 0.9 %
3-10 SYRINGE (ML) INJECTION AS NEEDED
Status: DISCONTINUED | OUTPATIENT
Start: 2023-09-06 | End: 2023-09-10 | Stop reason: HOSPADM

## 2023-09-06 RX ORDER — ATORVASTATIN CALCIUM 40 MG/1
80 TABLET, FILM COATED ORAL DAILY
Status: DISCONTINUED | OUTPATIENT
Start: 2023-09-06 | End: 2023-09-10 | Stop reason: HOSPADM

## 2023-09-06 RX ORDER — ACETAMINOPHEN 500 MG
1000 TABLET ORAL ONCE
Status: COMPLETED | OUTPATIENT
Start: 2023-09-06 | End: 2023-09-06

## 2023-09-06 RX ORDER — ONDANSETRON 2 MG/ML
4 INJECTION INTRAMUSCULAR; INTRAVENOUS EVERY 6 HOURS PRN
Status: DISCONTINUED | OUTPATIENT
Start: 2023-09-06 | End: 2023-09-10 | Stop reason: HOSPADM

## 2023-09-06 RX ORDER — TRANEXAMIC ACID 10 MG/ML
1000 INJECTION, SOLUTION INTRAVENOUS ONCE
Status: DISCONTINUED | OUTPATIENT
Start: 2023-09-06 | End: 2023-09-06 | Stop reason: HOSPADM

## 2023-09-06 RX ORDER — SODIUM CHLORIDE 9 MG/ML
40 INJECTION, SOLUTION INTRAVENOUS AS NEEDED
Status: DISCONTINUED | OUTPATIENT
Start: 2023-09-06 | End: 2023-09-10 | Stop reason: HOSPADM

## 2023-09-06 RX ORDER — LIDOCAINE HYDROCHLORIDE 10 MG/ML
INJECTION, SOLUTION EPIDURAL; INFILTRATION; INTRACAUDAL; PERINEURAL AS NEEDED
Status: DISCONTINUED | OUTPATIENT
Start: 2023-09-06 | End: 2023-09-06 | Stop reason: SURG

## 2023-09-06 RX ORDER — MELOXICAM 15 MG/1
15 TABLET ORAL DAILY
Status: DISCONTINUED | OUTPATIENT
Start: 2023-09-07 | End: 2023-09-10 | Stop reason: HOSPADM

## 2023-09-06 RX ORDER — SODIUM CHLORIDE, SODIUM LACTATE, POTASSIUM CHLORIDE, CALCIUM CHLORIDE 600; 310; 30; 20 MG/100ML; MG/100ML; MG/100ML; MG/100ML
9 INJECTION, SOLUTION INTRAVENOUS CONTINUOUS
Status: DISCONTINUED | OUTPATIENT
Start: 2023-09-06 | End: 2023-09-10 | Stop reason: HOSPADM

## 2023-09-06 RX ORDER — NALOXONE HCL 0.4 MG/ML
0.4 VIAL (ML) INJECTION
Status: DISCONTINUED | OUTPATIENT
Start: 2023-09-06 | End: 2023-09-10 | Stop reason: HOSPADM

## 2023-09-06 RX ORDER — ROPIVACAINE HYDROCHLORIDE 2 MG/ML
INJECTION, SOLUTION EPIDURAL; INFILTRATION; PERINEURAL CONTINUOUS
Status: DISCONTINUED | OUTPATIENT
Start: 2023-09-06 | End: 2023-09-10 | Stop reason: HOSPADM

## 2023-09-06 RX ORDER — PROPOFOL 10 MG/ML
VIAL (ML) INTRAVENOUS AS NEEDED
Status: DISCONTINUED | OUTPATIENT
Start: 2023-09-06 | End: 2023-09-06 | Stop reason: SURG

## 2023-09-06 RX ORDER — LIDOCAINE HYDROCHLORIDE 10 MG/ML
0.5 INJECTION, SOLUTION EPIDURAL; INFILTRATION; INTRACAUDAL; PERINEURAL ONCE AS NEEDED
Status: COMPLETED | OUTPATIENT
Start: 2023-09-06 | End: 2023-09-06

## 2023-09-06 RX ORDER — VANCOMYCIN/0.9 % SOD CHLORIDE 1.5G/250ML
15 PLASTIC BAG, INJECTION (ML) INTRAVENOUS ONCE
Status: COMPLETED | OUTPATIENT
Start: 2023-09-06 | End: 2023-09-06

## 2023-09-06 RX ORDER — ONDANSETRON 2 MG/ML
INJECTION INTRAMUSCULAR; INTRAVENOUS AS NEEDED
Status: DISCONTINUED | OUTPATIENT
Start: 2023-09-06 | End: 2023-09-06 | Stop reason: SDUPTHER

## 2023-09-06 RX ORDER — FENTANYL CITRATE 50 UG/ML
50 INJECTION, SOLUTION INTRAMUSCULAR; INTRAVENOUS
Status: DISCONTINUED | OUTPATIENT
Start: 2023-09-06 | End: 2023-09-06 | Stop reason: HOSPADM

## 2023-09-06 RX ORDER — ALBUTEROL SULFATE 2.5 MG/3ML
2.5 SOLUTION RESPIRATORY (INHALATION) ONCE AS NEEDED
Status: DISCONTINUED | OUTPATIENT
Start: 2023-09-06 | End: 2023-09-06 | Stop reason: HOSPADM

## 2023-09-06 RX ORDER — FENTANYL CITRATE 50 UG/ML
INJECTION, SOLUTION INTRAMUSCULAR; INTRAVENOUS
Status: COMPLETED
Start: 2023-09-06 | End: 2023-09-06

## 2023-09-06 RX ORDER — ALBUTEROL SULFATE 90 UG/1
2 AEROSOL, METERED RESPIRATORY (INHALATION) EVERY 4 HOURS PRN
Status: DISCONTINUED | OUTPATIENT
Start: 2023-09-06 | End: 2023-09-10 | Stop reason: HOSPADM

## 2023-09-06 RX ORDER — ONDANSETRON 4 MG/1
4 TABLET, FILM COATED ORAL EVERY 6 HOURS PRN
Status: DISCONTINUED | OUTPATIENT
Start: 2023-09-06 | End: 2023-09-10 | Stop reason: HOSPADM

## 2023-09-06 RX ORDER — CEFAZOLIN SODIUM 2 G/100ML
2 INJECTION, SOLUTION INTRAVENOUS EVERY 8 HOURS
Status: DISCONTINUED | OUTPATIENT
Start: 2023-09-06 | End: 2023-09-06

## 2023-09-06 RX ORDER — DROPERIDOL 2.5 MG/ML
0.62 INJECTION, SOLUTION INTRAMUSCULAR; INTRAVENOUS ONCE AS NEEDED
Status: DISCONTINUED | OUTPATIENT
Start: 2023-09-06 | End: 2023-09-06 | Stop reason: HOSPADM

## 2023-09-06 RX ORDER — HYDROMORPHONE HYDROCHLORIDE 1 MG/ML
0.5 INJECTION, SOLUTION INTRAMUSCULAR; INTRAVENOUS; SUBCUTANEOUS
Status: DISCONTINUED | OUTPATIENT
Start: 2023-09-06 | End: 2023-09-06 | Stop reason: HOSPADM

## 2023-09-06 RX ORDER — LEVOTHYROXINE SODIUM 0.12 MG/1
125 TABLET ORAL
Status: DISCONTINUED | OUTPATIENT
Start: 2023-09-07 | End: 2023-09-10 | Stop reason: HOSPADM

## 2023-09-06 RX ORDER — TRANEXAMIC ACID 10 MG/ML
1000 INJECTION, SOLUTION INTRAVENOUS ONCE
Status: COMPLETED | OUTPATIENT
Start: 2023-09-06 | End: 2023-09-06

## 2023-09-06 RX ORDER — ACETAMINOPHEN 500 MG
1000 TABLET ORAL EVERY 6 HOURS
Status: DISCONTINUED | OUTPATIENT
Start: 2023-09-06 | End: 2023-09-10 | Stop reason: HOSPADM

## 2023-09-06 RX ORDER — MECLIZINE HYDROCHLORIDE 25 MG/1
25 TABLET ORAL EVERY 6 HOURS PRN
Status: DISCONTINUED | OUTPATIENT
Start: 2023-09-06 | End: 2023-09-10 | Stop reason: HOSPADM

## 2023-09-06 RX ORDER — SODIUM CHLORIDE 0.9 % (FLUSH) 0.9 %
3 SYRINGE (ML) INJECTION EVERY 12 HOURS SCHEDULED
Status: DISCONTINUED | OUTPATIENT
Start: 2023-09-06 | End: 2023-09-10 | Stop reason: HOSPADM

## 2023-09-06 RX ORDER — MIDAZOLAM HYDROCHLORIDE 1 MG/ML
INJECTION INTRAMUSCULAR; INTRAVENOUS AS NEEDED
Status: DISCONTINUED | OUTPATIENT
Start: 2023-09-06 | End: 2023-09-06 | Stop reason: SURG

## 2023-09-06 RX ORDER — FAMOTIDINE 20 MG/1
20 TABLET, FILM COATED ORAL ONCE
Status: COMPLETED | OUTPATIENT
Start: 2023-09-06 | End: 2023-09-06

## 2023-09-06 RX ORDER — OXYCODONE HYDROCHLORIDE 5 MG/1
TABLET ORAL
Status: COMPLETED
Start: 2023-09-06 | End: 2023-09-06

## 2023-09-06 RX ORDER — BUPIVACAINE HYDROCHLORIDE 5 MG/ML
INJECTION, SOLUTION PERINEURAL
Status: COMPLETED | OUTPATIENT
Start: 2023-09-06 | End: 2023-09-06

## 2023-09-06 RX ORDER — SODIUM CHLORIDE AND POTASSIUM CHLORIDE 150; 900 MG/100ML; MG/100ML
50 INJECTION, SOLUTION INTRAVENOUS CONTINUOUS
Status: DISCONTINUED | OUTPATIENT
Start: 2023-09-06 | End: 2023-09-10 | Stop reason: HOSPADM

## 2023-09-06 RX ORDER — LABETALOL HYDROCHLORIDE 5 MG/ML
10 INJECTION, SOLUTION INTRAVENOUS EVERY 4 HOURS PRN
Status: DISCONTINUED | OUTPATIENT
Start: 2023-09-06 | End: 2023-09-10 | Stop reason: HOSPADM

## 2023-09-06 RX ORDER — ONDANSETRON 2 MG/ML
4 INJECTION INTRAMUSCULAR; INTRAVENOUS ONCE AS NEEDED
Status: DISCONTINUED | OUTPATIENT
Start: 2023-09-06 | End: 2023-09-06 | Stop reason: HOSPADM

## 2023-09-06 RX ORDER — OXYCODONE HYDROCHLORIDE 5 MG/1
5 TABLET ORAL EVERY 4 HOURS PRN
Status: DISCONTINUED | OUTPATIENT
Start: 2023-09-06 | End: 2023-09-07

## 2023-09-06 RX ADMIN — ACETAMINOPHEN 1000 MG: 500 TABLET ORAL at 12:37

## 2023-09-06 RX ADMIN — ACETAMINOPHEN 1000 MG: 500 TABLET ORAL at 23:16

## 2023-09-06 RX ADMIN — ATORVASTATIN CALCIUM 80 MG: 40 TABLET, FILM COATED ORAL at 23:17

## 2023-09-06 RX ADMIN — OXYCODONE HYDROCHLORIDE 5 MG: 5 TABLET ORAL at 23:21

## 2023-09-06 RX ADMIN — LIDOCAINE HYDROCHLORIDE 50 MG: 10 INJECTION, SOLUTION EPIDURAL; INFILTRATION; INTRACAUDAL; PERINEURAL at 14:38

## 2023-09-06 RX ADMIN — TRANEXAMIC ACID 1000 MG: 10 INJECTION, SOLUTION INTRAVENOUS at 14:59

## 2023-09-06 RX ADMIN — SODIUM CHLORIDE, POTASSIUM CHLORIDE, SODIUM LACTATE AND CALCIUM CHLORIDE 9 ML/HR: 600; 310; 30; 20 INJECTION, SOLUTION INTRAVENOUS at 12:35

## 2023-09-06 RX ADMIN — OXYCODONE HYDROCHLORIDE 5 MG: 5 TABLET ORAL at 18:59

## 2023-09-06 RX ADMIN — TRANEXAMIC ACID 1000 MG: 10 INJECTION, SOLUTION INTRAVENOUS at 16:41

## 2023-09-06 RX ADMIN — POTASSIUM CHLORIDE AND SODIUM CHLORIDE 50 ML/HR: 900; 150 INJECTION, SOLUTION INTRAVENOUS at 23:16

## 2023-09-06 RX ADMIN — BUPIVACAINE HYDROCHLORIDE 2 ML: 5 INJECTION, SOLUTION PERINEURAL at 14:46

## 2023-09-06 RX ADMIN — Medication 3 ML: at 23:17

## 2023-09-06 RX ADMIN — Medication 1000 MG: at 18:29

## 2023-09-06 RX ADMIN — FENTANYL CITRATE 50 MCG: 50 INJECTION, SOLUTION INTRAMUSCULAR; INTRAVENOUS at 18:59

## 2023-09-06 RX ADMIN — VANCOMYCIN HYDROCHLORIDE 1500 MG: 10 INJECTION, POWDER, LYOPHILIZED, FOR SOLUTION INTRAVENOUS at 12:45

## 2023-09-06 RX ADMIN — PROPOFOL 50 MCG/KG/MIN: 10 INJECTION, EMULSION INTRAVENOUS at 14:50

## 2023-09-06 RX ADMIN — MIDAZOLAM HYDROCHLORIDE 2 MG: 1 INJECTION, SOLUTION INTRAMUSCULAR; INTRAVENOUS at 15:02

## 2023-09-06 RX ADMIN — FAMOTIDINE 20 MG: 20 TABLET ORAL at 12:37

## 2023-09-06 RX ADMIN — ONDANSETRON 4 MG: 2 INJECTION INTRAMUSCULAR; INTRAVENOUS at 17:15

## 2023-09-06 RX ADMIN — SODIUM CHLORIDE, POTASSIUM CHLORIDE, SODIUM LACTATE AND CALCIUM CHLORIDE: 600; 310; 30; 20 INJECTION, SOLUTION INTRAVENOUS at 16:41

## 2023-09-06 RX ADMIN — LIDOCAINE HYDROCHLORIDE 0.2 ML: 10 INJECTION, SOLUTION EPIDURAL; INFILTRATION; INTRACAUDAL; PERINEURAL at 12:35

## 2023-09-06 RX ADMIN — PROPOFOL 50 MG: 10 INJECTION, EMULSION INTRAVENOUS at 14:38

## 2023-09-06 RX ADMIN — OXYCODONE HYDROCHLORIDE 10 MG: 10 TABLET, FILM COATED, EXTENDED RELEASE ORAL at 12:37

## 2023-09-06 NOTE — H&P
Patient Name: Janet Obrien  MRN: [id number]  : 1960  DOS: 2023    Attending: Dayo Chavez,*    Primary Care Provider: Noe Davenport MD      Chief complaint:  Right Knee Pain.    Subjective   Patient is a pleasant 63 y.o. female presented for scheduled surgery by .    She has history of osteoarthritis and right knee that has failed conservative management, she opted to proceed with surgery.    When seen preoperatively she is doing well, reviewed with patient her past medical history and home medications.  Motivated to have her surgery done and get started with her rehab.    Subsequent notes indicate she later underwent right total knee arthroplasty by Dr. Palacios.  Surgery was done under spinal anesthesia and was tolerated well, adductor canal nerve block catheter was placed by acute pain service.      Allergies:  Allergies   Allergen Reactions    Penicillins Angioedema, Anaphylaxis, Hives, Itching, Shortness Of Breath and Swelling    Cephalexin Itching    Chlorhexidine Rash and Itching    Sulfa Antibiotics Rash and Itching    Adhesive Tape Rash     Tegaderm specific    Darvon [Propoxyphene] Unknown (See Comments)     Doesn't remember    Diazepam Other (See Comments)     Paradoxical effect/ hypes pt        Medications Prior to Admission   Medication Sig Dispense Refill Last Dose    atorvastatin (LIPITOR) 80 MG tablet Take 1 tablet by mouth Daily.   2023    celecoxib (CeleBREX) 200 MG capsule Take 1 capsule by mouth 2 (Two) Times a Day. 60 capsule 0 2023    Cholecalciferol (vitamin D3) 125 MCG (5000 UT) tablet tablet Take 1 tablet by mouth Daily.   2023    DULoxetine (CYMBALTA) 60 MG capsule Take 1 capsule by mouth Daily.   2023    hydrochlorothiazide (HYDRODIURIL) 25 MG tablet Take 1 tablet by mouth Daily.   2023    oxyCODONE-acetaminophen (PERCOCET)  MG per tablet Take 1 tablet by mouth Every 8 (Eight) Hours As Needed.   2023    Synthroid 150  MCG tablet Take 1 tablet by mouth Daily.   9/6/2023 at 0600    meclizine (ANTIVERT) 25 MG tablet Take 1 tablet by mouth Every 6 (Six) Hours As Needed for Dizziness. (Patient taking differently: Take 1 tablet by mouth Every 6 (Six) Hours As Needed for Dizziness or Nausea. NOT CURRENTLY TAKING) 20 tablet 0 More than a month    montelukast (SINGULAIR) 10 MG tablet Take 1 tablet by mouth Every Night. 30 tablet 3 More than a month    naloxone (NARCAN) 4 MG/0.1ML nasal spray        Ozempic, 1 MG/DOSE, 4 MG/3ML solution pen-injector 1 (One) Time Per Week. THURSDAY  Indications: Type 2 Diabetes   8/31/2023    TiZANidine (ZANAFLEX) 2 MG capsule Take 1 capsule by mouth 3 (Three) Times a Day As Needed for Muscle Spasms (backpain). 30 capsule 0 More than a month    Ventolin  (90 Base) MCG/ACT inhaler Inhale 2 puffs Every 4 (Four) Hours As Needed for Wheezing. 18 g 3 9/4/2023          Past Medical History:   Diagnosis Date    Anxiety     Arthritis     Asthma     Back pain     Cancer     SQUAMOUS CELL/ BASAL CELL ON LIP, under the nose as well    Chronic bronchitis     Depression     Diabetes mellitus     type 2    Fibromyalgia     High triglycerides     Hyperlipidemia     Hypertension     Hypothyroidism     Migraine     MVP (mitral valve prolapse)     OA (osteoarthritis)     Ovarian cyst     Peripheral autonomic neuropathy     Pleurisy     Pneumonia     Rheumatism     RLS (restless legs syndrome)     Sleep apnea     Mild form per patient. recommended mouthpiece     Past Surgical History:   Procedure Laterality Date    APPENDECTOMY  1970's    CATARACT EXTRACTION W/ INTRAOCULAR LENS IMPLANT Right 09/16/2019    Procedure: CATARACT PHACO EXTRACTION WITH INTRAOCULAR LENS IMPLANT RIGHT;  Surgeon: Carl Babb MD;  Location: [facility];  Service: Ophthalmology    CATARACT EXTRACTION W/ INTRAOCULAR LENS IMPLANT Left 10/07/2019    Procedure: CATARACT PHACO EXTRACTION WITH INTRAOCULAR LENS IMPLANT LEFT;  Surgeon: Holley  "Carl Tomlinson MD;  Location: [facility] OR;  Service: Ophthalmology     SECTION  , 1997    x 2     DILATATION AND CURETTAGE      HAND SURGERY Right     \"Pinched nerve surgery\"    HAND SURGERY Left     Joint removed secondary to arthritis    HYSTERECTOMY      ''still have one ovary''    KNEE SURGERY Right 2021    arthroscopy and partial medial meniscectomy Dr. Chavez    THYROIDECTOMY  1985    TONSILLECTOMY AND ADENOIDECTOMY  1965    TOTAL KNEE ARTHROPLASTY Left 03/15/2023    Procedure: TOTAL KNEE ARTHROPLASTY -  LEFT;  Surgeon: Dayo Chavez MD;  Location: [facility] OR;  Service: Orthopedics;  Laterality: Left;     Family History   Problem Relation Age of Onset    Hypertension Mother     Heart attack Mother     Fibromyalgia Mother     Hyperlipidemia Mother     Thyroid disease Mother     Irritable bowel syndrome Mother     Heart attack Father     Cancer Brother     Stomach cancer Brother     Diabetes Maternal Aunt     Cancer Maternal Grandmother     Thyroid disease Maternal Grandmother     Cancer Paternal Grandmother     Colon cancer Neg Hx     Cirrhosis Neg Hx     Liver cancer Neg Hx     Liver disease Neg Hx      Social History     Tobacco Use    Smoking status: Former     Packs/day: 0.25     Years: 15.00     Pack years: 3.75     Types: Cigarettes     Start date:      Quit date: 2004     Years since quittin.1    Smokeless tobacco: Never   Vaping Use    Vaping Use: Never used   Substance Use Topics    Alcohol use: No    Drug use: No       Review of Systems  Pertinent items are noted in HPI    Vital Signs  /79 (BP Location: Right arm, Patient Position: Sitting)   Pulse 67   Temp 97.6 °F (36.4 °C) (Temporal)   Resp 18   Ht 167.6 cm (66\")   Wt 99.8 kg (220 lb)   SpO2 98%   BMI 35.51 kg/m²     Physical Exam:    General Appearance:    Alert, cooperative, in no acute distress   Head:    Normocephalic, without obvious abnormality, atraumatic   Eyes:            Lids and " lashes normal, conjunctivae and sclerae normal, no   icterus, no pallor, corneas clear    Ears:    Ears appear intact with no abnormalities noted   Throat:   No oral lesions, no thrush, oral mucosa moist   Neck:   No adenopathy, supple, trachea midline, no thyromegaly         Lungs:     Clear to auscultation,respirations regular, even and                   unlabored    Heart:    Regular rhythm and normal rate, normal S1 and S2, no       murmur, no gallop   Abdomen:     Normal bowel sounds, no masses, no organomegaly, soft        non-tender, non-distended, no guarding, no rebound                 tenderness   Genitalia:    Deferred   Extremities: No clubbing, cyanosis, or edema when seen preop   Pulses:   Pulses palpable and equal bilaterally   Skin:   No bleeding, bruising or rash   Neurologic:   Cranial nerves 2 - 12 grossly intact, Flexion and dorsiflexion intact bilateral feet.        I reviewed the patient's new clinical results.       Results from last 7 days   Lab Units 09/06/23  1147 08/31/23  0907   WBC 10*3/mm3 7.63 5.32   HEMOGLOBIN g/dL 12.2 13.4   HEMATOCRIT % 37.7 39.9   PLATELETS 10*3/mm3 266 305     Results from last 7 days   Lab Units 09/06/23  1147 08/31/23  0907   SODIUM mmol/L 137 134*   POTASSIUM mmol/L 3.7 3.2*   CHLORIDE mmol/L 103 98   CO2 mmol/L 27.0 27.0   BUN mg/dL 13 14   CREATININE mg/dL 0.68 0.68   CALCIUM mg/dL 9.7 10.5   GLUCOSE mg/dL 84 95     Lab Results   Component Value Date    HGBA1C 5.50 07/13/2023           Assessment and Plan:   Status post right total knee replacement    Primary osteoarthritis of right knee    Hypothyroidism    Diabetes mellitus    Essential hypertension    Hyperlipidemia    Obesity      Plan  1. PT/OT,  Weight bearing as tolerated RLE  2. Pain control-prns, ACB cath with ropivacaine infusion.  3. IS-encourage  4. DVT proph- Mechanicals and Apixaban.  5. Bowel regimen  6. Resume home medications as appropriate  7. Monitor post-op labs  8. DC planning for  home.    - Hypertension:  Resume home medications as appropriate, formulary substitution when indicated.  Holding parameters.  Prn medications for elevated blood pressure.    -DM type 2  Hgb A1C 5.5.  Hold OHA as appropriate  FSBG AC/HS, ( q 6 when NPO), along with correction humalog.  Long acting insulin if needed.    -Dyslipidemia:  Resume home regimen statin.( formulary substitution when appropriate).    -Hypothyroidism: Resume replacement.      Dragon disclaimer:  Part of this encounter note is an electronic transcription/translation of spoken language to printed text. The electronic translation of spoken language may permit erroneous, or at times, nonsensical words or phrases to be inadvertently transcribed; Although I have reviewed the note for such errors, some may still exist.    Fito Tang MD  09/06/23  16:23 EDT

## 2023-09-06 NOTE — H&P
Pre-Op H&P  Janet Obrien  [id number]  1960      Chief complaint: Right Knee Pain      Subjective:  Patient is a 63 y.o.female presents for scheduled surgery by Dr. Chavez. She anticipates a TOTAL KNEE ARTHROPLASTY WITH BERNICE ROBOT - RIGHT - Right today.  The patient endorses having right knee pain that has been going on for the past 2 years.  The pain is present all the time, regardless of movement.  She noted that her right leg would give out at times, but she denied having any recent falls.  Nothing has helped to alleviate her pain.  She denies the use of a walker or assistive device.    Review of Systems:  Constitutional-- No fever, chills or sweats. No fatigue.  CV-- No chest pain, palpitation or syncope. +HTN, HLD.  Resp-- No SOB, cough, hemoptysis. + LINDSAY w/o CPAP use.  Skin--No rashes or lesions      Allergies:   Allergies   Allergen Reactions    Penicillins Angioedema, Anaphylaxis, Hives, Itching, Shortness Of Breath and Swelling    Cephalexin Itching    Chlorhexidine Rash and Itching    Sulfa Antibiotics Rash and Itching    Adhesive Tape Rash     Tegaderm specific    Darvon [Propoxyphene] Unknown (See Comments)     Doesn't remember    Diazepam Other (See Comments)     Paradoxical effect/ hypes pt         Home Meds:  Medications Prior to Admission   Medication Sig Dispense Refill Last Dose    atorvastatin (LIPITOR) 80 MG tablet Take 1 tablet by mouth Daily.   9/5/2023    celecoxib (CeleBREX) 200 MG capsule Take 1 capsule by mouth 2 (Two) Times a Day. 60 capsule 0 9/5/2023    Cholecalciferol (vitamin D3) 125 MCG (5000 UT) tablet tablet Take 1 tablet by mouth Daily.   9/5/2023    DULoxetine (CYMBALTA) 60 MG capsule Take 1 capsule by mouth Daily.   9/5/2023    hydrochlorothiazide (HYDRODIURIL) 25 MG tablet Take 1 tablet by mouth Daily.   9/5/2023    oxyCODONE-acetaminophen (PERCOCET)  MG per tablet Take 1 tablet by mouth Every 8 (Eight) Hours As Needed.   9/5/2023    Synthroid 150 MCG tablet  Take 1 tablet by mouth Daily.   9/6/2023 at 0600    meclizine (ANTIVERT) 25 MG tablet Take 1 tablet by mouth Every 6 (Six) Hours As Needed for Dizziness. (Patient taking differently: Take 1 tablet by mouth Every 6 (Six) Hours As Needed for Dizziness or Nausea. NOT CURRENTLY TAKING) 20 tablet 0 More than a month    montelukast (SINGULAIR) 10 MG tablet Take 1 tablet by mouth Every Night. 30 tablet 3 More than a month    naloxone (NARCAN) 4 MG/0.1ML nasal spray        Ozempic, 1 MG/DOSE, 4 MG/3ML solution pen-injector 1 (One) Time Per Week. THURSDAY  Indications: Type 2 Diabetes   8/31/2023    TiZANidine (ZANAFLEX) 2 MG capsule Take 1 capsule by mouth 3 (Three) Times a Day As Needed for Muscle Spasms (backpain). 30 capsule 0 More than a month    Ventolin  (90 Base) MCG/ACT inhaler Inhale 2 puffs Every 4 (Four) Hours As Needed for Wheezing. 18 g 3 9/4/2023         PMH:   Past Medical History:   Diagnosis Date    Anxiety     Arthritis     Asthma     Back pain     Cancer     SQUAMOUS CELL/ BASAL CELL ON LIP    Chronic bronchitis     Depression     Diabetes mellitus     type 2    Fibromyalgia     High triglycerides     Hyperlipidemia     Hypertension     Hypothyroidism     Migraine     MVP (mitral valve prolapse)     OA (osteoarthritis)     Ovarian cyst     Peripheral autonomic neuropathy     Pleurisy     Pneumonia     Rheumatism     RLS (restless legs syndrome)     Sleep apnea     Mild form per patient. recommended mouthpiece     PSH:    Past Surgical History:   Procedure Laterality Date    APPENDECTOMY  1970's    CATARACT EXTRACTION W/ INTRAOCULAR LENS IMPLANT Right 09/16/2019    Procedure: CATARACT PHACO EXTRACTION WITH INTRAOCULAR LENS IMPLANT RIGHT;  Surgeon: Carl Babb MD;  Location: [facility];  Service: Ophthalmology    CATARACT EXTRACTION W/ INTRAOCULAR LENS IMPLANT Left 10/07/2019    Procedure: CATARACT PHACO EXTRACTION WITH INTRAOCULAR LENS IMPLANT LEFT;  Surgeon: Carl Babb MD;   "Location: [facility] OR;  Service: Ophthalmology     SECTION  , 1997    x 2     DILATATION AND CURETTAGE      HAND SURGERY Right     \"Pinched nerve surgery\"    HAND SURGERY Left     Joint removed secondary to arthritis    HYSTERECTOMY      ''still have one ovary''    KNEE SURGERY Right 2021    arthroscopy and partial medial meniscectomy Dr. Chavez    THYROIDECTOMY  1985    TONSILLECTOMY AND ADENOIDECTOMY  1965    TOTAL KNEE ARTHROPLASTY Left 03/15/2023    Procedure: TOTAL KNEE ARTHROPLASTY -  LEFT;  Surgeon: Dayo Chavez MD;  Location: [facility] OR;  Service: Orthopedics;  Laterality: Left;       Immunization History:  Influenza: No  Pneumococcal: No  Tetanus: Yes  Covid : x3    Social History:   Tobacco:   Social History     Tobacco Use   Smoking Status Former    Packs/day: 0.25    Years: 15.00    Pack years: 3.75    Types: Cigarettes    Start date:     Quit date: 2004    Years since quittin.1   Smokeless Tobacco Never      Alcohol:     Social History     Substance and Sexual Activity   Alcohol Use No         Physical Exam:/79 (BP Location: Right arm, Patient Position: Sitting)   Pulse 67   Temp 97.6 °F (36.4 °C) (Temporal)   Resp 18   Ht 167.6 cm (66\")   Wt 99.8 kg (220 lb)   SpO2 98%   BMI 35.51 kg/m²       General Appearance:    Alert, cooperative, no distress, appears stated age   Head:    Normocephalic, without obvious abnormality, atraumatic   Lungs:     Clear to auscultation bilaterally, respirations unlabored    Heart:   Regular rate and rhythm, S1 and S2 normal    Abdomen:    Soft without tenderness   Extremities:   Extremities normal, atraumatic, no cyanosis or edema   Skin:   Skin color, texture, turgor normal, no rashes or lesions   Neurologic:   Grossly intact     Results Review:     LABS:  Lab Results   Component Value Date    WBC 5.32 2023    HGB 13.4 2023    HCT 39.9 2023    MCV 85.1 2023     2023    " NEUTROABS 3.60 08/31/2023    GLUCOSE 95 08/31/2023    BUN 14 08/31/2023    CREATININE 0.68 08/31/2023    EGFRIFNONA 91 11/19/2021     (L) 08/31/2023    K 3.2 (L) 08/31/2023    CL 98 08/31/2023    CO2 27.0 08/31/2023    MG 2.4 05/09/2021    CALCIUM 10.5 08/31/2023    ALBUMIN 4.0 06/09/2023    AST 21 06/09/2023    ALT 18 06/09/2023    BILITOT 0.7 06/09/2023       RADIOLOGY:  TIB/FIB RIGHT Sep  3 2020 - 01:04; KNEE 3 VIEWS RIGHT Sep  3 2020 - 01:04; FEMUR 2 VIEWS RIGHT Sep  3 2020 - 01:04;        CLINICAL INDICATION:   Knee pain.     TECHNIQUE:   FEMUR 2 VIEWS RIGHT, KNEE 3 VIEWS RIGHT, TIB/FIB RIGHT     COMPARISON:   None.     FINDINGS:   Right femur: Mild degenerative changes of the femoral acetabular joint.   Insertional changes along the g reater trochanter. No acute fracture or   dislocation.     Right knee: Moderate degenerative changes. No acute fracture or   dislocation.     Right tibia/fibula: No acute fracture or dislocation.      IMPRESSION:   No acute osseous abnormality of the ri ght femur, knee, tibia, fibula.     Degenerative changes as above.           I reviewed the patient's new clinical results.    Impression:  Primary osteoarthritis of right knee       Plan: TOTAL KNEE ARTHROPLASTY WITH BERNICE NIKIA - RIGHT - Right       Herminio Canales, NARA   9/6/2023   12:12 EDT   not applicable

## 2023-09-06 NOTE — ANESTHESIA PROCEDURE NOTES
Spinal Block      Patient reassessed immediately prior to procedure    Patient location during procedure: OR  Indication:at surgeon's request  Performed By  CRNA/JORGE: Renan Bullard CRNA  Preanesthetic Checklist  Completed: patient identified, IV checked, site marked, risks and benefits discussed, surgical consent, monitors and equipment checked, pre-op evaluation and timeout performed  Spinal Block Prep:  Patient Position:sitting  Sterile Tech:cap, gloves, sterile barriers and mask  Prep:Chloraprep  Patient Monitoring:blood pressure monitoring, continuous pulse oximetry and EKG    Spinal Block Procedure  Approach:midline  Guidance:landmark technique and palpation technique  Location:L4-L5  Needle Type:Quincke  Needle Gauge:22 G  Placement of Spinal needle event:cerebrospinal fluid aspirated  Paresthesia: no  Fluid Appearance:clear  Medications: bupivacaine (MARCAINE) 0.5 % injection - Injection   2 mL - 9/6/2023 2:46:00 PM   Post Assessment  Patient Tolerance:patient tolerated the procedure well with no apparent complications  Complications no  Additional Notes  Procedure:  Pt assisted to sitting position, with legs in position of comfort over side of bed.  Pt. instructed in optimal spine presentation, the spine was prepped/ Draped and the skin at insertion site was anesthetized with 1% Lidocaine 2 ml.  The spinal needle was then advanced until CSF flow was obtained and LA was injected:

## 2023-09-06 NOTE — ANESTHESIA PROCEDURE NOTES
Right Adductor Canal Catheter      Patient reassessed immediately prior to procedure    Reason for block: at surgeon's request and post-op pain management  Preanesthetic Checklist  Completed: patient identified, IV checked, site marked, risks and benefits discussed, surgical consent, monitors and equipment checked, pre-op evaluation and timeout performed  Prep:  Pt Position: supine  Sterile barriers:cap, gloves, mask, sterile barriers and washed/disinfected hands  Prep: ChloraPrep  Patient monitoring: blood pressure monitoring, continuous pulse oximetry and EKG  Procedure  Performed under: spinal  Guidance:ultrasound guided    ULTRASOUND INTERPRETATION.  Using ultrasound guidance a 20 G gauge needle was placed in close proximity to the nerve, at which point, under ultrasound guidance anesthetic was injected in the area of the nerve and spread of the anesthesia was seen on ultrasound in close proximity thereto.  There were no abnormalities seen on ultrasound; a digital image was taken; and the patient tolerated the procedure with no complications. Images:still images obtained, printed/placed on chart    Laterality:right  Block Type:adductor canal block  Injection Technique:catheter  Needle Type:Tuohy and echogenic  Needle Gauge:18 G  Resistance on Injection: none  Catheter Size:20 G (20g)  Cath Depth at skin: 11 cm          Post Assessment  Injection Assessment: negative aspiration for heme, incremental injection and no paresthesia on injection  Patient Tolerance:comfortable throughout block  Complications:no  Additional Notes  CATHETER   A high-frequency linear transducer, with sterile cover, was placed on the anterior mid-thigh (between the anterior superior iliac spine and patella). The transducer was then moved medially to identify the Sartorius muscle (Estefany), Vastus Medialis muscle (VMM), Superficial Femoral Artery (SFA) and Vein. The transducer was then moved cephalad or caudad to position the SFA in the middle  "of the [facility]. The insertion site was prepped and draped in sterile fashion. Skin and cutaneous tissue was infiltrated with 2-5 ml of 1% Lidocaine. Using ultrasound-guidance, an 18-gauge Contiplex Ultra 360 Touhy needle was advanced in plane from lateral to medial. Preservative-free normal saline was utilized for hydro-dissection of tissue, advancement of Touhy, and to confirm needle placement below the fascial plane of the Estefany where the Nerve to the VMM is located. Local anesthetic (LA) 5 ml deposited here. The Touhy needle continues its path lateral to the SFA at the level of the Saphenous Nerve. The remainder of the LA was deposited at the 10-11 o'clock position of the SFA. This injection created a space between the Estefany and the SFA. Aspiration every 5 ml to prevent intravascular injection. Injection was completed with negative aspiration of blood and negative intravascular injection. Injection pressures were normal with minimal resistance. A 20-gauge Contiplex Echo catheter was placed through the needle and advance out the tip of the Touhy 3-5 cm anterior to the SFA. The Touhy needle was then removed, and final catheter position verified at the 12 o'clock position to the SFA. The catheter was secured in the usual fashion with skin glue, benzoin, steri-strips, CHG tegaderm and label noting \"Nerve Block Catheter\". Jerk tape applied at yellow connector and catheter connection.           "

## 2023-09-06 NOTE — ANESTHESIA PREPROCEDURE EVALUATION
Anesthesia Evaluation     Patient summary reviewed and Nursing notes reviewed                Airway   Mallampati: II  TM distance: >3 FB  Neck ROM: full  No difficulty expected  Dental - normal exam     Pulmonary - normal exam   (+) a smoker (2004) Former, asthma,sleep apnea  Cardiovascular - normal exam    (+) hypertension, hyperlipidemia    ROS comment:   Echo 7/20: normal EF, no significant valve disease     Neuro/Psych- negative ROS  GI/Hepatic/Renal/Endo    (+) morbid obesity, GERD, diabetes mellitus, thyroid problem hypothyroidism    ROS Comment: Ozempic tx    Musculoskeletal (-) negative ROS    Abdominal  - normal exam    Bowel sounds: normal.   Substance History - negative use     OB/GYN negative ob/gyn ROS         Other                        Anesthesia Plan    ASA 3     spinal     (Peripheral nerve block + cath for post op pain relief    Discussed light sedation with spinal)  intravenous induction     Anesthetic plan, risks, benefits, and alternatives have been provided, discussed and informed consent has been obtained with: patient.    Plan discussed with CRNA.      CODE STATUS:

## 2023-09-06 NOTE — ANESTHESIA POSTPROCEDURE EVALUATION
Patient: Janet Obrien    Procedure Summary       Date: 09/06/23 Room / Location:  [facility]  [facility]    Anesthesia Start: 1435 Anesthesia Stop: 1748    Procedure: TOTAL KNEE ARTHROPLASTY WITH BERNICE ROBOT - RIGHT (Right: Knee) Diagnosis:       Primary osteoarthritis of right knee      (Primary osteoarthritis of right knee [M17.11])    Surgeons: Dayo Chavez MD Provider: Xavier Stack MD    Anesthesia Type: spinal ASA Status: 3            Anesthesia Type: spinal    Vitals  No vitals data found for the desired time range.          Post Anesthesia Care and Evaluation    Patient location during evaluation: PACU  Patient participation: complete - patient participated  Level of consciousness: awake and alert  Pain management: adequate    Airway patency: patent  Anesthetic complications: No anesthetic complications  PONV Status: none  Cardiovascular status: hemodynamically stable and acceptable  Respiratory status: nonlabored ventilation, acceptable and nasal cannula  Hydration status: acceptable

## 2023-09-06 NOTE — OP NOTE
Orthopaedics Operative Report    PREOPERATIVE DIAGNOSIS: Primary osteoarthritis right knee    POSTOPERATIVE DIAGNOSIS: Same    PROCEDURE PERFORMED: Right total knee arthroplasty using Nora robot, CPT 88999, 55553    SURGEON: Dayo Chavez MD    ANESTHESIA:  Choice    STAFF:  Circulator: Micki Lynne RN; Reginald Bob RN  Scrub Person: Morris Andrade; Marifer Duckworth  Nursing Assistant: Jannette Ramirez PCT; Humaira Duarte  Assistant: Nena Gallagher PA-C    TOURNIQUET TIME: 95 minutes    ESTIMATED BLOOD LOSS: 50 mL    COMPLICATIONS: None apparent.    RELEASES: None required after    Surgical Approach: Knee Medial Parapatellar     TXA: IV    IMPLANTS:     Implant Name Type Inv. Item Serial No.  Lot No. LRB No. Used Action   DEV CONTRL TISS STRATAFIX SYMM PDS PLUS ENID CT-1 45CM - [id number] Implant DEV CONTRL TISS STRATAFIX SYMM PDS PLUS ENID CT-1 45CM  [id number] Right 1 Implanted   CMT BONE R 1X40 - [id number] Implant CMT BONE R 1X40  [employer] [id number] Right 1 Implanted   CMT BONE R 1X40 - [id number] Implant CMT BONE R 1X40  [employer] [id number] Right 1 Implanted   persona 0degree keel right size E cemented tibia    Travis [id number] Right 1 Implanted   PAT KN PERSONA ALLPOLY CMT 8X29MM - [id number] Implant PAT KN PERSONA ALLPOLY CMT 8X29MM  [employer] [id number] Right 1 Implanted   COMP FEM/KN PERSONA CR CMT NRW SZ8 RT - [id number] Implant COMP FEM/KN PERSONA CR CMT NRW SZ8 RT  [employer] [id number] Right 1 Implanted   ART/SRF KN PERSONA/VE MC EF 8TO11 10MM RT - [id number] Implant ART/SRF KN PERSONA/VE MC EF 8TO11 10MM RT  [employer] [id number] Right 1 Implanted       Travis Persona CR femur size 8 narrow  size E 0 degree tibia  29 patella button   Size 10 Vitamin E Medial Congruent highly crosslinked polyethylene articular surface    PREOPERATIVE ANTIBIOTICS: Vancomycin    REFERRING PHYSICIAN: Otoniel Davenport MD    INDICATIONS: Failure of  nonoperative treatment including injections, bracing, and activity modification.    DESCRIPTION OF PROCEDURE: After informed consent was obtained, the patient was taken to the operating room. The patient was given a dose of IV antibiotics prior to incision.After the smooth induction of spinal anesthesia, the patient’s right lower extremity was prepped and draped in the usual fashion for this type of procedure. We performed a timeout to verify site and the procedure to be performed.  We began with exsanguination of the right lower extremity using an Esmarch bandage and inflation of tourniquet to 300 mmHg. We made our standard midline incision and medial parapatellar approach. We took 20% of the quadriceps tendon medially and a sleeve of tissue around the patella for repair as well a sliver of patellar tendon. Dissected extra-ostially but subperiosteally on the medial side taking off the superficial and deep MCL from the proximal tibia. These were protected throughout the procedure. Visible medial meniscus was excised. The retropatellar fat pad was excised. The ACL and visible lateral meniscus was excised as well as the synovium from the anterior surface of the distal femur.  Osteophytes were removed from the distal femur and proximal tibia at this point.       We then everted the patella and this was resurfaced, restoring patellar height. The patellar height was 19 mm preoperatively and we cut the patella to 13 mm and sized the patella to a 29.  The lugholes were drilled and the component slightly medialized.      We then turned our attention to placement of our pins for the femoral and tibial trackers.  The femoral trackers were placed within the incision about 2 to 3 fingerbreadths from the articular cartilage on the medial aspect of the femur.  The tibial trackers were placed through percutaneous incisions on the tibia.  Once these were placed, we obtained our hip center and then we then registered our data points  on the femur and tibia.  Once these were registered, we took the knee through a range of motion and assessed our medial and lateral gaps at 0, 45, and 90 degrees. The 90 degree gap was obtained using a Sage elevator.  At this point, we then created our surgical plan.  Patient had a preoperative 4-1/2 degree varus deformity.  We were able to correct them to 1.5 degree varus alignment.  Preop range of motion was 6 to 125.  We placed 1-1/2 degrees of varus alignment on the distal femoral cut and 0 degrees of varus on the proximal tibial cut.  We created an extension gap medially of 18.5 mm and 21.0 mm laterally.  Our flexion gap was 19 medially and 21.5 laterally.  Proximal tibial cut was made at 9.5 mm.    At this point the robotic arm was brought in and pinned for the distal femoral cut.  This was made and then validated.  We then brought the robotic arm back into pin our external rotation.  Our 4-in-1 block was then placed and we assessed for possible notching as well as for the size of the posterior condylar cuts.  The 4-in-1 cut was made without difficulty and the excess bone removed.  We then brought the robotic arm back in to make our proximal tibia cut.  Retractors were placed and the bone cut was made which was validated both for amount of bone taken off and for slope.  We then used our blocks and checked our extension and flexion gaps which were felt to be acceptable.  We then took off posterior osteophytes off the posterior medial posterior lateral femur.  We completed preparation of our tibia and femur and then trialed and a size 10 seemed to have the best stability and range of motion parameters.  The bone was then thoroughly irrigated and holes were drilled on the tibia for improved cement perforation.  We then injected our periarticular injection into the posterior medial aspect of the knee which consisted of 20 ml of NS, 20 ml of 0.5% lidocaine with epi, 20 ml of 0.5% bupivicaine, 30 mg of toradol, and  8 mg of morphine. We cemented these implants in place and once the cement was allowed to cure fully, the excess cement was removed.  After the implants were placed and a size 10 polyethylene trial was assessed, the range of motion was 6 to 126 degrees. We then deflated the tourniquet prior to placement of our final polyethylene spacer. Any bleeding seen in the back of the knee was controlled and we obtained hemostasis. The final spacer was then secured into place. We then used a final irrigation consisting of Irricept and diluted Betadine throughout the knee.  Trackers and pins were then removed.  We then closed our medial parapatellar approach using 0 Ethibond in an interrupted fashion.  We then closed our subcutaneous layer using running 2-0 Vicryl and interrupted 2-0 Vicryl. We closed our skin using a running 3-0 Monocryl. We placed an Exofin Fusion dressing system over the incision and took down the drapes. The patient was transferred back to their hospital bed and then taken to the recovery room in stable condition. All counts were correct postoperatively. I performed the case.      First assistant: Nena Gallagher PA-C    The skilled assistance of the above noted first assistant was necessary during this complex surgical procedure.  The surgical assistant assisted with every aspect of the operation including, but not limited to, proper and safe positioning of the patient, obtaining adequate surgical exposure, manipulation of surgical instruments to make the proper bone cuts, cementing of the final implants, the continual process of hemostasis during the procedure itself in addition to surgical wound closure and removal of the patient from the operating table and returning the patient back to the \[facility] [place] [facility]\"".  The assistance of the surgical assistant allowed me to perform the most sensitive and technical potions of this operation using 2 hands, thus enhancing efficiency and patient safety.  This would  not be possible without the help of a skilled assistant familiar with the procedure and capable of safely performing the aforementioned tasks.       POSTOPERATIVE PLAN:  1. Weight bearing as tolerated right lower extremity.  2. The patient will receive an indwelling low femoral nerve block in the recovery room.  3.  Patient will receive IV vancomycin on the floor  4.  She will begin Eliquis 2.5 twice daily tomorrow morning and continue for 6 weeks for DVT prophylaxis.  5.  The patient will begin physical therapy likely tomorrow morning  6.  Discharge home likely tomorrow afternoon  7.  Patient will be discharged home with a prescription for oxycodone, Mobic or Ibuprofen, Tylenol, Colace, and Zofran in addition to their DVT prophylaxis  8.  Follow up with me in the office in 3 weeks  9.  We will arrange for home health PT    Dayo Chavez M.D.*

## 2023-09-07 LAB
ANION GAP SERPL CALCULATED.3IONS-SCNC: 5 MMOL/L (ref 5–15)
BASOPHILS # BLD AUTO: 0.02 10*3/MM3 (ref 0–0.2)
BASOPHILS NFR BLD AUTO: 0.2 % (ref 0–1.5)
BUN SERPL-MCNC: 10 MG/DL (ref 8–23)
BUN/CREAT SERPL: 15.4 (ref 7–25)
CALCIUM SPEC-SCNC: 9 MG/DL (ref 8.6–10.5)
CHLORIDE SERPL-SCNC: 102 MMOL/L (ref 98–107)
CO2 SERPL-SCNC: 27 MMOL/L (ref 22–29)
CREAT SERPL-MCNC: 0.65 MG/DL (ref 0.57–1)
DEPRECATED RDW RBC AUTO: 41.5 FL (ref 37–54)
EGFRCR SERPLBLD CKD-EPI 2021: 99.7 ML/MIN/1.73
EOSINOPHIL # BLD AUTO: 0 10*3/MM3 (ref 0–0.4)
EOSINOPHIL NFR BLD AUTO: 0 % (ref 0.3–6.2)
ERYTHROCYTE [DISTWIDTH] IN BLOOD BY AUTOMATED COUNT: 13.2 % (ref 12.3–15.4)
GLUCOSE SERPL-MCNC: 117 MG/DL (ref 65–99)
HCT VFR BLD AUTO: 30.9 % (ref 34–46.6)
HGB BLD-MCNC: 10.2 G/DL (ref 12–15.9)
IMM GRANULOCYTES # BLD AUTO: 0.04 10*3/MM3 (ref 0–0.05)
IMM GRANULOCYTES NFR BLD AUTO: 0.4 % (ref 0–0.5)
LYMPHOCYTES # BLD AUTO: 0.68 10*3/MM3 (ref 0.7–3.1)
LYMPHOCYTES NFR BLD AUTO: 7 % (ref 19.6–45.3)
MCH RBC QN AUTO: 28.6 PG (ref 26.6–33)
MCHC RBC AUTO-ENTMCNC: 33 G/DL (ref 31.5–35.7)
MCV RBC AUTO: 86.6 FL (ref 79–97)
MONOCYTES # BLD AUTO: 1 10*3/MM3 (ref 0.1–0.9)
MONOCYTES NFR BLD AUTO: 10.4 % (ref 5–12)
NEUTROPHILS NFR BLD AUTO: 7.92 10*3/MM3 (ref 1.7–7)
NEUTROPHILS NFR BLD AUTO: 82 % (ref 42.7–76)
NRBC BLD AUTO-RTO: 0 /100 WBC (ref 0–0.2)
PLATELET # BLD AUTO: 221 10*3/MM3 (ref 140–450)
PMV BLD AUTO: 10.6 FL (ref 6–12)
POTASSIUM SERPL-SCNC: 4.1 MMOL/L (ref 3.5–5.2)
RBC # BLD AUTO: 3.57 10*6/MM3 (ref 3.77–5.28)
SODIUM SERPL-SCNC: 134 MMOL/L (ref 136–145)
WBC NRBC COR # BLD: 9.66 10*3/MM3 (ref 3.4–10.8)

## 2023-09-07 PROCEDURE — 97116 GAIT TRAINING THERAPY: CPT

## 2023-09-07 PROCEDURE — 25010000002 VANCOMYCIN PER 500 MG: Performed by: ORTHOPAEDIC SURGERY

## 2023-09-07 PROCEDURE — 25010000002 MORPHINE PER 10 MG: Performed by: ORTHOPAEDIC SURGERY

## 2023-09-07 PROCEDURE — 97165 OT EVAL LOW COMPLEX 30 MIN: CPT

## 2023-09-07 PROCEDURE — 99024 POSTOP FOLLOW-UP VISIT: CPT | Performed by: ORTHOPAEDIC SURGERY

## 2023-09-07 PROCEDURE — 97110 THERAPEUTIC EXERCISES: CPT

## 2023-09-07 PROCEDURE — 85025 COMPLETE CBC W/AUTO DIFF WBC: CPT | Performed by: ORTHOPAEDIC SURGERY

## 2023-09-07 PROCEDURE — 97161 PT EVAL LOW COMPLEX 20 MIN: CPT

## 2023-09-07 PROCEDURE — 97535 SELF CARE MNGMENT TRAINING: CPT

## 2023-09-07 PROCEDURE — 80048 BASIC METABOLIC PNL TOTAL CA: CPT

## 2023-09-07 RX ORDER — OXYCODONE HYDROCHLORIDE 10 MG/1
10 TABLET ORAL EVERY 4 HOURS PRN
Status: DISCONTINUED | OUTPATIENT
Start: 2023-09-07 | End: 2023-09-10 | Stop reason: HOSPADM

## 2023-09-07 RX ADMIN — ACETAMINOPHEN 1000 MG: 500 TABLET ORAL at 04:28

## 2023-09-07 RX ADMIN — APIXABAN 2.5 MG: 2.5 TABLET, FILM COATED ORAL at 20:29

## 2023-09-07 RX ADMIN — OXYCODONE HYDROCHLORIDE 10 MG: 10 TABLET ORAL at 09:04

## 2023-09-07 RX ADMIN — OXYCODONE HYDROCHLORIDE 10 MG: 10 TABLET ORAL at 18:23

## 2023-09-07 RX ADMIN — OXYCODONE HYDROCHLORIDE 10 MG: 10 TABLET ORAL at 22:03

## 2023-09-07 RX ADMIN — MELOXICAM 15 MG: 15 TABLET ORAL at 08:52

## 2023-09-07 RX ADMIN — OXYCODONE HYDROCHLORIDE 10 MG: 10 TABLET ORAL at 14:07

## 2023-09-07 RX ADMIN — ACETAMINOPHEN 1000 MG: 500 TABLET ORAL at 11:44

## 2023-09-07 RX ADMIN — VANCOMYCIN HYDROCHLORIDE 1000 MG: 1 INJECTION, SOLUTION INTRAVENOUS at 01:47

## 2023-09-07 RX ADMIN — MORPHINE SULFATE 4 MG: 4 INJECTION, SOLUTION INTRAMUSCULAR; INTRAVENOUS at 00:56

## 2023-09-07 RX ADMIN — ACETAMINOPHEN 1000 MG: 500 TABLET ORAL at 20:29

## 2023-09-07 RX ADMIN — MORPHINE SULFATE 4 MG: 4 INJECTION, SOLUTION INTRAMUSCULAR; INTRAVENOUS at 06:23

## 2023-09-07 RX ADMIN — DULOXETINE HYDROCHLORIDE 60 MG: 60 CAPSULE, DELAYED RELEASE ORAL at 08:52

## 2023-09-07 RX ADMIN — Medication 3 ML: at 08:54

## 2023-09-07 RX ADMIN — ACETAMINOPHEN 1000 MG: 500 TABLET ORAL at 18:22

## 2023-09-07 RX ADMIN — Medication 3 ML: at 20:29

## 2023-09-07 RX ADMIN — APIXABAN 2.5 MG: 2.5 TABLET, FILM COATED ORAL at 08:52

## 2023-09-07 RX ADMIN — OXYCODONE HYDROCHLORIDE 5 MG: 5 TABLET ORAL at 04:26

## 2023-09-07 RX ADMIN — VANCOMYCIN HYDROCHLORIDE 1000 MG: 1 INJECTION, SOLUTION INTRAVENOUS at 14:32

## 2023-09-07 RX ADMIN — LEVOTHYROXINE SODIUM 125 MCG: 125 TABLET ORAL at 04:26

## 2023-09-07 NOTE — PROGRESS NOTES
"          Orthopaedic Surgery Progress Note      LOS: 0 days   Patient Care Team:  Noe Davenport MD as PCP - General (Internal Medicine)  Bhavna Akins MD as Surgeon (General Surgery)  Dee Kelly MD as Consulting Physician (Endocrinology)    POD 1    Subjective     Interval History:   Patient underwent elective right TKA yesterday afternoon.  She says she got to the floor about 9:30 PM.  She is complaining of pain in the back of the knee more than the front of the knee.  Denies chest pain or shortness of breath.    Objective     Vital Signs:  Temp (24hrs), Av.8 °F (36.6 °C), Min:97 °F (36.1 °C), Max:98.4 °F (36.9 °C)    BP 98/67   Pulse 77   Temp 98.4 °F (36.9 °C) (Oral)   Resp 16   Ht 167.6 cm (66\")   Wt 99.8 kg (220 lb)   SpO2 94%   BMI 35.51 kg/m²     Labs:  Lab Results (last 24 hours)       Procedure Component Value Units Date/Time    Basic Metabolic Panel [[id number]]  (Abnormal) Collected: 23    Specimen: Blood Updated: 23     Glucose 117 mg/dL      BUN 10 mg/dL      Creatinine 0.65 mg/dL      Sodium 134 mmol/L      Potassium 4.1 mmol/L      Chloride 102 mmol/L      CO2 27.0 mmol/L      Calcium 9.0 mg/dL      BUN/Creatinine Ratio 15.4     Anion Gap 5.0 mmol/L      eGFR 99.7 mL/min/1.73     Narrative:      GFR Normal >60  Chronic Kidney Disease <60  Kidney Failure <15      CBC & Differential [[id number]]  (Abnormal) Collected: 23    Specimen: Blood Updated: 23    Narrative:      The following orders were created for panel order CBC & Differential.  Procedure                               Abnormality         Status                     ---------                               -----------         ------                     CBC Auto Differential[[id number]]        Abnormal            Final result                 Please view results for these tests on the individual orders.    CBC Auto Differential [[id number]]  (Abnormal) Collected: 23    " Specimen: Blood Updated: 09/07/23 0553     WBC 9.66 10*3/mm3      RBC 3.57 10*6/mm3      Hemoglobin 10.2 g/dL      Hematocrit 30.9 %      MCV 86.6 fL      MCH 28.6 pg      MCHC 33.0 g/dL      RDW 13.2 %      RDW-SD 41.5 fl      MPV 10.6 fL      Platelets 221 10*3/mm3      Neutrophil % 82.0 %      Lymphocyte % 7.0 %      Monocyte % 10.4 %      Eosinophil % 0.0 %      Basophil % 0.2 %      Immature Grans % 0.4 %      Neutrophils, Absolute 7.92 10*3/mm3      Lymphocytes, Absolute 0.68 10*3/mm3      Monocytes, Absolute 1.00 10*3/mm3      Eosinophils, Absolute 0.00 10*3/mm3      Basophils, Absolute 0.02 10*3/mm3      Immature Grans, Absolute 0.04 10*3/mm3      nRBC 0.0 /100 WBC     Basic Metabolic Panel [[id number]]  (Normal) Collected: 09/06/23 1147    Specimen: Blood Updated: 09/06/23 1244     Glucose 84 mg/dL      BUN 13 mg/dL      Creatinine 0.68 mg/dL      Sodium 137 mmol/L      Potassium 3.7 mmol/L      Comment: Slight hemolysis detected by analyzer. Results may be affected.        Chloride 103 mmol/L      CO2 27.0 mmol/L      Calcium 9.7 mg/dL      BUN/Creatinine Ratio 19.1     Anion Gap 7.0 mmol/L      eGFR 98.6 mL/min/1.73     Narrative:      GFR Normal >60  Chronic Kidney Disease <60  Kidney Failure <15      CBC (No Diff) [[id number]]  (Normal) Collected: 09/06/23 1147    Specimen: Blood Updated: 09/06/23 1227     WBC 7.63 10*3/mm3      RBC 4.31 10*6/mm3      Hemoglobin 12.2 g/dL      Hematocrit 37.7 %      MCV 87.5 fL      MCH 28.3 pg      MCHC 32.4 g/dL      RDW 13.2 %      RDW-SD 42.8 fl      MPV 10.7 fL      Platelets 266 10*3/mm3     POC Glucose Once [[id number]]  (Normal) Collected: 09/06/23 1210    Specimen: Blood Updated: 09/06/23 1218     Glucose 84 mg/dL             Physical Exam:  EHL, FHL, gastroc soleus, and tibialis anterior are intact  Toes are pink and warm  Surgical dressing is in place  Palpable dorsalis pedis pulse  Calf is soft and nontender    Postoperative x-ray has been reviewed and looks  acceptable    Assessment & Plan     Postoperative day number 1 status post right total knee arthroplasty.    Labs: Creatinine 0.65, hematocrit 31, platelets 221,000    Pain Control: Suboptimal currently.  Patient having quite a bit of posterior knee pain.  Will increase oxycodone dose from 5 mg to 10 mg every 4 as needed.  Patient has chronic narcotic use so pain control will be challenging.    PT and OT     DVT prophylaxis: Begin Eliquis 2.5 twice daily this morning and continue for 6 weeks due to family history of DVT.    Discharge planning: After her last TKA, patient stayed 2 nights.  I anticipate a similar course of this time around as well.  Suboptimal pain control currently.    Upon discharge, patient will need follow-up with me in 3 weeks' time.  They will need [facility] for physical therapy.  Standard Exofin Fusion dressing care instructions.  Do not remove.  DME per case management.    Admission Status:  I believe this patient meets INPATIENT status due to the need for care which can only be reasonably provided in a hospital setting such as aggressive/expedited ancillary services and/or consultation services, the necessity for IV medications, close physician monitoring and/or the possible need for procedures.  In such, I feel patient’s risk for adverse outcomes and need for care warrant INPATIENT evaluation and predict the patient’s care encounter to likely last beyond 2 midnights.        Dayo Chavez MD  09/07/23  07:47 EDT

## 2023-09-07 NOTE — PROGRESS NOTES
"IM progress note      Janet Obrien  [id number]  1960     LOS: 0 days     Attending: Dayo Chavez,*    Primary Care Provider: Noe Davenport MD      Chief Complaint/Reason for visit:  No chief complaint on file.      Subjective   Feels okay overall.  Dealing with some postoperative pain issues.  No nausea or vomiting.  Did not get to her room till 930 last night..    Objective        Visit Vitals  /63 (BP Location: Left arm, Patient Position: Lying)   Pulse 84   Temp 98.1 °F (36.7 °C) (Oral)   Resp 20   Ht 167.6 cm (66\")   Wt 99.8 kg (220 lb)   SpO2 95%   BMI 35.51 kg/m²     Temp (24hrs), Av.9 °F (36.6 °C), Min:97 °F (36.1 °C), Max:98.4 °F (36.9 °C)      Intake/Output:    Intake/Output Summary (Last 24 hours) at 2023 1058  Last data filed at 2023 0740  Gross per 24 hour   Intake 1240 ml   Output 1200 ml   Net 40 ml        Physical Therapy:  Signed         Goal Outcome Evaluation:  Plan of Care Reviewed With: patient  Progress: improving  Outcome Evaluation: Patient ambulated 300 feet with RW, CGA, step through gait pattern, limited by pain. R knee extension quite limited, lacking 19 degrees AROM. R knee flexion progressing well at 90 degrees AAROM. All mobility limited by pain today. Patient currently below baseline functioning, demonstrating decreased functional mobility status, impaired balance, and decreased R Knee strength/ROM. Will address these deficits to promote return to PLOF. Recommend d/c home with assist and outpatient PT.        Anticipated Discharge Disposition (PT): home with assist, home with outpatient therapy services                 Physical Exam:     General Appearance:    Alert, cooperative, in no acute distress   Head:    Normocephalic, without obvious abnormality, atraumatic    Lungs:     Normal effort, symmetric chest rise,  clear to      auscultation bilaterally              Heart:    Regular rhythm and normal rate, normal S1 and S2    Abdomen:     " Normal bowel sounds, no masses, no organomegaly, soft        non-tender, non-distended, no guarding, no rebound                tenderness   Extremities: Clean dry intact dressing over knee.  Peripheral nerve block catheter present.  Intact flexion and dorsiflexion bilateral feet.  No clubbing, cyanosis or edema.  No deformities.    Pulses:   Pulses palpable and equal bilaterally   Skin:   No bleeding, bruising or rash          Results Review:     I reviewed the patient's new clinical results.   Results from last 7 days   Lab Units 09/07/23  0528 09/06/23  1147   WBC 10*3/mm3 9.66 7.63   HEMOGLOBIN g/dL 10.2* 12.2   HEMATOCRIT % 30.9* 37.7   PLATELETS 10*3/mm3 221 266     Results from last 7 days   Lab Units 09/07/23  0528 09/06/23  1147   SODIUM mmol/L 134* 137   POTASSIUM mmol/L 4.1 3.7   CHLORIDE mmol/L 102 103   CO2 mmol/L 27.0 27.0   BUN mg/dL 10 13   CREATININE mg/dL 0.65 0.68   CALCIUM mg/dL 9.0 9.7   GLUCOSE mg/dL 117* 84     I reviewed the patient's new imaging including images and reports.    All medications reviewed.   acetaminophen, 1,000 mg, Oral, Q6H  apixaban, 2.5 mg, Oral, Q12H  atorvastatin, 80 mg, Oral, Daily  DULoxetine, 60 mg, Oral, Daily  levothyroxine, 125 mcg, Oral, Q AM  meloxicam, 15 mg, Oral, Daily  sodium chloride, 3 mL, Intravenous, Q12H  vancomycin, 1,000 mg, Intravenous, Q12H      albuterol sulfate HFA, 2 puff, Q4H PRN  docusate sodium, 100 mg, BID PRN  labetalol, 10 mg, Q4H PRN  meclizine, 25 mg, Q6H PRN  Morphine, 4 mg, Q2H PRN   And  naloxone, 0.4 mg, Q5 Min PRN  ondansetron, 4 mg, Q6H PRN   Or  ondansetron, 4 mg, Q6H PRN  oxyCODONE, 10 mg, Q4H PRN  sodium chloride, 500 mL, TID PRN  sodium chloride, 3-10 mL, PRN  sodium chloride, 40 mL, PRN        Assessment & Plan       S/P total knee arthroplasty, right    Hypothyroidism    Diabetes mellitus    Essential hypertension    Hyperlipidemia    Primary osteoarthritis of right knee    Obesity         Plan   1. PT/OT,  Weight bearing as  tolerated RLE  2. Pain control-prns, ACB cath with ropivacaine infusion.  3. IS-encourage  4. DVT proph- Mechanicals and Apixaban.  5. Bowel regimen  6. Resume home medications as appropriate  7. Monitor post-op labs  8. DC planning for home.     - Hypertension:  Resume home medications as appropriate, formulary substitution when indicated.  Holding parameters.  Prn medications for elevated blood pressure.     -DM type 2  Hgb A1C 5.5.  Hold OHA as appropriate  FSBG AC/HS, ( q 6 when NPO), along with correction humalog.  Long acting insulin if needed.     -Dyslipidemia:  Resume home regimen statin.( formulary substitution when appropriate).     -Hypothyroidism: Resume replacement.    Fito Tang MD  09/07/23  10:58 EDT

## 2023-09-07 NOTE — PROGRESS NOTES
[facility]    Acute pain service Inpatient Progress Note    Patient Name: Janet Obrien  :  1960  MRN:  [id number]        Acute Pain  Service Inpatient Progress Note:    Analgesia:Fair  Pain Score:7/10  LOC: alert and awake  Resp Status: room air  Cardiac: VS stable  Side Effects:None  Catheter Site:clean, dressing intact and dry  Cath type: peripheral nerve cath with ON Q  Infusion rate: Fem/ Add: Basal: 1ml/hr, PIB: 8ml q 8h, PCA: 8ml q 30 min (1mL,8ml, 8ml InfuSystem Pump)  Catheter Plan:Catheter to remain Insitu and Continue catheter infusion rate unchanged  Comments: Pt states she has posterior pain with ambulation. Plan to give pop plexus lg when nurses reestablish IV access

## 2023-09-07 NOTE — PLAN OF CARE
Goal Outcome Evaluation:  Plan of Care Reviewed With: patient        Progress: improving  Outcome Evaluation: Patient ambulated 300 feet with RW, CGA, step through gait pattern, limited by pain. R knee extension quite limited, lacking 19 degrees AROM. R knee flexion progressing well at 90 degrees AAROM. All mobility limited by pain today. Patient currently below baseline functioning, demonstrating decreased functional mobility status, impaired balance, and decreased R Knee strength/ROM. Will address these deficits to promote return to PLOF. Recommend d/c home with assist and outpatient PT.      Anticipated Discharge Disposition (PT): home with assist, home with outpatient therapy services

## 2023-09-07 NOTE — PROGRESS NOTES
Met with patient she is agreeable to [facility]. Verified PCP. Surgery needs to Dr Palacios. Elisa HERRERA, [facility] Emergency Department-Liaison

## 2023-09-07 NOTE — CASE MANAGEMENT/SOCIAL WORK
Continued Stay Note  [facility]     Patient Name: Janet Obrien  MRN: [id number]  Today's Date: 9/7/2023    Admit Date: 9/6/2023    Plan: home with [facility]   Discharge Plan       Row Name 09/07/23 1029       Plan    Plan home with [facility]    Patient/Family in Agreement with Plan yes    Plan Comments Pt lives in [place] with her family. She reports she is independent with ADLs and owns a rolling walker. She is followed by her PCP and has drug coverage. At this time her plan for discharge is to return home and has requested [facility] . Elisa reports they can accept.  Per CoverMyMeds pts Eliquis does not require precert. No other dc needs at this time    Final Discharge Disposition Code 06 - home with home health care                   Discharge Codes    No documentation.                       Robyn Alan RN

## 2023-09-07 NOTE — PLAN OF CARE
Patient arrived to the floor at 2230 hours from PACU. A&Ox4, mood/affect appropriate. Speech clear/logical. Lung sound clear bilaterally. Independent in bed mobility, patient changes position freely. Elastic bandage CDI. VSS on RA. Patient c/o constant pain to the posterior aspect of right knee, denies numbness/tingling. No PI/open area noted upon admission. Will cont to mx. Call light in reach.

## 2023-09-07 NOTE — THERAPY EVALUATION
Patient Name: Janet Obrien  : 1960    MRN: [id number]                              Today's Date: 2023       Admit Date: 2023    Visit Dx:     ICD-10-CM ICD-9-CM   1. Primary osteoarthritis of right knee  M17.11 715.16     Patient Active Problem List   Diagnosis    Diplopia    Orbital pseudotumor    Myositic orbital pseudotumor    Hypothyroidism    Diabetes mellitus    Age-related nuclear cataract of right eye    Ocular myasthenia    Morbid obesity, unspecified obesity type    Essential hypertension    Precordial pain    SOB (shortness of breath)    Lower abdominal pain    Constipation    Diarrhea    Rectal bleeding    Gastroesophageal reflux disease    Class 3 severe obesity due to excess calories with serious comorbidity and body mass index (BMI) of 40.0 to 44.9 in adult    Anal or rectal pain    Primary osteoarthritis of left knee    S/P TKR (total knee replacement), left    Hyperlipidemia    Primary osteoarthritis of right knee    Obesity    S/P total knee arthroplasty, right     Past Medical History:   Diagnosis Date    Anxiety     Arthritis     Asthma     Back pain     Cancer     SQUAMOUS CELL/ BASAL CELL ON LIP, under the nose as well    Chronic bronchitis     Depression     Diabetes mellitus     type 2    Fibromyalgia     High triglycerides     Hyperlipidemia     Hypertension     Hypothyroidism     Migraine     MVP (mitral valve prolapse)     OA (osteoarthritis)     Ovarian cyst     Peripheral autonomic neuropathy     Pleurisy     Pneumonia     Rheumatism     RLS (restless legs syndrome)     Sleep apnea     Mild form per patient. recommended mouthpiece     Past Surgical History:   Procedure Laterality Date    APPENDECTOMY      CATARACT EXTRACTION W/ INTRAOCULAR LENS IMPLANT Right 2019    Procedure: CATARACT PHACO EXTRACTION WITH INTRAOCULAR LENS IMPLANT RIGHT;  Surgeon: Carl Babb MD;  [facility];  Service: Ophthalmology    CATARACT EXTRACTION W/ INTRAOCULAR  "LENS IMPLANT Left 10/07/2019    Procedure: CATARACT PHACO EXTRACTION WITH INTRAOCULAR LENS IMPLANT LEFT;  Surgeon: Carl Babb MD;  Location: [facility] OR;  Service: Ophthalmology     SECTION  , 1997    x 2     DILATATION AND CURETTAGE      HAND SURGERY Right     \"Pinched nerve surgery\"    HAND SURGERY Left     Joint removed secondary to arthritis    HYSTERECTOMY      ''still have one ovary''    KNEE SURGERY Right 2021    arthroscopy and partial medial meniscectomy Dr. Chavez    THYROIDECTOMY  1985    TONSILLECTOMY AND ADENOIDECTOMY      TOTAL KNEE ARTHROPLASTY Left 03/15/2023    Procedure: TOTAL KNEE ARTHROPLASTY -  LEFT;  Surgeon: Dayo Chavez MD;  Location: [facility] OR;  Service: Orthopedics;  Laterality: Left;      General Information       Row Name 23          Physical Therapy Time and Intention    Document Type evaluation  -LR     Mode of Treatment physical therapy;individual therapy  -LR       Row Name 23          General Information    Patient Profile Reviewed yes  -LR     Prior Level of Function independent:;all household mobility;community mobility;gait;transfer;bed mobility;ADL's  all mobility limited by pain, not using AD PTA  -LR     Existing Precautions/Restrictions fall;other (see comments)  R adductor canal nerve catheter  -LR     Barriers to Rehab previous functional deficit  -LR       Row Name 23          Living Environment    People in Home parent(s);other (see comments)  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 who has dementia; brother and sons coming to assist at all times  -LR       Row Name 23          Home Main Entrance    Number of Stairs, Main Entrance two  -LR     Stair Railings, Main Entrance none  -LR       Row Name 23          Stairs Within Home, Primary    Stairs, Within Home, Primary 8  -LR     Stair Railings, Within Home, Primary railing on right side (ascending)  -LR       Row Name 23    "       Cognition    Orientation Status (Cognition) oriented x 4  -LR       Row Name 09/07/23 0836          Safety Issues, Functional Mobility    Safety Issues Affecting Function (Mobility) safety precautions follow-through/compliance;safety precaution awareness;insight into deficits/self-awareness;awareness of need for assistance;impulsivity;positioning of assistive device  -LR     Impairments Affecting Function (Mobility) strength;balance;pain;endurance/activity tolerance;range of motion (ROM)  -LR               User Key  (r) = Recorded By, (t) = Taken By, (c) = Cosigned By      Initials Name Provider Type    LR Reina De La Cruz, PT Physical Therapist                   Mobility       Row Name 09/07/23 0836          Bed Mobility    Bed Mobility supine-sit  -LR     Supine-Sit [place] (Bed Mobility) verbal cues;standby assist  -LR     Assistive Device (Bed Mobility) head of bed elevated;bed rails  -LR     Comment, (Bed Mobility) Verbal cues to move LEs towards EOB and to push up from bed to raise trunk into sitting and to scoot hips out to get feet on floor. Patient required assist via her UEs to move R LE off EOB. Denied dizziness upon sitting up.  -       Row Name 09/07/23 0836          Transfers    Comment, (Transfers) Verbal cues to push up from bed to stand and to reach back for chair to lower into sitting. Patient pulled up on RW to stand despite these cues. Verbal cues to step R LE out before t/f for comfort.  -       Row Name 09/07/23 0836          Bed-Chair Transfer    Bed-Chair [place] (Transfers) not tested  -       Row Name 09/07/23 0836          Sit-Stand Transfer    Sit-Stand [place] (Transfers) verbal cues;contact guard  -LR     Assistive Device (Sit-Stand Transfers) walker, front-wheeled  -LR       Row Name 09/07/23 0836          Gait/Stairs (Locomotion)    [place] Level (Gait) verbal cues;contact guard  -LR     Assistive Device (Gait) walker, front-wheeled  -LR      Distance in Feet (Gait) 300  -LR     Deviations/Abnormal Patterns (Gait) bilateral deviations;junie decreased;gait speed decreased;stride length decreased;right sided deviations;antalgic  -LR     Bilateral Gait Deviations forward flexed posture;heel strike decreased  -LR     Right Sided Gait Deviations weight shift ability decreased  -LR     [place] Level (Stairs) not tested  -LR     Comment, (Gait/Stairs) Patient ambulated with step through gait pattern at fast pace. Verbal cues for correct sequencing of steps, increased R LE weight bearing/stance phase, decreased UE weight bearing, increased R knee flexion during swing phase, and decreased pace. Improved with repeated cues for correction. Verbal cues for upright posture and repeated cues to keep RW closer. Mildly unsteady at times with ambulation. Gait limited by pain.  -LR       Row Name 09/07/23 0836          Mobility    Extremity Weight-bearing Status right lower extremity  -LR     Right Lower Extremity (Weight-bearing Status) weight-bearing as tolerated (WBAT)  -LR               User Key  (r) = Recorded By, (t) = Taken By, (c) = Cosigned By      Initials Name Provider Type    LR Reina De La Cruz, PT Physical Therapist                   Obj/Interventions       Row Name 09/07/23 0836          Range of Motion Comprehensive    General Range of Motion lower extremity range of motion deficits identified  -LR       Row Name 09/07/23 0836          Strength Comprehensive (MMT)    General Manual Muscle Testing (MMT) Assessment lower extremity strength deficits identified  -LR       Row Name 09/07/23 0836          Motor Skills    Therapeutic Exercise ankle;knee;other (see comments)  cues for technique; min assist R SLR  -LR       Row Name 09/07/23 0836          Knee (Therapeutic Exercise)    Knee (Therapeutic Exercise) AROM (active range of motion);strengthening exercise;isometric exercises  -LR     Knee AROM (Therapeutic Exercise) right;heel  slides;supine;sitting;15 repititions  -LR     Knee Isometrics (Therapeutic Exercise) right;quad sets;supine;10 repetitions;5 repetitions  -LR     Knee Strengthening (Therapeutic Exercise) right;SLR (straight leg raise);SAQ (short arc quad);LAQ (long arc quad);sitting;supine;15 repititions  -LR       Row Name 09/07/23 0836          Ankle (Therapeutic Exercise)    Ankle (Therapeutic Exercise) AROM (active range of motion)  -LR     Ankle AROM (Therapeutic Exercise) bilateral;dorsiflexion;plantarflexion;supine;15 repititions  -LR       Row Name 09/07/23 0836          Balance    Balance Assessment sitting static balance;sitting dynamic balance;standing static balance;standing dynamic balance  -LR     Static Sitting Balance standby assist  -LR     Dynamic Sitting Balance standby assist  -LR     Position, Sitting Balance unsupported;sitting edge of bed  -LR     Static Standing Balance contact guard  -LR     Dynamic Standing Balance contact guard  -LR     Position/Device Used, Standing Balance supported;walker, rolling  -       Row Name 09/07/23 0836          Sensory Assessment (Somatosensory)    Sensory Assessment (Somatosensory) LE sensation intact;other (see comments)  denies numbness/tingling; reports light touch equal and intact upon assessment; able to actively DF bilaterally  -LR       Row Name 09/07/23 0836          General Lower Extremity Assessment (Range of Motion)    Lower Extremity: Range of Motion LLE ROM WFL;knee, right: LE ROM  -LR     Comment: Lower Extremity ROM R knee AROM 19-90 degrees; lacking 19 degrees of extension AROM, demonstrated 90 degrees flexion AAROM in sitting.  -       Row Name 09/07/23 0836          Lower Extremity (Manual Muscle Testing)    Lower Extremity: Manual Muscle Testing (MMT) left LE strength is WNL;right knee strength deficit  -LR     Comment, MMT: Lower Extremity R knee functionally 3-/5, unable to perform SLR independently, required min assist, no knee buckling observed  with weight bearing  -LR               User Key  (r) = Recorded By, (t) = Taken By, (c) = Cosigned By      Initials Name Provider Type    LR Reina De La Cruz, PT Physical Therapist                   Goals/Plan       Row Name 09/07/23 0836          Bed Mobility Goal 1 (PT)    Activity/Assistive Device (Bed Mobility Goal 1, PT) sit to supine/supine to sit  -LR     [place] Level/Cues Needed (Bed Mobility Goal 1, PT) modified independence  -LR     Time Frame (Bed Mobility Goal 1, PT) long term goal (LTG);3 days  -LR     Progress/Outcomes (Bed Mobility Goal 1, PT) goal ongoing  -LR       Row Name 09/07/23 0836          Transfer Goal 1 (PT)    Activity/Assistive Device (Transfer Goal 1, PT) sit-to-stand/stand-to-sit;walker, rolling  -LR     [place] Level/Cues Needed (Transfer Goal 1, PT) modified independence  -LR     Time Frame (Transfer Goal 1, PT) long term goal (LTG);3 days  -LR     Progress/Outcome (Transfer Goal 1, PT) goal ongoing  -LR       Row Name 09/07/23 0836          Gait Training Goal 1 (PT)    Activity/Assistive Device (Gait Training Goal 1, PT) gait (walking locomotion);walker, rolling  -LR     [place] Level (Gait Training Goal 1, PT) modified independence  -LR     Distance (Gait Training Goal 1, PT) 500 feet  -LR     Time Frame (Gait Training Goal 1, PT) long term goal (LTG);3 days  -LR     Progress/Outcome (Gait Training Goal 1, PT) goal ongoing  -LR       Row Name 09/07/23 0836          ROM Goal 1 (PT)    ROM Goal 1 (PT) 0-100 degrees R knee AROM  -LR     Time Frame (ROM Goal 1, PT) long-term goal (LTG);3 days  -LR     Progress/Outcome (ROM Goal 1, PT) goal ongoing  -LR       Row Name 09/07/23 0836          Stairs Goal 1 (PT)    Activity/Assistive Device (Stairs Goal 1, PT) ascending stairs;descending stairs;using handrail, right;step-to-step;cane, straight  -LR     [place] Level/Cues Needed (Stairs Goal 1, PT) contact guard required  -LR     Number of Stairs (Stairs Goal 1,  PT) 8; 1 step backwards with RW as well  -LR     Time Frame (Stairs Goal 1, PT) long term goal (LTG);3 days  -LR     Progress/Outcome (Stairs Goal 1, PT) goal ongoing  -LR       Row Name 09/07/23 0836          Therapy Assessment/Plan (PT)    Planned Therapy Interventions (PT) balance training;bed mobility training;gait training;home exercise program;patient/family education;ROM (range of motion);stair training;strengthening;transfer training  -LR               User Key  (r) = Recorded By, (t) = Taken By, (c) = Cosigned By      Initials Name Provider Type    LR Reina De La Cruz, PT Physical Therapist                   Clinical Impression       Row Name 09/07/23 0836          Pain    Pretreatment Pain Rating 7/10  -LR     Posttreatment Pain Rating 8/10  -LR     Pain Location - Side/Orientation Right  -LR     Pain Location posterior  -LR     Pain Location - knee  -LR     Pain Intervention(s) Ambulation/increased activity;Cold applied;Repositioned;Medication (See MAR)  -LR       Row Name 09/07/23 0836          Plan of Care Review    Plan of Care Reviewed With patient  -LR     Progress improving  -LR     Outcome Evaluation Patient ambulated 300 feet with RW, CGA, step through gait pattern, limited by pain. R knee extension quite limited, lacking 19 degrees AROM. R knee flexion progressing well at 90 degrees AAROM. All mobility limited by pain today. Patient currently below baseline functioning, demonstrating decreased functional mobility status, impaired balance, and decreased R Knee strength/ROM. Will address these deficits to promote return to PLOF. Recommend d/c home with assist and outpatient PT.  -LR       Row Name 09/07/23 0836          Therapy Assessment/Plan (PT)    Patient/Family Therapy Goals Statement (PT) go home, decrease pain  -LR     Rehab Potential (PT) good, to achieve stated therapy goals  -LR     Criteria for Skilled Interventions Met (PT) yes;meets criteria;skilled treatment is necessary  -LR      Therapy Frequency (PT) 2 times/day  -LR       Row Name 09/07/23 0836          Vital Signs    Intra Systolic BP Rehab 110  -LR     Intra Treatment Diastolic BP 70  -LR     Pre SpO2 (%) 97  -LR     O2 Delivery Pre Treatment room air  -LR     Post SpO2 (%) 99  -LR     O2 Delivery Post Treatment room air  -LR     Pre Patient Position Supine  -LR     Intra Patient Position Sitting  -LR     Post Patient Position Sitting  -LR       Row Name 09/07/23 0836          Positioning and Restraints    Pre-Treatment Position in bed  -LR     Post Treatment Position chair  -LR     In Chair notified nsg;reclined;sitting;call light within reach;encouraged to call for assist;exit alarm on;compression device;legs elevated  -LR               User Key  (r) = Recorded By, (t) = Taken By, (c) = Cosigned By      Initials Name Provider Type    LR Reina De La Cruz, PT Physical Therapist                   Outcome Measures       Row Name 09/07/23 0836          How much help from another person do you currently need...    Turning from your back to your side while in flat bed without using bedrails? 4  -LR     Moving from lying on back to sitting on the side of a flat bed without bedrails? 3  -LR     Moving to and from a bed to a chair (including a wheelchair)? 3  -LR     Standing up from a chair using your arms (e.g., wheelchair, bedside chair)? 3  -LR     Climbing 3-5 steps with a railing? 3  -LR     To walk in hospital room? 3  -LR     AM-PAC 6 Clicks Score (PT) 19  -LR     Highest level of mobility 6 --> Walked 10 steps or more  -LR       Row Name 09/07/23 0836          PADD    Diagnosis 1  -LR     Gender 1  -LR     Age Group 3  -LR     Gait Distance 1  -LR     Assist Level 1  -LR     Home Support 3  -LR     PADD Score 9  -LR     Patient Preference home with outpatient rehab  -LR     Prediction by PADD Score directly home (with home health or out-patient rehab)  -LR       Row Name 09/07/23 0836          Functional Assessment    Outcome  Measure Options AM-PAC 6 Clicks Basic Mobility (PT);PADD  -LR               User Key  (r) = Recorded By, (t) = Taken By, (c) = Cosigned By      Initials Name Provider Type    Reina Ruby, PT Physical Therapist                                 Physical Therapy Education       Title: PT OT SLP Therapies (In Progress)       Topic: Physical Therapy (Done)       Point: Mobility training (Done)       Learning Progress Summary             Patient Acceptance, E,D,H, VU,NR by LR at 9/7/2023 0836    Comment: Issued/reviewed written/illustrated HEP. Educated on precautions, weight bearing status, safety with mobility, correct supine to sit t/f technique, correct sit<->stand t/f technique, correct gait mechanics, and progression of POC.                         Point: Home exercise program (Done)       Learning Progress Summary             Patient Acceptance, E,D,H, VU,NR by LR at 9/7/2023 0836    Comment: Issued/reviewed written/illustrated HEP. Educated on precautions, weight bearing status, safety with mobility, correct supine to sit t/f technique, correct sit<->stand t/f technique, correct gait mechanics, and progression of POC.                         Point: Body mechanics (Done)       Learning Progress Summary             Patient Acceptance, E,D,H, VU,NR by LR at 9/7/2023 0836    Comment: Issued/reviewed written/illustrated HEP. Educated on precautions, weight bearing status, safety with mobility, correct supine to sit t/f technique, correct sit<->stand t/f technique, correct gait mechanics, and progression of POC.                         Point: Precautions (Done)       Learning Progress Summary             Patient Acceptance, E,D,H, VU,NR by LR at 9/7/2023 0836    Comment: Issued/reviewed written/illustrated HEP. Educated on precautions, weight bearing status, safety with mobility, correct supine to sit t/f technique, correct sit<->stand t/f technique, correct gait mechanics, and progression of POC.                                          User Key       Initials Effective Dates Name Provider Type Discipline    LR 02/03/23 -  Reina De La Cruz, PT Physical Therapist PT                  PT Recommendation and Plan  Planned Therapy Interventions (PT): balance training, bed mobility training, gait training, home exercise program, patient/family education, ROM (range of motion), stair training, strengthening, transfer training  Plan of Care Reviewed With: patient  Progress: improving  Outcome Evaluation: Patient ambulated 300 feet with RW, CGA, step through gait pattern, limited by pain. R knee extension quite limited, lacking 19 degrees AROM. R knee flexion progressing well at 90 degrees AAROM. All mobility limited by pain today. Patient currently below baseline functioning, demonstrating decreased functional mobility status, impaired balance, and decreased R Knee strength/ROM. Will address these deficits to promote return to PLOF. Recommend d/c home with assist and outpatient PT.     Time Calculation:   PT Evaluation Complexity  History, PT Evaluation Complexity: 3 or more personal factors and/or comorbidities  Examination of Body Systems (PT Eval Complexity): total of 3 or more elements  Clinical Presentation (PT Evaluation Complexity): stable  Clinical Decision Making (PT Evaluation Complexity): low complexity  Overall Complexity (PT Evaluation Complexity): low complexity     PT Charges       Row Name 09/07/23 0836             Time Calculation    Start Time 0836  -LR      PT Received On 09/07/23  -LR      PT Goal Re-Cert Due Date 09/17/23  -LR         Timed Charges    21543 - PT Therapeutic Exercise Minutes 15  -LR      53695 - Gait Training Minutes  15  -LR         Untimed Charges    PT Eval/Re-eval Minutes 40  -LR         Total Minutes    Timed Charges Total Minutes 30  -LR      Untimed Charges Total Minutes 40  -LR       Total Minutes 70  -LR                User Key  (r) = Recorded By, (t) = Taken By, (c) =  Cosigned By      Initials Name Provider Type    LR Reina De La Cruz, PT Physical Therapist                  Therapy Charges for Today       Code Description Service Date Service Provider Modifiers Qty    78957805041 HC PT THER PROC EA 15 MIN 9/7/2023 Reina De La Cruz, PT GP 1    83317883150 HC GAIT TRAINING EA 15 MIN 9/7/2023 Reina De La Cruz, PT GP 1    [id number]  PT EVAL LOW COMPLEXITY 3 9/7/2023 Reina De La Cruz, PT GP 1            PT G-Codes  Outcome Measure Options: AM-PAC 6 Clicks Basic Mobility (PT), PADD  AM-PAC 6 Clicks Score (PT): 19  PT Discharge Summary  Anticipated Discharge Disposition (PT): home with assist, home with outpatient therapy services    Reina De La Cruz, PT  9/7/2023

## 2023-09-07 NOTE — PLAN OF CARE
Goal Outcome Evaluation:  Plan of Care Reviewed With: patient        Progress: no change  Outcome Evaluation: Patient increased gait distance slightly this PM but all mobility and ther ex limited by uncontrolled pain this PM. Deferred initiating stair training d/t uncontrolled pain. Patient currently below functional baseline, demonstrating decreased functional mobility status and decreased R knee strength/ROM. Will address these deficits to promote return to PLOF.      Anticipated Discharge Disposition (PT): home with assist, home with outpatient therapy services

## 2023-09-07 NOTE — THERAPY EVALUATION
Patient Name: Janet Obrien  : 1960    MRN: [id number]                              Today's Date: 2023       Admit Date: 2023    Visit Dx:     ICD-10-CM ICD-9-CM   1. Primary osteoarthritis of right knee  M17.11 715.16     Patient Active Problem List   Diagnosis    Diplopia    Orbital pseudotumor    Myositic orbital pseudotumor    Hypothyroidism    Diabetes mellitus    Age-related nuclear cataract of right eye    Ocular myasthenia    Morbid obesity, unspecified obesity type    Essential hypertension    Precordial pain    SOB (shortness of breath)    Lower abdominal pain    Constipation    Diarrhea    Rectal bleeding    Gastroesophageal reflux disease    Class 3 severe obesity due to excess calories with serious comorbidity and body mass index (BMI) of 40.0 to 44.9 in adult    Anal or rectal pain    Primary osteoarthritis of left knee    S/P TKR (total knee replacement), left    Hyperlipidemia    Primary osteoarthritis of right knee    Obesity    S/P total knee arthroplasty, right     Past Medical History:   Diagnosis Date    Anxiety     Arthritis     Asthma     Back pain     Cancer     SQUAMOUS CELL/ BASAL CELL ON LIP, under the nose as well    Chronic bronchitis     Depression     Diabetes mellitus     type 2    Fibromyalgia     High triglycerides     Hyperlipidemia     Hypertension     Hypothyroidism     Migraine     MVP (mitral valve prolapse)     OA (osteoarthritis)     Ovarian cyst     Peripheral autonomic neuropathy     Pleurisy     Pneumonia     Rheumatism     RLS (restless legs syndrome)     Sleep apnea     Mild form per patient. recommended mouthpiece     Past Surgical History:   Procedure Laterality Date    APPENDECTOMY      CATARACT EXTRACTION W/ INTRAOCULAR LENS IMPLANT Right 2019    Procedure: CATARACT PHACO EXTRACTION WITH INTRAOCULAR LENS IMPLANT RIGHT;  Surgeon: Carl Babb MD;  Location: [facility];  Service: Ophthalmology    CATARACT EXTRACTION W/ INTRAOCULAR  "LENS IMPLANT Left 10/07/2019    Procedure: CATARACT PHACO EXTRACTION WITH INTRAOCULAR LENS IMPLANT LEFT;  Surgeon: Carl Babb MD;  Location: [facility] OR;  Service: Ophthalmology     SECTION  , 1997    x 2     DILATATION AND CURETTAGE      HAND SURGERY Right     \"Pinched nerve surgery\"    HAND SURGERY Left     Joint removed secondary to arthritis    HYSTERECTOMY      ''still have one ovary''    KNEE SURGERY Right 2021    arthroscopy and partial medial meniscectomy Dr. Chavez    THYROIDECTOMY  1985    TONSILLECTOMY AND ADENOIDECTOMY      TOTAL KNEE ARTHROPLASTY Left 03/15/2023    Procedure: TOTAL KNEE ARTHROPLASTY -  LEFT;  Surgeon: Dayo Chavez MD;  Location: [facility] OR;  Service: Orthopedics;  Laterality: Left;      General Information       Row Name 23 1304          OT Time and Intention    Document Type evaluation  -KF     Mode of Treatment occupational therapy;individual therapy  -KF       Row Name 23 1309          General Information    Patient Profile Reviewed yes  -KF     Prior Level of Function independent:;all household mobility;community mobility;bed mobility;ADL's;home management;cooking;cleaning;driving  All ADLs and functional mobility limited by pain  -KF     Existing Precautions/Restrictions fall;other (see comments)  R adductor canal nerve catheter  -KF     Barriers to Rehab previous functional deficit  -KF       Row Name 23 1304          Occupational Profile    Environmental Supports and Barriers (Occupational Profile) Pt with elevated toilet seat and shower bench.  -       Row Name 23 1304          Living Environment    People in Home parent(s);other (see comments)  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 who has dementia; brother and sons coming to assist at all times  -KF       Row Name 23 1304          Home Main Entrance    Number of Stairs, Main Entrance two  -KF     Stair Railings, Main Entrance none  -KF       Row Name 23 " 1304          Stairs Within Home, Primary    Number of Stairs, Within Home, Primary eight;other (see comments)  Split level home  -     Stair Railings, Within Home, Primary railing on right side (ascending)  -       Row Name 09/07/23 1304          Cognition    Orientation Status (Cognition) oriented x 4  -       Row Name 09/07/23 1304          Safety Issues, Functional Mobility    Safety Issues Affecting Function (Mobility) awareness of need for assistance;impulsivity;insight into deficits/self-awareness;judgment;positioning of assistive device;problem-solving;safety precaution awareness;safety precautions follow-through/compliance;sequencing abilities  -     Impairments Affecting Function (Mobility) strength;balance;pain;endurance/activity tolerance;range of motion (ROM)  -               User Key  (r) = Recorded By, (t) = Taken By, (c) = Cosigned By      Initials Name Provider Type    Padma Vivar OT Occupational Therapist                     Mobility/ADL's       Row Name 09/07/23 1308          Bed Mobility    Comment, (Bed Mobility) Pt found and left up in the chair.  -       Row Name 09/07/23 1308          Transfers    Transfers sit-stand transfer;stand-sit transfer;toilet transfer  -     Comment, (Transfers) VCs for safety including to push from chair arm when standing and to reach back for chair arm/grab bar prior to sitting.  -       Row Name 09/07/23 1308          Sit-Stand Transfer    Sit-Stand [place] (Transfers) contact guard;1 person assist;verbal cues  -     Assistive Device (Sit-Stand Transfers) walker, front-wheeled  -       Row Name 09/07/23 1308          Stand-Sit Transfer    Stand-Sit [place] (Transfers) contact guard;1 person assist;verbal cues  -     Assistive Device (Stand-Sit Transfers) walker, front-wheeled  -       Row Name 09/07/23 1308          Toilet Transfer    Type (Toilet Transfer) sit-stand;stand-sit  -     [place] Level (Toilet Transfer)  contact guard;1 person assist  -     Assistive Device (Toilet Transfer) walker, front-wheeled  -[facility] Name 09/07/23 1308          Functional Mobility    Functional Mobility- Ind. Level contact guard assist;1 person;verbal cues required  -     Functional Mobility- Device walker, front-wheeled  -     Functional Mobility-Distance (Feet) --  In room ambulation for ADLs  -     Functional Mobility- Comment VCs for step sequence due to increased RLE pain. Pt with complaints of dizziness throughout mobility, BPs recorded below. Pt educated on pacing self and listening to body when needing rest breaks.  -[facility] Name 09/07/23 1308          Activities of Daily Living    BADL Assessment/Intervention bathing;lower body dressing;grooming;toileting  -[facility] Name 09/07/23 1308          Mobility    Extremity Weight-bearing Status right lower extremity  -     Right Lower Extremity (Weight-bearing Status) weight-bearing as tolerated (WBAT)  -[facility] Name 09/07/23 1308          Bathing Assessment/Intervention    Assistive Devices (Bathing) long-handled sponge  -     Comment, (Bathing) Educated pt on use of long handled sponge to assist in pain control during bathing.  -[facility] Name 09/07/23 1308          Lower Body Dressing Assessment/Training    Assistive Devices (Lower Body Dressing) long-handled shoe horn;reacher;sock-aid  -     Comment, (Lower Body Dressing) Educated pt on use of ADL AE to assist in pain control and independent completion of ADLs; practiced deferred this date due to RLE pain and ongoing dizziness  -[facility] Name 09/07/23 1308          Grooming Assessment/Training    [place] Level (Grooming) wash face, hands;contact guard assist  -     Position (Grooming) supported standing  -[facility] Name 09/07/23 1308          Toileting Assessment/Training    [place] Level (Toileting) standby assist  -     Assistive Devices (Toileting) commode  -     Position  (Toileting) unsupported sitting  -KF               User Key  (r) = Recorded By, (t) = Taken By, (c) = Cosigned By      Initials Name Provider Type    KF Padma Glover OT Occupational Therapist                   Obj/Interventions       Row Name 09/07/23 1313          Sensory Assessment (Somatosensory)    Sensory Assessment (Somatosensory) UE sensation intact  -[facility] Name 09/07/23 1313          Vision Assessment/Intervention    Visual Impairment/Limitations WNL  -KF       Row Name 09/07/23 1313          Range of Motion Comprehensive    General Range of Motion bilateral upper extremity ROM WFL  -[facility] Name 09/07/23 1313          Strength Comprehensive (MMT)    Comment, General Manual Muscle Testing (MMT) Assessment No BUE strength deficit noted, pt unable to perform RLE SLR independently  -KF       Row Name 09/07/23 1313          Balance    Balance Assessment sitting static balance;sitting dynamic balance;standing static balance;standing dynamic balance  -KF     Static Sitting Balance standby assist  -KF     Dynamic Sitting Balance standby assist  -KF     Position, Sitting Balance unsupported;sitting in chair  -KF     Static Standing Balance contact guard  -KF     Dynamic Standing Balance contact guard  -KF     Position/Device Used, Standing Balance supported;walker, front-wheeled  -KF     Balance Interventions sitting;standing;supported;static;dynamic;occupation based/functional task  -KF     Comment, Balance No overt LOB or knee buckling.  -KF               User Key  (r) = Recorded By, (t) = Taken By, (c) = Cosigned By      Initials Name Provider Type    KF Padma Glover OT Occupational Therapist                   Goals/Plan       Row Name 09/07/23 1320          Transfer Goal 1 (OT)    Activity/Assistive Device (Transfer Goal 1, OT) toilet  -KF     [place] Level/Cues Needed (Transfer Goal 1, OT) modified independence  -KF     Time Frame (Transfer Goal 1, OT) long term goal (LTG);10 days  -KF      Progress/Outcome (Transfer Goal 1, OT) new goal  -KF       Row Name 09/07/23 1320          Dressing Goal 1 (OT)    Activity/Device (Dressing Goal 1, OT) upper body dressing;lower body dressing  -KF     [place]/Cues Needed (Dressing Goal 1, OT) modified independence;other (see comments)  ADL AE PRN  -KF     Time Frame (Dressing Goal 1, OT) long term goal (LTG);10 days  -KF     Progress/Outcome (Dressing Goal 1, OT) new goal  -KF       Row Name 09/07/23 1320          Grooming Goal 1 (OT)    Activity/Device (Grooming Goal 1, OT) hair care;oral care;wash face, hands  -KF     [place] (Grooming Goal 1, OT) modified independence  -KF     Time Frame (Grooming Goal 1, OT) long term goal (LTG);10 days  -KF     Progress/Outcome (Grooming Goal 1, OT) new goal  -KF       Row Name 09/07/23 1320          Therapy Assessment/Plan (OT)    Planned Therapy Interventions (OT) activity tolerance training;adaptive equipment training;BADL retraining;functional balance retraining;IADL retraining;occupation/activity based interventions;patient/caregiver education/training;strengthening exercise;transfer/mobility retraining  -KF               User Key  (r) = Recorded By, (t) = Taken By, (c) = Cosigned By      Initials Name Provider Type    Padma Vivar, JAZZ Occupational Therapist                   Clinical Impression       Row Name 09/07/23 1315          Pain Assessment    Pretreatment Pain Rating 5/10  -KF     Posttreatment Pain Rating 5/10  -KF     Pain Location - Side/Orientation Right  -KF     Pain Location posterior  -KF     Pain Location - knee  -KF     Pain Intervention(s) Cold applied;Repositioned;Ambulation/increased activity;Elevated;Nursing Notified;Rest  -KF       Row Name 09/07/23 1315          Plan of Care Review    Plan of Care Reviewed With patient  -KF     Progress no change  -KF     Outcome Evaluation OT evaluation completed. Pt ambulated to/from the bathroom using a RWx with CGA, completing toileting ADL  with SBA/CGA. Pt limited by dizziness throughout session, though BP remained stable. Pt provided ADL AE with education, though practice deferred this date due to RLE pain and dizziness. The pt presents with a decline compared to her functional baseline secondary to deficits in LE strength, balance, activity tolerance, and safety awareness. Pt will benefit from continued OT services to address deficits. Recommend a d/c home with assist and continued PT services.  -       Row Name 09/07/23 1315          Therapy Assessment/Plan (OT)    Patient/Family Therapy Goal Statement (OT) Restore PLOF  -KF     Rehab Potential (OT) good, to achieve stated therapy goals  -KF     Criteria for Skilled Therapeutic Interventions Met (OT) yes;skilled treatment is necessary  -KF     Therapy Frequency (OT) daily  -KF       Row Name 09/07/23 1315          Therapy Plan Review/Discharge Plan (OT)    Anticipated Discharge Disposition (OT) home with assist  -KF       Row Name 09/07/23 1317          Vital Signs    Pre Systolic BP Rehab 110  Initial BP while in chair  -KF     Pre Treatment Diastolic BP 70  -KF     Intra Systolic BP Rehab 128  BP after returning from the bathroom, taken while pt reclined in chair  -KF     Intra Treatment Diastolic BP 64  -KF     Post Systolic BP Rehab 109  Post BP while in chair  -KF     Post Treatment Diastolic BP 53  -KF     Pretreatment Heart Rate (beats/min) 79  -KF     Pre SpO2 (%) 97  -KF     O2 Delivery Pre Treatment room air  -KF     Pre Patient Position Sitting  -KF     Intra Patient Position Standing  -KF     Post Patient Position Sitting  -KF       Row Name 09/07/23 1315          Positioning and Restraints    Pre-Treatment Position sitting in chair/recliner  -KF     Post Treatment Position chair  -KF     In Chair notified nsg;reclined;call light within reach;encouraged to call for assist;exit alarm on;legs elevated  -KF               User Key  (r) = Recorded By, (t) = Taken By, (c) = Cosigned By       Initials Name Provider Type    KF Padma Glover OT Occupational Therapist                   Outcome Measures       Row Name 09/07/23 1321          How much help from another is currently needed...    Putting on and taking off regular lower body clothing? 3  -KF     Bathing (including washing, rinsing, and drying) 3  -KF     Toileting (which includes using toilet bed pan or urinal) 3  -KF     Putting on and taking off regular upper body clothing 4  -KF     Taking care of personal grooming (such as brushing teeth) 3  -KF     Eating meals 4  -KF     AM-PAC 6 Clicks Score (OT) 20  -KF       Row Name 09/07/23 0836          How much help from another person do you currently need...    Turning from your back to your side while in flat bed without using bedrails? 4  -LR     Moving from lying on back to sitting on the side of a flat bed without bedrails? 3  -LR     Moving to and from a bed to a chair (including a wheelchair)? 3  -LR     Standing up from a chair using your arms (e.g., wheelchair, bedside chair)? 3  -LR     Climbing 3-5 steps with a railing? 3  -LR     To walk in hospital room? 3  -LR     AM-PAC 6 Clicks Score (PT) 19  -LR     Highest level of mobility 6 --> Walked 10 steps or more  -LR       Row Name 09/07/23 1321 09/07/23 0836       Functional Assessment    Outcome Measure Options AM-PAC 6 Clicks Daily Activity (OT)  -KF AM-PAC 6 Clicks Basic Mobility (PT);PADD  -LR              User Key  (r) = Recorded By, (t) = Taken By, (c) = Cosigned By      Initials Name Provider Type    LR Reina De La Cruz, PT Physical Therapist    KF Padma Glover OT Occupational Therapist                    Occupational Therapy Education       Title: PT OT SLP Therapies (In Progress)       Topic: Occupational Therapy (In Progress)       Point: ADL training (Done)       Description:   Instruct learner(s) on proper safety adaptation and remediation techniques during self care or transfers.   Instruct in proper use of  assistive devices.                  Learning Progress Summary             Patient Acceptance, E, VU by  at 9/7/2023 1118                         Point: Home exercise program (Not Started)       Description:   Instruct learner(s) on appropriate technique for monitoring, assisting and/or progressing therapeutic exercises/activities.                  Learner Progress:  Not documented in this visit.              Point: Precautions (Done)       Description:   Instruct learner(s) on prescribed precautions during self-care and functional transfers.                  Learning Progress Summary             Patient Acceptance, E, VU by  at 9/7/2023 1118                         Point: Body mechanics (Done)       Description:   Instruct learner(s) on proper positioning and spine alignment during self-care, functional mobility activities and/or exercises.                  Learning Progress Summary             Patient Acceptance, E, VU by  at 9/7/2023 1118                                         User Key       Initials Effective Dates Name Provider Type Discipline     08/09/23 -  Padma Glover OT Occupational Therapist OT                  OT Recommendation and Plan  Planned Therapy Interventions (OT): activity tolerance training, adaptive equipment training, BADL retraining, functional balance retraining, IADL retraining, occupation/activity based interventions, patient/caregiver education/training, strengthening exercise, transfer/mobility retraining  Therapy Frequency (OT): daily  Plan of Care Review  Plan of Care Reviewed With: patient  Progress: no change  Outcome Evaluation: OT evaluation completed. Pt ambulated to/from the bathroom using a RWx with CGA, completing toileting ADL with SBA/CGA. Pt limited by dizziness throughout session, though BP remained stable. Pt provided ADL AE with education, though practice deferred this date due to RLE pain and dizziness. The pt presents with a decline compared to her  functional baseline secondary to deficits in LE strength, balance, activity tolerance, and safety awareness. Pt will benefit from continued OT services to address deficits. Recommend a d/c home with assist and continued PT services.     Time Calculation:   Evaluation Complexity (OT)  Review Occupational Profile/Medical/Therapy History Complexity: brief/low complexity  Assessment, Occupational Performance/Identification of Deficit Complexity: 1-3 performance deficits  Clinical Decision Making Complexity (OT): problem focused assessment/low complexity  Overall Complexity of Evaluation (OT): low complexity     Time Calculation- OT       Row Name 09/07/23 1323 09/07/23 0836          Time Calculation- OT    OT Start Time 1118  -KF --     OT Received On 09/07/23  -KF --     OT Goal Re-Cert Due Date 09/17/23  -KF --        Timed Charges    92241 - Gait Training Minutes  -- 15  -LR     11502 - OT Self Care/Mgmt Minutes 23  -KF --        Untimed Charges    OT Eval/Re-eval Minutes 48  -KF --        Total Minutes    Timed Charges Total Minutes 23  -KF 15  -LR     Untimed Charges Total Minutes 48  -KF --      Total Minutes 71  -KF 15  -LR               User Key  (r) = Recorded By, (t) = Taken By, (c) = Cosigned By      Initials Name Provider Type    LR Reina De La Cruz, PT Physical Therapist    KF Padma Glover OT Occupational Therapist                  Therapy Charges for Today       Code Description Service Date Service Provider Modifiers Qty    [id number]  OT SELF CARE/MGMT/TRAIN EA 15 MIN 9/7/2023 Padma Glover OT GO 2    [id number] HC OT EVAL LOW COMPLEXITY 4 9/7/2023 Padma Glover OT GO 1                 Padma Glover OT  9/7/2023

## 2023-09-07 NOTE — THERAPY TREATMENT NOTE
Patient Name: Janet Obrien  : 1960    MRN: [id number]                              Today's Date: 2023       Admit Date: 2023    Visit Dx:     ICD-10-CM ICD-9-CM   1. Primary osteoarthritis of right knee  M17.11 715.16     Patient Active Problem List   Diagnosis    Diplopia    Orbital pseudotumor    Myositic orbital pseudotumor    Hypothyroidism    Diabetes mellitus    Age-related nuclear cataract of right eye    Ocular myasthenia    Morbid obesity, unspecified obesity type    Essential hypertension    Precordial pain    SOB (shortness of breath)    Lower abdominal pain    Constipation    Diarrhea    Rectal bleeding    Gastroesophageal reflux disease    Class 3 severe obesity due to excess calories with serious comorbidity and body mass index (BMI) of 40.0 to 44.9 in adult    Anal or rectal pain    Primary osteoarthritis of left knee    S/P TKR (total knee replacement), left    Hyperlipidemia    Primary osteoarthritis of right knee    Obesity    S/P total knee arthroplasty, right     Past Medical History:   Diagnosis Date    Anxiety     Arthritis     Asthma     Back pain     Cancer     SQUAMOUS CELL/ BASAL CELL ON LIP, under the nose as well    Chronic bronchitis     Depression     Diabetes mellitus     type 2    Fibromyalgia     High triglycerides     Hyperlipidemia     Hypertension     Hypothyroidism     Migraine     MVP (mitral valve prolapse)     OA (osteoarthritis)     Ovarian cyst     Peripheral autonomic neuropathy     Pleurisy     Pneumonia     Rheumatism     RLS (restless legs syndrome)     Sleep apnea     Mild form per patient. recommended mouthpiece     Past Surgical History:   Procedure Laterality Date    APPENDECTOMY      CATARACT EXTRACTION W/ INTRAOCULAR LENS IMPLANT Right 2019    Procedure: CATARACT PHACO EXTRACTION WITH INTRAOCULAR LENS IMPLANT RIGHT;  Surgeon: Carl Babb MD;  Location: [facility];  Service: Ophthalmology    CATARACT EXTRACTION W/ INTRAOCULAR  "LENS IMPLANT Left 10/07/2019    Procedure: CATARACT PHACO EXTRACTION WITH INTRAOCULAR LENS IMPLANT LEFT;  Surgeon: Carl Babb MD;  Location: [facility] OR;  Service: Ophthalmology     SECTION  , 1997    x 2     DILATATION AND CURETTAGE      HAND SURGERY Right     \"Pinched nerve surgery\"    HAND SURGERY Left     Joint removed secondary to arthritis    HYSTERECTOMY      ''still have one ovary''    KNEE SURGERY Right 2021    arthroscopy and partial medial meniscectomy Dr. Chavez    THYROIDECTOMY  1985    TONSILLECTOMY AND ADENOIDECTOMY      TOTAL KNEE ARTHROPLASTY Left 03/15/2023    Procedure: TOTAL KNEE ARTHROPLASTY -  LEFT;  Surgeon: Dayo Chavez MD;  Location: [facility] OR;  Service: Orthopedics;  Laterality: Left;      General Information       Row Name 23 1457 23 08       Physical Therapy Time and Intention    Document Type therapy note (daily note)  -LR evaluation  -LR    Mode of Treatment physical therapy;individual therapy  -LR physical therapy;individual therapy  -LR      Row Name 23 1457 23 08       General Information    Patient Profile Reviewed yes  -LR yes  -LR    Prior Level of Function -- independent:;all household mobility;community mobility;gait;transfer;bed mobility;ADL's  all mobility limited by pain, not using AD PTA  -LR    Existing Precautions/Restrictions fall;other (see comments)  R adductor canal nerve catheter  -LR fall;other (see comments)  R adductor canal nerve catheter  -LR    Barriers to Rehab previous functional deficit  -LR previous functional deficit  -LR      Row Name 23          Living Environment    People in Home parent(s);other (see comments)  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 who has dementia; brother and sons coming to assist at all times  -LR       Row Name 23 08          Home Main Entrance    Number of Stairs, Main Entrance two  -LR     Stair Railings, Main Entrance none  -LR       Row Name " 09/07/23 0836          Stairs Within Home, Primary    Stairs, Within Home, Primary 8  -LR     Stair Railings, Within Home, Primary railing on right side (ascending)  -LR       Row Name 09/07/23 1457 09/07/23 0836       Cognition    Orientation Status (Cognition) oriented x 4  -LR oriented x 4  -LR      Row Name 09/07/23 1457 09/07/23 0836       Safety Issues, Functional Mobility    Safety Issues Affecting Function (Mobility) safety precautions follow-through/compliance;safety precaution awareness;insight into deficits/self-awareness;awareness of need for assistance;positioning of assistive device  -LR safety precautions follow-through/compliance;safety precaution awareness;insight into deficits/self-awareness;awareness of need for assistance;impulsivity;positioning of assistive device  -LR    Impairments Affecting Function (Mobility) strength;balance;pain;endurance/activity tolerance;range of motion (ROM)  -LR strength;balance;pain;endurance/activity tolerance;range of motion (ROM)  -LR              User Key  (r) = Recorded By, (t) = Taken By, (c) = Cosigned By      Initials Name Provider Type    LR Reina De La Cruz, PT Physical Therapist                   Mobility       Row Name 09/07/23 1457 09/07/23 0836       Bed Mobility    Bed Mobility sit-supine  -LR supine-sit  -LR    Supine-Sit [place] (Bed Mobility) not tested  -LR verbal cues;standby assist  -LR    Sit-Supine [place] (Bed Mobility) verbal cues;contact guard  -LR --    Assistive Device (Bed Mobility) head of bed elevated;bed rails  -LR head of bed elevated;bed rails  -LR    Comment, (Bed Mobility) Patient [facility] on arrival. Verbal cues to push from bed to scoot hips back and up into bed. CGA to lift R LE into bed.  -LR Verbal cues to move LEs towards EOB and to push up from bed to raise trunk into sitting and to scoot hips out to get feet on floor. Patient required assist via her UEs to move R LE off EOB. Denied dizziness upon sitting up.  -LR       Row Name 09/07/23 1457 09/07/23 0836       Transfers    Comment, (Transfers) Verbal cues for correct hand placement with t/f and to step R LE out before t/f for comfort. Assisted to and from bathroom.  -LR Verbal cues to push up from bed to stand and to reach back for chair to lower into sitting. Patient pulled up on RW to stand despite these cues. Verbal cues to step R LE out before t/f for comfort.  -LR      Row Name 09/07/23 1457 09/07/23 0836       Bed-Chair Transfer    Bed-Chair Mar[place] (Transfers) not tested  -LR not tested  -LR      Row Name 09/07/23 1457 09/07/23 0836       Sit-Stand Transfer    Sit-Stand [place]ies (Transfers) verbal cues;contact guard  -LR verbal cues;contact guard  -LR    Assistive Device (Sit-Stand Transfers) walker, front-wheeled  -LR walker, front-wheeled  -LR      Row Name 09/07/23 1457 09/07/23 0836       Gait/Stairs (Locomotion)    [place] Level (Gait) verbal cues;contact guard  -LR verbal cues;contact guard  -LR    Assistive Device (Gait) walker, front-wheeled  -LR walker, front-wheeled  -LR    Distance in Feet (Gait) 325  -  -LR    Deviations/Abnormal Patterns (Gait) bilateral deviations;junie decreased;gait speed decreased;stride length decreased;right sided deviations;antalgic  -LR bilateral deviations;junie decreased;gait speed decreased;stride length decreased;right sided deviations;antalgic  -LR    Bilateral Gait Deviations forward flexed posture;heel strike decreased  -LR forward flexed posture;heel strike decreased  -LR    Right Sided Gait Deviations weight shift ability decreased  -LR weight shift ability decreased  -LR    [place] Level (Stairs) unable to assess  -LR not tested  -LR    Comment, (Gait/Stairs) Patient ambulated with step through gait pattern at fast pace. Required repeated cues to keep RW closer and to stay with in RW. Verbal cues for decreased pace, increased R LE weight bearing/stance phase, decreased UE weight bearing, and  increased R knee flexion during swing phase. Improved slightly with cues for correction. Gait limited by significant pain. Deferred stair training d/t uncontrolled pain.  -LR Patient ambulated with step through gait pattern at fast pace. Verbal cues for correct sequencing of steps, increased R LE weight bearing/stance phase, decreased UE weight bearing, increased R knee flexion during swing phase, and decreased pace. Improved with repeated cues for correction. Verbal cues for upright posture and repeated cues to keep RW closer. Mildly unsteady at times with ambulation. Gait limited by pain.  -LR      Row Name 09/07/23 1457 09/07/23 0836       Mobility    Extremity Weight-bearing Status right lower extremity  -LR right lower extremity  -LR    Right Lower Extremity (Weight-bearing Status) weight-bearing as tolerated (WBAT)  -LR weight-bearing as tolerated (WBAT)  -LR              User Key  (r) = Recorded By, (t) = Taken By, (c) = Cosigned By      Initials Name Provider Type    Reina Ruby, PT Physical Therapist                   Obj/Interventions       Row Name 09/07/23 0836          Range of Motion Comprehensive    General Range of Motion lower extremity range of motion deficits identified  -LR       Row Name 09/07/23 0836          Strength Comprehensive (MMT)    General Manual Muscle Testing (MMT) Assessment lower extremity strength deficits identified  -       Row Name 09/07/23 1457 09/07/23 0836       Motor Skills    Therapeutic Exercise ankle;knee;other (see comments)  cues for technique; min assist R SLR, R SAQ, R heel slides, performed 10 reps vs 15 d/t uncontrolled pain  -LR ankle;knee;other (see comments)  cues for technique; min assist R SLR  -LR      Row Name 09/07/23 1457 09/07/23 0836       Knee (Therapeutic Exercise)    Knee (Therapeutic Exercise) AROM (active range of motion);strengthening exercise;isometric exercises  -LR AROM (active range of motion);strengthening exercise;isometric  exercises  -LR    Knee AROM (Therapeutic Exercise) right;heel slides;sitting;10 repetitions;other (see comments)  and reclined  -LR right;heel slides;supine;sitting;15 repititions  -LR    Knee Isometrics (Therapeutic Exercise) right;quad sets;sitting;10 repetitions  -LR right;quad sets;supine;10 repetitions;5 repetitions  -LR    Knee Strengthening (Therapeutic Exercise) right;SLR (straight leg raise);SAQ (short arc quad);LAQ (long arc quad);sitting;10 repetitions  -LR right;SLR (straight leg raise);SAQ (short arc quad);LAQ (long arc quad);sitting;supine;15 repititions  -LR      Row Name 09/07/23 1457 09/07/23 0836       Ankle (Therapeutic Exercise)    Ankle (Therapeutic Exercise) AROM (active range of motion)  -LR AROM (active range of motion)  -LR    Ankle AROM (Therapeutic Exercise) bilateral;plantarflexion;dorsiflexion;sitting;10 repetitions  -LR bilateral;dorsiflexion;plantarflexion;supine;15 repititions  -LR      Row Name 09/07/23 1457 09/07/23 0836       Balance    Balance Assessment sitting static balance;sitting dynamic balance;standing static balance;standing dynamic balance  -LR sitting static balance;sitting dynamic balance;standing static balance;standing dynamic balance  -LR    Static Sitting Balance independent  -LR standby assist  -LR    Dynamic Sitting Balance independent  -LR standby assist  -LR    Position, Sitting Balance unsupported;sitting edge of bed  -LR unsupported;sitting edge of bed  -LR    Static Standing Balance contact guard  -LR contact guard  -LR    Dynamic Standing Balance contact guard  -LR contact guard  -LR    Position/Device Used, Standing Balance supported;walker, rolling  -LR supported;walker, rolling  -LR      Row Name 09/07/23 0836          Sensory Assessment (Somatosensory)    Sensory Assessment (Somatosensory) LE sensation intact;other (see comments)  denies numbness/tingling; reports light touch equal and intact upon assessment; able to actively DF bilaterally  -LR        Row Name 09/07/23 0836          General Lower Extremity Assessment (Range of Motion)    Lower Extremity: Range of Motion LLE ROM WFL;knee, right: LE ROM  -LR     Comment: Lower Extremity ROM R knee AROM 19-90 degrees; lacking 19 degrees of extension AROM, demonstrated 90 degrees flexion AAROM in sitting.  -LR       Row Name 09/07/23 0836          Lower Extremity (Manual Muscle Testing)    Lower Extremity: Manual Muscle Testing (MMT) left LE strength is WNL;right knee strength deficit  -LR     Comment, MMT: Lower Extremity R knee functionally 3-/5, unable to perform SLR independently, required min assist, no knee buckling observed with weight bearing  -LR               User Key  (r) = Recorded By, (t) = Taken By, (c) = Cosigned By      Initials Name Provider Type    LR Reina De La Cruz, PT Physical Therapist                   Goals/Plan       Row Name 09/07/23 1457 09/07/23 0836       Bed Mobility Goal 1 (PT)    Activity/Assistive Device (Bed Mobility Goal 1, PT) sit to supine/supine to sit  -LR sit to supine/supine to sit  -LR    [place] Level/Cues Needed (Bed Mobility Goal 1, PT) modified independence  -LR modified independence  -LR    Time Frame (Bed Mobility Goal 1, PT) long term goal (LTG);3 days  -LR long term goal (LTG);3 days  -LR    Progress/Outcomes (Bed Mobility Goal 1, PT) goal ongoing  -LR goal ongoing  -LR      Row Name 09/07/23 1457 09/07/23 0836       Transfer Goal 1 (PT)    Activity/Assistive Device (Transfer Goal 1, PT) sit-to-stand/stand-to-sit;walker, rolling  -LR sit-to-stand/stand-to-sit;walker, rolling  -LR    [place] Level/Cues Needed (Transfer Goal 1, PT) modified independence  -LR modified independence  -LR    Time Frame (Transfer Goal 1, PT) long term goal (LTG);3 days  -LR long term goal (LTG);3 days  -LR    Progress/Outcome (Transfer Goal 1, PT) goal ongoing;continuing progress toward goal  -LR goal ongoing  -LR      Row Name 09/07/23 1457 09/07/23 0836       Gait Training  Goal 1 (PT)    Activity/Assistive Device (Gait Training Goal 1, PT) gait (walking locomotion);walker, rolling  -LR gait (walking locomotion);walker, rolling  -LR    [place] Level (Gait Training Goal 1, PT) modified independence  -LR modified independence  -LR    Distance (Gait Training Goal 1, PT) 500 feet  - feet  -LR    Time Frame (Gait Training Goal 1, PT) long term goal (LTG);3 days  -LR long term goal (LTG);3 days  -LR    Progress/Outcome (Gait Training Goal 1, PT) continuing progress toward goal;goal ongoing  -LR goal ongoing  -LR      Row Name 09/07/23 1457 09/07/23 0836       ROM Goal 1 (PT)    ROM Goal 1 (PT) 0-100 degrees R knee AROM  -LR 0-100 degrees R knee AROM  -LR    Time Frame (ROM Goal 1, PT) long-term goal (LTG);3 days  -LR long-term goal (LTG);3 days  -LR    Progress/Outcome (ROM Goal 1, PT) continuing progress toward goal;goal ongoing  -LR goal ongoing  -LR      Row Name 09/07/23 1457 09/07/23 0836       Stairs Goal 1 (PT)    Activity/Assistive Device (Stairs Goal 1, PT) ascending stairs;descending stairs;using handrail, right;step-to-step;cane, straight  -LR ascending stairs;descending stairs;using handrail, right;step-to-step;cane, straight  -LR    [place] Level/Cues Needed (Stairs Goal 1, PT) contact guard required  -LR contact guard required  -LR    Number of Stairs (Stairs Goal 1, PT) 8; 1 step backwards with RW as well  -LR 8; 1 step backwards with RW as well  -LR    Time Frame (Stairs Goal 1, PT) long term goal (LTG);3 days  -LR long term goal (LTG);3 days  -LR    Progress/Outcome (Stairs Goal 1, PT) goal ongoing  -LR goal ongoing  -LR      Row Name 09/07/23 0836          Therapy Assessment/Plan (PT)    Planned Therapy Interventions (PT) balance training;bed mobility training;gait training;home exercise program;patient/family education;ROM (range of motion);stair training;strengthening;transfer training  -LR               User Key  (r) = Recorded By, (t) = Taken By, (c) =  Cosigned By      Initials Name Provider Type    LR Reina De La Cruz, PT Physical Therapist                   Clinical Impression       Row Name 09/07/23 1457 09/07/23 0836       Pain    Pretreatment Pain Rating 7/10  -LR 7/10  -LR    Posttreatment Pain Rating 9/10  -LR 8/10  -LR    Pain Location - Side/Orientation Right  -LR Right  -LR    Pain Location generalized  -LR posterior  -LR    Pain Location - knee  -LR knee  -LR    Pain Intervention(s) Cold applied;Ambulation/increased activity;Repositioned  -LR Ambulation/increased activity;Cold applied;Repositioned;Medication (See MAR)  -LR      Row Name 09/07/23 1457 09/07/23 0836       Plan of Care Review    Plan of Care Reviewed With patient  -LR patient  -LR    Progress no change  -LR improving  -LR    Outcome Evaluation Patient increased gait distance slightly this PM but all mobility and ther ex limited by uncontrolled pain this PM. Deferred initiating stair training d/t uncontrolled pain. Patient currently below functional baseline, demonstrating decreased functional mobility status and decreased R knee strength/ROM. Will address these deficits to promote return to PLOF.  -LR Patient ambulated 300 feet with RW, CGA, step through gait pattern, limited by pain. R knee extension quite limited, lacking 19 degrees AROM. R knee flexion progressing well at 90 degrees AAROM. All mobility limited by pain today. Patient currently below baseline functioning, demonstrating decreased functional mobility status, impaired balance, and decreased R Knee strength/ROM. Will address these deficits to promote return to PLOF. Recommend d/c home with assist and outpatient PT.  -LR      Row Name 09/07/23 1457 09/07/23 0836       Therapy Assessment/Plan (PT)    Patient/Family Therapy Goals Statement (PT) -- go home, decrease pain  -LR    Rehab Potential (PT) good, to achieve stated therapy goals  -LR good, to achieve stated therapy goals  -LR    Criteria for Skilled Interventions  Met (PT) yes;meets criteria;skilled treatment is necessary  -LR yes;meets criteria;skilled treatment is necessary  -LR    Therapy Frequency (PT) 2 times/day  -LR 2 times/day  -LR      Row Name 09/07/23 0836          Vital Signs    Intra Systolic BP Rehab 110  -LR     Intra Treatment Diastolic BP 70  -LR     Pre SpO2 (%) 97  -LR     O2 Delivery Pre Treatment room air  -LR     Post SpO2 (%) 99  -LR     O2 Delivery Post Treatment room air  -LR     Pre Patient Position Supine  -LR     Intra Patient Position Sitting  -LR     Post Patient Position Sitting  -LR       Row Name 09/07/23 1457 09/07/23 0836       Positioning and Restraints    Pre-Treatment Position sitting in chair/recliner  -LR in bed  -LR    Post Treatment Position bed  -LR chair  -LR    In Bed notified nsg;supine;call light within reach;encouraged to call for assist;exit alarm on;side rails up x2;SCD pump applied  -LR --    In Chair -- notified nsg;reclined;sitting;call light within reach;encouraged to call for assist;exit alarm on;compression device;legs elevated  -LR              User Key  (r) = Recorded By, (t) = Taken By, (c) = Cosigned By      Initials Name Provider Type    LR Reina De La Cruz, PT Physical Therapist                   Outcome Measures       Row Name 09/07/23 1457 09/07/23 0836       How much help from another person do you currently need...    Turning from your back to your side while in flat bed without using bedrails? 4  -LR 4  -LR    Moving from lying on back to sitting on the side of a flat bed without bedrails? 3  -LR 3  -LR    Moving to and from a bed to a chair (including a wheelchair)? 3  -LR 3  -LR    Standing up from a chair using your arms (e.g., wheelchair, bedside chair)? 3  -LR 3  -LR    Climbing 3-5 steps with a railing? 2  -LR 3  -LR    To walk in hospital room? 3  -LR 3  -LR    AM-PAC 6 Clicks Score (PT) 18  -LR 19  -LR    Highest level of mobility 6 --> Walked 10 steps or more  -LR 6 --> Walked 10 steps or more   -LR      Row Name 09/07/23 0800          How much help from another person do you currently need...    Turning from your back to your side while in flat bed without using bedrails? 4  -PT     Moving from lying on back to sitting on the side of a flat bed without bedrails? 3  -PT     Moving to and from a bed to a chair (including a wheelchair)? 3  -PT     Standing up from a chair using your arms (e.g., wheelchair, bedside chair)? 3  -PT     Climbing 3-5 steps with a railing? 3  -PT     To walk in hospital room? 3  -PT     AM-PAC 6 Clicks Score (PT) 19  -PT     Highest level of mobility 6 --> Walked 10 steps or more  -PT       Row Name 09/07/23 0836          PADD    Diagnosis 1  -LR     Gender 1  -LR     Age Group 3  -LR     Gait Distance 1  -LR     Assist Level 1  -LR     Home Support 3  -LR     PADD Score 9  -LR     Patient Preference home with outpatient rehab  -LR     Prediction by PADD Score directly home (with home health or out-patient rehab)  -LR       Row Name 09/07/23 1457 09/07/23 1321       Functional Assessment    Outcome Measure Options AM-PAC 6 Clicks Basic Mobility (PT)  -LR AM-PAC 6 Clicks Daily Activity (OT)  -KF      Row Name 09/07/23 0836          Functional Assessment    Outcome Measure Options AM-PAC 6 Clicks Basic Mobility (PT);PADD  -LR               User Key  (r) = Recorded By, (t) = Taken By, (c) = Cosigned By      Initials Name Provider Type    LR Reina De La Cruz, PT Physical Therapist    PT Peter Issa, RN Registered Nurse    KF Padma Glover, OT Occupational Therapist                                 Physical Therapy Education       Title: PT OT SLP Therapies (In Progress)       Topic: Physical Therapy (Done)       Point: Mobility training (Done)       Learning Progress Summary             Patient Acceptance, E,D, VU,NR by LR at 9/7/2023 6620    Comment: Educated on precautions, weight bearing status, safety with mobility, benefits of mobility, LE HEP, correct sit to supine t/f  technique, correct sit<->stand t/f technique, correct gait mechanics, and progression of POC.    Acceptance, E,D,H, VU,NR by LR at 9/7/2023 0836    Comment: Issued/reviewed written/illustrated HEP. Educated on precautions, weight bearing status, safety with mobility, correct supine to sit t/f technique, correct sit<->stand t/f technique, correct gait mechanics, and progression of POC.                         Point: Home exercise program (Done)       Learning Progress Summary             Patient Acceptance, E,D, VU,NR by LR at 9/7/2023 1457    Comment: Educated on precautions, weight bearing status, safety with mobility, benefits of mobility, LE HEP, correct sit to supine t/f technique, correct sit<->stand t/f technique, correct gait mechanics, and progression of POC.    Acceptance, E,D,H, VU,NR by LR at 9/7/2023 0836    Comment: Issued/reviewed written/illustrated HEP. Educated on precautions, weight bearing status, safety with mobility, correct supine to sit t/f technique, correct sit<->stand t/f technique, correct gait mechanics, and progression of POC.                         Point: Body mechanics (Done)       Learning Progress Summary             Patient Acceptance, E,D, VU,NR by LR at 9/7/2023 1457    Comment: Educated on precautions, weight bearing status, safety with mobility, benefits of mobility, LE HEP, correct sit to supine t/f technique, correct sit<->stand t/f technique, correct gait mechanics, and progression of POC.    Acceptance, E,D,H, VU,NR by LR at 9/7/2023 0836    Comment: Issued/reviewed written/illustrated HEP. Educated on precautions, weight bearing status, safety with mobility, correct supine to sit t/f technique, correct sit<->stand t/f technique, correct gait mechanics, and progression of POC.                         Point: Precautions (Done)       Learning Progress Summary             Patient Acceptance, E,D, VU,NR by LR at 9/7/2023 1457    Comment: Educated on precautions, weight bearing  status, safety with mobility, benefits of mobility, LE HEP, correct sit to supine t/f technique, correct sit<->stand t/f technique, correct gait mechanics, and progression of POC.    Acceptance, E,D,H, VU,NR by  at 9/7/2023 0836    Comment: Issued/reviewed written/illustrated HEP. Educated on precautions, weight bearing status, safety with mobility, correct supine to sit t/f technique, correct sit<->stand t/f technique, correct gait mechanics, and progression of POC.                                         User Key       Initials Effective Dates Name Provider Type Discipline    LR 02/03/23 -  Reina De La Cruz, PT Physical Therapist PT                  PT Recommendation and Plan  Planned Therapy Interventions (PT): balance training, bed mobility training, gait training, home exercise program, patient/family education, ROM (range of motion), stair training, strengthening, transfer training  Plan of Care Reviewed With: patient  Progress: no change  Outcome Evaluation: Patient increased gait distance slightly this PM but all mobility and ther ex limited by uncontrolled pain this PM. Deferred initiating stair training d/t uncontrolled pain. Patient currently below functional baseline, demonstrating decreased functional mobility status and decreased R knee strength/ROM. Will address these deficits to promote return to PLOF.     Time Calculation:   PT Evaluation Complexity  History, PT Evaluation Complexity: 3 or more personal factors and/or comorbidities  Examination of Body Systems (PT Eval Complexity): total of 3 or more elements  Clinical Presentation (PT Evaluation Complexity): stable  Clinical Decision Making (PT Evaluation Complexity): low complexity  Overall Complexity (PT Evaluation Complexity): low complexity     PT Charges       Row Name 09/07/23 1457 09/07/23 0836          Time Calculation    Start Time 1457  -LR 0836  -LR     PT Received On 09/07/23  -LR 09/07/23  -     PT Goal Re-Cert Due Date  09/17/23  -LR 09/17/23  -LR        Timed Charges    61972 - PT Therapeutic Exercise Minutes 10  -LR 15  -LR     88662 - Gait Training Minutes  10  -LR 15  -LR     58239 - PT Therapeutic Activity Minutes 5  -LR --        Untimed Charges    PT Eval/Re-eval Minutes -- 40  -LR        Total Minutes    Timed Charges Total Minutes 25  -LR 30  -LR     Untimed Charges Total Minutes -- 40  -LR      Total Minutes 25  -LR 70  -LR               User Key  (r) = Recorded By, (t) = Taken By, (c) = Cosigned By      Initials Name Provider Type    LR Reina De La Cruz, PT Physical Therapist                  Therapy Charges for Today       Code Description Service Date Service Provider Modifiers Qty    88313327963 HC PT THER PROC EA 15 MIN 9/7/2023 Reina De La Cruz, PT GP 1    51693176119 HC GAIT TRAINING EA 15 MIN 9/7/2023 Reina De La Cruz, PT GP 1    38686346262 HC PT EVAL LOW COMPLEXITY 3 9/7/2023 Reina De La Cruz, PT GP 1    62710131426 HC PT THER PROC EA 15 MIN 9/7/2023 Reina De La Cruz, PT GP 1    84142618935 HC GAIT TRAINING EA 15 MIN 9/7/2023 Reina De La Cruz, PT GP 1            PT G-Codes  Outcome Measure Options: AM-PAC 6 Clicks Basic Mobility (PT)  AM-PAC 6 Clicks Score (PT): 18  AM-PAC 6 Clicks Score (OT): 20  PT Discharge Summary  Anticipated Discharge Disposition (PT): home with assist, home with outpatient therapy services    Reina De La Cruz, PT  9/7/2023

## 2023-09-07 NOTE — PLAN OF CARE
Goal Outcome Evaluation:  Plan of Care Reviewed With: patient        Progress: no change  Outcome Evaluation: OT evaluation completed. Pt ambulated to/from the bathroom using a RWx with CGA, completing toileting ADL with SBA/CGA. Pt limited by dizziness throughout session, though BP remained stable. Pt provided ADL AE with education, though practice deferred this date due to RLE pain and dizziness. The pt presents with a decline compared to her functional baseline secondary to deficits in LE strength, balance, activity tolerance, and safety awareness. Pt will benefit from continued OT services to address deficits. Recommend a d/c home with assist and continued PT services.      Anticipated Discharge Disposition (OT): home with assist

## 2023-09-08 ENCOUNTER — APPOINTMENT (OUTPATIENT)
Dept: GENERAL RADIOLOGY | Facility: HOSPITAL | Age: 63
End: 2023-09-08
Payer: COMMERCIAL

## 2023-09-08 LAB
ANION GAP SERPL CALCULATED.3IONS-SCNC: 6 MMOL/L (ref 5–15)
BUN SERPL-MCNC: 12 MG/DL (ref 8–23)
BUN/CREAT SERPL: 17.9 (ref 7–25)
CALCIUM SPEC-SCNC: 9 MG/DL (ref 8.6–10.5)
CHLORIDE SERPL-SCNC: 99 MMOL/L (ref 98–107)
CO2 SERPL-SCNC: 24 MMOL/L (ref 22–29)
CREAT SERPL-MCNC: 0.67 MG/DL (ref 0.57–1)
EGFRCR SERPLBLD CKD-EPI 2021: 99 ML/MIN/1.73
GLUCOSE BLDC GLUCOMTR-MCNC: 101 MG/DL (ref 70–130)
GLUCOSE BLDC GLUCOMTR-MCNC: 112 MG/DL (ref 70–130)
GLUCOSE SERPL-MCNC: 108 MG/DL (ref 65–99)
NT-PROBNP SERPL-MCNC: 188.9 PG/ML (ref 0–900)
POTASSIUM SERPL-SCNC: 3.5 MMOL/L (ref 3.5–5.2)
SODIUM SERPL-SCNC: 129 MMOL/L (ref 136–145)

## 2023-09-08 PROCEDURE — 25010000002 MORPHINE PER 10 MG: Performed by: ORTHOPAEDIC SURGERY

## 2023-09-08 PROCEDURE — 97535 SELF CARE MNGMENT TRAINING: CPT

## 2023-09-08 PROCEDURE — 99024 POSTOP FOLLOW-UP VISIT: CPT | Performed by: ORTHOPAEDIC SURGERY

## 2023-09-08 PROCEDURE — 25010000002 FUROSEMIDE PER 20 MG: Performed by: INTERNAL MEDICINE

## 2023-09-08 PROCEDURE — 80048 BASIC METABOLIC PNL TOTAL CA: CPT | Performed by: INTERNAL MEDICINE

## 2023-09-08 PROCEDURE — 97116 GAIT TRAINING THERAPY: CPT

## 2023-09-08 PROCEDURE — 82948 REAGENT STRIP/BLOOD GLUCOSE: CPT

## 2023-09-08 PROCEDURE — 83880 ASSAY OF NATRIURETIC PEPTIDE: CPT | Performed by: INTERNAL MEDICINE

## 2023-09-08 PROCEDURE — 97110 THERAPEUTIC EXERCISES: CPT

## 2023-09-08 PROCEDURE — 94664 DEMO&/EVAL PT USE INHALER: CPT

## 2023-09-08 PROCEDURE — 94799 UNLISTED PULMONARY SVC/PX: CPT

## 2023-09-08 PROCEDURE — 63710000001 ONDANSETRON PER 8 MG: Performed by: ORTHOPAEDIC SURGERY

## 2023-09-08 PROCEDURE — 94761 N-INVAS EAR/PLS OXIMETRY MLT: CPT

## 2023-09-08 PROCEDURE — 25010000002 VANCOMYCIN PER 500 MG: Performed by: ORTHOPAEDIC SURGERY

## 2023-09-08 PROCEDURE — 94640 AIRWAY INHALATION TREATMENT: CPT

## 2023-09-08 PROCEDURE — 71045 X-RAY EXAM CHEST 1 VIEW: CPT

## 2023-09-08 RX ORDER — IPRATROPIUM BROMIDE AND ALBUTEROL SULFATE 2.5; .5 MG/3ML; MG/3ML
3 SOLUTION RESPIRATORY (INHALATION) EVERY 4 HOURS PRN
Status: DISCONTINUED | OUTPATIENT
Start: 2023-09-08 | End: 2023-09-10 | Stop reason: HOSPADM

## 2023-09-08 RX ORDER — FUROSEMIDE 10 MG/ML
40 INJECTION INTRAMUSCULAR; INTRAVENOUS ONCE
Status: COMPLETED | OUTPATIENT
Start: 2023-09-08 | End: 2023-09-08

## 2023-09-08 RX ORDER — INSULIN LISPRO 100 [IU]/ML
2-7 INJECTION, SOLUTION INTRAVENOUS; SUBCUTANEOUS
Status: DISCONTINUED | OUTPATIENT
Start: 2023-09-08 | End: 2023-09-10 | Stop reason: HOSPADM

## 2023-09-08 RX ORDER — METAXALONE 800 MG/1
800 TABLET ORAL 3 TIMES DAILY PRN
Status: DISCONTINUED | OUTPATIENT
Start: 2023-09-08 | End: 2023-09-10 | Stop reason: HOSPADM

## 2023-09-08 RX ORDER — DEXTROSE MONOHYDRATE 25 G/50ML
25 INJECTION, SOLUTION INTRAVENOUS
Status: DISCONTINUED | OUTPATIENT
Start: 2023-09-08 | End: 2023-09-10 | Stop reason: HOSPADM

## 2023-09-08 RX ORDER — NICOTINE POLACRILEX 4 MG
15 LOZENGE BUCCAL
Status: DISCONTINUED | OUTPATIENT
Start: 2023-09-08 | End: 2023-09-10 | Stop reason: HOSPADM

## 2023-09-08 RX ADMIN — ALBUTEROL SULFATE 2 PUFF: 90 AEROSOL, METERED RESPIRATORY (INHALATION) at 07:00

## 2023-09-08 RX ADMIN — VANCOMYCIN HYDROCHLORIDE 1000 MG: 1 INJECTION, SOLUTION INTRAVENOUS at 01:07

## 2023-09-08 RX ADMIN — APIXABAN 2.5 MG: 2.5 TABLET, FILM COATED ORAL at 21:46

## 2023-09-08 RX ADMIN — ATORVASTATIN CALCIUM 80 MG: 40 TABLET, FILM COATED ORAL at 08:12

## 2023-09-08 RX ADMIN — VANCOMYCIN HYDROCHLORIDE 1000 MG: 1 INJECTION, SOLUTION INTRAVENOUS at 13:32

## 2023-09-08 RX ADMIN — MELOXICAM 15 MG: 15 TABLET ORAL at 08:12

## 2023-09-08 RX ADMIN — OXYCODONE HYDROCHLORIDE 10 MG: 10 TABLET ORAL at 08:12

## 2023-09-08 RX ADMIN — Medication 3 ML: at 21:46

## 2023-09-08 RX ADMIN — METAXALONE 800 MG: 800 TABLET ORAL at 21:46

## 2023-09-08 RX ADMIN — FUROSEMIDE 40 MG: 10 INJECTION, SOLUTION INTRAMUSCULAR; INTRAVENOUS at 08:12

## 2023-09-08 RX ADMIN — MORPHINE SULFATE 4 MG: 4 INJECTION, SOLUTION INTRAMUSCULAR; INTRAVENOUS at 01:09

## 2023-09-08 RX ADMIN — LEVOTHYROXINE SODIUM 125 MCG: 125 TABLET ORAL at 04:57

## 2023-09-08 RX ADMIN — MECLIZINE HYDROCHLORIDE 25 MG: 25 TABLET ORAL at 08:12

## 2023-09-08 RX ADMIN — OXYCODONE HYDROCHLORIDE 10 MG: 10 TABLET ORAL at 21:46

## 2023-09-08 RX ADMIN — DULOXETINE HYDROCHLORIDE 60 MG: 60 CAPSULE, DELAYED RELEASE ORAL at 08:12

## 2023-09-08 RX ADMIN — OXYCODONE HYDROCHLORIDE 10 MG: 10 TABLET ORAL at 04:04

## 2023-09-08 RX ADMIN — APIXABAN 2.5 MG: 2.5 TABLET, FILM COATED ORAL at 08:12

## 2023-09-08 RX ADMIN — OXYCODONE HYDROCHLORIDE 10 MG: 10 TABLET ORAL at 17:58

## 2023-09-08 RX ADMIN — ONDANSETRON HYDROCHLORIDE 4 MG: 4 TABLET, FILM COATED ORAL at 08:12

## 2023-09-08 RX ADMIN — ACETAMINOPHEN 1000 MG: 500 TABLET ORAL at 13:32

## 2023-09-08 RX ADMIN — Medication 3 ML: at 08:13

## 2023-09-08 RX ADMIN — IPRATROPIUM BROMIDE AND ALBUTEROL SULFATE 3 ML: 2.5; .5 SOLUTION RESPIRATORY (INHALATION) at 07:17

## 2023-09-08 RX ADMIN — ACETAMINOPHEN 1000 MG: 500 TABLET ORAL at 04:57

## 2023-09-08 NOTE — PROGRESS NOTES
"IM progress note      Janet Obrien  [id number]  1960     LOS: 0 days     Attending: Dayo Chavez,*    Primary Care Provider: Noe Davenport MD      Chief Complaint/Reason for visit:  No chief complaint on file.      Subjective   Was reported to have wheezing. Felt like asthma flare. Had crackles per her RN assessment.  I saw her after Duonebs and a dose of lasix where she voided over 700 ml. Breathing is improved but she feels week, and 'sleeping a lot'  Noted PT want further IP sessions.    Objective        Visit Vitals  /53 (BP Location: Left arm, Patient Position: Sitting)   Pulse 81   Temp 100.2 °F (37.9 °C) (Oral)   Resp 18   Ht 167.6 cm (66\")   Wt 99.8 kg (220 lb)   SpO2 91%   BMI 35.51 kg/m²     Temp (24hrs), Av.9 °F (37.2 °C), Min:98.2 °F (36.8 °C), Max:100.2 °F (37.9 °C)      Intake/Output:    Intake/Output Summary (Last 24 hours) at 2023 1424  Last data filed at 2023 1332  Gross per 24 hour   Intake 1500 ml   Output 750 ml   Net 750 ml          Physical Therapy:     igned         Goal Outcome Evaluation:  Plan of Care Reviewed With: patient  Progress: no change  Outcome Evaluation: Pt. continues to present below baseline function w/decreased strength and AROM and acute pain of R knee affecting her ability to safely participate in functional mobility. She performed bed mobility, transfers and ambulated 40' w/front wheeled walker, contact guard assist. Activity limited by pain. Pt. tolerated ther-ex. Continue IPPT to address stated deficits.        Anticipated Discharge Disposition (PT): home with assist, home with home health                Physical Exam:     General Appearance:    Alert, cooperative, in no acute distress   Head:    Normocephalic, without obvious abnormality, atraumatic    Lungs:     Normal effort, symmetric chest rise,  clear to      auscultation bilaterally              Heart:    Regular rhythm and normal rate, normal S1 and S2    Abdomen:     " Normal bowel sounds, no masses, no organomegaly, soft        non-tender, non-distended, no guarding, no rebound                tenderness   Extremities: Clean dry intact dressing over knee.  Peripheral nerve block catheter present.  Intact flexion and dorsiflexion bilateral feet.  No clubbing, cyanosis or edema.  No deformities.    Pulses:   Pulses palpable and equal bilaterally   Skin:   No bleeding, bruising or rash          Results Review:     I reviewed the patient's new clinical results.   Results from last 7 days   Lab Units 09/07/23  0528 09/06/23  1147   WBC 10*3/mm3 9.66 7.63   HEMOGLOBIN g/dL 10.2* 12.2   HEMATOCRIT % 30.9* 37.7   PLATELETS 10*3/mm3 221 266       Results from last 7 days   Lab Units 09/08/23  0805 09/07/23  0528 09/06/23  1147   SODIUM mmol/L 129* 134* 137   POTASSIUM mmol/L 3.5 4.1 3.7   CHLORIDE mmol/L 99 102 103   CO2 mmol/L 24.0 27.0 27.0   BUN mg/dL 12 10 13   CREATININE mg/dL 0.67 0.65 0.68   CALCIUM mg/dL 9.0 9.0 9.7   GLUCOSE mg/dL 108* 117* 84        Latest Reference Range & Units 09/08/23 08:05   proBNP 0.0 - 900.0 pg/mL 188.9     I reviewed the patient's new imaging including images and reports.  CXR no acute disease. 9/8/23.    All medications reviewed.   acetaminophen, 1,000 mg, Oral, Q6H  apixaban, 2.5 mg, Oral, Q12H  atorvastatin, 80 mg, Oral, Daily  DULoxetine, 60 mg, Oral, Daily  levothyroxine, 125 mcg, Oral, Q AM  meloxicam, 15 mg, Oral, Daily  sodium chloride, 3 mL, Intravenous, Q12H  vancomycin, 1,000 mg, Intravenous, Q12H      albuterol sulfate HFA, 2 puff, Q4H PRN  docusate sodium, 100 mg, BID PRN  ipratropium-albuterol, 3 mL, Q4H PRN  labetalol, 10 mg, Q4H PRN  meclizine, 25 mg, Q6H PRN  metaxalone, 800 mg, TID PRN  Morphine, 4 mg, Q2H PRN   And  naloxone, 0.4 mg, Q5 Min PRN  ondansetron, 4 mg, Q6H PRN   Or  ondansetron, 4 mg, Q6H PRN  oxyCODONE, 10 mg, Q4H PRN  sodium chloride, 500 mL, TID PRN  sodium chloride, 3-10 mL, PRN  sodium chloride, 40 mL,  PRN        Assessment & Plan       S/P total knee arthroplasty, right    Hypothyroidism    Diabetes mellitus    Essential hypertension    Hyperlipidemia    Primary osteoarthritis of right knee    Obesity         Plan   1. PT/OT,  Weight bearing as tolerated RLE  2. Pain control-prns, ACB cath with ropivacaine infusion.  3. IS-encourage  4. DVT proph- Mechanicals and Apixaban.  5. Bowel regimen  6. Resume home medications as appropriate  7. Monitor post-op labs  8. DC planning for home when cleared by PT/OT.      - Hypertension:  Resume home medications as appropriate, formulary substitution when indicated.  Holding parameters.  Prn medications for elevated blood pressure.     -DM type 2  Hgb A1C 5.5.  Hold OHA as appropriate  FSBG AC/HS, ( q 6 when NPO), along with correction humalog.  Long acting insulin if needed.     -Dyslipidemia:  Resume home regimen statin.( formulary substitution when appropriate).     -Hypothyroidism: Resume replacement.    Fito Tang MD  09/08/23  14:24 EDT

## 2023-09-08 NOTE — PROGRESS NOTES
"          Orthopaedic Surgery Progress Note      LOS: 0 days   Patient Care Team:  Noe Davenport MD as PCP - General (Internal Medicine)  Bhavna Akins MD as Surgeon (General Surgery)  Dee Kelly MD as Consulting Physician (Endocrinology)    POD 2    Subjective     Interval History:   Patient complaining of some nausea this morning.  Continues to complain of posterior knee pain.  Was able to ambulate with physical therapy.  She says she was not able to work with occupational therapy yesterday.    Objective     Vital Signs:  Temp (24hrs), Av.4 °F (36.9 °C), Min:98.1 °F (36.7 °C), Max:98.6 °F (37 °C)    /81 (BP Location: Left arm, Patient Position: Lying)   Pulse 86   Temp 98.6 °F (37 °C) (Oral)   Resp 16   Ht 167.6 cm (66\")   Wt 99.8 kg (220 lb)   SpO2 94%   BMI 35.51 kg/m²     Labs:  Lab Results (last 24 hours)       ** No results found for the last 24 hours. **            Physical Exam:  EHL, FHL, gastroc soleus, and tibialis anterior are intact  Toes are pink and warm  Surgical dressing is in place  Palpable dorsalis pedis pulse  Calf is soft and nontender      Assessment & Plan     Postoperative day number 2 status post right total knee arthroplasty.    Pain Control: Adductor canal block and oxycodone.  Would likely benefit from Zofran or Phenergan this morning.    PT and OT     DVT prophylaxis: Eliquis 2.5 twice daily for 6 weeks    Discharge planning: Anticipate discharge home this afternoon    Upon discharge, patient will need follow-up with me in 3 weeks' time.  They will need [facility] for physical therapy.  Standard Exofin Fusion dressing care instructions.  Do not remove.  DME per case management.    Admission Status:  I believe this patient meets INPATIENT status due to the need for care which can only be reasonably provided in a hospital setting such as aggressive/expedited ancillary services and/or consultation services, the necessity for IV medications, close " physician monitoring and/or the possible need for procedures.  In such, I feel patient’s risk for adverse outcomes and need for care warrant INPATIENT evaluation and predict the patient’s care encounter to likely last beyond 2 midnights.        Dayo Chavez MD  09/08/23  07:09 EDT

## 2023-09-08 NOTE — THERAPY TREATMENT NOTE
Patient Name: Janet Obrien  : 1960    MRN: [id number]                              Today's Date: 2023       Admit Date: 2023    Visit Dx:     ICD-10-CM ICD-9-CM   1. Primary osteoarthritis of right knee  M17.11 715.16     Patient Active Problem List   Diagnosis    Diplopia    Orbital pseudotumor    Myositic orbital pseudotumor    Hypothyroidism    Diabetes mellitus    Age-related nuclear cataract of right eye    Ocular myasthenia    Morbid obesity, unspecified obesity type    Essential hypertension    Precordial pain    SOB (shortness of breath)    Lower abdominal pain    Constipation    Diarrhea    Rectal bleeding    Gastroesophageal reflux disease    Class 3 severe obesity due to excess calories with serious comorbidity and body mass index (BMI) of 40.0 to 44.9 in adult    Anal or rectal pain    Primary osteoarthritis of left knee    S/P TKR (total knee replacement), left    Hyperlipidemia    Primary osteoarthritis of right knee    Obesity    S/P total knee arthroplasty, right     Past Medical History:   Diagnosis Date    Anxiety     Arthritis     Asthma     Back pain     Cancer     SQUAMOUS CELL/ BASAL CELL ON LIP, under the nose as well    Chronic bronchitis     Depression     Diabetes mellitus     type 2    Fibromyalgia     High triglycerides     Hyperlipidemia     Hypertension     Hypothyroidism     Migraine     MVP (mitral valve prolapse)     OA (osteoarthritis)     Ovarian cyst     Peripheral autonomic neuropathy     Pleurisy     Pneumonia     Rheumatism     RLS (restless legs syndrome)     Sleep apnea     Mild form per patient. recommended mouthpiece     Past Surgical History:   Procedure Laterality Date    APPENDECTOMY      CATARACT EXTRACTION W/ INTRAOCULAR LENS IMPLANT Right 2019    Procedure: CATARACT PHACO EXTRACTION WITH INTRAOCULAR LENS IMPLANT RIGHT;  Surgeon: Carl Babb MD;  Location: [facility];  Service: Ophthalmology    CATARACT EXTRACTION W/ INTRAOCULAR  "LENS IMPLANT Left 10/07/2019    Procedure: CATARACT PHACO EXTRACTION WITH INTRAOCULAR LENS IMPLANT LEFT;  Surgeon: Carl Babb MD;  Location: [facility] OR;  Service: Ophthalmology     SECTION  , 1997    x 2     DILATATION AND CURETTAGE      HAND SURGERY Right     \"Pinched nerve surgery\"    HAND SURGERY Left     Joint removed secondary to arthritis    HYSTERECTOMY      ''still have one ovary''    KNEE SURGERY Right 2021    arthroscopy and partial medial meniscectomy Dr. Chavez    THYROIDECTOMY  1985    TONSILLECTOMY AND ADENOIDECTOMY  1965    TOTAL KNEE ARTHROPLASTY Left 03/15/2023    Procedure: TOTAL KNEE ARTHROPLASTY -  LEFT;  Surgeon: Dayo Chavez MD;  Location: [facility] OR;  Service: Orthopedics;  Laterality: Left;      General Information       Row Name 23 1051          Physical Therapy Time and Intention    Document Type therapy note (daily note)  -     Mode of Treatment physical therapy  -       Row Name 23 1051          General Information    Patient Profile Reviewed yes  -     Existing Precautions/Restrictions fall;other (see comments)  R adductor canal nerve cath  -     Barriers to Rehab previous functional deficit  -SS       Row Name 23 1051          Cognition    Orientation Status (Cognition) oriented x 4  -SS       Row Name 23 1051          Safety Issues, Functional Mobility    Safety Issues Affecting Function (Mobility) at risk behavior observed;awareness of need for assistance;impulsivity;insight into deficits/self-awareness;judgment;positioning of assistive device;problem-solving;safety precaution awareness;safety precautions follow-through/compliance;sequencing abilities  -     Impairments Affecting Function (Mobility) strength;balance;pain;endurance/activity tolerance;range of motion (ROM)  -               User Key  (r) = Recorded By, (t) = Taken By, (c) = Cosigned By      Initials Name Provider Type    SS Elba Bangura, " PT Physical Therapist                   Mobility       Row Name 09/08/23 1106          Bed Mobility    Bed Mobility supine-sit;scooting/bridging  -SS     Scooting/Bridging Haines (Bed Mobility) standby assist;verbal cues  -SS     Supine-Sit Haines (Bed Mobility) standby assist;verbal cues  -SS     Assistive Device (Bed Mobility) head of bed elevated;bed rails  -     Comment, (Bed Mobility) VC for sequencing  -       Row Name 09/08/23 1106          Sit-Stand Transfer    Sit-Stand Haines (Transfers) verbal cues;contact guard  -SS     Assistive Device (Sit-Stand Transfers) walker, front-wheeled  -SS     Comment, (Sit-Stand Transfer) VC for hand placement, appropriate alignment, lowering with eccentric control  -       Row Name 09/08/23 1106          Gait/Stairs (Locomotion)    Haines Level (Gait) verbal cues;contact guard  -SS     Assistive Device (Gait) walker, front-wheeled  -SS     Distance in Feet (Gait) 40  -SS     Deviations/Abnormal Patterns (Gait) bilateral deviations;junie decreased;gait speed decreased;stride length decreased;right sided deviations;antalgic  -     Bilateral Gait Deviations forward flexed posture;heel strike decreased  -     Comment, (Gait/Stairs) Pt. ambulated with a step through, antalgic gait pattern. Pt. demonstrates impulsivity and requires frequent redirection for safety awareness. VC for upright posture, decreased shoulder elevation, controlled breathing. No buckling noted but pt. c/o significant pain, limiting further distances. Pt. declined stair training.  -       Row Name 09/08/23 1106          Mobility    Extremity Weight-bearing Status right lower extremity  -SS     Right Lower Extremity (Weight-bearing Status) weight-bearing as tolerated (WBAT)  -               User Key  (r) = Recorded By, (t) = Taken By, (c) = Cosigned By      Initials Name Provider Type    SS Elba Bangura PT Physical Therapist                   Obj/Interventions        Row Name 09/08/23 1113          Motor Skills    Therapeutic Exercise knee;ankle  -SS       Row Name 09/08/23 1113          Knee (Therapeutic Exercise)    Knee (Therapeutic Exercise) AROM (active range of motion);isometric exercises;strengthening exercise  -     Knee AROM (Therapeutic Exercise) right;sitting;flexion;10 repetitions  -     Knee Isometrics (Therapeutic Exercise) right;quad sets;10 repetitions  -     Knee Strengthening (Therapeutic Exercise) right;SLR (straight leg raise);SAQ (short arc quad);heel slides;LAQ (long arc quad);10 repetitions  -       Row Name 09/08/23 1113          Ankle (Therapeutic Exercise)    Ankle (Therapeutic Exercise) AROM (active range of motion)  -     Ankle AROM (Therapeutic Exercise) bilateral;dorsiflexion;plantarflexion;10 repetitions  -       Row Name 09/08/23 1113          Balance    Balance Assessment sitting static balance;sitting dynamic balance;sit to stand dynamic balance;standing static balance;standing dynamic balance  -     Static Sitting Balance independent  -     Dynamic Sitting Balance independent  -SS     Position, Sitting Balance unsupported;sitting edge of bed  -     Sit to Stand Dynamic Balance contact guard  -SS     Static Standing Balance contact guard  -SS     Dynamic Standing Balance contact guard  -SS     Position/Device Used, Standing Balance supported;walker, front-wheeled  -     Balance Interventions sitting;standing;sit to stand;supported;static;dynamic  -SS               User Key  (r) = Recorded By, (t) = Taken By, (c) = Cosigned By      Initials Name Provider Type     Elba Bangura PT Physical Therapist                   Goals/Plan    No documentation.                  Clinical Impression       Row Name 09/08/23 1114          Pain    Pretreatment Pain Rating 7/10  -SS     Posttreatment Pain Rating 9/10  -SS     Pain Location - Side/Orientation Right  -SS     Pain Location generalized  -     Pain Location - knee  -SS     Pain  Intervention(s) Cold applied;Repositioned;Ambulation/increased activity;Elevated  -     Additional Documentation Pain Scale: Numbers Pre/Post-Treatment (Group)  -       Row Name 09/08/23 1114          Plan of Care Review    Plan of Care Reviewed With patient  -     Progress no change  -     Outcome Evaluation Pt. continues to present below baseline function w/decreased strength and AROM and acute pain of R knee affecting her ability to safely participate in functional mobility. She performed bed mobility, transfers and ambulated 40' w/front wheeled walker, contact guard assist. Activity limited by pain. Pt. tolerated ther-ex. Continue IPPT to address stated deficits.  -       Row Name 09/08/23 1114          Therapy Assessment/Plan (PT)    Rehab Potential (PT) good, to achieve stated therapy goals  -     Criteria for Skilled Interventions Met (PT) yes;meets criteria;skilled treatment is necessary  -     Therapy Frequency (PT) 2 times/day  -       Row Name 09/08/23 1114          Vital Signs    Pre Systolic BP Rehab 117  -SS     Pre Treatment Diastolic BP 62  -SS     Pretreatment Heart Rate (beats/min) 97  -SS     Pre SpO2 (%) 94  -SS     O2 Delivery Pre Treatment room air  -     Pre Patient Position Supine  -       Row Name 09/08/23 1114          Positioning and Restraints    Pre-Treatment Position in bed  -     Post Treatment Position chair  -SS     In Chair notified nsg;reclined;call light within reach;encouraged to call for assist;exit alarm on;waffle cushion;legs elevated  -               User Key  (r) = Recorded By, (t) = Taken By, (c) = Cosigned By      Initials Name Provider Type     Elba Bangura, PT Physical Therapist                   Outcome Measures       Row Name 09/08/23 1123          How much help from another person do you currently need...    Turning from your back to your side while in flat bed without using bedrails? 3  -SS     Moving from lying on back to sitting on the  side of a flat bed without bedrails? 3  -SS     Moving to and from a bed to a chair (including a wheelchair)? 3  -SS     Standing up from a chair using your arms (e.g., wheelchair, bedside chair)? 3  -SS     Climbing 3-5 steps with a railing? 2  -SS     To walk in hospital room? 3  -SS     AM-PAC 6 Clicks Score (PT) 17  -SS     Highest level of mobility 5 --> Static standing  -SS       Row Name 09/08/23 1123          Functional Assessment    Outcome Measure Options AM-PAC 6 Clicks Basic Mobility (PT)  -SS               User Key  (r) = Recorded By, (t) = Taken By, (c) = Cosigned By      Initials Name Provider Type    SS Elba Bangura, PT Physical Therapist                                 Physical Therapy Education       Title: PT OT SLP Therapies (In Progress)       Topic: Physical Therapy (Done)       Point: Mobility training (Done)       Learning Progress Summary             Patient ALFREDO Zuniga, VU,DU,NR by  at 9/8/2023 1124    Comment: Reviewed safety/technique w/bed mobility, transfers, ambulation, knee precautions, safety recommendations, controlled breathing techniques, PT POC    Acceptance, E,D, VU,NR by LR at 9/7/2023 1457    Comment: Educated on precautions, weight bearing status, safety with mobility, benefits of mobility, LE HEP, correct sit to supine t/f technique, correct sit<->stand t/f technique, correct gait mechanics, and progression of POC.    Acceptance, E,D,H, VU,NR by LR at 9/7/2023 0836    Comment: Issued/reviewed written/illustrated HEP. Educated on precautions, weight bearing status, safety with mobility, correct supine to sit t/f technique, correct sit<->stand t/f technique, correct gait mechanics, and progression of POC.                         Point: Home exercise program (Done)       Learning Progress Summary             Patient ALFREDO Zuniga, LALIT,DU,NR by  at 9/8/2023 1124    Comment: Reviewed safety/technique w/bed mobility, transfers, ambulation, knee precautions, safety recommendations,  controlled breathing techniques, PT POC    Acceptance, E,D, VU,NR by LR at 9/7/2023 1457    Comment: Educated on precautions, weight bearing status, safety with mobility, benefits of mobility, LE HEP, correct sit to supine t/f technique, correct sit<->stand t/f technique, correct gait mechanics, and progression of POC.    Acceptance, E,D,H, VU,NR by LR at 9/7/2023 0836    Comment: Issued/reviewed written/illustrated HEP. Educated on precautions, weight bearing status, safety with mobility, correct supine to sit t/f technique, correct sit<->stand t/f technique, correct gait mechanics, and progression of POC.                         Point: Body mechanics (Done)       Learning Progress Summary             Patient Eager, E, VU,DU,NR by SS at 9/8/2023 1124    Comment: Reviewed safety/technique w/bed mobility, transfers, ambulation, knee precautions, safety recommendations, controlled breathing techniques, PT POC    Acceptance, E,D, VU,NR by LR at 9/7/2023 1457    Comment: Educated on precautions, weight bearing status, safety with mobility, benefits of mobility, LE HEP, correct sit to supine t/f technique, correct sit<->stand t/f technique, correct gait mechanics, and progression of POC.    Acceptance, E,D,H, VU,NR by LR at 9/7/2023 0836    Comment: Issued/reviewed written/illustrated HEP. Educated on precautions, weight bearing status, safety with mobility, correct supine to sit t/f technique, correct sit<->stand t/f technique, correct gait mechanics, and progression of POC.                         Point: Precautions (Done)       Learning Progress Summary             Patient Eager, E, VU,DU,NR by SS at 9/8/2023 1124    Comment: Reviewed safety/technique w/bed mobility, transfers, ambulation, knee precautions, safety recommendations, controlled breathing techniques, PT POC    Acceptance, E,D, VU,NR by LR at 9/7/2023 1457    Comment: Educated on precautions, weight bearing status, safety with mobility, benefits of  mobility, LE HEP, correct sit to supine t/f technique, correct sit<->stand t/f technique, correct gait mechanics, and progression of POC.    Acceptance, E,D,H, VU,NR by  at 9/7/2023 0836    Comment: Issued/reviewed written/illustrated HEP. Educated on precautions, weight bearing status, safety with mobility, correct supine to sit t/f technique, correct sit<->stand t/f technique, correct gait mechanics, and progression of POC.                                         User Key       Initials Effective Dates Name Provider Type Discipline     02/03/23 -  Reina De La Cruz, PT Physical Therapist PT     06/01/21 -  Elba Bangura PT Physical Therapist PT                  PT Recommendation and Plan     Plan of Care Reviewed With: patient  Progress: no change  Outcome Evaluation: Pt. continues to present below baseline function w/decreased strength and AROM and acute pain of R knee affecting her ability to safely participate in functional mobility. She performed bed mobility, transfers and ambulated 40' w/front wheeled walker, contact guard assist. Activity limited by pain. Pt. tolerated ther-ex. Continue IPPT to address stated deficits.     Time Calculation:         PT Charges       Row Name 09/08/23 1126             Time Calculation    Start Time 1001  -SS      PT Received On 09/08/23  -SS         Timed Charges    22982 - PT Therapeutic Exercise Minutes 10  -SS      38187 - Gait Training Minutes  10  -SS      89233 - PT Therapeutic Activity Minutes 5  -SS         Total Minutes    Timed Charges Total Minutes 25  -SS       Total Minutes 25  -SS                User Key  (r) = Recorded By, (t) = Taken By, (c) = Cosigned By      Initials Name Provider Type     Elba Bangura, PT Physical Therapist                  Therapy Charges for Today       Code Description Service Date Service Provider Modifiers Qty    37506131401 HC PT THER PROC EA 15 MIN 9/8/2023 Elba Bangura, PT GP 1    34161905529 HC GAIT TRAINING EA  15 MIN 9/8/2023 Elba Bangura, PT GP 1            PT G-Codes  Outcome Measure Options: AM-PAC 6 Clicks Basic Mobility (PT)  AM-PAC 6 Clicks Score (PT): 17  AM-PAC 6 Clicks Score (OT): 20  PT Discharge Summary  Anticipated Discharge Disposition (PT): home with assist, home with home health    Elba Bangura, LEANA  9/8/2023

## 2023-09-08 NOTE — PLAN OF CARE
Problem: Adult Inpatient Plan of Care  Goal: Plan of Care Review  Recent Flowsheet Documentation  Taken 9/8/2023 1430 by Aurea Chaudhary OT  Progress: declining  Plan of Care Reviewed With: patient  Outcome Evaluation: Pt participates in OOB activity/therapy with encouragement. Up in room/bathroom with RW support and Min Ax1. Education and cues needed for safety and fall prevention. Education/demonstration reviewed for precautions and ADL retraining. Pt declined to trial AE due to drowsiness and pain. OT will continue to follow IP. Recommend home with 24/7 assist when medically ready.

## 2023-09-08 NOTE — THERAPY TREATMENT NOTE
Patient Name: Janet Obrien  : 1960    MRN: [id number]                              Today's Date: 2023       Admit Date: 2023    Visit Dx:     ICD-10-CM ICD-9-CM   1. Primary osteoarthritis of right knee  M17.11 715.16     Patient Active Problem List   Diagnosis    Diplopia    Orbital pseudotumor    Myositic orbital pseudotumor    Hypothyroidism    Diabetes mellitus    Age-related nuclear cataract of right eye    Ocular myasthenia    Morbid obesity, unspecified obesity type    Essential hypertension    Precordial pain    SOB (shortness of breath)    Lower abdominal pain    Constipation    Diarrhea    Rectal bleeding    Gastroesophageal reflux disease    Class 3 severe obesity due to excess calories with serious comorbidity and body mass index (BMI) of 40.0 to 44.9 in adult    Anal or rectal pain    Primary osteoarthritis of left knee    S/P TKR (total knee replacement), left    Hyperlipidemia    Primary osteoarthritis of right knee    Obesity    S/P total knee arthroplasty, right     Past Medical History:   Diagnosis Date    Anxiety     Arthritis     Asthma     Back pain     Cancer     SQUAMOUS CELL/ BASAL CELL ON LIP, under the nose as well    Chronic bronchitis     Depression     Diabetes mellitus     type 2    Fibromyalgia     High triglycerides     Hyperlipidemia     Hypertension     Hypothyroidism     Migraine     MVP (mitral valve prolapse)     OA (osteoarthritis)     Ovarian cyst     Peripheral autonomic neuropathy     Pleurisy     Pneumonia     Rheumatism     RLS (restless legs syndrome)     Sleep apnea     Mild form per patient. recommended mouthpiece     Past Surgical History:   Procedure Laterality Date    APPENDECTOMY      CATARACT EXTRACTION W/ INTRAOCULAR LENS IMPLANT Right 2019    Procedure: CATARACT PHACO EXTRACTION WITH INTRAOCULAR LENS IMPLANT RIGHT;  Surgeon: Carl Babb MD;  Location: [facility];  Service: Ophthalmology    CATARACT EXTRACTION W/ INTRAOCULAR  "LENS IMPLANT Left 10/07/2019    Procedure: CATARACT PHACO EXTRACTION WITH INTRAOCULAR LENS IMPLANT LEFT;  Surgeon: Carl Babb MD;  Location: [facility] OR;  Service: Ophthalmology     SECTION  , 1997    x 2     DILATATION AND CURETTAGE      HAND SURGERY Right     \"Pinched nerve surgery\"    HAND SURGERY Left     Joint removed secondary to arthritis    HYSTERECTOMY      ''still have one ovary''    KNEE SURGERY Right 2021    arthroscopy and partial medial meniscectomy Dr. Chavez    THYROIDECTOMY  1985    TONSILLECTOMY AND ADENOIDECTOMY  1965    TOTAL KNEE ARTHROPLASTY Left 03/15/2023    Procedure: TOTAL KNEE ARTHROPLASTY -  LEFT;  Surgeon: Dayo Chavez MD;  Location: [facility] OR;  Service: Orthopedics;  Laterality: Left;      General Information       Row Name 23 1420 23 1051       Physical Therapy Time and Intention    Document Type therapy note (daily note)  -SS therapy note (daily note)  -SS    Mode of Treatment physical therapy  -SS physical therapy  -SS      Row Name 23 1420 23 1051       General Information    Patient Profile Reviewed yes  -SS yes  -SS    Existing Precautions/Restrictions fall;other (see comments)  R adductor canal nerve cath  -SS fall;other (see comments)  R adductor canal nerve cath  -SS    Barriers to Rehab previous functional deficit  -SS previous functional deficit  -SS      Row Name 23 1420 23 1051       Cognition    Orientation Status (Cognition) oriented x 4  -SS oriented x 4  -SS      Row Name 23 1420 23 1051       Safety Issues, Functional Mobility    Safety Issues Affecting Function (Mobility) at risk behavior observed;awareness of need for assistance;impulsivity;insight into deficits/self-awareness;judgment;positioning of assistive device;problem-solving;safety precaution awareness;safety precautions follow-through/compliance;sequencing abilities  -SS at risk behavior observed;awareness of need " for assistance;impulsivity;insight into deficits/self-awareness;judgment;positioning of assistive device;problem-solving;safety precaution awareness;safety precautions follow-through/compliance;sequencing abilities  -    Impairments Affecting Function (Mobility) strength;balance;pain;endurance/activity tolerance;range of motion (ROM);postural/trunk control  -SS strength;balance;pain;endurance/activity tolerance;range of motion (ROM)  -              User Key  (r) = Recorded By, (t) = Taken By, (c) = Cosigned By      Initials Name Provider Type     Elba Bangura, PT Physical Therapist                   Mobility       Row Name 09/08/23 1421 09/08/23 1106       Bed Mobility    Bed Mobility -- supine-sit;scooting/bridging  -    Scooting/Bridging [place] (Bed Mobility) -- standby assist;verbal cues  -    Supine-Sit [place] (Bed Mobility) -- standby assist;verbal cues  -    Assistive Device (Bed Mobility) -- head of bed elevated;bed rails  -    Comment, (Bed Mobility) sitting EOB w/OT  -SS VC for sequencing  -      Row Name 09/08/23 1421 09/08/23 1106       Sit-Stand Transfer    Sit-Stand [place] (Transfers) verbal cues;contact guard  -SS verbal cues;contact guard  -SS    Assistive Device (Sit-Stand Transfers) walker, front-wheeled  -SS walker, front-wheeled  -SS    Comment, (Sit-Stand Transfer) VC for hand placement, appropriate alignment, lowering with eccentric control, controlled breathing  - VC for hand placement, appropriate alignment, lowering with eccentric control  -SS      Row Name 09/08/23 1421 09/08/23 1106       Gait/Stairs (Locomotion)    [place] Level (Gait) verbal cues;contact guard  -SS verbal cues;contact guard  -SS    Assistive Device (Gait) walker, front-wheeled  -SS walker, front-wheeled  -SS    Distance in Feet (Gait) 40  10+20+10  -SS 40  -SS    Deviations/Abnormal Patterns (Gait) bilateral deviations;junie decreased;gait speed decreased;stride length  decreased;right sided deviations;antalgic  -SS bilateral deviations;junie decreased;gait speed decreased;stride length decreased;right sided deviations;antalgic  -SS    Bilateral Gait Deviations forward flexed posture;heel strike decreased  -SS forward flexed posture;heel strike decreased  -SS    Right Sided Gait Deviations weight shift ability decreased  -SS --    [place] Level (Stairs) unable to assess  -SS --    Comment, (Gait/Stairs) Pt. ambulated with a step through, antalgic gait pattern. Pt. demonstrates impulsivity and requires frequent redirection for safety awareness. VC for upright posture, decreased shoulder elevation, controlled breathing, heel toe gait pattern.  Pain limiting further distances. Pt. declined stair training.  -SS Pt. ambulated with a step through, antalgic gait pattern. Pt. demonstrates impulsivity and requires frequent redirection for safety awareness. VC for upright posture, decreased shoulder elevation, controlled breathing. No buckling noted but pt. c/o significant pain, limiting further distances. Pt. declined stair training.  -      Row Name 09/08/23 1421 09/08/23 1106       Mobility    Extremity Weight-bearing Status right lower extremity  -SS right lower extremity  -SS    Right Lower Extremity (Weight-bearing Status) weight-bearing as tolerated (WBAT)  -SS weight-bearing as tolerated (WBAT)  -SS              User Key  (r) = Recorded By, (t) = Taken By, (c) = Cosigned By      Initials Name Provider Type     Elba Bangura, LEANA Physical Therapist                   Obj/Interventions       Row Name 09/08/23 1423          Range of Motion Comprehensive    Comment, General Range of Motion R knee AROM: -10-70  -       Row Name 09/08/23 1423 09/08/23 1113       Motor Skills    Therapeutic Exercise knee;ankle  -SS knee;ankle  -      Row Name 09/08/23 1423 09/08/23 1113       Knee (Therapeutic Exercise)    Knee (Therapeutic Exercise) AROM (active range of motion);isometric  exercises;strengthening exercise  -SS AROM (active range of motion);isometric exercises;strengthening exercise  -    Knee AROM (Therapeutic Exercise) right;sitting;flexion;10 repetitions  -SS right;sitting;flexion;10 repetitions  -SS    Knee Isometrics (Therapeutic Exercise) right;quad sets;10 repetitions  -SS right;quad sets;10 repetitions  -SS    Knee Strengthening (Therapeutic Exercise) right;heel slides;SLR (straight leg raise);SAQ (short arc quad);LAQ (long arc quad);10 repetitions  -SS right;SLR (straight leg raise);SAQ (short arc quad);heel slides;LAQ (long arc quad);10 repetitions  -SS      Row Name 09/08/23 1423 09/08/23 1113       Ankle (Therapeutic Exercise)    Ankle (Therapeutic Exercise) AROM (active range of motion)  -SS AROM (active range of motion)  -    Ankle AROM (Therapeutic Exercise) bilateral;dorsiflexion;plantarflexion;10 repetitions  -SS bilateral;dorsiflexion;plantarflexion;10 repetitions  -SS      Row Name 09/08/23 1423 09/08/23 1113       Balance    Balance Assessment sitting static balance;sitting dynamic balance;sit to stand dynamic balance;standing static balance;standing dynamic balance  - sitting static balance;sitting dynamic balance;sit to stand dynamic balance;standing static balance;standing dynamic balance  -    Static Sitting Balance independent  -SS independent  -SS    Dynamic Sitting Balance independent  -SS independent  -SS    Position, Sitting Balance unsupported;sitting in chair  -SS unsupported;sitting edge of bed  -SS    Sit to Stand Dynamic Balance contact guard  -SS contact guard  -SS    Static Standing Balance contact guard  -SS contact guard  -SS    Dynamic Standing Balance contact guard  -SS contact guard  -SS    Position/Device Used, Standing Balance supported;walker, front-wheeled  -SS supported;walker, front-wheeled  -SS    Balance Interventions sitting;standing;sit to stand;supported;static;dynamic  -SS sitting;standing;sit to  stand;supported;static;dynamic  -              User Key  (r) = Recorded By, (t) = Taken By, (c) = Cosigned By      Initials Name Provider Type    SS Elba Bangura, PT Physical Therapist                   Goals/Plan    No documentation.                  Clinical Impression       Row Name 09/08/23 1424 09/08/23 1114       Pain    Pretreatment Pain Rating 7/10  -SS 7/10  -SS    Posttreatment Pain Rating 10/10  -SS 9/10  -SS    Pain Location - Side/Orientation Right  -SS Right  -SS    Pain Location generalized  -SS generalized  -SS    Pain Location - knee  -SS knee  -SS    Pain Intervention(s) Cold applied;Repositioned;Ambulation/increased activity  -SS Cold applied;Repositioned;Ambulation/increased activity;Elevated  -    Additional Documentation Pain Scale: Numbers Pre/Post-Treatment (Group)  -SS Pain Scale: Numbers Pre/Post-Treatment (Group)  -SS      Row Name 09/08/23 1424 09/08/23 1114       Plan of Care Review    Plan of Care Reviewed With patient  -SS patient  -SS    Progress no change  -SS no change  -    Outcome Evaluation Pt. continues to present below baseline function w/decreased strength and AROM and acute pain of R knee affecting her ability to safely participate in functional mobility. She performed bed mobility, transfers and ambulated 40' w/front wheeled walker, contact guard assist. Activity limited by pain. Pt. tolerated ther-ex. Continue IPPT to address stated deficits.  -SS Pt. continues to present below baseline function w/decreased strength and AROM and acute pain of R knee affecting her ability to safely participate in functional mobility. She performed bed mobility, transfers and ambulated 40' w/front wheeled walker, contact guard assist. Activity limited by pain. Pt. tolerated ther-ex. Continue IPPT to address stated deficits.  -      Row Name 09/08/23 1424 09/08/23 1114       Therapy Assessment/Plan (PT)    Rehab Potential (PT) good, to achieve stated therapy goals  -SS good, to  achieve stated therapy goals  -SS    Criteria for Skilled Interventions Met (PT) yes;meets criteria;skilled treatment is necessary  -SS yes;meets criteria;skilled treatment is necessary  -SS    Therapy Frequency (PT) 2 times/day  -SS 2 times/day  -      Row Name 09/08/23 1114          Vital Signs    Pre Systolic BP Rehab 117  -SS     Pre Treatment Diastolic BP 62  -SS     Pretreatment Heart Rate (beats/min) 97  -SS     Pre SpO2 (%) 94  -SS     O2 Delivery Pre Treatment room air  -SS     Pre Patient Position Supine  -SS       Row Name 09/08/23 1424 09/08/23 1114       Positioning and Restraints    Pre-Treatment Position sitting in chair/recliner  -SS in bed  -SS    Post Treatment Position bed  -SS chair  -SS    In Bed notified nsg;sitting EOB;with OT  -SS --    In Chair -- notified nsg;reclined;call light within reach;encouraged to call for assist;exit alarm on;waffle cushion;legs elevated  -SS              User Key  (r) = Recorded By, (t) = Taken By, (c) = Cosigned By      Initials Name Provider Type    SS Elba Bangura, PT Physical Therapist                   Outcome Measures       Row Name 09/08/23 1426 09/08/23 1123       How much help from another person do you currently need...    Turning from your back to your side while in flat bed without using bedrails? 3  -SS 3  -SS    Moving from lying on back to sitting on the side of a flat bed without bedrails? 3  -SS 3  -SS    Moving to and from a bed to a chair (including a wheelchair)? 3  -SS 3  -SS    Standing up from a chair using your arms (e.g., wheelchair, bedside chair)? 3  -SS 3  -SS    Climbing 3-5 steps with a railing? 2  -SS 2  -SS    To walk in hospital room? 3  -SS 3  -SS    AM-PAC 6 Clicks Score (PT) 17  -SS 17  -SS    Highest level of mobility 5 --> Static standing  -SS 5 --> Static standing  -SS      Row Name 09/08/23 1426 09/08/23 1123       Functional Assessment    Outcome Measure Options AM-PAC 6 Clicks Basic Mobility (PT)  -SS AM-PAC 6 Clicks  Basic Mobility (PT)  -              User Key  (r) = Recorded By, (t) = Taken By, (c) = Cosigned By      Initials Name Provider Type    Elba Joyce PT Physical Therapist                                 Physical Therapy Education       Title: PT OT SLP Therapies (In Progress)       Topic: Physical Therapy (Done)       Point: Mobility training (Done)       Learning Progress Summary             Patient Acceptance, E, VU,DU,NR by  at 9/8/2023 1427    Comment: Reviewed safety/technique w/transfers, ambulation, HEP, PT POC, controlled breathing w/activity    Eager, E, VU,DU,NR by SS at 9/8/2023 1124    Comment: Reviewed safety/technique w/bed mobility, transfers, ambulation, knee precautions, safety recommendations, controlled breathing techniques, PT POC    Acceptance, E,D, VU,NR by LR at 9/7/2023 1457    Comment: Educated on precautions, weight bearing status, safety with mobility, benefits of mobility, LE HEP, correct sit to supine t/f technique, correct sit<->stand t/f technique, correct gait mechanics, and progression of POC.    Acceptance, E,D,H, VU,NR by LR at 9/7/2023 0836    Comment: Issued/reviewed written/illustrated HEP. Educated on precautions, weight bearing status, safety with mobility, correct supine to sit t/f technique, correct sit<->stand t/f technique, correct gait mechanics, and progression of POC.                         Point: Home exercise program (Done)       Learning Progress Summary             Patient Acceptance, E, VU,DU,NR by  at 9/8/2023 1427    Comment: Reviewed safety/technique w/transfers, ambulation, HEP, PT POC, controlled breathing w/activity    Eager, E, VU,DU,NR by  at 9/8/2023 1124    Comment: Reviewed safety/technique w/bed mobility, transfers, ambulation, knee precautions, safety recommendations, controlled breathing techniques, PT POC    Acceptance, E,D, VU,NR by LR at 9/7/2023 1457    Comment: Educated on precautions, weight bearing status, safety with mobility,  benefits of mobility, LE HEP, correct sit to supine t/f technique, correct sit<->stand t/f technique, correct gait mechanics, and progression of POC.    Acceptance, E,D,H, VU,NR by LR at 9/7/2023 0836    Comment: Issued/reviewed written/illustrated HEP. Educated on precautions, weight bearing status, safety with mobility, correct supine to sit t/f technique, correct sit<->stand t/f technique, correct gait mechanics, and progression of POC.                         Point: Body mechanics (Done)       Learning Progress Summary             Patient Acceptance, E, VU,DU,NR by SS at 9/8/2023 1427    Comment: Reviewed safety/technique w/transfers, ambulation, HEP, PT POC, controlled breathing w/activity    Stanforder, ALFREDO, VU,DU,NR by SS at 9/8/2023 1124    Comment: Reviewed safety/technique w/bed mobility, transfers, ambulation, knee precautions, safety recommendations, controlled breathing techniques, PT POC    Acceptance, E,D, VU,NR by LR at 9/7/2023 1457    Comment: Educated on precautions, weight bearing status, safety with mobility, benefits of mobility, LE HEP, correct sit to supine t/f technique, correct sit<->stand t/f technique, correct gait mechanics, and progression of POC.    Acceptance, E,D,H, VU,NR by LR at 9/7/2023 0836    Comment: Issued/reviewed written/illustrated HEP. Educated on precautions, weight bearing status, safety with mobility, correct supine to sit t/f technique, correct sit<->stand t/f technique, correct gait mechanics, and progression of POC.                         Point: Precautions (Done)       Learning Progress Summary             Patient Acceptance, E, VU,DU,NR by SS at 9/8/2023 1427    Comment: Reviewed safety/technique w/transfers, ambulation, HEP, PT POC, controlled breathing w/activity    Nadia, ALFREDO, VU,DU,NR by SS at 9/8/2023 1124    Comment: Reviewed safety/technique w/bed mobility, transfers, ambulation, knee precautions, safety recommendations, controlled breathing techniques, PT POC     Acceptance, E,D, VU,NR by LR at 9/7/2023 1457    Comment: Educated on precautions, weight bearing status, safety with mobility, benefits of mobility, LE HEP, correct sit to supine t/f technique, correct sit<->stand t/f technique, correct gait mechanics, and progression of POC.    Acceptance, E,D,H, VU,NR by LR at 9/7/2023 0836    Comment: Issued/reviewed written/illustrated HEP. Educated on precautions, weight bearing status, safety with mobility, correct supine to sit t/f technique, correct sit<->stand t/f technique, correct gait mechanics, and progression of POC.                                         User Key       Initials Effective Dates Name Provider Type Discipline     02/03/23 -  Reina De La Cruz, PT Physical Therapist PT     06/01/21 -  Elba Bangura, LEANA Physical Therapist PT                  PT Recommendation and Plan     Plan of Care Reviewed With: patient  Progress: no change  Outcome Evaluation: Pt. continues to present below baseline function w/decreased strength and AROM and acute pain of R knee affecting her ability to safely participate in functional mobility. She performed bed mobility, transfers and ambulated 40' w/front wheeled walker, contact guard assist. Activity limited by pain. Pt. tolerated ther-ex. Continue IPPT to address stated deficits.     Time Calculation:         PT Charges       Row Name 09/08/23 1427 09/08/23 1126          Time Calculation    Start Time 1332  -SS 1001  -SS     PT Received On 09/08/23  -SS 09/08/23  -SS        Timed Charges    46140 - PT Therapeutic Exercise Minutes 10  -SS 10  -SS     98775 - Gait Training Minutes  10  -SS 10  -SS     49894 - PT Therapeutic Activity Minutes 3  -SS 5  -SS        Total Minutes    Timed Charges Total Minutes 23  -SS 25  -SS      Total Minutes 23  -SS 25  -SS               User Key  (r) = Recorded By, (t) = Taken By, (c) = Cosigned By      Initials Name Provider Type    SS Elba Bangura, PT Physical Therapist                   Therapy Charges for Today       Code Description Service Date Service Provider Modifiers Qty    95763713061 HC PT THER PROC EA 15 MIN 9/8/2023 Elba Bangura, PT GP 1    30536717520 HC GAIT TRAINING EA 15 MIN 9/8/2023 Elba Bangura, PT GP 1    13706981209 HC PT THER PROC EA 15 MIN 9/8/2023 Elba Bangura, PT GP 1    32919363699 HC GAIT TRAINING EA 15 MIN 9/8/2023 Elba Bangura, PT GP 1            PT G-Codes  Outcome Measure Options: AM-PAC 6 Clicks Basic Mobility (PT)  AM-PAC 6 Clicks Score (PT): 17  AM-PAC 6 Clicks Score (OT): 20  PT Discharge Summary  Anticipated Discharge Disposition (PT): home with assist, home with home health (when pain managed and pt. able to navigate stairs; if not inpatient rehab facility)    Elba Bangura, LEANA  9/8/2023

## 2023-09-08 NOTE — PLAN OF CARE
Goal Outcome Evaluation:  Plan of Care Reviewed With: patient        Progress: no change  Outcome Evaluation: Pt. continues to present below baseline function w/decreased strength and AROM and acute pain of R knee affecting her ability to safely participate in functional mobility. She performed bed mobility, transfers and ambulated 40' w/front wheeled walker, contact guard assist. Activity limited by pain. Pt. tolerated ther-ex. Continue IPPT to address stated deficits.      Anticipated Discharge Disposition (PT): home with assist, home with home health

## 2023-09-08 NOTE — THERAPY TREATMENT NOTE
Patient Name: Janet Obrien  : 1960    MRN: [id number]                              Today's Date: 2023       Admit Date: 2023    Visit Dx:     ICD-10-CM ICD-9-CM   1. Primary osteoarthritis of right knee  M17.11 715.16     Patient Active Problem List   Diagnosis    Diplopia    Orbital pseudotumor    Myositic orbital pseudotumor    Hypothyroidism    Diabetes mellitus    Age-related nuclear cataract of right eye    Ocular myasthenia    Morbid obesity, unspecified obesity type    Essential hypertension    Precordial pain    SOB (shortness of breath)    Lower abdominal pain    Constipation    Diarrhea    Rectal bleeding    Gastroesophageal reflux disease    Class 3 severe obesity due to excess calories with serious comorbidity and body mass index (BMI) of 40.0 to 44.9 in adult    Anal or rectal pain    Primary osteoarthritis of left knee    S/P TKR (total knee replacement), left    Hyperlipidemia    Primary osteoarthritis of right knee    Obesity    S/P total knee arthroplasty, right     Past Medical History:   Diagnosis Date    Anxiety     Arthritis     Asthma     Back pain     Cancer     SQUAMOUS CELL/ BASAL CELL ON LIP, under the nose as well    Chronic bronchitis     Depression     Diabetes mellitus     type 2    Fibromyalgia     High triglycerides     Hyperlipidemia     Hypertension     Hypothyroidism     Migraine     MVP (mitral valve prolapse)     OA (osteoarthritis)     Ovarian cyst     Peripheral autonomic neuropathy     Pleurisy     Pneumonia     Rheumatism     RLS (restless legs syndrome)     Sleep apnea     Mild form per patient. recommended mouthpiece     Past Surgical History:   Procedure Laterality Date    APPENDECTOMY      CATARACT EXTRACTION W/ INTRAOCULAR LENS IMPLANT Right 2019    Procedure: CATARACT PHACO EXTRACTION WITH INTRAOCULAR LENS IMPLANT RIGHT;  Surgeon: Carl Babb MD;  Location: [facility];  Service: Ophthalmology    CATARACT EXTRACTION W/ INTRAOCULAR  "LENS IMPLANT Left 10/07/2019    Procedure: CATARACT PHACO EXTRACTION WITH INTRAOCULAR LENS IMPLANT LEFT;  Surgeon: Carl Babb MD;  Location: [facility] OR;  Service: Ophthalmology     SECTION  , 1997    x 2     DILATATION AND CURETTAGE      HAND SURGERY Right     \"Pinched nerve surgery\"    HAND SURGERY Left     Joint removed secondary to arthritis    HYSTERECTOMY      ''still have one ovary''    KNEE SURGERY Right 2021    arthroscopy and partial medial meniscectomy Dr. Chavez    THYROIDECTOMY  1985    TONSILLECTOMY AND ADENOIDECTOMY      TOTAL KNEE ARTHROPLASTY Left 03/15/2023    Procedure: TOTAL KNEE ARTHROPLASTY -  LEFT;  Surgeon: Dayo Chavez MD;  Location: [facility] OR;  Service: Orthopedics;  Laterality: Left;      General Information       Row Name 23 1423          OT Time and Intention    Document Type therapy note (daily note)  -TB     Mode of Treatment occupational therapy  -TB       Row Name 23 1423          General Information    Patient Profile Reviewed yes  -TB     Existing Precautions/Restrictions fall;other (see comments)  s/p R TKA, WBAT, AC block. Impulsive  -TB     Barriers to Rehab previous functional deficit;ineffective coping  -TB       Row Name 23 1423          Occupational Profile    Reason for Services/Referral (Occupational Profile) Occupational decline  -TB       Row Name 23          Cognition    Orientation Status (Cognition) oriented x 4  -TB       Row Name 23 1423          Safety Issues, Functional Mobility    Safety Issues Affecting Function (Mobility) at risk behavior observed;awareness of need for assistance;impulsivity;insight into deficits/self-awareness;judgment;positioning of assistive device;problem-solving;safety precaution awareness;safety precautions follow-through/compliance;sequencing abilities  -TB     Impairments Affecting Function (Mobility) balance;endurance/activity " tolerance;pain;strength;postural/trunk control;range of motion (ROM)  -TB     Comment, Safety Issues/Impairments (Mobility) Pt up in room/bathroom with RW support and Min Ax1. Education and cues needed for safety/fall prevention.  -               User Key  (r) = Recorded By, (t) = Taken By, (c) = Cosigned By      Initials Name Provider Type     Aurea Chaudhary OT Occupational Therapist                     Mobility/ADL's       Row Name 09/08/23 1425          Bed Mobility    Bed Mobility sit-supine;scooting/bridging  -TB     Scooting/Bridging [place] (Bed Mobility) standby assist;verbal cues  -TB     Sit-Supine [place] (Bed Mobility) standby assist;verbal cues  -TB     Bed Mobility, Safety Issues decreased use of legs for bridging/pushing  -     Assistive Device (Bed Mobility) head of bed elevated;bed rails  -     Comment, (Bed Mobility) Leg  issued and teaching reviewed. Follow up teaching recommended.  -       Row Name 09/08/23 1425          Transfers    Transfers sit-stand transfer;stand-sit transfer;toilet transfer  -       Row Name 09/08/23 1425          Sit-Stand Transfer    Sit-Stand [place] (Transfers) contact guard;1 person assist;verbal cues  -TB     Assistive Device (Sit-Stand Transfers) walker, front-wheeled  -       Row Name 09/08/23 1425          Stand-Sit Transfer    Stand-Sit [place] (Transfers) contact guard;1 person assist;verbal cues  -TB     Assistive Device (Stand-Sit Transfers) walker, front-wheeled  -       Row Name 09/08/23 1425          Toilet Transfer    Type (Toilet Transfer) sit-stand;stand-sit  -TB     [place] Level (Toilet Transfer) minimum assist (75% patient effort);1 person assist;verbal cues  -TB     Assistive Device (Toilet Transfer) walker, front-wheeled;raised toilet seat;grab bars/safety frame  -       Row Name 09/08/23 1425          Functional Mobility    Functional Mobility- Ind. Level contact guard assist;1 person + 1  person to manage equipment;verbal cues required  -     Functional Mobility- Device walker, front-wheeled  -     Functional Mobility- Safety Issues balance decreased during turns;sequencing ability decreased;step length decreased;weight-shifting ability decreased  -     Functional Mobility- Comment Pt up in room/bathroom with RW support and Min Ax1. Education and cues needed for safety/fall prevention.  -       Row Name 09/08/23 1425          Activities of Daily Living    BADL Assessment/Intervention bathing;lower body dressing;grooming;feeding;toileting  -       Row Name 09/08/23 1425          Mobility    Extremity Weight-bearing Status right lower extremity  -TB     Right Lower Extremity (Weight-bearing Status) weight-bearing as tolerated (WBAT)  -       Row Name 09/08/23 1425          Bathing Assessment/Intervention    [place] Level (Bathing) bathing skills;set up;distal lower extremities/feet  -TB     Assistive Devices (Bathing) long-handled sponge  -TB     Position (Bathing) sitting up in bed  -TB     Comment, (Bathing) Education reviewed for precautions and ADL retraining. Instruction provided for no showers until nerve block removed.  -       Row Name 09/08/23 1425          Lower Body Dressing Assessment/Training    [place] Level (Lower Body Dressing) lower body dressing skills  -TB     Position (Lower Body Dressing) sitting up in bed  -TB     Comment, (Lower Body Dressing) Education/demonstration reviewed for precautions and ADL retraining. Instruction provided for nerve block line mgmt to prevent dislodgement. Pt declined to trial AE due to drowsiness and pain.  -       Row Name 09/08/23 1425          Grooming Assessment/Training    [place] Level (Grooming) set up  -TB     Position (Grooming) sitting up in bed  -TB     Comment, (Grooming) to wash/dry hands following toileting  -       Row Name 09/08/23 1425          Toileting Assessment/Training    [place] Level  (Toileting) standby assist;perform perineal hygiene  -TB     Position (Toileting) unsupported sitting  -TB       Row Name 09/08/23 1425          Self-Feeding Assessment/Training    [place] Level (Feeding) independent;liquids to mouth  -TB     Position (Self-Feeding) sitting up in bed  -TB               User Key  (r) = Recorded By, (t) = Taken By, (c) = Cosigned By      Initials Name Provider Type    TB Aurea Chaudhary OT Occupational Therapist                   Obj/Interventions       Row Name 09/08/23 1430          Balance    Balance Assessment sitting dynamic balance;sit to stand dynamic balance;standing dynamic balance  -TB     Dynamic Sitting Balance supervision  -TB     Position, Sitting Balance unsupported;sitting in chair;sitting edge of bed  Commode  -TB     Sit to Stand Dynamic Balance contact guard;1-person assist;verbal cues  -TB     Dynamic Standing Balance minimal assist;1-person assist;verbal cues  -TB     Position/Device Used, Standing Balance supported;walker, front-wheeled  -TB     Balance Interventions sitting;standing;sit to stand;supported;dynamic;dynamic reaching;occupation based/functional task;UE activity with balance activity  -TB               User Key  (r) = Recorded By, (t) = Taken By, (c) = Cosigned By      Initials Name Provider Type     Aurea Chaudhary, JAZZ Occupational Therapist                   Goals/Plan    No documentation.                  Clinical Impression       Row Name 09/08/23 1430          Pain Assessment    Pretreatment Pain Rating 7/10  -TB     Posttreatment Pain Rating 10/10  -TB     Pain Location - Side/Orientation Right  -TB     Pain Location generalized  -TB     Pain Location - knee  -TB     Pre/Posttreatment Pain Comment Pt tolerates dynamic OOB activity with encouragement  -TB     Pain Intervention(s) Ambulation/increased activity;Repositioned;Cold applied;Other (Comment)  RLE AC block infusing  -TB       Row Name 09/08/23 1430          Plan of  Care Review    Plan of Care Reviewed With patient  -TB     Progress declining  -TB     Outcome Evaluation Pt participates in OOB activity/therapy with encouragement. Up in room/bathroom with RW support and Min Ax1. Education and cues needed for safety and fall prevention. Education/demonstration reviewed for precautions and ADL retraining. Pt declined to trial AE due to drowsiness and pain. OT will continue to follow IP. Recommend home with 24/7 assist when medically ready.  -TB       Row Name 09/08/23 1430          Therapy Plan Review/Discharge Plan (OT)    Anticipated Discharge Disposition (OT) home with 24/7 care  -TB       Row Name 09/08/23 1430          Vital Signs    Pre Systolic BP Rehab --  RN cleared OT  -TB     O2 Delivery Pre Treatment room air  -TB     Pre Patient Position Sitting  -TB     Intra Patient Position Standing  -TB     Post Patient Position Supine  -TB       Row Name 09/08/23 1430          Positioning and Restraints    Pre-Treatment Position bathroom  -TB     Post Treatment Position bed  -TB     In Bed notified nsg;supine;call light within reach;encouraged to call for assist;exit alarm on  -TB               User Key  (r) = Recorded By, (t) = Taken By, (c) = Cosigned By      Initials Name Provider Type    TB Aurea Chaudhary, OT Occupational Therapist                   Outcome Measures       Row Name 09/08/23 1436          How much help from another is currently needed...    Putting on and taking off regular lower body clothing? 2  -TB     Bathing (including washing, rinsing, and drying) 3  -TB     Toileting (which includes using toilet bed pan or urinal) 3  -TB     Putting on and taking off regular upper body clothing 3  -TB     Taking care of personal grooming (such as brushing teeth) 3  -TB     Eating meals 4  -TB     AM-PAC 6 Clicks Score (OT) 18  -TB       Row Name 09/08/23 1426 09/08/23 1123       How much help from another person do you currently need...    Turning from your back  to your side while in flat bed without using bedrails? 3  -SS 3  -SS    Moving from lying on back to sitting on the side of a flat bed without bedrails? 3  -SS 3  -SS    Moving to and from a bed to a chair (including a wheelchair)? 3  -SS 3  -SS    Standing up from a chair using your arms (e.g., wheelchair, bedside chair)? 3  -SS 3  -SS    Climbing 3-5 steps with a railing? 2  -SS 2  -SS    To walk in hospital room? 3  -SS 3  -SS    AM-PAC 6 Clicks Score (PT) 17  -SS 17  -SS    Highest level of mobility 5 --> Static standing  -SS 5 --> Static standing  -SS      Row Name 09/08/23 1436 09/08/23 1426       Functional Assessment    Outcome Measure Options AM-PAC 6 Clicks Daily Activity (OT)  -TB AM-PAC 6 Clicks Basic Mobility (PT)  -SS      Row Name 09/08/23 1123          Functional Assessment    Outcome Measure Options AM-PAC 6 Clicks Basic Mobility (PT)  -SS               User Key  (r) = Recorded By, (t) = Taken By, (c) = Cosigned By      Initials Name Provider Type    TB Aurea Chaudhary, OT Occupational Therapist    SS Elba Bangura, PT Physical Therapist                    Occupational Therapy Education       Title: PT OT SLP Therapies (In Progress)       Topic: Occupational Therapy (In Progress)       Point: ADL training (Done)       Description:   Instruct learner(s) on proper safety adaptation and remediation techniques during self care or transfers.   Instruct in proper use of assistive devices.                  Learning Progress Summary             Patient Acceptance, E,D, VU,NR by TB at 9/8/2023 1437    Acceptance, E, VU by KF at 9/7/2023 1118                         Point: Home exercise program (Not Started)       Description:   Instruct learner(s) on appropriate technique for monitoring, assisting and/or progressing therapeutic exercises/activities.                  Learner Progress:  Not documented in this visit.              Point: Precautions (Done)       Description:   Instruct learner(s) on  prescribed precautions during self-care and functional transfers.                  Learning Progress Summary             Patient Acceptance, E,D, VU,NR by TB at 9/8/2023 1437    Acceptance, E, VU by KF at 9/7/2023 1118                         Point: Body mechanics (Done)       Description:   Instruct learner(s) on proper positioning and spine alignment during self-care, functional mobility activities and/or exercises.                  Learning Progress Summary             Patient Acceptance, E, VU by KF at 9/7/2023 1118                                         User Key       Initials Effective Dates Name Provider Type Discipline     07/11/23 -  Aurea Chaudhary OT Occupational Therapist OT    KF 08/09/23 -  Padma Glover OT Occupational Therapist OT                  OT Recommendation and Plan     Plan of Care Review  Plan of Care Reviewed With: patient  Progress: declining  Outcome Evaluation: Pt participates in OOB activity/therapy with encouragement. Up in room/bathroom with RW support and Min Ax1. Education and cues needed for safety and fall prevention. Education/demonstration reviewed for precautions and ADL retraining. Pt declined to trial AE due to drowsiness and pain. OT will continue to follow IP. Recommend home with 24/7 assist when medically ready.     Time Calculation:         Time Calculation- OT       Row Name 09/08/23 1427 09/08/23 1317 09/08/23 1126       Time Calculation- OT    OT Start Time -- 1317 -TB --    OT Received On -- 09/08/23  - --    OT Goal Re-Cert Due Date -- 09/17/23  -TB --       Timed Charges    47967 - Gait Training Minutes  10  -SS -- 10  -SS    25072 - OT Self Care/Mgmt Minutes -- 23 -TB --       Total Minutes    Timed Charges Total Minutes 10  -SS 23  -TB 10  -SS     Total Minutes 10  -SS 23  -TB 10  -SS              User Key  (r) = Recorded By, (t) = Taken By, (c) = Cosigned By      Initials Name Provider Type    TB Aurea Chaudhary OT Occupational  Therapist    SS Elba Bangura, PT Physical Therapist                  Therapy Charges for Today       Code Description Service Date Service Provider Modifiers Qty    97138216574 HC OT SELF CARE/MGMT/TRAIN EA 15 MIN 9/8/2023 Aurea Chaudhary OT GO 2                 Aurea Chaudhary OT  9/8/2023

## 2023-09-08 NOTE — PROGRESS NOTES
[facility]    Acute pain service Inpatient Progress Note    Patient Name: Janet Obrien  :  1960  MRN:  [id number]        Acute Pain  Service Inpatient Progress Note:    Analgesia:Poor  Pain Score:8/10 (tibia and posterior knee pain mostly)  LOC: alert and awake  Resp Status: room air  Cardiac: VS stable  Side Effects:None  Catheter Site:clean, dressing intact and dry  Cath type: peripheral nerve cath with ON Q  Volume: 1mL,8ml, 8ml InfuSystem Pump.  Dosing/Volume: ropivacaine 0.2%  Catheter Plan:Catheter to remain Insitu, Continue catheter infusion rate unchanged and Bolus Catheter  Comments:        Bolused catheter with 5 ml 0.5% ropivicaine

## 2023-09-09 LAB
GLUCOSE BLDC GLUCOMTR-MCNC: 100 MG/DL (ref 70–130)
GLUCOSE BLDC GLUCOMTR-MCNC: 102 MG/DL (ref 70–130)
GLUCOSE BLDC GLUCOMTR-MCNC: 106 MG/DL (ref 70–130)
GLUCOSE BLDC GLUCOMTR-MCNC: 84 MG/DL (ref 70–130)

## 2023-09-09 PROCEDURE — 82948 REAGENT STRIP/BLOOD GLUCOSE: CPT

## 2023-09-09 PROCEDURE — 97116 GAIT TRAINING THERAPY: CPT

## 2023-09-09 PROCEDURE — 99024 POSTOP FOLLOW-UP VISIT: CPT | Performed by: ORTHOPAEDIC SURGERY

## 2023-09-09 PROCEDURE — 97110 THERAPEUTIC EXERCISES: CPT

## 2023-09-09 RX ADMIN — OXYCODONE HYDROCHLORIDE 10 MG: 10 TABLET ORAL at 21:34

## 2023-09-09 RX ADMIN — ACETAMINOPHEN 1000 MG: 500 TABLET ORAL at 23:25

## 2023-09-09 RX ADMIN — APIXABAN 2.5 MG: 2.5 TABLET, FILM COATED ORAL at 21:34

## 2023-09-09 RX ADMIN — OXYCODONE HYDROCHLORIDE 10 MG: 10 TABLET ORAL at 16:52

## 2023-09-09 RX ADMIN — OXYCODONE HYDROCHLORIDE 10 MG: 10 TABLET ORAL at 02:43

## 2023-09-09 RX ADMIN — ACETAMINOPHEN 1000 MG: 500 TABLET ORAL at 11:16

## 2023-09-09 RX ADMIN — MELOXICAM 15 MG: 15 TABLET ORAL at 10:10

## 2023-09-09 RX ADMIN — ACETAMINOPHEN 1000 MG: 500 TABLET ORAL at 06:56

## 2023-09-09 RX ADMIN — ATORVASTATIN CALCIUM 80 MG: 40 TABLET, FILM COATED ORAL at 10:10

## 2023-09-09 RX ADMIN — METAXALONE 800 MG: 800 TABLET ORAL at 06:56

## 2023-09-09 RX ADMIN — Medication 3 ML: at 10:11

## 2023-09-09 RX ADMIN — LEVOTHYROXINE SODIUM 125 MCG: 125 TABLET ORAL at 06:56

## 2023-09-09 RX ADMIN — APIXABAN 2.5 MG: 2.5 TABLET, FILM COATED ORAL at 10:10

## 2023-09-09 RX ADMIN — OXYCODONE HYDROCHLORIDE 10 MG: 10 TABLET ORAL at 06:56

## 2023-09-09 RX ADMIN — METAXALONE 800 MG: 800 TABLET ORAL at 16:52

## 2023-09-09 RX ADMIN — DULOXETINE HYDROCHLORIDE 60 MG: 60 CAPSULE, DELAYED RELEASE ORAL at 10:10

## 2023-09-09 NOTE — PROGRESS NOTES
"IM progress note      Janet Obrien  [id number]  1960     LOS: 0 days     Attending: Dayo Chavez,*    Primary Care Provider: Noe Davenport MD      Chief Complaint/Reason for visit:  No chief complaint on file.      Subjective   Feels tired. No shortness of breath. No n/vom.  Wants to progress with PT to go home tomorrow.     Objective        Visit Vitals  /67 (BP Location: Left arm, Patient Position: Lying)   Pulse 79   Temp 98.4 °F (36.9 °C) (Oral)   Resp 17   Ht 167.6 cm (66\")   Wt 99.8 kg (220 lb)   SpO2 97%   BMI 35.51 kg/m²     Temp (24hrs), Av.2 °F (37.3 °C), Min:98.2 °F (36.8 °C), Max:100.2 °F (37.9 °C)      Intake/Output:    Intake/Output Summary (Last 24 hours) at 2023 0946  Last data filed at 2023 1747  Gross per 24 hour   Intake 1060 ml   Output 950 ml   Net 110 ml          Physical Therapy:23:         Goal Outcome Evaluation:  Plan of Care Reviewed With: patient  Progress: no change  Outcome Evaluation: Pt. continues to present below baseline function w/decreased strength and AROM and acute pain of R knee affecting her ability to safely participate in functional mobility. She performed bed mobility, transfers and ambulated 40' w/front wheeled walker, contact guard assist. Activity limited by pain. Pt. tolerated ther-ex. Continue IPPT to address stated deficits.        Anticipated Discharge Disposition (PT): home with assist, home with home health (when pain managed and pt. able to navigate stairs; if not inpatient rehab facility)                    Physical Exam:     General Appearance:    Alert, cooperative, in no acute distress   Head:    Normocephalic, without obvious abnormality, atraumatic    Lungs:     Normal effort, symmetric chest rise,  clear to      auscultation bilaterally              Heart:    Regular rhythm and normal rate, normal S1 and S2    Abdomen:     Normal bowel sounds, no masses, no organomegaly, soft        non-tender, non-distended, no " guarding, no rebound                tenderness   Extremities: Clean dry intact dressing over knee.  Peripheral nerve block catheter present.    Intact flexion and dorsiflexion bilateral feet.  No clubbing, cyanosis or edema.  No deformities.    Pulses:   Pulses palpable and equal bilaterally   Skin:   No bleeding, bruising or rash          Results Review:     I reviewed the patient's new clinical results.   Results from last 7 days   Lab Units 09/07/23  0528 09/06/23  1147   WBC 10*3/mm3 9.66 7.63   HEMOGLOBIN g/dL 10.2* 12.2   HEMATOCRIT % 30.9* 37.7   PLATELETS 10*3/mm3 221 266       Results from last 7 days   Lab Units 09/08/23  0805 09/07/23  0528 09/06/23  1147   SODIUM mmol/L 129* 134* 137   POTASSIUM mmol/L 3.5 4.1 3.7   CHLORIDE mmol/L 99 102 103   CO2 mmol/L 24.0 27.0 27.0   BUN mg/dL 12 10 13   CREATININE mg/dL 0.67 0.65 0.68   CALCIUM mg/dL 9.0 9.0 9.7   GLUCOSE mg/dL 108* 117* 84        Latest Reference Range & Units 09/08/23 08:05   proBNP 0.0 - 900.0 pg/mL 188.9     I reviewed the patient's new imaging including images and reports.  CXR no acute disease. 9/8/23.    All medications reviewed.   acetaminophen, 1,000 mg, Oral, Q6H  apixaban, 2.5 mg, Oral, Q12H  atorvastatin, 80 mg, Oral, Daily  DULoxetine, 60 mg, Oral, Daily  insulin lispro, 2-7 Units, Subcutaneous, 4x Daily AC & at Bedtime  levothyroxine, 125 mcg, Oral, Q AM  meloxicam, 15 mg, Oral, Daily  sodium chloride, 3 mL, Intravenous, Q12H      albuterol sulfate HFA, 2 puff, Q4H PRN  dextrose, 25 g, Q15 Min PRN  dextrose, 15 g, Q15 Min PRN  docusate sodium, 100 mg, BID PRN  glucagon (human recombinant), 1 mg, Q15 Min PRN  ipratropium-albuterol, 3 mL, Q4H PRN  labetalol, 10 mg, Q4H PRN  meclizine, 25 mg, Q6H PRN  metaxalone, 800 mg, TID PRN  Morphine, 4 mg, Q2H PRN   And  naloxone, 0.4 mg, Q5 Min PRN  ondansetron, 4 mg, Q6H PRN   Or  ondansetron, 4 mg, Q6H PRN  oxyCODONE, 10 mg, Q4H PRN  sodium chloride, 500 mL, TID PRN  sodium chloride, 3-10 mL,  PRN  sodium chloride, 40 mL, PRN        Assessment & Plan       S/P total knee arthroplasty, right    Hypothyroidism    Diabetes mellitus    Essential hypertension    Hyperlipidemia    Primary osteoarthritis of right knee    Obesity         Plan   1. PT/OT,  Weight bearing as tolerated RLE  2. Pain control-prns, ACB cath with ropivacaine infusion.  3. IS-encourage  4. DVT proph- Mechanicals and Apixaban.  5. Bowel regimen  6. Resume home medications as appropriate  7. Monitor post-op labs  8. DC planning for home when cleared by PT/OT.      - Hypertension:  Resume home medications as appropriate, formulary substitution when indicated.  Holding parameters.  Prn medications for elevated blood pressure.     -DM type 2  Hgb A1C 5.5.  Hold OHA as appropriate  FSBG AC/HS, ( q 6 when NPO), along with correction humalog.  Long acting insulin if needed.     -Dyslipidemia:  Resume home regimen statin.( formulary substitution when appropriate).     -Hypothyroidism: Resumed replacement.    Fito Tang MD  09/09/23  09:46 EDT

## 2023-09-09 NOTE — PLAN OF CARE
Problem: Adult Inpatient Plan of Care  Goal: Plan of Care Review  Outcome: Ongoing, Progressing  Goal: Patient-Specific Goal (Individualized)  Outcome: Ongoing, Progressing  Goal: Absence of Hospital-Acquired Illness or Injury  Outcome: Ongoing, Progressing  Intervention: Identify and Manage Fall Risk  Recent Flowsheet Documentation  Taken 9/9/2023 0400 by Sophie Avina RN  Safety Promotion/Fall Prevention:   activity supervised   assistive device/personal items within reach   clutter free environment maintained   fall prevention program maintained   nonskid shoes/slippers when out of bed   room organization consistent   safety round/check completed  Taken 9/9/2023 0000 by Sophie Avina RN  Safety Promotion/Fall Prevention:   assistive device/personal items within reach   activity supervised   clutter free environment maintained   fall prevention program maintained   nonskid shoes/slippers when out of bed   room organization consistent   safety round/check completed  Taken 9/8/2023 2030 by Sophie Avina RN  Safety Promotion/Fall Prevention:   activity supervised   assistive device/personal items within reach   clutter free environment maintained   fall prevention program maintained   gait belt   mobility aid in reach   nonskid shoes/slippers when out of bed   room organization consistent   safety round/check completed  Intervention: Prevent Skin Injury  Recent Flowsheet Documentation  Taken 9/9/2023 0400 by Sophie Avina RN  Body Position:   position changed independently   neutral body alignment   neutral head position  Skin Protection:   adhesive use limited   incontinence pads utilized   skin-to-device areas padded   transparent dressing maintained   tubing/devices free from skin contact  Taken 9/9/2023 0000 by Sophie Avina RN  Body Position:   position changed independently   neutral body alignment   neutral head position  Skin Protection:   adhesive use limited   incontinence  pads utilized   transparent dressing maintained   tubing/devices free from skin contact   skin-to-device areas padded  Taken 9/8/2023 2030 by Sophie Avina RN  Body Position:   position changed independently   neutral head position   neutral body alignment  Skin Protection:   adhesive use limited   incontinence pads utilized   skin-to-device areas padded   transparent dressing maintained   tubing/devices free from skin contact  Intervention: Prevent and Manage VTE (Venous Thromboembolism) Risk  Recent Flowsheet Documentation  Taken 9/9/2023 0400 by Sophie Avina RN  VTE Prevention/Management:   right   foot pump device on   left   sequential compression devices on  Taken 9/9/2023 0000 by Sophie Avina RN  VTE Prevention/Management:   right   foot pump device on   left   sequential compression devices on  Taken 9/8/2023 2030 by Sophie Avina RN  VTE Prevention/Management:   right   foot pump device on   left   sequential compression devices on  Intervention: Prevent Infection  Recent Flowsheet Documentation  Taken 9/9/2023 0400 by Sophie Avina RN  Infection Prevention:   hand hygiene promoted   rest/sleep promoted   single patient room provided  Taken 9/9/2023 0000 by Sophie Avina RN  Infection Prevention:   hand hygiene promoted   rest/sleep promoted   single patient room provided  Taken 9/8/2023 2030 by Sophie Avina RN  Infection Prevention:   hand hygiene promoted   rest/sleep promoted   single patient room provided  Goal: Optimal Comfort and Wellbeing  Outcome: Ongoing, Progressing  Intervention: Monitor Pain and Promote Comfort  Recent Flowsheet Documentation  Taken 9/9/2023 0400 by Sophie Avina RN  Pain Management Interventions:   pain pump in use   pillow support provided   cold applied  Taken 9/8/2023 2146 by Sophie Avina RN  Pain Management Interventions:   pain pump in use   pillow support provided   position adjusted   cold applied   see  MAR  Intervention: Provide Person-Centered Care  Recent Flowsheet Documentation  Taken 9/8/2023 2030 by Sophie Avina, RN  Trust Relationship/Rapport:   care explained   choices provided   questions answered   questions encouraged   reassurance provided   thoughts/feelings acknowledged   empathic listening provided  Goal: Readiness for Transition of Care  Outcome: Ongoing, Progressing   Goal Outcome Evaluation:

## 2023-09-09 NOTE — THERAPY TREATMENT NOTE
Patient Name: Janet Obrien  : 1960    MRN: [id number]                              Today's Date: 2023       Admit Date: 2023    Visit Dx:     ICD-10-CM ICD-9-CM   1. Primary osteoarthritis of right knee  M17.11 715.16     Patient Active Problem List   Diagnosis    Diplopia    Orbital pseudotumor    Myositic orbital pseudotumor    Hypothyroidism    Diabetes mellitus    Age-related nuclear cataract of right eye    Ocular myasthenia    Morbid obesity, unspecified obesity type    Essential hypertension    Precordial pain    SOB (shortness of breath)    Lower abdominal pain    Constipation    Diarrhea    Rectal bleeding    Gastroesophageal reflux disease    Class 3 severe obesity due to excess calories with serious comorbidity and body mass index (BMI) of 40.0 to 44.9 in adult    Anal or rectal pain    Primary osteoarthritis of left knee    S/P TKR (total knee replacement), left    Hyperlipidemia    Primary osteoarthritis of right knee    Obesity    S/P total knee arthroplasty, right     Past Medical History:   Diagnosis Date    Anxiety     Arthritis     Asthma     Back pain     Cancer     SQUAMOUS CELL/ BASAL CELL ON LIP, under the nose as well    Chronic bronchitis     Depression     Diabetes mellitus     type 2    Fibromyalgia     High triglycerides     Hyperlipidemia     Hypertension     Hypothyroidism     Migraine     MVP (mitral valve prolapse)     OA (osteoarthritis)     Ovarian cyst     Peripheral autonomic neuropathy     Pleurisy     Pneumonia     Rheumatism     RLS (restless legs syndrome)     Sleep apnea     Mild form per patient. recommended mouthpiece     Past Surgical History:   Procedure Laterality Date    APPENDECTOMY      CATARACT EXTRACTION W/ INTRAOCULAR LENS IMPLANT Right 2019    Procedure: CATARACT PHACO EXTRACTION WITH INTRAOCULAR LENS IMPLANT RIGHT;  Surgeon: Carl Babb MD;  Location: [facility];  Service: Ophthalmology    CATARACT EXTRACTION W/ INTRAOCULAR  "LENS IMPLANT Left 10/07/2019    Procedure: CATARACT PHACO EXTRACTION WITH INTRAOCULAR LENS IMPLANT LEFT;  Surgeon: Carl Babb MD;  Location: [facility] OR;  Service: Ophthalmology     SECTION  , 1997    x 2     DILATATION AND CURETTAGE      HAND SURGERY Right     \"Pinched nerve surgery\"    HAND SURGERY Left     Joint removed secondary to arthritis    HYSTERECTOMY      ''still have one ovary''    KNEE SURGERY Right 2021    arthroscopy and partial medial meniscectomy Dr. Chavez    THYROIDECTOMY  1985    TONSILLECTOMY AND ADENOIDECTOMY  1965    TOTAL KNEE ARTHROPLASTY Left 03/15/2023    Procedure: TOTAL KNEE ARTHROPLASTY -  LEFT;  Surgeon: Dayo Chavez MD;  Location: [facility] OR;  Service: Orthopedics;  Laterality: Left;      General Information       Row Name 23 1305 23 1055       Physical Therapy Time and Intention    Document Type therapy note (daily note)  -LS therapy note (daily note)  -LS    Mode of Treatment physical therapy  -LS physical therapy  -      Row Name 23 1305 23 1055       General Information    Patient Profile Reviewed yes  -LS yes  -LS    Existing Precautions/Restrictions other (see comments);fall  s/p R TKA, WBAT, AC block.  -LS fall  s/p R TKA, WBAT, AC block.  -LS      Row Name 23 1305 23 1055       Cognition    Orientation Status (Cognition) oriented x 4  -LS oriented x 4  -LS              User Key  (r) = Recorded By, (t) = Taken By, (c) = Cosigned By      Initials Name Provider Type    LS Keya Carballo, PT Physical Therapist                   Mobility       Row Name 23 1305 23 1055       Bed Mobility    Bed Mobility -- supine-sit  -LS    Supine-Sit [place] (Bed Mobility) -- minimum assist (75% patient effort)  min A to support RLE  -LS    Sit-Supine [place] (Bed Mobility) modified independence  -LS --    Assistive Device (Bed Mobility) leg ;head of bed elevated  -LS --    Comment, " "(Bed Mobility) pt said she sleeps in a recliner  -LS from flat bed surface without bed rail  -LS      Row Name 09/09/23 1305 09/09/23 1055       Sit-Stand Transfer    Sit-Stand [place] (Transfers) modified independence  -LS standby assist  -LS    Assistive Device (Sit-Stand Transfers) walker, front-wheeled  -LS walker, front-wheeled  -LS    Comment, (Sit-Stand Transfer) used appropriate hand placement without vcs  -LS used appropriate hand placement without vcs  -LS      Row Name 09/09/23 1305 09/09/23 1055       Gait/Stairs (Locomotion)    [place] Level (Gait) contact guard  -LS minimum assist (75% patient effort)  -LS    Assistive Device (Gait) walker, front-wheeled  -LS walker, front-wheeled  -LS    Distance in Feet (Gait) 220  -  -LS    Deviations/Abnormal Patterns (Gait) gait speed decreased;bilateral deviations;stride length decreased  -LS gait speed decreased;bilateral deviations;stride length decreased  -LS    Bilateral Gait Deviations forward flexed posture  -LS forward flexed posture  -LS    [place] Level (Stairs) contact guard  -LS --    Assistive Device (Stairs) walker, front-wheeled  -LS --    Number of Steps (Stairs) 1 step twice  -LS --    Ascending Technique (Stairs) step-to-step  -LS --    Descending Technique (Stairs) step-to-step  -LS --    Comment, (Gait/Stairs) vcs to avoid pushing walker too far ahead. no buckling this afternoon. pt said she had a \"shot\" that helped. vcs for seqeuncing on the step.  -LS one episode of R knee buckling ; min A for balance. pt walked CGA other than that one instance. vcs to avoid pushinvg walker too far ahead. vcs to make sure R knee was extended during stance phase before stepping forward with LLE.  -LS      Row Name 09/09/23 1055          Mobility    Extremity Weight-bearing Status right lower extremity  -LS     Right Lower Extremity (Weight-bearing Status) weight-bearing as tolerated (WBAT)  -LS               User Key  (r) = Recorded By, " (t) = Taken By, (c) = Cosigned By      Initials Name Provider Type     Keya Carballo PT Physical Therapist                   Obj/Interventions       Row Name 09/09/23 1055          Motor Skills    Therapeutic Exercise hip  -       Row Name 09/09/23 1305 09/09/23 1055       Hip (Therapeutic Exercise)    Hip (Therapeutic Exercise) isometric exercises  - isometric exercises  -    Hip Isometrics (Therapeutic Exercise) bilateral;gluteal sets;other (see comments)  15 reps  -LS bilateral;gluteal sets;10 repetitions  -      Row Name 09/09/23 1305 09/09/23 1055       Knee (Therapeutic Exercise)    Knee (Therapeutic Exercise) -- isometric exercises  -LS    Knee AROM (Therapeutic Exercise) bilateral  -LS bilateral  -LS    Knee Isometrics (Therapeutic Exercise) quad sets;other (see comments)  15 reps  -LS bilateral;quad sets;10 repetitions  -    Knee Strengthening (Therapeutic Exercise) SAQ (short arc quad);SLR (straight leg raise);heel slides;supine;15 repititions;right  -LS left;SAQ (short arc quad);LAQ (long arc quad);SLR (straight leg raise);heel slides;10 repetitions  with min A  -      Row Name 09/09/23 1305 09/09/23 1055       Ankle (Therapeutic Exercise)    Ankle AROM (Therapeutic Exercise) bilateral;dorsiflexion;plantarflexion;15 repititions  - bilateral;dorsiflexion;plantarflexion;10 repetitions  -      Row Name 09/09/23 1305 09/09/23 1055       Balance    Dynamic Standing Balance contact guard  - --    Position/Device Used, Standing Balance supported  -LS walker, front-wheeled  -    Balance Interventions sit to stand;supported;weight shifting activity  - standing;sit to stand;supported;weight shifting activity  -      Row Name 09/09/23 1305          General Lower Extremity Assessment (Range of Motion)    Comment: Lower Extremity ROM R knee ROM -5 to 75 supine.  -               User Key  (r) = Recorded By, (t) = Taken By, (c) = Cosigned By      Initials Name Provider Type      Keya Carballo, PT Physical Therapist                   Goals/Plan    No documentation.                  Clinical Impression       Row Name 09/09/23 1305 09/09/23 1055       Pain    Pretreatment Pain Rating 5/10  -LS 6/10  -LS    Posttreatment Pain Rating 6/10  -LS 7/10  -LS    Pain Location - Side/Orientation Right  -LS Left  -LS    Pain Location - knee  -LS knee  -LS    Pain Intervention(s) Repositioned  -LS Repositioned;Cold applied  -LS      Row Name 09/09/23 1305 09/09/23 1055       Plan of Care Review    Plan of Care Reviewed With patient  -LS patient  -LS    Progress improving  -LS --    Outcome Evaluation Improving mobility. Needs to practice stairs.  -LS Improved distance with amb; however, pt did have one episode of R knee buckling requiring min A.  -LS      Row Name 09/09/23 1305 09/09/23 1055       Positioning and Restraints    Pre-Treatment Position sitting in chair/recliner  -LS in bed  -LS    Post Treatment Position bed  -LS chair  -LS    In Bed notified nsg;fowlers;encouraged to call for assist;exit alarm on;call light within reach  -LS --    In Chair -- notified nsg;sitting;call light within reach;encouraged to call for assist;exit alarm on;waffle cushion;legs elevated  -LS              User Key  (r) = Recorded By, (t) = Taken By, (c) = Cosigned By      Initials Name Provider Type    LS Keya Carballo, PT Physical Therapist                   Outcome Measures       Row Name 09/09/23 1305 09/09/23 1055       How much help from another person do you currently need...    Turning from your back to your side while in flat bed without using bedrails? 4  -LS 4  -LS    Moving from lying on back to sitting on the side of a flat bed without bedrails? 3  -LS 3  -LS    Moving to and from a bed to a chair (including a wheelchair)? 3  -LS 3  -LS    Standing up from a chair using your arms (e.g., wheelchair, bedside chair)? 4  -LS 3  -LS    Climbing 3-5 steps with a railing? 3  -LS 3  -LS    To walk in  hospital room? 3  -LS 3  -LS    AM-PAC 6 Clicks Score (PT) 20  -LS 19  -LS    Highest level of mobility 6 --> Walked 10 steps or more  -LS 6 --> Walked 10 steps or more  -      Row Name 09/09/23 1305 09/09/23 1055       Functional Assessment    Outcome Measure Options AM-PAC 6 Clicks Basic Mobility (PT)  -LS AM-PAC 6 Clicks Basic Mobility (PT)  -LS              User Key  (r) = Recorded By, (t) = Taken By, (c) = Cosigned By      Initials Name Provider Type    Keya Mike, PT Physical Therapist                                 Physical Therapy Education       Title: PT OT SLP Therapies (In Progress)       Topic: Physical Therapy (Done)       Point: Mobility training (Done)       Learning Progress Summary             Patient Acceptance, E, VU,NR by  at 9/9/2023 1305    Acceptance, E, VU,NR by  at 9/9/2023 1055    Acceptance, E, VU,DU,NR by  at 9/8/2023 1427    Comment: Reviewed safety/technique w/transfers, ambulation, HEP, PT POC, controlled breathing w/activity    Eager, E, VU,DU,NR by  at 9/8/2023 1124    Comment: Reviewed safety/technique w/bed mobility, transfers, ambulation, knee precautions, safety recommendations, controlled breathing techniques, PT POC    Acceptance, E,D, VU,NR by LR at 9/7/2023 1457    Comment: Educated on precautions, weight bearing status, safety with mobility, benefits of mobility, LE HEP, correct sit to supine t/f technique, correct sit<->stand t/f technique, correct gait mechanics, and progression of POC.    Acceptance, E,D,H, VU,NR by LR at 9/7/2023 0836    Comment: Issued/reviewed written/illustrated HEP. Educated on precautions, weight bearing status, safety with mobility, correct supine to sit t/f technique, correct sit<->stand t/f technique, correct gait mechanics, and progression of POC.                         Point: Home exercise program (Done)       Learning Progress Summary             Patient Acceptance, E, VU,DU,NR by  at 9/8/2023 1427    Comment:  Reviewed safety/technique w/transfers, ambulation, HEP, PT POC, controlled breathing w/activity    Eager, E, VU,DU,NR by SS at 9/8/2023 1124    Comment: Reviewed safety/technique w/bed mobility, transfers, ambulation, knee precautions, safety recommendations, controlled breathing techniques, PT POC    Acceptance, E,D, VU,NR by LR at 9/7/2023 1457    Comment: Educated on precautions, weight bearing status, safety with mobility, benefits of mobility, LE HEP, correct sit to supine t/f technique, correct sit<->stand t/f technique, correct gait mechanics, and progression of POC.    Acceptance, E,D,H, VU,NR by LR at 9/7/2023 0836    Comment: Issued/reviewed written/illustrated HEP. Educated on precautions, weight bearing status, safety with mobility, correct supine to sit t/f technique, correct sit<->stand t/f technique, correct gait mechanics, and progression of POC.                         Point: Body mechanics (Done)       Learning Progress Summary             Patient Acceptance, E, VU,DU,NR by SS at 9/8/2023 1427    Comment: Reviewed safety/technique w/transfers, ambulation, HEP, PT POC, controlled breathing w/activity    Eager, E, VU,DU,NR by SS at 9/8/2023 1124    Comment: Reviewed safety/technique w/bed mobility, transfers, ambulation, knee precautions, safety recommendations, controlled breathing techniques, PT POC    Acceptance, E,D, VU,NR by LR at 9/7/2023 1457    Comment: Educated on precautions, weight bearing status, safety with mobility, benefits of mobility, LE HEP, correct sit to supine t/f technique, correct sit<->stand t/f technique, correct gait mechanics, and progression of POC.    Acceptance, E,D,H, VU,NR by LR at 9/7/2023 0836    Comment: Issued/reviewed written/illustrated HEP. Educated on precautions, weight bearing status, safety with mobility, correct supine to sit t/f technique, correct sit<->stand t/f technique, correct gait mechanics, and progression of POC.                         Point:  Precautions (Done)       Learning Progress Summary             Patient Acceptance, E, VU,DU,NR by  at 9/8/2023 1427    Comment: Reviewed safety/technique w/transfers, ambulation, HEP, PT POC, controlled breathing w/activity    Eager, E, VU,DU,NR by  at 9/8/2023 1124    Comment: Reviewed safety/technique w/bed mobility, transfers, ambulation, knee precautions, safety recommendations, controlled breathing techniques, PT POC    Acceptance, E,D, VU,NR by LR at 9/7/2023 1457    Comment: Educated on precautions, weight bearing status, safety with mobility, benefits of mobility, LE HEP, correct sit to supine t/f technique, correct sit<->stand t/f technique, correct gait mechanics, and progression of POC.    Acceptance, E,D,H, VU,NR by LR at 9/7/2023 0836    Comment: Issued/reviewed written/illustrated HEP. Educated on precautions, weight bearing status, safety with mobility, correct supine to sit t/f technique, correct sit<->stand t/f technique, correct gait mechanics, and progression of POC.                                         User Key       Initials Effective Dates Name Provider Type Discipline     02/03/23 -  Reina De La Cruz, PT Physical Therapist PT     07/11/23 -  Keya Carballo, PT Physical Therapist PT     06/01/21 -  Elba Bangura, PT Physical Therapist PT                  PT Recommendation and Plan     Plan of Care Reviewed With: patient  Progress: improving  Outcome Evaluation: Improving mobility. Needs to practice stairs.     Time Calculation:         PT Charges       Row Name 09/09/23 1305 09/09/23 1055          Time Calculation    Start Time 1305  -LS 1055  -LS     PT Received On 09/09/23  -LS 09/09/23  -LS     PT Goal Re-Cert Due Date 09/17/23  -LS 09/17/23  -LS        Timed Charges    23542 - PT Therapeutic Exercise Minutes 10  -LS 15  -LS     79565 - Gait Training Minutes  10  -LS 10  -LS     36253 - PT Therapeutic Activity Minutes 5  -LS --        Total Minutes    Timed Charges  Total Minutes 25  -LS 25  -LS      Total Minutes 25  -LS 25  -LS               User Key  (r) = Recorded By, (t) = Taken By, (c) = Cosigned By      Initials Name Provider Type    Keya Mike, PT Physical Therapist                  Therapy Charges for Today       Code Description Service Date Service Provider Modifiers Qty    78822850320 HC PT THER PROC EA 15 MIN 9/9/2023 Keya Carballo, PT GP 1    78976606897 HC GAIT TRAINING EA 15 MIN 9/9/2023 Keya Carballo, PT GP 1    45530922157 HC PT THER PROC EA 15 MIN 9/9/2023 Keya Carballo, PT GP 2    54246646879 HC GAIT TRAINING EA 15 MIN 9/9/2023 Keya Carballo, PT GP 1    13322467165 HC PT THER SUPP EA 15 MIN 9/9/2023 Keya Carballo, PT GP 1            PT G-Codes  Outcome Measure Options: AM-PAC 6 Clicks Basic Mobility (PT)  AM-PAC 6 Clicks Score (PT): 20  AM-PAC 6 Clicks Score (OT): 18       Keya Carballo, PT  9/9/2023

## 2023-09-09 NOTE — PLAN OF CARE
Problem: Adult Inpatient Plan of Care  Goal: Plan of Care Review  Recent Flowsheet Documentation  Taken 9/9/2023 1305 by Keya Carballo, PT  Progress: improving  Plan of Care Reviewed With: patient  Outcome Evaluation: Improving mobility. Needs to practice stairs.  Taken 9/9/2023 1055 by Keya Carballo, PT  Plan of Care Reviewed With: patient  Outcome Evaluation:   Improved distance with amb   however, pt did have one episode of R knee buckling requiring min A.   Goal Outcome Evaluation:  Plan of Care Reviewed With: patient        Progress: improving  Outcome Evaluation: Improving mobility. Needs to practice stairs.

## 2023-09-09 NOTE — PROGRESS NOTES
[facility]    Acute pain service Inpatient Progress Note    Patient Name: Janet Obrien  :  1960  MRN:  [id number]        Acute Pain  Service Inpatient Progress Note:    Analgesia:Good  LOC: alert and awake  Resp Status: room air  Cardiac: VS stable  Side Effects:None  Catheter Site:clean, dressing intact and dry  Cath type: peripheral nerve cath with ON Q  Infusion rate: Fem/ Add: Basal: 1ml/hr, PIB: 8ml q 8h, PCA: 8ml q 30 min (1mL,8ml, 8ml InfuSystem Pump)  Dosing/Volume: ropivacaine 0.2%  Catheter Plan:Catheter to remain Insitu and Continue catheter infusion rate unchanged  Comments: Pt sitting up in bed, no acute distress, and VS normal on arrival.   Reports some residual pain over shin - improved with bolus; bolus dose again today 7 cc Ropivicaine 0.2%.  Smiling and conversant with no other concerns, continue current infusion.

## 2023-09-09 NOTE — PROGRESS NOTES
"          Orthopaedic Surgery Progress Note      LOS: 0 days   Patient Care Team:  Noe Davenport MD as PCP - General (Internal Medicine)  Bhavna Akins MD as Surgeon (General Surgery)  Dee Kelly MD as Consulting Physician (Endocrinology)    POD 3    Subjective     Interval History:   Patient continues to complain of pain with weightbearing.  Nausea has gotten better.  Was reported yesterday to have an asthma like flareup.    Objective     Vital Signs:  Temp (24hrs), Av.2 °F (37.3 °C), Min:98.2 °F (36.8 °C), Max:100.2 °F (37.9 °C)    /67 (BP Location: Left arm, Patient Position: Lying)   Pulse 79   Temp 98.4 °F (36.9 °C) (Oral)   Resp 17   Ht 167.6 cm (66\")   Wt 99.8 kg (220 lb)   SpO2 97%   BMI 35.51 kg/m²     Labs:  Lab Results (last 24 hours)       Procedure Component Value Units Date/Time    POC Glucose Once [[id number]]  (Normal) Collected: 23    Specimen: Blood Updated: 23     Glucose 84 mg/dL     POC Glucose Once [[id number]]  (Normal) Collected: 23    Specimen: Blood Updated: 23     Glucose 101 mg/dL     POC Glucose Once [[id number]]  (Normal) Collected: 23    Specimen: Blood Updated: 23     Glucose 112 mg/dL     Basic Metabolic Panel [[id number]]  (Abnormal) Collected: 23    Specimen: Blood Updated: 23     Glucose 108 mg/dL      BUN 12 mg/dL      Creatinine 0.67 mg/dL      Sodium 129 mmol/L      Potassium 3.5 mmol/L      Chloride 99 mmol/L      CO2 24.0 mmol/L      Calcium 9.0 mg/dL      BUN/Creatinine Ratio 17.9     Anion Gap 6.0 mmol/L      eGFR 99.0 mL/min/1.73     Narrative:      GFR Normal >60  Chronic Kidney Disease <60  Kidney Failure <15      BNP [[id number]]  (Normal) Collected: 23    Specimen: Blood Updated: 23     proBNP 188.9 pg/mL     Narrative:      Among patients with dyspnea, NT-proBNP is highly sensitive for the detection of acute congestive heart failure. In " addition NT-proBNP of <300 pg/ml effectively rules out acute congestive heart failure with 99% negative predictive value.              Physical Exam:  EHL, FHL, gastroc soleus, and tibialis anterior are intact  Toes are pink and warm  Surgical dressing is in place  Palpable dorsalis pedis pulse  Calf is soft and nontender      Assessment & Plan     Postoperative day number 3 status post right total knee arthroplasty.    Pain Control: Improved    PT and OT     DVT prophylaxis: Eliquis 2.5 twice daily x6 weeks    Discharge planning: Anticipate discharge home tomorrow    Upon discharge, patient will need follow-up with me in 3 weeks' time.  They will need [facility] for physical therapy.  Standard Exofin Fusion dressing care instructions.  Do not remove.  DME per case management.    Admission Status:  I believe this patient meets INPATIENT status due to the need for care which can only be reasonably provided in a hospital setting such as aggressive/expedited ancillary services and/or consultation services, the necessity for IV medications, close physician monitoring and/or the possible need for procedures.  In such, I feel patient’s risk for adverse outcomes and need for care warrant INPATIENT evaluation and predict the patient’s care encounter to likely last beyond 2 midnights.        Dayo Chavez MD  09/09/23  08:12 EDT

## 2023-09-09 NOTE — PLAN OF CARE
Goal Outcome Evaluation:  Plan of Care Reviewed With: patient           Outcome Evaluation: Improved distance with amb; however, pt did have one episode of R knee buckling requiring min A.

## 2023-09-09 NOTE — THERAPY TREATMENT NOTE
Patient Name: Janet Obrien  : 1960    MRN: [id number]                              Today's Date: 2023       Admit Date: 2023    Visit Dx:     ICD-10-CM ICD-9-CM   1. Primary osteoarthritis of right knee  M17.11 715.16     Patient Active Problem List   Diagnosis    Diplopia    Orbital pseudotumor    Myositic orbital pseudotumor    Hypothyroidism    Diabetes mellitus    Age-related nuclear cataract of right eye    Ocular myasthenia    Morbid obesity, unspecified obesity type    Essential hypertension    Precordial pain    SOB (shortness of breath)    Lower abdominal pain    Constipation    Diarrhea    Rectal bleeding    Gastroesophageal reflux disease    Class 3 severe obesity due to excess calories with serious comorbidity and body mass index (BMI) of 40.0 to 44.9 in adult    Anal or rectal pain    Primary osteoarthritis of left knee    S/P TKR (total knee replacement), left    Hyperlipidemia    Primary osteoarthritis of right knee    Obesity    S/P total knee arthroplasty, right     Past Medical History:   Diagnosis Date    Anxiety     Arthritis     Asthma     Back pain     Cancer     SQUAMOUS CELL/ BASAL CELL ON LIP, under the nose as well    Chronic bronchitis     Depression     Diabetes mellitus     type 2    Fibromyalgia     High triglycerides     Hyperlipidemia     Hypertension     Hypothyroidism     Migraine     MVP (mitral valve prolapse)     OA (osteoarthritis)     Ovarian cyst     Peripheral autonomic neuropathy     Pleurisy     Pneumonia     Rheumatism     RLS (restless legs syndrome)     Sleep apnea     Mild form per patient. recommended mouthpiece     Past Surgical History:   Procedure Laterality Date    APPENDECTOMY      CATARACT EXTRACTION W/ INTRAOCULAR LENS IMPLANT Right 2019    Procedure: CATARACT PHACO EXTRACTION WITH INTRAOCULAR LENS IMPLANT RIGHT;  Surgeon: Carl Babb MD;  [facility]: [facility];  Service: Ophthalmology    CATARACT EXTRACTION W/ INTRAOCULAR  "LENS IMPLANT Left 10/07/2019    Procedure: CATARACT PHACO EXTRACTION WITH INTRAOCULAR LENS IMPLANT LEFT;  Surgeon: Carl Babb MD;  Location: [facility] OR;  Service: Ophthalmology     SECTION  , 1997    x 2     DILATATION AND CURETTAGE      HAND SURGERY Right     \"Pinched nerve surgery\"    HAND SURGERY Left     Joint removed secondary to arthritis    HYSTERECTOMY      ''still have one ovary''    KNEE SURGERY Right 2021    arthroscopy and partial medial meniscectomy Dr. Chavez    THYROIDECTOMY  1985    TONSILLECTOMY AND ADENOIDECTOMY  1965    TOTAL KNEE ARTHROPLASTY Left 03/15/2023    Procedure: TOTAL KNEE ARTHROPLASTY -  LEFT;  Surgeon: Dayo Chavez MD;  Location: [facility] OR;  Service: Orthopedics;  Laterality: Left;      General Information       Row Name 23 1305 23 1055       Physical Therapy Time and Intention    Document Type therapy note (daily note)  -LS therapy note (daily note)  -LS    Mode of Treatment physical therapy  -LS physical therapy  -      Row Name 23 1305 23 1055       General Information    Patient Profile Reviewed yes  -LS yes  -LS    Existing Precautions/Restrictions other (see comments);fall  s/p R TKA, WBAT, AC block.  -LS fall  s/p R TKA, WBAT, AC block.  -LS      Row Name 23 1305 23 1055       Cognition    Orientation Status (Cognition) oriented x 4  -LS oriented x 4  -LS              User Key  (r) = Recorded By, (t) = Taken By, (c) = Cosigned By      Initials Name Provider Type    LS Keya Carballo, PT Physical Therapist                   Mobility       Row Name 23 1305 23 1055       Bed Mobility    Bed Mobility -- supine-sit  -LS    Supine-Sit [place] (Bed Mobility) -- minimum assist (75% patient effort)  min A to support RLE  -LS    Sit-Supine [place] (Bed Mobility) modified independence  -LS --    Assistive Device (Bed Mobility) leg ;head of bed elevated  -LS --    Comment, " "(Bed Mobility) pt said she sleeps in a recliner  -LS from flat bed surface without bed rail  -LS      Row Name 09/09/23 1305 09/09/23 1055       Sit-Stand Transfer    Sit-Stand [place] (Transfers) modified independence  -LS standby assist  -LS    Assistive Device (Sit-Stand Transfers) walker, front-wheeled  -LS walker, front-wheeled  -LS    Comment, (Sit-Stand Transfer) used appropriate hand placement without vcs  -LS used appropriate hand placement without vcs  -LS      Row Name 09/09/23 1305 09/09/23 1055       Gait/Stairs (Locomotion)    [place] Level (Gait) contact guard  -LS minimum assist (75% patient effort)  -LS    Assistive Device (Gait) walker, front-wheeled  -LS walker, front-wheeled  -LS    Distance in Feet (Gait) 220  -  -LS    Deviations/Abnormal Patterns (Gait) gait speed decreased;bilateral deviations;stride length decreased  -LS gait speed decreased;bilateral deviations;stride length decreased  -LS    Bilateral Gait Deviations forward flexed posture  -LS forward flexed posture  -LS    [place] Level (Stairs) contact guard  -LS --    Assistive Device (Stairs) walker, front-wheeled  -LS --    Number of Steps (Stairs) 1 step twice  -LS --    Ascending Technique (Stairs) step-to-step  -LS --    Descending Technique (Stairs) step-to-step  -LS --    Comment, (Gait/Stairs) vcs to avoid pushing walker too far ahead. no buckling this afternoon. pt said she had a \"shot\" that helped. vcs for seqeuncing on the step.  -LS one episode of R knee buckling ; min A for balance. pt walked CGA other than that one instance. vcs to avoid pushinvg walker too far ahead. vcs to make sure R knee was extended during stance phase before stepping forward with LLE.  -LS      Row Name 09/09/23 1055          Mobility    Extremity Weight-bearing Status right lower extremity  -LS     Right Lower Extremity (Weight-bearing Status) weight-bearing as tolerated (WBAT)  -LS               User Key  (r) = Recorded By, " (t) = Taken By, (c) = Cosigned By      Initials Name Provider Type    Keya Mike PT Physical Therapist                   Obj/Interventions       Row Name 09/09/23 1055          Motor Skills    Therapeutic Exercise --  -LS       Row Name 09/09/23 1305 09/09/23 1055       Hip (Therapeutic Exercise)    Hip (Therapeutic Exercise) isometric exercises  - isometric exercises  -    Hip Isometrics (Therapeutic Exercise) bilateral;gluteal sets;other (see comments)  15 reps  -LS bilateral;gluteal sets;10 repetitions  -      Row Name 09/09/23 1305 09/09/23 1055       Knee (Therapeutic Exercise)    Knee (Therapeutic Exercise) -- isometric exercises  -LS    Knee AROM (Therapeutic Exercise) bilateral  -LS --  -LS    Knee Isometrics (Therapeutic Exercise) quad sets;other (see comments)  15 reps  -LS bilateral;quad sets;10 repetitions  -LS    Knee Strengthening (Therapeutic Exercise) SAQ (short arc quad);SLR (straight leg raise);heel slides;supine;15 repititions;right  -LS SAQ (short arc quad);LAQ (long arc quad);SLR (straight leg raise);heel slides;10 repetitions;right  with min A  -      Row Name 09/09/23 1305 09/09/23 1055       Ankle (Therapeutic Exercise)    Ankle AROM (Therapeutic Exercise) bilateral;dorsiflexion;plantarflexion;15 repititions  -LS bilateral;dorsiflexion;plantarflexion;10 repetitions  -      Row Name 09/09/23 1305 09/09/23 1055       Balance    Dynamic Standing Balance contact guard  - --    Position/Device Used, Standing Balance supported  -LS walker, front-wheeled  -    Balance Interventions sit to stand;supported;weight shifting activity  - standing;sit to stand;supported;weight shifting activity  -      Row Name 09/09/23 1305          General Lower Extremity Assessment (Range of Motion)    Comment: Lower Extremity ROM R knee ROM -5 to 75 supine.  -               User Key  (r) = Recorded By, (t) = Taken By, (c) = Cosigned By      Initials Name Provider Type    JASPAL Carballo  Keya Montelongo, PT Physical Therapist                   Goals/Plan    No documentation.                  Clinical Impression       Row Name 09/09/23 1305 09/09/23 1055       Pain    Pretreatment Pain Rating 5/10  -LS 6/10  -LS    Posttreatment Pain Rating 6/10  -LS 7/10  -LS    Pain Location - Side/Orientation Right  -LS Right  -LS    Pain Location - knee  -LS knee  -LS    Pain Intervention(s) Repositioned  -LS Repositioned;Cold applied  -LS      Row Name 09/09/23 1305 09/09/23 1055       Plan of Care Review    Plan of Care Reviewed With patient  -LS patient  -LS    Progress improving  -LS --    Outcome Evaluation Improving mobility. Needs to practice stairs.  -LS Improved distance with amb; however, pt did have one episode of R knee buckling requiring min A.  -LS      Row Name 09/09/23 1305 09/09/23 1055       Positioning and Restraints    Pre-Treatment Position sitting in chair/recliner  -LS in bed  -LS    Post Treatment Position bed  -LS chair  -LS    In Bed notified nsg;fowlers;encouraged to call for assist;exit alarm on;call light within reach  -LS --    In Chair -- notified nsg;sitting;call light within reach;encouraged to call for assist;exit alarm on;waffle cushion;legs elevated  -LS              User Key  (r) = Recorded By, (t) = Taken By, (c) = Cosigned By      Initials Name Provider Type    Keya Mike, PT Physical Therapist                   Outcome Measures       Row Name 09/09/23 1305 09/09/23 1055       How much help from another person do you currently need...    Turning from your back to your side while in flat bed without using bedrails? 4  -LS 4  -LS    Moving from lying on back to sitting on the side of a flat bed without bedrails? 3  -LS 3  -LS    Moving to and from a bed to a chair (including a wheelchair)? 3  -LS 3  -LS    Standing up from a chair using your arms (e.g., wheelchair, bedside chair)? 4  -LS 3  -LS    Climbing 3-5 steps with a railing? 3  -LS 3  -LS    To walk in  hospital room? 3  -LS 3  -LS    AM-PAC 6 Clicks Score (PT) 20  -LS 19  -LS    Highest level of mobility 6 --> Walked 10 steps or more  -LS 6 --> Walked 10 steps or more  -      Row Name 09/09/23 1305 09/09/23 1055       Functional Assessment    Outcome Measure Options AM-PAC 6 Clicks Basic Mobility (PT)  -LS AM-PAC 6 Clicks Basic Mobility (PT)  -LS              User Key  (r) = Recorded By, (t) = Taken By, (c) = Cosigned By      Initials Name Provider Type    Keya Mike, PT Physical Therapist                                 Physical Therapy Education       Title: PT OT SLP Therapies (In Progress)       Topic: Physical Therapy (Done)       Point: Mobility training (Done)       Learning Progress Summary             Patient Acceptance, E, VU,NR by  at 9/9/2023 1305    Acceptance, E, VU,NR by  at 9/9/2023 1055    Acceptance, E, VU,DU,NR by  at 9/8/2023 1427    Comment: Reviewed safety/technique w/transfers, ambulation, HEP, PT POC, controlled breathing w/activity    Eager, E, VU,DU,NR by  at 9/8/2023 1124    Comment: Reviewed safety/technique w/bed mobility, transfers, ambulation, knee precautions, safety recommendations, controlled breathing techniques, PT POC    Acceptance, E,D, VU,NR by LR at 9/7/2023 1457    Comment: Educated on precautions, weight bearing status, safety with mobility, benefits of mobility, LE HEP, correct sit to supine t/f technique, correct sit<->stand t/f technique, correct gait mechanics, and progression of POC.    Acceptance, E,D,H, VU,NR by LR at 9/7/2023 0836    Comment: Issued/reviewed written/illustrated HEP. Educated on precautions, weight bearing status, safety with mobility, correct supine to sit t/f technique, correct sit<->stand t/f technique, correct gait mechanics, and progression of POC.                         Point: Home exercise program (Done)       Learning Progress Summary             Patient Acceptance, E, VU,DU,NR by  at 9/8/2023 1427    Comment:  Reviewed safety/technique w/transfers, ambulation, HEP, PT POC, controlled breathing w/activity    Eager, E, VU,DU,NR by SS at 9/8/2023 1124    Comment: Reviewed safety/technique w/bed mobility, transfers, ambulation, knee precautions, safety recommendations, controlled breathing techniques, PT POC    Acceptance, E,D, VU,NR by LR at 9/7/2023 1457    Comment: Educated on precautions, weight bearing status, safety with mobility, benefits of mobility, LE HEP, correct sit to supine t/f technique, correct sit<->stand t/f technique, correct gait mechanics, and progression of POC.    Acceptance, E,D,H, VU,NR by LR at 9/7/2023 0836    Comment: Issued/reviewed written/illustrated HEP. Educated on precautions, weight bearing status, safety with mobility, correct supine to sit t/f technique, correct sit<->stand t/f technique, correct gait mechanics, and progression of POC.                         Point: Body mechanics (Done)       Learning Progress Summary             Patient Acceptance, E, VU,DU,NR by SS at 9/8/2023 1427    Comment: Reviewed safety/technique w/transfers, ambulation, HEP, PT POC, controlled breathing w/activity    Eager, E, VU,DU,NR by SS at 9/8/2023 1124    Comment: Reviewed safety/technique w/bed mobility, transfers, ambulation, knee precautions, safety recommendations, controlled breathing techniques, PT POC    Acceptance, E,D, VU,NR by LR at 9/7/2023 1457    Comment: Educated on precautions, weight bearing status, safety with mobility, benefits of mobility, LE HEP, correct sit to supine t/f technique, correct sit<->stand t/f technique, correct gait mechanics, and progression of POC.    Acceptance, E,D,H, VU,NR by LR at 9/7/2023 0836    Comment: Issued/reviewed written/illustrated HEP. Educated on precautions, weight bearing status, safety with mobility, correct supine to sit t/f technique, correct sit<->stand t/f technique, correct gait mechanics, and progression of POC.                         Point:  Precautions (Done)       Learning Progress Summary             Patient Acceptance, E, VU,DU,NR by  at 9/8/2023 1427    Comment: Reviewed safety/technique w/transfers, ambulation, HEP, PT POC, controlled breathing w/activity    Eager, E, VU,DU,NR by  at 9/8/2023 1124    Comment: Reviewed safety/technique w/bed mobility, transfers, ambulation, knee precautions, safety recommendations, controlled breathing techniques, PT POC    Acceptance, E,D, VU,NR by LR at 9/7/2023 1457    Comment: Educated on precautions, weight bearing status, safety with mobility, benefits of mobility, LE HEP, correct sit to supine t/f technique, correct sit<->stand t/f technique, correct gait mechanics, and progression of POC.    Acceptance, E,D,H, VU,NR by LR at 9/7/2023 0836    Comment: Issued/reviewed written/illustrated HEP. Educated on precautions, weight bearing status, safety with mobility, correct supine to sit t/f technique, correct sit<->stand t/f technique, correct gait mechanics, and progression of POC.                                         User Key       Initials Effective Dates Name Provider Type Discipline     02/03/23 -  Reina De La Cruz, PT Physical Therapist PT     07/11/23 -  Keya Carballo, PT Physical Therapist PT     06/01/21 -  Elba Bangura, PT Physical Therapist PT                  PT Recommendation and Plan     Plan of Care Reviewed With: patient  Progress: improving  Outcome Evaluation: Improving mobility. Needs to practice stairs.     Time Calculation:         PT Charges       Row Name 09/09/23 1305 09/09/23 1055          Time Calculation    Start Time 1305  -LS 1055  -LS     PT Received On 09/09/23  -LS 09/09/23  -LS     PT Goal Re-Cert Due Date 09/17/23  -LS 09/17/23  -LS        Timed Charges    09148 - PT Therapeutic Exercise Minutes 10  -LS 15  -LS     85682 - Gait Training Minutes  10  -LS 10  -LS     01777 - PT Therapeutic Activity Minutes 5  -LS --        Total Minutes    Timed Charges  Total Minutes 25  -LS 25  -LS      Total Minutes 25  -LS 25  -LS               User Key  (r) = Recorded By, (t) = Taken By, (c) = Cosigned By      Initials Name Provider Type    Keya Mike, PT Physical Therapist                  Therapy Charges for Today       Code Description Service Date Service Provider Modifiers Qty    85873515072 HC PT THER PROC EA 15 MIN 9/9/2023 Keya Carballo, PT GP 1    04421108357 HC GAIT TRAINING EA 15 MIN 9/9/2023 Keya Carballo, PT GP 1    18774805730 HC PT THER PROC EA 15 MIN 9/9/2023 Keya Carballo, PT GP 2    25378714172 HC GAIT TRAINING EA 15 MIN 9/9/2023 Keya Carballo, PT GP 1    94717464866 HC PT THER SUPP EA 15 MIN 9/9/2023 Keya Carballo, PT GP 1            PT G-Codes  Outcome Measure Options: AM-PAC 6 Clicks Basic Mobility (PT)  AM-PAC 6 Clicks Score (PT): 20  AM-PAC 6 Clicks Score (OT): 18       Keya Carballo, PT  9/9/2023

## 2023-09-10 ENCOUNTER — HOME HEALTH ADMISSION (OUTPATIENT)
Dept: HOME HEALTH SERVICES | Facility: HOME HEALTHCARE | Age: 63
End: 2023-09-10
Payer: COMMERCIAL

## 2023-09-10 VITALS
RESPIRATION RATE: 18 BRPM | DIASTOLIC BLOOD PRESSURE: 64 MMHG | HEART RATE: 64 BPM | OXYGEN SATURATION: 94 % | HEIGHT: 66 IN | TEMPERATURE: 98 F | SYSTOLIC BLOOD PRESSURE: 116 MMHG | WEIGHT: 220 LBS | BODY MASS INDEX: 35.36 KG/M2

## 2023-09-10 LAB — GLUCOSE BLDC GLUCOMTR-MCNC: 83 MG/DL (ref 70–130)

## 2023-09-10 PROCEDURE — 97530 THERAPEUTIC ACTIVITIES: CPT

## 2023-09-10 PROCEDURE — 99024 POSTOP FOLLOW-UP VISIT: CPT | Performed by: ORTHOPAEDIC SURGERY

## 2023-09-10 PROCEDURE — 82948 REAGENT STRIP/BLOOD GLUCOSE: CPT

## 2023-09-10 RX ORDER — AMOXICILLIN 250 MG
2 CAPSULE ORAL 2 TIMES DAILY
Status: DISCONTINUED | OUTPATIENT
Start: 2023-09-10 | End: 2023-09-10 | Stop reason: HOSPADM

## 2023-09-10 RX ORDER — POLYETHYLENE GLYCOL 3350 17 G/17G
POWDER, FOR SOLUTION ORAL
Status: COMPLETED
Start: 2023-09-10 | End: 2023-09-10

## 2023-09-10 RX ORDER — BISACODYL 5 MG/1
5 TABLET, DELAYED RELEASE ORAL DAILY PRN
Status: DISCONTINUED | OUTPATIENT
Start: 2023-09-10 | End: 2023-09-10 | Stop reason: HOSPADM

## 2023-09-10 RX ORDER — POLYETHYLENE GLYCOL 3350 17 G/17G
17 POWDER, FOR SOLUTION ORAL DAILY PRN
Status: DISCONTINUED | OUTPATIENT
Start: 2023-09-10 | End: 2023-09-10 | Stop reason: HOSPADM

## 2023-09-10 RX ORDER — BISACODYL 10 MG
10 SUPPOSITORY, RECTAL RECTAL DAILY PRN
Status: DISCONTINUED | OUTPATIENT
Start: 2023-09-10 | End: 2023-09-10 | Stop reason: HOSPADM

## 2023-09-10 RX ORDER — OXYCODONE HYDROCHLORIDE 5 MG/1
10 TABLET ORAL EVERY 4 HOURS PRN
Qty: 20 TABLET | Refills: 0 | Status: SHIPPED | OUTPATIENT
Start: 2023-09-10

## 2023-09-10 RX ORDER — DOCUSATE SODIUM 100 MG/1
100 CAPSULE, LIQUID FILLED ORAL 2 TIMES DAILY
Qty: 30 CAPSULE | Refills: 0 | Status: SHIPPED | OUTPATIENT
Start: 2023-09-10

## 2023-09-10 RX ORDER — AMOXICILLIN 250 MG
CAPSULE ORAL
Status: COMPLETED
Start: 2023-09-10 | End: 2023-09-10

## 2023-09-10 RX ADMIN — ACETAMINOPHEN 1000 MG: 500 TABLET ORAL at 05:21

## 2023-09-10 RX ADMIN — LEVOTHYROXINE SODIUM 125 MCG: 125 TABLET ORAL at 05:21

## 2023-09-10 RX ADMIN — Medication 2 TABLET: at 08:20

## 2023-09-10 RX ADMIN — APIXABAN 2.5 MG: 2.5 TABLET, FILM COATED ORAL at 08:19

## 2023-09-10 RX ADMIN — MELOXICAM 15 MG: 15 TABLET ORAL at 08:19

## 2023-09-10 RX ADMIN — Medication 3 ML: at 08:20

## 2023-09-10 RX ADMIN — DULOXETINE HYDROCHLORIDE 60 MG: 60 CAPSULE, DELAYED RELEASE ORAL at 08:19

## 2023-09-10 RX ADMIN — ATORVASTATIN CALCIUM 80 MG: 40 TABLET, FILM COATED ORAL at 08:19

## 2023-09-10 RX ADMIN — OXYCODONE HYDROCHLORIDE 10 MG: 10 TABLET ORAL at 08:19

## 2023-09-10 RX ADMIN — POLYETHYLENE GLYCOL 3350 17 G: 17 POWDER, FOR SOLUTION ORAL at 08:20

## 2023-09-10 RX ADMIN — ACETAMINOPHEN 1000 MG: 500 TABLET ORAL at 11:52

## 2023-09-10 RX ADMIN — OXYCODONE HYDROCHLORIDE 10 MG: 10 TABLET ORAL at 12:18

## 2023-09-10 RX ADMIN — SENNOSIDES AND DOCUSATE SODIUM 2 TABLET: 8.6; 5 TABLET ORAL at 08:20

## 2023-09-10 RX ADMIN — OXYCODONE HYDROCHLORIDE 10 MG: 10 TABLET ORAL at 02:54

## 2023-09-10 NOTE — THERAPY TREATMENT NOTE
Patient Name: Janet Obrien  : 1960    MRN: [id number]                              Today's Date: 9/10/2023       Admit Date: 2023    Visit Dx:     ICD-10-CM ICD-9-CM   1. Primary osteoarthritis of right knee  M17.11 715.16     Patient Active Problem List   Diagnosis    Diplopia    Orbital pseudotumor    Myositic orbital pseudotumor    Hypothyroidism    Diabetes mellitus    Age-related nuclear cataract of right eye    Ocular myasthenia    Morbid obesity, unspecified obesity type    Essential hypertension    Precordial pain    SOB (shortness of breath)    Lower abdominal pain    Constipation    Diarrhea    Rectal bleeding    Gastroesophageal reflux disease    Class 3 severe obesity due to excess calories with serious comorbidity and body mass index (BMI) of 40.0 to 44.9 in adult    Anal or rectal pain    Primary osteoarthritis of left knee    S/P TKR (total knee replacement), left    Hyperlipidemia    Primary osteoarthritis of right knee    Obesity    S/P total knee arthroplasty, right     Past Medical History:   Diagnosis Date    Anxiety     Arthritis     Asthma     Back pain     Cancer     SQUAMOUS CELL/ BASAL CELL ON LIP, under the nose as well    Chronic bronchitis     Depression     Diabetes mellitus     type 2    Fibromyalgia     High triglycerides     Hyperlipidemia     Hypertension     Hypothyroidism     Migraine     MVP (mitral valve prolapse)     OA (osteoarthritis)     Ovarian cyst     Peripheral autonomic neuropathy     Pleurisy     Pneumonia     Rheumatism     RLS (restless legs syndrome)     Sleep apnea     Mild form per patient. recommended mouthpiece     Past Surgical History:   Procedure Laterality Date    APPENDECTOMY      CATARACT EXTRACTION W/ INTRAOCULAR LENS IMPLANT Right 2019    Procedure: CATARACT PHACO EXTRACTION WITH INTRAOCULAR LENS IMPLANT RIGHT;  Surgeon: Carl Babb MD;  Location: [facility];  Service: Ophthalmology    CATARACT EXTRACTION W/ INTRAOCULAR  "LENS IMPLANT Left 10/07/2019    Procedure: CATARACT PHACO EXTRACTION WITH INTRAOCULAR LENS IMPLANT LEFT;  Surgeon: Carl Babb MD;  Location: [facility] OR;  Service: Ophthalmology     SECTION  , 1997    x 2     DILATATION AND CURETTAGE      HAND SURGERY Right     \"Pinched nerve surgery\"    HAND SURGERY Left     Joint removed secondary to arthritis    HYSTERECTOMY      ''still have one ovary''    KNEE SURGERY Right 2021    arthroscopy and partial medial meniscectomy Dr. Chavez    THYROIDECTOMY  1985    TONSILLECTOMY AND ADENOIDECTOMY  1965    TOTAL KNEE ARTHROPLASTY Left 03/15/2023    Procedure: TOTAL KNEE ARTHROPLASTY -  LEFT;  Surgeon: Dayo Chavez MD;  Location: [facility] OR;  Service: Orthopedics;  Laterality: Left;      General Information       Row Name 09/10/23 1043          Physical Therapy Time and Intention    Document Type therapy note (daily note)  -     Mode of Treatment physical therapy  -       Row Name 09/10/23 1043          General Information    Patient Profile Reviewed yes  -LS     Existing Precautions/Restrictions fall  -       Row Name 09/10/23 1043          Cognition    Orientation Status (Cognition) oriented x 4  -LS               User Key  (r) = Recorded By, (t) = Taken By, (c) = Cosigned By      Initials Name Provider Type     Keya Carballo, PT Physical Therapist                   Mobility       Row Name 09/10/23 1043          Bed Mobility    Bed Mobility supine-sit  -LS     Supine-Sit [place] (Bed Mobility) modified independence  -     Assistive Device (Bed Mobility) head of bed elevated;leg   -       Row Name 09/10/23 1043          Sit-Stand Transfer    Sit-Stand [place] (Transfers) modified independence  -     Assistive Device (Sit-Stand Transfers) walker, front-wheeled  -       Row Name 09/10/23 1043          Gait/Stairs (Locomotion)    [place] Level (Gait) standby assist  -     Assistive Device (Gait) " walker, front-wheeled  -LS     Distance in Feet (Gait) 200  -LS     [place] Level (Stairs) stand by assist  -LS     Handrail Location (Stairs) right side (ascending)  -LS     Number of Steps (Stairs) 5 X 2  -LS     Ascending Technique (Stairs) step-to-step  -LS     Descending Technique (Stairs) step-to-step  -LS     Comment, (Gait/Stairs) vcs to avoid pushing walker too far ahead. pt used appropriate technique on stairs without vcs.  -LS       Row Name 09/10/23 1043          Mobility    Extremity Weight-bearing Status right lower extremity  -LS     Right Lower Extremity (Weight-bearing Status) weight-bearing as tolerated (WBAT)  -LS               User Key  (r) = Recorded By, (t) = Taken By, (c) = Cosigned By      Initials Name Provider Type    Keya Mike, PT Physical Therapist                   Obj/Interventions    No documentation.                  Goals/Plan    No documentation.                  Clinical Impression       Row Name 09/10/23 1043          Pain    Pretreatment Pain Rating 4/10  -LS     Posttreatment Pain Rating 6/10  -LS     Pain Location - Side/Orientation Left  -LS     Pain Location - knee  -LS     Pain Intervention(s) Repositioned  -LS       Row Name 09/10/23 1043          Plan of Care Review    Plan of Care Reviewed With patient  -LS     Progress improving  -LS     Outcome Evaluation Less assist needed for mobility. Pt said she gave her RW to her mother and needs one; CM notified.  -       Row Name 09/10/23 1043          Positioning and Restraints    Pre-Treatment Position in bed  -LS     Post Treatment Position chair  -LS     In Chair notified nsg;sitting;call light within reach;encouraged to call for assist;exit alarm on;waffle cushion;legs elevated  -LS               User Key  (r) = Recorded By, (t) = Taken By, (c) = Cosigned By      Initials Name Provider Type    Keya Mike, PT Physical Therapist                   Outcome Measures    No documentation.                                 Physical Therapy Education       Title: PT OT SLP Therapies (In Progress)       Topic: Physical Therapy (Done)       Point: Mobility training (Done)       Learning Progress Summary             Patient Acceptance, E, VU,NR by LS at 9/10/2023 1043    Acceptance, E, VU,NR by LS at 9/9/2023 1305    Acceptance, E, VU,NR by LS at 9/9/2023 1055    Acceptance, E, VU,DU,NR by SS at 9/8/2023 1427    Comment: Reviewed safety/technique w/transfers, ambulation, HEP, PT POC, controlled breathing w/activity    Eager, E, VU,DU,NR by SS at 9/8/2023 1124    Comment: Reviewed safety/technique w/bed mobility, transfers, ambulation, knee precautions, safety recommendations, controlled breathing techniques, PT POC    Acceptance, E,D, VU,NR by LR at 9/7/2023 1457    Comment: Educated on precautions, weight bearing status, safety with mobility, benefits of mobility, LE HEP, correct sit to supine t/f technique, correct sit<->stand t/f technique, correct gait mechanics, and progression of POC.    Acceptance, E,D,H, VU,NR by LR at 9/7/2023 0836    Comment: Issued/reviewed written/illustrated HEP. Educated on precautions, weight bearing status, safety with mobility, correct supine to sit t/f technique, correct sit<->stand t/f technique, correct gait mechanics, and progression of POC.                         Point: Home exercise program (Done)       Learning Progress Summary             Patient Acceptance, E, VU,DU,NR by SS at 9/8/2023 1427    Comment: Reviewed safety/technique w/transfers, ambulation, HEP, PT POC, controlled breathing w/activity    Eager, E, VU,DU,NR by SS at 9/8/2023 1124    Comment: Reviewed safety/technique w/bed mobility, transfers, ambulation, knee precautions, safety recommendations, controlled breathing techniques, PT POC    Acceptance, E,D, VU,NR by LR at 9/7/2023 1457    Comment: Educated on precautions, weight bearing status, safety with mobility, benefits of mobility, LE HEP, correct sit to  supine t/f technique, correct sit<->stand t/f technique, correct gait mechanics, and progression of POC.    Acceptance, E,D,H, VU,NR by LR at 9/7/2023 0836    Comment: Issued/reviewed written/illustrated HEP. Educated on precautions, weight bearing status, safety with mobility, correct supine to sit t/f technique, correct sit<->stand t/f technique, correct gait mechanics, and progression of POC.                         Point: Body mechanics (Done)       Learning Progress Summary             Patient Acceptance, E, VU,DU,NR by SS at 9/8/2023 1427    Comment: Reviewed safety/technique w/transfers, ambulation, HEP, PT POC, controlled breathing w/activity    Eager, E, VU,DU,NR by SS at 9/8/2023 1124    Comment: Reviewed safety/technique w/bed mobility, transfers, ambulation, knee precautions, safety recommendations, controlled breathing techniques, PT POC    Acceptance, E,D, VU,NR by LR at 9/7/2023 1457    Comment: Educated on precautions, weight bearing status, safety with mobility, benefits of mobility, LE HEP, correct sit to supine t/f technique, correct sit<->stand t/f technique, correct gait mechanics, and progression of POC.    Acceptance, E,D,H, VU,NR by LR at 9/7/2023 0836    Comment: Issued/reviewed written/illustrated HEP. Educated on precautions, weight bearing status, safety with mobility, correct supine to sit t/f technique, correct sit<->stand t/f technique, correct gait mechanics, and progression of POC.                         Point: Precautions (Done)       Learning Progress Summary             Patient Acceptance, E, VU,DU,NR by SS at 9/8/2023 1427    Comment: Reviewed safety/technique w/transfers, ambulation, HEP, PT POC, controlled breathing w/activity    Eager, E, VU,DU,NR by SS at 9/8/2023 1124    Comment: Reviewed safety/technique w/bed mobility, transfers, ambulation, knee precautions, safety recommendations, controlled breathing techniques, PT POC    Acceptance, E,D, VU,NR by LR at 9/7/2023 1457     Comment: Educated on precautions, weight bearing status, safety with mobility, benefits of mobility, LE HEP, correct sit to supine t/f technique, correct sit<->stand t/f technique, correct gait mechanics, and progression of POC.    Acceptance, E,D,H, VU,NR by LR at 9/7/2023 0836    Comment: Issued/reviewed written/illustrated HEP. Educated on precautions, weight bearing status, safety with mobility, correct supine to sit t/f technique, correct sit<->stand t/f technique, correct gait mechanics, and progression of POC.                                         User Key       Initials Effective Dates Name Provider Type Discipline    LR 02/03/23 -  Reina De La Cruz, PT Physical Therapist PT    LS 07/11/23 -  Keya Carballo, PT Physical Therapist PT    SS 06/01/21 -  Elba Bangura, PT Physical Therapist PT                  PT Recommendation and Plan     Plan of Care Reviewed With: patient  Progress: improving  Outcome Evaluation: Less assist needed for mobility. Pt said she gave her RW to her mother and needs one; CM notified.     Time Calculation:         PT Charges       Row Name 09/10/23 1043             Time Calculation    Start Time 1043  -LS         Time Calculation- PT    Total Timed Code Minutes- PT 14 minute(s)  -LS         Timed Charges    80901 - Gait Training Minutes  3  -LS      35655 - PT Therapeutic Activity Minutes 11  -LS         Total Minutes    Timed Charges Total Minutes 14  -LS       Total Minutes 14  -LS                User Key  (r) = Recorded By, (t) = Taken By, (c) = Cosigned By      Initials Name Provider Type    Keya Mike, PT Physical Therapist                  Therapy Charges for Today       Code Description Service Date Service Provider Modifiers Qty    53025460138 HC PT THER PROC EA 15 MIN 9/9/2023 Keya Carballo, PT GP 1    09614958868 HC GAIT TRAINING EA 15 MIN 9/9/2023 Keya Carballo, PT GP 1    01952003205 HC PT THER PROC EA 15 MIN 9/9/2023 Haris  Keya Montelongo, PT GP 2    60474040632 HC GAIT TRAINING EA 15 MIN 9/9/2023 Keya Carballo, PT GP 1    40236673983 HC PT THER SUPP EA 15 MIN 9/9/2023 Keya Carballo, PT GP 1    25540161709 HC PT THERAPEUTIC ACT EA 15 MIN 9/10/2023 Keya Carballo, PT GP 1            PT G-Codes  Outcome Measure Options: AM-PAC 6 Clicks Basic Mobility (PT)  AM-PAC 6 Clicks Score (PT): 20  AM-PAC 6 Clicks Score (OT): 18       Keya Carballo, PT  9/10/2023

## 2023-09-10 NOTE — PLAN OF CARE
Goal Outcome Evaluation:  Plan of Care Reviewed With: patient        Progress: improving  Outcome Evaluation: Less assist needed for mobility. Pt said she gave her RW to her mother and needs one; CM notified.

## 2023-09-10 NOTE — DISCHARGE SUMMARY
Patient Name: Janet Obrien  MRN: [id number]  : 1960  DOS: 9/10/2023    Attending: Dayo Chavez,*    Primary Care Provider: Noe Davenport MD    Date of Admission:.2023 10:40 AM    Date of Discharge:  9/10/2023    Discharge Diagnosis:   S/P total knee arthroplasty, right    Hypothyroidism    Diabetes mellitus    Essential hypertension    Hyperlipidemia    Primary osteoarthritis of right knee    Obesity      Hospital Course    At admit:  Patient is a pleasant 63 y.o. female presented for scheduled surgery by .     She has history of osteoarthritis and right knee that has failed conservative management, she opted to proceed with surgery.     When seen preoperatively she is doing well, reviewed with patient her past medical history and home medications.  Motivated to have her surgery done and get started with her rehab.     Subsequent notes indicate she later underwent right total knee arthroplasty by Dr. Palacios.  Surgery was done under spinal anesthesia and was tolerated well, adductor canal nerve block catheter was placed by acute pain service.     After admit:    Patient was provided pain medications as needed for pain control, along with adductor canal nerve block infusion of Ropivacaine.      Adjustments were made to pain medications to optimize postop pain management. Risks and benefits of opiate medications discussed with patient. ROD report was reviewed.    Shd was seen by PT and OT and has progressed slowly over her stay.    She used an IS for atelectasis prophylaxis and Eliquis along with mechanicals for DVT prophylaxis.    Home medications were resumed as appropriate, and labs were monitored and remained fairly stable.     With the progress she has made,  is ready for DC home today.      She will have an Infupump ( instructed on it during this admit).    Discussed with patient regarding plan and she shows understanding and agreement.    Patient will  "have PT following discharge.        Procedures Performed  Procedure(s):  TOTAL KNEE ARTHROPLASTY WITH BERNICE ROBOT - RIGHT       Pertinent Test Results:    I reviewed the patient's new clinical results.   Results from last 7 days   Lab Units 23  0528 23  1147   WBC 10*3/mm3 9.66 7.63   HEMOGLOBIN g/dL 10.2* 12.2   HEMATOCRIT % 30.9* 37.7   PLATELETS 10*3/mm3 221 266     Results from last 7 days   Lab Units 23  0805 23  0528 23  1147   SODIUM mmol/L 129* 134* 137   POTASSIUM mmol/L 3.5 4.1 3.7   CHLORIDE mmol/L 99 102 103   CO2 mmol/L 24.0 27.0 27.0   BUN mg/dL 12 10 13   CREATININE mg/dL 0.67 0.65 0.68   CALCIUM mg/dL 9.0 9.0 9.7   GLUCOSE mg/dL 108* 117* 84     I reviewed the patient's new imaging including images and reports.      Physical therapy       Goal Outcome Evaluation:  Plan of Care Reviewed With: patient  Progress: improving  Outcome Evaluation: Less assist needed for mobility. Pt said she gave her RW to her mother and needs one; CM notified.           Discharge Assessment:       Visit Vitals  /64 (BP Location: Left arm, Patient Position: Lying)   Pulse 64   Temp 98 °F (36.7 °C) (Oral)   Resp 18   Ht 167.6 cm (66\")   Wt 99.8 kg (220 lb)   SpO2 94%   BMI 35.51 kg/m²     Temp (24hrs), Av.2 °F (36.8 °C), Min:98 °F (36.7 °C), Max:98.8 °F (37.1 °C)      General Appearance:    Alert, cooperative, in no acute distress   Lungs:     Clear to auscultation,respirations regular, even and                   unlabored    Heart:    Regular rhythm and normal rate, normal S1 and S2   Abdomen:     Normal bowel sounds, no masses, no organomegaly, soft        non-tender, non-distended, no guarding, no rebound                 tenderness   Extremities:   CDI dressing surgical knee .     Pulses:   Pulses palpable and equal bilaterally   Skin:   No bleeding, bruising or rash   Neurologic:   Cranial nerves 2 - 12 grossly intact, sensation intact, Flexion and dorsiflexion intact bilateral " feet.         Discharge Disposition: home.     Discharge Medications     Discharge Medications        New Medications        Instructions Start Date   apixaban 2.5 MG tablet tablet  Commonly known as: ELIQUIS   2.5 mg, Oral, Every 12 Hours Scheduled   Start Date: September 11, 2023     docusate sodium 100 MG capsule  Commonly known as: Colace   100 mg, Oral, 2 Times Daily      oxyCODONE 5 MG immediate release tablet  Commonly known as: Roxicodone   10 mg, Oral, Every 4 Hours PRN             Continue These Medications        Instructions Start Date   atorvastatin 80 MG tablet  Commonly known as: LIPITOR   80 mg, Oral, Daily      celecoxib 200 MG capsule  Commonly known as: CeleBREX   200 mg, Oral, 2 Times Daily      DULoxetine 60 MG capsule  Commonly known as: CYMBALTA   60 mg, Oral, Daily      hydroCHLOROthiazide 25 MG tablet  Commonly known as: HYDRODIURIL   1 tablet, Oral, Daily      montelukast 10 MG tablet  Commonly known as: SINGULAIR   10 mg, Oral, Nightly      naloxone 4 MG/0.1ML nasal spray  Commonly known as: NARCAN   No dose, route, or frequency recorded.      Ozempic (1 MG/DOSE) 4 MG/3ML solution pen-injector  Generic drug: Semaglutide (1 MG/DOSE)   Weekly, THURSDAY      Synthroid 150 MCG tablet  Generic drug: levothyroxine   1 tablet, Oral, Daily      TiZANidine 2 MG capsule  Commonly known as: ZANAFLEX   2 mg, Oral, 3 Times Daily PRN      Ventolin  (90 Base) MCG/ACT inhaler  Generic drug: albuterol sulfate HFA   2 puffs, Inhalation, Every 4 Hours PRN      vitamin D3 125 MCG (5000 UT) tablet tablet   5,000 Units, Oral, Daily             Stop These Medications      meclizine 25 MG tablet  Commonly known as: ANTIVERT     oxyCODONE-acetaminophen  MG per tablet  Commonly known as: PERCOCET              Discharge Diet: resume prior.     Activity at Discharge: Weight bearing as tolerated     Future Appointments   Date Time Provider Department Center   9/28/2023 10:10 AM Dayo Chavez MD  [facility]   11/3/2023 12:30 PM [facility] 1 [facility]   11/3/2023  2:30 PM [facility] US 2  [facility]         Dragon disclaimer:  Part of this encounter note is an electronic transcription/translation of spoken language to printed text. The electronic translation of spoken language may permit erroneous, or at times, nonsensical words or phrases to be inadvertently transcribed; Although I have reviewed the note for such errors, some may still exist.       Fito Tang MD  09/10/23  11:38 EDT

## 2023-09-10 NOTE — PROGRESS NOTES
"          Orthopaedic Surgery Progress Note      LOS: 0 days   Patient Care Team:  Noe Davenport MD as PCP - General (Internal Medicine)  Bhavna Akins MD as Surgeon (General Surgery)  Dee Kelly MD as Consulting Physician (Endocrinology)    POD 3    Subjective     Interval History:   Patient expresses desire to go home.  However having difficulty with pain control and weightbearing.    Objective     Vital Signs:  Temp (24hrs), Av.4 °F (36.9 °C), Min:98 °F (36.7 °C), Max:98.8 °F (37.1 °C)    /60 (BP Location: Left arm, Patient Position: Lying)   Pulse 73   Temp 98.1 °F (36.7 °C) (Oral)   Resp 18   Ht 167.6 cm (66\")   Wt 99.8 kg (220 lb)   SpO2 95%   BMI 35.51 kg/m²     Labs:  Lab Results (last 24 hours)       Procedure Component Value Units Date/Time    POC Glucose Once [[id number]]  (Normal) Collected: 09/10/23 0737    Specimen: Blood Updated: 09/10/23 0749     Glucose 83 mg/dL     POC Glucose Once [[id number]]  (Normal) Collected: 23 2039    Specimen: Blood Updated: 23 2042     Glucose 102 mg/dL     POC Glucose Once [[id number]]  (Normal) Collected: 23 1558    Specimen: Blood Updated: 23 1559     Glucose 106 mg/dL     POC Glucose Once [[id number]]  (Normal) Collected: 23 1135    Specimen: Blood Updated: 23 1140     Glucose 100 mg/dL             Physical Exam:  EHL, FHL, gastroc soleus, and tibialis anterior are intact  Toes are pink and warm  Surgical dressing is in place  Palpable dorsalis pedis pulse  Calf is soft and nontender      Assessment & Plan     Postoperative day number 4 status post right total knee arthroplasty.    Pain Control: Improved    PT and OT     DVT prophylaxis: Eliquis 2.5 twice daily x6 weeks    Discharge planning: Anticipate discharge home today if pain control improved and passes therapy    Upon discharge, patient will need follow-up with Dr. Chavez in 3 weeks' time.  They will need [facility] for physical therapy.  " Standard Exofin Fusion dressing care instructions.  Do not remove.  DME per case management.    Admission Status:  I believe this patient meets INPATIENT status due to the need for care which can only be reasonably provided in a hospital setting such as aggressive/expedited ancillary services and/or consultation services, the necessity for IV medications, close physician monitoring and/or the possible need for procedures.  In such, I feel patient’s risk for adverse outcomes and need for care warrant INPATIENT evaluation and predict the patient’s care encounter to likely last beyond 2 midnights.        Nathaniel Chi MD  09/10/23  09:11 EDT

## 2023-09-11 ENCOUNTER — HOME CARE VISIT (OUTPATIENT)
Dept: HOME HEALTH SERVICES | Facility: HOME HEALTHCARE | Age: 63
End: 2023-09-11
Payer: COMMERCIAL

## 2023-09-11 VITALS
DIASTOLIC BLOOD PRESSURE: 53 MMHG | TEMPERATURE: 98.5 F | RESPIRATION RATE: 16 BRPM | HEART RATE: 76 BPM | OXYGEN SATURATION: 98 % | SYSTOLIC BLOOD PRESSURE: 126 MMHG

## 2023-09-11 PROCEDURE — G0151 HHCP-SERV OF PT,EA 15 MIN: HCPCS

## 2023-09-11 NOTE — HOME HEALTH
"SOC Note: Ms. Obrien admitted to  PT on 9/11 s/p TKA. Pt very tearful due to pain during evaluation. States \"this one has been so much worse than the L knee.\" Pt also reports her son was supposed to come and help her but he is not able to now and that her mom (who is on hospice) moved in since surgery and is dependent on Ms. Obrien to take care of her.     Home Health ordered for: disciplines PT    Reason for Hosp/Primary Dx/Co-morbidities: s/p TKA on 9/6    Focus of Care: to improve knee ROM, strength, gait and overall safety/independence with functional mobility s/p TKA    Patient's goal(s):\"to reduce pain and have successful rehab\"    Current Functional status/mobility/DME: currently has walker for household mobility; does have bath chair    HB status/Living Arrangements: lives at home alone but mother recently began staying with her (mother is on hospice and dependent for all care); multiple steps to enter/exit home    Skin Integrity/wound status: wound covered with dressing    Code Status: full    Fall Risk/Safety concerns: high fall risk; cluttered home     Medication issues/Concerns: on high risk meds; educated during SOC on meds    Additional Problems/Concerns: Pt reports significant pain from surgery; educated on pain management including positioning, ice and meds; complicated living situation with mother and no assistance    SDOH Barriers (i.e. caregiver concerns, social isolation, transportation, food insecurity, environment, income etc.)/Need for MSW: No MSW at agency at this time"

## 2023-09-13 ENCOUNTER — HOME CARE VISIT (OUTPATIENT)
Dept: HOME HEALTH SERVICES | Facility: HOME HEALTHCARE | Age: 63
End: 2023-09-13
Payer: COMMERCIAL

## 2023-09-13 VITALS
SYSTOLIC BLOOD PRESSURE: 128 MMHG | TEMPERATURE: 97.6 F | DIASTOLIC BLOOD PRESSURE: 60 MMHG | HEART RATE: 78 BPM | OXYGEN SATURATION: 97 % | RESPIRATION RATE: 16 BRPM

## 2023-09-13 PROCEDURE — G0157 HHC PT ASSISTANT EA 15: HCPCS

## 2023-09-13 NOTE — HOME HEALTH
"Routine Visit Note: Let into home by patient. Patient first seen sitting in recliner no apparent signs of distress. Patient states that she is feeling \"a lot of pain\" and reports regular compliance with her HEP as prescribed. Patient visably anxious throughout treatment session.     Skill/education provided: Instructed gait training, therapeutic exercise, and ROM exercise today. Educated patient on HEP, pain managment strategies, fall prevention, and incision care/post surgical care.      Patient/caregiver response: Patient complained frequently of pain in knee today, but was able to complete all exercises effectively / was very anxious and sensitive to movement due to knee pain. Patient verbalizes understanding of all provided education today and appears on track to meet her goals / exhibits improvement overall.     Plan for next visit: Patient would benefit from continued skilled PT to address deficits in LE strength, balance, ROM, gait, and to improve overall functional mobility / reduce fall risk and increase level of independence with ADL's. Progress with gait training and exercises as tolerated next visit. Review HEP with patient. Continue to educate patient on proper post surgical care."

## 2023-09-20 ENCOUNTER — HOME CARE VISIT (OUTPATIENT)
Dept: HOME HEALTH SERVICES | Facility: HOME HEALTHCARE | Age: 63
End: 2023-09-20
Payer: COMMERCIAL

## 2023-09-20 VITALS
TEMPERATURE: 97.8 F | DIASTOLIC BLOOD PRESSURE: 60 MMHG | OXYGEN SATURATION: 98 % | HEART RATE: 91 BPM | RESPIRATION RATE: 16 BRPM | SYSTOLIC BLOOD PRESSURE: 126 MMHG

## 2023-09-20 PROCEDURE — G0151 HHCP-SERV OF PT,EA 15 MIN: HCPCS

## 2023-09-20 NOTE — HOME HEALTH
"Routine Visit Note: Greeted at door by patient, who was first seen ambulating with rolling walker, showing no apparent signs of distress. Patient states that she is feeling \"a little better, but I have a lot of pain\" and reports regular compliance with her HEP as prescribed.     Skill/education provided: Instructed gait training, therapeutic exercise, and ROM exercise today. Educated patient on HEP, medications, fall prevention, medications, and post-surgical care.     Patient/caregiver response: Patient tolerated all treatment fairly well today, but frequently complained of pain in R knee throughout treatment session. Patient verbalizes understanding of all provided education today and appears on track to meet her goals / exhibits improvement overall today with ROM and strength despite complaints of pain. AROM R knee: flexion 95 degrees, full extension. Patient frequently completes exercises too quickly/aggressively / strongly encouraged patient to low pace of exercise and emphasize control of RLE.    Plan for next visit: Patient would benefit from continued skilled PT to address deficits in LE strength, ROM,  gait, and to improve overall functional mobility / reduce fall risk and increase level of independence with ADL's. Progress with gait training and exercises as tolerated next visit. Review HEP with patient. Continue to educate patient on post-surgical care / safety strategies."

## 2023-09-22 ENCOUNTER — HOME CARE VISIT (OUTPATIENT)
Dept: HOME HEALTH SERVICES | Facility: HOME HEALTHCARE | Age: 63
End: 2023-09-22
Payer: COMMERCIAL

## 2023-09-22 VITALS — RESPIRATION RATE: 16 BRPM | SYSTOLIC BLOOD PRESSURE: 131 MMHG | DIASTOLIC BLOOD PRESSURE: 68 MMHG | TEMPERATURE: 98.6 F

## 2023-09-22 PROCEDURE — G0157 HHC PT ASSISTANT EA 15: HCPCS

## 2023-09-22 NOTE — HOME HEALTH
"Routine Visit Note: Greeted at door by patient, who was first seen ambulating with rolling walker, showing no apparent signs of distress. Patient states that she is feeling \"a lot of pain because I am taking less of my pain pills \" /reports regular compliance with her HEP as prescribed.     Skill/education provided: Instructed gait training, therapeutic exercise, and ROM exercise today. Educated patient on HEP, fall prevention, and post surgical care.     Patient/caregiver response: Patient tolerated all treatment well today, but continues to complain of knee pain. Patient verbalizes understanding of all provided education today and appears onm track to meether goals / exhibits improvement overall today despite complaints of pain / ROM - knee flexion 96 degrees, full extension. Requires occasional seated ret breaks due to knee pain.     Plan for next visit: Patient would benefit from continued skilled PT to address deficits in BLE strength, ROM, gait, overall functional mobility / to facilitate post-surgical healing. Continue to educate on post surgical care."

## 2023-09-26 ENCOUNTER — HOME CARE VISIT (OUTPATIENT)
Dept: HOME HEALTH SERVICES | Facility: HOME HEALTHCARE | Age: 63
End: 2023-09-26
Payer: COMMERCIAL

## 2023-09-26 PROCEDURE — G0157 HHC PT ASSISTANT EA 15: HCPCS

## 2023-09-27 VITALS
SYSTOLIC BLOOD PRESSURE: 148 MMHG | DIASTOLIC BLOOD PRESSURE: 84 MMHG | RESPIRATION RATE: 16 BRPM | TEMPERATURE: 97.8 F | HEART RATE: 78 BPM | OXYGEN SATURATION: 96 %

## 2023-09-27 NOTE — HOME HEALTH
"Routine Visit Note:Let into home by patient, who was first seen ambulating in kitchen with no AD, showing no apparent signs of distress. Patient states that she continues to struggle with knee pain and states that she doesn't have enough pain pills / states that she has difficulty sleeping at night. Reports improvement overall with knee ROM and strength. Patient states that she \"walked up and down the stairs a lot yesterday\".    Skill/education provided: Insructed gait training and therapeutic exercises today. Educated patient on pain management strategies, fall prevention, and post surgical care.     Patient/caregiver response: Patient toelrated all treatment fairly well, but frequently complained of knee pain with activity. Patient verbalizes understanding of all provided education today and appears on track to meet her goals/exhibits improvement despite her continued pain.     Plan for next visit: Patient would benefit from conitnued skilled PT to address deficits in BLE strength, ROM, gait, and to improve overall functional mobility / facilitate post surgical healing. Progress as tolerated next visit and follow up regarding pain."

## 2023-09-28 ENCOUNTER — OFFICE VISIT (OUTPATIENT)
Dept: ORTHOPEDIC SURGERY | Facility: CLINIC | Age: 63
End: 2023-09-28
Payer: COMMERCIAL

## 2023-09-28 VITALS — HEIGHT: 66 IN | BODY MASS INDEX: 35.36 KG/M2 | WEIGHT: 220 LBS | TEMPERATURE: 97.6 F

## 2023-09-28 DIAGNOSIS — Z96.651 STATUS POST TOTAL RIGHT KNEE REPLACEMENT: Primary | ICD-10-CM

## 2023-09-28 PROCEDURE — 99024 POSTOP FOLLOW-UP VISIT: CPT | Performed by: ORTHOPAEDIC SURGERY

## 2023-09-28 RX ORDER — TIZANIDINE 4 MG/1
4 TABLET ORAL
COMMUNITY
Start: 2023-09-22

## 2023-09-28 RX ORDER — NITROFURANTOIN 25; 75 MG/1; MG/1
1 CAPSULE ORAL EVERY 12 HOURS SCHEDULED
COMMUNITY
Start: 2023-09-22

## 2023-09-28 RX ORDER — OXYCODONE HYDROCHLORIDE 5 MG/1
5 TABLET ORAL EVERY 6 HOURS PRN
Qty: 30 TABLET | Refills: 0 | Status: SHIPPED | OUTPATIENT
Start: 2023-09-28

## 2023-09-28 RX ORDER — HYDROCODONE BITARTRATE AND ACETAMINOPHEN 10; 325 MG/1; MG/1
TABLET ORAL
COMMUNITY
Start: 2023-09-20

## 2023-09-28 RX ORDER — SENNOSIDES 8.6 MG
650 CAPSULE ORAL EVERY 8 HOURS
Qty: 90 TABLET | Refills: 0 | Status: SHIPPED | OUTPATIENT
Start: 2023-09-28

## 2023-09-29 ENCOUNTER — HOME CARE VISIT (OUTPATIENT)
Dept: HOME HEALTH SERVICES | Facility: HOME HEALTHCARE | Age: 63
End: 2023-09-29
Payer: COMMERCIAL

## 2023-09-29 PROCEDURE — G0151 HHCP-SERV OF PT,EA 15 MIN: HCPCS

## 2023-10-02 VITALS
SYSTOLIC BLOOD PRESSURE: 138 MMHG | DIASTOLIC BLOOD PRESSURE: 82 MMHG | OXYGEN SATURATION: 97 % | TEMPERATURE: 97.8 F | RESPIRATION RATE: 16 BRPM

## 2023-10-02 NOTE — HOME HEALTH
"Routine Visit Note: Let into home by patient, who was first ambulating with rolling walker, showing no apparent signs of distress. Patient states that she is feeling \"a little better \" and mentions that her doctor prescribed her more pain pills. Patient reports regular compliance with her HEP as prescribed.     Skill/education provided: Instructed gait training, therapeutic exercise, and balance training today. Educated patient on HEP and reviewed/progressed HEP.     Patient/caregiver response: Patient tolerated all treatment well today and verbalizes understanding of all provided education today / appears on track to meet his/her goals and exhibits improvement overall today.     Plan for next visit: Patient would benefit from continued skilled PT to address deficits in LE strength, ROM, gait, and to improve overall functional mobility / facilitate post surgical healing and increase level of independence with ADL's. Progress with gait training and exercises as tolerated next visit. Review HEP with patient."

## 2023-10-03 ENCOUNTER — HOME CARE VISIT (OUTPATIENT)
Dept: HOME HEALTH SERVICES | Facility: HOME HEALTHCARE | Age: 63
End: 2023-10-03
Payer: COMMERCIAL

## 2023-10-03 PROCEDURE — G0151 HHCP-SERV OF PT,EA 15 MIN: HCPCS

## 2023-10-04 NOTE — HOME HEALTH
"Routine Visit Note: Greeted at door by patient, who was first seen ambulating with no AD, showing no apparent signs of distress. Patient states that she is feeling \"really sore, I did a lot of running around yesterday\" and mentions that she has to do \"a lot\" to take care of her mother / states that she \"can't take my pain pills enough\". Patient reports regular compliance with her HEP as prescribed.     Skill/education provided: Instructed gait training and therapeutic exercise. Educated patient on HEP, pain managment, and fall prevention strategies.      Patient/caregiver response: Patient tolerated all treatment well today, but continues to complain of knee pain. Patient verbalizes understanding of all provided education today and appears on track to meet her goals / exhibits improvement overall today. ROM and strength/control not indicitive of reported pain levels.     Plan for next visit: Patient would benefit from continued skilled PT to address deficits in LE strength, ROM, gait, and to improve overall functional mobility next visit. Review HEP with patient and continue to educate on pain managment strategies.                              really sore due to running around a lot yesterda"

## 2023-10-06 ENCOUNTER — HOME CARE VISIT (OUTPATIENT)
Dept: HOME HEALTH SERVICES | Facility: HOME HEALTHCARE | Age: 63
End: 2023-10-06
Payer: COMMERCIAL

## 2023-10-06 VITALS
HEART RATE: 82 BPM | SYSTOLIC BLOOD PRESSURE: 144 MMHG | RESPIRATION RATE: 16 BRPM | OXYGEN SATURATION: 98 % | DIASTOLIC BLOOD PRESSURE: 84 MMHG | TEMPERATURE: 97.8 F

## 2023-10-06 PROCEDURE — G0151 HHCP-SERV OF PT,EA 15 MIN: HCPCS

## 2023-10-10 ENCOUNTER — HOME CARE VISIT (OUTPATIENT)
Dept: HOME HEALTH SERVICES | Facility: HOME HEALTHCARE | Age: 63
End: 2023-10-10
Payer: COMMERCIAL

## 2023-10-10 VITALS
SYSTOLIC BLOOD PRESSURE: 142 MMHG | RESPIRATION RATE: 16 BRPM | DIASTOLIC BLOOD PRESSURE: 84 MMHG | OXYGEN SATURATION: 98 % | HEART RATE: 81 BPM | TEMPERATURE: 97.5 F

## 2023-10-10 PROCEDURE — G0157 HHC PT ASSISTANT EA 15: HCPCS

## 2023-10-10 NOTE — HOME HEALTH
"Routine Visit Note: Let into home by patient, who was first seen sitting on couch with no apparent signs of distress. Patient states that she is feeling \"okay\", but mentions that she is not sleeping well at night due to knee pain / states that \"my pain pills aren't doing anything\". Patient reports regular compliance with her HEP as prescribed.     Skill/education provided: Instructed gait training, therapeutic exercise, and ROM exercise today. Educated patient on HEP, incision managment, and post surgical care.     Patient/caregiver response: Patient tolerated all treatment fairly well today, but complained of knee pain with exertion / mentions symptoms improved following seated rest break. Patient verbalizes understanding of all provided education today and appears on track to meet her goals / exhibits improvement overall today despite reports of persitent pain.    Plan for next visit: Patient would benefit from continued skilled PT to address deficits in LE strength, ROM and to improve overall functional mobility / to facilitate post surgical healing. Scheduled for re-assessment with supervising PT next visit. Progress as tolerated."

## 2023-10-11 ENCOUNTER — HOME CARE VISIT (OUTPATIENT)
Dept: HOME HEALTH SERVICES | Facility: HOME HEALTHCARE | Age: 63
End: 2023-10-11
Payer: COMMERCIAL

## 2023-10-11 VITALS
DIASTOLIC BLOOD PRESSURE: 43 MMHG | OXYGEN SATURATION: 97 % | HEART RATE: 93 BPM | RESPIRATION RATE: 16 BRPM | SYSTOLIC BLOOD PRESSURE: 146 MMHG

## 2023-10-11 PROCEDURE — G0159 HHC PT MAINT EA 15 MIN: HCPCS

## 2023-10-12 ENCOUNTER — TELEPHONE (OUTPATIENT)
Dept: ORTHOPEDIC SURGERY | Facility: CLINIC | Age: 63
End: 2023-10-12
Payer: COMMERCIAL

## 2023-10-12 DIAGNOSIS — Z96.651 STATUS POST TOTAL RIGHT KNEE REPLACEMENT: Primary | ICD-10-CM

## 2023-10-12 RX ORDER — DOXYCYCLINE 100 MG/1
100 TABLET ORAL 2 TIMES DAILY
Qty: 14 TABLET | Refills: 0 | Status: SHIPPED | OUTPATIENT
Start: 2023-10-12 | End: 2023-10-19

## 2023-10-12 NOTE — TELEPHONE ENCOUNTER
Spoke with patient, she advises she is unable to come in today as she is the only one that takes care of her mom who has dementia. Patient has Dr's appointments tomorrow. Patient does not have access to [employer], asked if could email her a link to get it set up. Patient states she doesn't know much about technology.    Please advise  [phone]

## 2023-10-12 NOTE — TELEPHONE ENCOUNTER
I spoke with the pt & let her know that Dr. Chavez suggested she come in today or tomorrow but she was unable to do so due to her schedule, he said he would send in an abx for her and that she needed to come in asa, I got her scheduled for Thursday 10/19/23 w/ Nena. Pt voiced understanding and the abx was called in by Aileen. She also stated that she has an appt with her PCP tomorrow and asked if they could look at it I asked Aileen and she said that was fine that if they do not deem it urgent that she needs to see us that she can keep the Thursday appt but if they deem it necessary to see use before then we can move her appt up.

## 2023-10-12 NOTE — TELEPHONE ENCOUNTER
Patient had surgery on her right knee with Dr Palacios. She is concerned with a infection of the right knee. Patient says the incision is warm to the touch it is also red puffy with a pencil size puss filled spot.

## 2023-10-13 ENCOUNTER — TELEPHONE (OUTPATIENT)
Dept: ORTHOPEDIC SURGERY | Facility: CLINIC | Age: 63
End: 2023-10-13
Payer: COMMERCIAL

## 2023-10-13 NOTE — TELEPHONE ENCOUNTER
Caller: Janet Obrien    Relationship to patient: Self    Best call back number: [phone]    Patient is needing: PATIENT CALLED TO LET US KNOW  STATED HER INCISION LOOKS GOOD AND SHE WILL BE OKAY TO COME IN ON 10/19/23-  STATED MEDICATION SHOULD TAKE CARE OF INFECTION

## 2023-10-16 NOTE — TELEPHONE ENCOUNTER
Spoke with patient. She states that all the pus has come out of her incision site spot and there has not been any drainage recently. Patient states she noticed a new spot today but it does not look to be filled with pus and only is raised. Patient sent photos and Dr. Chavez said that patient was fine to keep appointment for Thursday, 10/19/2023 and did not need to come in tomorrow. Called patient and notified her that Dr. Chavez stated her incision looked fine and to keep her appointment for Thursday at 9 am. Patient verbalized understanding and appreciation.

## 2023-10-19 ENCOUNTER — OFFICE VISIT (OUTPATIENT)
Dept: ORTHOPEDIC SURGERY | Facility: CLINIC | Age: 63
End: 2023-10-19
Payer: COMMERCIAL

## 2023-10-19 VITALS — TEMPERATURE: 97.3 F

## 2023-10-19 DIAGNOSIS — Z96.651 STATUS POST TOTAL RIGHT KNEE REPLACEMENT: Primary | ICD-10-CM

## 2023-10-19 DIAGNOSIS — T81.41XA POSTOPERATIVE STITCH ABSCESS: ICD-10-CM

## 2023-10-19 PROCEDURE — 1160F RVW MEDS BY RX/DR IN RCRD: CPT | Performed by: PHYSICIAN ASSISTANT

## 2023-10-19 PROCEDURE — 1159F MED LIST DOCD IN RCRD: CPT | Performed by: PHYSICIAN ASSISTANT

## 2023-10-19 PROCEDURE — 99024 POSTOP FOLLOW-UP VISIT: CPT | Performed by: PHYSICIAN ASSISTANT

## 2023-10-19 RX ORDER — DOXYCYCLINE 100 MG/1
100 TABLET ORAL 2 TIMES DAILY
Qty: 14 TABLET | Refills: 0 | Status: SHIPPED | OUTPATIENT
Start: 2023-10-19 | End: 2023-10-26

## 2023-10-19 NOTE — PROGRESS NOTES
[facility] Note        Subjective     Post-op (3 week f/u -- 1.5 months S/P TOTAL KNEE ARTHROPLASTY WITH NORA ROBOT - RIGHT DOS (9/6/23)/)       HPI    Janet Obrien is a 63 y.o. female.  Patient is status post right TKA 9/6/2023 by Dr. Chavez.  She contacted the clinic last week regarding possible infection to her incision.  She was unable to get into the clinic until today but she did see her PCP last Friday who believes she had a stitch abscess.  Patient has been on doxycycline since last week, has 1 more day left.  Her main complaint at this time is continued pain to the knee.  She has been discharged from home health PT due to achieving full range of motion.    Pain scale: 7/10.  Associated symptoms swelling, stiffness and giving way.  Reports the only thing that helps with pain medication.  Pain is worse at night.  Patient is on Eliquis.    No reported fever, chills, night sweats or other constitutional symptoms.        Objective      Physical Exam  Temp 97.3 °F (36.3 °C)     There is no height or weight on file to calculate BMI.        Ortho Exam  Integument:   Right knee: Mild erythema surrounding central incision secondary to stitch abscess.  Unable to express any fluid today.  Patient states she has had scant discharge.  Remaining incision is well-healed.    Lower Extremities:   Right knee:    Tenderness:  Incisional tenderness    Effusion:  Trace    Swelling: Mild right calf is soft and nontender    Crepitus:  None    Range of motion:  Extension: 0°       Flexion: 120°  Instability:  No varus laxity, no valgus laxity, negative anterior drawer  Deformities:  None    No pain with passive range of motion of the knee.      Imaging Reviewed:  No new imaging today.      Assessment:  1. Status post total right knee replacement    2. Postoperative stitch abscess        Plan:  Status post right TKA with Nora robot, stable.  Postoperative stitch abscess--patient was provided with an  additional week of doxycycline.  Continue with current pain management.  Provided referral to outpatient PT.  Continue with Eliquis as directed.  Keep follow-up appointment with Dr. Chavez on 11/2/2023. Needs knee imaging.    Questions and concerns answered.    Patient was also examined by Dr. Chavez and he agrees with the above assessment and plan.      Nena Gallagher PA-C  10/19/23  09:09 EDT      Dictated Utilizing Dragon Dictation.

## 2023-11-03 ENCOUNTER — HOSPITAL ENCOUNTER (OUTPATIENT)
Dept: ULTRASOUND IMAGING | Facility: HOSPITAL | Age: 63
Discharge: HOME | End: 2023-11-03
Payer: COMMERCIAL

## 2023-11-03 ENCOUNTER — HOSPITAL ENCOUNTER (OUTPATIENT)
Dept: MAMMOGRAPHY | Facility: HOSPITAL | Age: 63
Discharge: HOME | End: 2023-11-03
Admitting: OBSTETRICS & GYNECOLOGY
Payer: COMMERCIAL

## 2023-11-03 DIAGNOSIS — R22.32 MASS OF LEFT AXILLA: ICD-10-CM

## 2023-11-03 PROCEDURE — G0279 TOMOSYNTHESIS, MAMMO: HCPCS

## 2023-11-03 PROCEDURE — 77066 DX MAMMO INCL CAD BI: CPT

## 2023-11-03 PROCEDURE — 76882 US LMTD JT/FCL EVL NVASC XTR: CPT

## 2023-12-04 ENCOUNTER — TELEPHONE (OUTPATIENT)
Dept: ORTHOPEDIC SURGERY | Facility: CLINIC | Age: 63
End: 2023-12-04
Payer: COMMERCIAL

## 2023-12-04 DIAGNOSIS — Z96.651 STATUS POST TOTAL RIGHT KNEE REPLACEMENT: ICD-10-CM

## 2023-12-04 DIAGNOSIS — Z96.652 STATUS POST TOTAL LEFT KNEE REPLACEMENT: Primary | ICD-10-CM

## 2023-12-04 NOTE — TELEPHONE ENCOUNTER
Caller: Janet Obrien    Relationship: Self    Best call back number: [phone]    What orders are you requesting (i.e. lab or imaging): PHYSICAL THERAPY FOR BOTH KNEES     In what timeframe would the patient need to come in: ASAP     Where will you receive your lab/imaging services: [facility], [facility] [place] [address]    Additional notes: PATIENT HAD PT ORDERED FOR HER RIGHT KNEE. SHE WAS UNABLE TO GO ORIGINALLY DUE TO HER MOTHERS PASSING, BUT IS NOW ABLE TO GO. PATIENT IS STATING THAT BOTH KNEES ARE HURTING AND SHE IS WANTING THE PT ORDER TO BE MADE FOR BOTH KNEES TO BE SEEN.

## 2023-12-04 NOTE — TELEPHONE ENCOUNTER
PT prescription placed.      Also patient canceled her follow-up with Dr. Chavez on 11/2/2023 due to a family emergency.  Please reschedule follow-up with Dr. Chavez.

## 2023-12-04 NOTE — TELEPHONE ENCOUNTER
Pt stated Dr. Chavez wanted to see her after 6 weeks of PT that she would call to schedule a f/u after that.    SURGERY CONSULT    Consulting Physician: Babatunde Fuentes MD   Referral Origin: patient self-referral     REASON FOR CONSULTATION:  Evaluation of umbilical hernia      HISTORY OF PRESENT ILLNESS:  Mr. Gant is a pleasant 73 year old male with a PMH significant for obesity, BMI 37, DMII, HTN, KURTIS (CPAP), who presents with concern for umbilical hernia. Has noticed for many years, but slowly increasing in size and occasionally the bulge becomes firm and harder to reduce. Able to reduce when lying down and generally minimally symptomatic. No obstructive symptoms.     John is working on exercising and weight loss, has lost about 4 lbs since November per chart review.    Hx of bilateral inguinal hernia repairs, one at age 21 the other as a toddler.     Last colonoscopy 2018, repeat in 10 years.     LABS:   11/19/21   Cr 1.15  hgb A1c 5.8      IMAGES:  No relevant imaging     PAST MEDICAL HISTORY:     Adenomatous colon polyp                                       Short-segment José's esophagus                             GERD (gastroesophageal reflux disease)                        Hypercholesterolemia                                          DM (diabetes mellitus) (CMS/HCC)                              HTN (hypertension)                                            Umbilical hernia                                              KURTIS (obstructive sleep apnea)                                   Comment: Uses a CPAP     PAST SURGICAL HISTORY:     ESOPHAGOGASTRODUODENOSCOPY TRANSORAL FLEX W/BX* 11/05/2012    COLONOSCOPY                                     11/05/2012      Comment: 5 years    HERNIA REPAIR                                   01/01/1969    HERNIA REPAIR                                                   Comment: as infant    POLYPECTOMY                                     10/01/2007    LAMINEC/FACETECT/FORAMIN,LUMBAR                 07/23/2003    TONSILLECTOMY                                                 COLONOSCOPY                                      2018      Comment: Per Dr. Bean repeat in 10 years.     FAMILY HISTORY:  Family History   Problem Relation Age of Onset   • Cancer Father         lung cancer   • Cataracts Mother    • Heart disease Mother    • Cancer Sister         uterus   • Patient is unaware of any medical problems Brother    • Patient is unaware of any medical problems Sister    • Patient is unaware of any medical problems Brother    • Asthma Brother        SOCIAL HISTORY:  Social History     Tobacco Use   • Smoking status: Former Smoker     Packs/day: 1.50     Years: 34.00     Pack years: 51.00     Types: Cigarettes     Start date: 1964     Quit date: 1999     Years since quittin.1   • Smokeless tobacco: Never Used   • Tobacco comment: Uses a CPAP for sleep apnea.   Substance Use Topics   • Alcohol use: Yes     Alcohol/week: 0.0 standard drinks     Comment: not specified   • Drug use: No     Comment: Coffee 6-7 cups per day, prior to noon        REVIEW OF SYSTEMS:  No shortness of breath, no chest pain, all other systems negative, pertinent positives included in the HPI.    MEDICATIONS:  Current Outpatient Medications   Medication Sig Dispense Refill   • mometasone (NASONEX) 50 MCG/ACT nasal spray Spray 2 sprays in each nostril daily.     • rosuvastatin (CRESTOR) 20 MG tablet Take 1 tablet by mouth daily. 90 tablet 3   • tamsulosin (FLOMAX) 0.4 MG Cap Take 1 capsule by mouth daily after dinner. 30 capsule 6   • metformin (GLUCOPHAGE) 1000 MG tablet Take 1 tablet by mouth daily. 90 tablet 3   • omeprazole (PrilOSEC) 20 MG capsule Take 1 capsule by mouth daily. 90 capsule 3   • PARoxetine (PAXIL) 20 MG tablet Take 1 tablet by mouth nightly. 90 tablet 3   • lisinopril (ZESTRIL) 20 MG tablet Take 1 tablet by mouth daily. 90 tablet 3   • Glucosamine-Chondroitin (GLUCOSAMINE CHONDR COMPLEX PO) Take 1 tablet by mouth nightly.      • aspirin 81 MG tablet Take 81 mg by mouth nightly.      • Multiple  Vitamins-Minerals (MULTIVITAMIN PO) Take by mouth every evening.        No current facility-administered medications for this visit.       ALLERGIES:  No Known Allergies      PHYSICAL EXAM:  Vitals:  Weight: 125.9 kg (277 lb 9 oz)  Height:  6' (1.829 m)  Body mass index is 37.64 kg/m².    Pulse: 92  BP:  (!) 140/60     GENERAL: Alert, in no acute distress.  NEURO: CN 2-12 grossly intact   EYES:  Pupils equal.  No scleral icterus.  HENT:  Normocephalic,  atraumatic.  NECK: Trachea midline, full range of motion.  RESPIRATORY: Clear to ascultation bilaterally, no wheezes.  CARDIAC: Regular rate and rhythm, no murmur.  ABDOMEN: obese, soft, non-tender, non-distended, reducible umbilical hernia, estimate fascial defect about 1.5 cm in size  : deferred  MUSCULOSKELETAL: upper extremities: no bony deformities, lower extremities: no bony deformities  EXTREMITIES: WWP, no edema.  SKIN: Warm and dry without rash in visible portion.  PSYCH: oriented, Calm, appropriate insight.      ASSESSMENT:  Mr. Gant is a 73 year old male with PMH significant for obesity, BMI 38, DMII, HTN, KURTIS (CPAP), who presents with a reducible umbilical hernia. Increasing in size overtime.                                                           PLAN:  Details of the umbilical hernia repair and potential postoperative complications including, but not limited to bleeding, infection, chronic pain and bowel injury were discussed and he was eager to proceed.  Postoperative sequelae including a few weeks of fatigue, and lifting restrictions of no more than 20 lbs for two weeks were discussed as well.     - Plan to obtain pre-op EKG given no priors available, if no significant abnormalities ok to proceed, otherwise appreciate PCP clearance.     - Discussed with John that continuing to lose weight will help prevent hernia recurrence.

## 2024-05-09 ENCOUNTER — TELEPHONE (OUTPATIENT)
Dept: ORTHOPEDIC SURGERY | Facility: CLINIC | Age: 64
End: 2024-05-09
Payer: COMMERCIAL

## 2024-05-10 ENCOUNTER — OFFICE VISIT (OUTPATIENT)
Dept: ORTHOPEDIC SURGERY | Facility: CLINIC | Age: 64
End: 2024-05-10
Payer: COMMERCIAL

## 2024-05-10 VITALS
BODY MASS INDEX: 36.64 KG/M2 | HEIGHT: 66 IN | WEIGHT: 228 LBS | DIASTOLIC BLOOD PRESSURE: 84 MMHG | SYSTOLIC BLOOD PRESSURE: 128 MMHG

## 2024-05-10 DIAGNOSIS — Z96.652 STATUS POST TOTAL LEFT KNEE REPLACEMENT: ICD-10-CM

## 2024-05-10 DIAGNOSIS — Z96.651 STATUS POST TOTAL RIGHT KNEE REPLACEMENT: ICD-10-CM

## 2024-05-10 DIAGNOSIS — M25.561 ACUTE PAIN OF RIGHT KNEE: Primary | ICD-10-CM

## 2024-06-10 ENCOUNTER — HOSPITAL ENCOUNTER (EMERGENCY)
Facility: HOSPITAL | Age: 64
Discharge: HOME | End: 2024-06-10
Attending: EMERGENCY MEDICINE | Admitting: EMERGENCY MEDICINE
Payer: COMMERCIAL

## 2024-06-10 ENCOUNTER — APPOINTMENT (OUTPATIENT)
Dept: MRI IMAGING | Facility: HOSPITAL | Age: 64
End: 2024-06-10
Payer: COMMERCIAL

## 2024-06-10 VITALS
HEIGHT: 66 IN | BODY MASS INDEX: 36.96 KG/M2 | TEMPERATURE: 97.9 F | SYSTOLIC BLOOD PRESSURE: 116 MMHG | WEIGHT: 230 LBS | HEART RATE: 76 BPM | DIASTOLIC BLOOD PRESSURE: 72 MMHG | RESPIRATION RATE: 18 BRPM | OXYGEN SATURATION: 99 %

## 2024-06-10 DIAGNOSIS — M54.50 LOW BACK PAIN WITHOUT SCIATICA, UNSPECIFIED BACK PAIN LATERALITY, UNSPECIFIED CHRONICITY: ICD-10-CM

## 2024-06-10 DIAGNOSIS — M51.36 DEGENERATIVE DISC DISEASE, LUMBAR: Primary | ICD-10-CM

## 2024-06-10 LAB
ALBUMIN SERPL-MCNC: 3.8 G/DL (ref 3.5–5.2)
ALBUMIN/GLOB SERPL: 1.7 G/DL
ALP SERPL-CCNC: 92 U/L (ref 39–117)
ALT SERPL W P-5'-P-CCNC: 17 U/L (ref 1–33)
ANION GAP SERPL CALCULATED.3IONS-SCNC: 6 MMOL/L (ref 5–15)
AST SERPL-CCNC: 20 U/L (ref 1–32)
BACTERIA UR QL AUTO: NORMAL /HPF
BASOPHILS # BLD AUTO: 0.04 10*3/MM3 (ref 0–0.2)
BASOPHILS NFR BLD AUTO: 0.9 % (ref 0–1.5)
BILIRUB SERPL-MCNC: 0.5 MG/DL (ref 0–1.2)
BILIRUB UR QL STRIP: NEGATIVE
BUN SERPL-MCNC: 8 MG/DL (ref 8–23)
BUN/CREAT SERPL: 11.3 (ref 7–25)
CALCIUM SPEC-SCNC: 9.9 MG/DL (ref 8.6–10.5)
CHLORIDE SERPL-SCNC: 104 MMOL/L (ref 98–107)
CLARITY UR: CLEAR
CO2 SERPL-SCNC: 28 MMOL/L (ref 22–29)
COLOR UR: YELLOW
CREAT SERPL-MCNC: 0.71 MG/DL (ref 0.57–1)
DEPRECATED RDW RBC AUTO: 42 FL (ref 37–54)
EGFRCR SERPLBLD CKD-EPI 2021: 96.3 ML/MIN/1.73
EOSINOPHIL # BLD AUTO: 0.32 10*3/MM3 (ref 0–0.4)
EOSINOPHIL NFR BLD AUTO: 7 % (ref 0.3–6.2)
ERYTHROCYTE [DISTWIDTH] IN BLOOD BY AUTOMATED COUNT: 13.4 % (ref 12.3–15.4)
GLOBULIN UR ELPH-MCNC: 2.2 GM/DL
GLUCOSE SERPL-MCNC: 70 MG/DL (ref 65–99)
GLUCOSE UR STRIP-MCNC: NEGATIVE MG/DL
HCT VFR BLD AUTO: 38.3 % (ref 34–46.6)
HGB BLD-MCNC: 12.7 G/DL (ref 12–15.9)
HGB UR QL STRIP.AUTO: NEGATIVE
HYALINE CASTS UR QL AUTO: NORMAL /LPF
IMM GRANULOCYTES # BLD AUTO: 0.01 10*3/MM3 (ref 0–0.05)
IMM GRANULOCYTES NFR BLD AUTO: 0.2 % (ref 0–0.5)
KETONES UR QL STRIP: NEGATIVE
LEUKOCYTE ESTERASE UR QL STRIP.AUTO: ABNORMAL
LYMPHOCYTES # BLD AUTO: 1.3 10*3/MM3 (ref 0.7–3.1)
LYMPHOCYTES NFR BLD AUTO: 28.4 % (ref 19.6–45.3)
MCH RBC QN AUTO: 28.5 PG (ref 26.6–33)
MCHC RBC AUTO-ENTMCNC: 33.2 G/DL (ref 31.5–35.7)
MCV RBC AUTO: 85.9 FL (ref 79–97)
MONOCYTES # BLD AUTO: 0.46 10*3/MM3 (ref 0.1–0.9)
MONOCYTES NFR BLD AUTO: 10.1 % (ref 5–12)
NEUTROPHILS NFR BLD AUTO: 2.44 10*3/MM3 (ref 1.7–7)
NEUTROPHILS NFR BLD AUTO: 53.4 % (ref 42.7–76)
NITRITE UR QL STRIP: NEGATIVE
NRBC BLD AUTO-RTO: 0 /100 WBC (ref 0–0.2)
PH UR STRIP.AUTO: 6.5 [PH] (ref 5–8)
PLATELET # BLD AUTO: 272 10*3/MM3 (ref 140–450)
PMV BLD AUTO: 9.9 FL (ref 6–12)
POTASSIUM SERPL-SCNC: 3.3 MMOL/L (ref 3.5–5.2)
PROT SERPL-MCNC: 6 G/DL (ref 6–8.5)
PROT UR QL STRIP: NEGATIVE
RBC # BLD AUTO: 4.46 10*6/MM3 (ref 3.77–5.28)
RBC # UR STRIP: NORMAL /HPF
REF LAB TEST METHOD: NORMAL
SODIUM SERPL-SCNC: 138 MMOL/L (ref 136–145)
SP GR UR STRIP: 1.01 (ref 1–1.03)
SQUAMOUS #/AREA URNS HPF: NORMAL /HPF
UROBILINOGEN UR QL STRIP: ABNORMAL
WBC # UR STRIP: NORMAL /HPF
WBC NRBC COR # BLD AUTO: 4.57 10*3/MM3 (ref 3.4–10.8)

## 2024-06-10 PROCEDURE — 25010000002 HYDROMORPHONE PER 4 MG: Performed by: EMERGENCY MEDICINE

## 2024-06-10 PROCEDURE — 85025 COMPLETE CBC W/AUTO DIFF WBC: CPT | Performed by: EMERGENCY MEDICINE

## 2024-06-10 PROCEDURE — 99284 EMERGENCY DEPT VISIT MOD MDM: CPT

## 2024-06-10 PROCEDURE — 80053 COMPREHEN METABOLIC PANEL: CPT | Performed by: EMERGENCY MEDICINE

## 2024-06-10 PROCEDURE — 25810000003 SODIUM CHLORIDE 0.9 % SOLUTION: Performed by: EMERGENCY MEDICINE

## 2024-06-10 PROCEDURE — 96374 THER/PROPH/DIAG INJ IV PUSH: CPT

## 2024-06-10 PROCEDURE — 36415 COLL VENOUS BLD VENIPUNCTURE: CPT

## 2024-06-10 PROCEDURE — 72148 MRI LUMBAR SPINE W/O DYE: CPT

## 2024-06-10 PROCEDURE — 81001 URINALYSIS AUTO W/SCOPE: CPT | Performed by: EMERGENCY MEDICINE

## 2024-06-10 RX ORDER — HYDROMORPHONE HYDROCHLORIDE 1 MG/ML
0.5 INJECTION, SOLUTION INTRAMUSCULAR; INTRAVENOUS; SUBCUTANEOUS
Status: DISCONTINUED | OUTPATIENT
Start: 2024-06-10 | End: 2024-06-10 | Stop reason: HOSPADM

## 2024-06-10 RX ORDER — NICOTINE POLACRILEX 4 MG
15 LOZENGE BUCCAL
Status: DISCONTINUED | OUTPATIENT
Start: 2024-06-10 | End: 2024-06-10

## 2024-06-10 RX ORDER — SODIUM CHLORIDE 0.9 % (FLUSH) 0.9 %
10 SYRINGE (ML) INJECTION EVERY 12 HOURS SCHEDULED
Status: DISCONTINUED | OUTPATIENT
Start: 2024-06-10 | End: 2024-06-10

## 2024-06-10 RX ORDER — IBUPROFEN 600 MG/1
1 TABLET ORAL
Status: DISCONTINUED | OUTPATIENT
Start: 2024-06-10 | End: 2024-06-10

## 2024-06-10 RX ORDER — SODIUM CHLORIDE 0.9 % (FLUSH) 0.9 %
10 SYRINGE (ML) INJECTION AS NEEDED
Status: DISCONTINUED | OUTPATIENT
Start: 2024-06-10 | End: 2024-06-10

## 2024-06-10 RX ORDER — SODIUM CHLORIDE AND POTASSIUM CHLORIDE 300; 900 MG/100ML; MG/100ML
250 INJECTION, SOLUTION INTRAVENOUS CONTINUOUS PRN
Status: DISCONTINUED | OUTPATIENT
Start: 2024-06-10 | End: 2024-06-10

## 2024-06-10 RX ORDER — SODIUM CHLORIDE 0.9 % (FLUSH) 0.9 %
10 SYRINGE (ML) INJECTION AS NEEDED
Status: DISCONTINUED | OUTPATIENT
Start: 2024-06-10 | End: 2024-06-10 | Stop reason: HOSPADM

## 2024-06-10 RX ORDER — SODIUM CHLORIDE 9 MG/ML
40 INJECTION, SOLUTION INTRAVENOUS AS NEEDED
Status: DISCONTINUED | OUTPATIENT
Start: 2024-06-10 | End: 2024-06-10

## 2024-06-10 RX ORDER — DEXTROSE MONOHYDRATE, SODIUM CHLORIDE, AND POTASSIUM CHLORIDE 50; 2.98; 4.5 G/1000ML; G/1000ML; G/1000ML
150 INJECTION, SOLUTION INTRAVENOUS CONTINUOUS PRN
Status: DISCONTINUED | OUTPATIENT
Start: 2024-06-10 | End: 2024-06-10

## 2024-06-10 RX ORDER — SODIUM CHLORIDE 450 MG/100ML
250 INJECTION, SOLUTION INTRAVENOUS CONTINUOUS PRN
Status: DISCONTINUED | OUTPATIENT
Start: 2024-06-10 | End: 2024-06-10

## 2024-06-10 RX ORDER — SODIUM CHLORIDE AND POTASSIUM CHLORIDE 150; 900 MG/100ML; MG/100ML
250 INJECTION, SOLUTION INTRAVENOUS CONTINUOUS PRN
Status: DISCONTINUED | OUTPATIENT
Start: 2024-06-10 | End: 2024-06-10

## 2024-06-10 RX ORDER — DEXTROSE MONOHYDRATE 25 G/50ML
10-50 INJECTION, SOLUTION INTRAVENOUS
Status: DISCONTINUED | OUTPATIENT
Start: 2024-06-10 | End: 2024-06-10

## 2024-06-10 RX ORDER — DEXTROSE MONOHYDRATE AND SODIUM CHLORIDE 5; .45 G/100ML; G/100ML
150 INJECTION, SOLUTION INTRAVENOUS CONTINUOUS PRN
Status: DISCONTINUED | OUTPATIENT
Start: 2024-06-10 | End: 2024-06-10

## 2024-06-10 RX ORDER — DEXTROSE MONOHYDRATE, SODIUM CHLORIDE, AND POTASSIUM CHLORIDE 50; 1.49; 4.5 G/1000ML; G/1000ML; G/1000ML
150 INJECTION, SOLUTION INTRAVENOUS CONTINUOUS PRN
Status: DISCONTINUED | OUTPATIENT
Start: 2024-06-10 | End: 2024-06-10

## 2024-06-10 RX ORDER — DEXTROSE MONOHYDRATE, SODIUM CHLORIDE, AND POTASSIUM CHLORIDE 50; 1.49; 9 G/1000ML; G/1000ML; G/1000ML
150 INJECTION, SOLUTION INTRAVENOUS CONTINUOUS PRN
Status: DISCONTINUED | OUTPATIENT
Start: 2024-06-10 | End: 2024-06-10

## 2024-06-10 RX ORDER — PREDNISONE 20 MG/1
20 TABLET ORAL DAILY
Qty: 5 TABLET | Refills: 0 | Status: SHIPPED | OUTPATIENT
Start: 2024-06-10 | End: 2024-06-15

## 2024-06-10 RX ORDER — DEXTROSE MONOHYDRATE AND SODIUM CHLORIDE 5; .9 G/100ML; G/100ML
150 INJECTION, SOLUTION INTRAVENOUS CONTINUOUS PRN
Status: DISCONTINUED | OUTPATIENT
Start: 2024-06-10 | End: 2024-06-10

## 2024-06-10 RX ORDER — SODIUM CHLORIDE 9 MG/ML
250 INJECTION, SOLUTION INTRAVENOUS CONTINUOUS PRN
Status: DISCONTINUED | OUTPATIENT
Start: 2024-06-10 | End: 2024-06-10

## 2024-06-10 RX ORDER — SODIUM CHLORIDE AND POTASSIUM CHLORIDE 150; 450 MG/100ML; MG/100ML
250 INJECTION, SOLUTION INTRAVENOUS CONTINUOUS PRN
Status: DISCONTINUED | OUTPATIENT
Start: 2024-06-10 | End: 2024-06-10

## 2024-06-10 RX ADMIN — HYDROMORPHONE HYDROCHLORIDE 0.5 MG: 1 INJECTION, SOLUTION INTRAMUSCULAR; INTRAVENOUS; SUBCUTANEOUS at 11:12

## 2024-06-10 RX ADMIN — SODIUM CHLORIDE 1000 ML: 9 INJECTION, SOLUTION INTRAVENOUS at 11:12

## 2024-06-10 NOTE — DISCHARGE INSTRUCTIONS
Take already prescribed pain medication as prescribed.    Apply heat to the low back 20 minutes at a time and perform regular stretching and light activity such as walking.    Take steroids as prescribed.    Keep outpatient follow-up with pain management for further definitive pain control.

## 2024-06-10 NOTE — Clinical Note
[facility] EMERGENCY DEPARTMENT  [address]  [facility] [address]  Phone: [phone]    Janet Obrien was seen and treated in our emergency department on 6/10/2024.  She may return to work on 06/13/2024.         Thank you for choosing [facility].    Meg Wood MD

## 2024-06-10 NOTE — ED PROVIDER NOTES
Subjective   History of Present Illness  63-year-old female with a history of chronic low back pain who currently takes hydrocodone on a regular basis from a pain management clinic who presents with complaint of increased back pain.  It has been more severe over the last couple days.  She states that it does not hurt whenever she lies flat or sits but if she tries to move or stand it causes increased pain in the bilateral low back region with radiation into the legs.  She denies saddle anesthesia.  No bowel incontinence or retention.  No other red flags of back pain.  No reported direct trauma to the back.  No previous surgeries to the back.  No history of cancer unexpected change in weight.  No fever or infectious symptoms.  She states that she is scheduled to have an epidural performed to help relieve the back pain but this has not been accomplished yet.      Review of Systems   Constitutional:  Negative for chills, fatigue and fever.   HENT:  Negative for congestion, ear pain, postnasal drip, sinus pressure and sore throat.    Eyes:  Negative for pain, redness and visual disturbance.   Respiratory:  Negative for cough, chest tightness and shortness of breath.    Cardiovascular:  Negative for chest pain, palpitations and leg swelling.   Gastrointestinal:  Negative for abdominal pain, anal bleeding, blood in stool, diarrhea, nausea and vomiting.   Endocrine: Negative for polydipsia and polyuria.   Genitourinary:  Negative for difficulty urinating, dysuria, frequency and urgency.   Musculoskeletal:  Positive for back pain. Negative for arthralgias and neck pain.   Skin:  Negative for pallor and rash.   Allergic/Immunologic: Negative for environmental allergies and immunocompromised state.   Neurological:  Negative for dizziness, weakness and headaches.   Hematological:  Negative for adenopathy.   Psychiatric/Behavioral:  Negative for confusion, self-injury and suicidal ideas. The patient is not nervous/anxious.   "  All other systems reviewed and are negative.      Past Medical History:   Diagnosis Date    Anxiety     Arthritis     Asthma     Back pain     Cancer     SQUAMOUS CELL/ BASAL CELL ON LIP, under the nose as well    Chronic bronchitis     Depression     Diabetes mellitus     type 2    Fibromyalgia     High triglycerides     Hyperlipidemia     Hypertension     Hypothyroidism     Migraine     MVP (mitral valve prolapse)     OA (osteoarthritis)     Ovarian cyst     Peripheral autonomic neuropathy     Pleurisy     Pneumonia     Rheumatism     RLS (restless legs syndrome)     Sleep apnea     Mild form per patient. recommended mouthpiece       Allergies   Allergen Reactions    Penicillins Angioedema, Anaphylaxis, Hives, Itching, Shortness Of Breath and Swelling    Pregabalin Anaphylaxis    Cephalexin Itching    Chlorhexidine Rash and Itching    Sulfa Antibiotics Rash and Itching    Adhesive Tape Rash     Tegaderm specific    Darvon [Propoxyphene] Unknown (See Comments)     Doesn't remember    Diazepam Other (See Comments)     Paradoxical effect/ hypes pt       Past Surgical History:   Procedure Laterality Date    APPENDECTOMY      CATARACT EXTRACTION W/ INTRAOCULAR LENS IMPLANT Right 2019    Procedure: CATARACT PHACO EXTRACTION WITH INTRAOCULAR LENS IMPLANT RIGHT;  Surgeon: Carl Babb MD;  Location: [facility] OR;  Service: Ophthalmology    CATARACT EXTRACTION W/ INTRAOCULAR LENS IMPLANT Left 10/07/2019    Procedure: CATARACT PHACO EXTRACTION WITH INTRAOCULAR LENS IMPLANT LEFT;  Surgeon: Carl Babb MD;  Location: [facility] OR;  Service: Ophthalmology     SECTION  , 1997    x 2     DILATATION AND CURETTAGE      HAND SURGERY Right     \"Pinched nerve surgery\"    HAND SURGERY Left     Joint removed secondary to arthritis    HYSTERECTOMY      ''still have one ovary''    KNEE SURGERY Right 2021    arthroscopy and partial medial meniscectomy Dr. Chavez    OOPHORECTVITALIY      " THYROIDECTOMY  1985    TONSILLECTOMY AND ADENOIDECTOMY  1965    TOTAL KNEE ARTHROPLASTY Left 03/15/2023    Procedure: TOTAL KNEE ARTHROPLASTY -  LEFT;  Surgeon: Dayo Chavez MD;  Location:  [facility] OR;  Service: Orthopedics;  Laterality: Left;    TOTAL KNEE ARTHROPLASTY Right 2023    Procedure: TOTAL KNEE ARTHROPLASTY WITH BERNICE ROBOT - RIGHT;  Surgeon: Dayo Chavez MD;  Location:  [facility] OR;  Service: Robotics - Ortho;  Laterality: Right;       Family History   Problem Relation Age of Onset    Hypertension Mother     Heart attack Mother     Fibromyalgia Mother     Hyperlipidemia Mother     Thyroid disease Mother     Irritable bowel syndrome Mother     Heart attack Father     Cancer Brother     Stomach cancer Brother     Breast cancer Maternal Grandmother     Cancer Maternal Grandmother     Thyroid disease Maternal Grandmother     Cancer Paternal Grandmother     Diabetes Maternal Aunt     Colon cancer Neg Hx     Cirrhosis Neg Hx     Liver cancer Neg Hx     Liver disease Neg Hx        Social History     Socioeconomic History    Marital status:    Tobacco Use    Smoking status: Former     Current packs/day: 0.00     Average packs/day: 0.3 packs/day for 26.6 years (6.6 ttl pk-yrs)     Types: Cigarettes     Start date:      Quit date: 2004     Years since quittin.9    Smokeless tobacco: Never   Vaping Use    Vaping status: Never Used   Substance and Sexual Activity    Alcohol use: No    Drug use: No    Sexual activity: Defer     Partners: Male     Birth control/protection: Abstinence           Objective   Physical Exam  Vitals and nursing note reviewed.   Constitutional:       General: She is not in acute distress.     Appearance: Normal appearance. She is well-developed. She is not toxic-appearing or diaphoretic.   HENT:      Head: Normocephalic and atraumatic.      Right Ear: External ear normal.      Left Ear: External ear normal.      Nose: Nose normal.   Eyes:       General: Lids are normal.      Pupils: Pupils are equal, round, and reactive to light.   Neck:      Trachea: No tracheal deviation.   Cardiovascular:      Rate and Rhythm: Normal rate and regular rhythm.      Pulses: No decreased pulses.      Heart sounds: Normal heart sounds. No murmur heard.     No friction rub. No gallop.   Pulmonary:      Effort: Pulmonary effort is normal. No respiratory distress.      Breath sounds: Normal breath sounds. No decreased breath sounds, wheezing, rhonchi or rales.   Abdominal:      General: Bowel sounds are normal.      Palpations: Abdomen is soft.      Tenderness: There is no abdominal tenderness. There is no guarding or rebound.   Musculoskeletal:         General: No deformity. Normal range of motion.      Cervical back: Normal range of motion and neck supple.   Lymphadenopathy:      Cervical: No cervical adenopathy.   Skin:     General: Skin is warm and dry.      Findings: No rash.   Neurological:      Mental Status: She is alert and oriented to person, place, and time.      GCS: GCS eye subscore is 4. GCS verbal subscore is 5. GCS motor subscore is 6.      Cranial Nerves: No cranial nerve deficit.      Sensory: No sensory deficit.      Comments: Intact strength of the bilateral lower extremities but pain in the back is exacerbated with movement of the bilateral lower extremities.    Normal bilateral patellar and Achilles reflexes to the bilateral lower extremities.   Psychiatric:         Speech: Speech normal.         Behavior: Behavior normal.         Thought Content: Thought content normal.         Judgment: Judgment normal.         Procedures           ED Course                                             Medical Decision Making  Differential diagnosis includes metastatic lesion, acute lumbar spine fracture, cauda equina, chronic degenerative disc disease, spinal stenosis, other unspecified etiology.    Labs are nonconcerning.    Negative for red flags of back pain.    MRI  of the lumbar spine shows multilevel degenerative changes lumbar spine with central canal stenosis at level L3-L4 with moderate left neuroforaminal stenosis.    The patient is already prescribed narcotics on a regular basis and will be discharged with a course of steroids.    Referred to neurosurgery for outpatient follow-up.    Advised to apply heat and engage in regular activity to keep the muscles of the low back loose.    Problems Addressed:  Degenerative disc disease, lumbar: complicated acute illness or injury with systemic symptoms  Low back pain without sciatica, unspecified back pain laterality, unspecified chronicity: complicated acute illness or injury with systemic symptoms    Amount and/or Complexity of Data Reviewed  External Data Reviewed: labs and radiology.  Labs: ordered. Decision-making details documented in ED Course.  Radiology: ordered and independent interpretation performed. Decision-making details documented in ED Course.    Risk  Prescription drug management.        Final diagnoses:   Degenerative disc disease, lumbar   Low back pain without sciatica, unspecified back pain laterality, unspecified chronicity       ED Disposition  ED Disposition       ED Disposition   Discharge    Condition   Stable    Comment   --               Noe Davenport MD  [address] [place] [address] [phone]    In 1 week           Medication List        Changed      predniSONE 20 MG tablet  Commonly known as: DELTASONE  Take 1 tablet by mouth Daily for 5 days.  What changed:   medication strength  how much to take  how to take this  when to take this  additional instructions               Where to Get Your Medications        These medications were sent to [facility] [id number] - [place], [place] - [address] AT [facility] - [phone]  - [phone]   [address] [place] [address]      Phone: [phone]   predniSONE 20 MG tablet            Meg Wood MD  06/10/24  6121

## 2024-07-02 ENCOUNTER — OFFICE VISIT (OUTPATIENT)
Dept: ORTHOPEDIC SURGERY | Facility: CLINIC | Age: 64
End: 2024-07-02
Payer: COMMERCIAL

## 2024-07-02 VITALS
HEIGHT: 66 IN | BODY MASS INDEX: 36.96 KG/M2 | WEIGHT: 230 LBS | DIASTOLIC BLOOD PRESSURE: 72 MMHG | SYSTOLIC BLOOD PRESSURE: 122 MMHG

## 2024-07-02 DIAGNOSIS — Z96.651 STATUS POST TOTAL RIGHT KNEE REPLACEMENT: Primary | ICD-10-CM

## 2024-07-02 DIAGNOSIS — M54.50 LOW BACK PAIN, UNSPECIFIED BACK PAIN LATERALITY, UNSPECIFIED CHRONICITY, UNSPECIFIED WHETHER SCIATICA PRESENT: ICD-10-CM

## 2024-07-02 PROCEDURE — 99213 OFFICE O/P EST LOW 20 MIN: CPT | Performed by: ORTHOPAEDIC SURGERY

## 2024-07-02 PROCEDURE — 3078F DIAST BP <80 MM HG: CPT | Performed by: ORTHOPAEDIC SURGERY

## 2024-07-02 PROCEDURE — 3074F SYST BP LT 130 MM HG: CPT | Performed by: ORTHOPAEDIC SURGERY

## 2024-07-02 RX ORDER — SEMAGLUTIDE 2.68 MG/ML
2 INJECTION, SOLUTION SUBCUTANEOUS
COMMUNITY

## 2024-07-02 RX ORDER — MECLIZINE HYDROCHLORIDE 50 MG/1
50 TABLET ORAL
COMMUNITY

## 2024-07-02 RX ORDER — DULOXETIN HYDROCHLORIDE 30 MG/1
30 CAPSULE, DELAYED RELEASE ORAL DAILY
COMMUNITY

## 2024-07-02 NOTE — PROGRESS NOTES
"    [facility] Note        Subjective     CC: Follow-up (7.5 week follow-up: Acute pain of right knee; S/P Right TKA (23))      HPI    Janet Obrien is a 63 y.o. female.  Patient is here today for follow-up of her right TKA completed in 2023.  She last saw Aileen for a flareup of knee pain.  In the interim, she has had a epidural steroid injection a day or 2 ago which has dramatically helped her knee pain.  She says that when her back is flared up, both of her knees hurt.  Denies fever chills nausea vomiting.    Overall, patient's symptoms are as above.    ROS:    Constiutional:Pt denies fever, chills, nausea, or vomiting.  MSK:as above        Objective      Past Medical History  Past Medical History:   Diagnosis Date    Anxiety     Arthritis     Asthma     Back pain     Cancer     SQUAMOUS CELL/ BASAL CELL ON LIP, under the nose as well    Chronic bronchitis     Depression     Diabetes mellitus     type 2    Fibromyalgia     High triglycerides     Hyperlipidemia     Hypertension     Hypothyroidism     Migraine     MVP (mitral valve prolapse)     OA (osteoarthritis)     Ovarian cyst     Peripheral autonomic neuropathy     Pleurisy     Pneumonia     Rheumatism     RLS (restless legs syndrome)     Sleep apnea     Mild form per patient. recommended mouthpiece     Social History     Socioeconomic History    Marital status:    Tobacco Use    Smoking status: Former     Current packs/day: 0.00     Average packs/day: 0.3 packs/day for 26.6 years (6.6 ttl pk-yrs)     Types: Cigarettes     Start date:      Quit date: 2004     Years since quittin.9     Passive exposure: Past    Smokeless tobacco: Never   Vaping Use    Vaping status: Never Used   Substance and Sexual Activity    Alcohol use: No    Drug use: No    Sexual activity: Defer     Partners: Male     Birth control/protection: Abstinence          Physical Exam  /72   Ht 167.6 cm (66\")   Wt 104 kg (230 " lb)   BMI 37.12 kg/m²     Body mass index is 37.12 kg/m².    Patient is well nourished and well developed.        Ortho Exam        Lower Extremity:     Inspection and Palpation:      Right knee:  Calf:  Soft and non tender  Effusion:  None  Pulses:  2+  Warmth:  None  Incision:  Healed     ROM:  Right:  Extension:0    Flexion:120      Deformities/Malalignments/Discrepancies:    Right:  none    Functional Testing:  Right:  Straight Leg Raise: 5/5  Patella Tracking:  Normal      Imaging/Labs/EMG Reviewed and Interpreted:  Imaging Results (Last 24 Hours)       ** No results found for the last 24 hours. **              Assessment    Assessment:  1. Status post total right knee replacement    2. Low back pain, unspecified back pain laterality, unspecified chronicity, unspecified whether sciatica present        Plan:  Recommend over the counter anti-inflammatories for pain and/or swelling  Status post right TKA--patient is doing very well overall.  Right side was done robotically on the left side done manually.  She has done better on the left than the right side.  I will see her back in September 2024 with an x-ray of both knees.  This will become her anniversary.  I told her that I suspect a lot of her knee pain is coming from her irritated low back and patient apparently has been told that she is not a surgical candidate from her low back.  May be a better candidate for epidural pain pump if she gets dramatic relief but I will defer to her pain management specialist on this issue.  Continue indefinite antibiotic prophylaxis.      Dayo Chavez MD  07/02/24  13:01 EDT      Dictated Utilizing Dragon Dictation.

## 2024-09-03 ENCOUNTER — HOSPITAL ENCOUNTER (EMERGENCY)
Facility: HOSPITAL | Age: 64
Discharge: HOME | End: 2024-09-03
Attending: EMERGENCY MEDICINE
Payer: COMMERCIAL

## 2024-09-03 VITALS
RESPIRATION RATE: 18 BRPM | TEMPERATURE: 97.8 F | HEART RATE: 67 BPM | BODY MASS INDEX: 36.96 KG/M2 | SYSTOLIC BLOOD PRESSURE: 149 MMHG | OXYGEN SATURATION: 98 % | HEIGHT: 66 IN | WEIGHT: 230 LBS | DIASTOLIC BLOOD PRESSURE: 90 MMHG

## 2024-09-03 DIAGNOSIS — M54.9 EXACERBATION OF CHRONIC BACK PAIN: Primary | ICD-10-CM

## 2024-09-03 DIAGNOSIS — Z86.39 HISTORY OF DIABETES MELLITUS: ICD-10-CM

## 2024-09-03 DIAGNOSIS — M54.10 RADICULAR LEG PAIN: ICD-10-CM

## 2024-09-03 DIAGNOSIS — Z86.69 HISTORY OF OBSTRUCTIVE SLEEP APNEA: ICD-10-CM

## 2024-09-03 DIAGNOSIS — Z87.39 HISTORY OF FIBROMYALGIA: ICD-10-CM

## 2024-09-03 DIAGNOSIS — F11.90 CHRONIC NARCOTIC USE: ICD-10-CM

## 2024-09-03 DIAGNOSIS — G89.4 CHRONIC PAIN SYNDROME: ICD-10-CM

## 2024-09-03 DIAGNOSIS — M51.36 DEGENERATIVE DISC DISEASE, LUMBAR: ICD-10-CM

## 2024-09-03 DIAGNOSIS — Z86.79 HISTORY OF HYPERTENSION: ICD-10-CM

## 2024-09-03 DIAGNOSIS — G89.29 EXACERBATION OF CHRONIC BACK PAIN: Primary | ICD-10-CM

## 2024-09-03 PROCEDURE — 99283 EMERGENCY DEPT VISIT LOW MDM: CPT

## 2024-09-03 PROCEDURE — 25010000002 HYDROMORPHONE 1 MG/ML SOLUTION: Performed by: EMERGENCY MEDICINE

## 2024-09-03 PROCEDURE — 96372 THER/PROPH/DIAG INJ SC/IM: CPT

## 2024-09-03 PROCEDURE — 25010000002 KETOROLAC TROMETHAMINE PER 15 MG: Performed by: EMERGENCY MEDICINE

## 2024-09-03 RX ORDER — METHOCARBAMOL 750 MG/1
750 TABLET, FILM COATED ORAL ONCE
Status: COMPLETED | OUTPATIENT
Start: 2024-09-03 | End: 2024-09-03

## 2024-09-03 RX ORDER — KETOROLAC TROMETHAMINE 30 MG/ML
30 INJECTION, SOLUTION INTRAMUSCULAR; INTRAVENOUS ONCE
Status: COMPLETED | OUTPATIENT
Start: 2024-09-03 | End: 2024-09-03

## 2024-09-03 RX ORDER — METHOCARBAMOL 750 MG/1
750 TABLET, FILM COATED ORAL 3 TIMES DAILY PRN
Qty: 21 TABLET | Refills: 0 | Status: SHIPPED | OUTPATIENT
Start: 2024-09-03

## 2024-09-03 RX ORDER — LIDOCAINE 50 MG/G
1 PATCH TOPICAL EVERY 24 HOURS
Qty: 15 PATCH | Refills: 0 | Status: SHIPPED | OUTPATIENT
Start: 2024-09-03

## 2024-09-03 RX ADMIN — KETOROLAC TROMETHAMINE 30 MG: 30 INJECTION, SOLUTION INTRAMUSCULAR; INTRAVENOUS at 21:05

## 2024-09-03 RX ADMIN — METHOCARBAMOL 750 MG: 750 TABLET ORAL at 21:06

## 2024-09-03 RX ADMIN — HYDROMORPHONE HYDROCHLORIDE 1 MG: 1 INJECTION, SOLUTION INTRAMUSCULAR; INTRAVENOUS; SUBCUTANEOUS at 21:06

## 2024-09-04 NOTE — DISCHARGE INSTRUCTIONS
This is a patient having acute exacerbation of chronic low back pain after traveling in a car for several hours with a recent trip.  Patient's last MRI of the lumbar spine in June, 2024 showed multilevel degenerative changes and bulging disks, with increased bulging disc at L3-L4 with narrowing on the left.  Patient needs to rest the back with no frequent twisting, bending, or any lifting greater than 10 pounds.  We gave a pain shot here in the ER and will prescribe Robaxin and diclofenac and Lidoderm patches on discharge.  Continue with routinely prescribed Percocet 10 mg by mouth 3 times daily.  Contact her chronic pain management specialist in the morning for first available recheck.  Return to the ER if any worsening symptoms and continue with all other current medical management.

## 2024-09-04 NOTE — ED PROVIDER NOTES
Subjective   History of Present Illness  This is a 64-year-old female that presents the ER with acute exacerbation of chronic low back pain.  Patient says that she drove to [place] recently and she developed increased low back pain while she was down there, approximately 5 days ago.  Patient says that her back was hurting so bad she cannot make the trip back home and needed to rest for few days.  She denied any fall or twisting injury or repetitive lifting while down in [place].  She describes pain to the left lower back that radiates to the left posterior upper leg.  She says pain is sharp, shooting, and stabbing.  She denies any numbness or tingling to the leg.  She says the only relief she can have for her back is being in a seated position with her legs straight out in front of her.  She denies any urinary or bowel changes or incontinence.  Pain is worsened with twisting, standing, or lying supine.  Patient follows with [facility] for chronic back pain and takes Percocet 10 mg by mouth 3 times daily, which is not helping.  Patient also has past medical history of chronic low back pain without previous back surgery, fibromyalgia, anxiety, osteoarthritis, diabetes mellitus, mitral valve prolapse, migraine headaches, hypothyroidism, asthma, depression, hypertension, obstructive sleep apnea, hypertriglyceridemia, and restless leg syndrome.  Patient does not have a follow-up at her pain clinic until 9/15/2024.  She has called multiple times and they cannot get her in sooner.    History provided by:  Patient  Back Pain  Location:  Lumbar spine (Left lower)  Quality:  Shooting and stabbing  Radiates to:  L posterior upper leg  Timing:  Constant  Progression:  Unchanged  Chronicity:  Chronic (acute exacerbation of chronic low back pain.)  Context: not falling and not lifting heavy objects    Context comment:  Recent trip to  [place] in a car; at least 10 hr trip. Acute exacerbation of  chronic low back pain x 5 days. Pain is left sided; sharp and stabbing with rad to left posterior leg.  Relieved by: Sitting position with legs straight out in front of her.  Worsened by:  Twisting, standing, lying down, ambulation and bending  Ineffective treatments: Percocet 10 mg TID and old Rx for Ibuprofen 800 mg TID.  Associated symptoms: leg pain (left posterior lower extremity pain.)    Associated symptoms: no abdominal pain, no abdominal swelling, no dysuria, no numbness, no paresthesias and no tingling    Associated symptoms comment:  N/V x 2 today.    Risk factors comment:  Pt follows with [facility] and takes Percocet 10 mg TID. Also, h/o fibromyalgia. No previous back surgery.      Review of Systems   Constitutional: Negative.    Respiratory: Negative.     Cardiovascular: Negative.  Negative for leg swelling.   Gastrointestinal:  Positive for nausea and vomiting (x 2 today secondary to pain). Negative for abdominal distention, abdominal pain, constipation and diarrhea (Normal BM today. No bowel incontinence.).   Genitourinary: Negative.  Negative for dysuria, enuresis, flank pain, frequency and urgency.        No urinary incontinence   Musculoskeletal:  Positive for back pain (History of chronic low back pain; increased left lower back pain x5 days after 10 hour trip to [place] in car. Pain is sharp and shooting and radiates to LLE) and gait problem.   Neurological: Negative.  Negative for tingling, numbness and paresthesias.   All other systems reviewed and are negative.      Past Medical History:   Diagnosis Date    Anxiety     Arthritis     Asthma     Back pain     Cancer     SQUAMOUS CELL/ BASAL CELL ON LIP, under the nose as well    Chronic bronchitis     Depression     Diabetes mellitus     type 2    Fibromyalgia     High triglycerides     Hyperlipidemia     Hypertension     Hypothyroidism     Migraine     MVP (mitral valve prolapse)     OA (osteoarthritis)     Ovarian cyst      "Peripheral autonomic neuropathy     Pleurisy     Pneumonia     Rheumatism     RLS (restless legs syndrome)     Sleep apnea     Mild form per patient. recommended mouthpiece       Allergies   Allergen Reactions    Penicillins Angioedema, Anaphylaxis, Hives, Itching, Shortness Of Breath and Swelling    Pregabalin Anaphylaxis    Cephalexin Itching    Chlorhexidine Rash and Itching    Sulfa Antibiotics Rash and Itching    Adhesive Tape Rash     Tegaderm specific    Darvon [Propoxyphene] Unknown (See Comments)     Doesn't remember    Diazepam Other (See Comments)     Paradoxical effect/ hypes pt       Past Surgical History:   Procedure Laterality Date    APPENDECTOMY      CATARACT EXTRACTION W/ INTRAOCULAR LENS IMPLANT Right 2019    Procedure: CATARACT PHACO EXTRACTION WITH INTRAOCULAR LENS IMPLANT RIGHT;  Surgeon: Carl Babb MD;  Location:  [facility];  Service: Ophthalmology    CATARACT EXTRACTION W/ INTRAOCULAR LENS IMPLANT Left 10/07/2019    Procedure: CATARACT PHACO EXTRACTION WITH INTRAOCULAR LENS IMPLANT LEFT;  Surgeon: Carl Babb MD;  Location:  [facility];  Service: Ophthalmology     SECTION  , 1997    x 2     DILATATION AND CURETTAGE      HAND SURGERY Right     \"Pinched nerve surgery\"    HAND SURGERY Left     Joint removed secondary to arthritis    HYSTERECTOMY      ''still have one ovary''    KNEE SURGERY Right 2021    arthroscopy and partial medial meniscectomy Dr. Chavez    OOPHORECTOMY      THYROIDECTOMY  1985    TONSILLECTOMY AND ADENOIDECTOMY      TOTAL KNEE ARTHROPLASTY Left 03/15/2023    Procedure: TOTAL KNEE ARTHROPLASTY -  LEFT;  Surgeon: Dayo Chavez MD;  Location:  [facility];  Service: Orthopedics;  Laterality: Left;    TOTAL KNEE ARTHROPLASTY Right 2023    Procedure: TOTAL KNEE ARTHROPLASTY WITH BERNICE ROBOT - RIGHT;  Surgeon: Dayo Chavez MD;  Location:  [facility];  Service: Robotics - Ortho;  Laterality: Right; "       Family History   Problem Relation Age of Onset    Hypertension Mother     Heart attack Mother     Fibromyalgia Mother     Hyperlipidemia Mother     Thyroid disease Mother     Irritable bowel syndrome Mother     Heart attack Father     Cancer Brother     Stomach cancer Brother     Breast cancer Maternal Grandmother     Cancer Maternal Grandmother     Thyroid disease Maternal Grandmother     Cancer Paternal Grandmother     Diabetes Maternal Aunt     Colon cancer Neg Hx     Cirrhosis Neg Hx     Liver cancer Neg Hx     Liver disease Neg Hx        Social History     Socioeconomic History    Marital status:    Tobacco Use    Smoking status: Former     Current packs/day: 0.00     Average packs/day: 0.3 packs/day for 26.6 years (6.6 ttl pk-yrs)     Types: Cigarettes     Start date:      Quit date: 2004     Years since quittin.1     Passive exposure: Past    Smokeless tobacco: Never   Vaping Use    Vaping status: Never Used   Substance and Sexual Activity    Alcohol use: No    Drug use: No    Sexual activity: Defer     Partners: Male     Birth control/protection: Abstinence           Objective   Physical Exam  Vitals and nursing note reviewed.   Constitutional:       General: She is not in acute distress.     Appearance: Normal appearance. She is obese. She is not ill-appearing, toxic-appearing or diaphoretic.      Comments: Patient appears uncomfortable secondary to low back pain.  No acute distress   HENT:      Head: Normocephalic and atraumatic.      Nose: Nose normal.      Mouth/Throat:      Mouth: Mucous membranes are moist.   Eyes:      Extraocular Movements: Extraocular movements intact.      Conjunctiva/sclera: Conjunctivae normal.      Pupils: Pupils are equal, round, and reactive to light.   Cardiovascular:      Rate and Rhythm: Normal rate and regular rhythm.      Pulses: Normal pulses.      Heart sounds: Normal heart sounds.      Comments: Regular rate and rhythm.  No  ectopy  Pulmonary:      Effort: Pulmonary effort is normal.      Breath sounds: Normal breath sounds.      Comments: Lungs are clear to auscultation bilaterally  Abdominal:      General: Bowel sounds are normal. There is no distension (.ever).      Palpations: Abdomen is soft.      Tenderness: There is no abdominal tenderness. There is no right CVA tenderness, left CVA tenderness, guarding or rebound.      Comments: Central obesity.  Soft with active bowel sounds in all 4 quadrants.  Nontender to palpation   Musculoskeletal:         General: Normal range of motion.      Cervical back: Neck supple.      Lumbar back: Spasms present. No swelling, edema, deformity, signs of trauma, lacerations, tenderness or bony tenderness. Decreased range of motion. Negative right straight leg raise test and negative left straight leg raise test. No scoliosis.        Back:       Right lower leg: No edema.      Left lower leg: No edema.      Comments: History of tenderness to left lumbar myofascia and left buttocks.  Negative straight leg raise bilaterally.  Normal dorsi/plantarflexion bilaterally.  1+ patellar reflex on the left and 2+ patellar reflex on the right.  Painful flexion of the back and painful, slowed gait.   Skin:     General: Skin is warm and dry.   Neurological:      General: No focal deficit present.      Mental Status: She is alert and oriented to person, place, and time.      Cranial Nerves: Cranial nerves 2-12 are intact.      Sensory: Sensation is intact.      Motor: Motor function is intact.      Coordination: Coordination is intact.      Deep Tendon Reflexes:      Reflex Scores:       Patellar reflexes are 2+ on the right side and 1+ on the left side.     Comments: Neuro intact and nonfocal   Psychiatric:         Mood and Affect: Mood and affect normal.         Speech: Speech normal.         Behavior: Behavior normal. Behavior is cooperative.         Thought Content: Thought content normal.         Cognition and  Memory: Cognition and memory normal.         Judgment: Judgment normal.      Comments: Normal mood and affect         Procedures           ED Course  ED Course as of 09/03/24 2138   Tue Sep 03, 2024   2033 Pt has called [facility] multiple times and next follow up is 9/15/24. She is on Percocet 10 mg TID and also taking Ibuprofen 800 mg TID. [FC]   2130 Nursing triage note said that patient is having radicular symptoms to right lower extremity, but patient says that pain is to her left posterior lower leg at this time.  She denies any numbness or tingling to the left leg and she denies any urinary or bowel changes or incontinence.  Patient denies any recent fall or trauma.  She recently drove to [place], which was a 10-hour car ride.  Her back started hurting while she was down there approximately 5 days ago.  Patient follows with chronic pain management.  Her last MRI of the lumbar spine without contrast was performed on 6/10/2024.  After I reviewed that study in [facility], impression showed multilevel degenerative changes in the lumbar spine with central canal stenosis at level L3-L4 with moderate left neural foraminal stenosis.  Patient advised that we cannot give any narcotic medication on discharge since she signed a pain contract with her pain management center.  We gave a one-time dose of Dilaudid 1 mg IM, Robaxin-750 milligrams p.o., and Toradol 30 mg injection.  I advised patient to call her pain management physician in the morning for first available recheck.  I advised her to rest the back with no frequent twisting, bending, or any lifting greater than 10 pounds.  Return to the ER if any worsening symptoms of urinary or bowel incontinence or intractable pain. [FC]      ED Course User Index  [FC] Rebeka Roblero PA-C                                      No results found for this or any previous visit (from the past 24 hour(s)).  Note: In addition to lab results from this visit, the labs listed  "above may include labs taken at another facility or during a different encounter within the last 24 hours. Please correlate lab times with ED admission and discharge times for further clarification of the services performed during this visit.    No orders to display     Vitals:    09/03/24 1938 09/03/24 2011 09/03/24 2101   BP: 130/85 175/93 149/90   BP Location: Right arm     Patient Position: Sitting     Pulse: 71 72 67   Resp: 18     Temp: 97.8 °F (36.6 °C)     TempSrc: Oral     SpO2: 97% 97% 98%   Weight: 104 kg (230 lb)     Height: 167.6 cm (66\")       Medications   HYDROmorphone (DILAUDID) injection 1 mg (1 mg Intramuscular Given 9/3/24 2106)   methocarbamol (ROBAXIN) tablet 750 mg (750 mg Oral Given 9/3/24 2106)   ketorolac (TORADOL) injection 30 mg (30 mg Intramuscular Given 9/3/24 2105)     ECG/EMG Results (last 24 hours)       ** No results found for the last 24 hours. **          No orders to display              Medical Decision Making  Risk  Prescription drug management.        Final diagnoses:   Exacerbation of chronic back pain   Degenerative disc disease, lumbar   Radicular leg pain   History of fibromyalgia   History of diabetes mellitus   History of hypertension   History of obstructive sleep apnea   Chronic pain syndrome   Chronic narcotic use       ED Disposition  ED Disposition       ED Disposition   Discharge    Condition   Stable    Comment   --               [facility]    Call in 1 day  Call tomorrow for first available recheck    [facility] EMERGENCY DEPARTMENT  [address] [address] [phone]    If symptoms worsen         Medication List        New Prescriptions      diclofenac 50 MG EC tablet  Commonly known as: VOLTAREN  Take 1 tablet by mouth 3 (Three) Times a Day.     lidocaine 5 %  Commonly known as: Lidoderm  Place 1 patch on the skin as directed by provider Daily. Remove & Discard patch within 12 hours or as directed by MD   "   methocarbamol 750 MG tablet  Commonly known as: ROBAXIN  Take 1 tablet by mouth 3 (Three) Times a Day As Needed for Muscle Spasms.               Where to Get Your Medications        These medications were sent to [facility] [id number] - [place], [place] - [address] AT [facility] - [phone]  - [phone]   [address], [facility] [address]      Phone: [phone]   diclofenac 50 MG EC tablet  lidocaine 5 %  methocarbamol 750 MG tablet            Rebeka Roblero PA-C  09/03/24 4650

## 2024-09-10 ENCOUNTER — OFFICE VISIT (OUTPATIENT)
Dept: ORTHOPEDIC SURGERY | Facility: CLINIC | Age: 64
End: 2024-09-10
Payer: COMMERCIAL

## 2024-09-10 VITALS
SYSTOLIC BLOOD PRESSURE: 154 MMHG | DIASTOLIC BLOOD PRESSURE: 94 MMHG | HEIGHT: 66 IN | BODY MASS INDEX: 36.96 KG/M2 | WEIGHT: 230 LBS

## 2024-09-10 DIAGNOSIS — Z96.653 STATUS POST TOTAL BILATERAL KNEE REPLACEMENT: Primary | ICD-10-CM

## 2024-09-10 PROCEDURE — 3080F DIAST BP >= 90 MM HG: CPT | Performed by: ORTHOPAEDIC SURGERY

## 2024-09-10 PROCEDURE — 99213 OFFICE O/P EST LOW 20 MIN: CPT | Performed by: ORTHOPAEDIC SURGERY

## 2024-09-10 PROCEDURE — 3077F SYST BP >= 140 MM HG: CPT | Performed by: ORTHOPAEDIC SURGERY

## 2024-09-10 RX ORDER — OXYCODONE AND ACETAMINOPHEN 10; 325 MG/1; MG/1
TABLET ORAL
COMMUNITY
Start: 2024-08-17

## 2024-09-10 RX ORDER — DOCUSATE SODIUM AND SENNOSIDES 8.6; 5 MG/1; MG/1
1 TABLET, FILM COATED ORAL EVERY 12 HOURS SCHEDULED
COMMUNITY
Start: 2024-08-14

## 2024-09-10 NOTE — PROGRESS NOTES
[facility]        Subjective     CC: Follow-up (1 year S/P TOTAL KNEE ARTHROPLASTY WITH BERNICE ROBOT - RIGHT DOS (23)/)--1.5 year status post Left total knee arthroplasty 3-15-23/)      HPI    Janet Obrein is a 64 y.o. female.  Patient is here today now 1 year out from robot-assisted right TKA on 2023.  Had her left side replaced in 2023 manually.  She quite honestly has done better from the left manual side than the robotic assisted right side.  She is having more issues with her low back than anything else.  Says her legs are numb.    Overall, patient's symptoms are as above    ROS:    Constiutional:Pt denies fever, chills, nausea, or vomiting.  MSK:as above        Objective      Past Medical History  Past Medical History:   Diagnosis Date    Anxiety     Arthritis     Asthma     Back pain     Cancer     SQUAMOUS CELL/ BASAL CELL ON LIP, under the nose as well    Chronic bronchitis     Depression     Diabetes mellitus     type 2    Fibromyalgia     High triglycerides     Hyperlipidemia     Hypertension     Hypothyroidism     Migraine     MVP (mitral valve prolapse)     OA (osteoarthritis)     Ovarian cyst     Peripheral autonomic neuropathy     Pleurisy     Pneumonia     Rheumatism     RLS (restless legs syndrome)     Sleep apnea     Mild form per patient. recommended mouthpiece     Social History     Socioeconomic History    Marital status:    Tobacco Use    Smoking status: Former     Current packs/day: 0.00     Average packs/day: 0.3 packs/day for 26.6 years (6.6 ttl pk-yrs)     Types: Cigarettes     Start date:      Quit date: 2004     Years since quittin.1     Passive exposure: Past    Smokeless tobacco: Never   Vaping Use    Vaping status: Never Used   Substance and Sexual Activity    Alcohol use: No    Drug use: No    Sexual activity: Defer     Partners: Male     Birth control/protection: Abstinence          Physical Exam  /94   Ht  "167.6 cm (66\")   Wt 104 kg (230 lb)   BMI 37.12 kg/m²     Body mass index is 37.12 kg/m².    Patient is well nourished and well developed.        Ortho Exam        Lower Extremity:     Inspection and Palpation:      Bilateral knees:  Calf:  Soft and non tender  Effusion:  None  Pulses:  2+  Warmth:  None  Incision:  Healed     ROM:  Right:  Extension:0    Flexion:120  Left:     Extension: 0    Flexion:  120      Functional Testing:  Bilateral:  Straight Leg Raise: 5/5  Patella Tracking:  Normal          Imaging/Labs/EMG Reviewed and Interpreted:  Imaging Results (Last 24 Hours)       Procedure Component Value Units Date/Time    XR Knee 3 View Bilateral [[id number]] Resulted: 09/10/24 0837     Updated: 09/10/24 0838    Narrative:      Knee X-ray    Indication: status-post TKA    Study:  AP, Lateral, and Sunrise views of Bilateral knee    Comparison: Right knee 5/10/2024    Findings:  No signs of acute fracture are visualized  No signs of loosening are appreciated  Components are well aligned    Impression:  Status post Bilateral cemented total knee arthroplasty. No   signs of loosening or fracture.                Assessment    Assessment:  1. Status post total bilateral knee replacement        Plan:  Recommend over the counter anti-inflammatories for pain and/or swelling  Status post bilateral TKA--radiographs are reassuring overall.  She should continue indefinite antibiotic prophylaxis.  Follow-up in year with x-rays or sooner if necessary.  Hopefully she can get her back squared away and this will give her some long-term benefit and relief.      Dayo Chavez MD  09/10/24  08:59 EDT      Dictated Utilizing Dragon Dictation.  "

## 2024-10-30 ENCOUNTER — OFFICE VISIT (OUTPATIENT)
Dept: NEUROSURGERY | Facility: CLINIC | Age: 64
End: 2024-10-30
Payer: COMMERCIAL

## 2024-10-30 VITALS — BODY MASS INDEX: 37.52 KG/M2 | TEMPERATURE: 98.3 F | HEIGHT: 66 IN | WEIGHT: 233.5 LBS

## 2024-10-30 DIAGNOSIS — M48.061 SPINAL STENOSIS OF LUMBAR REGION WITHOUT NEUROGENIC CLAUDICATION: Primary | ICD-10-CM

## 2024-10-30 DIAGNOSIS — E66.01 MORBID OBESITY: ICD-10-CM

## 2024-10-30 DIAGNOSIS — M51.9 LUMBAR DISC DISEASE: ICD-10-CM

## 2024-10-30 DIAGNOSIS — M43.16 SPONDYLOLISTHESIS OF LUMBAR REGION: ICD-10-CM

## 2024-10-30 NOTE — PROGRESS NOTES
Patient: aJnet Obrien  : 1960    Primary Care Provider: Noe Davenport MD    Requesting Provider: As above        History    Chief Complaint: Low back and leg pain with sensory alteration weakness.    History of Present Illness: Ms. Obrien is a 64-year-old unemployed woman with a many year history of progressive back pain that extends into her legs.  She also has heaviness and numbness in her legs.  She has been treated with pain medicine for a number of years.  She is undergone extensive therapy and chiropractic in the past.  She has had injections done with the last being in  or July of this year by her report.  She has some urinary and bowel urgency.  She is worse if she attempts to stand up straight.  She does better leaning forward.  She is very intolerant of lying down.    Review of Systems   Constitutional:  Negative for activity change, appetite change, chills, diaphoresis, fatigue, fever and unexpected weight change.   HENT:  Negative for congestion, dental problem, drooling, ear discharge, ear pain, facial swelling, hearing loss, mouth sores, nosebleeds, postnasal drip, rhinorrhea, sinus pressure, sinus pain, sneezing, sore throat, tinnitus, trouble swallowing and voice change.    Eyes:  Negative for photophobia, pain, discharge, redness, itching and visual disturbance.   Respiratory:  Negative for apnea, cough, choking, chest tightness, shortness of breath, wheezing and stridor.    Cardiovascular:  Negative for chest pain, palpitations and leg swelling.   Gastrointestinal:  Negative for abdominal distention, abdominal pain, anal bleeding, blood in stool, constipation, diarrhea, nausea, rectal pain and vomiting.   Endocrine: Negative for cold intolerance, heat intolerance, polydipsia, polyphagia and polyuria.   Genitourinary:  Negative for decreased urine volume, difficulty urinating, dyspareunia, dysuria, enuresis, flank pain, frequency, genital sores, hematuria, menstrual problem,  "pelvic pain, urgency, vaginal bleeding, vaginal discharge and vaginal pain.   Musculoskeletal:  Positive for arthralgias, back pain and gait problem. Negative for joint swelling, myalgias, neck pain and neck stiffness.   Skin:  Negative for color change, pallor, rash and wound.   Allergic/Immunologic: Negative for environmental allergies, food allergies and immunocompromised state.   Neurological:  Positive for numbness. Negative for dizziness, tremors, seizures, syncope, facial asymmetry, speech difficulty, weakness, light-headedness and headaches.   Hematological:  Negative for adenopathy. Does not bruise/bleed easily.   Psychiatric/Behavioral:  Negative for agitation, behavioral problems, confusion, decreased concentration, dysphoric mood, hallucinations, self-injury, sleep disturbance and suicidal ideas. The patient is not nervous/anxious and is not hyperactive.    All other systems reviewed and are negative.      The patient's past medical history, past surgical history, family history, and social history have been reviewed at length in the electronic medical record.      Physical Exam:   Temp 98.3 °F (36.8 °C) (Temporal)   Ht 167.6 cm (66\")   Wt 106 kg (233 lb 8 oz)   BMI 37.69 kg/m²   CONSTITUTIONAL: Patient is well-nourished, pleasant and appears stated age.  MUSCULOSKELETAL:  Straight leg raising is negative.  Alex's Sign is negative.  ROM in the low back is normal.  Tenderness in the back to palpation is not observed.  NEUROLOGICAL:  Orientation, memory, attention span, language function, and cognition have been examined and are intact.  Strength is intact in the lower extremities to direct testing.  Muscle tone is normal throughout.  Station and gait are normal.  Sensation is diminished light touch testing globally in her legs and thighs.  Deep tendon reflexes are difficult to elicit throughout.  Coordination is intact.      Medical Decision Making    Data Review:   (All imaging studies were " personally reviewed unless stated otherwise)  MRI of the lumbar spine dated 6/10/2024 demonstrates generous grade stenosis at L3-4 where there are bilateral joint effusions.  Synovial cyst emanates from the right facet joint and contributes to the stenosis.  There could be a slight offset of L4 and L5 where there is at least moderate stenosis.    Diagnosis:   1.  Chronic mechanical low back pain.  2.  Lumbar stenosis with neurogenic claudication.  3.  Possible lumbar spondylolisthesis.  4.  Morbid obesity.    Treatment Options:   I have referred the patient for flexion-extension upright lateral lumbar spine x-rays to assess for instability.  She will follow-up thereafter.      Scribed for Jorge Guo MD by Bronwyn Lewis CMA on 10/30/2024 16:27 EDT       I, Dr. Guo, personally performed the services described in the documentation, as scribed in my presence, and it is both accurate and complete.

## 2024-11-04 ENCOUNTER — HOSPITAL ENCOUNTER (OUTPATIENT)
Dept: GENERAL RADIOLOGY | Facility: HOSPITAL | Age: 64
Discharge: HOME | End: 2024-11-04
Admitting: NEUROLOGICAL SURGERY
Payer: COMMERCIAL

## 2024-11-04 DIAGNOSIS — M43.16 SPONDYLOLISTHESIS OF LUMBAR REGION: ICD-10-CM

## 2024-11-04 PROCEDURE — 72120 X-RAY BEND ONLY L-S SPINE: CPT

## 2024-11-08 ENCOUNTER — OFFICE VISIT (OUTPATIENT)
Dept: NEUROSURGERY | Facility: CLINIC | Age: 64
End: 2024-11-08
Payer: COMMERCIAL

## 2024-11-08 VITALS — HEIGHT: 66 IN | TEMPERATURE: 98.6 F | BODY MASS INDEX: 37.43 KG/M2 | WEIGHT: 232.9 LBS

## 2024-11-08 DIAGNOSIS — M54.9 MECHANICAL BACK PAIN: Primary | ICD-10-CM

## 2024-11-08 DIAGNOSIS — E66.01 MORBID OBESITY: ICD-10-CM

## 2024-11-08 DIAGNOSIS — M51.9 LUMBAR DISC DISEASE: ICD-10-CM

## 2024-11-08 DIAGNOSIS — M48.062 SPINAL STENOSIS OF LUMBAR REGION WITH NEUROGENIC CLAUDICATION: ICD-10-CM

## 2024-11-08 RX ORDER — MELOXICAM 7.5 MG/1
7.5 TABLET ORAL DAILY
Qty: 30 TABLET | Refills: 2 | Status: SHIPPED | OUTPATIENT
Start: 2024-11-08

## 2024-11-08 NOTE — PROGRESS NOTES
Patient: Janet Obrien  : 1960    Primary Care Provider: Noe Davenport MD    Requesting Provider: As above        History    Chief Complaint: Low back and leg pain with sensory alteration weakness.    History of Present Illness: Ms. Obrien is a 64-year-old unemployed woman with a many year history of progressive back pain that extends into her legs. She also has heaviness and numbness in her legs. She has been treated with pain medicine for a number of years. She is undergone extensive therapy and chiropractic in the past. She has had injections done with the last being in  or July of this year by her report. She has some urinary and bowel urgency. She is worse if she attempts to stand up straight. She does better leaning forward. She is very intolerant of lying down.  She has a new pain doctor that she has seen once.  At this point her main complaint is pain with lying down.  She does have some walking and standing intolerance.    Review of Systems   Constitutional:  Negative for activity change, appetite change, chills, diaphoresis, fatigue, fever and unexpected weight change.   HENT:  Negative for congestion, dental problem, drooling, ear discharge, ear pain, facial swelling, hearing loss, mouth sores, nosebleeds, postnasal drip, rhinorrhea, sinus pressure, sinus pain, sneezing, sore throat, tinnitus, trouble swallowing and voice change.    Eyes:  Negative for photophobia, pain, discharge, redness, itching and visual disturbance.   Respiratory:  Negative for apnea, cough, choking, chest tightness, shortness of breath, wheezing and stridor.    Cardiovascular:  Negative for chest pain, palpitations and leg swelling.   Gastrointestinal:  Negative for abdominal distention, abdominal pain, anal bleeding, blood in stool, constipation, diarrhea, nausea, rectal pain and vomiting.   Endocrine: Negative for cold intolerance, heat intolerance, polydipsia, polyphagia and polyuria.   Genitourinary:   "Negative for decreased urine volume, difficulty urinating, dyspareunia, dysuria, enuresis, flank pain, frequency, genital sores, hematuria, menstrual problem, pelvic pain, urgency, vaginal bleeding, vaginal discharge and vaginal pain.   Musculoskeletal:  Positive for arthralgias, back pain and gait problem. Negative for joint swelling, myalgias, neck pain and neck stiffness.   Skin:  Negative for color change, pallor, rash and wound.   Allergic/Immunologic: Negative for environmental allergies, food allergies and immunocompromised state.   Neurological:  Negative for dizziness, tremors, seizures, syncope, facial asymmetry, speech difficulty, weakness, light-headedness, numbness and headaches.   Hematological:  Negative for adenopathy. Does not bruise/bleed easily.   Psychiatric/Behavioral:  Negative for agitation, behavioral problems, confusion, decreased concentration, dysphoric mood, hallucinations, self-injury, sleep disturbance and suicidal ideas. The patient is not nervous/anxious and is not hyperactive.    All other systems reviewed and are negative.      The patient's past medical history, past surgical history, family history, and social history have been reviewed at length in the electronic medical record.      Physical Exam:   Temp 98.6 °F (37 °C) (Temporal)   Ht 167.6 cm (66\")   Wt 106 kg (232 lb 14.4 oz)   BMI 37.59 kg/m²   Deferred    Medical Decision Making    Data Review:   (All imaging studies were personally reviewed unless stated otherwise)  MRI of the lumbar spine dated 6/10/2024 demonstrates generous grade stenosis at L3-4 where there are bilateral joint effusions. Synovial cyst emanates from the right facet joint and contributes to the stenosis. There could be a slight offset of L4 and L5 where there is at least moderate stenosis.     Flexion-extension films do not demonstrate translational instability.  There is some scoliosis and extensive spondylosis.    Diagnosis:   1.  Mechanical low " back pain.  2.  Lumbar stenosis with some degree of neurogenic claudication.    Treatment Options:   At this juncture most of her symptoms are mechanical and not due to to her spinal stenosis.  However, I do believe there is at least some neurogenic claudication that is ongoing.  I recommended that she attempt some facet blocks and possible rhizotomies.  If she is still struggling and she will follow-up in our clinic.  Surgery would entail decompressive laminectomies at L3-4 and L4-5.  This would help with some of her standing intolerance but certainly would not help the pain she gets with lying down or changing positions.  I have prescribed meloxicam.      Scribed for Jorge Guo MD by Bronwyn Lewis CMA on 11/8/2024 09:18 EST       I, Dr. Guo, personally performed the services described in the documentation, as scribed in my presence, and it is both accurate and complete.

## 2025-01-06 NOTE — PLAN OF CARE
Goal Outcome Evaluation:  Plan of Care Reviewed With: patient        Progress: no change  Outcome Evaluation: Pt. continues to present below baseline function w/decreased strength and AROM and acute pain of R knee affecting her ability to safely participate in functional mobility. She performed bed mobility, transfers and ambulated 40' w/front wheeled walker, contact guard assist. Activity limited by pain. Pt. tolerated ther-ex. Continue IPPT to address stated deficits.      Anticipated Discharge Disposition (PT): home with assist, home with home health (when pain managed and pt. able to navigate stairs; if not inpatient rehab facility)   SUBJECTIVE:  HPI: Guillermina Lyons is a 58 year old female who presents for evaluation of bladder wall thickening and renal cyst noted on recent CT scan. She had left flank pain and hematuria.  She has had microhematuria for some time now.  She is status post cystoscopy on 9/24/2024.  This was normal.  She was given Vesicare 5 mg daily for her urinary symptoms including her urinary frequency and urge incontinence.  She now presents for follow-up.  She feels she is doing well with the vesicare now - the urgency is improved. She is able ot hold the urine for longer. When she does go - she has a better flow as well.     No GH. No Dysuria.   She has h/o breast cancer.       She was sent by Sarah Light MD for my opinion regarding this condition.    Patient Active Problem List   Diagnosis    Microcytic anemia    Breast cancer metastasized to axillary lymph node  (CMD)    Essential hypertension, benign    Hyperthyroidism    Malignant neoplasm of central portion of left female breast (CMD)    Mammographic microcalcification    Obesity (BMI 30-39.9)    Graves disease    Family history of malignant neoplasm of breast    Breast asymmetry    Status post breast lumpectomy    H/O total hysterectomy    Osteoporosis without current pathological fracture    History of breast cancer in female    History of chemotherapy    History of external beam radiation therapy    Iron deficiency anemia due to chronic blood loss       Current Outpatient Medications   Medication Sig Dispense Refill    solifenacin (VESIcare) 5 MG tablet Take 1 tablet by mouth daily. 90 tablet 3    alendronate (FOSAMAX) 70 MG tablet TAKE 1 TABLET BY MOUTH EVERY 7 DAYS 12 tablet 3    methIMAzole (TAPAZOLE) 5 MG tablet Take 0.5 tablets by mouth every other day. TAKE 1/2 TABLET BY MOUTH DAILY 21 tablet 1    enalapril (VASOTEC) 10 MG tablet Take 1.5 tablets by mouth daily. Begin taking on August 13, 2024. 135 tablet 1    hydroCHLOROthiazide 25 MG tablet Take 1.5  tablets by mouth daily. Begin taking on 2024. 135 tablet 1    Ferrous Sulfate 47.5 MG Tab CR Take 47.5 mg by mouth every other day. 90 tablet 0     No current facility-administered medications for this visit.       ALLERGIES:   Allergen Reactions    Adhesive   (Environmental) RASH       Past Surgical History:  Past Surgical History:   Procedure Laterality Date    Breast biopsy Left      section, low transverse      D and c  2018    Hysterectomy  2019       Social History     Socioeconomic History    Marital status: /Civil Union     Spouse name: Not on file    Number of children: 2    Years of education: Not on file    Highest education level: Not on file   Occupational History    Occupation: [employer]   Tobacco Use    Smoking status: Never    Smokeless tobacco: Never   Substance and Sexual Activity    Alcohol use: No    Drug use: No    Sexual activity: Yes     Partners: Male     Birth control/protection: None   Other Topics Concern    Not on file   Social History Narrative    Not on file     Social Determinants of Health     Financial Resource Strain: Low Risk  (2024)    Financial Resource Strain     Unable to Get: None   Food Insecurity: Low Risk  (2024)    Food Insecurity     Worried about Food: Never true     Food is Gone: Never true   Transportation Needs: Not At Risk (2024)    Transportation Needs     Lack of Reliable Transportation: No   Physical Activity: Medium Risk (2024)    Exercise Vital Sign     Days of Exercise per Week: 2 days     Minutes of Exercise per Session: 30 min   Stress: High Risk (2024)    Stress     How Stressed: Quite a bit   Social Connections: Medium Risk (2024)    Social Connections     Social Connectivity: 1 or 2 times a week   Interpersonal Safety: Not on file     Social History     Tobacco Use   Smoking Status Never   Smokeless Tobacco Never        Family History   Problem Relation Age of Onset    Hypertension Mother      Diabetes Father     Cancer, Prostate Maternal Grandfather     Hypertension Maternal Grandfather     Hypertension Paternal Grandmother     Patient is unaware of any medical problems Son     Patient is unaware of any medical problems Son     Stroke Paternal Uncle      Negative for any  issues.      ROS:  Constitutional:  Denies fevers, weight change or fatigue  Cardiovascular:  denies chest pain, SOB, bipedal edema  Pulmonary:  Denies wheezing, no cough  GI:  Denies significant nausea, vomiting or diarrhea  Musculoskeletal:  Denies significant back pain, joint pain   Neurologic:  Denies seizures or headaches  Psychiatric:  Denies anxiety, depression   Skin:  Denies rashes   Endocrine:  Denies DM, Thyroid dysfunction   Heme/Lymph:  Denies easy bruising or anemia    OBJECTIVE:   Physical Exam:       Visit Vitals  Temp 97.7 °F (36.5 °C) (Temporal)   Ht 5' 6\" (1.676 m)   Wt 103.5 kg (228 lb 2.8 oz)   LMP  (LMP Unknown)   BMI 36.83 kg/m²     General: Well developed, well nourished female   Psych: Alert and oriented to person, place, and time.   Skin: Normal color/tone. Without obvious lesions  Lungs: No laboring or audible wheezing  Abdomen: Soft, non-tender, not distended. No organomegaly.  Lymph: no inguinal nodes  : Not performed  Musculoskeletal: No CVAT  Extremities: No cyanosis, clubbing, or edema     Post-void Residual (performed today in clinic): 0 ml    Laboratory:  Most recent UA:  Lab Results   Component Value Date    COLOR YELLOW 08/24/2020       No components found for: \"CREAT\"    Lab Results   Component Value Date    HGB 11.4 (L) 11/11/2024    HCT 37.0 11/11/2024    MCV 76.4 (L) 11/11/2024    WBC 7.1 11/11/2024     11/11/2024      CT - 8/6/24 -   IMPRESSION:  1. No renal or ureteral calculi. No hydronephrosis.  2. Left renal cyst measuring, marginally enlarged from 2020. No internal  complexity or nodularity. No new or suspicious renal mass.  3. Colonic constipation.  4. Concentric bladder wall  thickening; correlate for cystitis. Consider  cystoscopy if there is ongoing clinical symptomatology.  5. Additional findings as above.       ASSESSMENT:    Bladder wall thickening   Microhematuria - neg workup. 9/24/24  Left renal cyst   Flank pain - likely musculoskeletal. Improved now.   Urinary frequency - improved with vesicare 5mg.     PLAN:  Notify me if you have visible blood in the urine.   Continue vesicare 5mg daily.  Side effects of this type of medication include, but are not limited to, dry mouth, constipation, stomach upset, urinary retention, confusion/delirium, drowsiness, or allergic reaction.   Follow up in 12 months.       Patient verbalized understanding.   Electronically signed by: Michael Lopez DO  1/7/2025

## 2025-01-16 ENCOUNTER — HOSPITAL ENCOUNTER (EMERGENCY)
Facility: HOSPITAL | Age: 65
Discharge: HOME | End: 2025-01-16
Attending: STUDENT IN AN ORGANIZED HEALTH CARE EDUCATION/TRAINING PROGRAM
Payer: COMMERCIAL

## 2025-01-16 ENCOUNTER — APPOINTMENT (OUTPATIENT)
Dept: GENERAL RADIOLOGY | Facility: HOSPITAL | Age: 65
End: 2025-01-16
Payer: COMMERCIAL

## 2025-01-16 VITALS
DIASTOLIC BLOOD PRESSURE: 81 MMHG | BODY MASS INDEX: 37.56 KG/M2 | SYSTOLIC BLOOD PRESSURE: 108 MMHG | WEIGHT: 233.69 LBS | RESPIRATION RATE: 18 BRPM | HEIGHT: 66 IN | HEART RATE: 74 BPM | TEMPERATURE: 98.4 F | OXYGEN SATURATION: 99 %

## 2025-01-16 DIAGNOSIS — M53.3 COCCYODYNIA: Primary | ICD-10-CM
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
## 2025-01-16 PROCEDURE — 99283 EMERGENCY DEPT VISIT LOW MDM: CPT | Performed by: STUDENT IN AN ORGANIZED HEALTH CARE EDUCATION/TRAINING PROGRAM

## 2025-01-16 PROCEDURE — 72100 X-RAY EXAM L-S SPINE 2/3 VWS: CPT

## 2025-01-16 PROCEDURE — 25010000002 KETOROLAC TROMETHAMINE PER 15 MG: Performed by: STUDENT IN AN ORGANIZED HEALTH CARE EDUCATION/TRAINING PROGRAM

## 2025-01-16 PROCEDURE — 96372 THER/PROPH/DIAG INJ SC/IM: CPT

## 2025-01-16 RX ORDER — KETOROLAC TROMETHAMINE 30 MG/ML
30 INJECTION, SOLUTION INTRAMUSCULAR; INTRAVENOUS ONCE
Status: COMPLETED | OUTPATIENT
Start: 2025-01-16 | End: 2025-01-16

## 2025-01-16 RX ADMIN — KETOROLAC TROMETHAMINE 30 MG: 30 INJECTION, SOLUTION INTRAMUSCULAR; INTRAVENOUS at 10:55

## 2025-01-16 NOTE — DISCHARGE INSTRUCTIONS
X-ray of the low back and tailbone demonstrated no fractures (breaks in the bone)  You may continue taking over-the-counter medicines as needed to help with your symptoms  Follow-up with primary care provider as instructed for further monitoring and management of your symptoms otherwise do not hesitate to return if symptoms worsen in any way

## 2025-01-16 NOTE — ED PROVIDER NOTES
[facility] EMERGENCY DEPARTMENT  Emergency Department Encounter  Emergency Medicine Physician Note       Pt Name: Janet Obrien  MRN: [id number]  Pt :   1960  Room Number:  24SF/24  Date of encounter:  2025  PCP: Noe Davenport MD  ED Provider: Robin Denny MD    Historian: Patient      HPI:  Chief Complaint: Fall        Context: Janet Obrien is a 64 y.o. female who presents to the ED for fall.  Patient reports suffering a ground-level fall on .  She states at that time she slipped on ice at her doorstep and fell backwards onto her tailbone.  She has had progressive pain at this site.  She denies any head injury no neck pain.  She has been ambulating but states it is painful to do so.  She has to lay on her sides or in certain positions to get comfortable.  No abdominal pain.  No numbness or weakness reported.      PAST MEDICAL HISTORY  Past Medical History:   Diagnosis Date    Anxiety     Arthritis     Asthma     Back pain     Cancer     SQUAMOUS CELL/ BASAL CELL ON LIP, under the nose as well    Chronic bronchitis     Depression     Diabetes mellitus     type 2    Fibromyalgia     High triglycerides     Hyperlipidemia     Hypertension     Hypothyroidism     Migraine     MVP (mitral valve prolapse)     OA (osteoarthritis)     Ovarian cyst     Peripheral autonomic neuropathy     Pleurisy     Pneumonia     Rheumatism     RLS (restless legs syndrome)     Sleep apnea     Mild form per patient. recommended mouthpiece         PAST SURGICAL HISTORY  Past Surgical History:   Procedure Laterality Date    APPENDECTOMY      CATARACT EXTRACTION W/ INTRAOCULAR LENS IMPLANT Right 2019    Procedure: CATARACT PHACO EXTRACTION WITH INTRAOCULAR LENS IMPLANT RIGHT;  Surgeon: Carl Babb MD;  Location: [facility];  Service: Ophthalmology    CATARACT EXTRACTION W/ INTRAOCULAR LENS IMPLANT Left 10/07/2019    Procedure: CATARACT PHACO EXTRACTION WITH INTRAOCULAR  "LENS IMPLANT LEFT;  Surgeon: Carl Babb MD;  Location:  [facility];  Service: Ophthalmology     SECTION  , 1997    x 2     DILATATION AND CURETTAGE      HAND SURGERY Right     \"Pinched nerve surgery\"    HAND SURGERY Left     Joint removed secondary to arthritis    HYSTERECTOMY      ''still have one ovary''    KNEE SURGERY Right 2021    arthroscopy and partial medial meniscectomy Dr. Chavez    OOPHORECTOMY      THYROIDECTOMY  1985    TONSILLECTOMY AND ADENOIDECTOMY      TOTAL KNEE ARTHROPLASTY Left 03/15/2023    Procedure: TOTAL KNEE ARTHROPLASTY -  LEFT;  Surgeon: Dayo Chavez MD;  Location:  [facility];  Service: Orthopedics;  Laterality: Left;    TOTAL KNEE ARTHROPLASTY Right 2023    Procedure: TOTAL KNEE ARTHROPLASTY WITH BERNICE ROBOT - RIGHT;  Surgeon: Dayo Chavez MD;  Location:  [facility];  Service: Robotics - Ortho;  Laterality: Right;         FAMILY HISTORY  Family History   Problem Relation Age of Onset    Hypertension Mother     Heart attack Mother     Fibromyalgia Mother     Hyperlipidemia Mother     Thyroid disease Mother     Irritable bowel syndrome Mother     Heart attack Father     Cancer Brother     Stomach cancer Brother     Breast cancer Maternal Grandmother     Cancer Maternal Grandmother     Thyroid disease Maternal Grandmother     Cancer Paternal Grandmother     Diabetes Maternal Aunt     Colon cancer Neg Hx     Cirrhosis Neg Hx     Liver cancer Neg Hx     Liver disease Neg Hx          SOCIAL HISTORY  Social History     Socioeconomic History    Marital status:    Tobacco Use    Smoking status: Former     Current packs/day: 0.00     Average packs/day: 0.3 packs/day for 26.6 years (6.6 ttl pk-yrs)     Types: Cigarettes     Start date:      Quit date: 2004     Years since quittin.5     Passive exposure: Past    Smokeless tobacco: Never   Vaping Use    Vaping status: Never Used   Substance and Sexual Activity    " Alcohol use: No    Drug use: No    Sexual activity: Defer     Partners: Male     Birth control/protection: Abstinence         ALLERGIES  Penicillins, Pregabalin, Cephalexin, Chlorhexidine, Sulfa antibiotics, Adhesive tape, Darvon [propoxyphene], and Diazepam        REVIEW OF SYSTEMS  As noted in HPI      PHYSICAL EXAM    I have reviewed the triage vital signs and nursing notes.    ED Triage Vitals [01/16/25 0932]   Temp Heart Rate Resp BP SpO2   98.4 °F (36.9 °C) 74 18 108/81 99 %      Temp src Heart Rate Source Patient Position BP Location FiO2 (%)   Oral Monitor Sitting Left arm --       Physical Exam  Constitutional:       General: She is not in acute distress.  HENT:      Head: Atraumatic.   Musculoskeletal:      Cervical back: Neck supple.      Comments: Tenderness to palpation lower lumbar sacral spine and coccygeal region no step-off or deformity overlying skin lesions rashes or abrasions   Neurological:      Mental Status: She is alert.      Comments: Antalgic gait       LAB RESULTS  No results found for this or any previous visit (from the past 24 hours).    If labs were ordered, I independently reviewed the results and considered them in treating the patient.        RADIOLOGY  XR Spine Lumbar 2 or 3 View    Result Date: 1/16/2025  PROCEDURE: XR SPINE LUMBAR 2 OR 3 VW-  HISTORY: Low back and coccyx pain after ground level fall  COMPARISON: November 4, 2024.  FINDINGS: Three views of the lumbar spine were obtained. The pedicles are intact. There is mild to moderate disc base narrowing at multiple lumbar levels. Minimal osteophyte formation noted. There is no acute fracture. There is minimal anterolisthesis L3 on L4 and L4 on L5, similar to prior. There is mild thoracolumbar scoliosis convex to the right similar to the prior exam. Degenerative change of the SI joints more prominent inferiorly again noted. Diffuse osteopenia identified.      No acute process.         This report was signed and finalized on  1/16/2025 12:20 PM by Cris Herrera MD.       PROCEDURES    Procedures    No orders to display       MEDICATIONS GIVEN IN ER    Medications   ketorolac (TORADOL) injection 30 mg (30 mg Intramuscular Given 1/16/25 1055)         MEDICAL DECISION MAKING, PROGRESS, and CONSULTS    All labs, if obtained, have been independently reviewed by me.  All radiology studies, if obtained, have been reviewed by me and the radiologist dictating the report.  All EKG's, if obtained, have been independently viewed and interpreted by me.      Discussion below represents my analysis of pertinent findings related to patient's condition, differential diagnosis, treatment plan and final disposition.                         Differential diagnosis:    Fracture, contusion, abrasion, laceration, fall, others.      Additional sources:    - Discussed/ obtained information from independent historians:      - External (non-ED) record review: Progress note neurosurgery 11/8/2024    - Chronic or social conditions impacting care:      - Shared decision making:        Orders placed during this visit:  Orders Placed This Encounter   Procedures    XR Spine Lumbar 2 or 3 View         Additional orders considered but not ordered:      ED Course/MDM Discussion:    Patient is a 64-year-old female who presented for fall that occurred several days ago on Sunday.  She presented with tailbone pain.  Denied other injury.    Examination reassuring she has tenderness to the lower lumbar spine and coccyx/sacrum however she is ambulatory and was without step-off or deformity.    X-ray obtained demonstrated no obvious significant fracture on my interpretation of imaging.  Formal radiology interpretation demonstrated no acute fracture.  Lower concern for hip fracture or other occult fracture given ambulatory nature.  Recommended over-the-counter analgesics, offered topical therapies, Toradol injection here in the ED.  Recommended patient follow-up with primary care  provider.  Return precautions reviewed.                    Consultants:      Shared Decision Making:  After my consideration of clinical presentation and any laboratory/radiology studies obtained, I discussed the findings with the patient/patient representative who is in agreement with the treatment plan and the final disposition.   Risks and benefits of discharge and/or observation/admission were discussed.       AS OF 12:52 EST VITALS:    BP - 108/81  HR - 74  TEMP - 98.4 °F (36.9 °C) (Oral)  O2 SATS - 99%                  DIAGNOSIS  Final diagnoses:   Coccyodynia   Ground-level fall         DISPOSITION  ED Disposition       ED Disposition   Discharge    Condition   Stable    Comment   --                   Please note that portions of this document were completed with voice recognition software.        Robin Denny MD  01/19/25 2033

## 2025-04-10 ENCOUNTER — APPOINTMENT (OUTPATIENT)
Facility: HOSPITAL | Age: 65
End: 2025-04-10
Payer: COMMERCIAL

## 2025-04-10 ENCOUNTER — HOSPITAL ENCOUNTER (INPATIENT)
Facility: HOSPITAL | Age: 65
LOS: 7 days | Discharge: HOME | End: 2025-04-17
Attending: STUDENT IN AN ORGANIZED HEALTH CARE EDUCATION/TRAINING PROGRAM | Admitting: INTERNAL MEDICINE
Payer: COMMERCIAL

## 2025-04-10 DIAGNOSIS — J21.1 ACUTE BRONCHIOLITIS DUE TO HUMAN METAPNEUMOVIRUS: ICD-10-CM

## 2025-04-10 DIAGNOSIS — J12.3 HUMAN METAPNEUMOVIRUS (HMPV) PNEUMONIA: ICD-10-CM

## 2025-04-10 DIAGNOSIS — R42 VERTIGO: ICD-10-CM

## 2025-04-10 DIAGNOSIS — K52.9 GASTROENTERITIS: ICD-10-CM

## 2025-04-10 DIAGNOSIS — J45.901 EXACERBATION OF ASTHMA, UNSPECIFIED ASTHMA SEVERITY, UNSPECIFIED WHETHER PERSISTENT: Primary | ICD-10-CM

## 2025-04-10 DIAGNOSIS — J96.91 RESPIRATORY FAILURE WITH HYPOXIA, UNSPECIFIED CHRONICITY: ICD-10-CM

## 2025-04-10 DIAGNOSIS — R53.81 DEBILITY: ICD-10-CM

## 2025-04-10 LAB
ALBUMIN SERPL-MCNC: 3.7 G/DL (ref 3.5–5.2)
ALBUMIN/GLOB SERPL: 1.3 G/DL
ALP SERPL-CCNC: 73 U/L (ref 39–117)
ALT SERPL W P-5'-P-CCNC: 20 U/L (ref 1–33)
ANION GAP SERPL CALCULATED.3IONS-SCNC: 14.9 MMOL/L (ref 5–15)
AST SERPL-CCNC: 46 U/L (ref 1–32)
B PARAPERT DNA SPEC QL NAA+PROBE: NOT DETECTED
B PERT DNA SPEC QL NAA+PROBE: NOT DETECTED
BACTERIA UR QL AUTO: NORMAL /HPF
BASOPHILS # BLD AUTO: 0.01 10*3/MM3 (ref 0–0.2)
BASOPHILS NFR BLD AUTO: 0.2 % (ref 0–1.5)
BILIRUB SERPL-MCNC: 0.6 MG/DL (ref 0–1.2)
BILIRUB UR QL STRIP: NEGATIVE
BUN SERPL-MCNC: 10 MG/DL (ref 8–23)
BUN/CREAT SERPL: 12.2 (ref 7–25)
C PNEUM DNA NPH QL NAA+NON-PROBE: NOT DETECTED
CALCIUM SPEC-SCNC: 9.9 MG/DL (ref 8.6–10.5)
CHLORIDE SERPL-SCNC: 90 MMOL/L (ref 98–107)
CLARITY UR: CLEAR
CO2 SERPL-SCNC: 24.1 MMOL/L (ref 22–29)
COLOR UR: YELLOW
CREAT SERPL-MCNC: 0.82 MG/DL (ref 0.57–1)
CRP SERPL-MCNC: 1.46 MG/DL (ref 0–0.5)
D DIMER PPP FEU-MCNC: 0.31 MCGFEU/ML (ref 0–0.63)
D-LACTATE SERPL-SCNC: 1.7 MMOL/L (ref 0.5–2)
DEPRECATED RDW RBC AUTO: 37.9 FL (ref 37–54)
EGFRCR SERPLBLD CKD-EPI 2021: 80.5 ML/MIN/1.73
EOSINOPHIL # BLD AUTO: 0 10*3/MM3 (ref 0–0.4)
EOSINOPHIL NFR BLD AUTO: 0 % (ref 0.3–6.2)
ERYTHROCYTE [DISTWIDTH] IN BLOOD BY AUTOMATED COUNT: 12.5 % (ref 12.3–15.4)
FLUAV SUBTYP SPEC NAA+PROBE: NOT DETECTED
FLUBV RNA ISLT QL NAA+PROBE: NOT DETECTED
GEN 5 1HR TROPONIN T REFLEX: 7 NG/L
GLOBULIN UR ELPH-MCNC: 2.8 GM/DL
GLUCOSE BLDC GLUCOMTR-MCNC: 221 MG/DL (ref 70–130)
GLUCOSE BLDC GLUCOMTR-MCNC: 227 MG/DL (ref 70–130)
GLUCOSE BLDC GLUCOMTR-MCNC: 229 MG/DL (ref 70–130)
GLUCOSE SERPL-MCNC: 148 MG/DL (ref 65–99)
GLUCOSE UR STRIP-MCNC: NEGATIVE MG/DL
HADV DNA SPEC NAA+PROBE: NOT DETECTED
HCOV 229E RNA SPEC QL NAA+PROBE: NOT DETECTED
HCOV HKU1 RNA SPEC QL NAA+PROBE: NOT DETECTED
HCOV NL63 RNA SPEC QL NAA+PROBE: NOT DETECTED
HCOV OC43 RNA SPEC QL NAA+PROBE: NOT DETECTED
HCT VFR BLD AUTO: 41.2 % (ref 34–46.6)
HETEROPH AB SER QL LA: NEGATIVE
HGB BLD-MCNC: 14 G/DL (ref 12–15.9)
HGB UR QL STRIP.AUTO: NEGATIVE
HMPV RNA NPH QL NAA+NON-PROBE: DETECTED
HOLD SPECIMEN: NORMAL
HPIV1 RNA ISLT QL NAA+PROBE: NOT DETECTED
HPIV2 RNA SPEC QL NAA+PROBE: NOT DETECTED
HPIV3 RNA NPH QL NAA+PROBE: NOT DETECTED
HPIV4 P GENE NPH QL NAA+PROBE: NOT DETECTED
HYALINE CASTS UR QL AUTO: NORMAL /LPF
IMM GRANULOCYTES # BLD AUTO: 0.01 10*3/MM3 (ref 0–0.05)
IMM GRANULOCYTES NFR BLD AUTO: 0.2 % (ref 0–0.5)
KETONES UR QL STRIP: NEGATIVE
LEUKOCYTE ESTERASE UR QL STRIP.AUTO: ABNORMAL
LIPASE SERPL-CCNC: 33 U/L (ref 13–60)
LYMPHOCYTES # BLD AUTO: 0.55 10*3/MM3 (ref 0.7–3.1)
LYMPHOCYTES NFR BLD AUTO: 8.6 % (ref 19.6–45.3)
M PNEUMO IGG SER IA-ACNC: NOT DETECTED
MAGNESIUM SERPL-MCNC: 1.9 MG/DL (ref 1.6–2.4)
MCH RBC QN AUTO: 27.7 PG (ref 26.6–33)
MCHC RBC AUTO-ENTMCNC: 34 G/DL (ref 31.5–35.7)
MCV RBC AUTO: 81.6 FL (ref 79–97)
MONOCYTES # BLD AUTO: 0.34 10*3/MM3 (ref 0.1–0.9)
MONOCYTES NFR BLD AUTO: 5.3 % (ref 5–12)
NEUTROPHILS NFR BLD AUTO: 5.47 10*3/MM3 (ref 1.7–7)
NEUTROPHILS NFR BLD AUTO: 85.7 % (ref 42.7–76)
NITRITE UR QL STRIP: NEGATIVE
NT-PROBNP SERPL-MCNC: 149 PG/ML (ref 0–900)
PH UR STRIP.AUTO: 6.5 [PH] (ref 5–8)
PLATELET # BLD AUTO: 281 10*3/MM3 (ref 140–450)
PMV BLD AUTO: 9.8 FL (ref 6–12)
POTASSIUM SERPL-SCNC: 2.9 MMOL/L (ref 3.5–5.2)
PROCALCITONIN SERPL-MCNC: 0.07 NG/ML (ref 0–0.25)
PROT SERPL-MCNC: 6.5 G/DL (ref 6–8.5)
PROT UR QL STRIP: NEGATIVE
RBC # BLD AUTO: 5.05 10*6/MM3 (ref 3.77–5.28)
RBC # UR STRIP: NORMAL /HPF
REF LAB TEST METHOD: NORMAL
RHINOVIRUS RNA SPEC NAA+PROBE: NOT DETECTED
RSV RNA NPH QL NAA+NON-PROBE: NOT DETECTED
SARS-COV-2 RNA RESP QL NAA+PROBE: NOT DETECTED
SODIUM SERPL-SCNC: 129 MMOL/L (ref 136–145)
SP GR UR STRIP: <=1.005 (ref 1–1.03)
SQUAMOUS #/AREA URNS HPF: NORMAL /HPF
TROPONIN T NUMERIC DELTA: 0 NG/L
TROPONIN T SERPL HS-MCNC: 7 NG/L
TSH SERPL DL<=0.05 MIU/L-ACNC: 0.77 UIU/ML (ref 0.27–4.2)
UROBILINOGEN UR QL STRIP: ABNORMAL
WBC # UR STRIP: NORMAL /HPF
WBC NRBC COR # BLD AUTO: 6.38 10*3/MM3 (ref 3.4–10.8)
WHOLE BLOOD HOLD COAG: NORMAL
WHOLE BLOOD HOLD SPECIMEN: NORMAL

## 2025-04-10 PROCEDURE — 83605 ASSAY OF LACTIC ACID: CPT | Performed by: STUDENT IN AN ORGANIZED HEALTH CARE EDUCATION/TRAINING PROGRAM

## 2025-04-10 PROCEDURE — 25010000002 ENOXAPARIN PER 10 MG: Performed by: INTERNAL MEDICINE

## 2025-04-10 PROCEDURE — 81001 URINALYSIS AUTO W/SCOPE: CPT | Performed by: STUDENT IN AN ORGANIZED HEALTH CARE EDUCATION/TRAINING PROGRAM

## 2025-04-10 PROCEDURE — 80050 GENERAL HEALTH PANEL: CPT | Performed by: STUDENT IN AN ORGANIZED HEALTH CARE EDUCATION/TRAINING PROGRAM

## 2025-04-10 PROCEDURE — 25010000002 METHYLPREDNISOLONE PER 125 MG: Performed by: STUDENT IN AN ORGANIZED HEALTH CARE EDUCATION/TRAINING PROGRAM

## 2025-04-10 PROCEDURE — 94799 UNLISTED PULMONARY SVC/PX: CPT

## 2025-04-10 PROCEDURE — 71275 CT ANGIOGRAPHY CHEST: CPT

## 2025-04-10 PROCEDURE — 83735 ASSAY OF MAGNESIUM: CPT | Performed by: STUDENT IN AN ORGANIZED HEALTH CARE EDUCATION/TRAINING PROGRAM

## 2025-04-10 PROCEDURE — 99285 EMERGENCY DEPT VISIT HI MDM: CPT | Performed by: STUDENT IN AN ORGANIZED HEALTH CARE EDUCATION/TRAINING PROGRAM

## 2025-04-10 PROCEDURE — 25810000003 SODIUM CHLORIDE 0.9 % SOLUTION 250 ML FLEX CONT: Performed by: FAMILY MEDICINE

## 2025-04-10 PROCEDURE — 94640 AIRWAY INHALATION TREATMENT: CPT

## 2025-04-10 PROCEDURE — 71045 X-RAY EXAM CHEST 1 VIEW: CPT

## 2025-04-10 PROCEDURE — 83880 ASSAY OF NATRIURETIC PEPTIDE: CPT | Performed by: STUDENT IN AN ORGANIZED HEALTH CARE EDUCATION/TRAINING PROGRAM

## 2025-04-10 PROCEDURE — 25510000001 IOPAMIDOL PER 1 ML: Performed by: FAMILY MEDICINE

## 2025-04-10 PROCEDURE — 25010000002 AZITHROMYCIN PER 500 MG: Performed by: FAMILY MEDICINE

## 2025-04-10 PROCEDURE — 36415 COLL VENOUS BLD VENIPUNCTURE: CPT

## 2025-04-10 PROCEDURE — 86140 C-REACTIVE PROTEIN: CPT | Performed by: STUDENT IN AN ORGANIZED HEALTH CARE EDUCATION/TRAINING PROGRAM

## 2025-04-10 PROCEDURE — 63710000001 INSULIN LISPRO (HUMAN) PER 5 UNITS: Performed by: INTERNAL MEDICINE

## 2025-04-10 PROCEDURE — 84145 PROCALCITONIN (PCT): CPT | Performed by: STUDENT IN AN ORGANIZED HEALTH CARE EDUCATION/TRAINING PROGRAM

## 2025-04-10 PROCEDURE — 74177 CT ABD & PELVIS W/CONTRAST: CPT

## 2025-04-10 PROCEDURE — 83690 ASSAY OF LIPASE: CPT | Performed by: STUDENT IN AN ORGANIZED HEALTH CARE EDUCATION/TRAINING PROGRAM

## 2025-04-10 PROCEDURE — 86308 HETEROPHILE ANTIBODY SCREEN: CPT | Performed by: STUDENT IN AN ORGANIZED HEALTH CARE EDUCATION/TRAINING PROGRAM

## 2025-04-10 PROCEDURE — 93005 ELECTROCARDIOGRAM TRACING: CPT

## 2025-04-10 PROCEDURE — 94664 DEMO&/EVAL PT USE INHALER: CPT

## 2025-04-10 PROCEDURE — 0202U NFCT DS 22 TRGT SARS-COV-2: CPT | Performed by: STUDENT IN AN ORGANIZED HEALTH CARE EDUCATION/TRAINING PROGRAM

## 2025-04-10 PROCEDURE — 93005 ELECTROCARDIOGRAM TRACING: CPT | Performed by: STUDENT IN AN ORGANIZED HEALTH CARE EDUCATION/TRAINING PROGRAM

## 2025-04-10 PROCEDURE — 99222 1ST HOSP IP/OBS MODERATE 55: CPT | Performed by: INTERNAL MEDICINE

## 2025-04-10 PROCEDURE — 70450 CT HEAD/BRAIN W/O DYE: CPT

## 2025-04-10 PROCEDURE — 82948 REAGENT STRIP/BLOOD GLUCOSE: CPT

## 2025-04-10 PROCEDURE — 85379 FIBRIN DEGRADATION QUANT: CPT | Performed by: STUDENT IN AN ORGANIZED HEALTH CARE EDUCATION/TRAINING PROGRAM

## 2025-04-10 PROCEDURE — 25010000002 DIPHENHYDRAMINE PER 50 MG: Performed by: STUDENT IN AN ORGANIZED HEALTH CARE EDUCATION/TRAINING PROGRAM

## 2025-04-10 PROCEDURE — 25010000002 POTASSIUM CHLORIDE 10 MEQ/100ML SOLUTION: Performed by: STUDENT IN AN ORGANIZED HEALTH CARE EDUCATION/TRAINING PROGRAM

## 2025-04-10 PROCEDURE — 84484 ASSAY OF TROPONIN QUANT: CPT | Performed by: STUDENT IN AN ORGANIZED HEALTH CARE EDUCATION/TRAINING PROGRAM

## 2025-04-10 RX ORDER — SODIUM CHLORIDE 0.9 % (FLUSH) 0.9 %
10 SYRINGE (ML) INJECTION AS NEEDED
Status: DISCONTINUED | OUTPATIENT
Start: 2025-04-10 | End: 2025-04-17 | Stop reason: HOSPADM

## 2025-04-10 RX ORDER — DIPHENHYDRAMINE HYDROCHLORIDE 50 MG/ML
25 INJECTION, SOLUTION INTRAMUSCULAR; INTRAVENOUS ONCE
Status: COMPLETED | OUTPATIENT
Start: 2025-04-10 | End: 2025-04-10

## 2025-04-10 RX ORDER — ONDANSETRON 2 MG/ML
4 INJECTION INTRAMUSCULAR; INTRAVENOUS EVERY 6 HOURS PRN
Status: DISCONTINUED | OUTPATIENT
Start: 2025-04-10 | End: 2025-04-17 | Stop reason: HOSPADM

## 2025-04-10 RX ORDER — BENZONATATE 100 MG/1
100 CAPSULE ORAL ONCE
Status: COMPLETED | OUTPATIENT
Start: 2025-04-10 | End: 2025-04-10

## 2025-04-10 RX ORDER — MONTELUKAST SODIUM 10 MG/1
10 TABLET ORAL NIGHTLY
Status: DISCONTINUED | OUTPATIENT
Start: 2025-04-10 | End: 2025-04-17 | Stop reason: HOSPADM

## 2025-04-10 RX ORDER — BISACODYL 5 MG/1
5 TABLET, DELAYED RELEASE ORAL DAILY PRN
Status: DISCONTINUED | OUTPATIENT
Start: 2025-04-10 | End: 2025-04-17 | Stop reason: HOSPADM

## 2025-04-10 RX ORDER — IPRATROPIUM BROMIDE AND ALBUTEROL SULFATE 2.5; .5 MG/3ML; MG/3ML
3 SOLUTION RESPIRATORY (INHALATION) EVERY 4 HOURS PRN
Status: DISCONTINUED | OUTPATIENT
Start: 2025-04-10 | End: 2025-04-11 | Stop reason: SDUPTHER

## 2025-04-10 RX ORDER — DEXTROMETHORPHAN POLISTIREX 30 MG/5ML
60 SUSPENSION ORAL EVERY 12 HOURS SCHEDULED
Status: DISCONTINUED | OUTPATIENT
Start: 2025-04-10 | End: 2025-04-17 | Stop reason: HOSPADM

## 2025-04-10 RX ORDER — LEVOTHYROXINE SODIUM 100 UG/1
100 TABLET ORAL DAILY
Status: DISCONTINUED | OUTPATIENT
Start: 2025-04-11 | End: 2025-04-17 | Stop reason: HOSPADM

## 2025-04-10 RX ORDER — IOPAMIDOL 755 MG/ML
100 INJECTION, SOLUTION INTRAVASCULAR
Status: COMPLETED | OUTPATIENT
Start: 2025-04-10 | End: 2025-04-10

## 2025-04-10 RX ORDER — METHYLPREDNISOLONE SODIUM SUCCINATE 125 MG/2ML
125 INJECTION INTRAMUSCULAR; INTRAVENOUS ONCE
Status: COMPLETED | OUTPATIENT
Start: 2025-04-10 | End: 2025-04-10

## 2025-04-10 RX ORDER — DEXTROSE MONOHYDRATE 25 G/50ML
25 INJECTION, SOLUTION INTRAVENOUS
Status: DISCONTINUED | OUTPATIENT
Start: 2025-04-10 | End: 2025-04-17 | Stop reason: HOSPADM

## 2025-04-10 RX ORDER — ALBUTEROL SULFATE 0.83 MG/ML
2.5 SOLUTION RESPIRATORY (INHALATION) ONCE
Status: COMPLETED | OUTPATIENT
Start: 2025-04-10 | End: 2025-04-10

## 2025-04-10 RX ORDER — DULOXETIN HYDROCHLORIDE 60 MG/1
60 CAPSULE, DELAYED RELEASE ORAL DAILY
Status: DISCONTINUED | OUTPATIENT
Start: 2025-04-11 | End: 2025-04-17 | Stop reason: HOSPADM

## 2025-04-10 RX ORDER — ACETAMINOPHEN 325 MG/1
650 TABLET ORAL EVERY 6 HOURS PRN
Status: DISCONTINUED | OUTPATIENT
Start: 2025-04-10 | End: 2025-04-17 | Stop reason: HOSPADM

## 2025-04-10 RX ORDER — DOCUSATE SODIUM 100 MG/1
100 CAPSULE, LIQUID FILLED ORAL 2 TIMES DAILY
Status: DISCONTINUED | OUTPATIENT
Start: 2025-04-10 | End: 2025-04-17 | Stop reason: HOSPADM

## 2025-04-10 RX ORDER — ASPIRIN 81 MG/1
324 TABLET, CHEWABLE ORAL ONCE
Status: COMPLETED | OUTPATIENT
Start: 2025-04-10 | End: 2025-04-10

## 2025-04-10 RX ORDER — BISACODYL 10 MG
10 SUPPOSITORY, RECTAL RECTAL DAILY PRN
Status: DISCONTINUED | OUTPATIENT
Start: 2025-04-10 | End: 2025-04-17 | Stop reason: HOSPADM

## 2025-04-10 RX ORDER — IBUPROFEN 600 MG/1
1 TABLET ORAL
Status: DISCONTINUED | OUTPATIENT
Start: 2025-04-10 | End: 2025-04-17 | Stop reason: HOSPADM

## 2025-04-10 RX ORDER — POTASSIUM CHLORIDE 750 MG/1
20 CAPSULE, EXTENDED RELEASE ORAL ONCE
Status: COMPLETED | OUTPATIENT
Start: 2025-04-10 | End: 2025-04-10

## 2025-04-10 RX ORDER — INSULIN LISPRO 100 [IU]/ML
2-7 INJECTION, SOLUTION INTRAVENOUS; SUBCUTANEOUS
Status: DISCONTINUED | OUTPATIENT
Start: 2025-04-10 | End: 2025-04-17 | Stop reason: HOSPADM

## 2025-04-10 RX ORDER — ATORVASTATIN CALCIUM 40 MG/1
80 TABLET, FILM COATED ORAL DAILY
Status: DISCONTINUED | OUTPATIENT
Start: 2025-04-11 | End: 2025-04-17 | Stop reason: HOSPADM

## 2025-04-10 RX ORDER — SODIUM CHLORIDE 0.9 % (FLUSH) 0.9 %
10 SYRINGE (ML) INJECTION EVERY 12 HOURS SCHEDULED
Status: DISCONTINUED | OUTPATIENT
Start: 2025-04-10 | End: 2025-04-17 | Stop reason: HOSPADM

## 2025-04-10 RX ORDER — MECLIZINE HYDROCHLORIDE 25 MG/1
25 TABLET ORAL 3 TIMES DAILY PRN
Status: DISCONTINUED | OUTPATIENT
Start: 2025-04-10 | End: 2025-04-17 | Stop reason: HOSPADM

## 2025-04-10 RX ORDER — POTASSIUM CHLORIDE 7.45 MG/ML
10 INJECTION INTRAVENOUS ONCE
Status: COMPLETED | OUTPATIENT
Start: 2025-04-10 | End: 2025-04-10

## 2025-04-10 RX ORDER — HYDROXYZINE HYDROCHLORIDE 10 MG/1
10 TABLET, FILM COATED ORAL ONCE
Status: DISCONTINUED | OUTPATIENT
Start: 2025-04-10 | End: 2025-04-17 | Stop reason: HOSPADM

## 2025-04-10 RX ORDER — AMOXICILLIN 250 MG
2 CAPSULE ORAL 2 TIMES DAILY PRN
Status: DISCONTINUED | OUTPATIENT
Start: 2025-04-10 | End: 2025-04-17 | Stop reason: HOSPADM

## 2025-04-10 RX ORDER — ENOXAPARIN SODIUM 100 MG/ML
40 INJECTION SUBCUTANEOUS DAILY
Status: DISCONTINUED | OUTPATIENT
Start: 2025-04-10 | End: 2025-04-17 | Stop reason: HOSPADM

## 2025-04-10 RX ORDER — IPRATROPIUM BROMIDE AND ALBUTEROL SULFATE 2.5; .5 MG/3ML; MG/3ML
3 SOLUTION RESPIRATORY (INHALATION)
Status: DISCONTINUED | OUTPATIENT
Start: 2025-04-10 | End: 2025-04-17 | Stop reason: HOSPADM

## 2025-04-10 RX ORDER — NICOTINE POLACRILEX 4 MG
15 LOZENGE BUCCAL
Status: DISCONTINUED | OUTPATIENT
Start: 2025-04-10 | End: 2025-04-17 | Stop reason: HOSPADM

## 2025-04-10 RX ORDER — SODIUM CHLORIDE 9 MG/ML
40 INJECTION, SOLUTION INTRAVENOUS AS NEEDED
Status: DISCONTINUED | OUTPATIENT
Start: 2025-04-10 | End: 2025-04-17 | Stop reason: HOSPADM

## 2025-04-10 RX ORDER — POLYETHYLENE GLYCOL 3350 17 G/17G
17 POWDER, FOR SOLUTION ORAL DAILY PRN
Status: DISCONTINUED | OUTPATIENT
Start: 2025-04-10 | End: 2025-04-17 | Stop reason: HOSPADM

## 2025-04-10 RX ORDER — AZITHROMYCIN 250 MG/1
500 TABLET, FILM COATED ORAL
Status: DISCONTINUED | OUTPATIENT
Start: 2025-04-11 | End: 2025-04-10

## 2025-04-10 RX ORDER — CELECOXIB 200 MG/1
200 CAPSULE ORAL DAILY
Status: DISCONTINUED | OUTPATIENT
Start: 2025-04-11 | End: 2025-04-17 | Stop reason: HOSPADM

## 2025-04-10 RX ADMIN — BENZONATATE 100 MG: 100 CAPSULE ORAL at 22:35

## 2025-04-10 RX ADMIN — Medication 10 ML: at 20:34

## 2025-04-10 RX ADMIN — INSULIN LISPRO 3 UNITS: 100 INJECTION, SOLUTION INTRAVENOUS; SUBCUTANEOUS at 20:34

## 2025-04-10 RX ADMIN — AZITHROMYCIN DIHYDRATE 500 MG: 500 INJECTION, POWDER, LYOPHILIZED, FOR SOLUTION INTRAVENOUS at 08:41

## 2025-04-10 RX ADMIN — Medication 5 MG: at 22:35

## 2025-04-10 RX ADMIN — MONTELUKAST 10 MG: 10 TABLET, FILM COATED ORAL at 20:34

## 2025-04-10 RX ADMIN — ACETAMINOPHEN 650 MG: 325 TABLET, FILM COATED ORAL at 20:38

## 2025-04-10 RX ADMIN — Medication 10 ML: at 12:56

## 2025-04-10 RX ADMIN — DOCUSATE SODIUM 100 MG: 100 CAPSULE, LIQUID FILLED ORAL at 20:34

## 2025-04-10 RX ADMIN — POTASSIUM CHLORIDE 20 MEQ: 750 CAPSULE, EXTENDED RELEASE ORAL at 06:43

## 2025-04-10 RX ADMIN — ALBUTEROL SULFATE 2.5 MG: 2.5 SOLUTION RESPIRATORY (INHALATION) at 06:48

## 2025-04-10 RX ADMIN — ASPIRIN 324 MG: 81 TABLET, CHEWABLE ORAL at 06:18

## 2025-04-10 RX ADMIN — METHYLPREDNISOLONE SODIUM SUCCINATE 125 MG: 125 INJECTION INTRAMUSCULAR; INTRAVENOUS at 06:39

## 2025-04-10 RX ADMIN — ENOXAPARIN SODIUM 40 MG: 100 INJECTION SUBCUTANEOUS at 12:56

## 2025-04-10 RX ADMIN — IPRATROPIUM BROMIDE AND ALBUTEROL SULFATE 3 ML: 2.5; .5 SOLUTION RESPIRATORY (INHALATION) at 17:03

## 2025-04-10 RX ADMIN — DIPHENHYDRAMINE HYDROCHLORIDE 25 MG: 50 INJECTION INTRAMUSCULAR; INTRAVENOUS at 06:58

## 2025-04-10 RX ADMIN — POTASSIUM CHLORIDE 10 MEQ: 7.46 INJECTION, SOLUTION INTRAVENOUS at 07:09

## 2025-04-10 RX ADMIN — IOPAMIDOL 85 ML: 755 INJECTION, SOLUTION INTRAVENOUS at 07:29

## 2025-04-10 RX ADMIN — ALBUTEROL SULFATE 2.5 MG: 2.5 SOLUTION RESPIRATORY (INHALATION) at 08:31

## 2025-04-10 RX ADMIN — INSULIN LISPRO 3 UNITS: 100 INJECTION, SOLUTION INTRAVENOUS; SUBCUTANEOUS at 17:35

## 2025-04-10 RX ADMIN — DEXTROMETHORPHAN POLISTIREX 60 MG: 30 SUSPENSION ORAL at 20:34

## 2025-04-10 RX ADMIN — IPRATROPIUM BROMIDE AND ALBUTEROL SULFATE 3 ML: 2.5; .5 SOLUTION RESPIRATORY (INHALATION) at 13:41

## 2025-04-10 RX ADMIN — DEXTROMETHORPHAN POLISTIREX 60 MG: 30 SUSPENSION ORAL at 12:55

## 2025-04-10 RX ADMIN — IPRATROPIUM BROMIDE AND ALBUTEROL SULFATE 3 ML: 2.5; .5 SOLUTION RESPIRATORY (INHALATION) at 19:28

## 2025-04-10 NOTE — H&P
"    [facility] Medicine Services  HISTORY AND PHYSICAL    Patient Name: Janet Obrien  : 1960  MRN: [id number]  Primary Care Physician: Noe Davenport MD  Date of admission: 4/10/2025      Subjective   Subjective     Chief Complaint:  Acute dyspnea     HPI:  Janet Obrien is a 64 y.o. female (smoked remotely), w history of asthma and anxiety, DM2, HTN HL hypothyroidism RLS and LINDSAY.  Presents with acute dyspnea.  Tested positive for HMPV.  Wheezing in ED and improved with nebs but is still requiring 4L NC oxygen.      She actually became ill with vomiting about 9 days ago \"after eating at [employer]\", then developed dyspnea/cough about a week ago and was started on azithromycin outpt; states she has taken it for at least 7 days and \"it hasn't done anything for me.\" She has a cough and notes associated symptoms like lightheadedness.  States she is still vomiting.  States she cannot even keep down water.  States there is no diarrhea.       Personal History     Past Medical History:   Diagnosis Date    Anxiety     Arthritis     Asthma     Back pain     Cancer     SQUAMOUS CELL/ BASAL CELL ON LIP, under the nose as well    Chronic bronchitis     Depression     Diabetes mellitus     type 2    Fibromyalgia     High triglycerides     Hyperlipidemia     Hypertension     Hypothyroidism     Migraine     MVP (mitral valve prolapse)     OA (osteoarthritis)     Ovarian cyst     Peripheral autonomic neuropathy     Pleurisy     Pneumonia     Rheumatism     RLS (restless legs syndrome)     Sleep apnea     Mild form per patient. recommended mouthpiece           Past Surgical History:   Procedure Laterality Date    APPENDECTOMY      CATARACT EXTRACTION W/ INTRAOCULAR LENS IMPLANT Right 2019    Procedure: CATARACT PHACO EXTRACTION WITH INTRAOCULAR LENS IMPLANT RIGHT;  Surgeon: Carl Babb MD;  Location: [facility];  Service: Ophthalmology    CATARACT EXTRACTION W/ INTRAOCULAR " "LENS IMPLANT Left 10/07/2019    Procedure: CATARACT PHACO EXTRACTION WITH INTRAOCULAR LENS IMPLANT LEFT;  Surgeon: Carl Babb MD;  Location:  [facility];  Service: Ophthalmology     SECTION  , 1997    x 2     DILATATION AND CURETTAGE      HAND SURGERY Right     \"Pinched nerve surgery\"    HAND SURGERY Left     Joint removed secondary to arthritis    HYSTERECTOMY      ''still have one ovary''    KNEE SURGERY Right 2021    arthroscopy and partial medial meniscectomy Dr. Chavez    OOPHORECTOMY      THYROIDECTOMY      TONSILLECTOMY AND ADENOIDECTOMY      TOTAL KNEE ARTHROPLASTY Left 03/15/2023    Procedure: TOTAL KNEE ARTHROPLASTY -  LEFT;  Surgeon: Dayo Chavez MD;  Location:  [facility];  Service: Orthopedics;  Laterality: Left;    TOTAL KNEE ARTHROPLASTY Right 2023    Procedure: TOTAL KNEE ARTHROPLASTY WITH BERNICE ROBOT - RIGHT;  Surgeon: Dayo Chavez MD;  Location:  [facility];  Service: Robotics - Ortho;  Laterality: Right;       Family History: family history includes Breast cancer in her maternal grandmother; Cancer in her brother, maternal grandmother, and paternal grandmother; Diabetes in her maternal aunt; Fibromyalgia in her mother; Heart attack in her father and mother; Hyperlipidemia in her mother; Hypertension in her mother; Irritable bowel syndrome in her mother; Stomach cancer in her brother; Thyroid disease in her maternal grandmother and mother.     Social History:  reports that she quit smoking about 20 years ago. Her smoking use included cigarettes. She started smoking about 47 years ago. She has a 6.6 pack-year smoking history. She has been exposed to tobacco smoke. She has never used smokeless tobacco. She reports that she does not drink alcohol and does not use drugs.  Social History     Social History Narrative    Not on file       Medications:  Available home medication information reviewed.  DULoxetine, ONE TOUCH ULTRA 2, " Semaglutide (2 MG/DOSE), acetaminophen, albuterol sulfate HFA, atorvastatin, azelastine, celecoxib, cetirizine, docusate sodium, glucose blood, hydroCHLOROthiazide, levothyroxine, lidocaine, meclizine, meloxicam, methocarbamol, montelukast, ondansetron ODT, oxyCODONE-acetaminophen, sennosides-docusate, tiZANidine, and vitamin D3    Allergies   Allergen Reactions    Penicillins Angioedema, Anaphylaxis, Hives, Itching, Shortness Of Breath and Swelling    Pregabalin Anaphylaxis    Cephalexin Itching    Chlorhexidine Rash and Itching    Sulfa Antibiotics Rash and Itching    Adhesive Tape Rash     Tegaderm specific    Darvon [Propoxyphene] Unknown (See Comments)     Doesn't remember    Diazepam Other (See Comments)     Paradoxical effect/ hypes pt       Objective   Objective     Vital Signs:   Temp:  [98.6 °F (37 °C)] 98.6 °F (37 °C)  Heart Rate:  [78-93] 85  Resp:  [18] 18  BP: ()/(61-80) 124/61  Flow (L/min) (Oxygen Therapy):  [2-4] 2       Physical Exam   Gen:  WD/WN woman in NAD on nc oxygen.  Moves around easily in bed, sits up with ease.    Neuro: alert and oriented, clear speech, follows commands, grossly nonfocal  HEENT:  NC/AT   Neck:  Supple, no LAD  Heart RRR   Lungs scattered wheeze   Abd:  Soft, no rebound; pt complaint of severe diffuse tenderness not consistent with timing of palpation.  Sits up with ease.   Extrem:  No c/c/e      Result Review:  I have personally reviewed the results from the time of this admission to 4/10/2025 10:37 EDT and agree with these findings:  [x]  Laboratory list / accordion  [x]  Microbiology  []  Radiology  []  EKG/Telemetry   []  Cardiology/Vascular   []  Pathology  []  Old records  []  Other:  Most notable findings include: +HMPV.  CT w minimal RLL nodularity.        LAB RESULTS:      Lab 04/10/25  0557   WBC 6.38   HEMOGLOBIN 14.0   HEMATOCRIT 41.2   PLATELETS 281   NEUTROS ABS 5.47   IMMATURE GRANS (ABS) 0.01   LYMPHS ABS 0.55*   MONOS ABS 0.34   EOS ABS 0.00   MCV  81.6   CRP 1.46*   PROCALCITONIN 0.07   LACTATE 1.7   D DIMER QUANT 0.31         Lab 04/10/25  0557   SODIUM 129*   POTASSIUM 2.9*   CHLORIDE 90*   CO2 24.1   ANION GAP 14.9   BUN 10   CREATININE 0.82   EGFR 80.5   GLUCOSE 148*   CALCIUM 9.9   MAGNESIUM 1.9   TSH 0.766         Lab 04/10/25  0557   TOTAL PROTEIN 6.5   ALBUMIN 3.7   GLOBULIN 2.8   ALT (SGPT) 20   AST (SGOT) 46*   BILIRUBIN 0.6   ALK PHOS 73   LIPASE 33         Lab 04/10/25  0704 04/10/25  0557   PROBNP  --  149.0   HSTROP T 7 7                 UA          6/10/2024    12:08 4/10/2025    08:54   Urinalysis   Squamous Epithelial Cells, UA None Seen  0-2    Specific Gravity, UA 1.006  <=1.005    Ketones, UA Negative  Negative    Blood, UA Negative  Negative    Leukocytes, UA Trace  Trace    Nitrite, UA Negative  Negative    RBC, UA 0-2  0-2    WBC, UA 0-2  0-2    Bacteria, UA None Seen  None Seen        Microbiology Results (last 10 days)       Procedure Component Value - Date/Time    Respiratory Panel PCR w/COVID-19(SARS-CoV-2) [facility]-House, NP Swab in UTM/VTM, 2 HR TAT - Swab, Nasopharynx [[id number]]  (Abnormal) Collected: 04/10/25 0618    Lab Status: Final result Specimen: Swab from Nasopharynx Updated: 04/10/25 0730     ADENOVIRUS, PCR Not Detected     Coronavirus 229E Not Detected     Coronavirus HKU1 Not Detected     Coronavirus NL63 Not Detected     Coronavirus OC43 Not Detected     COVID19 Not Detected     Human Metapneumovirus Detected     Human Rhinovirus/Enterovirus Not Detected     Influenza A PCR Not Detected     Influenza B PCR Not Detected     Parainfluenza Virus 1 Not Detected     Parainfluenza Virus 2 Not Detected     Parainfluenza Virus 3 Not Detected     Parainfluenza Virus 4 Not Detected     RSV, PCR Not Detected     Bordetella pertussis pcr Not Detected     Bordetella parapertussis PCR Not Detected     Chlamydophila pneumoniae PCR Not Detected     Mycoplasma pneumo by PCR Not Detected    Narrative:      In the  setting of a positive respiratory panel with a viral infection PLUS a negative procalcitonin without other underlying concern for bacterial infection, consider observing off antibiotics or discontinuation of antibiotics and continue supportive care. If the respiratory panel is positive for atypical bacterial infection (Bordetella pertussis, Chlamydophila pneumoniae, or Mycoplasma pneumoniae), consider antibiotic de-escalation to target atypical bacterial infection.            CT Abdomen Pelvis With Contrast  Result Date: 4/10/2025  CT ANGIOGRAM CHEST PULMONARY EMBOLISM, CT ABDOMEN PELVIS W CONTRAST Date of Exam: 4/10/2025 6:18 AM EDT Indication: Acute fall v syncope/ abdominal pain/vomiting/ dyspnea. Comparison: None available. Technique: Axial CT images were obtained of the chest, abdomen and pelvis after the uneventful intravenous administration of 85 mL Isovue-370 utilizing pulmonary embolism protocol.  In addition, a 3-D volume rendered image was created for interpretation.   Reconstructed coronal and sagittal images were also obtained. Automated exposure control and iterative construction methods were used. Findings: PULMONARY VASCULATURE: Pulmonary arteries are widely patent without evidence of embolus.Main pulmonary artery is normal in size. No evidence of right heart strain. MEDIASTINUM:Unremarkable. Aortic and heart size are normal. No aortic dissection identified. No mass nor pericardial effusion. CORONARY ARTERIES: Nocalcified atherosclerotic disease. LUNGS: Evaluation of lung parenchyma demonstrates nodular infiltrate in the right lower lung compatible with infectious/inflammatory process. Some faint tree-in-bud opacities are present in the anterior right upper lung also compatible with inflammatory change. PLEURAL SPACE: No effusion, mass, nor pneumothorax. LYMPH NODES: Right hilar lymph nodes are noted measuring up to 1.3 cm in short axis. OSSEOUS STRUCTURES: Appropriate for age with no acute process  identified. No acute osseous injury noted. There is a suspected sebaceous cyst in the left axillary region measuring 1.2 cm. Correlate on physical examination. LIVER:  Unremarkable parenchyma without focal lesion. BILIARY/GALLBLADDER:  Unremarkable SPLEEN:  Unremarkable PANCREAS:  Unremarkable ADRENAL: There is a 1.6 cm indeterminate right adrenal gland nodule. The left adrenal gland is unremarkable in appearance. KIDNEYS:  Unremarkable parenchyma with no solid mass identified. No obstruction.  No calculus identified. GASTROINTESTINAL/MESENTERY: Evaluation of the gastrointestinal tract demonstrates diverticulosis without CT evidence for acute diverticulitis. No free intraperitoneal gas or fluid. Trace sliding hiatal hernia is noted. Left inguinal hernia is present containing mesenteric fat. MESENTERIC VESSELS:  Patent. AORTA/IVC:  Normal caliber. RETROPERITONEUM/LYMPH NODES:  Unremarkable REPRODUCTIVE: Surgically absent. BLADDER:  Unremarkable OSSEUS STRUCTURES:  Typical for age with no acute process identified.     Impression: Impression: 1. No evidence for pulmonary embolus. 2. Nodular infiltrate in the right lower lung with reactive right hilar lymphadenopathy. 3. No acute findings in the abdomen or pelvis.   Electronically Signed: Margarette Fuentes MD  4/10/2025 7:46 AM EDT  Workstation ID: [id number]    CT Angiogram Chest Pulmonary Embolism  Result Date: 4/10/2025  CT ANGIOGRAM CHEST PULMONARY EMBOLISM, CT ABDOMEN PELVIS W CONTRAST Date of Exam: 4/10/2025 6:18 AM EDT Indication: Acute fall v syncope/ abdominal pain/vomiting/ dyspnea. Comparison: None available. Technique: Axial CT images were obtained of the chest, abdomen and pelvis after the uneventful intravenous administration of 85 mL Isovue-370 utilizing pulmonary embolism protocol.  In addition, a 3-D volume rendered image was created for interpretation.   Reconstructed coronal and sagittal images were also obtained. Automated exposure control and iterative  construction methods were used. Findings: PULMONARY VASCULATURE: Pulmonary arteries are widely patent without evidence of embolus.Main pulmonary artery is normal in size. No evidence of right heart strain. MEDIASTINUM:Unremarkable. Aortic and heart size are normal. No aortic dissection identified. No mass nor pericardial effusion. CORONARY ARTERIES: Nocalcified atherosclerotic disease. LUNGS: Evaluation of lung parenchyma demonstrates nodular infiltrate in the right lower lung compatible with infectious/inflammatory process. Some faint tree-in-bud opacities are present in the anterior right upper lung also compatible with inflammatory change. PLEURAL SPACE: No effusion, mass, nor pneumothorax. LYMPH NODES: Right hilar lymph nodes are noted measuring up to 1.3 cm in short axis. OSSEOUS STRUCTURES: Appropriate for age with no acute process identified. No acute osseous injury noted. There is a suspected sebaceous cyst in the left axillary region measuring 1.2 cm. Correlate on physical examination. LIVER:  Unremarkable parenchyma without focal lesion. BILIARY/GALLBLADDER:  Unremarkable SPLEEN:  Unremarkable PANCREAS:  Unremarkable ADRENAL: There is a 1.6 cm indeterminate right adrenal gland nodule. The left adrenal gland is unremarkable in appearance. KIDNEYS:  Unremarkable parenchyma with no solid mass identified. No obstruction.  No calculus identified. GASTROINTESTINAL/MESENTERY: Evaluation of the gastrointestinal tract demonstrates diverticulosis without CT evidence for acute diverticulitis. No free intraperitoneal gas or fluid. Trace sliding hiatal hernia is noted. Left inguinal hernia is present containing mesenteric fat. MESENTERIC VESSELS:  Patent. AORTA/IVC:  Normal caliber. RETROPERITONEUM/LYMPH NODES:  Unremarkable REPRODUCTIVE: Surgically absent. BLADDER:  Unremarkable OSSEUS STRUCTURES:  Typical for age with no acute process identified.     Impression: Impression: 1. No evidence for pulmonary embolus. 2.  Nodular infiltrate in the right lower lung with reactive right hilar lymphadenopathy. 3. No acute findings in the abdomen or pelvis.   Electronically Signed: Margarette Fuentes MD  4/10/2025 7:46 AM EDT  Workstation ID: [id number]    CT Head Without Contrast  Result Date: 4/10/2025  CT HEAD WO CONTRAST Date of Exam: 4/10/2025 6:18 AM EDT Indication: Acute fall v syncope/ abdominal pain/vomiting/ dyspnea/. Comparison: None available. Technique: Axial CT images were obtained of the head without contrast administration.  Automated exposure control and iterative construction methods were used. Findings: There is no evidence of hemorrhage. There is no mass effect or midline shift. There is no extracerebral collection. Ventricles are normal in size and configuration for patient's stated age. Posterior fossa is within normal limits. Calvarium and skull base appear intact.  Visualized sinuses show no air fluid levels. Visualized orbits are unremarkable.     Impression: Impression: No acute intracranial abnormality identified. Electronically Signed: Andry Larkin MD  4/10/2025 7:43 AM EDT  Workstation ID: [id number]    XR Chest 1 View  Result Date: 4/10/2025  XR CHEST 1 VW Date of Exam: 4/10/2025 5:40 AM EDT Indication: Chest Pain Triage Protocol Comparison: 9/8/2023. Findings: There is no pneumothorax, pleural effusion or focal airspace consolidation. Heart size and pulmonary vasculature appear within normal limits. Regional bones appear intact.     Impression: Impression: No acute cardiopulmonary abnormality. Electronically Signed: Toni Ledezma MD  4/10/2025 5:48 AM EDT  Workstation ID: [id number]      Results for orders placed in visit on 06/05/20    Adult Transthoracic Echo Complete W/ Cont if Necessary Per Protocol    Interpretation Summary  · Estimated EF = 76%.  · Left ventricular systolic function is hyperdynamic (EF > 70).      Assessment & Plan   Assessment & Plan       * No active hospital problems. 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  DC.      Hyponatremia  - follow expectantly , stop HCTZ, consider IV fluids    C/o severe vomiting  - looks very comfortable.  See how she eats, hold diuretics, and consider IV fluids.       DM  - on Ozempic.  SSI here.      VTE Prophylaxis:  No VTE prophylaxis order currently exists.          CODE STATUS:    There are no questions and answers to display.       Expected Discharge   Expected discharge date/ time has not been documented.     Terri Garcia MD  04/10/25

## 2025-04-10 NOTE — PLAN OF CARE
Goal Outcome Evaluation:  Plan of Care Reviewed With: patient        Progress: no change  Outcome Evaluation: Pt currently on 2LNC with complaints of SOB. Dizziness upon standing. Orthostatics completed. No pain noted during shift. Frequent nonproductive cough. No acute events during shift. Continue POC.

## 2025-04-10 NOTE — ED NOTES
" Janet Obrien    Nursing Report ED to Floor:  Mental status: a/o x4  Ambulatory status: assist x1  Oxygen Therapy:  2L NC  Cardiac Rhythm: sinus   Admitted from: [facility]   Safety Concerns:  none  Precautions: contact  Social Issues: none  ED Room #:  06    ED Nurse Phone [email or web address] [phone] or may call [phone]  HPI:   Chief Complaint   Patient presents with    Cough       Past Medical History:  Past Medical History:   Diagnosis Date    Anxiety     Arthritis     Asthma     Back pain     Cancer     SQUAMOUS CELL/ BASAL CELL ON LIP, under the nose as well    Chronic bronchitis     Depression     Diabetes mellitus     type 2    Fibromyalgia     High triglycerides     Hyperlipidemia     Hypertension     Hypothyroidism     Migraine     MVP (mitral valve prolapse)     OA (osteoarthritis)     Ovarian cyst     Peripheral autonomic neuropathy     Pleurisy     Pneumonia     Rheumatism     RLS (restless legs syndrome)     Sleep apnea     Mild form per patient. recommended mouthpiece        Past Surgical History:  Past Surgical History:   Procedure Laterality Date    APPENDECTOMY  's    CATARACT EXTRACTION W/ INTRAOCULAR LENS IMPLANT Right 2019    Procedure: CATARACT PHACO EXTRACTION WITH INTRAOCULAR LENS IMPLANT RIGHT;  Surgeon: Carl Babb MD;  Location: [facility] OR;  Service: Ophthalmology    CATARACT EXTRACTION W/ INTRAOCULAR LENS IMPLANT Left 10/07/2019    Procedure: CATARACT PHACO EXTRACTION WITH INTRAOCULAR LENS IMPLANT LEFT;  Surgeon: Carl Babb MD;  Location: [facility] OR;  Service: Ophthalmology     SECTION  , 1997    x 2     DILATATION AND CURETTAGE      HAND SURGERY Right     \"Pinched nerve surgery\"    HAND SURGERY Left     Joint removed secondary to arthritis    HYSTERECTOMY      ''still have one ovary''    KNEE SURGERY Right 2021    arthroscopy and partial medial meniscectomy Dr. Chavez    OOPHORECTOMY      THYROIDECTOMY  1985    TONSILLECTOMY AND " ADENOIDECTOMY  1965    TOTAL KNEE ARTHROPLASTY Left 03/15/2023    Procedure: TOTAL KNEE ARTHROPLASTY -  LEFT;  Surgeon: Dayo Chavez MD;  Location: [facility] OR;  Service: Orthopedics;  Laterality: Left;    TOTAL KNEE ARTHROPLASTY Right 09/06/2023    Procedure: TOTAL KNEE ARTHROPLASTY WITH BERNICE ROBOT - RIGHT;  Surgeon: Dayo Chavez MD;  Location:  [facility] OR;  Service: Robotics - Ortho;  Laterality: Right;        Admitting Doctor:   Terri Garcia MD    Consulting Provider(s):  Consults       No orders found from 3/12/2025 to 4/11/2025.             Admitting Diagnosis:   The primary encounter diagnosis was Exacerbation of asthma, unspecified asthma severity, unspecified whether persistent. Diagnoses of Human metapneumovirus (hMPV) pneumonia and Respiratory failure with hypoxia, unspecified chronicity were also pertinent to this visit.    Most Recent Vitals:   Vitals:    04/10/25 0638 04/10/25 0644 04/10/25 0647 04/10/25 0652   BP:   133/75    BP Location:       Patient Position:       Pulse: 84 84 78 84   Resp:       Temp:       TempSrc:       SpO2: (!) 87% 96% 95% 98%   Weight:       Height:           Active LDAs/IV Access:   Lines, Drains & Airways       Active LDAs       Name Placement date Placement time Site Days    Peripheral IV 04/10/25 0556 Right Antecubital 04/10/25  0556  Antecubital  less than 1                    Labs (abnormal labs have a star):   Labs Reviewed   RESPIRATORY PANEL PCR W/ COVID-19 (SARS-COV-2), NP SWAB IN UTM/VTP, 2 HR TAT - Abnormal; Notable for the following components:       Result Value    Human Metapneumovirus Detected (*)     All other components within normal limits    Narrative:     In the setting of a positive respiratory panel with a viral infection PLUS a negative procalcitonin without other underlying concern for bacterial infection, consider observing off antibiotics or discontinuation of antibiotics and continue supportive care. If the respiratory panel is  positive for atypical bacterial infection (Bordetella pertussis, Chlamydophila pneumoniae, or Mycoplasma pneumoniae), consider antibiotic de-escalation to target atypical bacterial infection.   COMPREHENSIVE METABOLIC PANEL - Abnormal; Notable for the following components:    Glucose 148 (*)     Sodium 129 (*)     Potassium 2.9 (*)     Chloride 90 (*)     AST (SGOT) 46 (*)     All other components within normal limits    Narrative:     GFR Categories in Chronic Kidney Disease (CKD)      GFR Category          GFR (mL/min/1.73)    Interpretation  G1                     90 or greater         Normal or high (1)  G2                      60-89                Mild decrease (1)  G3a                   45-59                Mild to moderate decrease  G3b                   30-44                Moderate to severe decrease  G4                    15-29                Severe decrease  G5                    14 or less           Kidney failure          (1)In the absence of evidence of kidney disease, neither GFR category G1 or G2 fulfill the criteria for CKD.    eGFR calculation 2021 CKD-EPI creatinine equation, which does not include race as a factor   CBC WITH AUTO DIFFERENTIAL - Abnormal; Notable for the following components:    Neutrophil % 85.7 (*)     Lymphocyte % 8.6 (*)     Eosinophil % 0.0 (*)     Lymphocytes, Absolute 0.55 (*)     All other components within normal limits   C-REACTIVE PROTEIN - Abnormal; Notable for the following components:    C-Reactive Protein 1.46 (*)     All other components within normal limits   TROPONIN - Normal   LIPASE - Normal   BNP (IN-HOUSE) - Normal    Narrative:     This assay is used as an aid in the diagnosis of individuals suspected of having heart failure. It can be used as an aid in the diagnosis of acute decompensated heart failure (ADHF) in patients presenting with signs and symptoms of ADHF to the emergency department (ED). In addition, NT-proBNP of <300 pg/mL indicates ADHF is not  "likely.    Age Range Result Interpretation  NT-proBNP Concentration (pg/mL:      <50             Positive            >450                   Gray                 300-450                    Negative             <300    50-75           Positive            >900                  Gray                300-900                  Negative            <300      >75             Positive            >1800                  Gray                300-1800                  Negative            <300   D-DIMER, QUANTITATIVE - Normal    Narrative:     According to the assay 's published package insert, a normal (<0.50 MCGFEU/mL) D-dimer result in conjunction with a non-high clinical probability assessment, excludes deep vein thrombosis (DVT) and pulmonary embolism (PE) with high sensitivity.    D-dimer values increase with age and this can make VTE exclusion of an older population difficult. To address this, the [employer] College [employer], based on best available evidence and recent guidelines, recommends that clinicians use age-adjusted D-dimer thresholds in patients greater than 50 years of age with: a) a low probability of PE who do not meet all Pulmonary Embolism Rule Out Criteria, or b) in those with intermediate probability of PE.   The formula for an age-adjusted D-dimer cut-off is \"age/100\".  For example, a 60 year old patient would have an age-adjusted cut-off of 0.60 MCGFEU/mL and an 80 year old 0.80 MCGFEU/mL.   LACTIC ACID, PLASMA - Normal   PROCALCITONIN - Normal   MONONUCLEOSIS SCREEN - Normal   TSH RFX ON ABNORMAL TO FREE T4 - Normal   MAGNESIUM - Normal   HIGH SENSITIVITIY TROPONIN T 1HR - Normal    Narrative:     High Sensitive Troponin T Reference Range:  <14.0 ng/L- Negative Female for AMI  <22.0 ng/L- Negative Male for AMI  >=14 - Abnormal Female indicating possible myocardial injury.  >=22 - Abnormal Male indicating possible myocardial injury.   Clinicians would have to utilize clinical acumen, EKG, " Troponin, and serial changes to determine if it is an Acute Myocardial Infarction or myocardial injury due to an underlying chronic condition.        RAINBOW DRAW    Narrative:     The following orders were created for panel order [place] Draw.  Procedure                               Abnormality         Status                     ---------                               -----------         ------                     Green Top (Gel)[[id number]]                                  Final result               Lavender Top[[id number]]                                     Final result               Gold Top - SST[[id number]]                                   Final result               Gray Top[[id number]]                                         Final result               Light Blue Top[[id number]]                                   Final result                 Please view results for these tests on the individual orders.   URINALYSIS W/ MICROSCOPIC IF INDICATED (NO CULTURE)   CBC AND DIFFERENTIAL    Narrative:     The following orders were created for panel order CBC & Differential.  Procedure                               Abnormality         Status                     ---------                               -----------         ------                     CBC Auto Differential[[id number]]        Abnormal            Final result                 Please view results for these tests on the individual orders.   GREEN TOP   LAVENDER TOP   GOLD TOP - SST   GRAY TOP   LIGHT BLUE TOP       Meds Given in ED:   Medications   sodium chloride 0.9 % flush 10 mL (has no administration in time range)   azithromycin (ZITHROMAX) 500 mg in sodium chloride 0.9 % 250 mL IVPB-VTB (has no administration in time range)   aspirin chewable tablet 324 mg (324 mg Oral Given 4/10/25 0618)   albuterol (PROVENTIL) nebulizer solution 0.083% 2.5 mg/3mL (2.5 mg Nebulization Given 4/10/25 0648)   methylPREDNISolone sodium succinate (SOLU-Medrol) injection 125 mg (125  mg Intravenous Given 4/10/25 0639)   diphenhydrAMINE (BENADRYL) injection 25 mg (25 mg Intravenous Given 4/10/25 0658)   potassium chloride 10 mEq in 100 mL IVPB (10 mEq Intravenous New Bag 4/10/25 0709)   potassium chloride (MICRO-K/KLOR-CON) CR capsule (20 mEq Oral Given 4/10/25 0643)   iopamidol (ISOVUE-370) 76 % injection 100 mL (85 mL Intravenous Given 4/10/25 0729)   albuterol (PROVENTIL) nebulizer solution 0.083% 2.5 mg/3mL (2.5 mg Nebulization Given 4/10/25 0831)           Last NIH score:                                                          Dysphagia screening results:  Patient Factors Component (Dysphagia:Stroke or Rule-out)  Best Eye Response: 4-->(E4) spontaneous (04/10/25 0557)  Best Motor Response: 6-->(M6) obeys commands (04/10/25 0557)  Best Verbal Response: 5-->(V5) oriented (04/10/25 0557)  [place] Coma Scale Score: 15 (04/10/25 0557)     [place] Coma Scale:  No data recorded     CIWA:        Restraint Type:            Isolation Status:  No active isolations

## 2025-04-10 NOTE — FSED PROVIDER NOTE
Subjective   History of Present Illness  64-year-old female with a history of hypertension diabetes and mild seasonal asthma treated with Singulair presents with a myriad of complaints ranging from 3 weeks to acutely tonight.  States that she woke up had nausea and vomiting was dizzy had a fall initially denied LOC but is stating some retrograde amnesia to the events and is not entirely sure that she did not pass out.  She also for 3 weeks has had a viral type illness with persistent cough that is intermittently productive states she has sometimes pain over the anterior chest after a coughing spell persistent nausea and vomiting with decreased oral intake denies diarrhea throughout the course of the illness and states that it all started after going out to eat 1 night at a fast food restaurant.  She denies sore throat or significant nasal congestion.  Intermittent complaints of anterior abdominal pain.  No weakness or numbness and dizziness has resolved there is no room spinning at rest on arrival.  States that she is urinating slightly less but normal otherwise.    History provided by:  Patient      Review of Systems   All other systems reviewed and are negative.      Past Medical History:   Diagnosis Date    Anxiety     Arthritis     Asthma     Back pain     Cancer     SQUAMOUS CELL/ BASAL CELL ON LIP, under the nose as well    Chronic bronchitis     Depression     Diabetes mellitus     type 2    Fibromyalgia     High triglycerides     Hyperlipidemia     Hypertension     Hypothyroidism     Migraine     MVP (mitral valve prolapse)     OA (osteoarthritis)     Ovarian cyst     Peripheral autonomic neuropathy     Pleurisy     Pneumonia     Rheumatism     RLS (restless legs syndrome)     Sleep apnea     Mild form per patient. recommended mouthpiece       Allergies   Allergen Reactions    Penicillins Angioedema, Anaphylaxis, Hives, Itching, Shortness Of Breath and Swelling    Pregabalin Anaphylaxis    Cephalexin Itching  "   Chlorhexidine Rash and Itching    Sulfa Antibiotics Rash and Itching    Adhesive Tape Rash     Tegaderm specific    Darvon [Propoxyphene] Unknown (See Comments)     Doesn't remember    Diazepam Other (See Comments)     Paradoxical effect/ hypes pt       Past Surgical History:   Procedure Laterality Date    APPENDECTOMY  's    CATARACT EXTRACTION W/ INTRAOCULAR LENS IMPLANT Right 2019    Procedure: CATARACT PHACO EXTRACTION WITH INTRAOCULAR LENS IMPLANT RIGHT;  Surgeon: Carl Babb MD;  Location:  [facility] OR;  Service: Ophthalmology    CATARACT EXTRACTION W/ INTRAOCULAR LENS IMPLANT Left 10/07/2019    Procedure: CATARACT PHACO EXTRACTION WITH INTRAOCULAR LENS IMPLANT LEFT;  Surgeon: Carl Babb MD;  Location:  [facility] OR;  Service: Ophthalmology     SECTION  , 1997    x 2     DILATATION AND CURETTAGE      HAND SURGERY Right     \"Pinched nerve surgery\"    HAND SURGERY Left     Joint removed secondary to arthritis    HYSTERECTOMY      ''still have one ovary''    KNEE SURGERY Right 2021    arthroscopy and partial medial meniscectomy Dr. Chavez    OOPHORECTOMY      THYROIDECTOMY  1985    TONSILLECTOMY AND ADENOIDECTOMY      TOTAL KNEE ARTHROPLASTY Left 03/15/2023    Procedure: TOTAL KNEE ARTHROPLASTY -  LEFT;  Surgeon: Dayo Chavez MD;  Location:  [facility] OR;  Service: Orthopedics;  Laterality: Left;    TOTAL KNEE ARTHROPLASTY Right 2023    Procedure: TOTAL KNEE ARTHROPLASTY WITH BERNICE ROBOT - RIGHT;  Surgeon: Dayo Chavez MD;  Location:  [facility] OR;  Service: Robotics - Ortho;  Laterality: Right;       Family History   Problem Relation Age of Onset    Hypertension Mother     Heart attack Mother     Fibromyalgia Mother     Hyperlipidemia Mother     Thyroid disease Mother     Irritable bowel syndrome Mother     Heart attack Father     Cancer Brother     Stomach cancer Brother     Breast cancer Maternal Grandmother     Cancer Maternal " Grandmother     Thyroid disease Maternal Grandmother     Cancer Paternal Grandmother     Diabetes Maternal Aunt     Colon cancer Neg Hx     Cirrhosis Neg Hx     Liver cancer Neg Hx     Liver disease Neg Hx        Social History     Socioeconomic History    Marital status:    Tobacco Use    Smoking status: Former     Current packs/day: 0.00     Average packs/day: 0.3 packs/day for 26.6 years (6.6 ttl pk-yrs)     Types: Cigarettes     Start date:      Quit date: 2004     Years since quittin.7     Passive exposure: Past    Smokeless tobacco: Never   Vaping Use    Vaping status: Never Used   Substance and Sexual Activity    Alcohol use: No    Drug use: No    Sexual activity: Defer     Partners: Male     Birth control/protection: Abstinence           Objective   Physical Exam  Vitals reviewed.   HENT:      Head: Normocephalic and atraumatic.      Mouth/Throat:      Mouth: Mucous membranes are moist.   Eyes:      Extraocular Movements: Extraocular movements intact.      Conjunctiva/sclera: Conjunctivae normal.      Pupils: Pupils are equal, round, and reactive to light.   Cardiovascular:      Rate and Rhythm: Normal rate.   Pulmonary:      Effort: Pulmonary effort is normal.   Abdominal:      General: Abdomen is protuberant. There is no distension.      Palpations: Abdomen is soft.      Tenderness: There is abdominal tenderness in the epigastric area and periumbilical area.   Musculoskeletal:      Right lower leg: No edema.      Left lower leg: No edema.   Skin:     General: Skin is warm and dry.      Capillary Refill: Capillary refill takes less than 2 seconds.   Neurological:      General: No focal deficit present.      Mental Status: She is alert and oriented to person, place, and time.         Procedures           ED Course  ED Course as of 25 0345   Thu Apr 10, 2025   0527 EKG ventricular rate 99  QRS 84 QTc 477 normal sinus rhythm normal axis negative STEMI [JN]   0711 EKG  ventricular rate 83  QRS 72 QTc 446 normal axis negative STEMI [JN]   0813 Patient has significant wheezing, tachpnea, satting 88% on 2L [EG]   0814 Oxygen being titrated up to 4L [EG]      ED Course User Index  [EG] Lilia Khalil DO  [JN] Eron Fuchs MD                                           Medical Decision Making  64-year-old female presents with a myriad of symptoms with a prolonged upper respiratory type illness and exacerbation of cough and reactive airway disease on exam complaining of lightheadedness chest pain shortness of breath and abdominal pain.  Differential includes viral URI, pneumonia, ACS, gastritis, enteritis, colitis, pneumothorax, asthma exacerbation, and others    Given an albuterol nebulizer treatment after initial evaluation pretreated for respiratory reactive airway disease and CT contrast with Solu-Medrol 125 and Benadryl IV.  Initial labs demonstrate some hypokalemia repleted with 1 round of IV potassium and p.o. 20 mill equivalents.    Handoff to oncoming attending at the time of shift change    Problems Addressed:  Exacerbation of asthma, unspecified asthma severity, unspecified whether persistent: complicated acute illness or injury  Human metapneumovirus (hMPV) pneumonia: complicated acute illness or injury  Respiratory failure with hypoxia, unspecified chronicity: complicated acute illness or injury    Amount and/or Complexity of Data Reviewed  Labs: ordered.  Radiology: ordered.  ECG/medicine tests: ordered.    Risk  OTC drugs.  Prescription drug management.  Decision regarding hospitalization.        Final diagnoses:   Exacerbation of asthma, unspecified asthma severity, unspecified whether persistent   Human metapneumovirus (hMPV) pneumonia   Respiratory failure with hypoxia, unspecified chronicity       ED Disposition  ED Disposition       ED Disposition   Decision to Admit    Condition   --    Comment   Level of Care: Telemetry [5]   Accepting Provider:: MINI,  KOLBY [[id number]]   Admitting Physician: KOLBY TAYLOR [[id number]]   Attending Physician: KOLBY TAYLOR [[id number]]   Patient Class: Inpatient [101]                 No follow-up provider specified.       Medication List        ASK your doctor about these medications      azithromycin 500 MG tablet  Commonly known as: ZITHROMAX  Take 2 tablets by mouth Daily for 1 day, THEN 1 tablet Daily for 5 days.  Start taking on: April 3, 2025  Ask about: Should I take this medication?

## 2025-04-11 LAB
ANION GAP SERPL CALCULATED.3IONS-SCNC: 9 MMOL/L (ref 5–15)
BUN SERPL-MCNC: 11 MG/DL (ref 8–23)
BUN/CREAT SERPL: 15.1 (ref 7–25)
CALCIUM SPEC-SCNC: 9.5 MG/DL (ref 8.6–10.5)
CHLORIDE SERPL-SCNC: 95 MMOL/L (ref 98–107)
CO2 SERPL-SCNC: 30 MMOL/L (ref 22–29)
CREAT SERPL-MCNC: 0.73 MG/DL (ref 0.57–1)
CRP SERPL-MCNC: 0.91 MG/DL (ref 0–0.5)
DEPRECATED RDW RBC AUTO: 38.6 FL (ref 37–54)
EGFRCR SERPLBLD CKD-EPI 2021: 92.5 ML/MIN/1.73
ERYTHROCYTE [DISTWIDTH] IN BLOOD BY AUTOMATED COUNT: 12.7 % (ref 12.3–15.4)
GLUCOSE BLDC GLUCOMTR-MCNC: 108 MG/DL (ref 70–130)
GLUCOSE BLDC GLUCOMTR-MCNC: 123 MG/DL (ref 70–130)
GLUCOSE BLDC GLUCOMTR-MCNC: 298 MG/DL (ref 70–130)
GLUCOSE BLDC GLUCOMTR-MCNC: 379 MG/DL (ref 70–130)
GLUCOSE SERPL-MCNC: 101 MG/DL (ref 65–99)
HBA1C MFR BLD: 5.5 % (ref 4.8–5.6)
HCT VFR BLD AUTO: 36 % (ref 34–46.6)
HGB BLD-MCNC: 12.1 G/DL (ref 12–15.9)
L PNEUMO1 AG UR QL IA: NEGATIVE
MCH RBC QN AUTO: 27.6 PG (ref 26.6–33)
MCHC RBC AUTO-ENTMCNC: 33.6 G/DL (ref 31.5–35.7)
MCV RBC AUTO: 82.2 FL (ref 79–97)
MRSA DNA SPEC QL NAA+PROBE: NEGATIVE
PLATELET # BLD AUTO: 231 10*3/MM3 (ref 140–450)
PMV BLD AUTO: 10.7 FL (ref 6–12)
POTASSIUM SERPL-SCNC: 3.2 MMOL/L (ref 3.5–5.2)
QT INTERVAL: 372 MS
QT INTERVAL: 380 MS
QTC INTERVAL: 446 MS
QTC INTERVAL: 477 MS
RBC # BLD AUTO: 4.38 10*6/MM3 (ref 3.77–5.28)
SODIUM SERPL-SCNC: 134 MMOL/L (ref 136–145)
WBC NRBC COR # BLD AUTO: 5.83 10*3/MM3 (ref 3.4–10.8)

## 2025-04-11 PROCEDURE — 97166 OT EVAL MOD COMPLEX 45 MIN: CPT

## 2025-04-11 PROCEDURE — 87449 NOS EACH ORGANISM AG IA: CPT | Performed by: INTERNAL MEDICINE

## 2025-04-11 PROCEDURE — 83036 HEMOGLOBIN GLYCOSYLATED A1C: CPT | Performed by: INTERNAL MEDICINE

## 2025-04-11 PROCEDURE — 82948 REAGENT STRIP/BLOOD GLUCOSE: CPT

## 2025-04-11 PROCEDURE — 97535 SELF CARE MNGMENT TRAINING: CPT

## 2025-04-11 PROCEDURE — 25810000003 SODIUM CHLORIDE 0.9 % SOLUTION: Performed by: INTERNAL MEDICINE

## 2025-04-11 PROCEDURE — 94664 DEMO&/EVAL PT USE INHALER: CPT

## 2025-04-11 PROCEDURE — 87641 MR-STAPH DNA AMP PROBE: CPT | Performed by: INTERNAL MEDICINE

## 2025-04-11 PROCEDURE — 99232 SBSQ HOSP IP/OBS MODERATE 35: CPT | Performed by: INTERNAL MEDICINE

## 2025-04-11 PROCEDURE — 80048 BASIC METABOLIC PNL TOTAL CA: CPT | Performed by: INTERNAL MEDICINE

## 2025-04-11 PROCEDURE — 86140 C-REACTIVE PROTEIN: CPT | Performed by: INTERNAL MEDICINE

## 2025-04-11 PROCEDURE — 25010000002 METHYLPREDNISOLONE PER 125 MG: Performed by: INTERNAL MEDICINE

## 2025-04-11 PROCEDURE — 94799 UNLISTED PULMONARY SVC/PX: CPT

## 2025-04-11 PROCEDURE — 85027 COMPLETE CBC AUTOMATED: CPT | Performed by: INTERNAL MEDICINE

## 2025-04-11 PROCEDURE — 25010000002 ENOXAPARIN PER 10 MG: Performed by: INTERNAL MEDICINE

## 2025-04-11 PROCEDURE — 63710000001 INSULIN LISPRO (HUMAN) PER 5 UNITS: Performed by: INTERNAL MEDICINE

## 2025-04-11 PROCEDURE — 63710000001 PREDNISONE PER 1 MG: Performed by: INTERNAL MEDICINE

## 2025-04-11 RX ORDER — METHOCARBAMOL 750 MG/1
750 TABLET, FILM COATED ORAL 3 TIMES DAILY PRN
Status: DISCONTINUED | OUTPATIENT
Start: 2025-04-11 | End: 2025-04-17 | Stop reason: HOSPADM

## 2025-04-11 RX ORDER — CODEINE PHOSPHATE AND GUAIFENESIN 10; 100 MG/5ML; MG/5ML
10 SOLUTION ORAL EVERY 6 HOURS PRN
Status: DISCONTINUED | OUTPATIENT
Start: 2025-04-11 | End: 2025-04-17 | Stop reason: HOSPADM

## 2025-04-11 RX ORDER — WATER 10 ML/10ML
INJECTION INTRAMUSCULAR; INTRAVENOUS; SUBCUTANEOUS
Status: COMPLETED
Start: 2025-04-11 | End: 2025-04-11

## 2025-04-11 RX ORDER — DOXYCYCLINE 100 MG/1
100 CAPSULE ORAL EVERY 12 HOURS SCHEDULED
Status: DISCONTINUED | OUTPATIENT
Start: 2025-04-11 | End: 2025-04-17 | Stop reason: HOSPADM

## 2025-04-11 RX ORDER — LORAZEPAM 1 MG/1
1 TABLET ORAL EVERY 6 HOURS
Status: DISCONTINUED | OUTPATIENT
Start: 2025-04-11 | End: 2025-04-13

## 2025-04-11 RX ORDER — IPRATROPIUM BROMIDE AND ALBUTEROL SULFATE 2.5; .5 MG/3ML; MG/3ML
3 SOLUTION RESPIRATORY (INHALATION) EVERY 4 HOURS PRN
Status: DISCONTINUED | OUTPATIENT
Start: 2025-04-11 | End: 2025-04-17 | Stop reason: HOSPADM

## 2025-04-11 RX ORDER — LIDOCAINE 4 G/G
1 PATCH TOPICAL
Status: DISCONTINUED | OUTPATIENT
Start: 2025-04-11 | End: 2025-04-17 | Stop reason: HOSPADM

## 2025-04-11 RX ORDER — POTASSIUM CHLORIDE 1500 MG/1
40 TABLET, EXTENDED RELEASE ORAL EVERY 4 HOURS
Status: COMPLETED | OUTPATIENT
Start: 2025-04-11 | End: 2025-04-11

## 2025-04-11 RX ORDER — OXYCODONE AND ACETAMINOPHEN 10; 325 MG/1; MG/1
1 TABLET ORAL EVERY 4 HOURS PRN
Refills: 0 | Status: DISCONTINUED | OUTPATIENT
Start: 2025-04-11 | End: 2025-04-17 | Stop reason: HOSPADM

## 2025-04-11 RX ORDER — GUAIFENESIN 600 MG/1
1200 TABLET, EXTENDED RELEASE ORAL EVERY 12 HOURS SCHEDULED
Status: DISCONTINUED | OUTPATIENT
Start: 2025-04-11 | End: 2025-04-17 | Stop reason: HOSPADM

## 2025-04-11 RX ORDER — METHYLPREDNISOLONE SODIUM SUCCINATE 125 MG/2ML
60 INJECTION, POWDER, LYOPHILIZED, FOR SOLUTION INTRAMUSCULAR; INTRAVENOUS EVERY 8 HOURS
Status: DISCONTINUED | OUTPATIENT
Start: 2025-04-11 | End: 2025-04-13

## 2025-04-11 RX ORDER — PREDNISONE 20 MG/1
40 TABLET ORAL
Status: DISCONTINUED | OUTPATIENT
Start: 2025-04-11 | End: 2025-04-11

## 2025-04-11 RX ORDER — SODIUM CHLORIDE 9 MG/ML
75 INJECTION, SOLUTION INTRAVENOUS CONTINUOUS
Status: ACTIVE | OUTPATIENT
Start: 2025-04-11 | End: 2025-04-12

## 2025-04-11 RX ADMIN — IPRATROPIUM BROMIDE AND ALBUTEROL SULFATE 3 ML: 2.5; .5 SOLUTION RESPIRATORY (INHALATION) at 11:42

## 2025-04-11 RX ADMIN — ENOXAPARIN SODIUM 40 MG: 100 INJECTION SUBCUTANEOUS at 08:18

## 2025-04-11 RX ADMIN — Medication 10 ML: at 20:41

## 2025-04-11 RX ADMIN — DOCUSATE SODIUM 100 MG: 100 CAPSULE, LIQUID FILLED ORAL at 08:27

## 2025-04-11 RX ADMIN — DEXTROMETHORPHAN POLISTIREX 60 MG: 30 SUSPENSION ORAL at 08:20

## 2025-04-11 RX ADMIN — DOXYCYCLINE 100 MG: 100 CAPSULE ORAL at 13:15

## 2025-04-11 RX ADMIN — INSULIN LISPRO 6 UNITS: 100 INJECTION, SOLUTION INTRAVENOUS; SUBCUTANEOUS at 16:43

## 2025-04-11 RX ADMIN — DOXYCYCLINE 100 MG: 100 CAPSULE ORAL at 20:49

## 2025-04-11 RX ADMIN — IPRATROPIUM BROMIDE AND ALBUTEROL SULFATE 3 ML: 2.5; .5 SOLUTION RESPIRATORY (INHALATION) at 16:05

## 2025-04-11 RX ADMIN — LEVOTHYROXINE SODIUM 100 MCG: 0.1 TABLET ORAL at 08:21

## 2025-04-11 RX ADMIN — METHYLPREDNISOLONE SODIUM SUCCINATE 60 MG: 125 INJECTION INTRAMUSCULAR; INTRAVENOUS at 20:49

## 2025-04-11 RX ADMIN — DULOXETINE 60 MG: 60 CAPSULE, DELAYED RELEASE ORAL at 08:22

## 2025-04-11 RX ADMIN — LORAZEPAM 1 MG: 1 TABLET ORAL at 20:40

## 2025-04-11 RX ADMIN — METHYLPREDNISOLONE SODIUM SUCCINATE 60 MG: 125 INJECTION INTRAMUSCULAR; INTRAVENOUS at 13:15

## 2025-04-11 RX ADMIN — IPRATROPIUM BROMIDE AND ALBUTEROL SULFATE 3 ML: 2.5; .5 SOLUTION RESPIRATORY (INHALATION) at 19:43

## 2025-04-11 RX ADMIN — Medication 10 ML: at 08:19

## 2025-04-11 RX ADMIN — GUAIFENESIN 1200 MG: 600 TABLET ORAL at 08:21

## 2025-04-11 RX ADMIN — CELECOXIB 200 MG: 200 CAPSULE ORAL at 08:21

## 2025-04-11 RX ADMIN — POTASSIUM CHLORIDE 40 MEQ: 20 TABLET, EXTENDED RELEASE ORAL at 17:14

## 2025-04-11 RX ADMIN — IPRATROPIUM BROMIDE AND ALBUTEROL SULFATE 3 ML: 2.5; .5 SOLUTION RESPIRATORY (INHALATION) at 08:01

## 2025-04-11 RX ADMIN — ATORVASTATIN CALCIUM 80 MG: 40 TABLET, FILM COATED ORAL at 08:21

## 2025-04-11 RX ADMIN — LIDOCAINE 1 PATCH: 4 PATCH TOPICAL at 10:34

## 2025-04-11 RX ADMIN — INSULIN LISPRO 4 UNITS: 100 INJECTION, SOLUTION INTRAVENOUS; SUBCUTANEOUS at 20:40

## 2025-04-11 RX ADMIN — LORAZEPAM 1 MG: 1 TABLET ORAL at 13:15

## 2025-04-11 RX ADMIN — WATER 10 ML: 1 INJECTION INTRAMUSCULAR; INTRAVENOUS; SUBCUTANEOUS at 13:15

## 2025-04-11 RX ADMIN — DOCUSATE SODIUM 100 MG: 100 CAPSULE, LIQUID FILLED ORAL at 20:40

## 2025-04-11 RX ADMIN — OXYCODONE HYDROCHLORIDE AND ACETAMINOPHEN 1 TABLET: 10; 325 TABLET ORAL at 11:37

## 2025-04-11 RX ADMIN — GUAIFENESIN 1200 MG: 600 TABLET ORAL at 20:40

## 2025-04-11 RX ADMIN — MONTELUKAST 10 MG: 10 TABLET, FILM COATED ORAL at 20:40

## 2025-04-11 RX ADMIN — POTASSIUM CHLORIDE 40 MEQ: 20 TABLET, EXTENDED RELEASE ORAL at 12:10

## 2025-04-11 RX ADMIN — PREDNISONE 40 MG: 20 TABLET ORAL at 08:19

## 2025-04-11 RX ADMIN — SODIUM CHLORIDE 1000 ML: 9 INJECTION, SOLUTION INTRAVENOUS at 13:15

## 2025-04-11 RX ADMIN — DEXTROMETHORPHAN POLISTIREX 60 MG: 30 SUSPENSION ORAL at 20:40

## 2025-04-11 NOTE — PROGRESS NOTES
[facility] Medicine Services  PROGRESS NOTE    Patient Name: Janet Obrien  : 1960  MRN: [id number]    Date of Admission: 4/10/2025  Primary Care Physician: Noe Davenport MD    Subjective   Subjective     CC:  Follow-up for shortness of breath    HPI:  Patient was seen and examined this morning.  Complaining of shortness of breath and wheezing.  Also very vertiginous.  Hypotensive.  Spoke to her daughter over the phone      Objective   Objective     Vital Signs:   Temp:  [98 °F (36.7 °C)-99.6 °F (37.6 °C)] 98 °F (36.7 °C)  Heart Rate:  [65-90] 65  Resp:  [17-20] 18  BP: ()/(43-76) 97/56  Flow (L/min) (Oxygen Therapy):  [2] 2     Physical Exam:  General: Comfortable, mild respiratory distress, conversant and cooperative  Head: Atraumatic and normocephalic  Eyes: No Icterus. No pallor  Ears:  Ears appear intact with no abnormalities noted  Throat: No oral lesions, no thrush  Neck: Supple, trachea midline  Lungs: Bilateral wheezing.  No crackles  Heart:  Normal S1 and S2, no murmur, no gallop, No JVD, no lower extremity swelling  Abdomen:  Soft, no tenderness, no organomegaly, normal bowel sounds, no organomegaly  Extremities: pulses equal bilaterally  Skin: No bleeding, bruising or rash, normal skin turgor and elasticity  Neurologic: Cranial nerves appear intact with no evidence of facial asymmetry, normal motor and sensory functions in all 4 extremities  Psych: Alert and oriented x 3, normal mood    Results Reviewed:  LAB RESULTS:      Lab 25  0524 04/10/25  0704 04/10/25  0557   WBC 5.83  --  6.38   HEMOGLOBIN 12.1  --  14.0   HEMATOCRIT 36.0  --  41.2   PLATELETS 231  --  281   NEUTROS ABS  --   --  5.47   IMMATURE GRANS (ABS)  --   --  0.01   LYMPHS ABS  --   --  0.55*   MONOS ABS  --   --  0.34   EOS ABS  --   --  0.00   MCV 82.2  --  81.6   CRP 0.91*  --  1.46*   PROCALCITONIN  --   --  0.07   LACTATE  --   --  1.7   D DIMER QUANT  --   --  0.31   HSTROP T  --   7 7         Lab 04/11/25  0524 04/10/25  0557   SODIUM 134* 129*   POTASSIUM 3.2* 2.9*   CHLORIDE 95* 90*   CO2 30.0* 24.1   ANION GAP 9.0 14.9   BUN 11 10   CREATININE 0.73 0.82   EGFR 92.5 80.5   GLUCOSE 101* 148*   CALCIUM 9.5 9.9   MAGNESIUM  --  1.9   HEMOGLOBIN A1C 5.50  --    TSH  --  0.766         Lab 04/10/25  0557   TOTAL PROTEIN 6.5   ALBUMIN 3.7   GLOBULIN 2.8   ALT (SGPT) 20   AST (SGOT) 46*   BILIRUBIN 0.6   ALK PHOS 73   LIPASE 33         Lab 04/10/25  0704 04/10/25  0557   PROBNP  --  149.0   HSTROP T 7 7                 Brief Urine Lab Results  (Last result in the past 365 days)        Color   Clarity   Blood   Leuk Est   Nitrite   Protein   CREAT   Urine HCG        04/10/25 0854 Yellow   Clear   Negative   Trace   Negative   Negative                   Microbiology Results Abnormal       Procedure Component Value - Date/Time    Respiratory Panel PCR w/COVID-19(SARS-CoV-2) IVAN/KOBE/CASANDRA/PAD/COR/ANDRAE In-House, NP Swab in UTM/VTM, 2 HR TAT - Swab, Nasopharynx [[id number]]  (Abnormal) Collected: 04/10/25 0618    Lab Status: Final result Specimen: Swab from Nasopharynx Updated: 04/10/25 0730     ADENOVIRUS, PCR Not Detected     Coronavirus 229E Not Detected     Coronavirus HKU1 Not Detected     Coronavirus NL63 Not Detected     Coronavirus OC43 Not Detected     COVID19 Not Detected     Human Metapneumovirus Detected     Human Rhinovirus/Enterovirus Not Detected     Influenza A PCR Not Detected     Influenza B PCR Not Detected     Parainfluenza Virus 1 Not Detected     Parainfluenza Virus 2 Not Detected     Parainfluenza Virus 3 Not Detected     Parainfluenza Virus 4 Not Detected     RSV, PCR Not Detected     Bordetella pertussis pcr Not Detected     Bordetella parapertussis PCR Not Detected     Chlamydophila pneumoniae PCR Not Detected     Mycoplasma pneumo by PCR Not Detected    Narrative:      In the setting of a positive respiratory panel with a viral infection PLUS a negative procalcitonin without other  underlying concern for bacterial infection, consider observing off antibiotics or discontinuation of antibiotics and continue supportive care. If the respiratory panel is positive for atypical bacterial infection (Bordetella pertussis, Chlamydophila pneumoniae, or Mycoplasma pneumoniae), consider antibiotic de-escalation to target atypical bacterial infection.            CT Abdomen Pelvis With Contrast  Result Date: 4/10/2025  CT ANGIOGRAM CHEST PULMONARY EMBOLISM, CT ABDOMEN PELVIS W CONTRAST Date of Exam: 4/10/2025 6:18 AM EDT Indication: Acute fall v syncope/ abdominal pain/vomiting/ dyspnea. Comparison: None available. Technique: Axial CT images were obtained of the chest, abdomen and pelvis after the uneventful intravenous administration of 85 mL Isovue-370 utilizing pulmonary embolism protocol.  In addition, a 3-D volume rendered image was created for interpretation.   Reconstructed coronal and sagittal images were also obtained. Automated exposure control and iterative construction methods were used. Findings: PULMONARY VASCULATURE: Pulmonary arteries are widely patent without evidence of embolus.Main pulmonary artery is normal in size. No evidence of right heart strain. MEDIASTINUM:Unremarkable. Aortic and heart size are normal. No aortic dissection identified. No mass nor pericardial effusion. CORONARY ARTERIES: Nocalcified atherosclerotic disease. LUNGS: Evaluation of lung parenchyma demonstrates nodular infiltrate in the right lower lung compatible with infectious/inflammatory process. Some faint tree-in-bud opacities are present in the anterior right upper lung also compatible with inflammatory change. PLEURAL SPACE: No effusion, mass, nor pneumothorax. LYMPH NODES: Right hilar lymph nodes are noted measuring up to 1.3 cm in short axis. OSSEOUS STRUCTURES: Appropriate for age with no acute process identified. No acute osseous injury noted. There is a suspected sebaceous cyst in the left axillary region  measuring 1.2 cm. Correlate on physical examination. LIVER:  Unremarkable parenchyma without focal lesion. BILIARY/GALLBLADDER:  Unremarkable SPLEEN:  Unremarkable PANCREAS:  Unremarkable ADRENAL: There is a 1.6 cm indeterminate right adrenal gland nodule. The left adrenal gland is unremarkable in appearance. KIDNEYS:  Unremarkable parenchyma with no solid mass identified. No obstruction.  No calculus identified. GASTROINTESTINAL/MESENTERY: Evaluation of the gastrointestinal tract demonstrates diverticulosis without CT evidence for acute diverticulitis. No free intraperitoneal gas or fluid. Trace sliding hiatal hernia is noted. Left inguinal hernia is present containing mesenteric fat. MESENTERIC VESSELS:  Patent. AORTA/IVC:  Normal caliber. RETROPERITONEUM/LYMPH NODES:  Unremarkable REPRODUCTIVE: Surgically absent. BLADDER:  Unremarkable OSSEUS STRUCTURES:  Typical for age with no acute process identified.     Impression: Impression: 1. No evidence for pulmonary embolus. 2. Nodular infiltrate in the right lower lung with reactive right hilar lymphadenopathy. 3. No acute findings in the abdomen or pelvis.   Electronically Signed: Margarette Fuentes MD  4/10/2025 7:46 AM EDT  Workstation ID: [id number]    CT Angiogram Chest Pulmonary Embolism  Result Date: 4/10/2025  CT ANGIOGRAM CHEST PULMONARY EMBOLISM, CT ABDOMEN PELVIS W CONTRAST Date of Exam: 4/10/2025 6:18 AM EDT Indication: Acute fall v syncope/ abdominal pain/vomiting/ dyspnea. Comparison: None available. Technique: Axial CT images were obtained of the chest, abdomen and pelvis after the uneventful intravenous administration of 85 mL Isovue-370 utilizing pulmonary embolism protocol.  In addition, a 3-D volume rendered image was created for interpretation.   Reconstructed coronal and sagittal images were also obtained. Automated exposure control and iterative construction methods were used. Findings: PULMONARY VASCULATURE: Pulmonary arteries are widely patent  without evidence of embolus.Main pulmonary artery is normal in size. No evidence of right heart strain. MEDIASTINUM:Unremarkable. Aortic and heart size are normal. No aortic dissection identified. No mass nor pericardial effusion. CORONARY ARTERIES: Nocalcified atherosclerotic disease. LUNGS: Evaluation of lung parenchyma demonstrates nodular infiltrate in the right lower lung compatible with infectious/inflammatory process. Some faint tree-in-bud opacities are present in the anterior right upper lung also compatible with inflammatory change. PLEURAL SPACE: No effusion, mass, nor pneumothorax. LYMPH NODES: Right hilar lymph nodes are noted measuring up to 1.3 cm in short axis. OSSEOUS STRUCTURES: Appropriate for age with no acute process identified. No acute osseous injury noted. There is a suspected sebaceous cyst in the left axillary region measuring 1.2 cm. Correlate on physical examination. LIVER:  Unremarkable parenchyma without focal lesion. BILIARY/GALLBLADDER:  Unremarkable SPLEEN:  Unremarkable PANCREAS:  Unremarkable ADRENAL: There is a 1.6 cm indeterminate right adrenal gland nodule. The left adrenal gland is unremarkable in appearance. KIDNEYS:  Unremarkable parenchyma with no solid mass identified. No obstruction.  No calculus identified. GASTROINTESTINAL/MESENTERY: Evaluation of the gastrointestinal tract demonstrates diverticulosis without CT evidence for acute diverticulitis. No free intraperitoneal gas or fluid. Trace sliding hiatal hernia is noted. Left inguinal hernia is present containing mesenteric fat. MESENTERIC VESSELS:  Patent. AORTA/IVC:  Normal caliber. RETROPERITONEUM/LYMPH NODES:  Unremarkable REPRODUCTIVE: Surgically absent. BLADDER:  Unremarkable OSSEUS STRUCTURES:  Typical for age with no acute process identified.     Impression: Impression: 1. No evidence for pulmonary embolus. 2. Nodular infiltrate in the right lower lung with reactive right hilar lymphadenopathy. 3. No acute  findings in the abdomen or pelvis.   Electronically Signed: Margarette Fuentes MD  4/10/2025 7:46 AM EDT  Workstation ID: [id number]    CT Head Without Contrast  Result Date: 4/10/2025  CT HEAD WO CONTRAST Date of Exam: 4/10/2025 6:18 AM EDT Indication: Acute fall v syncope/ abdominal pain/vomiting/ dyspnea/. Comparison: None available. Technique: Axial CT images were obtained of the head without contrast administration.  Automated exposure control and iterative construction methods were used. Findings: There is no evidence of hemorrhage. There is no mass effect or midline shift. There is no extracerebral collection. Ventricles are normal in size and configuration for patient's stated age. Posterior fossa is within normal limits. Calvarium and skull base appear intact.  Visualized sinuses show no air fluid levels. Visualized orbits are unremarkable.     Impression: Impression: No acute intracranial abnormality identified. Electronically Signed: Andry Larkin MD  4/10/2025 7:43 AM EDT  Workstation ID: [id number]    XR Chest 1 View  Result Date: 4/10/2025  XR CHEST 1 VW Date of Exam: 4/10/2025 5:40 AM EDT Indication: Chest Pain Triage Protocol Comparison: 9/8/2023. Findings: There is no pneumothorax, pleural effusion or focal airspace consolidation. Heart size and pulmonary vasculature appear within normal limits. Regional bones appear intact.     Impression: Impression: No acute cardiopulmonary abnormality. Electronically Signed: Toni Ledezma MD  4/10/2025 5:48 AM EDT  Workstation ID: [id number]      Results for orders placed in visit on 06/05/20    Adult Transthoracic Echo Complete W/ Cont if Necessary Per Protocol    Interpretation Summary  · Estimated EF = 76%.  · Left ventricular systolic function is hyperdynamic (EF > 70).      Current medications:  Scheduled Meds:atorvastatin, 80 mg, Oral, Daily  celecoxib, 200 mg, Oral, Daily  dextromethorphan polistirex ER, 60 mg, Oral, Q12H  docusate sodium, 100 mg, Oral,  BID  doxycycline, 100 mg, Oral, Q12H  DULoxetine, 60 mg, Oral, Daily  enoxaparin sodium, 40 mg, Subcutaneous, Daily  guaiFENesin, 1,200 mg, Oral, Q12H  hydrOXYzine, 10 mg, Oral, Once  insulin lispro, 2-7 Units, Subcutaneous, 4x Daily AC & at Bedtime  ipratropium-albuterol, 3 mL, Nebulization, 4x Daily - RT  levothyroxine, 100 mcg, Oral, Daily  Lidocaine, 1 patch, Transdermal, Q24H  LORazepam, 1 mg, Oral, Q6H  methylPREDNISolone sodium succinate, 60 mg, Intravenous, Q8H  montelukast, 10 mg, Oral, Nightly  potassium chloride ER, 40 mEq, Oral, Q4H  sodium chloride, 1,000 mL, Intravenous, Once  sodium chloride, 10 mL, Intravenous, Q12H      Continuous Infusions:sodium chloride, 75 mL/hr      PRN Meds:.  acetaminophen    senna-docusate sodium **AND** polyethylene glycol **AND** bisacodyl **AND** bisacodyl    Calcium Replacement - Follow Nurse / BPA Driven Protocol    dextrose    dextrose    glucagon (human recombinant)    guaiFENesin-codeine    ipratropium-albuterol    Magnesium Standard Dose Replacement - Follow Nurse / BPA Driven Protocol    meclizine    melatonin    methocarbamol    ondansetron    oxyCODONE-acetaminophen    Phosphorus Replacement - Follow Nurse / BPA Driven Protocol    Potassium Replacement - Follow Nurse / BPA Driven Protocol    sodium chloride    sodium chloride    sodium chloride    tiZANidine    Assessment & Plan   Assessment & Plan     Active Hospital Problems    Diagnosis  POA    **Acute bronchiolitis due to human metapneumovirus [J21.1]  Unknown      Resolved Hospital Problems   No resolved problems to display.        Brief Hospital Course to date:  Janet Obrien is a 64 y.o. female with past medical history of bronchial asthma, essential hypertension, dyslipidemia, hypothyroidism, restless leg syndrome, anxiety, obstructive sleep apnea, obesity, who presented to the hospital with shortness of breath and wheezing.  Test positive for human metapneumovirus    Acute hypoxic respiratory  insufficiency  Acute asthma exacerbation  Acute bronchitis secondary to human metapneumovirus infection  Patient with history of bronchial asthma.  Presented to the hospital with acute hypoxemia and worsening asthma symptoms/wheezing.  Tested positive for human metapneumovirus and CT chest with evidence of bronchiolitis  Start IV steroid 60 mg 3 times daily  No leukocytosis.  Normal procalcitonin.  Normal CRP would argue against pneumonia.  Monitor off antibiotics  DuoNebs: Scheduled and as needed    Vertigo  Possible otitis media  P.o. doxycycline  Casual Ativan  As needed meclizine     Hypoosmolar hyponatremia  Hypovolemic secondary to poor oral intake  IV fluids  Holding hydrochlorothiazide  Encourage p.o. intake      Hypokalemia  Replace per protocol    Type 2 diabetes  A1c 5.5 %  SSI     Expected Discharge Location and Transportation: Home   Expected Discharge   Expected Discharge Date: 4/14/2025; Expected Discharge Time:      VTE Prophylaxis:  Pharmacologic VTE prophylaxis orders are present.         AM-PAC 6 Clicks Score (PT): 24 (04/10/25 2030)    CODE STATUS:   Code Status and Medical Interventions: CPR (Attempt to Resuscitate); Full Support   Ordered at: 04/10/25 8147     Code Status (Patient has no pulse and is not breathing):    CPR (Attempt to Resuscitate)     Medical Interventions (Patient has pulse or is breathing):    Full Support     Level Of Support Discussed With:    Patient       Sulaiman Bear MD  04/11/25

## 2025-04-11 NOTE — PLAN OF CARE
Goal Outcome Evaluation:  Plan of Care Reviewed With: patient           Outcome Evaluation: Pt presents w/ dizziness, decreased functional endurance, generalized weakness, and balance deficits limiting her ADL independence. Pt w/ one noted LOB w/ mobility to/from bathroom req max A to correct & pt unable to reach feet to don socks this date. Pt would benefit from continued skilled IPOT services to address current functional deficits. Based on pt's current functional status & hx of multiple recent falls rec IRF at d/c for best functional outcomes.    Anticipated Discharge Disposition (OT): inpatient rehabilitation facility

## 2025-04-11 NOTE — PAYOR COMM NOTE
"Betsy Obrien (64 y.o. Female)     Pao Larsen, RN  Utilization Review  [phone]  [phone]        Date of Birth   1960    Social Security Number       Address   [address] [place] [address]    Home Phone   [phone]    MRN   [id number]       Christian   None    Marital Status                               Admission Date   4/10/2025    Admission Type   Emergency    Admitting Provider   Sulaiman Bear MD    Attending Provider   Sulaiman Bear MD    Department, Room/Bed   [facility] 6B, N625/1       Discharge Date       Discharge Disposition       Discharge Destination                                 Attending Provider: Sulaiman Bear MD    Allergies: Penicillins, Pregabalin, Cephalexin, Chlorhexidine, Sulfa Antibiotics, Adhesive Tape, Darvon [Propoxyphene], Diazepam    Isolation: Contact   Infection: Human Metapneumovirus  (04/10/25)   Code Status: CPR    Ht: 165.1 cm (65\")   Wt: 102 kg (225 lb)    Admission Cmt: None   Principal Problem: Acute bronchiolitis due to human metapneumovirus [J21.1]                   Active Insurance as of 4/10/2025       Primary Coverage       Payor Plan Insurance Group Employer/Plan Group    [employer] [place] [employer] [place]ICA[place]        Payor Plan Address Payor Plan Phone Number Payor Plan Fax Number Effective Dates    [address] [address] [phone]  7/21/2016 - None Entered    [facility] [id number]         Subscriber Name Subscriber Birth Date Member ID       BETSY OBRIEN 1960 [id number]                     Emergency Contacts        (Rel.) Home Phone Work Phone Mobile Phone    Gage Alicea (Son) [phone] -- --    TarvisToni (Son) -- -- [phone]    LISA PEOPLES (Brother) -- -- [phone]    Ness Bach (Other) -- -- [phone]    ebenezer peoples (Other) -- -- [phone]                 History & Physical        Mini, MD Terri at 04/10/25 1037        " "      [facility] Medicine Services  HISTORY AND PHYSICAL    Patient Name: Janet Obrien  : 1960  MRN: [id number]  Primary Care Physician: Noe Davenport MD  Date of admission: 4/10/2025      Subjective   Subjective     Chief Complaint:  Acute dyspnea     HPI:  Janet Obrien is a 64 y.o. female (smoked remotely), w history of asthma and anxiety, DM2, HTN HL hypothyroidism RLS and LINDSAY.  Presents with acute dyspnea.  Tested positive for HMPV.  Wheezing in ED and improved with nebs but is still requiring 4L NC oxygen.      She actually became ill with vomiting about 9 days ago \"after eating at [employer]\", then developed dyspnea/cough about a week ago and was started on azithromycin outpt; states she has taken it for at least 7 days and \"it hasn't done anything for me.\" She has a cough and notes associated symptoms like lightheadedness.  States she is still vomiting.  States she cannot even keep down water.  States there is no diarrhea.       Personal History     Past Medical History:   Diagnosis Date    Anxiety     Arthritis     Asthma     Back pain     Cancer     SQUAMOUS CELL/ BASAL CELL ON LIP, under the nose as well    Chronic bronchitis     Depression     Diabetes mellitus     type 2    Fibromyalgia     High triglycerides     Hyperlipidemia     Hypertension     Hypothyroidism     Migraine     MVP (mitral valve prolapse)     OA (osteoarthritis)     Ovarian cyst     Peripheral autonomic neuropathy     Pleurisy     Pneumonia     Rheumatism     RLS (restless legs syndrome)     Sleep apnea     Mild form per patient. recommended mouthpiece           Past Surgical History:   Procedure Laterality Date    APPENDECTOMY      CATARACT EXTRACTION W/ INTRAOCULAR LENS IMPLANT Right 2019    Procedure: CATARACT PHACO EXTRACTION WITH INTRAOCULAR LENS IMPLANT RIGHT;  Surgeon: Carl Babb MD;  Location: [facility];  Service: Ophthalmology    CATARACT EXTRACTION W/ " "INTRAOCULAR LENS IMPLANT Left 10/07/2019    Procedure: CATARACT PHACO EXTRACTION WITH INTRAOCULAR LENS IMPLANT LEFT;  Surgeon: Carl Babb MD;  Location:  [facility];  Service: Ophthalmology     SECTION  , 1997    x 2     DILATATION AND CURETTAGE      HAND SURGERY Right     \"Pinched nerve surgery\"    HAND SURGERY Left     Joint removed secondary to arthritis    HYSTERECTOMY      ''still have one ovary''    KNEE SURGERY Right 2021    arthroscopy and partial medial meniscectomy Dr. Chavez    OOPHORECTOMY      THYROIDECTOMY      TONSILLECTOMY AND ADENOIDECTOMY      TOTAL KNEE ARTHROPLASTY Left 03/15/2023    Procedure: TOTAL KNEE ARTHROPLASTY -  LEFT;  Surgeon: Dayo Chavez MD;  Location:  [facility];  Service: Orthopedics;  Laterality: Left;    TOTAL KNEE ARTHROPLASTY Right 2023    Procedure: TOTAL KNEE ARTHROPLASTY WITH BERNICE ROBOT - RIGHT;  Surgeon: Dayo Chavez MD;  Location:  [facility];  Service: Robotics - Ortho;  Laterality: Right;       Family History: family history includes Breast cancer in her maternal grandmother; Cancer in her brother, maternal grandmother, and paternal grandmother; Diabetes in her maternal aunt; Fibromyalgia in her mother; Heart attack in her father and mother; Hyperlipidemia in her mother; Hypertension in her mother; Irritable bowel syndrome in her mother; Stomach cancer in her brother; Thyroid disease in her maternal grandmother and mother.     Social History:  reports that she quit smoking about 20 years ago. Her smoking use included cigarettes. She started smoking about 47 years ago. She has a 6.6 pack-year smoking history. She has been exposed to tobacco smoke. She has never used smokeless tobacco. She reports that she does not drink alcohol and does not use drugs.  Social History     Social History Narrative    Not on file       Medications:  Available home medication information reviewed.  DULoxetine, ONE TOUCH ULTRA " 2, Semaglutide (2 MG/DOSE), acetaminophen, albuterol sulfate HFA, atorvastatin, azelastine, celecoxib, cetirizine, docusate sodium, glucose blood, hydroCHLOROthiazide, levothyroxine, lidocaine, meclizine, meloxicam, methocarbamol, montelukast, ondansetron ODT, oxyCODONE-acetaminophen, sennosides-docusate, tiZANidine, and vitamin D3    Allergies   Allergen Reactions    Penicillins Angioedema, Anaphylaxis, Hives, Itching, Shortness Of Breath and Swelling    Pregabalin Anaphylaxis    Cephalexin Itching    Chlorhexidine Rash and Itching    Sulfa Antibiotics Rash and Itching    Adhesive Tape Rash     Tegaderm specific    Darvon [Propoxyphene] Unknown (See Comments)     Doesn't remember    Diazepam Other (See Comments)     Paradoxical effect/ hypes pt       Objective   Objective     Vital Signs:   Temp:  [98.6 °F (37 °C)] 98.6 °F (37 °C)  Heart Rate:  [78-93] 85  Resp:  [18] 18  BP: ()/(61-80) 124/61  Flow (L/min) (Oxygen Therapy):  [2-4] 2       Physical Exam   Gen:  WD/WN woman in NAD on nc oxygen.  Moves around easily in bed, sits up with ease.    Neuro: alert and oriented, clear speech, follows commands, grossly nonfocal  HEENT:  NC/AT   Neck:  Supple, no LAD  Heart RRR   Lungs scattered wheeze   Abd:  Soft, no rebound; pt complaint of severe diffuse tenderness not consistent with timing of palpation.  Sits up with ease.   Extrem:  No c/c/e      Result Review:  I have personally reviewed the results from the time of this admission to 4/10/2025 10:37 EDT and agree with these findings:  [x]  Laboratory list / accordion  [x]  Microbiology  []  Radiology  []  EKG/Telemetry   []  Cardiology/Vascular   []  Pathology  []  Old records  []  Other:  Most notable findings include: +HMPV.  CT w minimal RLL nodularity.        LAB RESULTS:      Lab 04/10/25  0557   WBC 6.38   HEMOGLOBIN 14.0   HEMATOCRIT 41.2   PLATELETS 281   NEUTROS ABS 5.47   IMMATURE GRANS (ABS) 0.01   LYMPHS ABS 0.55*   MONOS ABS 0.34   EOS ABS 0.00    MCV 81.6   CRP 1.46*   PROCALCITONIN 0.07   LACTATE 1.7   D DIMER QUANT 0.31         Lab 04/10/25  0557   SODIUM 129*   POTASSIUM 2.9*   CHLORIDE 90*   CO2 24.1   ANION GAP 14.9   BUN 10   CREATININE 0.82   EGFR 80.5   GLUCOSE 148*   CALCIUM 9.9   MAGNESIUM 1.9   TSH 0.766         Lab 04/10/25  0557   TOTAL PROTEIN 6.5   ALBUMIN 3.7   GLOBULIN 2.8   ALT (SGPT) 20   AST (SGOT) 46*   BILIRUBIN 0.6   ALK PHOS 73   LIPASE 33         Lab 04/10/25  0704 04/10/25  0557   PROBNP  --  149.0   HSTROP T 7 7                 UA          6/10/2024    12:08 4/10/2025    08:54   Urinalysis   Squamous Epithelial Cells, UA None Seen  0-2    Specific Gravity, UA 1.006  <=1.005    Ketones, UA Negative  Negative    Blood, UA Negative  Negative    Leukocytes, UA Trace  Trace    Nitrite, UA Negative  Negative    RBC, UA 0-2  0-2    WBC, UA 0-2  0-2    Bacteria, UA None Seen  None Seen        Microbiology Results (last 10 days)       Procedure Component Value - Date/Time    Respiratory Panel PCR w/COVID-19(SARS-CoV-2) [facility]-House, NP Swab in UTM/VTM, 2 HR TAT - Swab, Nasopharynx [[id number]]  (Abnormal) Collected: 04/10/25 0618    Lab Status: Final result Specimen: Swab from Nasopharynx Updated: 04/10/25 0730     ADENOVIRUS, PCR Not Detected     Coronavirus 229E Not Detected     Coronavirus HKU1 Not Detected     Coronavirus NL63 Not Detected     Coronavirus OC43 Not Detected     COVID19 Not Detected     Human Metapneumovirus Detected     Human Rhinovirus/Enterovirus Not Detected     Influenza A PCR Not Detected     Influenza B PCR Not Detected     Parainfluenza Virus 1 Not Detected     Parainfluenza Virus 2 Not Detected     Parainfluenza Virus 3 Not Detected     Parainfluenza Virus 4 Not Detected     RSV, PCR Not Detected     Bordetella pertussis pcr Not Detected     Bordetella parapertussis PCR Not Detected     Chlamydophila pneumoniae PCR Not Detected     Mycoplasma pneumo by PCR Not Detected    Narrative:      In  the setting of a positive respiratory panel with a viral infection PLUS a negative procalcitonin without other underlying concern for bacterial infection, consider observing off antibiotics or discontinuation of antibiotics and continue supportive care. If the respiratory panel is positive for atypical bacterial infection (Bordetella pertussis, Chlamydophila pneumoniae, or Mycoplasma pneumoniae), consider antibiotic de-escalation to target atypical bacterial infection.            CT Abdomen Pelvis With Contrast  Result Date: 4/10/2025  CT ANGIOGRAM CHEST PULMONARY EMBOLISM, CT ABDOMEN PELVIS W CONTRAST Date of Exam: 4/10/2025 6:18 AM EDT Indication: Acute fall v syncope/ abdominal pain/vomiting/ dyspnea. Comparison: None available. Technique: Axial CT images were obtained of the chest, abdomen and pelvis after the uneventful intravenous administration of 85 mL Isovue-370 utilizing pulmonary embolism protocol.  In addition, a 3-D volume rendered image was created for interpretation.   Reconstructed coronal and sagittal images were also obtained. Automated exposure control and iterative construction methods were used. Findings: PULMONARY VASCULATURE: Pulmonary arteries are widely patent without evidence of embolus.Main pulmonary artery is normal in size. No evidence of right heart strain. MEDIASTINUM:Unremarkable. Aortic and heart size are normal. No aortic dissection identified. No mass nor pericardial effusion. CORONARY ARTERIES: Nocalcified atherosclerotic disease. LUNGS: Evaluation of lung parenchyma demonstrates nodular infiltrate in the right lower lung compatible with infectious/inflammatory process. Some faint tree-in-bud opacities are present in the anterior right upper lung also compatible with inflammatory change. PLEURAL SPACE: No effusion, mass, nor pneumothorax. LYMPH NODES: Right hilar lymph nodes are noted measuring up to 1.3 cm in short axis. OSSEOUS STRUCTURES: Appropriate for age with no acute  process identified. No acute osseous injury noted. There is a suspected sebaceous cyst in the left axillary region measuring 1.2 cm. Correlate on physical examination. LIVER:  Unremarkable parenchyma without focal lesion. BILIARY/GALLBLADDER:  Unremarkable SPLEEN:  Unremarkable PANCREAS:  Unremarkable ADRENAL: There is a 1.6 cm indeterminate right adrenal gland nodule. The left adrenal gland is unremarkable in appearance. KIDNEYS:  Unremarkable parenchyma with no solid mass identified. No obstruction.  No calculus identified. GASTROINTESTINAL/MESENTERY: Evaluation of the gastrointestinal tract demonstrates diverticulosis without CT evidence for acute diverticulitis. No free intraperitoneal gas or fluid. Trace sliding hiatal hernia is noted. Left inguinal hernia is present containing mesenteric fat. MESENTERIC VESSELS:  Patent. AORTA/IVC:  Normal caliber. RETROPERITONEUM/LYMPH NODES:  Unremarkable REPRODUCTIVE: Surgically absent. BLADDER:  Unremarkable OSSEUS STRUCTURES:  Typical for age with no acute process identified.     Impression: Impression: 1. No evidence for pulmonary embolus. 2. Nodular infiltrate in the right lower lung with reactive right hilar lymphadenopathy. 3. No acute findings in the abdomen or pelvis.   Electronically Signed: Margarette Fuentes MD  4/10/2025 7:46 AM EDT  Workstation ID: [id number]    CT Angiogram Chest Pulmonary Embolism  Result Date: 4/10/2025  CT ANGIOGRAM CHEST PULMONARY EMBOLISM, CT ABDOMEN PELVIS W CONTRAST Date of Exam: 4/10/2025 6:18 AM EDT Indication: Acute fall v syncope/ abdominal pain/vomiting/ dyspnea. Comparison: None available. Technique: Axial CT images were obtained of the chest, abdomen and pelvis after the uneventful intravenous administration of 85 mL Isovue-370 utilizing pulmonary embolism protocol.  In addition, a 3-D volume rendered image was created for interpretation.   Reconstructed coronal and sagittal images were also obtained. Automated exposure control and  iterative construction methods were used. Findings: PULMONARY VASCULATURE: Pulmonary arteries are widely patent without evidence of embolus.Main pulmonary artery is normal in size. No evidence of right heart strain. MEDIASTINUM:Unremarkable. Aortic and heart size are normal. No aortic dissection identified. No mass nor pericardial effusion. CORONARY ARTERIES: Nocalcified atherosclerotic disease. LUNGS: Evaluation of lung parenchyma demonstrates nodular infiltrate in the right lower lung compatible with infectious/inflammatory process. Some faint tree-in-bud opacities are present in the anterior right upper lung also compatible with inflammatory change. PLEURAL SPACE: No effusion, mass, nor pneumothorax. LYMPH NODES: Right hilar lymph nodes are noted measuring up to 1.3 cm in short axis. OSSEOUS STRUCTURES: Appropriate for age with no acute process identified. No acute osseous injury noted. There is a suspected sebaceous cyst in the left axillary region measuring 1.2 cm. Correlate on physical examination. LIVER:  Unremarkable parenchyma without focal lesion. BILIARY/GALLBLADDER:  Unremarkable SPLEEN:  Unremarkable PANCREAS:  Unremarkable ADRENAL: There is a 1.6 cm indeterminate right adrenal gland nodule. The left adrenal gland is unremarkable in appearance. KIDNEYS:  Unremarkable parenchyma with no solid mass identified. No obstruction.  No calculus identified. GASTROINTESTINAL/MESENTERY: Evaluation of the gastrointestinal tract demonstrates diverticulosis without CT evidence for acute diverticulitis. No free intraperitoneal gas or fluid. Trace sliding hiatal hernia is noted. Left inguinal hernia is present containing mesenteric fat. MESENTERIC VESSELS:  Patent. AORTA/IVC:  Normal caliber. RETROPERITONEUM/LYMPH NODES:  Unremarkable REPRODUCTIVE: Surgically absent. BLADDER:  Unremarkable OSSEUS STRUCTURES:  Typical for age with no acute process identified.     Impression: Impression: 1. No evidence for pulmonary  embolus. 2. Nodular infiltrate in the right lower lung with reactive right hilar lymphadenopathy. 3. No acute findings in the abdomen or pelvis.   Electronically Signed: Margarette Fuentes MD  4/10/2025 7:46 AM EDT  Workstation ID: [id number]    CT Head Without Contrast  Result Date: 4/10/2025  CT HEAD WO CONTRAST Date of Exam: 4/10/2025 6:18 AM EDT Indication: Acute fall v syncope/ abdominal pain/vomiting/ dyspnea/. Comparison: None available. Technique: Axial CT images were obtained of the head without contrast administration.  Automated exposure control and iterative construction methods were used. Findings: There is no evidence of hemorrhage. There is no mass effect or midline shift. There is no extracerebral collection. Ventricles are normal in size and configuration for patient's stated age. Posterior fossa is within normal limits. Calvarium and skull base appear intact.  Visualized sinuses show no air fluid levels. Visualized orbits are unremarkable.     Impression: Impression: No acute intracranial abnormality identified. Electronically Signed: Andry Larkin MD  4/10/2025 7:43 AM EDT  Workstation ID: [id number]    XR Chest 1 View  Result Date: 4/10/2025  XR CHEST 1 VW Date of Exam: 4/10/2025 5:40 AM EDT Indication: Chest Pain Triage Protocol Comparison: 9/8/2023. Findings: There is no pneumothorax, pleural effusion or focal airspace consolidation. Heart size and pulmonary vasculature appear within normal limits. Regional bones appear intact.     Impression: Impression: No acute cardiopulmonary abnormality. Electronically Signed: Toni Ledezma MD  4/10/2025 5:48 AM EDT  Workstation ID: [id number]      Results for orders placed in visit on 06/05/20    Adult Transthoracic Echo Complete W/ Cont if Necessary Per Protocol    Interpretation Summary  · Estimated EF = 76%.  · Left ventricular systolic function is hyperdynamic (EF > 70).      Assessment & Plan   Assessment & Plan       * No active hospital problems.  *    64 yr old owman with asthma and DMs, presents with bronchospasm and viral bronchiolitis    Bronchiolitis  HMPV+  RLL infiltrate   - CTA:  patchy-nodular infiltrate clustered in RLL   - droplet isolation  - duonebs scheduled and prn   - supportive care  - suspect the RLL nodularity is viral ; pt has had at least a week of azithromycin already .  DC.      Hyponatremia  - follow expectantly , stop HCTZ, consider IV fluids    C/o severe vomiting  - looks very comfortable.  See how she eats, hold diuretics, and consider IV fluids.       DM  - on Ozempic.  SSI here.      VTE Prophylaxis:  No VTE prophylaxis order currently exists.          CODE STATUS:    There are no questions and answers to display.       Expected Discharge   Expected discharge date/ time has not been documented.     Terri Garcia MD  04/10/25      Electronically signed by Terri Garcia MD at 04/10/25 1457          Emergency Department Notes        Essie Knutson, RN at 04/10/25 0934          EMS at bedside to transfer patient to [facility].     Electronically signed by Essie Knutson, RN at 04/10/25 0935       Essie Knutson, RN at 04/10/25 0925          [facility] transfer request sent @ 0925. ETA UNKNOWN    Electronically signed by Essie Knutson, RN at 04/10/25 0925       Tiffany Sharp, RN at 04/10/25 0836           Janet Obrien    Nursing Report ED to Floor:  Mental status: a/o x4  Ambulatory status: assist x1  Oxygen Therapy:  2L NC  Cardiac Rhythm: sinus   Admitted from: [place] [facility]   Safety Concerns:  none  Precautions: contact  Social Issues: none  ED Room #:  06    ED Nurse Phone [id number] [phone] or may call [phone]  HPI:   Chief Complaint   Patient presents with    Cough       Past Medical History:  Past Medical History:   Diagnosis Date    Anxiety     Arthritis     Asthma     Back pain     Cancer     SQUAMOUS CELL/ BASAL CELL ON LIP, under the nose as well    Chronic bronchitis     Depression     Diabetes mellitus     type 2     "Fibromyalgia     High triglycerides     Hyperlipidemia     Hypertension     Hypothyroidism     Migraine     MVP (mitral valve prolapse)     OA (osteoarthritis)     Ovarian cyst     Peripheral autonomic neuropathy     Pleurisy     Pneumonia     Rheumatism     RLS (restless legs syndrome)     Sleep apnea     Mild form per patient. recommended mouthpiece        Past Surgical History:  Past Surgical History:   Procedure Laterality Date    APPENDECTOMY  's    CATARACT EXTRACTION W/ INTRAOCULAR LENS IMPLANT Right 2019    Procedure: CATARACT PHACO EXTRACTION WITH INTRAOCULAR LENS IMPLANT RIGHT;  Surgeon: Carl Babb MD;  Location:  [facility];  Service: Ophthalmology    CATARACT EXTRACTION W/ INTRAOCULAR LENS IMPLANT Left 10/07/2019    Procedure: CATARACT PHACO EXTRACTION WITH INTRAOCULAR LENS IMPLANT LEFT;  Surgeon: Carl Babb MD;  Location:  [facility];  Service: Ophthalmology     SECTION  , 1997    x 2     DILATATION AND CURETTAGE      HAND SURGERY Right     \"Pinched nerve surgery\"    HAND SURGERY Left     Joint removed secondary to arthritis    HYSTERECTOMY      ''still have one ovary''    KNEE SURGERY Right 2021    arthroscopy and partial medial meniscectomy Dr. Chavez    OOPHORECTOMY      THYROIDECTOMY  1985    TONSILLECTOMY AND ADENOIDECTOMY      TOTAL KNEE ARTHROPLASTY Left 03/15/2023    Procedure: TOTAL KNEE ARTHROPLASTY -  LEFT;  Surgeon: Dayo Chavez MD;  Location:  [facility];  Service: Orthopedics;  Laterality: Left;    TOTAL KNEE ARTHROPLASTY Right 2023    Procedure: TOTAL KNEE ARTHROPLASTY WITH BERNICE ROBOT - RIGHT;  Surgeon: Dayo Chavez MD;  Location:  [facility];  Service: Robotics - Ortho;  Laterality: Right;        Admitting Doctor:   Terri Garcia MD    Consulting Provider(s):  Consults       No orders found from 3/12/2025 to 2025.             Admitting Diagnosis:   The primary encounter diagnosis was Exacerbation of " asthma, unspecified asthma severity, unspecified whether persistent. Diagnoses of Human metapneumovirus (hMPV) pneumonia and Respiratory failure with hypoxia, unspecified chronicity were also pertinent to this visit.    Most Recent Vitals:   Vitals:    04/10/25 0638 04/10/25 0644 04/10/25 0647 04/10/25 0652   BP:   133/75    BP Location:       Patient Position:       Pulse: 84 84 78 84   Resp:       Temp:       TempSrc:       SpO2: (!) 87% 96% 95% 98%   Weight:       Height:           Active LDAs/IV Access:   Lines, Drains & Airways       Active LDAs       Name Placement date Placement time Site Days    Peripheral IV 04/10/25 0556 Right Antecubital 04/10/25  0556  Antecubital  less than 1                    Labs (abnormal labs have a star):   Labs Reviewed   RESPIRATORY PANEL PCR W/ COVID-19 (SARS-COV-2), NP SWAB IN UTM/VTP, 2 HR TAT - Abnormal; Notable for the following components:       Result Value    Human Metapneumovirus Detected (*)     All other components within normal limits    Narrative:     In the setting of a positive respiratory panel with a viral infection PLUS a negative procalcitonin without other underlying concern for bacterial infection, consider observing off antibiotics or discontinuation of antibiotics and continue supportive care. If the respiratory panel is positive for atypical bacterial infection (Bordetella pertussis, Chlamydophila pneumoniae, or Mycoplasma pneumoniae), consider antibiotic de-escalation to target atypical bacterial infection.   COMPREHENSIVE METABOLIC PANEL - Abnormal; Notable for the following components:    Glucose 148 (*)     Sodium 129 (*)     Potassium 2.9 (*)     Chloride 90 (*)     AST (SGOT) 46 (*)     All other components within normal limits    Narrative:     GFR Categories in Chronic Kidney Disease (CKD)      GFR Category          GFR (mL/min/1.73)    Interpretation  G1                     90 or greater         Normal or high (1)  G2                      60-89                 Mild decrease (1)  G3a                   45-59                Mild to moderate decrease  G3b                   30-44                Moderate to severe decrease  G4                    15-29                Severe decrease  G5                    14 or less           Kidney failure          (1)In the absence of evidence of kidney disease, neither GFR category G1 or G2 fulfill the criteria for CKD.    eGFR calculation 2021 CKD-EPI creatinine equation, which does not include race as a factor   CBC WITH AUTO DIFFERENTIAL - Abnormal; Notable for the following components:    Neutrophil % 85.7 (*)     Lymphocyte % 8.6 (*)     Eosinophil % 0.0 (*)     Lymphocytes, Absolute 0.55 (*)     All other components within normal limits   C-REACTIVE PROTEIN - Abnormal; Notable for the following components:    C-Reactive Protein 1.46 (*)     All other components within normal limits   TROPONIN - Normal   LIPASE - Normal   BNP (IN-HOUSE) - Normal    Narrative:     This assay is used as an aid in the diagnosis of individuals suspected of having heart failure. It can be used as an aid in the diagnosis of acute decompensated heart failure (ADHF) in patients presenting with signs and symptoms of ADHF to the emergency department (ED). In addition, NT-proBNP of <300 pg/mL indicates ADHF is not likely.    Age Range Result Interpretation  NT-proBNP Concentration (pg/mL:      <50             Positive            >450                   Gray                 300-450                    Negative             <300    50-75           Positive            >900                  Gray                300-900                  Negative            <300      >75             Positive            >1800                  Gray                300-1800                  Negative            <300   D-DIMER, QUANTITATIVE - Normal    Narrative:     According to the assay 's published package insert, a normal (<0.50 MCGFEU/mL) D-dimer result in  "conjunction with a non-high clinical probability assessment, excludes deep vein thrombosis (DVT) and pulmonary embolism (PE) with high sensitivity.    D-dimer values increase with age and this can make VTE exclusion of an older population difficult. To address this, the [employer], based on best available evidence and recent guidelines, recommends that clinicians use age-adjusted D-dimer thresholds in patients greater than 50 years of age with: a) a low probability of PE who do not meet all Pulmonary Embolism Rule Out Criteria, or b) in those with intermediate probability of PE.   The formula for an age-adjusted D-dimer cut-off is \"age/100\".  For example, a 60 year old patient would have an age-adjusted cut-off of 0.60 MCGFEU/mL and an 80 year old 0.80 MCGFEU/mL.   LACTIC ACID, PLASMA - Normal   PROCALCITONIN - Normal   MONONUCLEOSIS SCREEN - Normal   TSH RFX ON ABNORMAL TO FREE T4 - Normal   MAGNESIUM - Normal   HIGH SENSITIVITIY TROPONIN T 1HR - Normal    Narrative:     High Sensitive Troponin T Reference Range:  <14.0 ng/L- Negative Female for AMI  <22.0 ng/L- Negative Male for AMI  >=14 - Abnormal Female indicating possible myocardial injury.  >=22 - Abnormal Male indicating possible myocardial injury.   Clinicians would have to utilize clinical acumen, EKG, Troponin, and serial changes to determine if it is an Acute Myocardial Infarction or myocardial injury due to an underlying chronic condition.        RAINBOW DRAW    Narrative:     The following orders were created for panel order [place] Draw.  Procedure                               Abnormality         Status                     ---------                               -----------         ------                     Green Top (Gel)[[id number]]                                  Final result               Lavender Top[[id number]]                                     Final result               Gold Top - [facility][[id number]]                            "        Final result               Gray Top[[id number]]                                         Final result               Light Blue Top[[id number]]                                   Final result                 Please view results for these tests on the individual orders.   URINALYSIS W/ MICROSCOPIC IF INDICATED (NO CULTURE)   CBC AND DIFFERENTIAL    Narrative:     The following orders were created for panel order CBC & Differential.  Procedure                               Abnormality         Status                     ---------                               -----------         ------                     CBC Auto Differential[[id number]]        Abnormal            Final result                 Please view results for these tests on the individual orders.   GREEN TOP   LAVENDER TOP   GOLD TOP - SST   GRAY TOP   LIGHT BLUE TOP       Meds Given in ED:   Medications   sodium chloride 0.9 % flush 10 mL (has no administration in time range)   azithromycin (ZITHROMAX) 500 mg in sodium chloride 0.9 % 250 mL IVPB-VTB (has no administration in time range)   aspirin chewable tablet 324 mg (324 mg Oral Given 4/10/25 0618)   albuterol (PROVENTIL) nebulizer solution 0.083% 2.5 mg/3mL (2.5 mg Nebulization Given 4/10/25 0648)   methylPREDNISolone sodium succinate (SOLU-Medrol) injection 125 mg (125 mg Intravenous Given 4/10/25 0639)   diphenhydrAMINE (BENADRYL) injection 25 mg (25 mg Intravenous Given 4/10/25 0658)   potassium chloride 10 mEq in 100 mL IVPB (10 mEq Intravenous New Bag 4/10/25 0709)   potassium chloride (MICRO-K/KLOR-CON) CR capsule (20 mEq Oral Given 4/10/25 0643)   iopamidol (ISOVUE-370) 76 % injection 100 mL (85 mL Intravenous Given 4/10/25 0729)   albuterol (PROVENTIL) nebulizer solution 0.083% 2.5 mg/3mL (2.5 mg Nebulization Given 4/10/25 0831)           Last NIH score:                                                          Dysphagia screening results:  Patient Factors Component (Dysphagia:Stroke or  Rule-out)  Best Eye Response: 4-->(E4) spontaneous (04/10/25 0557)  Best Motor Response: 6-->(M6) obeys commands (04/10/25 0557)  Best Verbal Response: 5-->(V5) oriented (04/10/25 0557)  [place] Coma Scale Score: 15 (04/10/25 0557)     [place] Coma Scale:  No data recorded     CIWA:        Restraint Type:            Isolation Status:  No active isolations          Electronically signed by Tiffany Sharp RN at 04/10/25 0837       Eron Fuchs MD at 04/10/25 0615          Subjective   History of Present Illness  64-year-old female with a history of hypertension diabetes and mild seasonal asthma treated with Singulair presents with a myriad of complaints ranging from 3 weeks to acutely tonight.  States that she woke up had nausea and vomiting was dizzy had a fall initially denied LOC but is stating some retrograde amnesia to the events and is not entirely sure that she did not pass out.  She also for 3 weeks has had a viral type illness with persistent cough that is intermittently productive states she has sometimes pain over the anterior chest after a coughing spell persistent nausea and vomiting with decreased oral intake denies diarrhea throughout the course of the illness and states that it all started after going out to eat 1 night at a fast food restaurant.  She denies sore throat or significant nasal congestion.  Intermittent complaints of anterior abdominal pain.  No weakness or numbness and dizziness has resolved there is no room spinning at rest on arrival.  States that she is urinating slightly less but normal otherwise.    History provided by:  Patient      Review of Systems   All other systems reviewed and are negative.      Past Medical History:   Diagnosis Date    Anxiety     Arthritis     Asthma     Back pain     Cancer     SQUAMOUS CELL/ BASAL CELL ON LIP, under the nose as well    Chronic bronchitis     Depression     Diabetes mellitus     type 2    Fibromyalgia     High triglycerides      "Hyperlipidemia     Hypertension     Hypothyroidism     Migraine     MVP (mitral valve prolapse)     OA (osteoarthritis)     Ovarian cyst     Peripheral autonomic neuropathy     Pleurisy     Pneumonia     Rheumatism     RLS (restless legs syndrome)     Sleep apnea     Mild form per patient. recommended mouthpiece       Allergies   Allergen Reactions    Penicillins Angioedema, Anaphylaxis, Hives, Itching, Shortness Of Breath and Swelling    Pregabalin Anaphylaxis    Cephalexin Itching    Chlorhexidine Rash and Itching    Sulfa Antibiotics Rash and Itching    Adhesive Tape Rash     Tegaderm specific    Darvon [Propoxyphene] Unknown (See Comments)     Doesn't remember    Diazepam Other (See Comments)     Paradoxical effect/ hypes pt       Past Surgical History:   Procedure Laterality Date    APPENDECTOMY      CATARACT EXTRACTION W/ INTRAOCULAR LENS IMPLANT Right 2019    Procedure: CATARACT PHACO EXTRACTION WITH INTRAOCULAR LENS IMPLANT RIGHT;  Surgeon: Carl Babb MD;  Location: [facility] OR;  Service: Ophthalmology    CATARACT EXTRACTION W/ INTRAOCULAR LENS IMPLANT Left 10/07/2019    Procedure: CATARACT PHACO EXTRACTION WITH INTRAOCULAR LENS IMPLANT LEFT;  Surgeon: Carl Babb MD;  Location: [facility] OR;  Service: Ophthalmology     SECTION  , 1997    x 2     DILATATION AND CURETTAGE      HAND SURGERY Right     \"Pinched nerve surgery\"    HAND SURGERY Left     Joint removed secondary to arthritis    HYSTERECTOMY  2003    ''still have one ovary''    KNEE SURGERY Right 2021    arthroscopy and partial medial meniscectomy Dr. Chavez    OOPHORECTOMY      THYROIDECTOMY  1985    TONSILLECTOMY AND ADENOIDECTOMY      TOTAL KNEE ARTHROPLASTY Left 03/15/2023    Procedure: TOTAL KNEE ARTHROPLASTY -  LEFT;  Surgeon: Dayo Chavez MD;  Location: [facility] OR;  Service: Orthopedics;  Laterality: Left;    TOTAL KNEE ARTHROPLASTY Right 2023    Procedure: TOTAL KNEE " ARTHROPLASTY WITH BERNICE ROBOT - RIGHT;  Surgeon: Dayo Chavez MD;  Location: [facility];  Service: Robotics - Ortho;  Laterality: Right;       Family History   Problem Relation Age of Onset    Hypertension Mother     Heart attack Mother     Fibromyalgia Mother     Hyperlipidemia Mother     Thyroid disease Mother     Irritable bowel syndrome Mother     Heart attack Father     Cancer Brother     Stomach cancer Brother     Breast cancer Maternal Grandmother     Cancer Maternal Grandmother     Thyroid disease Maternal Grandmother     Cancer Paternal Grandmother     Diabetes Maternal Aunt     Colon cancer Neg Hx     Cirrhosis Neg Hx     Liver cancer Neg Hx     Liver disease Neg Hx        Social History     Socioeconomic History    Marital status:    Tobacco Use    Smoking status: Former     Current packs/day: 0.00     Average packs/day: 0.3 packs/day for 26.6 years (6.6 ttl pk-yrs)     Types: Cigarettes     Start date:      Quit date: 2004     Years since quittin.7     Passive exposure: Past    Smokeless tobacco: Never   Vaping Use    Vaping status: Never Used   Substance and Sexual Activity    Alcohol use: No    Drug use: No    Sexual activity: Defer     Partners: Male     Birth control/protection: Abstinence           Objective   Physical Exam  Vitals reviewed.   HENT:      Head: Normocephalic and atraumatic.      Mouth/Throat:      Mouth: Mucous membranes are moist.   Eyes:      Extraocular Movements: Extraocular movements intact.      Conjunctiva/sclera: Conjunctivae normal.      Pupils: Pupils are equal, round, and reactive to light.   Cardiovascular:      Rate and Rhythm: Normal rate.   Pulmonary:      Effort: Pulmonary effort is normal.   Abdominal:      General: Abdomen is protuberant. There is no distension.      Palpations: Abdomen is soft.      Tenderness: There is abdominal tenderness in the epigastric area and periumbilical area.   Musculoskeletal:      Right lower leg: No  edema.      Left lower leg: No edema.   Skin:     General: Skin is warm and dry.      Capillary Refill: Capillary refill takes less than 2 seconds.   Neurological:      General: No focal deficit present.      Mental Status: She is alert and oriented to person, place, and time.         Procedures          ED Course  ED Course as of 04/11/25 0345   u Apr 10, 2025   0527 EKG ventricular rate 99  QRS 84 QTc 477 normal sinus rhythm normal axis negative STEMI [JN]   0711 EKG ventricular rate 83  QRS 72 QTc 446 normal axis negative STEMI [JN]   0813 Patient has significant wheezing, tachpnea, satting 88% on 2L [EG]   0814 Oxygen being titrated up to 4L [EG]      ED Course User Index  [EG] Lilia Khalil DO  [JN] Eron Fuchs MD                                           Medical Decision Making  64-year-old female presents with a myriad of symptoms with a prolonged upper respiratory type illness and exacerbation of cough and reactive airway disease on exam complaining of lightheadedness chest pain shortness of breath and abdominal pain.  Differential includes viral URI, pneumonia, ACS, gastritis, enteritis, colitis, pneumothorax, asthma exacerbation, and others    Given an albuterol nebulizer treatment after initial evaluation pretreated for respiratory reactive airway disease and CT contrast with Solu-Medrol 125 and Benadryl IV.  Initial labs demonstrate some hypokalemia repleted with 1 round of IV potassium and p.o. 20 mill equivalents.    Handoff to oncoming attending at the time of shift change    Problems Addressed:  Exacerbation of asthma, unspecified asthma severity, unspecified whether persistent: complicated acute illness or injury  Human metapneumovirus (hMPV) pneumonia: complicated acute illness or injury  Respiratory failure with hypoxia, unspecified chronicity: complicated acute illness or injury    Amount and/or Complexity of Data Reviewed  Labs: ordered.  Radiology:  ordered.  ECG/medicine tests: ordered.    Risk  OTC drugs.  Prescription drug management.  Decision regarding hospitalization.        Final diagnoses:   Exacerbation of asthma, unspecified asthma severity, unspecified whether persistent   Human metapneumovirus (hMPV) pneumonia   Respiratory failure with hypoxia, unspecified chronicity       ED Disposition  ED Disposition       ED Disposition   Decision to Admit    Condition   --    Comment   Level of Care: Telemetry [5]   Accepting Provider:: KOLBY TAYLOR [[id number]]   Admitting Physician: KOLBY TAYLOR [[id number]]   Attending Physician: KOLBY TAYLOR [[id number]]   Patient Class: Inpatient [101]                 No follow-up provider specified.       Medication List        ASK your doctor about these medications      azithromycin 500 MG tablet  Commonly known as: ZITHROMAX  Take 2 tablets by mouth Daily for 1 day, THEN 1 tablet Daily for 5 days.  Start taking on: April 3, 2025  Ask about: Should I take this medication?                Electronically signed by Eron Fuchs MD at 04/11/25 0346       Vital Signs (last day)       Date/Time Temp Temp src Pulse Resp BP Patient Position SpO2    04/11/25 0801 -- -- -- 17 -- -- --    04/11/25 0725 99.6 (37.6) Axillary 66 18 97/76 Lying 93    04/11/25 0346 98.5 (36.9) Oral 65 18 91/43 Lying 95    04/10/25 2310 98.5 (36.9) Oral 72 18 124/63 Lying --    04/10/25 2030 99.4 (37.4) Oral 90 18 125/66 Lying --    04/10/25 1928 -- -- 83 18 -- Lying 97    04/10/25 1703 -- -- -- 18 -- -- --    04/10/25 1657 -- -- 85 -- 94/64 Standing 93    04/10/25 1656 -- -- 85 -- 105/73 Sitting 97    04/10/25 1655 98.8 (37.1) Oral 88 20 129/63 Lying 94    04/10/25 1341 -- -- -- 22 -- -- --    04/10/25 1152 98.8 (37.1) Oral 77 18 117/64 Lying 91    04/10/25 1123 -- -- 84 -- -- -- 87    04/10/25 0930 -- -- 85 -- 124/61 -- 92    04/10/25 0925 98.6 (37) Oral 84 18 -- -- 100    04/10/25 0840 -- -- 87 -- 128/65 -- 98    04/10/25 0652 -- -- 84 -- -- -- 98    04/10/25  0647 -- -- 78 -- 133/75 -- 95    04/10/25 0644 -- -- 84 -- -- -- 96    04/10/25 0638 -- -- 84 -- -- -- 87    04/10/25 0637 -- -- 84 -- -- -- 93    04/10/25 0636 -- -- 88 -- -- -- 95    04/10/25 0635 -- -- 87 -- -- -- 92    04/10/25 0634 -- -- 93 -- -- -- 92    04/10/25 0633 -- -- 84 -- -- -- 86    04/10/25 0632 -- -- 84 -- -- -- 87    04/10/25 0631 -- -- 88 -- -- -- 89    04/10/25 0630 -- -- 87 -- 142/80 -- 88    04/10/25 0533 -- -- 87 -- 115/77 -- 94    04/10/25 0521 98.6 (37) Oral 93 18 95/66 Sitting 91          Oxygen Therapy (last day)       Date/Time SpO2 Device (Oxygen Therapy) Flow (L/min) (Oxygen Therapy) Oxygen Concentration (%) ETCO2 (mmHg)    04/11/25 0809 -- -- 2 -- --    04/11/25 0801 -- -- 2 -- --    04/11/25 0725 93 nasal cannula 2 -- --    04/11/25 0619 -- nasal cannula 2 -- --    04/11/25 0411 -- nasal cannula 2 -- --    04/11/25 0346 95 -- -- -- --    04/11/25 0226 -- nasal cannula 2 -- --    04/11/25 0038 -- nasal cannula 2 -- --    04/10/25 2212 -- nasal cannula 2 -- --    04/10/25 2030 -- nasal cannula 2 -- --    04/10/25 1928 97 nasal cannula 2 -- --    04/10/25 1711 -- -- 2 -- --    04/10/25 1703 -- nasal cannula 2 -- --    04/10/25 1657 93 -- -- -- --    04/10/25 1656 97 -- -- -- --    04/10/25 1655 94 -- -- -- --    04/10/25 1553 -- nasal cannula 2 -- --    04/10/25 1346 -- -- 2 -- --    04/10/25 1341 -- nasal cannula 2 -- --    04/10/25 1252 -- nasal cannula 2 -- --    04/10/25 1152 91 nasal cannula 2 -- --    04/10/25 1123 87 nasal cannula 2 -- --    04/10/25 0930 92 -- -- -- --    04/10/25 0925 100 nasal cannula 2 -- --    04/10/25 0840 98 -- -- -- --    04/10/25 0831 -- nasal cannula 2 -- --    04/10/25 08:14:12 -- nasal cannula 2 -- --    04/10/25 0652 98 -- -- -- --    04/10/25 06:50:25 -- nasal cannula 4 99 --    04/10/25 0648 -- nasal cannula 4 -- --    04/10/25 0647 95 -- -- -- --    04/10/25 0644 96 -- -- -- --    04/10/25 0638 87 -- -- -- --    04/10/25 0637 93 -- -- -- --     04/10/25 0636 95 -- -- -- --    04/10/25 0635 92 -- -- -- --    04/10/25 0634 92 -- -- -- --    04/10/25 0633 86 -- -- -- --    04/10/25 0632 87 -- -- -- --    04/10/25 0631 89 -- -- -- --    04/10/25 0630 88 -- -- -- --    04/10/25 0533 94 -- -- -- --    04/10/25 0521 91 room air -- -- --          Lines, Drains & Airways       Active LDAs       Name Placement date Placement time Site Days    Peripheral IV 04/10/25 0556 Right Antecubital 04/10/25  0556  Antecubital  1                  Current Facility-Administered Medications   Medication Dose Route Frequency Provider Last Rate Last Admin    acetaminophen (TYLENOL) tablet 650 mg  650 mg Oral Q6H PRN Terri Garcia MD   650 mg at 04/10/25 2038    atorvastatin (LIPITOR) tablet 80 mg  80 mg Oral Daily Terri Garcia MD   80 mg at 04/11/25 0821    sennosides-docusate (PERICOLACE) 8.6-50 MG per tablet 2 tablet  2 tablet Oral BID PRN Terri Garcia MD        And    polyethylene glycol (MIRALAX) packet 17 g  17 g Oral Daily PRN Terri Garcia MD        And    bisacodyl (DULCOLAX) EC tablet 5 mg  5 mg Oral Daily PRN Terri Garcia MD        And    bisacodyl (DULCOLAX) suppository 10 mg  10 mg Rectal Daily PRN Terri Garcia MD        celecoxib (CeleBREX) capsule 200 mg  200 mg Oral Daily Terri Garcia MD   200 mg at 04/11/25 0821    dextromethorphan polistirex ER (DELSYM) 30 MG/5ML oral suspension 60 mg  60 mg Oral Q12H Terri Garcia MD   60 mg at 04/11/25 0820    dextrose (D50W) (25 g/50 mL) IV injection 25 g  25 g Intravenous Q15 Min PRN Terri Garcia MD        dextrose (GLUTOSE) oral gel 15 g  15 g Oral Q15 Min PRN Terri Garcia MD        docusate sodium (COLACE) capsule 100 mg  100 mg Oral BID Terri Garcia MD   100 mg at 04/10/25 2034    DULoxetine (CYMBALTA) DR capsule 60 mg  60 mg Oral Daily Terri Garcia MD   60 mg at 04/11/25 0822    enoxaparin sodium (LOVENOX) syringe 40 mg  40 mg Subcutaneous Daily Terri Garcia MD   40 mg at 04/11/25 0818    glucagon  (GLUCAGEN) injection 1 mg  1 mg Intramuscular Q15 Min PRN Terri Garcia MD        guaiFENesin (MUCINEX) 12 hr tablet 1,200 mg  1,200 mg Oral Q12H Sulaiman Bear MD   1,200 mg at 04/11/25 0821    hydrOXYzine (ATARAX) tablet 10 mg  10 mg Oral Once Sukhjinder Duran PA-C        Insulin Lispro (humaLOG) injection 2-7 Units  2-7 Units Subcutaneous 4x Daily AC & at Bedtime Terri Garcia MD   3 Units at 04/10/25 2034    ipratropium-albuterol (DUO-NEB) nebulizer solution 3 mL  3 mL Nebulization 4x Daily - RT Terri Garcia MD   3 mL at 04/11/25 0801    ipratropium-albuterol (DUO-NEB) nebulizer solution 3 mL  3 mL Nebulization Q4H PRN Terri Garcia MD        levothyroxine (SYNTHROID, LEVOTHROID) tablet 100 mcg  100 mcg Oral Daily Terri Garcia MD   100 mcg at 04/11/25 0821    meclizine (ANTIVERT) tablet 25 mg  25 mg Oral TID PRN Terri Garcia MD        melatonin tablet 5 mg  5 mg Oral Nightly PRN Sukhjinder Duran PA-C   5 mg at 04/10/25 2235    montelukast (SINGULAIR) tablet 10 mg  10 mg Oral Nightly Terri Garcia MD   10 mg at 04/10/25 2034    ondansetron (ZOFRAN) injection 4 mg  4 mg Intravenous Q6H PRN Terri Garcia MD        predniSONE (DELTASONE) tablet 40 mg  40 mg Oral Daily With Breakfast Sulaiman Bear MD   40 mg at 04/11/25 0819    sodium chloride 0.9 % flush 10 mL  10 mL Intravenous PRN Eron Fuchs MD        sodium chloride 0.9 % flush 10 mL  10 mL Intravenous Q12H Terri Garcia MD   10 mL at 04/11/25 0819    sodium chloride 0.9 % flush 10 mL  10 mL Intravenous PRN Terri Garcia MD        sodium chloride 0.9 % infusion 40 mL  40 mL Intravenous PRN Terri Garcia MD         Lab Results (last 24 hours)       Procedure Component Value Units Date/Time    POC Glucose Once [[id number]]  (Normal) Collected: 04/11/25 0729    Specimen: Blood Updated: 04/11/25 0731     Glucose 108 mg/dL     C-reactive Protein [[id number]]  (Abnormal) Collected: 04/11/25 0524    Specimen: Blood  Updated: 04/11/25 0726     C-Reactive Protein 0.91 mg/dL     Basic Metabolic Panel [[id number]]  (Abnormal) Collected: 04/11/25 0524    Specimen: Blood Updated: 04/11/25 0656     Glucose 101 mg/dL      BUN 11 mg/dL      Creatinine 0.73 mg/dL      Sodium 134 mmol/L      Potassium 3.2 mmol/L      Chloride 95 mmol/L      CO2 30.0 mmol/L      Calcium 9.5 mg/dL      BUN/Creatinine Ratio 15.1     Anion Gap 9.0 mmol/L      eGFR 92.5 mL/min/1.73     Narrative:      GFR Categories in Chronic Kidney Disease (CKD)      GFR Category          GFR (mL/min/1.73)    Interpretation  G1                     90 or greater         Normal or high (1)  G2                      60-89                Mild decrease (1)  G3a                   45-59                Mild to moderate decrease  G3b                   30-44                Moderate to severe decrease  G4                    15-29                Severe decrease  G5                    14 or less           Kidney failure          (1)In the absence of evidence of kidney disease, neither GFR category G1 or G2 fulfill the criteria for CKD.    eGFR calculation 2021 CKD-EPI creatinine equation, which does not include race as a factor    CBC (No Diff) [[id number]]  (Normal) Collected: 04/11/25 0524    Specimen: Blood Updated: 04/11/25 0632     WBC 5.83 10*3/mm3      RBC 4.38 10*6/mm3      Hemoglobin 12.1 g/dL      Hematocrit 36.0 %      MCV 82.2 fL      MCH 27.6 pg      MCHC 33.6 g/dL      RDW 12.7 %      RDW-SD 38.6 fl      MPV 10.7 fL      Platelets 231 10*3/mm3     POC Glucose Once [[id number]]  (Abnormal) Collected: 04/10/25 1904    Specimen: Blood Updated: 04/10/25 1906     Glucose 221 mg/dL     POC Glucose Once [[id number]]  (Abnormal) Collected: 04/10/25 1903    Specimen: Blood Updated: 04/10/25 1906     Glucose 229 mg/dL     POC Glucose Once [[id number]]  (Abnormal) Collected: 04/10/25 1654    Specimen: Blood Updated: 04/10/25 1656     Glucose 227 mg/dL     Urinalysis With Microscopic If  Indicated (No Culture) - Urine, Clean Catch [[id number]]  (Abnormal) Collected: 04/10/25 0854    Specimen: Urine, Clean Catch Updated: 04/10/25 0906     Color, UA Yellow     Appearance, UA Clear     pH, UA 6.5     Specific Gravity, UA <=1.005     Glucose, UA Negative     Ketones, UA Negative     Bilirubin, UA Negative     Blood, UA Negative     Protein, UA Negative     Leuk Esterase, UA Trace     Nitrite, UA Negative     Urobilinogen, UA 0.2 E.U./dL    Urinalysis, Microscopic Only - Urine, Clean Catch [[id number]] Collected: 04/10/25 0854    Specimen: Urine, Clean Catch Updated: 04/10/25 0906     RBC, UA 0-2 /HPF      WBC, UA 0-2 /HPF      Bacteria, UA None Seen /HPF      Squamous Epithelial Cells, UA 0-2 /HPF      Hyaline Casts, UA 0-2 /LPF      Methodology Manual Light Microscopy          Imaging Results (Last 24 Hours)       ** No results found for the last 24 hours. **          ECG/EMG Results (last 24 hours)       Procedure Component Value Units Date/Time    ECG 12 Lead Chest Pain [[id number]] Collected: 04/10/25 0527     Updated: 04/10/25 1039     QT Interval 372 ms      QTC Interval 477 ms     Narrative:      Test Reason : Chest Pain  Blood Pressure :   */*   mmHG  Vent. Rate :  99 BPM     Atrial Rate :  99 BPM     P-R Int : 156 ms          QRS Dur :  84 ms      QT Int : 372 ms       P-R-T Axes :  50  26  70 degrees    QTcB Int : 477 ms    Normal sinus rhythm  Nonspecific ST and T wave abnormality  Prolonged QT  Abnormal ECG  When compared with ECG of 02-Mar-2023 08:45,  Nonspecific T wave abnormality now evident in Lateral leads    Referred By:            Confirmed By:     ECG 12 Lead Chest Pain [[id number]] Collected: 04/10/25 0638     Updated: 04/10/25 1040     QT Interval 380 ms      QTC Interval 446 ms     Narrative:      Test Reason : Chest Pain  Blood Pressure :   */*   mmHG  Vent. Rate :  83 BPM     Atrial Rate :  83 BPM     P-R Int : 174 ms          QRS Dur :  72 ms      QT Int : 380 ms       P-R-T Axes  :  51  49  53 degrees    QTcB Int : 446 ms    Normal sinus rhythm  ST & T wave abnormality, consider anterolateral ischemia  Abnormal ECG  When compared with ECG of 10-Apr-2025 05:27, (Unconfirmed)  Nonspecific T wave abnormality now evident in Inferior leads  T wave inversion now evident in Anterior leads  T wave inversion less evident in Lateral leads    Referred By:            Confirmed By:

## 2025-04-11 NOTE — CASE MANAGEMENT/SOCIAL WORK
Discharge Planning Assessment  [facility]     Patient Name: Janet Obrien  MRN: [id number]  Today's Date: 4/11/2025    Admit Date: 4/10/2025    Plan: Home   Discharge Needs Assessment       Row Name 04/11/25 0915       Living Environment    People in Home alone    Current Living Arrangements home    Potentially Unsafe Housing Conditions none    In the past 12 months has the electric, gas, oil, or water company threatened to shut off services in your home? No    Primary Care Provided by self    Provides Primary Care For no one    Family Caregiver if Needed child(wesley), adult    Family Caregiver Names Toni Robles (son) [phone] or Gage Alicea (son) [phone]    Quality of Family Relationships unable to assess    Able to Return to Prior Arrangements yes       Resource/Environmental Concerns    Resource/Environmental Concerns financial    Financial Concerns food, unable to afford;other (see comments)  SW referral offered and refused    Transportation Concerns none       Transportation Needs    In the past 12 months, has lack of transportation kept you from medical appointments or from getting medications? no    In the past 12 months, has lack of transportation kept you from meetings, work, or from getting things needed for daily living? No       Food Insecurity    Within the past 12 months, you worried that your food would run out before you got the money to buy more. Sometimes  States she receives $60/mo in food stamps; SW referral offered and refused    Within the past 12 months, the food you bought just didn't last and you didn't have money to get more. Sometimes  States she receives $60/mo in food stamps; SW referral offered and refused       Transition Planning    Patient/Family Anticipates Transition to home    Patient/Family Anticipated Services at Transition     Transportation Anticipated family or friend will provide       Discharge Needs Assessment    Readmission Within the Last  30 Days no previous admission in last 30 days    Equipment Currently Used at Home glucometer    Concerns to be Addressed discharge planning    Do you want help finding or keeping work or a job? I do not need or want help    Do you want help with school or training? For example, starting or completing job training or getting a high school diploma, GED or equivalent No    Anticipated Changes Related to Illness none    Equipment Needed After Discharge glucometer    Current Discharge Risk lives alone;financial support inadequate    Discharge Coordination/Progress I spoke with Pt, in room, to initiate discharge plan. Pt is admitted with bronchiolitis. She lives alone in a split-level home in [place] [facility] [place]. She reports frequent falls in the past 3 weeks. Prior to that, she reports ambulating independently and being independent with ADLs. She has the following DME: glucometer. Her PCP is Dr Noe Davenport. She has [employer] Medicaid insurance which has Rx coverage. She uses [facility]. Prescriptions are affordable. She denies HH/O2/OPPT. When asked if she would be interested in HH, she states she has dogs which bite. When asked if she would be interested in inpatient rehab, she states she has no one to take care of her dogs. Her plan is to return home at discharge. Her son will provide transportation.Food insecurity reported. Pt states she receives $60/mo in food stamps. SW referral was offered but refused. SAY informed. CM will continue to follow hospital course.                   Discharge Plan       Row Name 04/11/25 0925       Plan    Plan Home    Final Discharge Disposition Code 01 - home or self-care                    Expected Discharge Date and Time       Expected Discharge Date Expected Discharge Time    Apr 12, 2025            Demographic Summary       Row Name 04/11/25 0912       General Information    Arrived From home    Reason for Consult discharge planning    Preferred Language English     General Information Comments PCP Dr Noe Davenport       Contact Information    Permission Granted to Share Info With     Contact Information Obtained for     Contact Information Comments Toni Robles (son) [phone] or Gage Alicea (son) [phone]                   Functional Status       Row Name 04/11/25 0914       Functional Status    Usual Activity Tolerance good    Current Activity Tolerance fair       Functional Status, IADL    Medications independent    Meal Preparation independent    Housekeeping independent    Laundry independent    Shopping independent                   Psychosocial    No documentation.                  Abuse/Neglect    No documentation.                  Legal    No documentation.                  Substance Abuse    No documentation.                  Patient Forms    No documentation.                     Sophie Camacho RN

## 2025-04-11 NOTE — THERAPY EVALUATION
Patient Name: Janet Obrien  : 1960    MRN: [id number]                              Today's Date: 2025       Admit Date: 4/10/2025    Visit Dx:     ICD-10-CM ICD-9-CM   1. Exacerbation of asthma, unspecified asthma severity, unspecified whether persistent  J45.901 493.92   2. Human metapneumovirus (hMPV) pneumonia  J12.3 480.8   3. Respiratory failure with hypoxia, unspecified chronicity  J96.91 518.81     Patient Active Problem List   Diagnosis    Diplopia    Orbital pseudotumor    Myositic orbital pseudotumor    Hypothyroidism    Diabetes mellitus    Age-related nuclear cataract of right eye    Ocular myasthenia    Morbid obesity, unspecified obesity type    Essential hypertension    Precordial pain    SOB (shortness of breath)    Lower abdominal pain    Constipation    Diarrhea    Rectal bleeding    Gastroesophageal reflux disease    Class 3 severe obesity due to excess calories with serious comorbidity and body mass index (BMI) of 40.0 to 44.9 in adult    Anal or rectal pain    Primary osteoarthritis of left knee    S/P TKR (total knee replacement), left    Hyperlipidemia    Primary osteoarthritis of right knee    Obesity    S/P total knee arthroplasty, right    Acute bronchiolitis due to human metapneumovirus     Past Medical History:   Diagnosis Date    Anxiety     Arthritis     Asthma     Back pain     Cancer     SQUAMOUS CELL/ BASAL CELL ON LIP, under the nose as well    Chronic bronchitis     Depression     Diabetes mellitus     type 2    Fibromyalgia     High triglycerides     Hyperlipidemia     Hypertension     Hypothyroidism     Migraine     MVP (mitral valve prolapse)     OA (osteoarthritis)     Ovarian cyst     Peripheral autonomic neuropathy     Pleurisy     Pneumonia     Rheumatism     RLS (restless legs syndrome)     Sleep apnea     Mild form per patient. recommended mouthpiece     Past Surgical History:   Procedure Laterality Date    APPENDECTOMY      CATARACT EXTRACTION W/  "INTRAOCULAR LENS IMPLANT Right 2019    Procedure: CATARACT PHACO EXTRACTION WITH INTRAOCULAR LENS IMPLANT RIGHT;  Surgeon: Carl Babb MD;  Location: [facility] OR;  Service: Ophthalmology    CATARACT EXTRACTION W/ INTRAOCULAR LENS IMPLANT Left 10/07/2019    Procedure: CATARACT PHACO EXTRACTION WITH INTRAOCULAR LENS IMPLANT LEFT;  Surgeon: Carl Babb MD;  Location: [facility] OR;  Service: Ophthalmology     SECTION  , 1997    x 2     DILATATION AND CURETTAGE      HAND SURGERY Right     \"Pinched nerve surgery\"    HAND SURGERY Left     Joint removed secondary to arthritis    HYSTERECTOMY      ''still have one ovary''    KNEE SURGERY Right 2021    arthroscopy and partial medial meniscectomy Dr. Chavez    OOPHORECTOMY      THYROIDECTOMY  1985    TONSILLECTOMY AND ADENOIDECTOMY      TOTAL KNEE ARTHROPLASTY Left 03/15/2023    Procedure: TOTAL KNEE ARTHROPLASTY -  LEFT;  Surgeon: Dayo Chavez MD;  Location:  [facility] OR;  Service: Orthopedics;  Laterality: Left;    TOTAL KNEE ARTHROPLASTY Right 2023    Procedure: TOTAL KNEE ARTHROPLASTY WITH BERNICE ROBOT - RIGHT;  Surgeon: Dayo Chavez MD;  Location:  [facility] OR;  Service: Robotics - Ortho;  Laterality: Right;      General Information       Row Name 25 1523          OT Time and Intention    Document Type evaluation  -     Mode of Treatment occupational therapy  -       Row Name 25 1523          General Information    Patient Profile Reviewed yes  -MC     Prior Level of Function independent:;bed mobility;all household mobility;community mobility;ADL's  Pt denies use of DME at baseline. Admits to multiple recent falls  -     Existing Precautions/Restrictions fall;oxygen therapy device and L/min;other (see comments)  dizziness w/ activity or head movement  -     Barriers to Rehab medically complex;previous functional deficit  -       Row Name 25 1523          Living Environment    " Current Living Arrangements home  -     People in Home alone  Pt reports nobody able to assist her if needed  -       Row Name 04/11/25 1523          Home Main Entrance    Number of Stairs, Main Entrance two  -       Row Name 04/11/25 1523          Stairs Within Home, Primary    Number of Stairs, Within Home, Primary six;other (see comments)  split-foyer  -       Row Name 04/11/25 1523          Cognition    Orientation Status (Cognition) oriented x 3  -       Row Name 04/11/25 1523          Safety Issues/Impairments Affecting Functional Mobility    Safety Issues Affecting Function (Mobility) awareness of need for assistance;insight into deficits/self-awareness;safety precaution awareness;safety precautions follow-through/compliance;sequencing abilities  -     Impairments Affecting Function (Mobility) balance;endurance/activity tolerance;pain;postural/trunk control;strength;shortness of breath  -               User Key  (r) = Recorded By, (t) = Taken By, (c) = Cosigned By      Initials Name Provider Type     Micki Turner OT Occupational Therapist                     Mobility/ADL's       Row Name 04/11/25 1526          Bed Mobility    Bed Mobility supine-sit;sit-supine  -     Supine-Sit [place] (Bed Mobility) modified independence  -     Sit-Supine [place] (Bed Mobility) modified independence  -     Assistive Device (Bed Mobility) bed rails;head of bed elevated  -       Row Name 04/11/25 1526          Transfers    Transfers sit-stand transfer;stand-sit transfer;toilet transfer  -       Row Name 04/11/25 1526          Sit-Stand Transfer    Sit-Stand [place] (Transfers) contact guard  -       Row Name 04/11/25 1526          Stand-Sit Transfer    Stand-Sit [place] (Transfers) contact guard  -[facility] Name 04/11/25 1526          Toilet Transfer    Type (Toilet Transfer) sit-stand;stand-sit  -     [place] Level (Toilet Transfer) contact guard  -      Assistive Device (Toilet Transfer) commode;grab bars/safety frame  -[facility] Name 04/11/25 1526          Functional Mobility    Functional Mobility- Ind. Level contact guard assist;verbal cues required  -     Functional Mobility-Distance (Feet) --  to/from bathroom  -     Functional Mobility- Safety Issues balance decreased during turns;sequencing ability decreased;step length decreased  -     Functional Mobility- Comment Pt CGA for functional mobility to/from bathroom w/ one noted forward LOB req max A to correct. Pt c/o dizziness throughout, BP checked and no drop in BP noted. Pt c/o significant fatigue following mobility to/from bathroom. Pt encouraged to sit up in recliner chair but pt declined.  -       Row Name 04/11/25 1526          Activities of Daily Living    BADL Assessment/Intervention lower body dressing;toileting  -[facility] Name 04/11/25 1526          Lower Body Dressing Assessment/Training    [place] Level (Lower Body Dressing) don;socks;dependent (less than 25% patient effort);verbal cues  -     Position (Lower Body Dressing) edge of bed sitting  -     Comment, (Lower Body Dressing) Pt unable to reach feet to don socks herself despite encouragement  -[facility] Name 04/11/25 1526          Toileting Assessment/Training    [place] Level (Toileting) adjust/manage clothing;perform perineal hygiene;standby assist  -     Position (Toileting) unsupported sitting  -               User Key  (r) = Recorded By, (t) = Taken By, (c) = Cosigned By      Initials Name Provider Type     Micki Turner OT Occupational Therapist                   Obj/Interventions       [facility] Name 04/11/25 1529          Sensory Assessment (Somatosensory)    Sensory Assessment (Somatosensory) UE sensation intact  -[facility] Name 04/11/25 1529          Vision Assessment/Intervention    Visual Impairment/Limitations WFL  -[facility] Name 04/11/25 1529          Range of Motion Comprehensive     General Range of Motion bilateral upper extremity ROM WFL  -[facility] Name 04/11/25 1529          Strength Comprehensive (MMT)    General Manual Muscle Testing (MMT) Assessment upper extremity strength deficits identified  -     Comment, General Manual Muscle Testing (MMT) Assessment BUE grossly 4/5  -       Row Name 04/11/25 1529          Balance    Balance Assessment sitting static balance;sitting dynamic balance;sit to stand dynamic balance;standing static balance;standing dynamic balance  -     Static Sitting Balance standby assist  -     Dynamic Sitting Balance contact guard  -     Position, Sitting Balance unsupported;sitting edge of bed  -     Sit to Stand Dynamic Balance contact guard;verbal cues  -     Static Standing Balance contact guard;verbal cues  -     Dynamic Standing Balance contact guard;verbal cues  -     Position/Device Used, Standing Balance supported  -     Balance Interventions sitting;sit to stand;occupation based/functional task  -     Comment, Balance One noted foward LOB req max A to correct  -               User Key  (r) = Recorded By, (t) = Taken By, (c) = Cosigned By      Initials Name Provider Type     Micki Turner OT Occupational Therapist                   Goals/Plan       [facility] Name 04/11/25 1535          Transfer Goal 1 (OT)    Activity/Assistive Device (Transfer Goal 1, OT) bed-to-chair/chair-to-bed;toilet  -     [place] Level/Cues Needed (Transfer Goal 1, OT) standby assist  -     Time Frame (Transfer Goal 1, OT) long term goal (LTG);10 days  -     Progress/Outcome (Transfer Goal 1, OT) goal ongoing  -[facility] Name 04/11/25 1535          Dressing Goal 1 (OT)    Activity/Device (Dressing Goal 1, OT) lower body dressing  don/doff socks, pants w/ AAD  -     [place]/Cues Needed (Dressing Goal 1, OT) minimum assist (75% or more patient effort)  -     Time Frame (Dressing Goal 1, OT) short term goal (STG);5 days  -      Progress/Outcome (Dressing Goal 1, OT) goal ongoing  -       Row Name 04/11/25 4817          Therapy Assessment/Plan (OT)    Planned Therapy Interventions (OT) activity tolerance training;adaptive equipment training;BADL retraining;functional balance retraining;IADL retraining;occupation/activity based interventions;patient/caregiver education/training;ROM/therapeutic exercise;strengthening exercise;transfer/mobility retraining  -               User Key  (r) = Recorded By, (t) = Taken By, (c) = Cosigned By      Initials Name Provider Type     Micki Turner, JAZZ Occupational Therapist                   Clinical Impression       Row Name 04/11/25 1530          Pain Assessment    Pain Side/Orientation generalized  -     Pain Management Interventions exercise or physical activity utilized;positioning techniques utilized  -     Response to Pain Interventions activity participation with tolerable pain  -     Additional Documentation Pain Scale: FACES Pre/Post-Treatment (Group)  -       Row Name 04/11/25 1530          Pain Scale: FACES Pre/Post-Treatment    Pain: FACES Scale, Pretreatment 4-->hurts little more  -     Posttreatment Pain Rating 4-->hurts little more  -       Row Name 04/11/25 1530          Plan of Care Review    Plan of Care Reviewed With patient  -     Outcome Evaluation Pt presents w/ dizziness, decreased functional endurance, generalized weakness, and balance deficits limiting her ADL independence. Pt w/ one noted LOB w/ mobility to/from bathroom req max A to correct & pt unable to reach feet to don socks this date. Pt would benefit from continued skilled IPOT services to address current functional deficits. Based on pt's current functional status & hx of multiple recent falls rec IRF at d/c for best functional outcomes.  -       Row Name 04/11/25 8589          Therapy Assessment/Plan (OT)    Patient/Family Therapy Goal Statement (OT) Return to PLOF  -     Rehab Potential (OT)  fair  -     Criteria for Skilled Therapeutic Interventions Met (OT) yes;skilled treatment is necessary  -     Therapy Frequency (OT) daily  -     Predicted Duration of Therapy Intervention (OT) 10 days  -       Row Name 04/11/25 1530          Therapy Plan Review/Discharge Plan (OT)    Anticipated Discharge Disposition (OT) inpatient rehabilitation facility  -       Row Name 04/11/25 1530          Vital Signs    Pre Systolic BP Rehab 103  -MC     Pre Treatment Diastolic BP 64  -MC     Post Systolic BP Rehab 124  -MC     Post Treatment Diastolic BP 65  -MC     O2 Delivery Pre Treatment nasal cannula  -     O2 Delivery Intra Treatment room air  -     O2 Delivery Post Treatment nasal cannula  -MC     Pre Patient Position Supine  -     Intra Patient Position Standing  -     Post Patient Position Supine  -       Row Name 04/11/25 1530          Positioning and Restraints    Pre-Treatment Position in bed  -     Post Treatment Position bed  -     In Bed supine;call light within reach;encouraged to call for assist;exit alarm on;side rails up x3  -               User Key  (r) = Recorded By, (t) = Taken By, (c) = Cosigned By      Initials Name Provider Type    Micki Gómez, JAZZ Occupational Therapist                   Outcome Measures       Row Name 04/11/25 1536          How much help from another is currently needed...    Putting on and taking off regular lower body clothing? 1  -MC     Bathing (including washing, rinsing, and drying) 2  -MC     Toileting (which includes using toilet bed pan or urinal) 3  -MC     Putting on and taking off regular upper body clothing 3  -MC     Taking care of personal grooming (such as brushing teeth) 3  -MC     Eating meals 4  -MC     AM-PAC 6 Clicks Score (OT) 16  -[facility] Name 04/11/25 1536          Functional Assessment    Outcome Measure Options AM-PAC 6 Clicks Daily Activity (OT)  -               User Key  (r) = Recorded By, (t) = Taken By, (c) =  Cosigned By      Initials Name Provider Type     Micki Turner OT Occupational Therapist                    Occupational Therapy Education       Title: PT OT SLP Therapies (In Progress)       Topic: Occupational Therapy (In Progress)       Point: ADL training (In Progress)       Learning Progress Summary            Patient Acceptance, E, NR by  at 4/11/2025 1536                      Point: Precautions (In Progress)       Learning Progress Summary            Patient Acceptance, E, NR by  at 4/11/2025 1536                      Point: Body mechanics (In Progress)       Learning Progress Summary            Patient Acceptance, E, NR by  at 4/11/2025 1536                                      User Key       Initials Effective Dates Name Provider Type Discipline     10/14/22 -  Micki Turner OT Occupational Therapist OT                  OT Recommendation and Plan  Planned Therapy Interventions (OT): activity tolerance training, adaptive equipment training, BADL retraining, functional balance retraining, IADL retraining, occupation/activity based interventions, patient/caregiver education/training, ROM/therapeutic exercise, strengthening exercise, transfer/mobility retraining  Therapy Frequency (OT): daily  Plan of Care Review  Plan of Care Reviewed With: patient  Outcome Evaluation: Pt presents w/ dizziness, decreased functional endurance, generalized weakness, and balance deficits limiting her ADL independence. Pt w/ one noted LOB w/ mobility to/from bathroom req max A to correct & pt unable to reach feet to don socks this date. Pt would benefit from continued skilled IPOT services to address current functional deficits. Based on pt's current functional status & hx of multiple recent falls rec IRF at d/c for best functional outcomes.     Time Calculation:   Evaluation Complexity (OT)  Review Occupational Profile/Medical/Therapy History Complexity: expanded/moderate complexity  Assessment, Occupational  Performance/Identification of Deficit Complexity: 3-5 performance deficits  Clinical Decision Making Complexity (OT): detailed assessment/moderate complexity  Overall Complexity of Evaluation (OT): moderate complexity     Time Calculation- OT       Row Name 04/11/25 1537             Time Calculation- OT    OT Start Time 1500  -MC      OT Received On 04/11/25  -      OT Goal Re-Cert Due Date 04/21/25  -         Timed Charges    41983 - OT Therapeutic Activity Minutes 4  -MC      92656 - OT Self Care/Mgmt Minutes 7  -MC         Untimed Charges    OT Eval/Re-eval Minutes 31  -MC         Total Minutes    Timed Charges Total Minutes 11  -MC      Untimed Charges Total Minutes 31  -MC       Total Minutes 42  -MC                User Key  (r) = Recorded By, (t) = Taken By, (c) = Cosigned By      Initials Name Provider Type     Micki Turner OT Occupational Therapist                  Therapy Charges for Today       Code Description Service Date Service Provider Modifiers Qty    [id number]  OT SELF CARE/MGMT/TRAIN EA 15 MIN 4/11/2025 Micki Turner OT GO 1    [id number]  OT EVAL MOD COMPLEXITY 3 4/11/2025 Micki Turner OT GO 1                 Micki Turner OT  4/11/2025

## 2025-04-12 LAB
ALBUMIN SERPL-MCNC: 3.4 G/DL (ref 3.5–5.2)
ALBUMIN/GLOB SERPL: 1.5 G/DL
ALP SERPL-CCNC: 72 U/L (ref 39–117)
ALT SERPL W P-5'-P-CCNC: 21 U/L (ref 1–33)
ANION GAP SERPL CALCULATED.3IONS-SCNC: 10 MMOL/L (ref 5–15)
AST SERPL-CCNC: 39 U/L (ref 1–32)
BASOPHILS # BLD AUTO: 0 10*3/MM3 (ref 0–0.2)
BASOPHILS NFR BLD AUTO: 0 % (ref 0–1.5)
BILIRUB SERPL-MCNC: 0.2 MG/DL (ref 0–1.2)
BUN SERPL-MCNC: 14 MG/DL (ref 8–23)
BUN/CREAT SERPL: 21.5 (ref 7–25)
CALCIUM SPEC-SCNC: 9.1 MG/DL (ref 8.6–10.5)
CHLORIDE SERPL-SCNC: 100 MMOL/L (ref 98–107)
CO2 SERPL-SCNC: 24 MMOL/L (ref 22–29)
CREAT SERPL-MCNC: 0.65 MG/DL (ref 0.57–1)
DEPRECATED RDW RBC AUTO: 39.6 FL (ref 37–54)
EGFRCR SERPLBLD CKD-EPI 2021: 99.1 ML/MIN/1.73
EOSINOPHIL # BLD AUTO: 0 10*3/MM3 (ref 0–0.4)
EOSINOPHIL NFR BLD AUTO: 0 % (ref 0.3–6.2)
ERYTHROCYTE [DISTWIDTH] IN BLOOD BY AUTOMATED COUNT: 12.9 % (ref 12.3–15.4)
GLOBULIN UR ELPH-MCNC: 2.2 GM/DL
GLUCOSE BLDC GLUCOMTR-MCNC: 153 MG/DL (ref 70–130)
GLUCOSE BLDC GLUCOMTR-MCNC: 184 MG/DL (ref 70–130)
GLUCOSE BLDC GLUCOMTR-MCNC: 212 MG/DL (ref 70–130)
GLUCOSE BLDC GLUCOMTR-MCNC: 223 MG/DL (ref 70–130)
GLUCOSE SERPL-MCNC: 196 MG/DL (ref 65–99)
HCT VFR BLD AUTO: 34.1 % (ref 34–46.6)
HGB BLD-MCNC: 11.4 G/DL (ref 12–15.9)
IMM GRANULOCYTES # BLD AUTO: 0.03 10*3/MM3 (ref 0–0.05)
IMM GRANULOCYTES NFR BLD AUTO: 0.5 % (ref 0–0.5)
LYMPHOCYTES # BLD AUTO: 0.53 10*3/MM3 (ref 0.7–3.1)
LYMPHOCYTES NFR BLD AUTO: 9.6 % (ref 19.6–45.3)
MAGNESIUM SERPL-MCNC: 2.1 MG/DL (ref 1.6–2.4)
MCH RBC QN AUTO: 28 PG (ref 26.6–33)
MCHC RBC AUTO-ENTMCNC: 33.4 G/DL (ref 31.5–35.7)
MCV RBC AUTO: 83.8 FL (ref 79–97)
MONOCYTES # BLD AUTO: 0.37 10*3/MM3 (ref 0.1–0.9)
MONOCYTES NFR BLD AUTO: 6.7 % (ref 5–12)
NEUTROPHILS NFR BLD AUTO: 4.61 10*3/MM3 (ref 1.7–7)
NEUTROPHILS NFR BLD AUTO: 83.2 % (ref 42.7–76)
NRBC BLD AUTO-RTO: 0 /100 WBC (ref 0–0.2)
PLAT MORPH BLD: NORMAL
PLATELET # BLD AUTO: 245 10*3/MM3 (ref 140–450)
PMV BLD AUTO: 10.7 FL (ref 6–12)
POTASSIUM SERPL-SCNC: 4.3 MMOL/L (ref 3.5–5.2)
PROT SERPL-MCNC: 5.6 G/DL (ref 6–8.5)
RBC # BLD AUTO: 4.07 10*6/MM3 (ref 3.77–5.28)
RBC MORPH BLD: NORMAL
SODIUM SERPL-SCNC: 134 MMOL/L (ref 136–145)
WBC MORPH BLD: NORMAL
WBC NRBC COR # BLD AUTO: 5.54 10*3/MM3 (ref 3.4–10.8)

## 2025-04-12 PROCEDURE — 94664 DEMO&/EVAL PT USE INHALER: CPT

## 2025-04-12 PROCEDURE — 80053 COMPREHEN METABOLIC PANEL: CPT | Performed by: INTERNAL MEDICINE

## 2025-04-12 PROCEDURE — 94799 UNLISTED PULMONARY SVC/PX: CPT

## 2025-04-12 PROCEDURE — 97162 PT EVAL MOD COMPLEX 30 MIN: CPT

## 2025-04-12 PROCEDURE — 82948 REAGENT STRIP/BLOOD GLUCOSE: CPT

## 2025-04-12 PROCEDURE — 85025 COMPLETE CBC W/AUTO DIFF WBC: CPT | Performed by: INTERNAL MEDICINE

## 2025-04-12 PROCEDURE — 25010000002 ENOXAPARIN PER 10 MG: Performed by: INTERNAL MEDICINE

## 2025-04-12 PROCEDURE — 63710000001 INSULIN LISPRO (HUMAN) PER 5 UNITS: Performed by: INTERNAL MEDICINE

## 2025-04-12 PROCEDURE — 99232 SBSQ HOSP IP/OBS MODERATE 35: CPT | Performed by: INTERNAL MEDICINE

## 2025-04-12 PROCEDURE — 85007 BL SMEAR W/DIFF WBC COUNT: CPT | Performed by: INTERNAL MEDICINE

## 2025-04-12 PROCEDURE — 25810000003 SODIUM CHLORIDE 0.9 % SOLUTION: Performed by: INTERNAL MEDICINE

## 2025-04-12 PROCEDURE — 25010000002 METHYLPREDNISOLONE PER 125 MG: Performed by: INTERNAL MEDICINE

## 2025-04-12 PROCEDURE — 83735 ASSAY OF MAGNESIUM: CPT | Performed by: INTERNAL MEDICINE

## 2025-04-12 PROCEDURE — 94761 N-INVAS EAR/PLS OXIMETRY MLT: CPT

## 2025-04-12 RX ORDER — WATER 10 ML/10ML
INJECTION INTRAMUSCULAR; INTRAVENOUS; SUBCUTANEOUS
Status: COMPLETED
Start: 2025-04-12 | End: 2025-04-12

## 2025-04-12 RX ADMIN — ATORVASTATIN CALCIUM 80 MG: 40 TABLET, FILM COATED ORAL at 08:26

## 2025-04-12 RX ADMIN — DEXTROMETHORPHAN POLISTIREX 60 MG: 30 SUSPENSION ORAL at 08:25

## 2025-04-12 RX ADMIN — METHYLPREDNISOLONE SODIUM SUCCINATE 60 MG: 125 INJECTION INTRAMUSCULAR; INTRAVENOUS at 14:12

## 2025-04-12 RX ADMIN — LORAZEPAM 1 MG: 1 TABLET ORAL at 14:12

## 2025-04-12 RX ADMIN — SODIUM CHLORIDE 75 ML/HR: 9 INJECTION, SOLUTION INTRAVENOUS at 05:05

## 2025-04-12 RX ADMIN — INSULIN LISPRO 3 UNITS: 100 INJECTION, SOLUTION INTRAVENOUS; SUBCUTANEOUS at 11:38

## 2025-04-12 RX ADMIN — IPRATROPIUM BROMIDE AND ALBUTEROL SULFATE 3 ML: 2.5; .5 SOLUTION RESPIRATORY (INHALATION) at 14:00

## 2025-04-12 RX ADMIN — MONTELUKAST 10 MG: 10 TABLET, FILM COATED ORAL at 21:00

## 2025-04-12 RX ADMIN — DOCUSATE SODIUM 100 MG: 100 CAPSULE, LIQUID FILLED ORAL at 08:26

## 2025-04-12 RX ADMIN — LORAZEPAM 1 MG: 1 TABLET ORAL at 21:00

## 2025-04-12 RX ADMIN — IPRATROPIUM BROMIDE AND ALBUTEROL SULFATE 3 ML: 2.5; .5 SOLUTION RESPIRATORY (INHALATION) at 17:26

## 2025-04-12 RX ADMIN — CELECOXIB 200 MG: 200 CAPSULE ORAL at 08:26

## 2025-04-12 RX ADMIN — METHYLPREDNISOLONE SODIUM SUCCINATE 60 MG: 125 INJECTION INTRAMUSCULAR; INTRAVENOUS at 21:00

## 2025-04-12 RX ADMIN — Medication 10 ML: at 08:24

## 2025-04-12 RX ADMIN — OXYCODONE HYDROCHLORIDE AND ACETAMINOPHEN 1 TABLET: 10; 325 TABLET ORAL at 23:28

## 2025-04-12 RX ADMIN — INSULIN LISPRO 3 UNITS: 100 INJECTION, SOLUTION INTRAVENOUS; SUBCUTANEOUS at 21:00

## 2025-04-12 RX ADMIN — IPRATROPIUM BROMIDE AND ALBUTEROL SULFATE 3 ML: 2.5; .5 SOLUTION RESPIRATORY (INHALATION) at 19:50

## 2025-04-12 RX ADMIN — IPRATROPIUM BROMIDE AND ALBUTEROL SULFATE 3 ML: 2.5; .5 SOLUTION RESPIRATORY (INHALATION) at 09:11

## 2025-04-12 RX ADMIN — ENOXAPARIN SODIUM 40 MG: 100 INJECTION SUBCUTANEOUS at 08:24

## 2025-04-12 RX ADMIN — Medication 10 ML: at 21:00

## 2025-04-12 RX ADMIN — DOXYCYCLINE 100 MG: 100 CAPSULE ORAL at 21:00

## 2025-04-12 RX ADMIN — GUAIFENESIN AND CODEINE PHOSPHATE 10 ML: 100; 10 SOLUTION ORAL at 14:12

## 2025-04-12 RX ADMIN — DEXTROMETHORPHAN POLISTIREX 60 MG: 30 SUSPENSION ORAL at 21:00

## 2025-04-12 RX ADMIN — LIDOCAINE 1 PATCH: 4 PATCH TOPICAL at 08:24

## 2025-04-12 RX ADMIN — DULOXETINE 60 MG: 60 CAPSULE, DELAYED RELEASE ORAL at 08:26

## 2025-04-12 RX ADMIN — GUAIFENESIN 1200 MG: 600 TABLET ORAL at 21:00

## 2025-04-12 RX ADMIN — LORAZEPAM 1 MG: 1 TABLET ORAL at 08:25

## 2025-04-12 RX ADMIN — INSULIN LISPRO 2 UNITS: 100 INJECTION, SOLUTION INTRAVENOUS; SUBCUTANEOUS at 08:25

## 2025-04-12 RX ADMIN — WATER 10 ML: 1 INJECTION INTRAMUSCULAR; INTRAVENOUS; SUBCUTANEOUS at 14:12

## 2025-04-12 RX ADMIN — METHYLPREDNISOLONE SODIUM SUCCINATE 60 MG: 125 INJECTION INTRAMUSCULAR; INTRAVENOUS at 05:05

## 2025-04-12 RX ADMIN — LEVOTHYROXINE SODIUM 100 MCG: 0.1 TABLET ORAL at 08:25

## 2025-04-12 RX ADMIN — INSULIN LISPRO 2 UNITS: 100 INJECTION, SOLUTION INTRAVENOUS; SUBCUTANEOUS at 16:57

## 2025-04-12 RX ADMIN — GUAIFENESIN 1200 MG: 600 TABLET ORAL at 08:26

## 2025-04-12 RX ADMIN — DOXYCYCLINE 100 MG: 100 CAPSULE ORAL at 08:25

## 2025-04-12 NOTE — PROGRESS NOTES
[facility] Medicine Services  PROGRESS NOTE    Patient Name: Janet Obrien  : 1960  MRN: [id number]    Date of Admission: 4/10/2025  Primary Care Physician: Noe Davenport MD    Subjective   Subjective     CC:  Follow-up for shortness of breath    HPI:  Patient was seen and examined this morning.  Shortness of breath and cough significantly proved compared to yesterday.  Still wheezing.  Oxygen: Stopped 1 L.      Objective   Objective     Vital Signs:   Temp:  [97.9 °F (36.6 °C)-98.5 °F (36.9 °C)] 97.9 °F (36.6 °C)  Heart Rate:  [65-91] 80  Resp:  [17-20] 18  BP: ()/(56-68) 109/68  Flow (L/min) (Oxygen Therapy):  [1-2] 1     Physical Exam:  General: Comfortable, mild respiratory distress, conversant and cooperative  Head: Atraumatic and normocephalic  Eyes: No Icterus. No pallor  Ears:  Ears appear intact with no abnormalities noted  Throat: No oral lesions, no thrush  Neck: Supple, trachea midline  Lungs: Slightly improved bilateral wheezing.  No crackles  Heart:  Normal S1 and S2, no murmur, no gallop, No JVD, no lower extremity swelling  Abdomen:  Soft, no tenderness, no organomegaly, normal bowel sounds, no organomegaly  Extremities: pulses equal bilaterally  Skin: No bleeding, bruising or rash, normal skin turgor and elasticity  Neurologic: Cranial nerves appear intact with no evidence of facial asymmetry, normal motor and sensory functions in all 4 extremities  Psych: Alert and oriented x 3, normal mood    Results Reviewed:  LAB RESULTS:      Lab 25  0354 25  0524 04/10/25  0704 04/10/25  0557   WBC 5.54 5.83  --  6.38   HEMOGLOBIN 11.4* 12.1  --  14.0   HEMATOCRIT 34.1 36.0  --  41.2   PLATELETS 245 231  --  281   NEUTROS ABS 4.61  --   --  5.47   IMMATURE GRANS (ABS) 0.03  --   --  0.01   LYMPHS ABS 0.53*  --   --  0.55*   MONOS ABS 0.37  --   --  0.34   EOS ABS 0.00  --   --  0.00   MCV 83.8 82.2  --  81.6   CRP  --  0.91*  --  1.46*   PROCALCITONIN   --   --   --  0.07   LACTATE  --   --   --  1.7   D DIMER QUANT  --   --   --  0.31   HSTROP T  --   --  7 7         Lab 04/12/25  0354 04/11/25  0524 04/10/25  0557   SODIUM 134* 134* 129*   POTASSIUM 4.3 3.2* 2.9*   CHLORIDE 100 95* 90*   CO2 24.0 30.0* 24.1   ANION GAP 10.0 9.0 14.9   BUN 14 11 10   CREATININE 0.65 0.73 0.82   EGFR 99.1 92.5 80.5   GLUCOSE 196* 101* 148*   CALCIUM 9.1 9.5 9.9   MAGNESIUM 2.1  --  1.9   HEMOGLOBIN A1C  --  5.50  --    TSH  --   --  0.766         Lab 04/12/25  0354 04/10/25  0557   TOTAL PROTEIN 5.6* 6.5   ALBUMIN 3.4* 3.7   GLOBULIN 2.2 2.8   ALT (SGPT) 21 20   AST (SGOT) 39* 46*   BILIRUBIN 0.2 0.6   ALK PHOS 72 73   LIPASE  --  33         Lab 04/10/25  0704 04/10/25  0557   PROBNP  --  149.0   HSTROP T 7 7                 Brief Urine Lab Results  (Last result in the past 365 days)        Color   Clarity   Blood   Leuk Est   Nitrite   Protein   CREAT   Urine HCG        04/10/25 0854 Yellow   Clear   Negative   Trace   Negative   Negative                   Microbiology Results Abnormal       Procedure Component Value - Date/Time    Respiratory Panel PCR w/COVID-19(SARS-CoV-2) [facility] In-House, NP Swab in UTM/VTM, 2 HR TAT - Swab, Nasopharynx [[id number]]  (Abnormal) Collected: 04/10/25 0618    Lab Status: Final result Specimen: Swab from Nasopharynx Updated: 04/10/25 0730     ADENOVIRUS, PCR Not Detected     Coronavirus 229E Not Detected     Coronavirus HKU1 Not Detected     Coronavirus NL63 Not Detected     Coronavirus OC43 Not Detected     COVID19 Not Detected     Human Metapneumovirus Detected     Human Rhinovirus/Enterovirus Not Detected     Influenza A PCR Not Detected     Influenza B PCR Not Detected     Parainfluenza Virus 1 Not Detected     Parainfluenza Virus 2 Not Detected     Parainfluenza Virus 3 Not Detected     Parainfluenza Virus 4 Not Detected     RSV, PCR Not Detected     Bordetella pertussis pcr Not Detected     Bordetella parapertussis PCR Not  Detected     Chlamydophila pneumoniae PCR Not Detected     Mycoplasma pneumo by PCR Not Detected    Narrative:      In the setting of a positive respiratory panel with a viral infection PLUS a negative procalcitonin without other underlying concern for bacterial infection, consider observing off antibiotics or discontinuation of antibiotics and continue supportive care. If the respiratory panel is positive for atypical bacterial infection (Bordetella pertussis, Chlamydophila pneumoniae, or Mycoplasma pneumoniae), consider antibiotic de-escalation to target atypical bacterial infection.            No radiology results from the last 24 hrs      Results for orders placed in visit on 06/05/20    Adult Transthoracic Echo Complete W/ Cont if Necessary Per Protocol    Interpretation Summary  · Estimated EF = 76%.  · Left ventricular systolic function is hyperdynamic (EF > 70).      Current medications:  Scheduled Meds:atorvastatin, 80 mg, Oral, Daily  celecoxib, 200 mg, Oral, Daily  dextromethorphan polistirex ER, 60 mg, Oral, Q12H  docusate sodium, 100 mg, Oral, BID  doxycycline, 100 mg, Oral, Q12H  DULoxetine, 60 mg, Oral, Daily  enoxaparin sodium, 40 mg, Subcutaneous, Daily  guaiFENesin, 1,200 mg, Oral, Q12H  hydrOXYzine, 10 mg, Oral, Once  insulin lispro, 2-7 Units, Subcutaneous, 4x Daily AC & at Bedtime  ipratropium-albuterol, 3 mL, Nebulization, 4x Daily - RT  levothyroxine, 100 mcg, Oral, Daily  Lidocaine, 1 patch, Transdermal, Q24H  LORazepam, 1 mg, Oral, Q6H  methylPREDNISolone sodium succinate, 60 mg, Intravenous, Q8H  montelukast, 10 mg, Oral, Nightly  sodium chloride, 10 mL, Intravenous, Q12H      Continuous Infusions:sodium chloride, 75 mL/hr, Last Rate: 75 mL/hr (04/12/25 4265)      PRN Meds:.  acetaminophen    senna-docusate sodium **AND** polyethylene glycol **AND** bisacodyl **AND** bisacodyl    Calcium Replacement - Follow Nurse / BPA Driven Protocol    dextrose    dextrose    glucagon (human  recombinant)    guaiFENesin-codeine    ipratropium-albuterol    Magnesium Standard Dose Replacement - Follow Nurse / BPA Driven Protocol    meclizine    melatonin    methocarbamol    ondansetron    oxyCODONE-acetaminophen    Phosphorus Replacement - Follow Nurse / BPA Driven Protocol    Potassium Replacement - Follow Nurse / BPA Driven Protocol    sodium chloride    sodium chloride    sodium chloride    tiZANidine    Assessment & Plan   Assessment & Plan     Active Hospital Problems    Diagnosis  POA    **Acute bronchiolitis due to human metapneumovirus [J21.1]  Unknown      Resolved Hospital Problems   No resolved problems to display.        Brief Hospital Course to date:  Janet Obrien is a 64 y.o. female with past medical history of bronchial asthma, essential hypertension, dyslipidemia, hypothyroidism, restless leg syndrome, anxiety, obstructive sleep apnea, obesity, who presented to the hospital with shortness of breath and wheezing.  Test positive for human metapneumovirus    Acute hypoxic respiratory insufficiency  Acute asthma exacerbation  Acute bronchitis secondary to human metapneumovirus infection  Patient with history of bronchial asthma.  Presented to the hospital with acute hypoxemia and worsening asthma symptoms/wheezing.  Tested positive for human metapneumovirus and CT chest with evidence of bronchiolitis  Continue IV steroid 60 mg 3 times daily  No leukocytosis.  Normal procalcitonin.  Normal CRP would argue against pneumonia.  Monitor off antibiotics  DuoNebs: Scheduled and as needed    Vertigo  Possible otitis media  Continue p.o. doxycycline  Scheduled Ativan and as needed meclizine     Hypoosmolar hyponatremia  Hypovolemic secondary to poor oral intake  Improved with IV fluids   Holding hydrochlorothiazide  Encourage p.o. intake      Hypokalemia  Replace per protocol    Type 2 diabetes  A1c 5.5 %  SSI     Expected Discharge Location and Transportation: Home   Expected Discharge   Expected  Discharge Date: 4/14/2025; Expected Discharge Time:      VTE Prophylaxis:  Pharmacologic VTE prophylaxis orders are present.         AM-PAC 6 Clicks Score (PT): 23 (04/11/25 2040)    CODE STATUS:   Code Status and Medical Interventions: CPR (Attempt to Resuscitate); Full Support   Ordered at: 04/10/25 8326     Code Status (Patient has no pulse and is not breathing):    CPR (Attempt to Resuscitate)     Medical Interventions (Patient has pulse or is breathing):    Full Support     Level Of Support Discussed With:    Patient       Sulaiman Bear MD  04/12/25

## 2025-04-12 NOTE — THERAPY EVALUATION
Patient Name: Janet Obrien  : 1960    MRN: [id number]                              Today's Date: 2025       Admit Date: 4/10/2025    Visit Dx:     ICD-10-CM ICD-9-CM   1. Exacerbation of asthma, unspecified asthma severity, unspecified whether persistent  J45.901 493.92   2. Human metapneumovirus (hMPV) pneumonia  J12.3 480.8   3. Respiratory failure with hypoxia, unspecified chronicity  J96.91 518.81     Patient Active Problem List   Diagnosis    Diplopia    Orbital pseudotumor    Myositic orbital pseudotumor    Hypothyroidism    Diabetes mellitus    Age-related nuclear cataract of right eye    Ocular myasthenia    Morbid obesity, unspecified obesity type    Essential hypertension    Precordial pain    SOB (shortness of breath)    Lower abdominal pain    Constipation    Diarrhea    Rectal bleeding    Gastroesophageal reflux disease    Class 3 severe obesity due to excess calories with serious comorbidity and body mass index (BMI) of 40.0 to 44.9 in adult    Anal or rectal pain    Primary osteoarthritis of left knee    S/P TKR (total knee replacement), left    Hyperlipidemia    Primary osteoarthritis of right knee    Obesity    S/P total knee arthroplasty, right    Acute bronchiolitis due to human metapneumovirus     Past Medical History:   Diagnosis Date    Anxiety     Arthritis     Asthma     Back pain     Cancer     SQUAMOUS CELL/ BASAL CELL ON LIP, under the nose as well    Chronic bronchitis     Depression     Diabetes mellitus     type 2    Fibromyalgia     High triglycerides     Hyperlipidemia     Hypertension     Hypothyroidism     Migraine     MVP (mitral valve prolapse)     OA (osteoarthritis)     Ovarian cyst     Peripheral autonomic neuropathy     Pleurisy     Pneumonia     Rheumatism     RLS (restless legs syndrome)     Sleep apnea     Mild form per patient. recommended mouthpiece     Past Surgical History:   Procedure Laterality Date    APPENDECTOMY      CATARACT EXTRACTION W/  "INTRAOCULAR LENS IMPLANT Right 2019    Procedure: CATARACT PHACO EXTRACTION WITH INTRAOCULAR LENS IMPLANT RIGHT;  Surgeon: Carl Babb MD;  Location: [facility] OR;  Service: Ophthalmology    CATARACT EXTRACTION W/ INTRAOCULAR LENS IMPLANT Left 10/07/2019    Procedure: CATARACT PHACO EXTRACTION WITH INTRAOCULAR LENS IMPLANT LEFT;  Surgeon: Carl Babb MD;  Location: [facility] OR;  Service: Ophthalmology     SECTION  , 1997    x 2     DILATATION AND CURETTAGE      HAND SURGERY Right     \"Pinched nerve surgery\"    HAND SURGERY Left     Joint removed secondary to arthritis    HYSTERECTOMY      ''still have one ovary''    KNEE SURGERY Right 2021    arthroscopy and partial medial meniscectomy Dr. Chavez    OOPHORECTOMY      THYROIDECTOMY  1985    TONSILLECTOMY AND ADENOIDECTOMY      TOTAL KNEE ARTHROPLASTY Left 03/15/2023    Procedure: TOTAL KNEE ARTHROPLASTY -  LEFT;  Surgeon: Dayo Chavez MD;  Location:  [facility] OR;  Service: Orthopedics;  Laterality: Left;    TOTAL KNEE ARTHROPLASTY Right 2023    Procedure: TOTAL KNEE ARTHROPLASTY WITH BERNICE ROBOT - RIGHT;  Surgeon: Dayo Chavez MD;  Location:  [facility] OR;  Service: Robotics - Ortho;  Laterality: Right;      General Information       Row Name 25 1435          Physical Therapy Time and Intention    Document Type evaluation  -ML     Mode of Treatment physical therapy  -ML       Row Name 25 1435          General Information    Patient Profile Reviewed yes  -ML     Prior Level of Function independent:;all household mobility;gait;transfer;bed mobility;ADL's;driving;using stairs;home management  patient reports hx of falls  -ML     Existing Precautions/Restrictions fall;oxygen therapy device and L/min;other (see comments)  dizziness w/ activity or head movement  -ML     Barriers to Rehab medically complex;previous functional deficit  -ML       Row Name 25 1435          Living " Environment    Current Living Arrangements home  -     People in Home alone  -ML       Row Name 04/12/25 1435          Home Main Entrance    Number of Stairs, Main Entrance two  -ML     Stair Railings, Main Entrance none  -ML       Row Name 04/12/25 1435          Stairs Within Home, Primary    Number of Stairs, Within Home, Primary six  split-foyer  -ML       Row Name 04/12/25 1435          Cognition    Orientation Status (Cognition) oriented x 3  -ML       Row Name 04/12/25 1435          Safety Issues/Impairments Affecting Functional Mobility    Safety Issues Affecting Function (Mobility) awareness of need for assistance;insight into deficits/self-awareness;safety precaution awareness;safety precautions follow-through/compliance;sequencing abilities  -     Impairments Affecting Function (Mobility) balance;endurance/activity tolerance;pain;postural/trunk control;strength;shortness of breath  -               User Key  (r) = Recorded By, (t) = Taken By, (c) = Cosigned By      Initials Name Provider Type     Angeles Arreola Physical Therapist                   Mobility       Row Name 04/12/25 1437          Bed Mobility    Bed Mobility supine-sit  -ML     Supine-Sit [place] (Bed Mobility) modified independence  -       Row Name 04/12/25 1437          Sit-Stand Transfer    Sit-Stand [place] (Transfers) contact guard;verbal cues  -     Assistive Device (Sit-Stand Transfers) walker, front-wheeled  -ML       Row Name 04/12/25 1437          Gait/Stairs (Locomotion)    [place] Level (Gait) minimum assist (75% patient effort);1 person assist;verbal cues  -ML     Assistive Device (Gait) walker, front-wheeled  -ML     Distance in Feet (Gait) 12  + 18  -ML     Deviations/Abnormal Patterns (Gait) bilateral deviations;junie decreased;gait speed decreased;stride length decreased  -ML     Comment, (Gait/Stairs) Patient initially ambulated without AD reaching for walls, patient used RW to ambulate from  bathroom back to chair. Improved stability with use of RW. Patient demonstrates step through gait pattern, unsteady gait througout no overt loss of balance.  -ML               User Key  (r) = Recorded By, (t) = Taken By, (c) = Cosigned By      Initials Name Provider Type    ML Angeles Arreola Physical Therapist                   Obj/Interventions       Row Name 04/12/25 1440          Range of Motion Comprehensive    General Range of Motion bilateral lower extremity ROM WFL  -ML       Row Name 04/12/25 1440          Strength Comprehensive (MMT)    General Manual Muscle Testing (MMT) Assessment lower extremity strength deficits identified  -ML     Comment, General Manual Muscle Testing (MMT) Assessment BLE grossly 4/5  -ML       Row Name 04/12/25 1440          Balance    Balance Assessment sitting static balance;sitting dynamic balance;standing static balance;standing dynamic balance  -ML     Static Sitting Balance standby assist  -ML     Dynamic Sitting Balance standby assist  -ML     Position, Sitting Balance unsupported;sitting edge of bed;other (see comments)  sitting on toilet  -ML     Static Standing Balance contact guard  -ML     Dynamic Standing Balance minimal assist;1-person assist;verbal cues  -ML     Position/Device Used, Standing Balance supported;walker, front-wheeled  -ML     Balance Interventions sitting;standing;sit to stand;supported;occupation based/functional task  -ML               User Key  (r) = Recorded By, (t) = Taken By, (c) = Cosigned By      Initials Name Provider Type    ML Angeles Arreola Physical Therapist                   Goals/Plan       Row Name 04/12/25 1443          Bed Mobility Goal 1 (PT)    Activity/Assistive Device (Bed Mobility Goal 1, PT) sit to supine;supine to sit  -ML     [place] Level/Cues Needed (Bed Mobility Goal 1, PT) independent  -ML     Time Frame (Bed Mobility Goal 1, PT) short term goal (STG);5 days  -ML       Row Name 04/12/25 144          Transfer Goal 1 (PT)     Activity/Assistive Device (Transfer Goal 1, PT) sit-to-stand/stand-to-sit;bed-to-chair/chair-to-bed;walker, rolling  -ML     [place] Level/Cues Needed (Transfer Goal 1, PT) modified independence  -ML     Time Frame (Transfer Goal 1, PT) long term goal (LTG);10 days  -ML       Row Name 04/12/25 1444          Gait Training Goal 1 (PT)    Activity/Assistive Device (Gait Training Goal 1, PT) gait (walking locomotion);decrease fall risk;improve balance and speed;increase endurance/gait distance;walker, rolling  -ML     [place] Level (Gait Training Goal 1, PT) modified independence  -ML     Distance (Gait Training Goal 1, PT) 100  -ML     Time Frame (Gait Training Goal 1, PT) long term goal (LTG);10 days  -ML       Row Name 04/12/25 1444          Stairs Goal 1 (PT)    Activity/Assistive Device (Stairs Goal 1, PT) ascending stairs;descending stairs;step-to-step  -ML     [place] Level/Cues Needed (Stairs Goal 1, PT) standby assist  -ML     Number of Stairs (Stairs Goal 1, PT) 6  -ML     Time Frame (Stairs Goal 1, PT) long term goal (LTG);10 days  -ML       Row Name 04/12/25 1444          Therapy Assessment/Plan (PT)    Planned Therapy Interventions (PT) balance training;bed mobility training;gait training;home exercise program;patient/family education;postural re-education;ROM (range of motion);strengthening;transfer training  -ML               User Key  (r) = Recorded By, (t) = Taken By, (c) = Cosigned By      Initials Name Provider Type    ML Angeles Arreola Physical Therapist                   Clinical Impression       Row Name 04/12/25 1441          Pain    Pretreatment Pain Rating 5/10  -ML     Posttreatment Pain Rating 5/10  -ML     Pain Location chest  throat  -ML     Pain Management Interventions exercise or physical activity utilized;positioning techniques utilized;nursing notified  -ML     Response to Pain Interventions activity participation with tolerable pain;no change per patient report  -ML        Row Name 04/12/25 1441          Plan of Care Review    Plan of Care Reviewed With patient  -     Outcome Evaluation Physical therapy evaluation complete. The patient required Parth to ambulate with RW, verbal cues to increase safety awareness. The patient presents below baseline for mobility and with hx of recent falls. The patient would continue to benefit from skilled PT to address strength, balance and activity tolerance deficits. Recommend RW at hospital discharge if patient declines rehab.  -ML       Row Name 04/12/25 1441          Therapy Assessment/Plan (PT)    Patient/Family Therapy Goals Statement (PT) no PT goals stated  -ML     Rehab Potential (PT) good  -ML     Criteria for Skilled Interventions Met (PT) yes;meets criteria;skilled treatment is necessary  -ML     Therapy Frequency (PT) daily  -ML     Predicted Duration of Therapy Intervention (PT) 10 days  -ML       Row Name 04/12/25 1441          Vital Signs    Pre Systolic BP Rehab 120  -ML     Pre Treatment Diastolic BP 90  -ML     Pre SpO2 (%) 96  -ML     O2 Delivery Pre Treatment room air  -ML     Post SpO2 (%) 94  -ML     O2 Delivery Post Treatment room air  -ML       Row Name 04/12/25 1441          Positioning and Restraints    Pre-Treatment Position in bed  -ML     Post Treatment Position chair  -ML     In Chair notified nsg;reclined;call light within reach;encouraged to call for assist;exit alarm on;waffle cushion;legs elevated  -ML               User Key  (r) = Recorded By, (t) = Taken By, (c) = Cosigned By      Initials Name Provider Type    ML Angeles Arreola Physical Therapist                   Outcome Measures       Row Name 04/12/25 1445          How much help from another person do you currently need...    Turning from your back to your side while in flat bed without using bedrails? 4  -ML     Moving from lying on back to sitting on the side of a flat bed without bedrails? 4  -ML     Moving to and from a bed to a chair (including a  wheelchair)? 3  -ML     Standing up from a chair using your arms (e.g., wheelchair, bedside chair)? 3  -ML     Climbing 3-5 steps with a railing? 3  -ML     To walk in hospital room? 3  -ML     AM-PAC 6 Clicks Score (PT) 20  -ML     Highest Level of Mobility Goal 6 --> Walk 10 steps or more  -ML       Row Name 04/12/25 1445          Functional Assessment    Outcome Measure Options AM-PAC 6 Clicks Basic Mobility (PT)  -ML               User Key  (r) = Recorded By, (t) = Taken By, (c) = Cosigned By      Initials Name Provider Type     Angeles Arreola Physical Therapist                                 Physical Therapy Education       Title: PT OT SLP Therapies (In Progress)       Topic: Physical Therapy (In Progress)       Point: Mobility training (In Progress)       Learning Progress Summary            Patient Acceptance, E, NR by  at 4/12/2025 1446                      Point: Home exercise program (Not Started)       Learner Progress:  Not documented in this visit.              Point: Body mechanics (Not Started)       Learner Progress:  Not documented in this visit.              Point: Precautions (In Progress)       Learning Progress Summary            Patient Acceptance, E, NR by  at 4/12/2025 1446                                      User Key       Initials Effective Dates Name Provider Type Discipline     04/22/21 -  Angeles Arreola Physical Therapist PT                  PT Recommendation and Plan  Planned Therapy Interventions (PT): balance training, bed mobility training, gait training, home exercise program, patient/family education, postural re-education, ROM (range of motion), strengthening, transfer training  Outcome Evaluation: Physical therapy evaluation complete. The patient required Parth to ambulate with RW, verbal cues to increase safety awareness. The patient presents below baseline for mobility and with hx of recent falls. The patient would continue to benefit from skilled PT to address strength,  balance and activity tolerance deficits. Recommend RW at hospital discharge if patient declines rehab.     Time Calculation:   PT Evaluation Complexity  History, PT Evaluation Complexity: 1-2 personal factors and/or comorbidities  Examination of Body Systems (PT Eval Complexity): total of 3 or more elements  Clinical Presentation (PT Evaluation Complexity): evolving  Clinical Decision Making (PT Evaluation Complexity): moderate complexity  Overall Complexity (PT Evaluation Complexity): moderate complexity     PT Charges       Row Name 04/12/25 1446             Time Calculation    Start Time 1400  -ML      PT Received On 04/12/25  -ML      PT Goal Re-Cert Due Date 04/22/25  -ML         Untimed Charges    PT Eval/Re-eval Minutes 46  -ML         Total Minutes    Untimed Charges Total Minutes 46  -ML       Total Minutes 46  -ML                User Key  (r) = Recorded By, (t) = Taken By, (c) = Cosigned By      Initials Name Provider Type    Angeles Bernal Physical Therapist                  Therapy Charges for Today       Code Description Service Date Service Provider Modifiers Qty    [id number] HC PT EVAL MOD COMPLEXITY 4 4/12/2025 Angeles Arreola GP 1            PT G-Codes  Outcome Measure Options: AM-PAC 6 Clicks Basic Mobility (PT)  AM-PAC 6 Clicks Score (PT): 20  AM-PAC 6 Clicks Score (OT): 16  PT Discharge Summary  Anticipated Discharge Disposition (PT): inpatient rehabilitation facility    Angeles Arreola  4/12/2025

## 2025-04-12 NOTE — PLAN OF CARE
Goal Outcome Evaluation:  Plan of Care Reviewed With: patient           Outcome Evaluation: Physical therapy evaluation complete. The patient required Parth to ambulate with RW, verbal cues to increase safety awareness. The patient presents below baseline for mobility and with hx of recent falls. The patient would continue to benefit from skilled PT to address strength, balance and activity tolerance deficits. Recommend RW at hospital discharge if patient declines rehab.    Anticipated Discharge Disposition (PT): inpatient rehabilitation facility

## 2025-04-13 LAB
GLUCOSE BLDC GLUCOMTR-MCNC: 135 MG/DL (ref 70–130)
GLUCOSE BLDC GLUCOMTR-MCNC: 135 MG/DL (ref 70–130)
GLUCOSE BLDC GLUCOMTR-MCNC: 151 MG/DL (ref 70–130)
GLUCOSE BLDC GLUCOMTR-MCNC: 164 MG/DL (ref 70–130)

## 2025-04-13 PROCEDURE — 82948 REAGENT STRIP/BLOOD GLUCOSE: CPT

## 2025-04-13 PROCEDURE — 94761 N-INVAS EAR/PLS OXIMETRY MLT: CPT

## 2025-04-13 PROCEDURE — 25010000002 ENOXAPARIN PER 10 MG: Performed by: INTERNAL MEDICINE

## 2025-04-13 PROCEDURE — 94799 UNLISTED PULMONARY SVC/PX: CPT

## 2025-04-13 PROCEDURE — 63710000001 INSULIN LISPRO (HUMAN) PER 5 UNITS: Performed by: INTERNAL MEDICINE

## 2025-04-13 PROCEDURE — 25010000002 METHYLPREDNISOLONE PER 125 MG: Performed by: INTERNAL MEDICINE

## 2025-04-13 PROCEDURE — 99232 SBSQ HOSP IP/OBS MODERATE 35: CPT | Performed by: INTERNAL MEDICINE

## 2025-04-13 PROCEDURE — 94664 DEMO&/EVAL PT USE INHALER: CPT

## 2025-04-13 RX ORDER — METHYLPREDNISOLONE SODIUM SUCCINATE 40 MG/ML
40 INJECTION, POWDER, LYOPHILIZED, FOR SOLUTION INTRAMUSCULAR; INTRAVENOUS DAILY
Status: DISCONTINUED | OUTPATIENT
Start: 2025-04-14 | End: 2025-04-15

## 2025-04-13 RX ADMIN — Medication 10 ML: at 20:25

## 2025-04-13 RX ADMIN — ATORVASTATIN CALCIUM 80 MG: 40 TABLET, FILM COATED ORAL at 08:23

## 2025-04-13 RX ADMIN — DOCUSATE SODIUM 100 MG: 100 CAPSULE, LIQUID FILLED ORAL at 20:25

## 2025-04-13 RX ADMIN — OXYCODONE HYDROCHLORIDE AND ACETAMINOPHEN 1 TABLET: 10; 325 TABLET ORAL at 06:10

## 2025-04-13 RX ADMIN — Medication 10 ML: at 08:24

## 2025-04-13 RX ADMIN — GUAIFENESIN 1200 MG: 600 TABLET ORAL at 08:23

## 2025-04-13 RX ADMIN — DOXYCYCLINE 100 MG: 100 CAPSULE ORAL at 20:25

## 2025-04-13 RX ADMIN — ENOXAPARIN SODIUM 40 MG: 100 INJECTION SUBCUTANEOUS at 08:23

## 2025-04-13 RX ADMIN — IPRATROPIUM BROMIDE AND ALBUTEROL SULFATE 3 ML: 2.5; .5 SOLUTION RESPIRATORY (INHALATION) at 20:05

## 2025-04-13 RX ADMIN — LORAZEPAM 1 MG: 1 TABLET ORAL at 02:17

## 2025-04-13 RX ADMIN — MONTELUKAST 10 MG: 10 TABLET, FILM COATED ORAL at 20:25

## 2025-04-13 RX ADMIN — IPRATROPIUM BROMIDE AND ALBUTEROL SULFATE 3 ML: 2.5; .5 SOLUTION RESPIRATORY (INHALATION) at 15:54

## 2025-04-13 RX ADMIN — DEXTROMETHORPHAN POLISTIREX 60 MG: 30 SUSPENSION ORAL at 08:23

## 2025-04-13 RX ADMIN — DOXYCYCLINE 100 MG: 100 CAPSULE ORAL at 08:24

## 2025-04-13 RX ADMIN — LIDOCAINE 1 PATCH: 4 PATCH TOPICAL at 08:23

## 2025-04-13 RX ADMIN — DEXTROMETHORPHAN POLISTIREX 60 MG: 30 SUSPENSION ORAL at 20:25

## 2025-04-13 RX ADMIN — INSULIN LISPRO 2 UNITS: 100 INJECTION, SOLUTION INTRAVENOUS; SUBCUTANEOUS at 08:23

## 2025-04-13 RX ADMIN — IPRATROPIUM BROMIDE AND ALBUTEROL SULFATE 3 ML: 2.5; .5 SOLUTION RESPIRATORY (INHALATION) at 09:19

## 2025-04-13 RX ADMIN — GUAIFENESIN 1200 MG: 600 TABLET ORAL at 20:25

## 2025-04-13 RX ADMIN — CELECOXIB 200 MG: 200 CAPSULE ORAL at 08:24

## 2025-04-13 RX ADMIN — DULOXETINE 60 MG: 60 CAPSULE, DELAYED RELEASE ORAL at 08:23

## 2025-04-13 RX ADMIN — LORAZEPAM 1 MG: 1 TABLET ORAL at 08:24

## 2025-04-13 RX ADMIN — INSULIN LISPRO 2 UNITS: 100 INJECTION, SOLUTION INTRAVENOUS; SUBCUTANEOUS at 11:42

## 2025-04-13 RX ADMIN — METHYLPREDNISOLONE SODIUM SUCCINATE 60 MG: 125 INJECTION INTRAMUSCULAR; INTRAVENOUS at 06:10

## 2025-04-13 RX ADMIN — LEVOTHYROXINE SODIUM 100 MCG: 0.1 TABLET ORAL at 08:24

## 2025-04-13 RX ADMIN — DOCUSATE SODIUM 100 MG: 100 CAPSULE, LIQUID FILLED ORAL at 08:24

## 2025-04-13 NOTE — PLAN OF CARE
Goal Outcome Evaluation:  Plan of Care Reviewed With: patient        Progress: improving        VSS. No acute changes. SOA improved. On 1 lpm via NC for comfort. Expiratory wheezes continue.

## 2025-04-13 NOTE — PROGRESS NOTES
[facility] Medicine Services  PROGRESS NOTE    Patient Name: Janet Obrien  : 1960  MRN: [id number]    Date of Admission: 4/10/2025  Primary Care Physician: Noe Davenport MD    Subjective   Subjective     CC:  Follow-up for shortness of breath    HPI:  Patient was seen and examined this morning.  Shortness of breath significantly improved.  Remains on oxygen supplementation at 1 L/min satting 95%.  Complaining of persistent weakness.  Agreeable to inpatient rehab as recommended by PT      Objective   Objective     Vital Signs:   Temp:  [97.3 °F (36.3 °C)-98.3 °F (36.8 °C)] 98.2 °F (36.8 °C)  Heart Rate:  [69-96] 85  Resp:  [16-20] 18  BP: (102-139)/(63-90) 108/65  Flow (L/min) (Oxygen Therapy):  [1] 1     Physical Exam:  General: Comfortable, not in distress, conversant and cooperative  Head: Atraumatic and normocephalic  Eyes: No Icterus. No pallor  Ears:  Ears appear intact with no abnormalities noted  Throat: No oral lesions, no thrush  Neck: Supple, trachea midline  Lungs: Proved wheezing.  No crackles  Heart:  Normal S1 and S2, no murmur, no gallop, No JVD, no lower extremity swelling  Abdomen:  Soft, no tenderness, no organomegaly, normal bowel sounds, no organomegaly  Extremities: pulses equal bilaterally  Skin: No bleeding, bruising or rash, normal skin turgor and elasticity  Neurologic: Cranial nerves appear intact with no evidence of facial asymmetry, normal motor and sensory functions in all 4 extremities  Psych: Alert and oriented x 3, normal mood    Results Reviewed:  LAB RESULTS:      Lab 25  0354 25  0524 04/10/25  0704 04/10/25  0557   WBC 5.54 5.83  --  6.38   HEMOGLOBIN 11.4* 12.1  --  14.0   HEMATOCRIT 34.1 36.0  --  41.2   PLATELETS 245 231  --  281   NEUTROS ABS 4.61  --   --  5.47   IMMATURE GRANS (ABS) 0.03  --   --  0.01   LYMPHS ABS 0.53*  --   --  0.55*   MONOS ABS 0.37  --   --  0.34   EOS ABS 0.00  --   --  0.00   MCV 83.8 82.2  --   81.6   CRP  --  0.91*  --  1.46*   PROCALCITONIN  --   --   --  0.07   LACTATE  --   --   --  1.7   D DIMER QUANT  --   --   --  0.31   HSTROP T  --   --  7 7         Lab 04/12/25  0354 04/11/25  0524 04/10/25  0557   SODIUM 134* 134* 129*   POTASSIUM 4.3 3.2* 2.9*   CHLORIDE 100 95* 90*   CO2 24.0 30.0* 24.1   ANION GAP 10.0 9.0 14.9   BUN 14 11 10   CREATININE 0.65 0.73 0.82   EGFR 99.1 92.5 80.5   GLUCOSE 196* 101* 148*   CALCIUM 9.1 9.5 9.9   MAGNESIUM 2.1  --  1.9   HEMOGLOBIN A1C  --  5.50  --    TSH  --   --  0.766         Lab 04/12/25  0354 04/10/25  0557   TOTAL PROTEIN 5.6* 6.5   ALBUMIN 3.4* 3.7   GLOBULIN 2.2 2.8   ALT (SGPT) 21 20   AST (SGOT) 39* 46*   BILIRUBIN 0.2 0.6   ALK PHOS 72 73   LIPASE  --  33         Lab 04/10/25  0704 04/10/25  0557   PROBNP  --  149.0   HSTROP T 7 7                 Brief Urine Lab Results  (Last result in the past 365 days)        Color   Clarity   Blood   Leuk Est   Nitrite   Protein   CREAT   Urine HCG        04/10/25 0854 Yellow   Clear   Negative   Trace   Negative   Negative                   Microbiology Results Abnormal       Procedure Component Value - Date/Time    Respiratory Panel PCR w/COVID-19(SARS-CoV-2) [facility] In-House, NP Swab in UTM/VTM, 2 HR TAT - Swab, Nasopharynx [[id number]]  (Abnormal) Collected: 04/10/25 0618    Lab Status: Final result Specimen: Swab from Nasopharynx Updated: 04/10/25 0730     ADENOVIRUS, PCR Not Detected     Coronavirus 229E Not Detected     Coronavirus HKU1 Not Detected     Coronavirus NL63 Not Detected     Coronavirus OC43 Not Detected     COVID19 Not Detected     Human Metapneumovirus Detected     Human Rhinovirus/Enterovirus Not Detected     Influenza A PCR Not Detected     Influenza B PCR Not Detected     Parainfluenza Virus 1 Not Detected     Parainfluenza Virus 2 Not Detected     Parainfluenza Virus 3 Not Detected     Parainfluenza Virus 4 Not Detected     RSV, PCR Not Detected     Bordetella pertussis pcr Not  Detected     Bordetella parapertussis PCR Not Detected     Chlamydophila pneumoniae PCR Not Detected     Mycoplasma pneumo by PCR Not Detected    Narrative:      In the setting of a positive respiratory panel with a viral infection PLUS a negative procalcitonin without other underlying concern for bacterial infection, consider observing off antibiotics or discontinuation of antibiotics and continue supportive care. If the respiratory panel is positive for atypical bacterial infection (Bordetella pertussis, Chlamydophila pneumoniae, or Mycoplasma pneumoniae), consider antibiotic de-escalation to target atypical bacterial infection.            No radiology results from the last 24 hrs      Results for orders placed in visit on 06/05/20    Adult Transthoracic Echo Complete W/ Cont if Necessary Per Protocol    Interpretation Summary  · Estimated EF = 76%.  · Left ventricular systolic function is hyperdynamic (EF > 70).      Current medications:  Scheduled Meds:atorvastatin, 80 mg, Oral, Daily  celecoxib, 200 mg, Oral, Daily  dextromethorphan polistirex ER, 60 mg, Oral, Q12H  docusate sodium, 100 mg, Oral, BID  doxycycline, 100 mg, Oral, Q12H  DULoxetine, 60 mg, Oral, Daily  enoxaparin sodium, 40 mg, Subcutaneous, Daily  guaiFENesin, 1,200 mg, Oral, Q12H  hydrOXYzine, 10 mg, Oral, Once  insulin lispro, 2-7 Units, Subcutaneous, 4x Daily AC & at Bedtime  ipratropium-albuterol, 3 mL, Nebulization, 4x Daily - RT  levothyroxine, 100 mcg, Oral, Daily  Lidocaine, 1 patch, Transdermal, Q24H  LORazepam, 1 mg, Oral, Q6H  methylPREDNISolone sodium succinate, 60 mg, Intravenous, Q8H  montelukast, 10 mg, Oral, Nightly  sodium chloride, 10 mL, Intravenous, Q12H      Continuous Infusions:     PRN Meds:.  acetaminophen    senna-docusate sodium **AND** polyethylene glycol **AND** bisacodyl **AND** bisacodyl    Calcium Replacement - Follow Nurse / BPA Driven Protocol    dextrose    dextrose    glucagon (human recombinant)     guaiFENesin-codeine    ipratropium-albuterol    Magnesium Standard Dose Replacement - Follow Nurse / BPA Driven Protocol    meclizine    melatonin    methocarbamol    ondansetron    oxyCODONE-acetaminophen    Phosphorus Replacement - Follow Nurse / BPA Driven Protocol    Potassium Replacement - Follow Nurse / BPA Driven Protocol    sodium chloride    sodium chloride    sodium chloride    tiZANidine    Assessment & Plan   Assessment & Plan     Active Hospital Problems    Diagnosis  POA    **Acute bronchiolitis due to human metapneumovirus [J21.1]  Unknown      Resolved Hospital Problems   No resolved problems to display.        Brief Hospital Course to date:  Jnaet Obrien is a 64 y.o. female with past medical history of bronchial asthma, essential hypertension, dyslipidemia, hypothyroidism, restless leg syndrome, anxiety, obstructive sleep apnea, obesity, who presented to the hospital with shortness of breath and wheezing.  Test positive for human metapneumovirus    Acute hypoxic respiratory insufficiency  Acute asthma exacerbation  Acute bronchitis secondary to human metapneumovirus infection  Patient with history of bronchial asthma.  Presented to the hospital with acute hypoxemia and worsening asthma symptoms/wheezing.  Tested positive for human metapneumovirus and CT chest with evidence of bronchiolitis  Wheezing significantly improved.  Will decrease IV Solu-Medrol from 60 mg 3 times daily to 40 mg daily  No leukocytosis.  Normal procalcitonin.  Normal CRP would argue against pneumonia.  Monitor off antibiotics  DuoNebs: Scheduled and as needed    Vertigo  Possible otitis media  Continue p.o. doxycycline  Continue as needed meclizine     Hypoosmolar hyponatremia, resolved  Hypovolemic secondary to poor oral intake  Improved with IV fluids   Resolved hydrochlorothiazide  Encourage p.o. intake      Hypokalemia  Replace per protocol    Type 2 diabetes  A1c 5.5 %  SSI     Acute debility  PT and OT recommended  inpatient rehab    Expected Discharge Location and Transportation: Other  Expected Discharge   Expected Discharge Date: 4/14/2025; Expected Discharge Time:      VTE Prophylaxis:  Pharmacologic VTE prophylaxis orders are present.         AM-PAC 6 Clicks Score (PT): 20 (04/12/25 2045)    CODE STATUS:   Code Status and Medical Interventions: CPR (Attempt to Resuscitate); Full Support   Ordered at: 04/10/25 4254     Code Status (Patient has no pulse and is not breathing):    CPR (Attempt to Resuscitate)     Medical Interventions (Patient has pulse or is breathing):    Full Support     Level Of Support Discussed With:    Patient       Sulaiman Bear MD  04/13/25

## 2025-04-13 NOTE — PLAN OF CARE
Goal Outcome Evaluation:  Plan of Care Reviewed With: patient        Progress: no change  Outcome Evaluation: Remains on roomair. No resp distress noted. SR on tele. Percocet given with relief. VSS. POC Ongoing. No concerns at this time

## 2025-04-14 ENCOUNTER — APPOINTMENT (OUTPATIENT)
Dept: GENERAL RADIOLOGY | Facility: HOSPITAL | Age: 65
End: 2025-04-14
Payer: COMMERCIAL

## 2025-04-14 LAB
ALBUMIN SERPL-MCNC: 3.1 G/DL (ref 3.5–5.2)
ALBUMIN/GLOB SERPL: 1.5 G/DL
ALP SERPL-CCNC: 56 U/L (ref 39–117)
ALT SERPL W P-5'-P-CCNC: 23 U/L (ref 1–33)
ANION GAP SERPL CALCULATED.3IONS-SCNC: 6 MMOL/L (ref 5–15)
AST SERPL-CCNC: 27 U/L (ref 1–32)
BASOPHILS # BLD AUTO: 0.01 10*3/MM3 (ref 0–0.2)
BASOPHILS NFR BLD AUTO: 0.2 % (ref 0–1.5)
BILIRUB SERPL-MCNC: 0.2 MG/DL (ref 0–1.2)
BUN SERPL-MCNC: 15 MG/DL (ref 8–23)
BUN/CREAT SERPL: 23.4 (ref 7–25)
CALCIUM SPEC-SCNC: 9.2 MG/DL (ref 8.6–10.5)
CHLORIDE SERPL-SCNC: 102 MMOL/L (ref 98–107)
CO2 SERPL-SCNC: 27 MMOL/L (ref 22–29)
CREAT SERPL-MCNC: 0.64 MG/DL (ref 0.57–1)
DEPRECATED RDW RBC AUTO: 41 FL (ref 37–54)
EGFRCR SERPLBLD CKD-EPI 2021: 99.4 ML/MIN/1.73
EOSINOPHIL # BLD AUTO: 0 10*3/MM3 (ref 0–0.4)
EOSINOPHIL NFR BLD AUTO: 0 % (ref 0.3–6.2)
ERYTHROCYTE [DISTWIDTH] IN BLOOD BY AUTOMATED COUNT: 13.1 % (ref 12.3–15.4)
GLOBULIN UR ELPH-MCNC: 2.1 GM/DL
GLUCOSE BLDC GLUCOMTR-MCNC: 131 MG/DL (ref 70–130)
GLUCOSE BLDC GLUCOMTR-MCNC: 138 MG/DL (ref 70–130)
GLUCOSE BLDC GLUCOMTR-MCNC: 138 MG/DL (ref 70–130)
GLUCOSE BLDC GLUCOMTR-MCNC: 82 MG/DL (ref 70–130)
GLUCOSE SERPL-MCNC: 98 MG/DL (ref 65–99)
HCT VFR BLD AUTO: 34.9 % (ref 34–46.6)
HGB BLD-MCNC: 11.6 G/DL (ref 12–15.9)
IMM GRANULOCYTES # BLD AUTO: 0.04 10*3/MM3 (ref 0–0.05)
IMM GRANULOCYTES NFR BLD AUTO: 0.7 % (ref 0–0.5)
LYMPHOCYTES # BLD AUTO: 1.62 10*3/MM3 (ref 0.7–3.1)
LYMPHOCYTES NFR BLD AUTO: 27.5 % (ref 19.6–45.3)
MAGNESIUM SERPL-MCNC: 2.2 MG/DL (ref 1.6–2.4)
MCH RBC QN AUTO: 28.2 PG (ref 26.6–33)
MCHC RBC AUTO-ENTMCNC: 33.2 G/DL (ref 31.5–35.7)
MCV RBC AUTO: 84.9 FL (ref 79–97)
MONOCYTES # BLD AUTO: 0.56 10*3/MM3 (ref 0.1–0.9)
MONOCYTES NFR BLD AUTO: 9.5 % (ref 5–12)
NEUTROPHILS NFR BLD AUTO: 3.66 10*3/MM3 (ref 1.7–7)
NEUTROPHILS NFR BLD AUTO: 62.1 % (ref 42.7–76)
NRBC BLD AUTO-RTO: 0 /100 WBC (ref 0–0.2)
PHOSPHATE SERPL-MCNC: 2.6 MG/DL (ref 2.5–4.5)
PLATELET # BLD AUTO: 281 10*3/MM3 (ref 140–450)
PMV BLD AUTO: 10.3 FL (ref 6–12)
POTASSIUM SERPL-SCNC: 4 MMOL/L (ref 3.5–5.2)
PROT SERPL-MCNC: 5.2 G/DL (ref 6–8.5)
RBC # BLD AUTO: 4.11 10*6/MM3 (ref 3.77–5.28)
SODIUM SERPL-SCNC: 135 MMOL/L (ref 136–145)
WBC NRBC COR # BLD AUTO: 5.89 10*3/MM3 (ref 3.4–10.8)

## 2025-04-14 PROCEDURE — 84100 ASSAY OF PHOSPHORUS: CPT | Performed by: INTERNAL MEDICINE

## 2025-04-14 PROCEDURE — 83735 ASSAY OF MAGNESIUM: CPT | Performed by: INTERNAL MEDICINE

## 2025-04-14 PROCEDURE — 97535 SELF CARE MNGMENT TRAINING: CPT

## 2025-04-14 PROCEDURE — 25010000002 METHYLPREDNISOLONE PER 40 MG: Performed by: INTERNAL MEDICINE

## 2025-04-14 PROCEDURE — 97530 THERAPEUTIC ACTIVITIES: CPT

## 2025-04-14 PROCEDURE — 94664 DEMO&/EVAL PT USE INHALER: CPT

## 2025-04-14 PROCEDURE — 94799 UNLISTED PULMONARY SVC/PX: CPT

## 2025-04-14 PROCEDURE — 80053 COMPREHEN METABOLIC PANEL: CPT | Performed by: INTERNAL MEDICINE

## 2025-04-14 PROCEDURE — 94761 N-INVAS EAR/PLS OXIMETRY MLT: CPT

## 2025-04-14 PROCEDURE — 82948 REAGENT STRIP/BLOOD GLUCOSE: CPT

## 2025-04-14 PROCEDURE — 99232 SBSQ HOSP IP/OBS MODERATE 35: CPT | Performed by: NURSE PRACTITIONER

## 2025-04-14 PROCEDURE — 25010000002 ENOXAPARIN PER 10 MG: Performed by: INTERNAL MEDICINE

## 2025-04-14 PROCEDURE — 71045 X-RAY EXAM CHEST 1 VIEW: CPT

## 2025-04-14 PROCEDURE — 85025 COMPLETE CBC W/AUTO DIFF WBC: CPT | Performed by: INTERNAL MEDICINE

## 2025-04-14 RX ORDER — WATER 10 ML/10ML
INJECTION INTRAMUSCULAR; INTRAVENOUS; SUBCUTANEOUS
Status: COMPLETED
Start: 2025-04-14 | End: 2025-04-14

## 2025-04-14 RX ADMIN — IPRATROPIUM BROMIDE AND ALBUTEROL SULFATE 3 ML: 2.5; .5 SOLUTION RESPIRATORY (INHALATION) at 07:59

## 2025-04-14 RX ADMIN — DOXYCYCLINE 100 MG: 100 CAPSULE ORAL at 20:36

## 2025-04-14 RX ADMIN — Medication 10 ML: at 20:36

## 2025-04-14 RX ADMIN — MECLIZINE HYDROCHLORIDE 25 MG: 25 TABLET ORAL at 10:44

## 2025-04-14 RX ADMIN — DOCUSATE SODIUM 100 MG: 100 CAPSULE, LIQUID FILLED ORAL at 20:35

## 2025-04-14 RX ADMIN — DEXTROMETHORPHAN POLISTIREX 60 MG: 30 SUSPENSION ORAL at 08:13

## 2025-04-14 RX ADMIN — DOXYCYCLINE 100 MG: 100 CAPSULE ORAL at 08:14

## 2025-04-14 RX ADMIN — DEXTROMETHORPHAN POLISTIREX 60 MG: 30 SUSPENSION ORAL at 20:36

## 2025-04-14 RX ADMIN — DOCUSATE SODIUM 100 MG: 100 CAPSULE, LIQUID FILLED ORAL at 08:14

## 2025-04-14 RX ADMIN — Medication 10 ML: at 08:15

## 2025-04-14 RX ADMIN — LEVOTHYROXINE SODIUM 100 MCG: 0.1 TABLET ORAL at 08:14

## 2025-04-14 RX ADMIN — IPRATROPIUM BROMIDE AND ALBUTEROL SULFATE 3 ML: 2.5; .5 SOLUTION RESPIRATORY (INHALATION) at 12:53

## 2025-04-14 RX ADMIN — MONTELUKAST 10 MG: 10 TABLET, FILM COATED ORAL at 20:36

## 2025-04-14 RX ADMIN — IPRATROPIUM BROMIDE AND ALBUTEROL SULFATE 3 ML: 2.5; .5 SOLUTION RESPIRATORY (INHALATION) at 19:46

## 2025-04-14 RX ADMIN — DULOXETINE 60 MG: 60 CAPSULE, DELAYED RELEASE ORAL at 08:14

## 2025-04-14 RX ADMIN — CELECOXIB 200 MG: 200 CAPSULE ORAL at 08:14

## 2025-04-14 RX ADMIN — METHYLPREDNISOLONE SODIUM SUCCINATE 40 MG: 40 INJECTION INTRAMUSCULAR; INTRAVENOUS at 08:12

## 2025-04-14 RX ADMIN — WATER 10 ML: 1 INJECTION INTRAMUSCULAR; INTRAVENOUS; SUBCUTANEOUS at 08:12

## 2025-04-14 RX ADMIN — ENOXAPARIN SODIUM 40 MG: 100 INJECTION SUBCUTANEOUS at 08:14

## 2025-04-14 RX ADMIN — ATORVASTATIN CALCIUM 80 MG: 40 TABLET, FILM COATED ORAL at 08:14

## 2025-04-14 RX ADMIN — IPRATROPIUM BROMIDE AND ALBUTEROL SULFATE 3 ML: 2.5; .5 SOLUTION RESPIRATORY (INHALATION) at 16:00

## 2025-04-14 RX ADMIN — GUAIFENESIN 1200 MG: 600 TABLET ORAL at 20:36

## 2025-04-14 RX ADMIN — LIDOCAINE 1 PATCH: 4 PATCH TOPICAL at 08:12

## 2025-04-14 RX ADMIN — GUAIFENESIN 1200 MG: 600 TABLET ORAL at 08:14

## 2025-04-14 NOTE — THERAPY TREATMENT NOTE
Patient Name: Janet Obrien  : 1960    MRN: [id number]                              Today's Date: 2025       Admit Date: 4/10/2025    Visit Dx:     ICD-10-CM ICD-9-CM   1. Exacerbation of asthma, unspecified asthma severity, unspecified whether persistent  J45.901 493.92   2. Human metapneumovirus (hMPV) pneumonia  J12.3 480.8   3. Respiratory failure with hypoxia, unspecified chronicity  J96.91 518.81     Patient Active Problem List   Diagnosis    Diplopia    Orbital pseudotumor    Myositic orbital pseudotumor    Hypothyroidism    Diabetes mellitus    Age-related nuclear cataract of right eye    Ocular myasthenia    Morbid obesity, unspecified obesity type    Essential hypertension    Precordial pain    SOB (shortness of breath)    Lower abdominal pain    Constipation    Diarrhea    Rectal bleeding    Gastroesophageal reflux disease    Class 3 severe obesity due to excess calories with serious comorbidity and body mass index (BMI) of 40.0 to 44.9 in adult    Anal or rectal pain    Primary osteoarthritis of left knee    S/P TKR (total knee replacement), left    Hyperlipidemia    Primary osteoarthritis of right knee    Obesity    S/P total knee arthroplasty, right    Acute bronchiolitis due to human metapneumovirus     Past Medical History:   Diagnosis Date    Anxiety     Arthritis     Asthma     Back pain     Cancer     SQUAMOUS CELL/ BASAL CELL ON LIP, under the nose as well    Chronic bronchitis     Depression     Diabetes mellitus     type 2    Fibromyalgia     High triglycerides     Hyperlipidemia     Hypertension     Hypothyroidism     Migraine     MVP (mitral valve prolapse)     OA (osteoarthritis)     Ovarian cyst     Peripheral autonomic neuropathy     Pleurisy     Pneumonia     Rheumatism     RLS (restless legs syndrome)     Sleep apnea     Mild form per patient. recommended mouthpiece     Past Surgical History:   Procedure Laterality Date    APPENDECTOMY      CATARACT EXTRACTION W/  "INTRAOCULAR LENS IMPLANT Right 2019    Procedure: CATARACT PHACO EXTRACTION WITH INTRAOCULAR LENS IMPLANT RIGHT;  Surgeon: Carl Babb MD;  Location: [facility] OR;  Service: Ophthalmology    CATARACT EXTRACTION W/ INTRAOCULAR LENS IMPLANT Left 10/07/2019    Procedure: CATARACT PHACO EXTRACTION WITH INTRAOCULAR LENS IMPLANT LEFT;  Surgeon: Carl Babb MD;  Location: [facility] OR;  Service: Ophthalmology     SECTION  , 1997    x 2     DILATATION AND CURETTAGE      HAND SURGERY Right     \"Pinched nerve surgery\"    HAND SURGERY Left     Joint removed secondary to arthritis    HYSTERECTOMY      ''still have one ovary''    KNEE SURGERY Right 2021    arthroscopy and partial medial meniscectomy Dr. Chavez    OOPHORECTOMY      THYROIDECTOMY      TONSILLECTOMY AND ADENOIDECTOMY      TOTAL KNEE ARTHROPLASTY Left 03/15/2023    Procedure: TOTAL KNEE ARTHROPLASTY -  LEFT;  Surgeon: Dayo Chavez MD;  Location:  [facility] OR;  Service: Orthopedics;  Laterality: Left;    TOTAL KNEE ARTHROPLASTY Right 2023    Procedure: TOTAL KNEE ARTHROPLASTY WITH BERNICE ROBOT - RIGHT;  Surgeon: Dayo Chavez MD;  Location:  [facility] OR;  Service: Robotics - Ortho;  Laterality: Right;      General Information       Row Name 25 1528          OT Time and Intention    Document Type therapy note (daily note)  -LR     Mode of Treatment occupational therapy  -LR       Row Name 25 1528          General Information    Patient Profile Reviewed yes  -LR     Existing Precautions/Restrictions fall;oxygen therapy device and L/min;other (see comments)  dizziness w/ activity or head movement  -LR     Barriers to Rehab medically complex;previous functional deficit  -LR       Row Name 25 1528          Cognition    Orientation Status (Cognition) oriented x 4  -LR       Row Name 25 1528          Safety Issues/Impairments Affecting Functional Mobility    Safety Issues " Affecting Function (Mobility) safety precaution awareness;awareness of need for assistance;safety precautions follow-through/compliance;insight into deficits/self-awareness;sequencing abilities;judgment  -LR     Impairments Affecting Function (Mobility) balance;endurance/activity tolerance;pain;postural/trunk control;strength;shortness of breath;cognition  -LR     Cognitive Impairments, Mobility Safety/Performance safety precaution follow-through;awareness, need for assistance;sequencing abilities;insight into deficits/self-awareness;judgment;problem-solving/reasoning;safety precaution awareness  -LR               User Key  (r) = Recorded By, (t) = Taken By, (c) = Cosigned By      Initials Name Provider Type    LR Lisa Arnold, OT Occupational Therapist                     Mobility/ADL's       Row Name 04/14/25 1529          Bed Mobility    Bed Mobility supine-sit;sit-supine  -LR     Supine-Sit [place] (Bed Mobility) modified independence  -LR     Sit-Supine [place] (Bed Mobility) independent  -LR     Assistive Device (Bed Mobility) bed rails;head of bed elevated  -LR     Comment, (Bed Mobility) Increased VC to remain seated at EOB before standing  -       Row Name 04/14/25 1529          Transfers    Transfers sit-stand transfer;stand-sit transfer;toilet transfer  -       Row Name 04/14/25 1529          Sit-Stand Transfer    Sit-Stand [place] (Transfers) contact guard;verbal cues  -LR     Assistive Device (Sit-Stand Transfers) walker, front-wheeled  -LR       Row Name 04/14/25 1529          Stand-Sit Transfer    Stand-Sit [place] (Transfers) minimum assist (75% patient effort);1 person assist;verbal cues  -LR     Assistive Device (Stand-Sit Transfers) walker, front-wheeled  -LR     Comment, (Stand-Sit Transfer) Began feeling dizzy. Increased VC and TC for hip placement on commode and EOB  -       Row Name 04/14/25 1529          Toilet Transfer    Type (Toilet Transfer)  sit-stand;stand-sit  -LR     [place] Level (Toilet Transfer) minimum assist (75% patient effort);1 person assist  -LR     Assistive Device (Toilet Transfer) commode;grab bars/safety frame;raised toilet seat  -LR     Comment, (Toilet Transfer) 1 LOB noted when standing from commode. Therapist was holding onto gait belt, and pt checked for signs of stroke. Nsg notified and in room  -LR       Row Name 04/14/25 1529          Functional Mobility    Functional Mobility- Ind. Level minimum assist (75% patient effort);1 person;verbal cues required  -LR     Functional Mobility- Device walker, front-wheeled  -LR     Functional Mobility-Distance (Feet) --  in room for ADLs and transfers  -LR     Patient was able to Ambulate yes  -LR       Row Name 04/14/25 1529          Activities of Daily Living    BADL Assessment/Intervention grooming;toileting;lower body dressing  -LR       Row Name 04/14/25 1529          Lower Body Dressing Assessment/Training    [place] Level (Lower Body Dressing) don;socks;verbal cues;maximum assist (25% patient effort)  -LR     Position (Lower Body Dressing) edge of bed sitting  -LR       Row Name 04/14/25 1529          Toileting Assessment/Training    [place] Level (Toileting) adjust/manage clothing;standby assist;perform perineal hygiene;modified independence  -LR     Assistive Devices (Toileting) commode  -LR     Position (Toileting) supported sitting  -LR       Row Name 04/14/25 1529          Grooming Assessment/Training    [place] Level (Grooming) wash face, hands;set up  -LR     Position (Grooming) sitting up in bed  -LR               User Key  (r) = Recorded By, (t) = Taken By, (c) = Cosigned By      Initials Name Provider Type    LR Lisa Arnold, OT Occupational Therapist                   Obj/Interventions       Row Name 04/14/25 1539          Range of Motion Comprehensive    General Range of Motion bilateral lower extremity ROM WFL  -LR       Row Name 04/14/25 1531           Strength Comprehensive (MMT)    General Manual Muscle Testing (MMT) Assessment upper extremity strength deficits identified  -LR     Comment, General Manual Muscle Testing (MMT) Assessment BUE grossly 3+/5 MMT  -LR       Row Name 04/14/25 1534          Balance    Balance Assessment sitting static balance;sitting dynamic balance;standing static balance;standing dynamic balance  -LR     Static Sitting Balance standby assist  -LR     Dynamic Sitting Balance standby assist  -LR     Position, Sitting Balance unsupported;sitting edge of bed  -LR     Static Standing Balance contact guard  -LR     Dynamic Standing Balance minimal assist;contact guard;other (see comments)  fluctuated from min A>CGA>min A  -LR     Position/Device Used, Standing Balance supported;walker, front-wheeled  -LR     Balance Interventions sitting;standing;sit to stand;supported;static;dynamic;occupation based/functional task  -LR               User Key  (r) = Recorded By, (t) = Taken By, (c) = Cosigned By      Initials Name Provider Type    LR Lisa Arnold, OT Occupational Therapist                   Goals/Plan    No documentation.                  Clinical Impression       Row Name 04/14/25 1539          Pain Assessment    Pre/Posttreatment Pain Comment Pt complaining of dizziness and nausea throughout session. Nsg notified and aware  -LR       Row Name 04/14/25 1533          Plan of Care Review    Plan of Care Reviewed With patient  -LR     Progress no change  -LR     Outcome Evaluation Pt continues to present with balance deficits, decreased endurance, strength, safety awareness, impulsivity, decreased judgement, and dizziness/nausea. During STS from commode, pt experienced 1 LOB. Nsg notified and aware. Delayed processing time when asked questions during session. Recommend IPOT POC and IRF at D/C.  -LR       Row Name 04/14/25 1539          Therapy Plan Review/Discharge Plan (OT)    Anticipated Discharge Disposition (OT) inpatient  rehabilitation facility  -LR       Row Name 04/14/25 1535          Vital Signs    Pre Systolic BP Rehab 138  -LR     Pre Treatment Diastolic BP 91  -LR     Post Systolic BP Rehab 126  -LR     Post Treatment Diastolic BP 83  -LR     Pretreatment Heart Rate (beats/min) 77  -LR     Pre SpO2 (%) 95  -LR     O2 Delivery Pre Treatment room air  -LR     O2 Delivery Intra Treatment room air  -LR     Post SpO2 (%) 94  -LR     O2 Delivery Post Treatment room air  -LR     Pre Patient Position Supine  -LR     Intra Patient Position Standing  -LR     Post Patient Position Supine  -LR       Row Name 04/14/25 1535          Positioning and Restraints    Pre-Treatment Position in bed  -LR     Post Treatment Position bed  -LR     In Bed notified nsg;fowlers;call light within reach;encouraged to call for assist;exit alarm on;with other staff;legs elevated  -LR               User Key  (r) = Recorded By, (t) = Taken By, (c) = Cosigned By      Initials Name Provider Type    Lisa Greenfield OT Occupational Therapist                   Outcome Measures       Row Name 04/14/25 1539          How much help from another is currently needed...    Putting on and taking off regular lower body clothing? 2  -LR     Bathing (including washing, rinsing, and drying) 2  -LR     Toileting (which includes using toilet bed pan or urinal) 3  -LR     Putting on and taking off regular upper body clothing 3  -LR     Taking care of personal grooming (such as brushing teeth) 3  -LR     Eating meals 4  -LR     AM-PAC 6 Clicks Score (OT) 17  -LR       Row Name 04/14/25 1539          Functional Assessment    Outcome Measure Options AM-PAC 6 Clicks Daily Activity (OT)  -LR               User Key  (r) = Recorded By, (t) = Taken By, (c) = Cosigned By      Initials Name Provider Type    Lisa Greenfield OT Occupational Therapist                    Occupational Therapy Education       Title: PT OT SLP Therapies (In Progress)       Topic: Occupational Therapy  (In Progress)       Point: ADL training (In Progress)       Learning Progress Summary            Patient Acceptance, E, NR by LR at 4/14/2025 1540    Acceptance, E, NR by MC at 4/11/2025 1536                      Point: Home exercise program (In Progress)       Learning Progress Summary            Patient Acceptance, E, NR by LR at 4/14/2025 1540                      Point: Precautions (In Progress)       Learning Progress Summary            Patient Acceptance, E, NR by LR at 4/14/2025 1540    Acceptance, E, NR by MC at 4/11/2025 1536                      Point: Body mechanics (In Progress)       Learning Progress Summary            Patient Acceptance, E, NR by LR at 4/14/2025 1540    Acceptance, E, NR by MC at 4/11/2025 1536                                      User Key       Initials Effective Dates Name Provider Type Discipline     10/14/22 -  Micki Turner, OT Occupational Therapist OT     10/09/24 -  Lisa Arnold, OT Occupational Therapist OT                  OT Recommendation and Plan     Plan of Care Review  Plan of Care Reviewed With: patient  Progress: no change  Outcome Evaluation: Pt continues to present with balance deficits, decreased endurance, strength, safety awareness, impulsivity, decreased judgement, and dizziness/nausea. During STS from commode, pt experienced 1 LOB. Nsg notified and aware. Delayed processing time when asked questions during session. Recommend IPOT POC and IRF at D/C.     Time Calculation:         Time Calculation- OT       Row Name 04/14/25 1540             Time Calculation- OT    OT Start Time 1500  -LR      OT Received On 04/14/25  -LR      OT Goal Re-Cert Due Date 04/21/25  -LR         Timed Charges    80425 - OT Therapeutic Activity Minutes 9  -LR      06698 - OT Self Care/Mgmt Minutes 15  -LR         Total Minutes    Timed Charges Total Minutes 24  -LR       Total Minutes 24  -LR                User Key  (r) = Recorded By, (t) = Taken By, (c) = Cosigned By       Initials Name Provider Type     Lisa Arnold, JAZZ Occupational Therapist                  Therapy Charges for Today       Code Description Service Date Service Provider Modifiers Qty    [id number]  OT THERAPEUTIC ACT EA 15 MIN 4/14/2025 Lisa Arnold, OT GO 1    [id number]  OT SELF CARE/MGMT/TRAIN EA 15 MIN 4/14/2025 Lisa Arnold, OT GO 1                 Lisa Arnold OT  4/14/2025

## 2025-04-14 NOTE — PLAN OF CARE
Goal Outcome Evaluation:              Outcome Evaluation: Patient resting on 1L nc. NSR on monitor, VSS. No complaints of pain or SOA overnight. Sats 90%+ overnight.

## 2025-04-14 NOTE — PLAN OF CARE
Goal Outcome Evaluation:  Plan of Care Reviewed With: patient        Progress: no change  Outcome Evaluation: Pt continues to present with balance deficits, decreased endurance, strength, safety awareness, impulsivity, decreased judgement, and dizziness/nausea. During STS from commode, pt experienced 1 LOB. Nsg notified and aware. Delayed processing time when asked questions during session. Recommend IPOT POC and IRF at D/C.    Anticipated Discharge Disposition (OT): inpatient rehabilitation facility

## 2025-04-14 NOTE — CASE MANAGEMENT/SOCIAL WORK
Continued Stay Note  [facility]     Patient Name: Janet Obrien  MRN: [id number]  Today's Date: 4/14/2025    Admit Date: 4/10/2025    Plan: [facility]   Discharge Plan       Row Name 04/14/25 1348       Plan    Plan [facility]    Plan Comments Per discussion in MDR, Pt c/o dizziness. She is on PO Doxycycline. She is on room air. She walked 30ft with minimal assist and a walker on 4/12. Therapy recommended rehab, and Pt is hesitant but agreeable. A referral was called to April with [facility]. CM will continue to follow for medical readiness.    Final Discharge Disposition Code 62 - inpatient rehab facility                   Discharge Codes    No documentation.                 Expected Discharge Date and Time       Expected Discharge Date Expected Discharge Time    Apr 15, 2025               Sophie Camacho RN

## 2025-04-14 NOTE — PLAN OF CARE
Goal Outcome Evaluation:  Up to BR w sterady gait.   VSS on RA.   C/O dizzines this am and antivert given.    Blood sugars stable today.   Pt agreeable to go to [facility] upon dc now.

## 2025-04-14 NOTE — PROGRESS NOTES
[facility] Medicine Services  PROGRESS NOTE    Patient Name: Janet Obrien  : 1960  MRN: [id number]    Date of Admission: 4/10/2025  Primary Care Physician: Noe Davenport MD    Subjective   Subjective     CC:  F/u shortness of breath    HPI:  Patient resting in bed. Says she feels like she might be able to go home tomorrow. Says she is having a lot of dizziness but has not used meclizine on the med rec. Says she gets dizzy when she gets sick.       Objective   Objective     Vital Signs:   Temp:  [98 °F (36.7 °C)-98.5 °F (36.9 °C)] 98.2 °F (36.8 °C)  Heart Rate:  [63-89] 77  Resp:  [18] 18  BP: (117-138)/() 121/92  Flow (L/min) (Oxygen Therapy):  [1] 1     Physical Exam:  Constitutional: No acute distress, awake, alert  HENT: NCAT, mucous membranes moist  Respiratory: Scattered coarse rhonchi bilaterally, respiratory effort normal   Cardiovascular: RRR, no murmurs, rubs, or gallops  Gastrointestinal: Positive bowel sounds, soft, nontender, nondistended  Musculoskeletal: No bilateral ankle edema  Psychiatric: Appropriate affect, cooperative  Neurologic: Oriented x 3, moves all extremities, speech clear  Skin: No rashes      Results Reviewed:  LAB RESULTS:      Lab 25  0346 25  0354 25  0524 04/10/25  0704 04/10/25  0557   WBC 5.89 5.54 5.83  --  6.38   HEMOGLOBIN 11.6* 11.4* 12.1  --  14.0   HEMATOCRIT 34.9 34.1 36.0  --  41.2   PLATELETS 281 245 231  --  281   NEUTROS ABS 3.66 4.61  --   --  5.47   IMMATURE GRANS (ABS) 0.04 0.03  --   --  0.01   LYMPHS ABS 1.62 0.53*  --   --  0.55*   MONOS ABS 0.56 0.37  --   --  0.34   EOS ABS 0.00 0.00  --   --  0.00   MCV 84.9 83.8 82.2  --  81.6   CRP  --   --  0.91*  --  1.46*   PROCALCITONIN  --   --   --   --  0.07   LACTATE  --   --   --   --  1.7   D DIMER QUANT  --   --   --   --  0.31   HSTROP T  --   --   --  7 7         Lab 25  0346 25  0354 25  0524 04/10/25  0557   SODIUM 135* 134* 134*  129*   POTASSIUM 4.0 4.3 3.2* 2.9*   CHLORIDE 102 100 95* 90*   CO2 27.0 24.0 30.0* 24.1   ANION GAP 6.0 10.0 9.0 14.9   BUN 15 14 11 10   CREATININE 0.64 0.65 0.73 0.82   EGFR 99.4 99.1 92.5 80.5   GLUCOSE 98 196* 101* 148*   CALCIUM 9.2 9.1 9.5 9.9   MAGNESIUM 2.2 2.1  --  1.9   PHOSPHORUS 2.6  --   --   --    HEMOGLOBIN A1C  --   --  5.50  --    TSH  --   --   --  0.766         Lab 04/14/25  0346 04/12/25  0354 04/10/25  0557   TOTAL PROTEIN 5.2* 5.6* 6.5   ALBUMIN 3.1* 3.4* 3.7   GLOBULIN 2.1 2.2 2.8   ALT (SGPT) 23 21 20   AST (SGOT) 27 39* 46*   BILIRUBIN 0.2 0.2 0.6   ALK PHOS 56 72 73   LIPASE  --   --  33         Lab 04/10/25  0704 04/10/25  0557   PROBNP  --  149.0   HSTROP T 7 7                 Brief Urine Lab Results  (Last result in the past 365 days)        Color   Clarity   Blood   Leuk Est   Nitrite   Protein   CREAT   Urine HCG        04/10/25 0854 Yellow   Clear   Negative   Trace   Negative   Negative                   Microbiology Results Abnormal       Procedure Component Value - Date/Time    Respiratory Panel PCR w/COVID-19(SARS-CoV-2) IV[facility]/CASANDRA/[facility] In-House, NP Swab in UTM/VTM, 2 HR TAT - Swab, Nasopharynx [[id number]]  (Abnormal) Collected: 04/10/25 0618    Lab Status: Final result Specimen: Swab from Nasopharynx Updated: 04/10/25 0730     ADENOVIRUS, PCR Not Detected     Coronavirus 229E Not Detected     Coronavirus HKU1 Not Detected     Coronavirus NL63 Not Detected     Coronavirus OC43 Not Detected     COVID19 Not Detected     Human Metapneumovirus Detected     Human Rhinovirus/Enterovirus Not Detected     Influenza A PCR Not Detected     Influenza B PCR Not Detected     Parainfluenza Virus 1 Not Detected     Parainfluenza Virus 2 Not Detected     Parainfluenza Virus 3 Not Detected     Parainfluenza Virus 4 Not Detected     RSV, PCR Not Detected     Bordetella pertussis pcr Not Detected     Bordetella parapertussis PCR Not Detected     Chlamydophila pneumoniae PCR Not Detected      Mycoplasma pneumo by PCR Not Detected    Narrative:      In the setting of a positive respiratory panel with a viral infection PLUS a negative procalcitonin without other underlying concern for bacterial infection, consider observing off antibiotics or discontinuation of antibiotics and continue supportive care. If the respiratory panel is positive for atypical bacterial infection (Bordetella pertussis, Chlamydophila pneumoniae, or Mycoplasma pneumoniae), consider antibiotic de-escalation to target atypical bacterial infection.            XR Chest 1 View  Result Date: 4/14/2025  XR CHEST 1 VW Date of Exam: 4/14/2025 1:16 AM EDT Indication: Shortness of breath and wheezing Comparison: 4/10/2025 Findings: Cardiac and mediastinal contours are normal. Pulmonary vascularity is normal. The lungs are clear. No pneumothorax. There is mild elevation of the right hemidiaphragm.     Impression: No active disease. Electronically Signed: Rei Melvin MD  4/14/2025 4:55 AM EDT  Workstation ID: [id number]      Results for orders placed in visit on 06/05/20    Adult Transthoracic Echo Complete W/ Cont if Necessary Per Protocol    Interpretation Summary  · Estimated EF = 76%.  · Left ventricular systolic function is hyperdynamic (EF > 70).      Current medications:  Scheduled Meds:atorvastatin, 80 mg, Oral, Daily  celecoxib, 200 mg, Oral, Daily  dextromethorphan polistirex ER, 60 mg, Oral, Q12H  docusate sodium, 100 mg, Oral, BID  doxycycline, 100 mg, Oral, Q12H  DULoxetine, 60 mg, Oral, Daily  enoxaparin sodium, 40 mg, Subcutaneous, Daily  guaiFENesin, 1,200 mg, Oral, Q12H  hydrOXYzine, 10 mg, Oral, Once  insulin lispro, 2-7 Units, Subcutaneous, 4x Daily AC & at Bedtime  ipratropium-albuterol, 3 mL, Nebulization, 4x Daily - RT  levothyroxine, 100 mcg, Oral, Daily  Lidocaine, 1 patch, Transdermal, Q24H  methylPREDNISolone sodium succinate, 40 mg, Intravenous, Daily  montelukast, 10 mg, Oral, Nightly  sodium chloride, 10 mL,  Intravenous, Q12H      Continuous Infusions:   PRN Meds:.  acetaminophen    senna-docusate sodium **AND** polyethylene glycol **AND** bisacodyl **AND** bisacodyl    Calcium Replacement - Follow Nurse / BPA Driven Protocol    dextrose    dextrose    glucagon (human recombinant)    guaiFENesin-codeine    ipratropium-albuterol    Magnesium Standard Dose Replacement - Follow Nurse / BPA Driven Protocol    meclizine    melatonin    methocarbamol    ondansetron    oxyCODONE-acetaminophen    Phosphorus Replacement - Follow Nurse / BPA Driven Protocol    Potassium Replacement - Follow Nurse / BPA Driven Protocol    sodium chloride    sodium chloride    sodium chloride    tiZANidine    Assessment & Plan   Assessment & Plan     Active Hospital Problems    Diagnosis  POA    **Acute bronchiolitis due to human metapneumovirus [J21.1]  Unknown      Resolved Hospital Problems   No resolved problems to display.        Brief Hospital Course to date:  Janet Obrien is a 64 y.o. female with past medical history of bronchial asthma, essential hypertension, dyslipidemia, hypothyroidism, restless leg syndrome, anxiety, obstructive sleep apnea, obesity, who presented to the hospital with shortness of breath and wheezing.  Tested positive for human metapneumovirus     This patient's problems and plans were partially entered by my partner and updated as appropriate by me 04/14/25.      Acute hypoxic respiratory insufficiency  Acute asthma exacerbation  Acute bronchitis secondary to human metapneumovirus infection  Patient with history of bronchial asthma.  Presented to the hospital with acute hypoxemia and worsening asthma symptoms/wheezing.  Tested positive for human metapneumovirus and CT chest with evidence of bronchiolitis  Wheezing significantly improved.  IV Solu-Medrol decreased from 60 mg 3 times daily to 40 mg daily   No leukocytosis.  Normal procalcitonin.  Normal CRP would argue against pneumonia.  Monitor off  antibiotics  DuoNebs: Scheduled and as needed  PT and OT recommending inpatient rehab     Vertigo  Possible otitis media  Continue p.o. doxycycline  Continue as needed meclizine     Hypoosmolar hyponatremia, resolved  Hypovolemic secondary to poor oral intake  Improved with IV fluids   Holding hydrochlorothiazide  Encourage p.o. intake      Hypokalemia  Replace per protocol     Type 2 diabetes  A1c 5.5 %  On Ozempic weekly at home  SSI     Acute debility  PT and OT recommended inpatient rehab         Expected Discharge Location and Transportation: [facility] acute rehab  Expected Discharge pending bed offer, insurance approval  Expected Discharge Date: 4/15/2025; Expected Discharge Time:      VTE Prophylaxis:  Pharmacologic VTE prophylaxis orders are present.         AM-PAC 6 Clicks Score (PT): 20 (04/13/25 2027)    CODE STATUS:   Code Status and Medical Interventions: CPR (Attempt to Resuscitate); Full Support   Ordered at: 04/10/25 9895     Code Status (Patient has no pulse and is not breathing):    CPR (Attempt to Resuscitate)     Medical Interventions (Patient has pulse or is breathing):    Full Support     Level Of Support Discussed With:    Patient       Marta ALFREDO Dunn, APRN  04/14/25

## 2025-04-15 LAB
GLUCOSE BLDC GLUCOMTR-MCNC: 100 MG/DL (ref 70–130)
GLUCOSE BLDC GLUCOMTR-MCNC: 141 MG/DL (ref 70–130)
GLUCOSE BLDC GLUCOMTR-MCNC: 259 MG/DL (ref 70–130)
GLUCOSE BLDC GLUCOMTR-MCNC: 81 MG/DL (ref 70–130)

## 2025-04-15 PROCEDURE — 99232 SBSQ HOSP IP/OBS MODERATE 35: CPT | Performed by: NURSE PRACTITIONER

## 2025-04-15 PROCEDURE — 97110 THERAPEUTIC EXERCISES: CPT

## 2025-04-15 PROCEDURE — 97530 THERAPEUTIC ACTIVITIES: CPT

## 2025-04-15 PROCEDURE — 63710000001 INSULIN LISPRO (HUMAN) PER 5 UNITS: Performed by: INTERNAL MEDICINE

## 2025-04-15 PROCEDURE — 25010000002 ENOXAPARIN PER 10 MG: Performed by: INTERNAL MEDICINE

## 2025-04-15 PROCEDURE — 63710000001 PREDNISONE PER 1 MG: Performed by: NURSE PRACTITIONER

## 2025-04-15 PROCEDURE — 94799 UNLISTED PULMONARY SVC/PX: CPT

## 2025-04-15 PROCEDURE — 82948 REAGENT STRIP/BLOOD GLUCOSE: CPT

## 2025-04-15 RX ORDER — PREDNISONE 20 MG/1
40 TABLET ORAL
Status: DISCONTINUED | OUTPATIENT
Start: 2025-04-15 | End: 2025-04-17 | Stop reason: HOSPADM

## 2025-04-15 RX ADMIN — LEVOTHYROXINE SODIUM 100 MCG: 0.1 TABLET ORAL at 10:12

## 2025-04-15 RX ADMIN — ENOXAPARIN SODIUM 40 MG: 100 INJECTION SUBCUTANEOUS at 10:11

## 2025-04-15 RX ADMIN — DOCUSATE SODIUM 100 MG: 100 CAPSULE, LIQUID FILLED ORAL at 20:50

## 2025-04-15 RX ADMIN — ATORVASTATIN CALCIUM 80 MG: 40 TABLET, FILM COATED ORAL at 10:11

## 2025-04-15 RX ADMIN — LIDOCAINE 1 PATCH: 4 PATCH TOPICAL at 10:12

## 2025-04-15 RX ADMIN — Medication 10 ML: at 20:51

## 2025-04-15 RX ADMIN — IPRATROPIUM BROMIDE AND ALBUTEROL SULFATE 3 ML: 2.5; .5 SOLUTION RESPIRATORY (INHALATION) at 19:32

## 2025-04-15 RX ADMIN — GUAIFENESIN 1200 MG: 600 TABLET ORAL at 10:11

## 2025-04-15 RX ADMIN — IPRATROPIUM BROMIDE AND ALBUTEROL SULFATE 3 ML: 2.5; .5 SOLUTION RESPIRATORY (INHALATION) at 07:55

## 2025-04-15 RX ADMIN — DEXTROMETHORPHAN POLISTIREX 60 MG: 30 SUSPENSION ORAL at 20:50

## 2025-04-15 RX ADMIN — MONTELUKAST 10 MG: 10 TABLET, FILM COATED ORAL at 20:50

## 2025-04-15 RX ADMIN — CELECOXIB 200 MG: 200 CAPSULE ORAL at 10:11

## 2025-04-15 RX ADMIN — DULOXETINE 60 MG: 60 CAPSULE, DELAYED RELEASE ORAL at 10:11

## 2025-04-15 RX ADMIN — DOXYCYCLINE 100 MG: 100 CAPSULE ORAL at 20:50

## 2025-04-15 RX ADMIN — PREDNISONE 40 MG: 20 TABLET ORAL at 10:11

## 2025-04-15 RX ADMIN — INSULIN LISPRO 4 UNITS: 100 INJECTION, SOLUTION INTRAVENOUS; SUBCUTANEOUS at 17:19

## 2025-04-15 RX ADMIN — IPRATROPIUM BROMIDE AND ALBUTEROL SULFATE 3 ML: 2.5; .5 SOLUTION RESPIRATORY (INHALATION) at 11:49

## 2025-04-15 RX ADMIN — DEXTROMETHORPHAN POLISTIREX 60 MG: 30 SUSPENSION ORAL at 10:11

## 2025-04-15 RX ADMIN — IPRATROPIUM BROMIDE AND ALBUTEROL SULFATE 3 ML: 2.5; .5 SOLUTION RESPIRATORY (INHALATION) at 15:25

## 2025-04-15 RX ADMIN — GUAIFENESIN 1200 MG: 600 TABLET ORAL at 20:50

## 2025-04-15 RX ADMIN — DOXYCYCLINE 100 MG: 100 CAPSULE ORAL at 10:11

## 2025-04-15 NOTE — PROGRESS NOTES
[facility] Medicine Services  PROGRESS NOTE    Patient Name: Janet Obrien  : 1960  MRN: [id number]    Date of Admission: 4/10/2025  Primary Care Physician: Noe Davenport MD    Subjective   Subjective     CC:  F/u shortness of breath    HPI:  Resting in bed.  Says she feels like she is doing better.  Is waiting to hear about [facility].  Has improved breath sounds on exam.      Objective   Objective     Vital Signs:   Temp:  [97.7 °F (36.5 °C)-98.2 °F (36.8 °C)] 98.2 °F (36.8 °C)  Heart Rate:  [70-89] 75  Resp:  [18] 18  BP: (121-137)/() 134/93     Physical Exam:  Constitutional: No acute distress, awake, alert  HENT: NCAT, mucous membranes moist  Respiratory: Mild scattered rhonchi clear to auscultation bilaterally, respiratory effort normal , room air  Cardiovascular: RRR, no murmurs, rubs, or gallops  Gastrointestinal: Positive bowel sounds, soft, nontender, nondistended  Musculoskeletal: No bilateral ankle edema  Psychiatric: Appropriate affect, cooperative  Neurologic: Oriented x 3, moves all extremities, speech clear  Skin: No rashes      Results Reviewed:  LAB RESULTS:      Lab 25  0346 25  0354 25  0524 04/10/25  0704 04/10/25  0557   WBC 5.89 5.54 5.83  --  6.38   HEMOGLOBIN 11.6* 11.4* 12.1  --  14.0   HEMATOCRIT 34.9 34.1 36.0  --  41.2   PLATELETS 281 245 231  --  281   NEUTROS ABS 3.66 4.61  --   --  5.47   IMMATURE GRANS (ABS) 0.04 0.03  --   --  0.01   LYMPHS ABS 1.62 0.53*  --   --  0.55*   MONOS ABS 0.56 0.37  --   --  0.34   EOS ABS 0.00 0.00  --   --  0.00   MCV 84.9 83.8 82.2  --  81.6   CRP  --   --  0.91*  --  1.46*   PROCALCITONIN  --   --   --   --  0.07   LACTATE  --   --   --   --  1.7   D DIMER QUANT  --   --   --   --  0.31   HSTROP T  --   --   --  7 7         Lab 25  0346 25  0354 25  0524 04/10/25  0557   SODIUM 135* 134* 134* 129*   POTASSIUM 4.0 4.3 3.2* 2.9*   CHLORIDE 102 100 95* 90*   CO2 27.0  24.0 30.0* 24.1   ANION GAP 6.0 10.0 9.0 14.9   BUN 15 14 11 10   CREATININE 0.64 0.65 0.73 0.82   EGFR 99.4 99.1 92.5 80.5   GLUCOSE 98 196* 101* 148*   CALCIUM 9.2 9.1 9.5 9.9   MAGNESIUM 2.2 2.1  --  1.9   PHOSPHORUS 2.6  --   --   --    HEMOGLOBIN A1C  --   --  5.50  --    TSH  --   --   --  0.766         Lab 04/14/25  0346 04/12/25  0354 04/10/25  0557   TOTAL PROTEIN 5.2* 5.6* 6.5   ALBUMIN 3.1* 3.4* 3.7   GLOBULIN 2.1 2.2 2.8   ALT (SGPT) 23 21 20   AST (SGOT) 27 39* 46*   BILIRUBIN 0.2 0.2 0.6   ALK PHOS 56 72 73   LIPASE  --   --  33         Lab 04/10/25  0704 04/10/25  0557   PROBNP  --  149.0   HSTROP T 7 7                 Brief Urine Lab Results  (Last result in the past 365 days)        Color   Clarity   Blood   Leuk Est   Nitrite   Protein   CREAT   Urine HCG        04/10/25 0854 Yellow   Clear   Negative   Trace   Negative   Negative                   Microbiology Results Abnormal       Procedure Component Value - Date/Time    Respiratory Panel PCR w/COVID-19(SARS-CoV-2) [facility] In-House, NP Swab in UTM/VTM, 2 HR TAT - Swab, Nasopharynx [[id number]]  (Abnormal) Collected: 04/10/25 0618    Lab Status: Final result Specimen: Swab from Nasopharynx Updated: 04/10/25 0730     ADENOVIRUS, PCR Not Detected     Coronavirus 229E Not Detected     Coronavirus HKU1 Not Detected     Coronavirus NL63 Not Detected     Coronavirus OC43 Not Detected     COVID19 Not Detected     Human Metapneumovirus Detected     Human Rhinovirus/Enterovirus Not Detected     Influenza A PCR Not Detected     Influenza B PCR Not Detected     Parainfluenza Virus 1 Not Detected     Parainfluenza Virus 2 Not Detected     Parainfluenza Virus 3 Not Detected     Parainfluenza Virus 4 Not Detected     RSV, PCR Not Detected     Bordetella pertussis pcr Not Detected     Bordetella parapertussis PCR Not Detected     Chlamydophila pneumoniae PCR Not Detected     Mycoplasma pneumo by PCR Not Detected    Narrative:      In the setting  of a positive respiratory panel with a viral infection PLUS a negative procalcitonin without other underlying concern for bacterial infection, consider observing off antibiotics or discontinuation of antibiotics and continue supportive care. If the respiratory panel is positive for atypical bacterial infection (Bordetella pertussis, Chlamydophila pneumoniae, or Mycoplasma pneumoniae), consider antibiotic de-escalation to target atypical bacterial infection.            XR Chest 1 View  Result Date: 4/14/2025  XR CHEST 1 VW Date of Exam: 4/14/2025 1:16 AM EDT Indication: Shortness of breath and wheezing Comparison: 4/10/2025 Findings: Cardiac and mediastinal contours are normal. Pulmonary vascularity is normal. The lungs are clear. No pneumothorax. There is mild elevation of the right hemidiaphragm.     Impression: No active disease. Electronically Signed: Rei Melvin MD  4/14/2025 4:55 AM EDT  Workstation ID: [id number]      Results for orders placed in visit on 06/05/20    Adult Transthoracic Echo Complete W/ Cont if Necessary Per Protocol    Interpretation Summary  · Estimated EF = 76%.  · Left ventricular systolic function is hyperdynamic (EF > 70).      Current medications:  Scheduled Meds:atorvastatin, 80 mg, Oral, Daily  celecoxib, 200 mg, Oral, Daily  dextromethorphan polistirex ER, 60 mg, Oral, Q12H  docusate sodium, 100 mg, Oral, BID  doxycycline, 100 mg, Oral, Q12H  DULoxetine, 60 mg, Oral, Daily  enoxaparin sodium, 40 mg, Subcutaneous, Daily  guaiFENesin, 1,200 mg, Oral, Q12H  hydrOXYzine, 10 mg, Oral, Once  insulin lispro, 2-7 Units, Subcutaneous, 4x Daily AC & at Bedtime  ipratropium-albuterol, 3 mL, Nebulization, 4x Daily - RT  levothyroxine, 100 mcg, Oral, Daily  Lidocaine, 1 patch, Transdermal, Q24H  methylPREDNISolone sodium succinate, 40 mg, Intravenous, Daily  montelukast, 10 mg, Oral, Nightly  sodium chloride, 10 mL, Intravenous, Q12H      Continuous Infusions:   PRN Meds:.  acetaminophen     senna-docusate sodium **AND** polyethylene glycol **AND** bisacodyl **AND** bisacodyl    Calcium Replacement - Follow Nurse / BPA Driven Protocol    dextrose    dextrose    glucagon (human recombinant)    guaiFENesin-codeine    ipratropium-albuterol    Magnesium Standard Dose Replacement - Follow Nurse / BPA Driven Protocol    meclizine    melatonin    methocarbamol    ondansetron    oxyCODONE-acetaminophen    Phosphorus Replacement - Follow Nurse / BPA Driven Protocol    Potassium Replacement - Follow Nurse / BPA Driven Protocol    sodium chloride    sodium chloride    sodium chloride    tiZANidine    Assessment & Plan   Assessment & Plan     Active Hospital Problems    Diagnosis  POA    **Acute bronchiolitis due to human metapneumovirus [J21.1]  Unknown      Resolved Hospital Problems   No resolved problems to display.        Brief Hospital Course to date:  Janet Obrien is a 64 y.o. female with past medical history of bronchial asthma, essential hypertension, dyslipidemia, hypothyroidism, restless leg syndrome, anxiety, obstructive sleep apnea, obesity, who presented to the hospital with shortness of breath and wheezing.  Tested positive for human metapneumovirus     This patient's problems and plans were partially entered by my partner and updated as appropriate by me 04/15/25.      Acute hypoxic respiratory insufficiency  Acute asthma exacerbation  Acute bronchitis secondary to human metapneumovirus infection  Patient with history of bronchial asthma.  Presented to the hospital with acute hypoxemia and worsening asthma symptoms/wheezing.  Tested positive for human metapneumovirus and CT chest with evidence of bronchiolitis  Wheezing significantly improved.  IV Solu-Medrol decreased from 60 mg 3 times daily to 40 mg daily, transition to 40 mg PO today and plan for taper x 1 week at discharge  No leukocytosis.  Normal procalcitonin.  Normal CRP would argue against pneumonia.  Monitor off antibiotics  DuoNebs:  Scheduled and as needed  PT and OT recommending inpatient rehab, patient agreeable     Vertigo  Possible otitis media  Continue p.o. doxycycline  Continue as needed meclizine  Orthostatics negative     Hypoosmolar hyponatremia, resolved  Hypovolemic secondary to poor oral intake  Improved with IV fluids   Holding hydrochlorothiazide  Encourage p.o. intake      Hypokalemia  Replace per protocol     Type 2 diabetes  A1c 5.5 %  On Ozempic weekly at home  SSI     Acute debility  PT and OT recommended inpatient rehab         Expected Discharge Location and Transportation: [facility] acute rehab  Expected Discharge pending insurance approval  Expected Discharge Date: 4/16/2025; Expected Discharge Time:      VTE Prophylaxis:  Pharmacologic VTE prophylaxis orders are present.         AM-PAC 6 Clicks Score (PT): 20 (04/14/25 2035)    CODE STATUS:   Code Status and Medical Interventions: CPR (Attempt to Resuscitate); Full Support   Ordered at: 04/10/25 7358     Code Status (Patient has no pulse and is not breathing):    CPR (Attempt to Resuscitate)     Medical Interventions (Patient has pulse or is breathing):    Full Support     Level Of Support Discussed With:    Patient       Marta Dunn, APRN  04/15/25

## 2025-04-15 NOTE — THERAPY TREATMENT NOTE
Patient Name: Janet Obrien  : 1960    MRN: [id number]                              Today's Date: 4/15/2025       Admit Date: 4/10/2025    Visit Dx:     ICD-10-CM ICD-9-CM   1. Exacerbation of asthma, unspecified asthma severity, unspecified whether persistent  J45.901 493.92   2. Human metapneumovirus (hMPV) pneumonia  J12.3 480.8   3. Respiratory failure with hypoxia, unspecified chronicity  J96.91 518.81     Patient Active Problem List   Diagnosis    Diplopia    Orbital pseudotumor    Myositic orbital pseudotumor    Hypothyroidism    Diabetes mellitus    Age-related nuclear cataract of right eye    Ocular myasthenia    Morbid obesity, unspecified obesity type    Essential hypertension    Precordial pain    SOB (shortness of breath)    Lower abdominal pain    Constipation    Diarrhea    Rectal bleeding    Gastroesophageal reflux disease    Class 3 severe obesity due to excess calories with serious comorbidity and body mass index (BMI) of 40.0 to 44.9 in adult    Anal or rectal pain    Primary osteoarthritis of left knee    S/P TKR (total knee replacement), left    Hyperlipidemia    Primary osteoarthritis of right knee    Obesity    S/P total knee arthroplasty, right    Acute bronchiolitis due to human metapneumovirus     Past Medical History:   Diagnosis Date    Anxiety     Arthritis     Asthma     Back pain     Cancer     SQUAMOUS CELL/ BASAL CELL ON LIP, under the nose as well    Chronic bronchitis     Depression     Diabetes mellitus     type 2    Fibromyalgia     High triglycerides     Hyperlipidemia     Hypertension     Hypothyroidism     Migraine     MVP (mitral valve prolapse)     OA (osteoarthritis)     Ovarian cyst     Peripheral autonomic neuropathy     Pleurisy     Pneumonia     Rheumatism     RLS (restless legs syndrome)     Sleep apnea     Mild form per patient. recommended mouthpiece     Past Surgical History:   Procedure Laterality Date    APPENDECTOMY      CATARACT EXTRACTION W/  "INTRAOCULAR LENS IMPLANT Right 2019    Procedure: CATARACT PHACO EXTRACTION WITH INTRAOCULAR LENS IMPLANT RIGHT;  Surgeon: Carl Babb MD;  Location: [facility] OR;  Service: Ophthalmology    CATARACT EXTRACTION W/ INTRAOCULAR LENS IMPLANT Left 10/07/2019    Procedure: CATARACT PHACO EXTRACTION WITH INTRAOCULAR LENS IMPLANT LEFT;  Surgeon: Carl Babb MD;  Location: [facility] OR;  Service: Ophthalmology     SECTION  , 1997    x 2     DILATATION AND CURETTAGE      HAND SURGERY Right     \"Pinched nerve surgery\"    HAND SURGERY Left     Joint removed secondary to arthritis    HYSTERECTOMY      ''still have one ovary''    KNEE SURGERY Right 2021    arthroscopy and partial medial meniscectomy Dr. Chavez    OOPHORECTOMY      THYROIDECTOMY  1985    TONSILLECTOMY AND ADENOIDECTOMY      TOTAL KNEE ARTHROPLASTY Left 03/15/2023    Procedure: TOTAL KNEE ARTHROPLASTY -  LEFT;  Surgeon: Dayo Chavez MD;  Location:  [facility] OR;  Service: Orthopedics;  Laterality: Left;    TOTAL KNEE ARTHROPLASTY Right 2023    Procedure: TOTAL KNEE ARTHROPLASTY WITH BERNICE ROBOT - RIGHT;  Surgeon: Dayo Chavez MD;  Location:  [facility] OR;  Service: Robotics - Ortho;  Laterality: Right;      General Information       Row Name 04/15/25 1416          Physical Therapy Time and Intention    Document Type therapy note (daily note)  -ML     Mode of Treatment physical therapy  -       Row Name 04/15/25 1416          General Information    Patient Profile Reviewed yes  -ML     Existing Precautions/Restrictions fall;oxygen therapy device and L/min;other (see comments)  dizziness w/ activity or head movement  -ML     Barriers to Rehab medically complex;previous functional deficit  -ML       Row Name 04/15/25 1416          Cognition    Orientation Status (Cognition) oriented x 3  -ML       Row Name 04/15/25 1416          Safety Issues/Impairments Affecting Functional Mobility    Safety " Issues Affecting Function (Mobility) awareness of need for assistance;insight into deficits/self-awareness;safety precaution awareness;safety precautions follow-through/compliance;sequencing abilities  -ML     Impairments Affecting Function (Mobility) balance;endurance/activity tolerance;pain;postural/trunk control;strength;shortness of breath  -ML               User Key  (r) = Recorded By, (t) = Taken By, (c) = Cosigned By      Initials Name Provider Type     Angeles Arreola Physical Therapist                   Mobility       Row Name 04/15/25 1416          Bed Mobility    Bed Mobility supine-sit  -ML     Supine-Sit [place] (Bed Mobility) standby assist  -ML     Assistive Device (Bed Mobility) head of bed elevated;bed rails  -       Row Name 04/15/25 1416          Sit-Stand Transfer    Sit-Stand [place] (Transfers) contact guard;verbal cues  -       Row Name 04/15/25 1416          Gait/Stairs (Locomotion)    [place] Level (Gait) minimum assist (75% patient effort);1 person assist;verbal cues;1 person to manage equipment  progressing to CGA  -ML     Assistive Device (Gait) walker, front-wheeled;other (see comments)  chair follow  -ML     Distance in Feet (Gait) 35  + 14 + 16  -ML     Deviations/Abnormal Patterns (Gait) bilateral deviations;junie decreased;gait speed decreased;stride length decreased  -ML     Right Sided Gait Deviations knee buckling, right side  -ML               User Key  (r) = Recorded By, (t) = Taken By, (c) = Cosigned By      Initials Name Provider Type     Angeles Arreola Physical Therapist                   Obj/Interventions       Row Name 04/15/25 1418          Motor Skills    Therapeutic Exercise hip;knee;shoulder  -       Row Name 04/15/25 1418          Shoulder (Therapeutic Exercise)    Shoulder (Therapeutic Exercise) AROM (active range of motion)  -ML     Shoulder AROM (Therapeutic Exercise) bilateral;scapular retraction;15 repititions  -       Row Name 04/15/25  1418          Hip (Therapeutic Exercise)    Hip (Therapeutic Exercise) strengthening exercise  -ML     Hip Strengthening (Therapeutic Exercise) bilateral;aBduction;aDduction;marching while seated;15 repititions  -ML       Row Name 04/15/25 1418          Knee (Therapeutic Exercise)    Knee (Therapeutic Exercise) strengthening exercise  -ML     Knee Strengthening (Therapeutic Exercise) bilateral;LAQ (long arc quad);15 repititions  -ML       Row Name 04/15/25 1418          Balance    Balance Assessment sitting static balance;sitting dynamic balance;standing static balance;standing dynamic balance  -ML     Static Sitting Balance standby assist  -ML     Dynamic Sitting Balance standby assist  -ML     Position, Sitting Balance unsupported;sitting edge of bed;other (see comments)  sitting on toilet  -ML     Static Standing Balance contact guard  -ML     Dynamic Standing Balance minimal assist;1-person assist;verbal cues  -ML     Position/Device Used, Standing Balance supported;walker, front-wheeled  -ML     Balance Interventions sitting;standing;sit to stand;supported;occupation based/functional task  -ML               User Key  (r) = Recorded By, (t) = Taken By, (c) = Cosigned By      Initials Name Provider Type    ML Angeles Arreola Physical Therapist                   Goals/Plan    No documentation.                  Clinical Impression       Row Name 04/15/25 1419          Pain    Pretreatment Pain Rating 3/10  -ML     Posttreatment Pain Rating 4/10  -ML     Pain Location groin  -ML     Pain Side/Orientation bilateral  -ML     Pain Management Interventions exercise or physical activity utilized;positioning techniques utilized  -ML     Response to Pain Interventions activity participation with increased pain  -ML       Row Name 04/15/25 1419          Plan of Care Review    Plan of Care Reviewed With patient  -ML     Progress improving  -ML     Outcome Evaluation Patient progressed with ambulation distance compared to  previous treatment session. Patient continues to require RW during ambulation, noted with improved stability. Patient would continue to benefit from skilled PT to address strength, balance and activity tolerance deficits in order to assist with return to PLOF.  -       Row Name 04/15/25 1419          Positioning and Restraints    Pre-Treatment Position in bed  -ML     Post Treatment Position chair  -ML     In Chair notified nsg;reclined;call light within reach;encouraged to call for assist;exit alarm on;waffle cushion;legs elevated  -ML               User Key  (r) = Recorded By, (t) = Taken By, (c) = Cosigned By      Initials Name Provider Type    Angeles Bernal Physical Therapist                   Outcome Measures       Row Name 04/15/25 1422          How much help from another person do you currently need...    Turning from your back to your side while in flat bed without using bedrails? 4  -ML     Moving from lying on back to sitting on the side of a flat bed without bedrails? 4  -ML     Moving to and from a bed to a chair (including a wheelchair)? 3  -ML     Standing up from a chair using your arms (e.g., wheelchair, bedside chair)? 3  -ML     Climbing 3-5 steps with a railing? 3  -ML     To walk in hospital room? 3  -ML     AM-PAC 6 Clicks Score (PT) 20  -ML     Highest Level of Mobility Goal 6 --> Walk 10 steps or more  -       Row Name 04/15/25 1422          Functional Assessment    Outcome Measure Options AM-PAC 6 Clicks Basic Mobility (PT)  -ML               User Key  (r) = Recorded By, (t) = Taken By, (c) = Cosigned By      Initials Name Provider Type    Angeles Bernal Physical Therapist                                 Physical Therapy Education       Title: PT OT SLP Therapies (In Progress)       Topic: Physical Therapy (Done)       Point: Mobility training (Done)       Learning Progress Summary            Patient Acceptance, E, VU,NR by CHIQUITA at 4/15/2025 1422    Acceptance, E, VU by LASHA at 4/13/2025  1619    Acceptance, E, NR by ML at 4/12/2025 1446                      Point: Home exercise program (Done)       Learning Progress Summary            Patient Acceptance, E, VU,NR by ML at 4/15/2025 1422                      Point: Body mechanics (Done)       Learning Progress Summary            Patient Acceptance, E, VU by LASHA at 4/13/2025 1619                      Point: Precautions (Done)       Learning Progress Summary            Patient Acceptance, E, VU,NR by ML at 4/15/2025 1422    Acceptance, E, NR by ML at 4/12/2025 1446                                      User Key       Initials Effective Dates Name Provider Type Discipline     06/16/21 -  Salvador Howe RN Registered Nurse Nurse     04/22/21 -  Angeles Arreola Physical Therapist PT                  PT Recommendation and Plan  Planned Therapy Interventions (PT): balance training, bed mobility training, gait training, home exercise program, patient/family education, postural re-education, ROM (range of motion), strengthening, transfer training  Progress: improving  Outcome Evaluation: Patient progressed with ambulation distance compared to previous treatment session. Patient continues to require RW during ambulation, noted with improved stability. Patient would continue to benefit from skilled PT to address strength, balance and activity tolerance deficits in order to assist with return to PLOF.     Time Calculation:   PT Evaluation Complexity  History, PT Evaluation Complexity: 1-2 personal factors and/or comorbidities  Examination of Body Systems (PT Eval Complexity): total of 3 or more elements  Clinical Presentation (PT Evaluation Complexity): evolving  Clinical Decision Making (PT Evaluation Complexity): moderate complexity  Overall Complexity (PT Evaluation Complexity): moderate complexity     PT Charges       Row Name 04/15/25 1423             Time Calculation    Start Time 1302  -ML      PT Received On 04/15/25  -ML         Timed Charges     43818 - PT Therapeutic Exercise Minutes 8  -ML      80594 - PT Therapeutic Activity Minutes 18  -ML         Total Minutes    Timed Charges Total Minutes 26  -ML       Total Minutes 26  -ML                User Key  (r) = Recorded By, (t) = Taken By, (c) = Cosigned By      Initials Name Provider Type    Angeles Bernal Physical Therapist                  Therapy Charges for Today       Code Description Service Date Service Provider Modifiers Qty    93567144682 HC PT THER PROC EA 15 MIN 4/15/2025 Angeles Arreola GP 1    08849460963 HC PT THERAPEUTIC ACT EA 15 MIN 4/15/2025 Angeles Arreola GP 1    92904591875 HC PT THER SUPP EA 15 MIN 4/15/2025 Angeles Arreola GP 2            PT G-Codes  Outcome Measure Options: AM-PAC 6 Clicks Basic Mobility (PT)  AM-PAC 6 Clicks Score (PT): 20  AM-PAC 6 Clicks Score (OT): 17  PT Discharge Summary  Anticipated Discharge Disposition (PT): inpatient rehabilitation facility    Angeles Arreola  4/15/2025

## 2025-04-15 NOTE — CASE MANAGEMENT/SOCIAL WORK
Continued Stay Note  [facility]     Patient Name: Janet Obrien  MRN: [id number]  Today's Date: 4/15/2025    Admit Date: 4/10/2025    Plan: [facility]   Discharge Plan       Row Name 04/15/25 1108       Plan    Plan [facility]    Plan Comments Per discussion in MDR, updated PT notes are needed to start precert with [facility] Medicaid; PT notified. Plan is [facility]. CM will continue to follow.    *Addendum: 4/15 @ 1450 Precert started by April w/[facility].    Final Discharge Disposition Code 62 - inpatient rehab facility                   Discharge Codes    No documentation.                 Expected Discharge Date and Time       Expected Discharge Date Expected Discharge Time    Apr 16, 2025               Sophie Camacho RN

## 2025-04-15 NOTE — PLAN OF CARE
Goal Outcome Evaluation:           Progress: improving  Outcome Evaluation: Pt on room air. NSR on monitor. No complaints today. Up in chair and worked with therapy today. VSS

## 2025-04-15 NOTE — PLAN OF CARE
Goal Outcome Evaluation:  Plan of Care Reviewed With: patient        Progress: improving  Outcome Evaluation: Patient progressed with ambulation distance compared to previous treatment session. Patient continues to require RW during ambulation, noted with improved stability. Patient would continue to benefit from skilled PT to address strength, balance and activity tolerance deficits in order to assist with return to PLOF.    Anticipated Discharge Disposition (PT): inpatient rehabilitation facility

## 2025-04-16 LAB
GLUCOSE BLDC GLUCOMTR-MCNC: 107 MG/DL (ref 70–130)
GLUCOSE BLDC GLUCOMTR-MCNC: 139 MG/DL (ref 70–130)
GLUCOSE BLDC GLUCOMTR-MCNC: 164 MG/DL (ref 70–130)
GLUCOSE BLDC GLUCOMTR-MCNC: 71 MG/DL (ref 70–130)

## 2025-04-16 PROCEDURE — 63710000001 INSULIN LISPRO (HUMAN) PER 5 UNITS: Performed by: INTERNAL MEDICINE

## 2025-04-16 PROCEDURE — 63710000001 PREDNISONE PER 1 MG: Performed by: NURSE PRACTITIONER

## 2025-04-16 PROCEDURE — 94664 DEMO&/EVAL PT USE INHALER: CPT

## 2025-04-16 PROCEDURE — 94799 UNLISTED PULMONARY SVC/PX: CPT

## 2025-04-16 PROCEDURE — 82948 REAGENT STRIP/BLOOD GLUCOSE: CPT

## 2025-04-16 PROCEDURE — 99232 SBSQ HOSP IP/OBS MODERATE 35: CPT | Performed by: NURSE PRACTITIONER

## 2025-04-16 PROCEDURE — 25010000002 ENOXAPARIN PER 10 MG: Performed by: INTERNAL MEDICINE

## 2025-04-16 RX ADMIN — DULOXETINE 60 MG: 60 CAPSULE, DELAYED RELEASE ORAL at 09:25

## 2025-04-16 RX ADMIN — IPRATROPIUM BROMIDE AND ALBUTEROL SULFATE 3 ML: 2.5; .5 SOLUTION RESPIRATORY (INHALATION) at 16:28

## 2025-04-16 RX ADMIN — CELECOXIB 200 MG: 200 CAPSULE ORAL at 09:25

## 2025-04-16 RX ADMIN — GUAIFENESIN 1200 MG: 600 TABLET ORAL at 20:48

## 2025-04-16 RX ADMIN — ENOXAPARIN SODIUM 40 MG: 100 INJECTION SUBCUTANEOUS at 09:24

## 2025-04-16 RX ADMIN — DOCUSATE SODIUM 100 MG: 100 CAPSULE, LIQUID FILLED ORAL at 09:25

## 2025-04-16 RX ADMIN — DOXYCYCLINE 100 MG: 100 CAPSULE ORAL at 09:25

## 2025-04-16 RX ADMIN — DEXTROMETHORPHAN POLISTIREX 60 MG: 30 SUSPENSION ORAL at 20:48

## 2025-04-16 RX ADMIN — DOCUSATE SODIUM 100 MG: 100 CAPSULE, LIQUID FILLED ORAL at 20:48

## 2025-04-16 RX ADMIN — IPRATROPIUM BROMIDE AND ALBUTEROL SULFATE 3 ML: 2.5; .5 SOLUTION RESPIRATORY (INHALATION) at 22:05

## 2025-04-16 RX ADMIN — LIDOCAINE 1 PATCH: 4 PATCH TOPICAL at 09:24

## 2025-04-16 RX ADMIN — PREDNISONE 40 MG: 20 TABLET ORAL at 09:25

## 2025-04-16 RX ADMIN — IPRATROPIUM BROMIDE AND ALBUTEROL SULFATE 3 ML: 2.5; .5 SOLUTION RESPIRATORY (INHALATION) at 12:40

## 2025-04-16 RX ADMIN — LEVOTHYROXINE SODIUM 100 MCG: 0.1 TABLET ORAL at 09:25

## 2025-04-16 RX ADMIN — DOXYCYCLINE 100 MG: 100 CAPSULE ORAL at 20:48

## 2025-04-16 RX ADMIN — ATORVASTATIN CALCIUM 80 MG: 40 TABLET, FILM COATED ORAL at 09:24

## 2025-04-16 RX ADMIN — INSULIN LISPRO 2 UNITS: 100 INJECTION, SOLUTION INTRAVENOUS; SUBCUTANEOUS at 17:58

## 2025-04-16 RX ADMIN — IPRATROPIUM BROMIDE AND ALBUTEROL SULFATE 3 ML: 2.5; .5 SOLUTION RESPIRATORY (INHALATION) at 08:01

## 2025-04-16 RX ADMIN — GUAIFENESIN 1200 MG: 600 TABLET ORAL at 09:25

## 2025-04-16 RX ADMIN — Medication 10 ML: at 09:25

## 2025-04-16 RX ADMIN — MONTELUKAST 10 MG: 10 TABLET, FILM COATED ORAL at 20:48

## 2025-04-16 NOTE — PLAN OF CARE
Goal Outcome Evaluation:      NO acute changes during shift, still awaiting insurance approval- VSS - will continue POC.

## 2025-04-16 NOTE — PROGRESS NOTES
[facility] Medicine Services  PROGRESS NOTE    Patient Name: Janet Obrien  : 1960  MRN: [id number]    Date of Admission: 4/10/2025  Primary Care Physician: Noe Davenport MD    Subjective   Subjective     CC:  F/u shortness of breath    HPI:   Patient resting in bed.  Says she is continuing to feel better.  Says she worked with PT.  Is waiting to hear about insurance approval for a short stay at rehab..      Objective   Objective     Vital Signs:   Temp:  [97.6 °F (36.4 °C)-98.3 °F (36.8 °C)] 98.1 °F (36.7 °C)  Heart Rate:  [] 70  Resp:  [16-18] 16  BP: (114-154)/(74-96) 130/96     Physical Exam:  Constitutional: No acute distress, awake, alert  HENT: NCAT, mucous membranes moist  Respiratory: Mild scattered rhonchi/wheezes bilaterally, respiratory effort normal , room air  Cardiovascular: RRR, no murmurs, rubs, or gallops  Gastrointestinal: Positive bowel sounds, soft, nontender, nondistended  Musculoskeletal: No bilateral ankle edema  Psychiatric: Appropriate affect, cooperative  Neurologic: Oriented x 3, moves all extremities, speech clear  Skin: No rashes      Results Reviewed:  LAB RESULTS:      Lab 25  0346 25  0354 25  0524 04/10/25  0704 04/10/25  0557   WBC 5.89 5.54 5.83  --  6.38   HEMOGLOBIN 11.6* 11.4* 12.1  --  14.0   HEMATOCRIT 34.9 34.1 36.0  --  41.2   PLATELETS 281 245 231  --  281   NEUTROS ABS 3.66 4.61  --   --  5.47   IMMATURE GRANS (ABS) 0.04 0.03  --   --  0.01   LYMPHS ABS 1.62 0.53*  --   --  0.55*   MONOS ABS 0.56 0.37  --   --  0.34   EOS ABS 0.00 0.00  --   --  0.00   MCV 84.9 83.8 82.2  --  81.6   CRP  --   --  0.91*  --  1.46*   PROCALCITONIN  --   --   --   --  0.07   LACTATE  --   --   --   --  1.7   D DIMER QUANT  --   --   --   --  0.31   HSTROP T  --   --   --  7 7         Lab 25  0346 25  0354 25  0524 04/10/25  0557   SODIUM 135* 134* 134* 129*   POTASSIUM 4.0 4.3 3.2* 2.9*   CHLORIDE 102 100 95*  90*   CO2 27.0 24.0 30.0* 24.1   ANION GAP 6.0 10.0 9.0 14.9   BUN 15 14 11 10   CREATININE 0.64 0.65 0.73 0.82   EGFR 99.4 99.1 92.5 80.5   GLUCOSE 98 196* 101* 148*   CALCIUM 9.2 9.1 9.5 9.9   MAGNESIUM 2.2 2.1  --  1.9   PHOSPHORUS 2.6  --   --   --    HEMOGLOBIN A1C  --   --  5.50  --    TSH  --   --   --  0.766         Lab 04/14/25  0346 04/12/25  0354 04/10/25  0557   TOTAL PROTEIN 5.2* 5.6* 6.5   ALBUMIN 3.1* 3.4* 3.7   GLOBULIN 2.1 2.2 2.8   ALT (SGPT) 23 21 20   AST (SGOT) 27 39* 46*   BILIRUBIN 0.2 0.2 0.6   ALK PHOS 56 72 73   LIPASE  --   --  33         Lab 04/10/25  0704 04/10/25  0557   PROBNP  --  149.0   HSTROP T 7 7                 Brief Urine Lab Results  (Last result in the past 365 days)        Color   Clarity   Blood   Leuk Est   Nitrite   Protein   CREAT   Urine HCG        04/10/25 0854 Yellow   Clear   Negative   Trace   Negative   Negative                   Microbiology Results Abnormal       Procedure Component Value - Date/Time    Respiratory Panel PCR w/COVID-19(SARS-CoV-2) [facility] In-House, NP Swab in UTM/VTM, 2 HR TAT - Swab, Nasopharynx [[id number]]  (Abnormal) Collected: 04/10/25 0618    Lab Status: Final result Specimen: Swab from Nasopharynx Updated: 04/10/25 0730     ADENOVIRUS, PCR Not Detected     Coronavirus 229E Not Detected     Coronavirus HKU1 Not Detected     Coronavirus NL63 Not Detected     Coronavirus OC43 Not Detected     COVID19 Not Detected     Human Metapneumovirus Detected     Human Rhinovirus/Enterovirus Not Detected     Influenza A PCR Not Detected     Influenza B PCR Not Detected     Parainfluenza Virus 1 Not Detected     Parainfluenza Virus 2 Not Detected     Parainfluenza Virus 3 Not Detected     Parainfluenza Virus 4 Not Detected     RSV, PCR Not Detected     Bordetella pertussis pcr Not Detected     Bordetella parapertussis PCR Not Detected     Chlamydophila pneumoniae PCR Not Detected     Mycoplasma pneumo by PCR Not Detected    Narrative:       In the setting of a positive respiratory panel with a viral infection PLUS a negative procalcitonin without other underlying concern for bacterial infection, consider observing off antibiotics or discontinuation of antibiotics and continue supportive care. If the respiratory panel is positive for atypical bacterial infection (Bordetella pertussis, Chlamydophila pneumoniae, or Mycoplasma pneumoniae), consider antibiotic de-escalation to target atypical bacterial infection.            No radiology results from the last 24 hrs      Results for orders placed in visit on 06/05/20    Adult Transthoracic Echo Complete W/ Cont if Necessary Per Protocol    Interpretation Summary  · Estimated EF = 76%.  · Left ventricular systolic function is hyperdynamic (EF > 70).      Current medications:  Scheduled Meds:atorvastatin, 80 mg, Oral, Daily  celecoxib, 200 mg, Oral, Daily  dextromethorphan polistirex ER, 60 mg, Oral, Q12H  docusate sodium, 100 mg, Oral, BID  doxycycline, 100 mg, Oral, Q12H  DULoxetine, 60 mg, Oral, Daily  enoxaparin sodium, 40 mg, Subcutaneous, Daily  guaiFENesin, 1,200 mg, Oral, Q12H  hydrOXYzine, 10 mg, Oral, Once  insulin lispro, 2-7 Units, Subcutaneous, 4x Daily AC & at Bedtime  ipratropium-albuterol, 3 mL, Nebulization, 4x Daily - RT  levothyroxine, 100 mcg, Oral, Daily  Lidocaine, 1 patch, Transdermal, Q24H  montelukast, 10 mg, Oral, Nightly  predniSONE, 40 mg, Oral, Daily With Breakfast  sodium chloride, 10 mL, Intravenous, Q12H      Continuous Infusions:   PRN Meds:.  acetaminophen    senna-docusate sodium **AND** polyethylene glycol **AND** bisacodyl **AND** bisacodyl    Calcium Replacement - Follow Nurse / BPA Driven Protocol    dextrose    dextrose    glucagon (human recombinant)    guaiFENesin-codeine    ipratropium-albuterol    Magnesium Standard Dose Replacement - Follow Nurse / BPA Driven Protocol    meclizine    melatonin    methocarbamol    ondansetron    oxyCODONE-acetaminophen    Phosphorus  Replacement - Follow Nurse / BPA Driven Protocol    Potassium Replacement - Follow Nurse / BPA Driven Protocol    sodium chloride    sodium chloride    sodium chloride    tiZANidine    Assessment & Plan   Assessment & Plan     Active Hospital Problems    Diagnosis  POA    **Acute bronchiolitis due to human metapneumovirus [J21.1]  Unknown      Resolved Hospital Problems   No resolved problems to display.        Brief Hospital Course to date:  Janet Obrien is a 64 y.o. female with past medical history of bronchial asthma, essential hypertension, dyslipidemia, hypothyroidism, restless leg syndrome, anxiety, obstructive sleep apnea, obesity, who presented to the hospital with shortness of breath and wheezing.  Tested positive for human metapneumovirus     This patient's problems and plans were partially entered by my partner and updated as appropriate by me 04/16/25.      Acute hypoxic respiratory insufficiency  Acute asthma exacerbation  Acute bronchitis secondary to human metapneumovirus infection  Patient with history of bronchial asthma.  Presented to the hospital with acute hypoxemia and worsening asthma symptoms/wheezing.  Tested positive for human metapneumovirus and CT chest with evidence of bronchiolitis  Wheezing significantly improved.  IV Solu-Medrol decreased from 60 mg 3 times daily to 40 mg daily, transitioned to 40 mg PO 4/15 and plan for taper x 1 week at discharge  No leukocytosis.  Normal procalcitonin.  Normal CRP would argue against pneumonia.  Monitor off antibiotics  DuoNebs: Scheduled and as needed  PT and OT recommending inpatient rehab, patient agreeable     Vertigo  Possible otitis media  Continue p.o. doxycycline  Continue as needed meclizine  Orthostatics negative     Hypoosmolar hyponatremia, resolved  Hypovolemic secondary to poor oral intake  Improved with IV fluids   Holding hydrochlorothiazide  Encourage p.o. intake      Hypokalemia  Replace per protocol     Type 2 diabetes  A1c  5.5 %  On Ozempic weekly at home  SSI     Acute debility  PT and OT recommend inpatient rehab         Expected Discharge Location and Transportation: [facility] acute rehab  Expected Discharge pending insurance approval  Expected Discharge Date: 4/16/2025; Expected Discharge Time:      VTE Prophylaxis:  Pharmacologic VTE prophylaxis orders are present.         AM-PAC 6 Clicks Score (PT): 20 (04/15/25 2052)    CODE STATUS:   Code Status and Medical Interventions: CPR (Attempt to Resuscitate); Full Support   Ordered at: 04/10/25 2183     Code Status (Patient has no pulse and is not breathing):    CPR (Attempt to Resuscitate)     Medical Interventions (Patient has pulse or is breathing):    Full Support     Level Of Support Discussed With:    Patient       Marta Dunn, APRN  04/16/25

## 2025-04-16 NOTE — CASE MANAGEMENT/SOCIAL WORK
Continued Stay Note  [facility]     Patient Name: Janet Obrien  MRN: [id number]  Today's Date: 4/16/2025    Admit Date: 4/10/2025    Plan: [facility]   Discharge Plan       Row Name 04/16/25 1156       Plan    Plan [facility]    Plan Comments Pt walked 65ft with a 1 person assist and a walker yesterday. She is on room air. Per April, precert is still pending for acute rehab @ [facility]. I provided an update to the Pt, in room. CM will continue to follow.    Final Discharge Disposition Code 62 - inpatient rehab facility                   Discharge Codes    No documentation.                 Expected Discharge Date and Time       Expected Discharge Date Expected Discharge Time    Apr 16, 2025               Sophie Camacho RN

## 2025-04-17 ENCOUNTER — READMISSION MANAGEMENT (OUTPATIENT)
Dept: CALL CENTER | Facility: HOSPITAL | Age: 65
End: 2025-04-17
Payer: COMMERCIAL

## 2025-04-17 VITALS
HEIGHT: 65 IN | OXYGEN SATURATION: 94 % | DIASTOLIC BLOOD PRESSURE: 86 MMHG | BODY MASS INDEX: 37.49 KG/M2 | WEIGHT: 225 LBS | RESPIRATION RATE: 16 BRPM | SYSTOLIC BLOOD PRESSURE: 137 MMHG | HEART RATE: 73 BPM | TEMPERATURE: 98.3 F

## 2025-04-17 LAB — GLUCOSE BLDC GLUCOMTR-MCNC: 84 MG/DL (ref 70–130)

## 2025-04-17 PROCEDURE — 63710000001 PREDNISONE PER 1 MG: Performed by: NURSE PRACTITIONER

## 2025-04-17 PROCEDURE — 25010000002 ENOXAPARIN PER 10 MG: Performed by: INTERNAL MEDICINE

## 2025-04-17 PROCEDURE — 94664 DEMO&/EVAL PT USE INHALER: CPT

## 2025-04-17 PROCEDURE — 82948 REAGENT STRIP/BLOOD GLUCOSE: CPT

## 2025-04-17 PROCEDURE — 94799 UNLISTED PULMONARY SVC/PX: CPT

## 2025-04-17 PROCEDURE — 99239 HOSP IP/OBS DSCHRG MGMT >30: CPT | Performed by: INTERNAL MEDICINE

## 2025-04-17 RX ORDER — MECLIZINE HYDROCHLORIDE 25 MG/1
50 TABLET ORAL 3 TIMES DAILY PRN
Qty: 60 TABLET | Refills: 0 | Status: SHIPPED | OUTPATIENT
Start: 2025-04-17

## 2025-04-17 RX ORDER — FUROSEMIDE 20 MG/1
20 TABLET ORAL DAILY
Qty: 30 TABLET | Refills: 0 | Status: SHIPPED | OUTPATIENT
Start: 2025-04-17 | End: 2025-05-17

## 2025-04-17 RX ORDER — DEXTROMETHORPHAN POLISTIREX 30 MG/5ML
60 SUSPENSION ORAL EVERY 12 HOURS SCHEDULED
Qty: 148 ML | Refills: 0 | Status: SHIPPED | OUTPATIENT
Start: 2025-04-17 | End: 2025-04-24

## 2025-04-17 RX ORDER — PREDNISONE 20 MG/1
40 TABLET ORAL
Qty: 10 TABLET | Refills: 0 | Status: SHIPPED | OUTPATIENT
Start: 2025-04-18 | End: 2025-04-23

## 2025-04-17 RX ADMIN — CELECOXIB 200 MG: 200 CAPSULE ORAL at 08:03

## 2025-04-17 RX ADMIN — DEXTROMETHORPHAN POLISTIREX 60 MG: 30 SUSPENSION ORAL at 08:03

## 2025-04-17 RX ADMIN — DULOXETINE 60 MG: 60 CAPSULE, DELAYED RELEASE ORAL at 08:04

## 2025-04-17 RX ADMIN — LIDOCAINE 1 PATCH: 4 PATCH TOPICAL at 08:03

## 2025-04-17 RX ADMIN — LEVOTHYROXINE SODIUM 100 MCG: 0.1 TABLET ORAL at 08:03

## 2025-04-17 RX ADMIN — ATORVASTATIN CALCIUM 80 MG: 40 TABLET, FILM COATED ORAL at 08:03

## 2025-04-17 RX ADMIN — GUAIFENESIN 1200 MG: 600 TABLET ORAL at 08:03

## 2025-04-17 RX ADMIN — ENOXAPARIN SODIUM 40 MG: 100 INJECTION SUBCUTANEOUS at 08:03

## 2025-04-17 RX ADMIN — PREDNISONE 40 MG: 20 TABLET ORAL at 08:03

## 2025-04-17 RX ADMIN — IPRATROPIUM BROMIDE AND ALBUTEROL SULFATE 3 ML: 2.5; .5 SOLUTION RESPIRATORY (INHALATION) at 07:30

## 2025-04-17 RX ADMIN — DOXYCYCLINE 100 MG: 100 CAPSULE ORAL at 08:03

## 2025-04-17 NOTE — CASE MANAGEMENT/SOCIAL WORK
Case Management Discharge Note      Final Note: Pt is discharging home today since her ambulation has improved. She is on room air. Attempts to arrange HH PT were unsuccessful; [facility]; CM tried [facility], [facility] & [facility]. At Pt's request, an order for outpatient PT was faxed to Elton DUE[facility]AS @ [phone]. Family will provide transportation via private vehicle. Pt is aware and agreeable to plan. No other discharge needs identified.         Selected Continued Care - Discharged on 4/17/2025 Admission date: 4/10/2025 - Discharge disposition: Home or Self Care      Destination    No services have been selected for the patient.                Durable Medical Equipment    No services have been selected for the patient.                Dialysis/Infusion    No services have been selected for the patient.                Home Medical Care    No services have been selected for the patient.                Therapy    No services have been selected for the patient.                Community Resources    No services have been selected for the patient.                Community & DME    No services have been selected for the patient.                    Transportation Services  Private: Car    Final Discharge Disposition Code: 01 - home or self-care

## 2025-04-17 NOTE — DISCHARGE SUMMARY
[facility] Medicine Services  DISCHARGE SUMMARY    Patient Name: Janet Obrien  : 1960  MRN: [id number]    Date of Admission: 4/10/2025  5:26 AM  Date of Discharge: 2025  Primary Care Physician: Noe Davenport MD    Consults       No orders found from 3/12/2025 to 2025.            Hospital Course     Presenting Problem:     Active Hospital Problems    Diagnosis  POA    **Acute bronchiolitis due to human metapneumovirus [J21.1]  Unknown      Resolved Hospital Problems   No resolved problems to display.          Hospital Course:  Janet Obrien is a 64 y.o. female with past medical history of bronchial asthma, essential hypertension, dyslipidemia, hypothyroidism, restless leg syndrome, anxiety, obstructive sleep apnea, obesity, who presented to the hospital with shortness of breath and wheezing secondary to acute asthma exacerbation secondary to human metapneumovirus acute bronchitis.  Treated with IV steroids, DuoNebs, IV fluids etc.  Improved.  Because of her acute vertigo, initially PT recommended acute rehab.  While case management working on finding a facility for the patient and getting approval by the insurance, patient made remarkable improvement and was eventually discharged home.      Acute hypoxic respiratory insufficiency  Acute asthma exacerbation  Acute bronchitis secondary to human metapneumovirus infection  Patient with history of bronchial asthma.  Presented to the hospital with acute hypoxemia and worsening asthma symptoms/wheezing.  Tested positive for human metapneumovirus and CT chest with evidence of bronchiolitis  Status post IV steroids.  Transition to p.o. prednisone upon discharge  No leukocytosis.  Normal procalcitonin.  Normal CRP would argue against pneumonia.  Monitor off antibiotics  DuoNebs: Scheduled and as needed.  Discharged on albuterol inhaler.  Patient reported that she has enough supply at home      Vertigo, resolved  Possible  otitis media, resolved  Status p.o. doxycycline for possible otitis media  Continue as needed meclizine  Negative orthostatic vitals     Hypoosmolar hyponatremia, resolved  Hypovolemic secondary to poor oral intake  Improved with IV fluids   Hydrochlorothiazide substituted with Lasix upon discharge     Hypokalemia  Replaced per protocol     Type 2 diabetes  A1c 5.5 %  On Ozempic weekly at home     Acute debility  PT and OT recommend inpatient rehab  Patient significantly improved and was eventually discharged home          Discharge Follow Up Recommendations for outpatient labs/diagnostics:  PCP 1 week    Day of Discharge     HPI:   Patient seen and examined this morning.  Feeling much better today.  No dizziness or lightheadedness.  Was able to get out of the bed on her own with no support and walked more than 200 feet with me.  Oxygen saturation remained 96 [place] time.  Balance is good.  Patient feels that she is back to her baseline and she is comfortable to go home    Vital Signs:   Temp:  [98 °F (36.7 °C)-98.4 °F (36.9 °C)] 98.3 °F (36.8 °C)  Heart Rate:  [63-94] 73  Resp:  [16-20] 16  BP: (112-159)/(79-91) 137/86      Physical Exam:  General: Comfortable, not in distress, conversant and cooperative  Head: Atraumatic and normocephalic  Eyes: No Icterus. No pallor  Ears:  Ears appear intact with no abnormalities noted  Throat: No oral lesions, no thrush  Neck: Supple, trachea midline  Lungs: Clear to auscultation bilaterally, equal air entry, no wheezing or crackles  Heart:  Normal S1 and S2, no murmur, no gallop, No JVD, no lower extremity swelling  Abdomen:  Soft, no tenderness, no organomegaly, normal bowel sounds, no organomegaly  Extremities: pulses equal bilaterally  Skin: No bleeding, bruising or rash, normal skin turgor and elasticity  Neurologic: Cranial nerves appear intact with no evidence of facial asymmetry, normal motor and sensory functions in all 4 extremities  Psych: Alert and oriented x 3,  normal mood    Pertinent  and/or Most Recent Results     LAB RESULTS:      Lab 04/14/25  0346 04/12/25  0354 04/11/25  0524   WBC 5.89 5.54 5.83   HEMOGLOBIN 11.6* 11.4* 12.1   HEMATOCRIT 34.9 34.1 36.0   PLATELETS 281 245 231   NEUTROS ABS 3.66 4.61  --    IMMATURE GRANS (ABS) 0.04 0.03  --    LYMPHS ABS 1.62 0.53*  --    MONOS ABS 0.56 0.37  --    EOS ABS 0.00 0.00  --    MCV 84.9 83.8 82.2   CRP  --   --  0.91*         Lab 04/14/25  0346 04/12/25  0354 04/11/25  0524   SODIUM 135* 134* 134*   POTASSIUM 4.0 4.3 3.2*   CHLORIDE 102 100 95*   CO2 27.0 24.0 30.0*   ANION GAP 6.0 10.0 9.0   BUN 15 14 11   CREATININE 0.64 0.65 0.73   EGFR 99.4 99.1 92.5   GLUCOSE 98 196* 101*   CALCIUM 9.2 9.1 9.5   MAGNESIUM 2.2 2.1  --    PHOSPHORUS 2.6  --   --    HEMOGLOBIN A1C  --   --  5.50         Lab 04/14/25  0346 04/12/25  0354   TOTAL PROTEIN 5.2* 5.6*   ALBUMIN 3.1* 3.4*   GLOBULIN 2.1 2.2   ALT (SGPT) 23 21   AST (SGOT) 27 39*   BILIRUBIN 0.2 0.2   ALK PHOS 56 72                     Brief Urine Lab Results  (Last result in the past 365 days)        Color   Clarity   Blood   Leuk Est   Nitrite   Protein   CREAT   Urine HCG        04/10/25 0854 Yellow   Clear   Negative   Trace   Negative   Negative                 Microbiology Results (last 10 days)       Procedure Component Value - Date/Time    MRSA Screen, PCR (Inpatient) - Swab, Nares [[id number]]  (Normal) Collected: 04/11/25 0833    Lab Status: Final result Specimen: Swab from Nares Updated: 04/11/25 1016     MRSA PCR Negative    Narrative:      The negative predictive value of this diagnostic test is high and should only be used to consider de-escalating anti-MRSA therapy. A positive result may indicate colonization with MRSA and must be correlated clinically.  MRSA Negative    Legionella Antigen, Urine - Urine, Urine, Clean Catch [[id number]]  (Normal) Collected: 04/11/25 3634    Lab Status: Final result Specimen: Urine, Clean Catch Updated: 04/11/25 5294      LEGIONELLA ANTIGEN, URINE Negative    Respiratory Panel PCR w/COVID-19(SARS-CoV-2) IVAN/KOBE/CASANDRA/PAD/COR/ANDRAE In-House, NP Swab in UTM/VTM, 2 HR TAT - Swab, Nasopharynx [[id number]]  (Abnormal) Collected: 04/10/25 0618    Lab Status: Final result Specimen: Swab from Nasopharynx Updated: 04/10/25 0730     ADENOVIRUS, PCR Not Detected     Coronavirus 229E Not Detected     Coronavirus HKU1 Not Detected     Coronavirus NL63 Not Detected     Coronavirus OC43 Not Detected     COVID19 Not Detected     Human Metapneumovirus Detected     Human Rhinovirus/Enterovirus Not Detected     Influenza A PCR Not Detected     Influenza B PCR Not Detected     Parainfluenza Virus 1 Not Detected     Parainfluenza Virus 2 Not Detected     Parainfluenza Virus 3 Not Detected     Parainfluenza Virus 4 Not Detected     RSV, PCR Not Detected     Bordetella pertussis pcr Not Detected     Bordetella parapertussis PCR Not Detected     Chlamydophila pneumoniae PCR Not Detected     Mycoplasma pneumo by PCR Not Detected    Narrative:      In the setting of a positive respiratory panel with a viral infection PLUS a negative procalcitonin without other underlying concern for bacterial infection, consider observing off antibiotics or discontinuation of antibiotics and continue supportive care. If the respiratory panel is positive for atypical bacterial infection (Bordetella pertussis, Chlamydophila pneumoniae, or Mycoplasma pneumoniae), consider antibiotic de-escalation to target atypical bacterial infection.            XR Chest 1 View  Result Date: 4/14/2025  XR CHEST 1 VW Date of Exam: 4/14/2025 1:16 AM EDT Indication: Shortness of breath and wheezing Comparison: 4/10/2025 Findings: Cardiac and mediastinal contours are normal. Pulmonary vascularity is normal. The lungs are clear. No pneumothorax. There is mild elevation of the right hemidiaphragm.     No active disease. Electronically Signed: Rei Melvin MD  4/14/2025 4:55 AM EDT  Workstation ID:  [id number]    CT Abdomen Pelvis With Contrast  Result Date: 4/10/2025  CT ANGIOGRAM CHEST PULMONARY EMBOLISM, CT ABDOMEN PELVIS W CONTRAST Date of Exam: 4/10/2025 6:18 AM EDT Indication: Acute fall v syncope/ abdominal pain/vomiting/ dyspnea. Comparison: None available. Technique: Axial CT images were obtained of the chest, abdomen and pelvis after the uneventful intravenous administration of 85 mL Isovue-370 utilizing pulmonary embolism protocol.  In addition, a 3-D volume rendered image was created for interpretation.   Reconstructed coronal and sagittal images were also obtained. Automated exposure control and iterative construction methods were used. Findings: PULMONARY VASCULATURE: Pulmonary arteries are widely patent without evidence of embolus.Main pulmonary artery is normal in size. No evidence of right heart strain. MEDIASTINUM:Unremarkable. Aortic and heart size are normal. No aortic dissection identified. No mass nor pericardial effusion. CORONARY ARTERIES: Nocalcified atherosclerotic disease. LUNGS: Evaluation of lung parenchyma demonstrates nodular infiltrate in the right lower lung compatible with infectious/inflammatory process. Some faint tree-in-bud opacities are present in the anterior right upper lung also compatible with inflammatory change. PLEURAL SPACE: No effusion, mass, nor pneumothorax. LYMPH NODES: Right hilar lymph nodes are noted measuring up to 1.3 cm in short axis. OSSEOUS STRUCTURES: Appropriate for age with no acute process identified. No acute osseous injury noted. There is a suspected sebaceous cyst in the left axillary region measuring 1.2 cm. Correlate on physical examination. LIVER:  Unremarkable parenchyma without focal lesion. BILIARY/GALLBLADDER:  Unremarkable SPLEEN:  Unremarkable PANCREAS:  Unremarkable ADRENAL: There is a 1.6 cm indeterminate right adrenal gland nodule. The left adrenal gland is unremarkable in appearance. KIDNEYS:  Unremarkable parenchyma with no solid mass  identified. No obstruction.  No calculus identified. GASTROINTESTINAL/MESENTERY: Evaluation of the gastrointestinal tract demonstrates diverticulosis without CT evidence for acute diverticulitis. No free intraperitoneal gas or fluid. Trace sliding hiatal hernia is noted. Left inguinal hernia is present containing mesenteric fat. MESENTERIC VESSELS:  Patent. AORTA/IVC:  Normal caliber. RETROPERITONEUM/LYMPH NODES:  Unremarkable REPRODUCTIVE: Surgically absent. BLADDER:  Unremarkable OSSEUS STRUCTURES:  Typical for age with no acute process identified.     Impression: 1. No evidence for pulmonary embolus. 2. Nodular infiltrate in the right lower lung with reactive right hilar lymphadenopathy. 3. No acute findings in the abdomen or pelvis.   Electronically Signed: Margarette uFentes MD  4/10/2025 7:46 AM EDT  Workstation ID: [id number]    CT Angiogram Chest Pulmonary Embolism  Result Date: 4/10/2025  CT ANGIOGRAM CHEST PULMONARY EMBOLISM, CT ABDOMEN PELVIS W CONTRAST Date of Exam: 4/10/2025 6:18 AM EDT Indication: Acute fall v syncope/ abdominal pain/vomiting/ dyspnea. Comparison: None available. Technique: Axial CT images were obtained of the chest, abdomen and pelvis after the uneventful intravenous administration of 85 mL Isovue-370 utilizing pulmonary embolism protocol.  In addition, a 3-D volume rendered image was created for interpretation.   Reconstructed coronal and sagittal images were also obtained. Automated exposure control and iterative construction methods were used. Findings: PULMONARY VASCULATURE: Pulmonary arteries are widely patent without evidence of embolus.Main pulmonary artery is normal in size. No evidence of right heart strain. MEDIASTINUM:Unremarkable. Aortic and heart size are normal. No aortic dissection identified. No mass nor pericardial effusion. CORONARY ARTERIES: Nocalcified atherosclerotic disease. LUNGS: Evaluation of lung parenchyma demonstrates nodular infiltrate in the right lower lung  compatible with infectious/inflammatory process. Some faint tree-in-bud opacities are present in the anterior right upper lung also compatible with inflammatory change. PLEURAL SPACE: No effusion, mass, nor pneumothorax. LYMPH NODES: Right hilar lymph nodes are noted measuring up to 1.3 cm in short axis. OSSEOUS STRUCTURES: Appropriate for age with no acute process identified. No acute osseous injury noted. There is a suspected sebaceous cyst in the left axillary region measuring 1.2 cm. Correlate on physical examination. LIVER:  Unremarkable parenchyma without focal lesion. BILIARY/GALLBLADDER:  Unremarkable SPLEEN:  Unremarkable PANCREAS:  Unremarkable ADRENAL: There is a 1.6 cm indeterminate right adrenal gland nodule. The left adrenal gland is unremarkable in appearance. KIDNEYS:  Unremarkable parenchyma with no solid mass identified. No obstruction.  No calculus identified. GASTROINTESTINAL/MESENTERY: Evaluation of the gastrointestinal tract demonstrates diverticulosis without CT evidence for acute diverticulitis. No free intraperitoneal gas or fluid. Trace sliding hiatal hernia is noted. Left inguinal hernia is present containing mesenteric fat. MESENTERIC VESSELS:  Patent. AORTA/IVC:  Normal caliber. RETROPERITONEUM/LYMPH NODES:  Unremarkable REPRODUCTIVE: Surgically absent. BLADDER:  Unremarkable OSSEUS STRUCTURES:  Typical for age with no acute process identified.     Impression: 1. No evidence for pulmonary embolus. 2. Nodular infiltrate in the right lower lung with reactive right hilar lymphadenopathy. 3. No acute findings in the abdomen or pelvis.   Electronically Signed: Margarette Fuentes MD  4/10/2025 7:46 AM EDT  Workstation ID: [id number]    CT Head Without Contrast  Result Date: 4/10/2025  CT HEAD WO CONTRAST Date of Exam: 4/10/2025 6:18 AM EDT Indication: Acute fall v syncope/ abdominal pain/vomiting/ dyspnea/. Comparison: None available. Technique: Axial CT images were obtained of the head without  contrast administration.  Automated exposure control and iterative construction methods were used. Findings: There is no evidence of hemorrhage. There is no mass effect or midline shift. There is no extracerebral collection. Ventricles are normal in size and configuration for patient's stated age. Posterior fossa is within normal limits. Calvarium and skull base appear intact.  Visualized sinuses show no air fluid levels. Visualized orbits are unremarkable.     Impression: No acute intracranial abnormality identified. Electronically Signed: Andry Larkin MD  4/10/2025 7:43 AM EDT  Workstation ID: [id number]    XR Chest 1 View  Result Date: 4/10/2025  XR CHEST 1 VW Date of Exam: 4/10/2025 5:40 AM EDT Indication: Chest Pain Triage Protocol Comparison: 9/8/2023. Findings: There is no pneumothorax, pleural effusion or focal airspace consolidation. Heart size and pulmonary vasculature appear within normal limits. Regional bones appear intact.     Impression: No acute cardiopulmonary abnormality. Electronically Signed: Toni Ledezma MD  4/10/2025 5:48 AM EDT  Workstation ID: [id number]              Results for orders placed in visit on 06/05/20    Adult Transthoracic Echo Complete W/ Cont if Necessary Per Protocol    Interpretation Summary  · Estimated EF = 76%.  · Left ventricular systolic function is hyperdynamic (EF > 70).      Plan for Follow-up of Pending Labs/Results:     Discharge Details        Discharge Medications        New Medications        Instructions Start Date   dextromethorphan polistirex ER 30 MG/5ML Suspension Extended Release oral suspension  Commonly known as: DELSYM   60 mg, Oral, Every 12 Hours Scheduled      furosemide 20 MG tablet  Commonly known as: Lasix   20 mg, Oral, Daily      predniSONE 20 MG tablet  Commonly known as: DELTASONE   40 mg, Oral, Daily With Breakfast   Start Date: April 18, 2025            Changes to Medications        Instructions Start Date   meclizine 50 MG tablet  Commonly  known as: ANTIVERT  What changed:   when to take this  reasons to take this   50 mg, Oral, 3 Times Daily PRN             Continue These Medications        Instructions Start Date   acetaminophen 650 MG 8 hr tablet  Commonly known as: TYLENOL   650 mg, Oral, Every 8 Hours      atorvastatin 80 MG tablet  Commonly known as: LIPITOR   80 mg, Daily      azelastine 0.05 % ophthalmic solution  Commonly known as: OPTIVAR       celecoxib 200 MG capsule  Commonly known as: CeleBREX   1 capsule, Daily      cetirizine 10 MG tablet  Commonly known as: zyrTEC   10 mg, Oral, Daily      DULoxetine 30 MG capsule  Commonly known as: CYMBALTA   60 mg, Daily      lidocaine 5 %  Commonly known as: Lidoderm   1 patch, Transdermal, Every 24 Hours, Remove & Discard patch within 12 hours or as directed by MD      meloxicam 7.5 MG tablet  Commonly known as: Mobic   7.5 mg, Oral, Daily      methocarbamol 750 MG tablet  Commonly known as: ROBAXIN   750 mg, Oral, 3 Times Daily PRN      montelukast 10 MG tablet  Commonly known as: SINGULAIR   10 mg, Oral, Nightly      ondansetron ODT 4 MG disintegrating tablet  Commonly known as: ZOFRAN-ODT   4 mg, Translingual, Every 8 Hours PRN      ONE TOUCH ULTRA 2 w/Device kit   USE TO CHECK GLUCOSE TWICE DAILY AS NEEDED      OneTouch Ultra test strip  Generic drug: glucose blood   USE 1 STRIP TO CHECK GLUCOSE TWICE DAILY AS NEEDED      oxyCODONE-acetaminophen  MG per tablet  Commonly known as: PERCOCET       Ozempic (2 MG/DOSE) 8 MG/3ML solution pen-injector  Generic drug: Semaglutide (2 MG/DOSE)   2 mg      Stool Softener 100 MG capsule  Generic drug: docusate sodium   100 mg, Oral, 2 Times Daily      Stool Softener/Laxative 50-8.6 MG per tablet  Generic drug: sennosides-docusate   1 tablet, Every 12 Hours Scheduled      Synthroid 150 MCG tablet  Generic drug: levothyroxine   1 tablet, Daily      tiZANidine 4 MG tablet  Commonly known as: ZANAFLEX   4 mg, Every Night at Bedtime      Ventolin   (90 Base) MCG/ACT inhaler  Generic drug: albuterol sulfate HFA   2 puffs, Inhalation, Every 4 Hours PRN      vitamin D3 125 MCG (5000 UT) tablet tablet   1 tablet, Daily             Stop These Medications      azithromycin 500 MG tablet  Commonly known as: ZITHROMAX     hydroCHLOROthiazide 25 MG tablet              Allergies   Allergen Reactions    Penicillins Angioedema, Anaphylaxis, Hives, Itching, Shortness Of Breath and Swelling    Pregabalin Anaphylaxis    Cephalexin Itching    Chlorhexidine Rash and Itching    Sulfa Antibiotics Rash and Itching    Adhesive Tape Rash     Tegaderm specific    Darvon [Propoxyphene] Unknown (See Comments)     Doesn't remember    Diazepam Other (See Comments)     Paradoxical effect/ hypes pt         Discharge Disposition:  Home or Self Care    Diet:  Hospital:  Diet Order   Procedures    Diet: Diabetic; Consistent Carbohydrate; Fluid Consistency: Thin (IDDSI 0)            Activity:      Restrictions or Other Recommendations:  None        CODE STATUS:    Code Status and Medical Interventions: CPR (Attempt to Resuscitate); Full Support   Ordered at: 04/10/25 1457     Code Status (Patient has no pulse and is not breathing):    CPR (Attempt to Resuscitate)     Medical Interventions (Patient has pulse or is breathing):    Full Support     Level Of Support Discussed With:    Patient       Future Appointments   Date Time Provider Department Center   9/9/2025  8:40 AM Dayo Chavez MD [facility]       Additional Instructions for the Follow-ups that You Need to Schedule       Ambulatory Referral to Home Health   As directed      Face to Face Visit Date: 4/17/2025   Follow-up provider for Plan of Care?: I treated the patient in an acute care facility and will not continue treatment after discharge.   Follow-up provider: LAURITA SHEPHERD [[id number]]   Reason/Clinical Findings: vertigo, acute debility, acute bronchitis   Describe mobility limitations that make leaving home difficult:  impaired balance/endurance/activity tolernance/strength, SOB   Nursing/Therapeutic Services Requested: Physical Therapy Occupational Therapy   Frequency: 1 Week 1                      Sulaiman Bear MD  04/17/25      Time Spent on Discharge:  I spent 35 minutes on this discharge activity which included: face-to-face encounter with the patient, reviewing the data in the system, coordination of the care with the nursing staff as well as consultants, documentation, and entering orders.

## 2025-04-17 NOTE — PLAN OF CARE
Goal Outcome Evaluation:  Plan of Care Reviewed With: patient        Progress: improving  Outcome Evaluation: Patient awake throughout the shift. No verbalized complaints.

## 2025-04-17 NOTE — PAYOR COMM NOTE
"Betsy Obrien (64 y.o. Female)     Pao Larsen, RN  Utilization Review  Kswpy-[phone]  [phone]        Date of Birth   1960    Social Security Number       Address   [address] [place] [address]    Home Phone   [phone]    MRN   [id number]       Confucianist   None    Marital Status                               Admission Date   4/10/2025    Admission Type   Emergency    Admitting Provider   Sulaiman Bear MD    Attending Provider       Department, Room/Bed   [facility] 6B, N625/1       Discharge Date   4/17/2025    Discharge Disposition   Home or Self Care    Discharge Destination                                 Attending Provider: (none)   Allergies: Penicillins, Pregabalin, Cephalexin, Chlorhexidine, Sulfa Antibiotics, Adhesive Tape, Darvon [Propoxyphene], Diazepam    Isolation: None   Infection: None   Code Status: Prior    Ht: 165.1 cm (65\")   Wt: 102 kg (225 lb)    Admission Cmt: None   Principal Problem: Acute bronchiolitis due to human metapneumovirus [J21.1]                   Active Insurance as of 4/10/2025       Primary Coverage       Payor Plan Insurance Group Employer/Plan Group    [employer] [place] [employer] [place]ICA[place]        Payor Plan Address Payor Plan Phone Number Payor Plan Fax Number Effective Dates    [address] [address] [phone]  7/21/2016 - None Entered    [facility] [id number]         Subscriber Name Subscriber Birth Date Member ID       BETSY OBRIEN 1960 [id number]                     Emergency Contacts        (Rel.) Home Phone Work Phone Mobile Phone    Gage Alicea (Son) [phone] -- --    Toni Robles (Son) -- -- [phone]    LISA PEOPLES (Brother) -- -- [phone]    Ness Bach (Other) -- -- [phone]    ebenezer peoples (Other) -- -- [phone]                 Discharge Summary        Sulaiman Bear MD at 04/17/25 0910              [facility] Medicine " Services  DISCHARGE SUMMARY    Patient Name: Janet Obrien  : 1960  MRN: [id number]    Date of Admission: 4/10/2025  5:26 AM  Date of Discharge: 2025  Primary Care Physician: Noe Davenport MD    Consults       No orders found from 3/12/2025 to 2025.            Hospital Course     Presenting Problem:     Active Hospital Problems    Diagnosis  POA    **Acute bronchiolitis due to human metapneumovirus [J21.1]  Unknown      Resolved Hospital Problems   No resolved problems to display.          Hospital Course:  Janet Obrien is a 64 y.o. female with past medical history of bronchial asthma, essential hypertension, dyslipidemia, hypothyroidism, restless leg syndrome, anxiety, obstructive sleep apnea, obesity, who presented to the hospital with shortness of breath and wheezing secondary to acute asthma exacerbation secondary to human metapneumovirus acute bronchitis.  Treated with IV steroids, DuoNebs, IV fluids etc.  Improved.  Because of her acute vertigo, initially PT recommended acute rehab.  While case management working on finding a facility for the patient and getting approval by the insurance, patient made remarkable improvement and was eventually discharged home.      Acute hypoxic respiratory insufficiency  Acute asthma exacerbation  Acute bronchitis secondary to human metapneumovirus infection  Patient with history of bronchial asthma.  Presented to the hospital with acute hypoxemia and worsening asthma symptoms/wheezing.  Tested positive for human metapneumovirus and CT chest with evidence of bronchiolitis  Status post IV steroids.  Transition to p.o. prednisone upon discharge  No leukocytosis.  Normal procalcitonin.  Normal CRP would argue against pneumonia.  Monitor off antibiotics  DuoNebs: Scheduled and as needed.  Discharged on albuterol inhaler.  Patient reported that she has enough supply at home      Vertigo, resolved  Possible otitis media, resolved  Status p.o. doxycycline  for possible otitis media  Continue as needed meclizine  Negative orthostatic vitals     Hypoosmolar hyponatremia, resolved  Hypovolemic secondary to poor oral intake  Improved with IV fluids   Hydrochlorothiazide substituted with Lasix upon discharge     Hypokalemia  Replaced per protocol     Type 2 diabetes  A1c 5.5 %  On Ozempic weekly at home     Acute debility  PT and OT recommend inpatient rehab  Patient significantly improved and was eventually discharged home          Discharge Follow Up Recommendations for outpatient labs/diagnostics:  PCP 1 week    Day of Discharge     HPI:   Patient seen and examined this morning.  Feeling much better today.  No dizziness or lightheadedness.  Was able to get out of the bed on her own with no support and walked more than 200 feet with me.  Oxygen saturation remained 96 [place] time.  Balance is good.  Patient feels that she is back to her baseline and she is comfortable to go home    Vital Signs:   Temp:  [98 °F (36.7 °C)-98.4 °F (36.9 °C)] 98.3 °F (36.8 °C)  Heart Rate:  [63-94] 73  Resp:  [16-20] 16  BP: (112-159)/(79-91) 137/86      Physical Exam:  General: Comfortable, not in distress, conversant and cooperative  Head: Atraumatic and normocephalic  Eyes: No Icterus. No pallor  Ears:  Ears appear intact with no abnormalities noted  Throat: No oral lesions, no thrush  Neck: Supple, trachea midline  Lungs: Clear to auscultation bilaterally, equal air entry, no wheezing or crackles  Heart:  Normal S1 and S2, no murmur, no gallop, No JVD, no lower extremity swelling  Abdomen:  Soft, no tenderness, no organomegaly, normal bowel sounds, no organomegaly  Extremities: pulses equal bilaterally  Skin: No bleeding, bruising or rash, normal skin turgor and elasticity  Neurologic: Cranial nerves appear intact with no evidence of facial asymmetry, normal motor and sensory functions in all 4 extremities  Psych: Alert and oriented x 3, normal mood    Pertinent  and/or Most Recent  Results     LAB RESULTS:      Lab 04/14/25  0346 04/12/25  0354 04/11/25  0524   WBC 5.89 5.54 5.83   HEMOGLOBIN 11.6* 11.4* 12.1   HEMATOCRIT 34.9 34.1 36.0   PLATELETS 281 245 231   NEUTROS ABS 3.66 4.61  --    IMMATURE GRANS (ABS) 0.04 0.03  --    LYMPHS ABS 1.62 0.53*  --    MONOS ABS 0.56 0.37  --    EOS ABS 0.00 0.00  --    MCV 84.9 83.8 82.2   CRP  --   --  0.91*         Lab 04/14/25  0346 04/12/25  0354 04/11/25  0524   SODIUM 135* 134* 134*   POTASSIUM 4.0 4.3 3.2*   CHLORIDE 102 100 95*   CO2 27.0 24.0 30.0*   ANION GAP 6.0 10.0 9.0   BUN 15 14 11   CREATININE 0.64 0.65 0.73   EGFR 99.4 99.1 92.5   GLUCOSE 98 196* 101*   CALCIUM 9.2 9.1 9.5   MAGNESIUM 2.2 2.1  --    PHOSPHORUS 2.6  --   --    HEMOGLOBIN A1C  --   --  5.50         Lab 04/14/25  0346 04/12/25  0354   TOTAL PROTEIN 5.2* 5.6*   ALBUMIN 3.1* 3.4*   GLOBULIN 2.1 2.2   ALT (SGPT) 23 21   AST (SGOT) 27 39*   BILIRUBIN 0.2 0.2   ALK PHOS 56 72                     Brief Urine Lab Results  (Last result in the past 365 days)        Color   Clarity   Blood   Leuk Est   Nitrite   Protein   CREAT   Urine HCG        04/10/25 0854 Yellow   Clear   Negative   Trace   Negative   Negative                 Microbiology Results (last 10 days)       Procedure Component Value - Date/Time    MRSA Screen, PCR (Inpatient) - Swab, Nares [[id number]]  (Normal) Collected: 04/11/25 0833    Lab Status: Final result Specimen: Swab from Nares Updated: 04/11/25 1016     MRSA PCR Negative    Narrative:      The negative predictive value of this diagnostic test is high and should only be used to consider de-escalating anti-MRSA therapy. A positive result may indicate colonization with MRSA and must be correlated clinically.  MRSA Negative    Legionella Antigen, Urine - Urine, Urine, Clean Catch [[id number]]  (Normal) Collected: 04/11/25 0612    Lab Status: Final result Specimen: Urine, Clean Catch Updated: 04/11/25 1350     LEGIONELLA ANTIGEN, URINE Negative    Respiratory  Panel PCR w/COVID-19(SARS-CoV-2) IVAN/KOBE/CASANDRA/PAD/COR/ANDRAE In-House, NP Swab in UTM/VTM, 2 HR TAT - Swab, Nasopharynx [[id number]]  (Abnormal) Collected: 04/10/25 0618    Lab Status: Final result Specimen: Swab from Nasopharynx Updated: 04/10/25 0730     ADENOVIRUS, PCR Not Detected     Coronavirus 229E Not Detected     Coronavirus HKU1 Not Detected     Coronavirus NL63 Not Detected     Coronavirus OC43 Not Detected     COVID19 Not Detected     Human Metapneumovirus Detected     Human Rhinovirus/Enterovirus Not Detected     Influenza A PCR Not Detected     Influenza B PCR Not Detected     Parainfluenza Virus 1 Not Detected     Parainfluenza Virus 2 Not Detected     Parainfluenza Virus 3 Not Detected     Parainfluenza Virus 4 Not Detected     RSV, PCR Not Detected     Bordetella pertussis pcr Not Detected     Bordetella parapertussis PCR Not Detected     Chlamydophila pneumoniae PCR Not Detected     Mycoplasma pneumo by PCR Not Detected    Narrative:      In the setting of a positive respiratory panel with a viral infection PLUS a negative procalcitonin without other underlying concern for bacterial infection, consider observing off antibiotics or discontinuation of antibiotics and continue supportive care. If the respiratory panel is positive for atypical bacterial infection (Bordetella pertussis, Chlamydophila pneumoniae, or Mycoplasma pneumoniae), consider antibiotic de-escalation to target atypical bacterial infection.            XR Chest 1 View  Result Date: 4/14/2025  XR CHEST 1 VW Date of Exam: 4/14/2025 1:16 AM EDT Indication: Shortness of breath and wheezing Comparison: 4/10/2025 Findings: Cardiac and mediastinal contours are normal. Pulmonary vascularity is normal. The lungs are clear. No pneumothorax. There is mild elevation of the right hemidiaphragm.     No active disease. Electronically Signed: Rei Melvin MD  4/14/2025 4:55 AM EDT  Workstation ID: [id number]    CT Abdomen Pelvis With Contrast  Result  Date: 4/10/2025  CT ANGIOGRAM CHEST PULMONARY EMBOLISM, CT ABDOMEN PELVIS W CONTRAST Date of Exam: 4/10/2025 6:18 AM EDT Indication: Acute fall v syncope/ abdominal pain/vomiting/ dyspnea. Comparison: None available. Technique: Axial CT images were obtained of the chest, abdomen and pelvis after the uneventful intravenous administration of 85 mL Isovue-370 utilizing pulmonary embolism protocol.  In addition, a 3-D volume rendered image was created for interpretation.   Reconstructed coronal and sagittal images were also obtained. Automated exposure control and iterative construction methods were used. Findings: PULMONARY VASCULATURE: Pulmonary arteries are widely patent without evidence of embolus.Main pulmonary artery is normal in size. No evidence of right heart strain. MEDIASTINUM:Unremarkable. Aortic and heart size are normal. No aortic dissection identified. No mass nor pericardial effusion. CORONARY ARTERIES: Nocalcified atherosclerotic disease. LUNGS: Evaluation of lung parenchyma demonstrates nodular infiltrate in the right lower lung compatible with infectious/inflammatory process. Some faint tree-in-bud opacities are present in the anterior right upper lung also compatible with inflammatory change. PLEURAL SPACE: No effusion, mass, nor pneumothorax. LYMPH NODES: Right hilar lymph nodes are noted measuring up to 1.3 cm in short axis. OSSEOUS STRUCTURES: Appropriate for age with no acute process identified. No acute osseous injury noted. There is a suspected sebaceous cyst in the left axillary region measuring 1.2 cm. Correlate on physical examination. LIVER:  Unremarkable parenchyma without focal lesion. BILIARY/GALLBLADDER:  Unremarkable SPLEEN:  Unremarkable PANCREAS:  Unremarkable ADRENAL: There is a 1.6 cm indeterminate right adrenal gland nodule. The left adrenal gland is unremarkable in appearance. KIDNEYS:  Unremarkable parenchyma with no solid mass identified. No obstruction.  No calculus  identified. GASTROINTESTINAL/MESENTERY: Evaluation of the gastrointestinal tract demonstrates diverticulosis without CT evidence for acute diverticulitis. No free intraperitoneal gas or fluid. Trace sliding hiatal hernia is noted. Left inguinal hernia is present containing mesenteric fat. MESENTERIC VESSELS:  Patent. AORTA/IVC:  Normal caliber. RETROPERITONEUM/LYMPH NODES:  Unremarkable REPRODUCTIVE: Surgically absent. BLADDER:  Unremarkable OSSEUS STRUCTURES:  Typical for age with no acute process identified.     Impression: 1. No evidence for pulmonary embolus. 2. Nodular infiltrate in the right lower lung with reactive right hilar lymphadenopathy. 3. No acute findings in the abdomen or pelvis.   Electronically Signed: Margarette Fuentes MD  4/10/2025 7:46 AM EDT  Workstation ID: [id number]    CT Angiogram Chest Pulmonary Embolism  Result Date: 4/10/2025  CT ANGIOGRAM CHEST PULMONARY EMBOLISM, CT ABDOMEN PELVIS W CONTRAST Date of Exam: 4/10/2025 6:18 AM EDT Indication: Acute fall v syncope/ abdominal pain/vomiting/ dyspnea. Comparison: None available. Technique: Axial CT images were obtained of the chest, abdomen and pelvis after the uneventful intravenous administration of 85 mL Isovue-370 utilizing pulmonary embolism protocol.  In addition, a 3-D volume rendered image was created for interpretation.   Reconstructed coronal and sagittal images were also obtained. Automated exposure control and iterative construction methods were used. Findings: PULMONARY VASCULATURE: Pulmonary arteries are widely patent without evidence of embolus.Main pulmonary artery is normal in size. No evidence of right heart strain. MEDIASTINUM:Unremarkable. Aortic and heart size are normal. No aortic dissection identified. No mass nor pericardial effusion. CORONARY ARTERIES: Nocalcified atherosclerotic disease. LUNGS: Evaluation of lung parenchyma demonstrates nodular infiltrate in the right lower lung compatible with infectious/inflammatory  process. Some faint tree-in-bud opacities are present in the anterior right upper lung also compatible with inflammatory change. PLEURAL SPACE: No effusion, mass, nor pneumothorax. LYMPH NODES: Right hilar lymph nodes are noted measuring up to 1.3 cm in short axis. OSSEOUS STRUCTURES: Appropriate for age with no acute process identified. No acute osseous injury noted. There is a suspected sebaceous cyst in the left axillary region measuring 1.2 cm. Correlate on physical examination. LIVER:  Unremarkable parenchyma without focal lesion. BILIARY/GALLBLADDER:  Unremarkable SPLEEN:  Unremarkable PANCREAS:  Unremarkable ADRENAL: There is a 1.6 cm indeterminate right adrenal gland nodule. The left adrenal gland is unremarkable in appearance. KIDNEYS:  Unremarkable parenchyma with no solid mass identified. No obstruction.  No calculus identified. GASTROINTESTINAL/MESENTERY: Evaluation of the gastrointestinal tract demonstrates diverticulosis without CT evidence for acute diverticulitis. No free intraperitoneal gas or fluid. Trace sliding hiatal hernia is noted. Left inguinal hernia is present containing mesenteric fat. MESENTERIC VESSELS:  Patent. AORTA/IVC:  Normal caliber. RETROPERITONEUM/LYMPH NODES:  Unremarkable REPRODUCTIVE: Surgically absent. BLADDER:  Unremarkable OSSEUS STRUCTURES:  Typical for age with no acute process identified.     Impression: 1. No evidence for pulmonary embolus. 2. Nodular infiltrate in the right lower lung with reactive right hilar lymphadenopathy. 3. No acute findings in the abdomen or pelvis.   Electronically Signed: Margarette Fuentes MD  4/10/2025 7:46 AM EDT  Workstation ID: [id number]    CT Head Without Contrast  Result Date: 4/10/2025  CT HEAD WO CONTRAST Date of Exam: 4/10/2025 6:18 AM EDT Indication: Acute fall v syncope/ abdominal pain/vomiting/ dyspnea/. Comparison: None available. Technique: Axial CT images were obtained of the head without contrast administration.  Automated  exposure control and iterative construction methods were used. Findings: There is no evidence of hemorrhage. There is no mass effect or midline shift. There is no extracerebral collection. Ventricles are normal in size and configuration for patient's stated age. Posterior fossa is within normal limits. Calvarium and skull base appear intact.  Visualized sinuses show no air fluid levels. Visualized orbits are unremarkable.     Impression: No acute intracranial abnormality identified. Electronically Signed: Andry Larkin MD  4/10/2025 7:43 AM EDT  Workstation ID: [id number]    XR Chest 1 View  Result Date: 4/10/2025  XR CHEST 1 VW Date of Exam: 4/10/2025 5:40 AM EDT Indication: Chest Pain Triage Protocol Comparison: 9/8/2023. Findings: There is no pneumothorax, pleural effusion or focal airspace consolidation. Heart size and pulmonary vasculature appear within normal limits. Regional bones appear intact.     Impression: No acute cardiopulmonary abnormality. Electronically Signed: Toni Ledezma MD  4/10/2025 5:48 AM EDT  Workstation ID: [id number]              Results for orders placed in visit on 06/05/20    Adult Transthoracic Echo Complete W/ Cont if Necessary Per Protocol    Interpretation Summary  · Estimated EF = 76%.  · Left ventricular systolic function is hyperdynamic (EF > 70).      Plan for Follow-up of Pending Labs/Results:     Discharge Details        Discharge Medications        New Medications        Instructions Start Date   dextromethorphan polistirex ER 30 MG/5ML Suspension Extended Release oral suspension  Commonly known as: DELSYM   60 mg, Oral, Every 12 Hours Scheduled      furosemide 20 MG tablet  Commonly known as: Lasix   20 mg, Oral, Daily      predniSONE 20 MG tablet  Commonly known as: DELTASONE   40 mg, Oral, Daily With Breakfast   Start Date: April 18, 2025            Changes to Medications        Instructions Start Date   meclizine 50 MG tablet  Commonly known as: ANTIVERT  What changed:    when to take this  reasons to take this   50 mg, Oral, 3 Times Daily PRN             Continue These Medications        Instructions Start Date   acetaminophen 650 MG 8 hr tablet  Commonly known as: TYLENOL   650 mg, Oral, Every 8 Hours      atorvastatin 80 MG tablet  Commonly known as: LIPITOR   80 mg, Daily      azelastine 0.05 % ophthalmic solution  Commonly known as: OPTIVAR       celecoxib 200 MG capsule  Commonly known as: CeleBREX   1 capsule, Daily      cetirizine 10 MG tablet  Commonly known as: zyrTEC   10 mg, Oral, Daily      DULoxetine 30 MG capsule  Commonly known as: CYMBALTA   60 mg, Daily      lidocaine 5 %  Commonly known as: Lidoderm   1 patch, Transdermal, Every 24 Hours, Remove & Discard patch within 12 hours or as directed by MD      meloxicam 7.5 MG tablet  Commonly known as: Mobic   7.5 mg, Oral, Daily      methocarbamol 750 MG tablet  Commonly known as: ROBAXIN   750 mg, Oral, 3 Times Daily PRN      montelukast 10 MG tablet  Commonly known as: SINGULAIR   10 mg, Oral, Nightly      ondansetron ODT 4 MG disintegrating tablet  Commonly known as: ZOFRAN-ODT   4 mg, Translingual, Every 8 Hours PRN      ONE TOUCH ULTRA 2 w/Device kit   USE TO CHECK GLUCOSE TWICE DAILY AS NEEDED      OneTouch Ultra test strip  Generic drug: glucose blood   USE 1 STRIP TO CHECK GLUCOSE TWICE DAILY AS NEEDED      oxyCODONE-acetaminophen  MG per tablet  Commonly known as: PERCOCET       Ozempic (2 MG/DOSE) 8 MG/3ML solution pen-injector  Generic drug: Semaglutide (2 MG/DOSE)   2 mg      Stool Softener 100 MG capsule  Generic drug: docusate sodium   100 mg, Oral, 2 Times Daily      Stool Softener/Laxative 50-8.6 MG per tablet  Generic drug: sennosides-docusate   1 tablet, Every 12 Hours Scheduled      Synthroid 150 MCG tablet  Generic drug: levothyroxine   1 tablet, Daily      tiZANidine 4 MG tablet  Commonly known as: ZANAFLEX   4 mg, Every Night at Bedtime      Ventolin  (90 Base) MCG/ACT  inhaler  Generic drug: albuterol sulfate HFA   2 puffs, Inhalation, Every 4 Hours PRN      vitamin D3 125 MCG (5000 UT) tablet tablet   1 tablet, Daily             Stop These Medications      azithromycin 500 MG tablet  Commonly known as: ZITHROMAX     hydroCHLOROthiazide 25 MG tablet              Allergies   Allergen Reactions    Penicillins Angioedema, Anaphylaxis, Hives, Itching, Shortness Of Breath and Swelling    Pregabalin Anaphylaxis    Cephalexin Itching    Chlorhexidine Rash and Itching    Sulfa Antibiotics Rash and Itching    Adhesive Tape Rash     Tegaderm specific    Darvon [Propoxyphene] Unknown (See Comments)     Doesn't remember    Diazepam Other (See Comments)     Paradoxical effect/ hypes pt         Discharge Disposition:  Home or Self Care    Diet:  Hospital:  Diet Order   Procedures    Diet: Diabetic; Consistent Carbohydrate; Fluid Consistency: Thin (IDDSI 0)            Activity:      Restrictions or Other Recommendations:  None        CODE STATUS:    Code Status and Medical Interventions: CPR (Attempt to Resuscitate); Full Support   Ordered at: 04/10/25 1457     Code Status (Patient has no pulse and is not breathing):    CPR (Attempt to Resuscitate)     Medical Interventions (Patient has pulse or is breathing):    Full Support     Level Of Support Discussed With:    Patient       Future Appointments   Date Time Provider Department Center   9/9/2025  8:40 AM Dayo Chavez MD [facility]       Additional Instructions for the Follow-ups that You Need to Schedule       Ambulatory Referral to Home Health   As directed      Face to Face Visit Date: 4/17/2025   Follow-up provider for Plan of Care?: I treated the patient in an acute care facility and will not continue treatment after discharge.   Follow-up provider: LAURITA SHEPHERD [[id number]]   Reason/Clinical Findings: vertigo, acute debility, acute bronchitis   Describe mobility limitations that make leaving home difficult: impaired  balance/endurance/activity tolernance/strength, SOB   Nursing/Therapeutic Services Requested: Physical Therapy Occupational Therapy   Frequency: 1 Week 1                      Sulaiman Bear MD  04/17/25      Time Spent on Discharge:  I spent 35 minutes on this discharge activity which included: face-to-face encounter with the patient, reviewing the data in the system, coordination of the care with the nursing staff as well as consultants, documentation, and entering orders.            Electronically signed by Sulaiman Bear MD at 04/17/25 9530

## 2025-04-17 NOTE — DISCHARGE INSTR - APPOINTMENTS
You have an appointment with Noe Davenport MD on April 22, 2025 @ 1:00 PM.   Call them if you have any questions. Phone: [phone] [address] [facility] [address]

## 2025-04-17 NOTE — DISCHARGE PLACEMENT REQUEST
MicahJanet (64 y.o. Female)    To [facility]  From Sophie Camacho RN Case Mgr [facility]  [phone]    Pt will call to schedule     [facility]  [address] [facility] [address]  Phone:  [phone]  Fax:  [phone] Date: 2025      Ambulatory Referral to Physical Therapy for Evaluation & Treatment     Patient:  Janet Obrien MRN:  [id number]   [address] [facility] [address] :  1960  SSN:    Phone: [phone] Sex:  F      INSURANCE PAYOR PLAN GROUP # SUBSCRIBER ID   Primary:    [facility] [id number]   [id number]      Referring Provider Information:  BIJAN BARTHOLOMEW Phone: [phone] Fax: [phone]       Referral Information:   # Visits:  1 Referral Type: Physical Therapy [AE1]   Urgency:  Routine Referral Reason: Specialty Services Required   Start Date: 2025 End Date:  To be determined by Insurer   Diagnosis: Exacerbation of asthma, unspecified asthma severity, unspecified whether persistent (J45.901 [ICD-10-CM] 493.92 [ICD-9-CM])  Respiratory failure with hypoxia, unspecified chronicity (J96.91 [ICD-10-CM] 518.81 [ICD-9-CM])  Acute bronchiolitis due to human metapneumovirus (J21.1 [ICD-10-CM] 466.19 [ICD-9-CM])  Debility (R53.81 [ICD-10-CM] 799.3 [ICD-9-CM])  Vertigo (R42 [ICD-10-CM] 780.4 [ICD-9-CM])      Refer to Dept:   Refer to Provider:   Refer to Provider Phone:   Refer to Facility:       Specialty needed: Evaluate and treat  Follow-up needed: Yes     This document serves as a request of services and does not constitute Insurance authorization or approval of services.  To determine eligibility, please contact the members Insurance carrier to verify and review coverage.     If you have medical questions regarding this request for services. Please contact [facility] at [phone] during normal business hours.        Verbal Order Mode: Telephone with readback   Authorizing Provider: Chema  "Sulaiman Goyal MD  Authorizing Provider's NPI: [id number]     Order Entered By: Sophie Camacho RN 4/17/2025 11:17 AM     Electronically signed by: Sulaiman Bear MD 4/17/2025 11:21 AM          Date of Birth   1960    Social Security Number       Address   [address] [place] [address]    Home Phone   [phone]    MRN   [id number]       Rastafari   None    Marital Status                               Admission Date   4/10/2025    Admission Type   Emergency    Admitting Provider   Sulaiman Bear MD    Attending Provider       Department, Room/Bed   [facility], N625/1       Discharge Date   4/17/2025    Discharge Disposition   Home or Self Care    Discharge Destination                                 Attending Provider: (none)   Allergies: Penicillins, Pregabalin, Cephalexin, Chlorhexidine, Sulfa Antibiotics, Adhesive Tape, Darvon [Propoxyphene], Diazepam    Isolation: None   Infection: None   Code Status: CPR    Ht: 165.1 cm (65\")   Wt: 102 kg (225 lb)    Admission Cmt: None   Principal Problem: Acute bronchiolitis due to human metapneumovirus [J21.1]                   Active Insurance as of 4/10/2025       Primary Coverage       Payor Plan Insurance Group Employer/Plan Group    [employer] [place] [employer] [place]ICA[place]        Payor Plan Address Payor Plan Phone Number Payor Plan Fax Number Effective Dates    [address] [address] [phone]  7/21/2016 - None Entered    [place] [address]         Subscriber Name Subscriber Birth Date Member ID       BETSY JAMESON 1960 [id number]                     Emergency Contacts        (Rel.) Home Phone Work Phone Mobile Phone    Gage Alicea (Son) [phone] -- --    Toni Robles (Son) -- -- [phone]    LISA PEOPLES (Brother) -- -- [phone]    Ness Bach (Other) -- -- [phone]    ebenezer peoples (Other) -- -- [phone]              Discharge Summary    No notes of this type exist for " this encounter.

## 2025-04-18 NOTE — OUTREACH NOTE
Prep Survey      Flowsheet Row Responses   [facility] facility patient discharged from? [facility]   Is LACE score < 7 ? No   Eligibility Readm Mgmt   Discharge diagnosis Acute bronchiolitis due to human metapneumovirus   Does the patient have one of the following disease processes/diagnoses(primary or secondary)? Other   Does the patient have Home health ordered? No   Is there a DME ordered? No   Prep survey completed? Yes            Jackie KIMBLE - Registered Nurse

## 2025-04-22 ENCOUNTER — READMISSION MANAGEMENT (OUTPATIENT)
Dept: CALL CENTER | Facility: HOSPITAL | Age: 65
End: 2025-04-22
Payer: COMMERCIAL

## 2025-04-22 NOTE — OUTREACH NOTE
Medical Week 1 Survey      Flowsheet Row Responses   [facility] patient discharged from? [place]   Does the patient have one of the following disease processes/diagnoses(primary or secondary)? Other   Week 1 attempt successful? Yes   Call start time 1014   Call end time 1015   Discharge diagnosis Acute bronchiolitis due to human metapneumovirus   Meds reviewed with patient/caregiver? Yes   Is the patient having any side effects they believe may be caused by any medication additions or changes? No   Does the patient have all medications ordered at discharge? Yes   Is the patient taking all medications as directed (includes completed medication regime)? Yes   Does the patient have a primary care provider?  Yes   Does the patient have an appointment with their PCP within 7 days of discharge? Yes   Has the patient kept scheduled appointments due by today? Yes   Has home health visited the patient within 72 hours of discharge? N/A   Psychosocial issues? No   Did the patient receive a copy of their discharge instructions? Yes   Nursing interventions Reviewed instructions with patient   What is the patient's perception of their health status since discharge? Improving   Is the patient/caregiver able to teach back the hierarchy of who to call/visit for symptoms/problems? PCP, Specialist, Home health nurse, Urgent Care, ED, 911 Yes   Week 1 call completed? Yes   Graduated Yes   Wrap up additional comments Pt doing a little better and has seen PCP for f/u.   Call end time 1015            Mallory KIMBLE - Registered Nurse

## 2025-05-15 ENCOUNTER — HOSPITAL ENCOUNTER (EMERGENCY)
Facility: HOSPITAL | Age: 65
Discharge: HOME | End: 2025-05-15
Attending: EMERGENCY MEDICINE
Payer: COMMERCIAL

## 2025-05-15 VITALS
TEMPERATURE: 98 F | HEIGHT: 65 IN | WEIGHT: 237 LBS | SYSTOLIC BLOOD PRESSURE: 147 MMHG | DIASTOLIC BLOOD PRESSURE: 92 MMHG | HEART RATE: 95 BPM | RESPIRATION RATE: 18 BRPM | OXYGEN SATURATION: 97 % | BODY MASS INDEX: 39.49 KG/M2
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
## 2025-05-15 PROCEDURE — 99283 EMERGENCY DEPT VISIT LOW MDM: CPT

## 2025-05-15 PROCEDURE — 99282 EMERGENCY DEPT VISIT SF MDM: CPT | Performed by: EMERGENCY MEDICINE

## 2025-05-15 RX ORDER — DOXYCYCLINE 100 MG/1
100 CAPSULE ORAL 2 TIMES DAILY
Qty: 20 CAPSULE | Refills: 0 | Status: SHIPPED | OUTPATIENT
Start: 2025-05-15 | End: 2025-05-15 | Stop reason: SDUPTHER

## 2025-05-15 RX ORDER — AZITHROMYCIN 250 MG/1
250 TABLET, FILM COATED ORAL DAILY
Qty: 6 TABLET | Refills: 0 | Status: SHIPPED | OUTPATIENT
Start: 2025-05-15 | End: 2025-05-21

## 2025-05-15 RX ORDER — FLUCONAZOLE 150 MG/1
150 TABLET ORAL ONCE
Qty: 1 TABLET | Refills: 0 | Status: SHIPPED | OUTPATIENT
Start: 2025-05-15 | End: 2025-05-15

## 2025-05-15 RX ORDER — METRONIDAZOLE 500 MG/1
500 TABLET ORAL 2 TIMES DAILY
Qty: 14 TABLET | Refills: 0 | Status: SHIPPED | OUTPATIENT
Start: 2025-05-15 | End: 2025-05-22

## 2025-05-15 NOTE — ED PROVIDER NOTES
Pt Name: Janet Obrien  MRN: [id number]  : 1960  Date of Encounter: 5/15/2025    PCP: Noe Davenport MD      Subjective    History of Present Illness:    Chief Complaint: Cat scratch, cat bite    History of Present Illness: Janet Obrien is a 64 y.o. female who presents to the ER complaining of attack by cat causing a scratch antibiotic that started just prior to arrival patient states she was just feeding a stray cat when the neighbors dog came over causing the cat to get upset and attacked the patient's leg because the cat scratch and cat bite.  Patient states she is already had rabies vaccine 10 years ago with another animal bite incident..  Pain is described as Dull, Constant, and does not radiate  Patient rates pain as a 5 on a ten scale.    Triage Vitals:    ED Triage Vitals [05/15/25 1304]   Temp Heart Rate Resp BP SpO2   98 °F (36.7 °C) 95 18 147/92 97 %      Temp src Heart Rate Source Patient Position BP Location FiO2 (%)   Oral Monitor Sitting Right arm --       Nurses Notes reviewed and agree, including vitals, allergies, social history and prior medical history.     Penicillins, Pregabalin, Cephalexin, Chlorhexidine, Sulfa antibiotics, Adhesive tape, Darvon [propoxyphene], and Diazepam    Past Medical History:   Diagnosis Date    Anxiety     Arthritis     Asthma     Back pain     Cancer     SQUAMOUS CELL/ BASAL CELL ON LIP, under the nose as well    Chronic bronchitis     Depression     Diabetes mellitus     type 2    Fibromyalgia     High triglycerides     Hyperlipidemia     Hypertension     Hypothyroidism     Migraine     MVP (mitral valve prolapse)     OA (osteoarthritis)     Ovarian cyst     Peripheral autonomic neuropathy     Pleurisy     Pneumonia     Rheumatism     RLS (restless legs syndrome)     Sleep apnea     Mild form per patient. recommended mouthpiece       Past Surgical History:   Procedure Laterality Date    APPENDECTOMY      CATARACT EXTRACTION W/ INTRAOCULAR LENS  "IMPLANT Right 2019    Procedure: CATARACT PHACO EXTRACTION WITH INTRAOCULAR LENS IMPLANT RIGHT;  Surgeon: Carl Babb MD;  Location:  [facility];  Service: Ophthalmology    CATARACT EXTRACTION W/ INTRAOCULAR LENS IMPLANT Left 10/07/2019    Procedure: CATARACT PHACO EXTRACTION WITH INTRAOCULAR LENS IMPLANT LEFT;  Surgeon: Carl Babb MD;  Location:  [facility];  Service: Ophthalmology     SECTION  , 1997    x 2     DILATATION AND CURETTAGE      HAND SURGERY Right     \"Pinched nerve surgery\"    HAND SURGERY Left     Joint removed secondary to arthritis    HYSTERECTOMY      ''still have one ovary''    KNEE SURGERY Right 2021    arthroscopy and partial medial meniscectomy Dr. Chavez    OOPHORECTOMY      THYROIDECTOMY  1985    TONSILLECTOMY AND ADENOIDECTOMY      TOTAL KNEE ARTHROPLASTY Left 03/15/2023    Procedure: TOTAL KNEE ARTHROPLASTY -  LEFT;  Surgeon: Dayo Chavez MD;  Location:  [facility];  Service: Orthopedics;  Laterality: Left;    TOTAL KNEE ARTHROPLASTY Right 2023    Procedure: TOTAL KNEE ARTHROPLASTY WITH BERNICE ROBOT - RIGHT;  Surgeon: Dayo Chavez MD;  Location:  [facility];  Service: Robotics - Ortho;  Laterality: Right;       Social History     Socioeconomic History    Marital status:    Tobacco Use    Smoking status: Former     Current packs/day: 0.00     Average packs/day: 0.3 packs/day for 26.6 years (6.6 ttl pk-yrs)     Types: Cigarettes     Start date:      Quit date: 2004     Years since quittin.8     Passive exposure: Past    Smokeless tobacco: Never   Vaping Use    Vaping status: Never Used   Substance and Sexual Activity    Alcohol use: No    Drug use: No    Sexual activity: Defer     Partners: Male     Birth control/protection: Abstinence       Family History   Problem Relation Age of Onset    Hypertension Mother     Heart attack Mother     Fibromyalgia Mother     Hyperlipidemia Mother     Thyroid " disease Mother     Irritable bowel syndrome Mother     Heart attack Father     Cancer Brother     Stomach cancer Brother     Breast cancer Maternal Grandmother     Cancer Maternal Grandmother     Thyroid disease Maternal Grandmother     Cancer Paternal Grandmother     Diabetes Maternal Aunt     Colon cancer Neg Hx     Cirrhosis Neg Hx     Liver cancer Neg Hx     Liver disease Neg Hx        REVIEW OF SYSTEMS:     All systems reviewed and not pertinent unless noted.    Review of Systems   Skin:  Positive for wound.   All other systems reviewed and are negative.      Objective    Physical Exam  Vitals and nursing note reviewed.   Constitutional:       Appearance: Normal appearance.   HENT:      Head: Normocephalic and atraumatic.   Eyes:      Extraocular Movements: Extraocular movements intact.      Pupils: Pupils are equal, round, and reactive to light.   Cardiovascular:      Rate and Rhythm: Normal rate and regular rhythm.      Pulses: Normal pulses.      Heart sounds: Normal heart sounds.   Pulmonary:      Effort: Pulmonary effort is normal.      Breath sounds: Normal breath sounds.   Abdominal:      General: Abdomen is flat. Bowel sounds are normal.      Palpations: Abdomen is soft.   Musculoskeletal:      Cervical back: Normal range of motion and neck supple.   Skin:     Capillary Refill: Capillary refill takes less than 2 seconds.      Findings: Abrasion and wound present.   Neurological:      General: No focal deficit present.      Mental Status: She is alert and oriented to person, place, and time. Mental status is at baseline.      GCS: GCS eye subscore is 4. GCS verbal subscore is 5. GCS motor subscore is 6.      Sensory: Sensation is intact.      Motor: Motor function is intact.      Gait: Gait is intact.   Psychiatric:         Attention and Perception: Attention and perception normal.         Mood and Affect: Mood and affect normal.         Speech: Speech normal.         Behavior: Behavior normal. Behavior  is cooperative.                           Procedures    ED Course:    No orders to display            Orders placed during this visit:    Orders Placed This Encounter   Procedures    Wound Care       LAB Results:    Lab Results (last 24 hours)       ** No results found for the last 24 hours. **             If labs were ordered, I have independently reviewed the results and considered them in the diagnosis and treatment plan for the patient    RADIOLOGY    No radiology results from the last 24 hrs     If I have ordered, I have independently reviewed the above noted radiographic studies.  Please see the radiologist dictation for the official interpretation    Medications given to patient in the ER    Medications - No data to display    AS OF 18:03 EDT VITALS:    BP - 147/92  HR - 95  TEMP - 98 °F (36.7 °C) (Oral)  O2 SATS - 97%         Shared Decision Making: After my consideration of the clinical presentation and laboratory/radiology studies obtained, I have discussed the findings with the patient/patient representative who is in agreement with the treatment plan and final disposition. Risks and benefits of discharge and/or observation admission were discussed.  Final disposition of the patient will be discharged.  Patient is requested to follow-up with primary care provider and specialist in 1 week following final discharge.      Medical Decision Making  Janet Obrien is a 64 y.o. female who presents to the ER complaining of attack by cat causing a scratch antibiotic that started just prior to arrival patient states she was just feeding a stray cat when the neighbors dog came over causing the cat to get upset and attacked the patient's leg because the cat scratch and cat bite.  Patient states she is already had rabies vaccine 10 years ago with another animal bite incident..  Pain is described as Dull, Constant, and does not radiate  Patient rates pain as a 5 on a ten scale.    Physical exam without abnormality  except for cat bite and cat scratch on the left lower extremity.  Patient previously had rabies vaccine given after a previous animal bite incident.  Patient's wounds were cleaned with chlorhexidine soap, dressed with clean dress.  Patient was discharged with appropriate antibiotic.    DDX: includes but is not limited to: Animal bite, cat scratch, other    Problems Addressed:  Cat bite, initial encounter: complicated acute illness or injury  Cat scratch: complicated acute illness or injury    Amount and/or Complexity of Data Reviewed  Discussion of management or test interpretation with external provider(s): Discussed assessment, treatment and plan with ER attending    Risk  Prescription drug management.  Risk Details: I have discussed with patient the finding of the test preformed today. Patient has been diagnosed with cat bite, cat scratch and will be discharged home. Advised on return precautions the importance of close follow-up with primary care provider.  Strict return precautions have been given and patient verbalizes understanding        Final diagnoses:   Cat bite, initial encounter   Cat scratch       Please note that portions of this document were completed using voice recognition dictation software.       Leonardo Lora, APRN  05/15/25 4451

## 2025-05-18 ENCOUNTER — TELEPHONE (OUTPATIENT)
Dept: EMERGENCY DEPT | Facility: HOSPITAL | Age: 65
End: 2025-05-18
Payer: COMMERCIAL

## 2025-05-18 RX ORDER — AZITHROMYCIN 250 MG/1
250 TABLET, FILM COATED ORAL DAILY
Qty: 2 TABLET | Refills: 0 | Status: SHIPPED | OUTPATIENT
Start: 2025-05-18

## 2025-05-18 NOTE — TELEPHONE ENCOUNTER
Patient states she dropped 2 azithromycin tablets down the toilet on accident. Was prescribed this for a cat scratch. Asked that she get a prescription for these 2 tablets. This was sent in.

## 2025-06-11 ENCOUNTER — TRANSCRIBE ORDERS (OUTPATIENT)
Dept: ADMINISTRATIVE | Facility: HOSPITAL | Age: 65
End: 2025-06-11
Payer: COMMERCIAL

## 2025-06-11 DIAGNOSIS — Z12.31 OTHER SCREENING MAMMOGRAM: Primary | ICD-10-CM

## 2025-06-17 NOTE — TELEPHONE ENCOUNTER
Had cortisone injection in both knees but she is having issues with the left knee she says she isn't able to go down the stairs or able to get in a car it feels like its popping out of place. In extreme pain. Pain medication is not helping at all    No

## 2025-07-01 ENCOUNTER — HOSPITAL ENCOUNTER (OUTPATIENT)
Dept: MAMMOGRAPHY | Facility: HOSPITAL | Age: 65
Discharge: HOME | End: 2025-07-01
Admitting: INTERNAL MEDICINE
Payer: COMMERCIAL

## 2025-07-01 DIAGNOSIS — Z12.31 OTHER SCREENING MAMMOGRAM: ICD-10-CM

## 2025-07-01 PROCEDURE — 77067 SCR MAMMO BI INCL CAD: CPT

## 2025-07-01 PROCEDURE — 77063 BREAST TOMOSYNTHESIS BI: CPT

## (undated) DEVICE — SUT MONOCRYL PLS ANTIB UND 3/0  PS1 27IN

## (undated) DEVICE — PK KN TOTL 10

## (undated) DEVICE — GLV SURG SENSICARE W/ALOE PF LF 8 STRL

## (undated) DEVICE — Device

## (undated) DEVICE — HP CONCL INTREPID COAX I/A CRV .3MM

## (undated) DEVICE — BNDG ELAS W/CLIP 6IN 10YD LF STRL

## (undated) DEVICE — EYE PAK* 1033 NON-WOVEN OPHTHALMIC DRAPE APERTURE POUCHES: Brand: ALCON EYE-PAK

## (undated) DEVICE — SOL IRRIG H2O 1000ML STRL

## (undated) DEVICE — POST OP EYE CARE KIT: Brand: MEDLINE

## (undated) DEVICE — 3 BONE CEMENT MIXER: Brand: MIXEVAC

## (undated) DEVICE — PAD ARMBRD SURG CONVOL 7.5X20X2IN

## (undated) DEVICE — TRAP FLD MINIVAC MEGADYNE 100ML

## (undated) DEVICE — DELFI MATCHING LIMB PROTECTION SLEEVES (MLPS) HELP PROTECT THE PATIENT’S LIMB FROM POSSIBLE WRINKLING, PINCHING AND SHEARING OF SKIN AND SOFT TISSUES OF THE LIMB.EACH DELFI MATCHING LIMB PROTECTION SLEEVE IS INTENDED FOR USE WITH A SPECIFIC DELFI TOURNIQUET CUFF. THIS SLEEVE IS SPECIFICALLY FOR THIGH.: Brand: MATCHING LIMB PROTECTION SLEEVES - VARI-FIT

## (undated) DEVICE — CONTAINER,SPECIMEN,OR STERILE,4OZ: Brand: MEDLINE

## (undated) DEVICE — DRSNG PAD ABD 8X10IN STRL

## (undated) DEVICE — PIN DRL NOHEAD TROC 3.2X75MM

## (undated) DEVICE — 15 DEG. MICROKNIFE - 3MM: Brand: SHARPOINT

## (undated) DEVICE — 450 ML BOTTLE OF 0.05% CHLORHEXIDINE GLUCONATE IN 99.95% STERILE WATER FOR IRRIGATION, USP AND APPLICATOR.: Brand: IRRISEPT ANTIMICROBIAL WOUND LAVAGE

## (undated) DEVICE — SCRW HEX PERSONA FML 2.5X25MM PK/2
Type: IMPLANTABLE DEVICE | Site: KNEE | Status: NON-FUNCTIONAL
Removed: 2023-03-15

## (undated) DEVICE — SYS CLS SKIN PREMIERPRO EXOFINFUSION 22CM

## (undated) DEVICE — KT PUMP INFUBLOCK MDL 2100 PMKITSOLIS

## (undated) DEVICE — CANN IRR/INJ AIR ANT CHAMBER 6MM BEND 27G

## (undated) DEVICE — TBG PENCL TELESCP MEGADYNE SMOKE EVAC 10FT

## (undated) DEVICE — SYR LUERLOK 5CC

## (undated) DEVICE — BLANKT WARM UPPR/BDY ARM/OUT 57X196CM

## (undated) DEVICE — GLV SURG PREMIERPRO MIC LTX PF SZ8 BRN

## (undated) DEVICE — APPL DURAPREP IODOPHOR APL 26ML

## (undated) DEVICE — STERILE PVP: Brand: MEDLINE INDUSTRIES, INC.

## (undated) DEVICE — CLEARCUT® HP2 SLIT KNIFE INTREPID MICRO-COAXIAL SYSTEM 2.4 DB: Brand: CLEARCUT®; INTREPID

## (undated) DEVICE — PATIENT RETURN ELECTRODE, SINGLE-USE, CONTACT QUALITY MONITORING, ADULT, WITH 9FT CORD, FOR PATIENTS WEIGING OVER 33LBS. (15KG): Brand: MEGADYNE

## (undated) DEVICE — STRYKER PERFORMANCE SERIES SAGITTAL BLADE: Brand: STRYKER PERFORMANCE SERIES

## (undated) DEVICE — ANTIBACTERIAL UNDYED BRAIDED (POLYGLACTIN 910), SYNTHETIC ABSORBABLE SUTURE: Brand: COATED VICRYL

## (undated) DEVICE — UNDERCAST PADDING: Brand: DEROYAL

## (undated) DEVICE — DRSNG SURESITE123 2.4X2.8IN

## (undated) DEVICE — SYR LUERLOK 30CC